# Patient Record
Sex: FEMALE | Race: AMERICAN INDIAN OR ALASKA NATIVE
[De-identification: names, ages, dates, MRNs, and addresses within clinical notes are randomized per-mention and may not be internally consistent; named-entity substitution may affect disease eponyms.]

---

## 2017-10-04 ENCOUNTER — HOSPITAL ENCOUNTER (INPATIENT)
Dept: HOSPITAL 31 - C.ER | Age: 52
LOS: 13 days | Discharge: HOME | DRG: 699 | End: 2017-10-17
Attending: INTERNAL MEDICINE | Admitting: INTERNAL MEDICINE
Payer: MEDICARE

## 2017-10-04 VITALS — BODY MASS INDEX: 18.7 KG/M2

## 2017-10-04 DIAGNOSIS — Z79.52: ICD-10-CM

## 2017-10-04 DIAGNOSIS — Z16.24: ICD-10-CM

## 2017-10-04 DIAGNOSIS — Z89.612: ICD-10-CM

## 2017-10-04 DIAGNOSIS — E80.6: ICD-10-CM

## 2017-10-04 DIAGNOSIS — N39.0: ICD-10-CM

## 2017-10-04 DIAGNOSIS — Z89.611: ICD-10-CM

## 2017-10-04 DIAGNOSIS — Z79.01: ICD-10-CM

## 2017-10-04 DIAGNOSIS — J45.909: ICD-10-CM

## 2017-10-04 DIAGNOSIS — E87.2: ICD-10-CM

## 2017-10-04 DIAGNOSIS — I50.9: ICD-10-CM

## 2017-10-04 DIAGNOSIS — Z95.5: ICD-10-CM
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
## 2017-10-04 DIAGNOSIS — D64.9: ICD-10-CM

## 2017-10-04 DIAGNOSIS — I11.0: ICD-10-CM

## 2017-10-04 DIAGNOSIS — E11.65: ICD-10-CM

## 2017-10-04 DIAGNOSIS — T83.511A: Primary | ICD-10-CM

## 2017-10-04 DIAGNOSIS — D86.89: ICD-10-CM

## 2017-10-04 DIAGNOSIS — Z86.718: ICD-10-CM

## 2017-10-04 DIAGNOSIS — I48.0: ICD-10-CM

## 2017-10-04 DIAGNOSIS — E11.51: ICD-10-CM

## 2017-10-04 DIAGNOSIS — K27.9: ICD-10-CM

## 2017-10-04 DIAGNOSIS — R19.7: ICD-10-CM

## 2017-10-04 DIAGNOSIS — Z95.810: ICD-10-CM

## 2017-10-04 DIAGNOSIS — I42.0: ICD-10-CM

## 2017-10-04 DIAGNOSIS — E87.6: ICD-10-CM

## 2017-10-04 DIAGNOSIS — E78.00: ICD-10-CM

## 2017-10-04 DIAGNOSIS — N31.9: ICD-10-CM

## 2017-10-04 DIAGNOSIS — B96.1: ICD-10-CM

## 2017-10-04 LAB
ALBUMIN/GLOB SERPL: 0.9 {RATIO} (ref 1–2.1)
ALP SERPL-CCNC: 700 U/L (ref 38–126)
ALT SERPL-CCNC: 68 U/L (ref 9–52)
AST SERPL-CCNC: 88 U/L (ref 14–36)
BACTERIA #/AREA URNS HPF: (no result) /[HPF]
BASOPHILS # BLD AUTO: 0 K/UL (ref 0–0.2)
BASOPHILS NFR BLD: 0 % (ref 0–2)
BILIRUB SERPL-MCNC: 7.1 MG/DL (ref 0.2–1.3)
BILIRUB UR-MCNC: (no result) MG/DL
BUN SERPL-MCNC: 13 MG/DL (ref 7–17)
CALCIUM SERPL-MCNC: 8.6 MG/DL (ref 8.6–10.4)
CHLORIDE SERPL-SCNC: 101 MMOL/L (ref 98–107)
CO2 SERPL-SCNC: 21 MMOL/L (ref 22–30)
COLOR UR: YELLOW
EOSINOPHIL # BLD AUTO: 0.1 K/UL (ref 0–0.7)
EOSINOPHIL NFR BLD: 1.9 % (ref 0–4)
EOSINOPHIL NFR BLD: 4 % (ref 0–4)
ERYTHROCYTE [DISTWIDTH] IN BLOOD BY AUTOMATED COUNT: 15 % (ref 11.5–14.5)
GLOBULIN SER-MCNC: 4.2 GM/DL (ref 2.2–3.9)
GLUCOSE SERPL-MCNC: 106 MG/DL (ref 65–105)
GLUCOSE UR STRIP-MCNC: NORMAL MG/DL
HCT VFR BLD CALC: 36.1 % (ref 34–47)
KETONES UR STRIP-MCNC: NEGATIVE MG/DL
LEUKOCYTE ESTERASE UR-ACNC: (no result) LEU/UL
LYMPHOCYTES # BLD AUTO: 4.5 K/UL (ref 1–4.3)
LYMPHOCYTES NFR BLD AUTO: 78.9 % (ref 20–40)
MCH RBC QN AUTO: 33.6 PG (ref 27–31)
MCHC RBC AUTO-ENTMCNC: 34.3 G/DL (ref 33–37)
MCV RBC AUTO: 98 FL (ref 81–99)
MONOCYTES # BLD: 0.6 K/UL (ref 0–0.8)
MONOCYTES NFR BLD: 11.4 % (ref 0–10)
NEUTROPHILS NFR BLD AUTO: 78 % (ref 50–75)
NRBC BLD AUTO-RTO: 0.1 % (ref 0–2)
PH UR STRIP: 5 [PH] (ref 5–8)
PLATELET # BLD: 280 K/UL (ref 130–400)
PMV BLD AUTO: 9.7 FL (ref 7.2–11.7)
POTASSIUM SERPL-SCNC: 3 MMOL/L (ref 3.6–5.2)
PROT SERPL-MCNC: 7.8 G/DL (ref 6.3–8.3)
PROT UR STRIP-MCNC: (no result) MG/DL
RBC # UR STRIP: (no result) /UL
RBC #/AREA URNS HPF: 72 /HPF (ref 0–3)
SODIUM SERPL-SCNC: 136 MMOL/L (ref 132–148)
SP GR UR STRIP: 1.02 (ref 1–1.03)
TOTAL CELLS COUNTED BLD: 100
UROBILINOGEN UR-MCNC: 4 MG/DL (ref 0.2–1)
WBC # BLD AUTO: 5.7 K/UL (ref 4.8–10.8)
WBC #/AREA URNS HPF: 937 /HPF (ref 0–5)

## 2017-10-04 RX ADMIN — DIPHENHYDRAMINE HYDROCHLORIDE PRN MG: 50 INJECTION INTRAMUSCULAR; INTRAVENOUS at 22:48

## 2017-10-04 RX ADMIN — INSULIN ASPART SCH: 100 INJECTION, SOLUTION INTRAVENOUS; SUBCUTANEOUS at 22:56

## 2017-10-04 NOTE — CP.PCM.HP
History of Present Illness





- History of Present Illness


History of Present Illness: 





CC: abdominal pain and wosening LFTS





HPI: 52 year old AA female, well known to me with  past medical history 

includes heapatic sarcoidosis (with diffuse invasion into liver), HTN, CHF, DM, 

bilateral above-knee amputation, and pacemaker, who is complanit with diet, 

medicationa nd follow uppresents to the ED for evaluation of right-sided 

abdominal pain which began a couple days ago. Patient states she was evaluated 

in [facility] yesterday and was found to have elevated liver 

functions and a urinary tract infection. Patient was later discharged. Patient 

states that Dr. Olmedo did not want patient to be discharged and advised her to 

present to this ED for further evaluation. Patient also reports chills, 

subjective fever, dry cough and nausea. She denies trouble breathing, 

vomiting.She also has nausea, no vomitting, chills, rigors not known fever, she 

aaaalso has chronic neurogenic bladder with foleys in place with cloudy urine 

in bag. She c/o abdominal pain, hip pain, b/l stump pain. denies any cough, 

sore throat, chest pain, orthopnea, PND 





Present on Admission





- Present on Admission


Any Indicators Present on Admission: Yes





Review of Systems





- Review of Systems


Systems not reviewed;Unavailable: Acuity of Condition





- Constitutional


Constitutional: absent: As Per HPI, Anorexia, Chills, Daytime Sleepiness, 

Excessive Sweating, Fatigue, Fever, Frequent Falls, Headache, Increased Appetite

, Lethargy, Malaise, Night Sweats, Snoring, Sleep Apnea, Weight Gain, Weight 

Loss, Weakness, Other





- EENT


Eyes: absent: As Per HPI, Blind Spots, Blurred Vision, Change in Vision, 

Decreased Night Vision, Diplopia, Discharge, Dry Eye, Exophthalmos, Floaters, 

Irritation, Itchy Eyes, Loss of Peripheral Vision, Pain, Photophobia, Requires 

Corrective Lenses, Sees Flashes, Spots in Vision, Tunnel Vision, Other Visual 

Disturbances, Loss of Vision, Other


Nose/Mouth/Throat: absent: As Per HPI, Epistaxis, Nasal Congestion, Nasal 

Discharge, Nasal Obstruction, Nasal Trauma, Nose Pain, Post Nasal Drip, Sinus 

Pain, Sinus Pressure, Bleeding Gums, Change in Voice, Dental Pain, Dry Mouth, 

Dysphagia, Halitosis, Hoarsness, Lip Swelling, Mouth Lesions, Mouth Pain, 

Odynophagia, Sore Throat, Throat Swelling, Tongue Swelling, Facial Pain, Neck 

Pain, Neck Mass, Other





- Cardiovascular


Cardiovascular: absent: As Per HPI, Acrocyanosis, Chest Pain, Chest Pain at Rest

, Chest Pain with Activity, Claudication, Diaphoresis, Dyspnea, Dyspnea on 

Exertion, Edema, Irregular Heart Rhythm, Pain Radiating to Arm/Neck/Jaw, Leg 

Edema, Leg Ulcers, Lightheadedness, Orthopnea, Palpitations, Paroxysmal 

Nocturnal Dyspnea, Pedal Edema, Radiating Pain, Rapid Heart Rate, Slow Heart 

Rate, Syncope, Other





- Gastrointestinal


Gastrointestinal: Abdominal Pain





- Genitourinary


Additional comments: 





neurogenic bladder


foleys in place





Past Patient History





- Infectious Disease


Hx of Infectious Diseases: None





- Tetanus Immunizations


Tetanus Immunization: Unknown





- Past Medical History & Family History


Past Medical History?: Yes





- Past Social History


Smoking Status: Never Smoked





- CARDIAC


Hx Cardia Arrhythmia: Yes


Hx Congestive Heart Failure: Yes


Hx Hypercholesterolemia: Yes


Hx Hypertension: Yes





- PULMONARY


Hx Asthma: Yes (NO INHALER-ON PREDNISONE DAILY PO.)





- NEUROLOGICAL


Hx Neurological Disorder: No





- HEENT


Hx HEENT Problems: Yes


Hx Cataracts: Yes (BILAT.)





- RENAL


Hx Chronic Kidney Disease: No





- ENDOCRINE/METABOLIC


Hx Endocrine Disorders: Yes


Hx Diabetes Mellitus Type 1: Yes





- HEMATOLOGICAL/ONCOLOGICAL


Hx Anemia: Yes





- INTEGUMENTARY


Hx Dermatological Problems: Yes





- MUSCULOSKELETAL/RHEUMATOLOGICAL


Hx Musculoskeletal Disorders: Yes


Hx Falls: Yes


Hx Unsteady Gait: Yes (PT. W/C BOUND BILAT AKA)


Other/Comment: ANABELLA ABOVE KNEE AMPUTATIONS..





- GASTROINTESTINAL


Hx Gall Bladder Disease: Yes





- GENITOURINARY/GYNECOLOGICAL


Hx Genitourinary Disorders: Yes


Hx Urinary Tract Infection: Yes


Other/Comment: PT HAS LONG TERM GUTIERREZ





- PSYCHIATRIC


Hx Substance Use: No





- SURGICAL HISTORY


Hx Appendectomy: Yes


Hx Cholecystectomy: Yes


Hx Coronary Stent: Yes





- ANESTHESIA


Hx Anesthesia: Yes


Hx Anesthesia Reactions: No


Hx Malignant Hyperthermia: No





Meds


Allergies/Adverse Reactions: 


 Allergies











Allergy/AdvReac Type Severity Reaction Status Date / Time


 


avocado Allergy Severe ANAPHYLAXIS Verified 10/04/17 17:28


 


banana Allergy Severe ANAPHYLAXIS Verified 10/04/17 17:28


 


cherry Allergy Severe ANAPHYLAXIS Verified 10/04/17 17:28


 


ketorolac [From Toradol] Allergy   Verified 10/04/17 17:28














Physical Exam





- Constitutional


Additional comments: 





elderly female in mild distress due to abdominal danitza n and jaundice





- Eye Exam


Eye Exam: Scleral icterus





- ENT Exam


ENT Exam: Mucous Membranes Moist, Normal Exam





- Neck Exam


Neck exam: Positive for: Normal Inspection





- Respiratory Exam


Respiratory Exam: Clear to Auscultation Bilateral, NORMAL BREATHING PATTERN





- Cardiovascular Exam


Cardiovascular Exam: REGULAR RHYTHM


Additional comments: 





S3 postive





- GI/Abdominal Exam


GI & Abdominal Exam: Organomegaly, Tenderness





- Rectal Exam


Rectal Exam: Deferred





Results





- Vital Signs


Recent Vital Signs: 





 Last Vital Signs











Temp  98 F   10/04/17 22:26


 


Pulse  56 L  10/04/17 22:26


 


Resp  20   10/04/17 22:26


 


BP  126/75   10/04/17 22:26


 


Pulse Ox  97   10/04/17 22:26














- Labs


Result Diagrams: 


 10/04/17 18:22





 10/04/17 18:22


Labs: 





 Laboratory Results - last 24 hr











  10/04/17 10/04/17 10/04/17





  18:20 18:22 18:22


 


WBC   5.7 


 


RBC   3.69 L 


 


Hgb   12.4 


 


Hct   36.1 


 


MCV   98.0  D 


 


MCH   33.6 H 


 


MCHC   34.3 


 


RDW   15.0 H 


 


Plt Count   280 


 


MPV   9.7 


 


Neut % (Auto)   7.8 L 


 


Lymph % (Auto)   78.9 H 


 


Mono % (Auto)   11.4 H 


 


Eos % (Auto)   1.9 


 


Baso % (Auto)   0.0 


 


Neut #   0.4 L 


 


Lymph #   4.5 H 


 


Mono #   0.6 


 


Eos #   0.1 


 


Baso #   0.0 


 


Neutrophils % (Manual)   78 H 


 


Lymphocytes % (Manual)   15 L 


 


Monocytes % (Manual)   3 


 


Eosinophils % (Manual)   4 


 


Platelet Estimate   Normal 


 


Anisocytosis (manual)   Slight 


 


Target Cells   Slight 


 


Sodium    136


 


Potassium    3.0 L


 


Chloride    101


 


Carbon Dioxide    21 L


 


Anion Gap    17


 


BUN    13


 


Creatinine    0.5 L


 


Est GFR ( Amer)    > 60


 


Est GFR (Non-Af Amer)    > 60


 


POC Glucose (mg/dL)   


 


Random Glucose    106 H


 


Calcium    8.6


 


Total Bilirubin    7.1 H


 


AST    88 H


 


ALT    68 H


 


Alkaline Phosphatase    700 H


 


Total Protein    7.8


 


Albumin    3.6


 


Globulin    4.2 H


 


Albumin/Globulin Ratio    0.9 L


 


Lipase    27


 


Urine Color  Yellow  


 


Urine Clarity  Hazy  


 


Urine pH  5.0  


 


Ur Specific Gravity  1.020  


 


Urine Protein  2+ H  


 


Urine Glucose (UA)  Normal  


 


Urine Ketones  Negative  


 


Urine Blood  1+ H  


 


Urine Nitrate  Positive H  


 


Urine Bilirubin  2+ H  


 


Urine Urobilinogen  4.0 H  


 


Ur Leukocyte Esterase  3+ H  


 


Urine WBC (Auto)  937 H  


 


Urine RBC (Auto)  72 H  


 


Ur Squamous Epith Cells  1  


 


Urine Bacteria  Many H  














  10/04/17





  22:41


 


WBC 


 


RBC 


 


Hgb 


 


Hct 


 


MCV 


 


MCH 


 


MCHC 


 


RDW 


 


Plt Count 


 


MPV 


 


Neut % (Auto) 


 


Lymph % (Auto) 


 


Mono % (Auto) 


 


Eos % (Auto) 


 


Baso % (Auto) 


 


Neut # 


 


Lymph # 


 


Mono # 


 


Eos # 


 


Baso # 


 


Neutrophils % (Manual) 


 


Lymphocytes % (Manual) 


 


Monocytes % (Manual) 


 


Eosinophils % (Manual) 


 


Platelet Estimate 


 


Anisocytosis (manual) 


 


Target Cells 


 


Sodium 


 


Potassium 


 


Chloride 


 


Carbon Dioxide 


 


Anion Gap 


 


BUN 


 


Creatinine 


 


Est GFR ( Amer) 


 


Est GFR (Non-Af Amer) 


 


POC Glucose (mg/dL)  196 H


 


Random Glucose 


 


Calcium 


 


Total Bilirubin 


 


AST 


 


ALT 


 


Alkaline Phosphatase 


 


Total Protein 


 


Albumin 


 


Globulin 


 


Albumin/Globulin Ratio 


 


Lipase 


 


Urine Color 


 


Urine Clarity 


 


Urine pH 


 


Ur Specific Gravity 


 


Urine Protein 


 


Urine Glucose (UA) 


 


Urine Ketones 


 


Urine Blood 


 


Urine Nitrate 


 


Urine Bilirubin 


 


Urine Urobilinogen 


 


Ur Leukocyte Esterase 


 


Urine WBC (Auto) 


 


Urine RBC (Auto) 


 


Ur Squamous Epith Cells 


 


Urine Bacteria 














Assessment & Plan


(1) Neurogenic bladder


Status: Acute   





(2) Sarcoidosis


Assessment and Plan: 


acute excerberation of he[atic sarcoidsois


most likely due to doisease activity as pt is complaint to medications


Status: Acute   





(3) Abdominal pain


Status: Acute   





(4) Hypertension


Status: Acute   





(5) Obstructive hyperbilirubinemia


Status: Acute

## 2017-10-04 NOTE — C.PDOC
History Of Present Illness


52 year old female, whose past medical history includes sarcoidosis (with 

diffuse invasion into liver), CHF, DM, bilateral above-knee amputation, and 

pacemaker, presents to the ED for evaluation of right-sided abdominal pain 

which began a couple days ago. Patient states she was evaluated in [facility] yesterday and was found to have elevated liver functions and a 

urinary tract infection. Patient was later discharged. Patient states that Dr. Olmedo did not want patient to be discharged and advised her to present to this 

ED for further evaluation. Patient also reports chills, subjective fever, dry 

cough and nausea. She denies trouble breathing, vomiting. 


Time Seen by Provider: 10/04/17 17:46


Chief Complaint (Nursing): Abdominal Pain


History Per: Patient


History/Exam Limitations: no limitations


Onset/Duration Of Symptoms: Days


Current Symptoms Are (Timing): Still Present


Location Of Pain/Discomfort: Other (right-sided )


Radiation Of Pain To:: None


Quality Of Discomfort: "Pain"


Associated Symptoms: Fever (subjective ), Chills


Additional History Per: Patient


Abnormal Vaginal Bleeding: No





Past Medical History


Reviewed: Historical Data, Nursing Documentation, Vital Signs


Vital Signs: 


 Last Vital Signs











Temp  98.9 F   10/04/17 17:23


 


Pulse  90   10/04/17 17:23


 


Resp  18   10/04/17 17:23


 


BP  168/90 H  10/04/17 17:23


 


Pulse Ox  99   10/04/17 18:16














- Medical History


PMH: Anemia, Asthma (NO INHALER-ON PREDNISONE DAILY PO.), CAD (stent x 1), 

Cardia Arrhythmia, CHF, Diabetes, Deep Vein Thrombosis, Gall Bladder Disease, 

HTN, Hypercholesterolemia


   Denies: Chronic Kidney Disease


Surgical History: Appendectomy, Cholecystectomy, Coronary Stent, Endoscopy, C-

Section





- [facility] Procedures








CENTRAL VENOUS CATHETER PLACEMENT WITH GUIDANCE (05/29/14)


CLOSED ENDOSCOPIC BIOPSY OF LARGE INTESTINE (11/19/14)


ENDOSC RETROGRADE CHOLANGIOPANCREATOGRAPHY [ERCP] (10/30/13)


ENDOSCOP INSERTION OF STENT (TUBE) INTO BILE DUCT (05/29/14)


ESOPHAGOGASTRODUODENOSCOPY [EGD] W/CLOSED BIOPSY (05/29/14)


OTHER LOCAL DESTRUC SKIN (10/30/13)


OTHER SKIN & SUBQ I   D (10/30/13)








Family History: States: Unknown Family Hx





- Social History


Hx Tobacco Use: No


Hx Alcohol Use: No


Hx Substance Use: No





- Immunization History


Hx Tetanus Toxoid Vaccination: No


Hx Influenza Vaccination: No


Hx Pneumococcal Vaccination: Yes (2015)





Review Of Systems


Constitutional: Positive for: Fever (subjective ), Chills


Respiratory: Positive for: Cough.  Negative for: Shortness of Breath, Sputum


Gastrointestinal: Positive for: Nausea, Abdominal Pain (right-sided ).  

Negative for: Vomiting





Physical Exam





- Physical Exam


Appears: Non-toxic, No Acute Distress


Skin: Normal Color, Warm, Dry


Head: Atraumatic, Normacephalic


Eye(s): bilateral: Normal Inspection


Oral Mucosa: Moist


Neck: Supple


Chest: Symmetrical, No Deformity, No Tenderness


Cardiovascular: Rhythm Regular, No Murmur


Respiratory: Normal Breath Sounds, No Rales, No Rhonchi, No Wheezing


Gastrointestinal/Abdominal: Soft, Tenderness (to right and left upper quadrants 

on palpation ), No Guarding, No Rebound, Other (diffusely rotund. suprapubic 

cathether in place, draining cloudy, dark nichelle urine )


Extremity: Normal ROM (baseline ), Capillary Refill (less than 2 seconds ), 

Other (bilateral above-knee amputation )


Neurological/Psych: Oriented x3, Normal Speech, Normal Cognition


Gait: Steady





ED Course And Treatment





- Laboratory Results


Result Diagrams: 


 10/04/17 18:22


Lab Interpretation: Abnormal (Markedly elevated alk phos 700, Urine nitrite + 

with 937 WBC and many bacteria)


O2 Sat by Pulse Oximetry: 99 (on RA)


Pulse Ox Interpretation: Normal


Progress Note: labs and UA ordered and reviewed.


Reevaluation Time: 18:52


Reassessment Condition: Improved (after pain medicaiton)





- Physician Consult Information


Time Consulting Physician Contacted: 18:00


Outcome Of Conversation: Case discussed with Dr Olmedo and Dr Dunbar. Patient 

to be admitted for treatment of hepatic sarcoidosis and UTI.





Disposition





- Disposition


Disposition: HOSPITALIZED


Disposition Time: 18:53


Condition: STABLE


Forms:  [facility] (English)





- POA


Present On Arrival: None





- Clinical Impression


Clinical Impression: 


 Sarcoidosis, UTI (urinary tract infection)








- Scribe Statement


The provider has reviewed the documentation as recorded by the Scribe (Sneha Thomas)


Provider Attestation: 








All medical record entries made by the Scribe were at my direction and 

personally dictated by me. I have reviewed the chart and agree that the record 

accurately reflects my personal performance of the history, physical exam, 

medical decision making, and the department course for this patient. I have 

also personally directed, reviewed, and agree with the discharge instructions 

and disposition.

## 2017-10-05 LAB
BUN SERPL-MCNC: 15 MG/DL (ref 7–17)
CALCIUM SERPL-MCNC: 9.5 MG/DL (ref 8.6–10.4)
CHLORIDE SERPL-SCNC: 102 MMOL/L (ref 98–107)
CO2 SERPL-SCNC: 25 MMOL/L (ref 22–30)
GLUCOSE SERPL-MCNC: 93 MG/DL (ref 65–105)
POTASSIUM SERPL-SCNC: 4.3 MMOL/L (ref 3.6–5.2)
SODIUM SERPL-SCNC: 137 MMOL/L (ref 132–148)

## 2017-10-05 RX ADMIN — DIPHENHYDRAMINE HYDROCHLORIDE PRN MG: 50 INJECTION INTRAMUSCULAR; INTRAVENOUS at 11:15

## 2017-10-05 RX ADMIN — DIPHENHYDRAMINE HYDROCHLORIDE PRN MG: 50 INJECTION INTRAMUSCULAR; INTRAVENOUS at 05:15

## 2017-10-05 RX ADMIN — PANTOPRAZOLE SODIUM SCH MG: 40 TABLET, DELAYED RELEASE ORAL at 09:42

## 2017-10-05 RX ADMIN — INSULIN ASPART SCH: 100 INJECTION, SOLUTION INTRAVENOUS; SUBCUTANEOUS at 11:52

## 2017-10-05 RX ADMIN — INSULIN ASPART SCH: 100 INJECTION, SOLUTION INTRAVENOUS; SUBCUTANEOUS at 21:27

## 2017-10-05 RX ADMIN — INSULIN ASPART SCH UNIT: 100 INJECTION, SOLUTION INTRAVENOUS; SUBCUTANEOUS at 17:13

## 2017-10-05 RX ADMIN — DIPHENHYDRAMINE HYDROCHLORIDE PRN MG: 50 INJECTION INTRAMUSCULAR; INTRAVENOUS at 17:33

## 2017-10-05 RX ADMIN — METOPROLOL SUCCINATE SCH MG: 25 TABLET, EXTENDED RELEASE ORAL at 09:42

## 2017-10-05 RX ADMIN — INSULIN DETEMIR SCH UNIT: 100 INJECTION, SOLUTION SUBCUTANEOUS at 09:40

## 2017-10-05 RX ADMIN — INSULIN ASPART SCH: 100 INJECTION, SOLUTION INTRAVENOUS; SUBCUTANEOUS at 08:11

## 2017-10-05 RX ADMIN — DIGOXIN SCH MG: 0.25 TABLET ORAL at 17:41

## 2017-10-05 NOTE — CON
SHORT CONSULTATION SHEET



LOCATION:  #370, bed AAndrew BOWDEN was called for GI consultation by the admitting MD as well as the ER

staff.  The patient is seen and fully examined today, 10/05/2017, for GI

consultation.  The entire chart is reviewed.  The patient is fully

examined.



The patient is a very well-known case for me, was sent from my office today

for evaluation due to her persistent abdominal pain, distention as well as

evidence of hematuria with cloudy urine in the Charles catheter.



Full dictated consultation sheet to follow.



FINAL DIAGNOSES:

1.  Urinary tract infection.

2.  Anemia.

3.  Sarcoidosis with hepatic sarcoidosis and abnormal liver function test.

4.  Partial colon resection.

5.  Status post cholecystectomy.

6.  Status post ERCP with biliary stent insertion.

7.  Known history of peripheral vascular disease with bilateral above-knee

amputation.

8.  Known history of hyperlipidemia with hypertension as well as coronary

artery disease with cardiac arrest.

9.  Diabetes mellitus by history.

10.  Bronchial asthma by history.

11.  Status post appendectomy, coronary artery stent insertion as well as

 section and partial colon resection with right hemicolectomy due

to intrapelvic and intraabdominal abscess formation before.



SUGGESTIONS:

1.  Agree with your plan.

2.  Due to the patient's jaundice and abnormal liver function tests,

steroid IV to be started for the patient's hepatic sarcoidosis.

3.  ID consultation.

4.  We will follow up closely with you.







__________________________________________

Jeffrey Albert MD



cc:  Jeffrey Albert MD



DD:  10/05/2017 12:58:55

DT:  10/05/2017 14:14:18

Job # [id number]

## 2017-10-05 NOTE — CP.PCM.PN
Subjective





- Date & Time of Evaluation


Date of Evaluation: 10/05/17


Time of Evaluation: 21:00





- Subjective


Subjective: 





Pt seen and examined, doing well c/o abdominal pain, urine cultures are pending

, UTI is due to chronic foleys in place





Objective





- Vital Signs/Intake and Output


Vital Signs (last 24 hours): 


 











Temp Pulse Resp BP Pulse Ox


 


 97.6 F   69   20   130/79   98 


 


 10/05/17 07:51  10/05/17 07:51  10/05/17 07:51  10/05/17 07:51  10/05/17 07:51








Intake and Output: 


 











 10/05/17 10/05/17





 06:59 18:59


 


Intake Total 240 


 


Output Total 900 


 


Balance -660 














- Medications


Medications: 


 Current Medications





Apixaban (Eliquis)  5 mg PO BID [facility]


Digoxin (Lanoxin)  0.25 mg PO DAILY@1800 [facility]


Diphenhydramine HCl (Benadryl)  25 mg IVP Q6 PRN


   PRN Reason: Itching / Pruritus


   Last Admin: 10/05/17 05:15 Dose:  25 mg


Insulin Aspart (Novolog)  0 unit SC ACHS [facility]


   PRN Reason: Protocol


   Last Admin: 10/05/17 08:11 Dose:  Not Given


Insulin Detemir (Levemir)  10 unit SC QAM [facility]


Losartan Potassium (Cozaar)  50 mg PO DAILY [facility]


Metoprolol Succinate (Toprol Xl)  25 mg PO DAILY [facility]


Morphine Sulfate (Morphine)  2 mg IVP Q6 PRN


   PRN Reason: Pain, severe (8-10)


   Last Admin: 10/05/17 05:09 Dose:  2 mg


Ondansetron HCl (Zofran Odt)  4 mg PO QID PRN


   PRN Reason: Nausea/Vomiting


Pantoprazole Sodium (Protonix Ec Tab)  40 mg PO DAILY [facility]


Prednisone (Prednisone Tab)  20 mg PO BID [facility]


   Last Admin: 10/04/17 20:21 Dose:  20 mg











- Labs


Labs: 


 





 10/04/17 18:22 





 10/04/17 18:22 











- Constitutional


Appears: No Acute Distress





- Head Exam


Head Exam: ATRAUMATIC, NORMAL INSPECTION, NORMOCEPHALIC





- Respiratory Exam


Respiratory Exam: Clear to Ausculation Bilateral, NORMAL BREATHING PATTERN





- Cardiovascular Exam


Cardiovascular Exam: REGULAR RHYTHM, +S1, +S2.  absent: Murmur





- GI/Abdominal Exam


GI & Abdominal Exam: Tenderness, Hypoactive Bowel Sounds


Additional comments: 





Hypoactive Bowel Sounds (BILATERAL AMPUTATION.), Soft, Tenderness (LOWER 

ABDOMEN AND RIGHT FLANK.)





- Extremities Exam


Additional comments: 





BILATERAL AMPUTATION.





Assessment and Plan


(1) Neurogenic bladder


Status: Acute   





(2) Sarcoidosis


Status: Acute   





(3) Abdominal pain


Status: Acute   





(4) Hypertension


Status: Acute   





(5) Obstructive hyperbilirubinemia


Status: Acute   





(6) UTI (urinary tract infection) due to urinary indwelling Charles catheter


Status: Acute

## 2017-10-06 LAB
APTT BLD: 38 SECONDS (ref 21–34)
INR PPP: 1.1

## 2017-10-06 RX ADMIN — DIPHENHYDRAMINE HYDROCHLORIDE PRN MG: 50 INJECTION INTRAMUSCULAR; INTRAVENOUS at 01:28

## 2017-10-06 RX ADMIN — INSULIN DETEMIR SCH UNIT: 100 INJECTION, SOLUTION SUBCUTANEOUS at 09:57

## 2017-10-06 RX ADMIN — DIGOXIN SCH MG: 0.25 TABLET ORAL at 17:37

## 2017-10-06 RX ADMIN — DIPHENHYDRAMINE HYDROCHLORIDE PRN MG: 50 INJECTION INTRAMUSCULAR; INTRAVENOUS at 23:42

## 2017-10-06 RX ADMIN — INSULIN ASPART SCH UNIT: 100 INJECTION, SOLUTION INTRAVENOUS; SUBCUTANEOUS at 17:38

## 2017-10-06 RX ADMIN — INSULIN ASPART SCH UNIT: 100 INJECTION, SOLUTION INTRAVENOUS; SUBCUTANEOUS at 08:08

## 2017-10-06 RX ADMIN — INSULIN ASPART SCH UNIT: 100 INJECTION, SOLUTION INTRAVENOUS; SUBCUTANEOUS at 12:08

## 2017-10-06 RX ADMIN — DIPHENHYDRAMINE HYDROCHLORIDE PRN MG: 50 INJECTION INTRAMUSCULAR; INTRAVENOUS at 09:57

## 2017-10-06 RX ADMIN — PANTOPRAZOLE SODIUM SCH MG: 40 TABLET, DELAYED RELEASE ORAL at 09:56

## 2017-10-06 RX ADMIN — DIPHENHYDRAMINE HYDROCHLORIDE PRN MG: 50 INJECTION INTRAMUSCULAR; INTRAVENOUS at 19:15

## 2017-10-06 RX ADMIN — METOPROLOL SUCCINATE SCH MG: 25 TABLET, EXTENDED RELEASE ORAL at 09:56

## 2017-10-06 RX ADMIN — INSULIN ASPART SCH: 100 INJECTION, SOLUTION INTRAVENOUS; SUBCUTANEOUS at 22:02

## 2017-10-06 NOTE — CON
DATE:



REASON FOR CONSULTATION:  Congestive heart failure and atrial fibrillation.



HISTORY OF PRESENT ILLNESS:  The patient is a 52-year-old 

female who has a history of cardiomyopathy, status post ICD placement in

2010, history of bilateral above-knee amputation, the first right

above-knee amputation was in 2015 secondary to diabetic foot followed by

above-knee amputation of the left foot, for what the patient stated that

the deep venous thrombosis and infection in 2016.  The patient is known to

have liver sarcoidosis and steroid therapy at home.  The patient is

diabetic and she attributed her diabetes to long-term use of steroid.  The

patient presents because of abdominal pain and was diagnosed with urinary

tract infection.  The patient did experience nausea.  She denies any

retrosternal chest pain.  She complains of occasional palpitation.  The

patient is on Eliquis therapy at home.  The patient denies any recent

discharge of the defibrillator and has recently seen her

electrophysiologist, Dr. Morin.



SOCIAL HISTORY:  Nonsmoker, nondrinker.



MEDICATIONS:  Eliquis 5 mg twice a day, Cozaar 50 mg once a day,

ceftazidime 2.5 g intravenously q. 12 hours, Lanoxin 0.25 mg daily,

morphine sulfate 2 mg intravenously q. 4 hours p.r.n., Protonix 40 mg p.o.

once a day, prednisone 20 mg twice a day, Toprol-XL 50 mg once a day,

Zofran 4 mg p.o. q. 4 hours p.r.n. for nausea and vomiting.



REVIEW OF SYSTEMS:  No fever or chills.  No dizziness.  No retrosternal

chest pain and no recent discharge from the defibrillator.



PHYSICAL EXAMINATION:

GENERAL:  The patient is a middle-aged female who does not appear to be in

acute distress.

VITAL SIGNS:  Blood pressure 167/80, heart rate 75, temperature 98.1,

respirations 20.

HEENT:  Mild facial edema.

CHEST:  Minimal basal rhonchi.

HEART:  S1 and S2 regular.

ABDOMEN:  Mild epigastric tenderness.

EXTREMITIES:  Bilateral above-knee amputations.



LABORATORY DATA:  Today's SMA-7, sodium 137, potassium 4.3, chloride 102,

CO2 of 25, glucose 93, BUN 15, creatinine 0.5, yesterday's potassium on

admission was 3.  Total bilirubin is elevated 7.1.  AST and ALT are

elevated at 88 and 68 respectively.  Alkaline phosphatase is significantly

elevated at 700.  Lipase is within normal limit.  Hemoglobin, hematocrit,

white count and platelet count are within normal limit.



EKG revealed atrial fibrillation with controlled ventricular response. 

Echocardiographic study performed in 10/2015 was consistent with ischemic

cardiomyopathy, ejection fraction was estimated to 30% to 35% with moderate

pulmonary hypertension.  Urine is positive for nitrites, bilirubin,

leukocyte esterase and significant wbc's and rbc's with many bacteria. 

Urine preliminary culture result is positive for Klebsiella pneumoniae.



ASSESSMENT:

1.  Paroxysmal atrial fibrillation.

2.  Cardiomyopathy, status post implantable cardioverter-defibrillator

placement.

3.  Bilateral above-knee amputation.

4.  Liver sarcoidosis.

5.  Improved hypokalemia.



RECOMMENDATIONS:  Continue current IV ceftazidime, continue Eliquis 5 mg

twice a day, Cozaar 50 mg once a day, Lanoxin 0.25 mg once a day, Toprol-XL

at 50 mg once a day.  Obtain serum digoxin level, PT, PTT, and INR. 

Transfer the patient to telemetry.





__________________________________________

Ankit Figeuroa MD





DD:  10/06/2017 17:24:11

DT:  10/06/2017 18:57:23

Job # [id number]

## 2017-10-06 NOTE — CP.PCM.CON
History of Present Illness





- History of Present Illness


History of Present Illness: 


INFECTIOUS DISEASE CONSULT;


HPI ; 


52 year old female, whose past medical history includes sarcoidosis (with 

diffuse invasion into liver), CHF, DM, bilateral above-knee amputation, and 

pacemaker, presents to the ED for evaluation of right-sided abdominal pain 

which began a couple days ago. Patient states she was evaluated in [facility] yesterday and was found to have elevated liver functions and a 

urinary tract infection. Patient was later discharged. Patient states that Dr. Olmedo did not want patient to be discharged and advised her to present to this 

ED for further evaluation. Patient also reports chills, subjective fever, dry 

cough and nausea. She denies trouble breathing. 





Pt has chronic neurogenic bladder with foleys in place with cloudy urine in 

bag. She c/o abdominal pain, hip pain, b/l stump pain. denies any cough, sore 

throat, chest pain, orthopnea, PND.





INFECTIOUS DISEASE CONSULTATION REQUESTED BY pmd AS URINE CULTURES POSITIVE FOR 

MULTIDRUG-RESISTANT kLEBSIELLA PNEUMONIAE.  aLSO PATIENT WITH MARKED 

HYPERBILIRUBINEMIA WITH TOTAL BILIRUBIN OF 7.1 AND CLOUDY URINE.








PMH: Anemia, Asthma (NO INHALER-ON PREDNISONE DAILY PO.), CAD (stent x 1), 

Cardia Arrhythmia, CHF, Diabetes, Deep Vein Thrombosis, Gall Bladder Disease, 

HTN, Hypercholesterolemia


   Denies: Chronic Kidney Disease


Surgical History: Appendectomy, Cholecystectomy, Coronary Stent, Endoscopy, C-

Section, B/L ABOVE-KNEE AMPUTATION.





- [facility] Procedures








CENTRAL VENOUS CATHETER PLACEMENT WITH GUIDANCE (05/29/14)


CLOSED ENDOSCOPIC BIOPSY OF LARGE INTESTINE (11/19/14)


ENDOSC RETROGRADE CHOLANGIOPANCREATOGRAPHY [ERCP] (10/30/13)


ENDOSCOP INSERTION OF STENT (TUBE) INTO BILE DUCT (05/29/14)


ESOPHAGOGASTRODUODENOSCOPY [EGD] W/CLOSED BIOPSY (05/29/14)


OTHER LOCAL DESTRUC SKIN (10/30/13)


OTHER SKIN & SUBQ I   D (10/30/13)








Family History: States: Unknown Family Hx





- Social History


Hx Tobacco Use: No


Hx Alcohol Use: No


Hx Substance Use: No





- Immunization History


Hx Tetanus Toxoid Vaccination: No


Hx Influenza Vaccination: No


Hx Pneumococcal Vaccination: Yes (2015)





ALLERGY; AVOCADO, BANANA, ANGEL, KETOROLAC.





Review of Systems





- Constitutional


Constitutional: Chills, Fever (SUBJECTIVE).  absent: Headache





- EENT


Eyes: absent: Change in Vision


Nose/Mouth/Throat: Dry Mouth.  absent: Mouth Lesions





- Cardiovascular


Cardiovascular: absent: Chest Pain, Dyspnea





- Respiratory


Respiratory: absent: Cough





- Gastrointestinal


Gastrointestinal: Abdominal Pain.  absent: Nausea, Vomiting





- Genitourinary


Genitourinary: Urinary Incontinence (NEUROGENIC BLADDER WITH CHRONIC Heath IN 

PLACE.)





- Neurological


Additional comments: 





BILATERAL AMPUTEE





- Hematologic/Lymphatic


Hematologic: As Per HPI





Past Patient History





- Infectious Disease


Hx of Infectious Diseases: None





- Tetanus Immunizations


Tetanus Immunization: Unknown





- Past Medical History & Family History


Past Medical History?: Yes





- Past Social History


Smoking Status: Never Smoked





- CARDIAC


Hx Cardia Arrhythmia: Yes


Hx Congestive Heart Failure: Yes


Hx Hypercholesterolemia: Yes


Hx Hypertension: Yes





- PULMONARY


Hx Asthma: Yes (NO INHALER-ON PREDNISONE DAILY PO.)





- NEUROLOGICAL


Hx Neurological Disorder: No





- HEENT


Hx HEENT Problems: Yes


Hx Cataracts: Yes (BILAT.)





- RENAL


Hx Chronic Kidney Disease: No





- ENDOCRINE/METABOLIC


Hx Endocrine Disorders: Yes


Hx Diabetes Mellitus Type 1: Yes





- HEMATOLOGICAL/ONCOLOGICAL


Hx Anemia: Yes





- INTEGUMENTARY


Hx Dermatological Problems: Yes





- MUSCULOSKELETAL/RHEUMATOLOGICAL


Hx Musculoskeletal Disorders: Yes


Hx Falls: Yes


Hx Unsteady Gait: Yes (PT. W/C BOUND BILAT AKA)


Other/Comment: ANABELLA ABOVE KNEE AMPUTATIONS..





- GASTROINTESTINAL


Hx Gall Bladder Disease: Yes





- GENITOURINARY/GYNECOLOGICAL


Hx Genitourinary Disorders: Yes


Hx Urinary Tract Infection: Yes


Other/Comment: PT HAS LONG TERM HEATH





- PSYCHIATRIC


Hx Substance Use: No





- SURGICAL HISTORY


Hx Appendectomy: Yes


Hx Cholecystectomy: Yes


Hx Coronary Stent: Yes





- ANESTHESIA


Hx Anesthesia: Yes


Hx Anesthesia Reactions: No


Hx Malignant Hyperthermia: No





Meds


Allergies/Adverse Reactions: 


 Allergies











Allergy/AdvReac Type Severity Reaction Status Date / Time


 


avocado Allergy Severe ANAPHYLAXIS Verified 10/04/17 17:28


 


banana Allergy Severe ANAPHYLAXIS Verified 10/04/17 17:28


 


cherry Allergy Severe ANAPHYLAXIS Verified 10/04/17 17:28


 


ketorolac [From Toradol] Allergy   Verified 10/04/17 17:28














- Medications


Medications: 


 Current Medications





Apixaban (Eliquis)  5 mg PO BID [facility]


   Last Admin: 10/06/17 09:56 Dose:  5 mg


Digoxin (Lanoxin)  0.25 mg PO DAILY@1800 [facility]


   Last Admin: 10/05/17 17:41 Dose:  0.25 mg


Diphenhydramine HCl (Benadryl)  25 mg IVP Q6 PRN


   PRN Reason: Itching / Pruritus


   Last Admin: 10/06/17 09:57 Dose:  25 mg


Ceftriaxone Sodium 1 gm/ (Sodium Chloride)  100 mls @ 100 mls/hr IVPB DAILY [facility]


   Last Admin: 10/06/17 10:44 Dose:  100 mls/hr


Insulin Aspart (Novolog)  0 unit SC ACHS [facility]


   PRN Reason: Protocol


   Last Admin: 10/06/17 12:08 Dose:  1 unit


Insulin Detemir (Levemir)  10 unit SC QAM [facility]


   Last Admin: 10/06/17 09:57 Dose:  10 unit


Losartan Potassium (Cozaar)  100 mg PO DAILY [facility]


Metoprolol Succinate (Toprol Xl)  25 mg PO DAILY [facility]


   Last Admin: 10/06/17 09:56 Dose:  25 mg


Morphine Sulfate (Morphine)  2 mg IVP Q4 PRN


   PRN Reason: Pain, severe (8-10)


   Last Admin: 10/06/17 09:57 Dose:  2 mg


Ondansetron HCl (Zofran Odt)  4 mg PO QID PRN


   PRN Reason: Nausea/Vomiting


Pantoprazole Sodium (Protonix Ec Tab)  40 mg PO DAILY [facility]


   Last Admin: 10/06/17 09:56 Dose:  40 mg


Prednisone (Prednisone Tab)  20 mg PO BID [facility]


   Last Admin: 10/06/17 09:56 Dose:  20 mg











Physical Exam





- Constitutional


Appears: No Acute Distress





- Head Exam


Head Exam: NORMAL INSPECTION





- Eye Exam


Eye Exam: EOMI, PERRL, Scleral icterus





- ENT Exam


ENT Exam: Normal Oropharynx





- Respiratory Exam


Respiratory Exam: Decreased Breath Sounds





- Cardiovascular Exam


Cardiovascular Exam: REGULAR RHYTHM, +S1, +S2





- GI/Abdominal Exam


GI & Abdominal Exam: Hypoactive Bowel Sounds (BILATERAL AMPUTATION.), Soft, 

Tenderness (LOWER ABDOMEN AND RIGHT FLANK.)





- Neurological Exam


Neurological exam: Alert, CN II-XII Intact





- Psychiatric Exam


Psychiatric exam: Normal Affect





- Skin


Skin Exam: Warm





Results





- Vital Signs


Recent Vital Signs: 


 Last Vital Signs











Temp  98.1 F   10/06/17 07:44


 


Pulse  102 H  10/06/17 11:33


 


Resp  20   10/06/17 11:33


 


BP  128/88   10/06/17 11:33


 


Pulse Ox  99   10/06/17 11:33














- Labs


Result Diagrams: 


 10/04/17 18:22





 10/05/17 11:16


Labs: 


 Laboratory Results - last 24 hr











  10/05/17 10/05/17 10/06/17





  16:23 21:20 07:06


 


POC Glucose (mg/dL)  256 H  170 H  223 H














  10/06/17





  11:18


 


POC Glucose (mg/dL)  199 H














Assessment & Plan


(1) UTI (urinary tract infection)


Status: Acute   





(2) Neurogenic bladder


Status: Acute   





(3) Sarcoidosis


Status: Acute   





(4) Abdominal pain


Status: Acute   





(5) Congestive heart failure


Status: Acute   





(6) Hypertension


Status: Acute   





- Assessment and Plan (Free Text)


Plan: 





PLAN;


PANCULTURES.


CHANGE Heath AND REPEAT MIDSTREAM ua URINE CULTURE.


START iv AVYCAZ 2.5 MG iv PIGGYBACK EVERY 12 HOURLY FOR MDR UROSEPSIS.


STRICT CONTACT PRECAUTIONS.


CALLED MICRO TO CHECK FOR SENSITIVITY FOR AVYCAZ, IMIPENEM, AND COLISTIN.





fOLLOW-UP BLOOD CULTURES TO ADJUST ANTIBIOTICS.





CT ABDOMEN AND PELVIS WITHOUT CONTRAST R/O URETHRAL CALCULI OR NEPHROLITHIASIS.


F/U LFTS AS PER GI.


PROBNP.


LFTS.


BMP.


CASE DISCUSSED WITH THE STAFF


WILL FOLLOW PATIENT ALONG WITH YOU.

## 2017-10-06 NOTE — PN
LOCATION:  #370, bed A.



SUBJECTIVE:  This is a 52-year-old female, seen and examined in rounds

early today.  The entire chart is reviewed including, but not limited to

the most recent lab and radiology study results, current and the previous

medication list, current and the previous medical events.  Case discussed

at length.  The patient is still having intermittent periods of abdominal

pain, postprandial abdominal distension with generalized weakness and

malaise.  Most recent lab result shows blood glucose level of 223 with

normal SMA-7 and basically normal CBC, but increased total bilirubin of 7.1

with AST elevated to 88, ALT 68 with alkaline phosphatase 700.



Urine culture is still pending.  The patient has many bacteria in the

urine.



PHYSICAL EXAMINATION:

GENERAL:  A 52-year-old female, awake, alert, and oriented.

VITAL SIGNS:  Afebrile with pulse of 64, respiratory is 20 to 22 and blood

pressure 166/72.

HEENT:  Showed pale, dry oral mucoid membrane.  Bilateral icteric sclerae.

LUNGS:  Few scattered crepitation.  Decreased air entry at bases.

HEART:  Positive S1 and S2.

ABDOMEN:  Soft.  Bowel sounds are present.  No mass or organomegaly.  No

rebound tenderness or guarding.

EXTREMITIES:  Evidence of bilateral above-knee amputation.

NEUROLOGIC:  No neurological deficits, sensory or motor.



LABORATORY DATA:  The patient still has Charles catheter with cloudy urine

and bloody contents.



IMPRESSION:

1.  Re-exacerbation of sarcoidosis with hepatic sarcoid disease.

2.  Abnormal liver function tests secondary to above.

3.  Urinary tract infection.

4.  Neurogenic bladder.

5.  Known history of, but not limited to cardiac arrhythmia, congestive

heart failure, diabetes mellitus, hypertension with hyperlipidemia as well

as deep venous thrombosis.

6.  Known history of _____ status post biliary stent insertion and status

post cholecystectomy.

7.  Partial colon resection secondary to intraabdominal and intrapelvic

abscess formation before.



SUGGESTIONS:

1.  Agree with your plan.

2.  Abdominal ultrasound.

3.  IV antibiotic with infectious disease consultation.

4.  Further recommendations to follow.







__________________________________________

Jeffrey Albert MD



cc:  Jeffrey Albert MD



DD:  10/06/2017 10:33:33

DT:  10/06/2017 11:15:25

Job # [id number]

## 2017-10-07 LAB
ALBUMIN/GLOB SERPL: 0.7 {RATIO} (ref 1–2.1)
ALP SERPL-CCNC: 638 U/L (ref 38–126)
ALT SERPL-CCNC: 105 U/L (ref 9–52)
AST SERPL-CCNC: 128 U/L (ref 14–36)
BILIRUB DIRECT SERPL-MCNC: 4.1 MG/DL (ref 0–0.4)
BILIRUB SERPL-MCNC: 4.7 MG/DL (ref 0.2–1.3)
BUN SERPL-MCNC: 21 MG/DL (ref 7–17)
CALCIUM SERPL-MCNC: 9.4 MG/DL (ref 8.6–10.4)
CHLORIDE SERPL-SCNC: 103 MMOL/L (ref 98–107)
CO2 SERPL-SCNC: 18 MMOL/L (ref 22–30)
GLOBULIN SER-MCNC: 4.8 GM/DL (ref 2.2–3.9)
GLUCOSE SERPL-MCNC: 189 MG/DL (ref 65–105)
POTASSIUM SERPL-SCNC: 4.6 MMOL/L (ref 3.6–5.2)
PROT SERPL-MCNC: 8.2 G/DL (ref 6.3–8.3)
SODIUM SERPL-SCNC: 134 MMOL/L (ref 132–148)

## 2017-10-07 RX ADMIN — DIPHENHYDRAMINE HYDROCHLORIDE PRN MG: 50 INJECTION INTRAMUSCULAR; INTRAVENOUS at 03:44

## 2017-10-07 RX ADMIN — INSULIN ASPART SCH: 100 INJECTION, SOLUTION INTRAVENOUS; SUBCUTANEOUS at 21:46

## 2017-10-07 RX ADMIN — Medication SCH: at 17:52

## 2017-10-07 RX ADMIN — Medication SCH MG: at 17:52

## 2017-10-07 RX ADMIN — DIPHENHYDRAMINE HYDROCHLORIDE PRN MG: 50 INJECTION INTRAMUSCULAR; INTRAVENOUS at 20:01

## 2017-10-07 RX ADMIN — METOPROLOL SUCCINATE SCH MG: 50 TABLET, EXTENDED RELEASE ORAL at 10:09

## 2017-10-07 RX ADMIN — DIPHENHYDRAMINE HYDROCHLORIDE PRN MG: 50 INJECTION INTRAMUSCULAR; INTRAVENOUS at 07:41

## 2017-10-07 RX ADMIN — DIPHENHYDRAMINE HYDROCHLORIDE PRN MG: 50 INJECTION INTRAMUSCULAR; INTRAVENOUS at 11:47

## 2017-10-07 RX ADMIN — INSULIN ASPART SCH: 100 INJECTION, SOLUTION INTRAVENOUS; SUBCUTANEOUS at 11:49

## 2017-10-07 RX ADMIN — DIGOXIN SCH MG: 0.25 TABLET ORAL at 17:53

## 2017-10-07 RX ADMIN — PANTOPRAZOLE SODIUM SCH MG: 40 TABLET, DELAYED RELEASE ORAL at 10:07

## 2017-10-07 RX ADMIN — DIPHENHYDRAMINE HYDROCHLORIDE PRN MG: 50 INJECTION INTRAMUSCULAR; INTRAVENOUS at 15:59

## 2017-10-07 RX ADMIN — INSULIN ASPART SCH: 100 INJECTION, SOLUTION INTRAVENOUS; SUBCUTANEOUS at 07:52

## 2017-10-07 RX ADMIN — INSULIN ASPART SCH UNIT: 100 INJECTION, SOLUTION INTRAVENOUS; SUBCUTANEOUS at 17:51

## 2017-10-07 RX ADMIN — INSULIN DETEMIR SCH UNIT: 100 INJECTION, SOLUTION SUBCUTANEOUS at 10:07

## 2017-10-07 NOTE — RAD
HISTORY:

CHF  



COMPARISON:

04/26/2017 



FINDINGS:



LUNGS:

No active pulmonary disease.



PLEURA:

No significant pleural effusion identified, no pneumothorax apparent.



CARDIOVASCULAR:

 No radiographic findings to suggest acute or significant 

cardiovascular disease. Position/ configuration of pacemaker

device: Satisfactory.



OSSEOUS STRUCTURES:

No significant abnormalities.



VISUALIZED UPPER ABDOMEN:

Normal.



OTHER FINDINGS:

None.



IMPRESSION:

No active disease. No significant interval change compared to the 

prior examination(s).

## 2017-10-07 NOTE — CP.PCM.PN
Subjective





- Date & Time of Evaluation


Date of Evaluation: 10/07/17


Time of Evaluation: 09:45





- Subjective


Subjective: 





PT SEEN AND EXAMINED ATBEDSIDE, CONTINUES TO C/O BACK PAIN , ABDOMINAL PAIN, IS 

ANXIOUS





Objective





- Vital Signs/Intake and Output


Vital Signs (last 24 hours): 


 











Temp Pulse Resp BP Pulse Ox


 


 97.5 F L  64   20   121/81   100 


 


 10/06/17 23:15  10/06/17 23:15  10/06/17 23:15  10/06/17 23:15  10/06/17 23:15








Intake and Output: 


 











 10/06/17 10/07/17





 18:59 06:59


 


Intake Total 460 700


 


Output Total 600 850


 


Balance -140 -150














- Medications


Medications: 


 Current Medications





Apixaban (Eliquis)  5 mg PO BID [facility]


   Last Admin: 10/06/17 17:37 Dose:  5 mg


Digoxin (Lanoxin)  0.25 mg PO DAILY@1800 [facility]


   Last Admin: 10/06/17 17:37 Dose:  0.25 mg


Diphenhydramine HCl (Benadryl)  25 mg IVP Q4H PRN


   PRN Reason: pruritus


   Last Admin: 10/06/17 23:42 Dose:  25 mg


Ceftazidime/Avibactam 2.5 gm/ (Sodium Chloride)  100 mls @ 50 mls/hr IV Q12H [facility]


   Last Admin: 10/06/17 17:36 Dose:  50 mls/hr


Insulin Aspart (Novolog)  0 unit SC ACHS [facility]


   PRN Reason: Protocol


   Last Admin: 10/06/17 22:02 Dose:  Not Given


Insulin Detemir (Levemir)  10 unit SC QAM [facility]


   Last Admin: 10/06/17 09:57 Dose:  10 unit


Losartan Potassium (Cozaar)  50 mg PO DAILY [facility]


Metoprolol Succinate (Toprol Xl)  50 mg PO DAILY [facility]


Morphine Sulfate (Morphine)  2 mg IVP Q4 PRN


   PRN Reason: Pain, severe (8-10)


   Last Admin: 10/06/17 23:44 Dose:  2 mg


Ondansetron HCl (Zofran Odt)  4 mg PO QID PRN


   PRN Reason: Nausea/Vomiting


Pantoprazole Sodium (Protonix Ec Tab)  40 mg PO DAILY [facility]


   Last Admin: 10/06/17 09:56 Dose:  40 mg


Prednisone (Prednisone Tab)  20 mg PO BID [facility]


   Last Admin: 10/06/17 17:36 Dose:  20 mg


Zolpidem Tartrate (Ambien)  5 mg PO HS PRN


   PRN Reason: Insomnia


   Last Admin: 10/06/17 22:56 Dose:  5 mg











- Labs


Labs: 


 





 10/04/17 18:22 





 10/05/17 11:16 





 











PT  12.4 SECONDS (9.7-12.2)  H  10/06/17  19:40    


 


INR  1.1   10/06/17  19:40    


 


APTT  38 SECONDS (21-34)  H  10/06/17  19:40    














- Constitutional


Appears: No Acute Distress





- Head Exam


Head Exam: ATRAUMATIC, NORMAL INSPECTION, NORMOCEPHALIC





- Respiratory Exam


Respiratory Exam: Clear to Ausculation Bilateral





- Cardiovascular Exam


Cardiovascular Exam: +S1, +S2, +S4





- GI/Abdominal Exam


GI & Abdominal Exam: Tenderness, Normal Bowel Sounds


Additional comments: 





RUQ TENDERNESS





Assessment and Plan


(1) Neurogenic bladder


Status: Acute   





(2) Sarcoidosis


Status: Acute   





(3) Abdominal pain


Status: Acute   





(4) Hypertension


Status: Acute   





(5) Obstructive hyperbilirubinemia


Status: Acute   





(6) UTI (urinary tract infection) due to urinary indwelling Charles catheter


Status: Acute

## 2017-10-07 NOTE — CP.PCM.PN
Subjective





- Date & Time of Evaluation


Date of Evaluation: 10/07/17


Time of Evaluation: 18:42





- Subjective


Subjective: 





 CHIEF COMPLAINTS  TODAY :


afebrile, tachycardic.


c/o right upper quadrant and flank pain going to the back


c/o dry cough.





ROS





HEENT :  N.ICTERIC


Resp :       No  SOB wheezing, +ve DRY COUGH


Cardio :     No CP, PND orthopnea 


GI :           No abd. Pain, n/v 


CNS : No headache , focal deficit. 


Musculoskel :  N


Ext. : Pedal pulses intact, no edema or calf pain 


Derm :        N


Psych :     N. 








PE. 


Pt. is alert awake in no distress. 





V.S  As noted in the chart 





Head ,ear nose,throat and eyes : Normal.,sclera icteric.


Neck : Supple with normal carotids.


Lungs  EXPIRATORY WHEEZE


Heart : S1 & S2 normal . . No murmur. S4 + 


Abd : Soft MILD TENDERNESS RIGHT UPPER QUADRANT with normal bowel sounds.


Neuro : Moves all ext. with no localized deficit.


Ext : No edema with intact pulses. Neg. calf tenderness 


Derm : No rashes or decubitus ulcer.





Radiology/Labs .


blood cultures-ve for 24 hours


Repeat urine culture? multiple species ? contaminants





Initial urine culture Klebsiella pneumoniae -veESBL S- TYGACIL,GENTAMYCIN -CECILLE 

4.


LFTS  10/7/17


 TOTAL BILI 4.7, DIRECT BILI 4.1, ast 128, alt 105, ALKALINE PHOSPHATASE 638 

IMPROVING.














Objective





- Vital Signs/Intake and Output


Vital Signs (last 24 hours): 


 











Temp Pulse Resp BP Pulse Ox


 


 97.8 F   101 H  20   138/89   95 


 


 10/07/17 16:00  10/07/17 16:00  10/07/17 16:00  10/07/17 16:00  10/07/17 16:00








Intake and Output: 


 











 10/07/17 10/07/17





 06:59 18:59


 


Intake Total 950 


 


Output Total 1450 


 


Balance -500 














- Medications


Medications: 


 Current Medications





Apixaban (Eliquis)  5 mg PO BID [facility]


   Last Admin: 10/07/17 17:52 Dose:  5 mg


Digoxin (Lanoxin)  0.25 mg PO DAILY@1800 [facility]


   Last Admin: 10/07/17 17:53 Dose:  0.25 mg


Diphenhydramine HCl (Benadryl)  25 mg IVP Q4H PRN


   PRN Reason: pruritus


   Last Admin: 10/07/17 15:59 Dose:  25 mg


Ceftazidime/Avibactam 2.5 gm/ (Sodium Chloride)  100 mls @ 50 mls/hr IV Q12H [facility]


   Last Admin: 10/07/17 17:51 Dose:  50 mls/hr


Insulin Aspart (Novolog)  0 unit SC ACHS [facility]


   PRN Reason: Protocol


   Last Admin: 10/07/17 17:51 Dose:  4 unit


Insulin Detemir (Levemir)  10 unit SC QAM [facility]


   Last Admin: 10/07/17 10:07 Dose:  10 unit


Losartan Potassium (Cozaar)  50 mg PO DAILY [facility]


   Last Admin: 10/07/17 10:10 Dose:  50 mg


Metoprolol Succinate (Toprol Xl)  50 mg PO DAILY [facility]


   Last Admin: 10/07/17 10:09 Dose:  50 mg


Morphine Sulfate (Morphine)  2 mg IVP Q4 PRN


   PRN Reason: Pain, severe (8-10)


   Last Admin: 10/07/17 15:59 Dose:  2 mg


Ondansetron HCl (Zofran Odt)  4 mg PO QID PRN


   PRN Reason: Nausea/Vomiting


Pantoprazole Sodium (Protonix Ec Tab)  40 mg PO DAILY [facility]


   Last Admin: 10/07/17 10:07 Dose:  40 mg


Prednisone (Prednisone Tab)  20 mg PO BID [facility]


   Last Admin: 10/07/17 17:52 Dose:  20 mg


Saccharomyces Boulardii (Florastor)  250 mg PO BID [facility]


   Last Admin: 10/07/17 17:52 Dose:  250 mg


Zolpidem Tartrate (Ambien)  5 mg PO HS PRN


   PRN Reason: Insomnia


   Last Admin: 10/06/17 22:56 Dose:  5 mg











- Labs


Labs: 


 





 10/04/17 18:22 





 10/07/17 06:23 





 











PT  12.4 SECONDS (9.7-12.2)  H  10/06/17  19:40    


 


INR  1.1   10/06/17  19:40    


 


APTT  38 SECONDS (21-34)  H  10/06/17  19:40    














Assessment and Plan


(1) UTI (urinary tract infection)


Status: Acute   





(2) Neurogenic bladder


Status: Acute   





(3) Sarcoidosis


Status: Acute   





(4) Abdominal pain


Status: Acute   





(5) Congestive heart failure


Status: Acute   





(6) Hypertension


Status: Acute   





- Assessment and Plan (Free Text)


Plan: 





ON IV AVYCAZ 2.5 MG iv PIGGYBACK EVERY 12 HOURLY FOR MDR UROSEPSIS. 10/6/17.


PATIENT TOLERATING IT WITHOUT SIDE EFFECTS.


STRICT CONTACT PRECAUTIONS.


 MICRO TO CHECK FOR SENSITIVITY FOR AVYCAZ, IMIPENEM, AND COLISTIN.-P





fOLLOW-UP BLOOD CULTURES TO ADJUST ANTIBIOTICS.





CT ABDOMEN AND PELVIS WITHOUT CONTRAST R/O URETHRAL CALCULI OR NEPHROLITHIASIS.


F/U LFTS AS PER GI.


PROBNP.


BMP.

## 2017-10-07 NOTE — PN
DATE:



LOCATION:  [facility], bed A.



SUBJECTIVE:  The patient is a 52-year-old female seen and examined in

rounds with intermittent periods of chest discomfort with abdominal pain

with underlying diagnosis of urinary tract infection, still awaiting

official report of the urine culture and ID consultation workup.



The patient is awake, alert and oriented with intermittent complaint of

persistent abdominal pain and distention, but less than before in severity.



Entire chart is reviewed including but not limited to the most recent lab

and radiology study results, current and the previous medication list,

current and the previous medical events.  Case discussed at length with the

staff in the floor.



Today's lab showed low CO2 content of 18 indicative of metabolic acidosis,

increased BUN of 21 but low creatinine with blood glucose level of 189 with

total bilirubin _____ with AST of 128, ALT of 105, alkaline phosphatase of

638 with low albumin of 3.4 with the latest normal CBC, but abnormal

urinalysis.



PHYSICAL EXAMINATION:

GENERAL:  A 52-year-old female.

VITAL SIGNS:  Afebrile with pulse of 68, respiratory 20 to 22 and blood

pressure 150/76.

HEENT:  Showed pale and dry mucoid membrane.  Bilateral icteric sclerae.

LUNGS:  Few scattered crepitation.  Decreased air entry at bases.

HEART:  Positive S1 and S2.

ABDOMEN:  Soft.  Bowel sounds are present with mild generalized tenderness.

No mass or organomegaly.  No rebound tenderness or guarding, but abdominal

distention is seen.

EXTREMITIES:  Evidence of bilateral above-knee amputation.

NEUROLOGIC:  No new neurological deficits, sensory or motor.



The patient is seen by Dr. Figueroa for cardiology evaluation and to be

transferred to telemetry unit.



IMPRESSION:

1.  Hepatic sarcoidosis with abnormal liver function test, gradually

improving.

2.  Abnormal liver function test with jaundice, most likely secondary to

above as well as secondary to infectious process.

3.  Known history of severe _____ spasm and stenosis with status post

biliary stent insertion.

4.  Urinary tract infection, to rule out an early phase of urosepsis.

5.  Neurogenic bladder by history.

6.  Peripheral vascular disease with bilateral above-knee amputation.

7.  Known history of cardiac arrhythmia, congestive heart failure,

hypertension and hyperlipidemia.

8.  Poorly-controlled diabetes mellitus.

9.  Known history of deep vein thrombosis.

10.  Known history of status post partial colon resection several months

ago.



SUGGESTION:

1.  Agree with your plan.

2.  Steroids IV.

3.  Abdominal ultrasound.



Further recommendation to follow.





__________________________________________

Jeffrey Albert MD



cc:  Jeffrey Albert MD



DD:  10/07/2017 12:08:38

DT:  10/07/2017 15:54:54

Job # [id number]

## 2017-10-07 NOTE — PN
DATE:



SUBJECTIVE:  The patient complains of palpitations.  Abdominal pain has

improved.  She is mildly short of breath.



PHYSICAL EXAMINATION

VITAL SIGNS:  Blood pressure 138/89, heart rate 101, temperature 97.8,

respiration 20, monitor reveals sinus rhythm with frequent APCs.

HEENT:  Normocephalic.

CARDIOPULMONARY:  S1 and S2, regular.

LUNGS:  Diminished breath sounds at the bases.

ABDOMEN:  Soft.

EXTREMITIES:  Bilateral above-knee amputation.



LABORATORY DATA:  Digoxin level is 1.1.



ASSESSMENT AND PLAN:

1.  Paroxysmal atrial fibrillation.

2.  Cardiomyopathy, status post implantable cardioverter-defibrillator

implantation.

3.  Bilateral above-knee amputation.

4.  Liver sarcoidosis.

5.  Mild cholestasis.

6.  Urinary tract infection.



RECOMMENDATIONS:  Continue IV ceftazidime.  Continue Eliquis 5 mg twice a

day, Lanoxin 0.25 mg daily, prednisone 20 mg twice a day, Cozaar 50 mg once

a day.  I will review echocardiograph study performed today.





__________________________________________

Ankit Figueroa MD



DD:  10/07/2017 18:10:37

DT:  10/07/2017 19:09:09

Job # [id number]

## 2017-10-07 NOTE — CP.PCM.PN
Subjective





- Date & Time of Evaluation


Date of Evaluation: 10/06/17


Time of Evaluation: 17:35





- Subjective


Subjective: 





Pt seen and examined today, she continues to complain of abdominal pain in left 

flank area,started on antibiotics as per ID


START iv AVYCAZ 2.5 MG iv PIGGYBACK EVERY 12 HOURLY FOR MDR UROSEPSIS.


STRICT CONTACT PRECAUTIONS.








Objective





- Vital Signs/Intake and Output


Vital Signs (last 24 hours): 


 











Temp Pulse Resp BP Pulse Ox


 


 97.5 F L  64   20   121/81   100 


 


 10/06/17 23:15  10/06/17 23:15  10/06/17 23:15  10/06/17 23:15  10/06/17 23:15








Intake and Output: 


 











 10/06/17 10/07/17





 18:59 06:59


 


Intake Total 460 700


 


Output Total 600 850


 


Balance -140 -150














- Medications


Medications: 


 Current Medications





Apixaban (Eliquis)  5 mg PO BID [facility]


   Last Admin: 10/06/17 17:37 Dose:  5 mg


Digoxin (Lanoxin)  0.25 mg PO DAILY@1800 [facility]


   Last Admin: 10/06/17 17:37 Dose:  0.25 mg


Diphenhydramine HCl (Benadryl)  25 mg IVP Q4H PRN


   PRN Reason: pruritus


   Last Admin: 10/06/17 23:42 Dose:  25 mg


Ceftazidime/Avibactam 2.5 gm/ (Sodium Chloride)  100 mls @ 50 mls/hr IV Q12H [facility]


   Last Admin: 10/06/17 17:36 Dose:  50 mls/hr


Insulin Aspart (Novolog)  0 unit SC ACHS [facility]


   PRN Reason: Protocol


   Last Admin: 10/06/17 22:02 Dose:  Not Given


Insulin Detemir (Levemir)  10 unit SC QAM [facility]


   Last Admin: 10/06/17 09:57 Dose:  10 unit


Losartan Potassium (Cozaar)  50 mg PO DAILY [facility]


Metoprolol Succinate (Toprol Xl)  50 mg PO DAILY [facility]


Morphine Sulfate (Morphine)  2 mg IVP Q4 PRN


   PRN Reason: Pain, severe (8-10)


   Last Admin: 10/06/17 23:44 Dose:  2 mg


Ondansetron HCl (Zofran Odt)  4 mg PO QID PRN


   PRN Reason: Nausea/Vomiting


Pantoprazole Sodium (Protonix Ec Tab)  40 mg PO DAILY [facility]


   Last Admin: 10/06/17 09:56 Dose:  40 mg


Prednisone (Prednisone Tab)  20 mg PO BID [facility]


   Last Admin: 10/06/17 17:36 Dose:  20 mg


Zolpidem Tartrate (Ambien)  5 mg PO HS PRN


   PRN Reason: Insomnia


   Last Admin: 10/06/17 22:56 Dose:  5 mg











- Labs


Labs: 


 





 10/04/17 18:22 





 10/05/17 11:16 





 











PT  12.4 SECONDS (9.7-12.2)  H  10/06/17  19:40    


 


INR  1.1   10/06/17  19:40    


 


APTT  38 SECONDS (21-34)  H  10/06/17  19:40    














- Constitutional


Appears: No Acute Distress, Chronically Ill





- Head Exam


Head Exam: ATRAUMATIC, NORMAL INSPECTION, NORMOCEPHALIC





- Eye Exam


Eye Exam: EOMI, Normal appearance, PERRL


Pupil Exam: NORMAL ACCOMODATION, PERRL





- Respiratory Exam


Respiratory Exam: Clear to Ausculation Bilateral, NORMAL BREATHING PATTERN





- Cardiovascular Exam


Cardiovascular Exam: REGULAR RHYTHM, +S1, +S2.  absent: Murmur





- GI/Abdominal Exam


GI & Abdominal Exam: Tenderness, Hypoactive Bowel Sounds





- Rectal Exam


Rectal Exam: Deferred





- Extremities Exam


Additional comments: 





b/l amputation





- Neurological Exam


Neurological Exam: Alert, Awake





- Psychiatric Exam


Psychiatric exam: Anxious





Assessment and Plan


(1) Neurogenic bladder


Status: Acute   





(2) Sarcoidosis


Status: Acute   





(3) Abdominal pain


Status: Acute   





(4) Hypertension


Status: Acute   





(5) Obstructive hyperbilirubinemia


Status: Acute   





(6) UTI (urinary tract infection) due to urinary indwelling Heath catheter


Assessment & Plan: 


PLAN;


PANCULTURES.


CHANGE Heath AND REPEAT MIDSTREAM ua URINE CULTURE.


START iv AVYCAZ 2.5 MG iv PIGGYBACK EVERY 12 HOURLY FOR MDR UROSEPSIS.


STRICT CONTACT PRECAUTIONS.


CALLED MICRO TO CHECK FOR SENSITIVITY FOR AVYCAZ, IMIPENEM, AND COLISTIN.





fOLLOW-UP BLOOD CULTURES TO ADJUST ANTIBIOTICS.





CT ABDOMEN AND PELVIS WITHOUT CONTRAST R/O URETHRAL CALCULI OR NEPHROLITHIASIS.


F/U LFTS AS PER GI.


PROBNP.


LFTS.


BMP.


CASE DISCUSSED WITH THE STAFF


WILL FOLLOW PATIENT ALONG WITH YOU.








Status: Acute

## 2017-10-08 LAB
BASOPHILS # BLD AUTO: 0 K/UL (ref 0–0.2)
BASOPHILS NFR BLD: 0 % (ref 0–2)
EOSINOPHIL # BLD AUTO: 0 K/UL (ref 0–0.7)
EOSINOPHIL NFR BLD: 0 % (ref 0–4)
ERYTHROCYTE [DISTWIDTH] IN BLOOD BY AUTOMATED COUNT: 15.6 % (ref 11.5–14.5)
HCT VFR BLD CALC: 36 % (ref 34–47)
LYMPHOCYTES # BLD AUTO: 0.8 K/UL (ref 1–4.3)
LYMPHOCYTES NFR BLD AUTO: 10 % (ref 20–40)
MCH RBC QN AUTO: 33.6 PG (ref 27–31)
MCHC RBC AUTO-ENTMCNC: 33.6 G/DL (ref 33–37)
MCV RBC AUTO: 100.2 FL (ref 81–99)
MONOCYTES # BLD: 0.4 K/UL (ref 0–0.8)
MONOCYTES NFR BLD: 5 % (ref 0–10)
NRBC BLD AUTO-RTO: 0 % (ref 0–2)
PLATELET # BLD: 236 K/UL (ref 130–400)
PMV BLD AUTO: 10 FL (ref 7.2–11.7)
WBC # BLD AUTO: 7.6 K/UL (ref 4.8–10.8)

## 2017-10-08 RX ADMIN — Medication SCH MG: at 19:16

## 2017-10-08 RX ADMIN — DIPHENHYDRAMINE HYDROCHLORIDE PRN MG: 50 INJECTION INTRAMUSCULAR; INTRAVENOUS at 22:47

## 2017-10-08 RX ADMIN — DIPHENHYDRAMINE HYDROCHLORIDE PRN MG: 50 INJECTION INTRAMUSCULAR; INTRAVENOUS at 10:07

## 2017-10-08 RX ADMIN — INSULIN DETEMIR SCH: 100 INJECTION, SOLUTION SUBCUTANEOUS at 10:07

## 2017-10-08 RX ADMIN — INSULIN ASPART SCH UNIT: 100 INJECTION, SOLUTION INTRAVENOUS; SUBCUTANEOUS at 17:16

## 2017-10-08 RX ADMIN — DIGOXIN SCH MG: 0.25 TABLET ORAL at 17:20

## 2017-10-08 RX ADMIN — Medication SCH MG: at 10:06

## 2017-10-08 RX ADMIN — DIPHENHYDRAMINE HYDROCHLORIDE PRN MG: 50 INJECTION INTRAMUSCULAR; INTRAVENOUS at 05:08

## 2017-10-08 RX ADMIN — DIPHENHYDRAMINE HYDROCHLORIDE PRN MG: 50 INJECTION INTRAMUSCULAR; INTRAVENOUS at 18:44

## 2017-10-08 RX ADMIN — DIPHENHYDRAMINE HYDROCHLORIDE PRN MG: 50 INJECTION INTRAMUSCULAR; INTRAVENOUS at 00:37

## 2017-10-08 RX ADMIN — DIPHENHYDRAMINE HYDROCHLORIDE PRN MG: 50 INJECTION INTRAMUSCULAR; INTRAVENOUS at 14:40

## 2017-10-08 RX ADMIN — INSULIN ASPART SCH: 100 INJECTION, SOLUTION INTRAVENOUS; SUBCUTANEOUS at 21:08

## 2017-10-08 RX ADMIN — INSULIN ASPART SCH: 100 INJECTION, SOLUTION INTRAVENOUS; SUBCUTANEOUS at 10:07

## 2017-10-08 RX ADMIN — INSULIN ASPART SCH UNIT: 100 INJECTION, SOLUTION INTRAVENOUS; SUBCUTANEOUS at 14:41

## 2017-10-08 RX ADMIN — PANTOPRAZOLE SODIUM SCH MG: 40 TABLET, DELAYED RELEASE ORAL at 10:06

## 2017-10-08 RX ADMIN — BISMUTH SUBSALICYLATE PRN MG: 525 SUSPENSION ORAL at 17:26

## 2017-10-08 RX ADMIN — METOPROLOL SUCCINATE SCH MG: 50 TABLET, EXTENDED RELEASE ORAL at 10:05

## 2017-10-08 NOTE — PN
DATE:



SUBJECTIVE:  The patient complains of sharp left-sided chest pain.  She is

also experiencing abdominal pain as well as suprapubic pain.



PHYSICAL EXAMINATION

VITAL SIGNS:  Blood pressure 145/86, heart rate 71, temperature 97.5,

respirations 18.

HEENT:  Normocephalic.

CHEST:  Diminished breath sound over the bases.

HEART:  S1 and S2 regular.

ABDOMEN:  Soft.

EXTREMITIES:  Bilateral above knee amputation.



LABORATORY DATA:  Hemoglobin and hematocrit 12.1 and 36, white count and

platelet count are within normal limit.



ASSESSMENT:

1.  Mildly depressed left ventricular systolic function.

2.  Uncontrolled diabetes mellitus.

3.  Paroxysmal atrial fibrillation.

4.  Liver sarcoidosis.

5.  Urinary tract infection.



RECOMMENDATIONS:  Continue current Cozaar 50 mg once a day, Eliquis 5 mg

twice a day, digoxin 0.25 mg daily, Protonix 40 mg p.o. once a day, and

Toprol-XL 50 mg once a day.





__________________________________________

Ankit Figueroa MD





DD:  10/08/2017 16:36:43

DT:  10/08/2017 18:32:20

Job # [id number]

## 2017-10-08 NOTE — CARD
--------------- APPROVED REPORT --------------





EXAM: Two-dimensional and M-mode echocardiogram with Doppler and 

color Doppler.



Other Information 

Quality : GoodRhythm : NSR



INDICATION

Dyspnea Chest Pain Congestive Heart Failure 



RISK FACTORS

Hypertension 



M-Mode DIMENSIONS 

Left Atrium (MM)2.80   (2.5-4.0cm)IVSd0.59   (0.7-1.1cm)

Aortic Root2.77   (2.2-3.7cm)LVDd6.87   (4.0-5.6cm)

Aortic Cusp Exc.1.48   (1.5-2.0cm)PWd0.98   (0.7-1.1cm)

FS (%) 24   %LVDs5.21   (2.0-3.8cm)

LVEF (%)47   (>50%)



Aortic Valve

AoV Peak Joolmsrh874.2cm/Daisy Peak GR.9mmHg



Mitral Valve

MV E Eidltpct47.0cm/sMV A Jfghuldz683.9cm/sE/A ratio0.8



TDI

E/Lateral E'0.0E/Medial E'0.0



Tricuspid Valve

TR Peak Yunnsria315px/sTR Peak Gr.4zjGyPORS02iyMy



 LEFT VENTRICLE 

The left ventricle is normal size.

There is normal left ventricular wall thickness.

The systolic function is mildly impaired.

Transmitral Doppler flow pattern is abnormal.



 RIGHT VENTRICLE 

The right ventricle is normal size.

There is Pacemaker/AICD wire. suspected in the right ventricle.



 ATRIA 

The left atrium size is normal.

The right atrium size is normal.



 AORTIC VALVE 

The aortic valve is normal in structure.



 MITRAL VALVE 

Mitral regurgitation is trace.



 TRICUSPID VALVE 

There is trace tricuspid regurgitation.



<Conclusion>

Mild LV systolic function.

DIastolic dysfunction.

Trace MR.

Trace TR.

Pacemaker/AICDwire seen.

## 2017-10-08 NOTE — PN
DATE:



LOCATION:  [facility], bed A.



SUBJECTIVE:  This 52-year-old female seen early on rounds today without

significant clinical changes, again was intermittent period of abdominal

pain, but less jaundice with postprandial abdominal distention.  The entire

chart is reviewed including, but not limited to the most recent lab and

radiology study results, current and the previous medication list, current

and the previous medical events with allergy to medication list as

reported.  The patient was seen on 10/07/2017 by the ID consultant on the

case.  The patient is still complaining of mild tachypnea with slight

shortness of breath as well as flank pain with intermittent period of dry

cough.



PHYSICAL EXAMINATION:

GENERAL:  A 52-year-old female.

VITAL SIGNS:  Afebrile with heart rate of 64, respiratory rate 20 to 22,

and blood pressure 124/76.

HEENT:  Showed pale dry oral mucous membrane, bilateral icteric sclerae.

LUNGS:  Few scattered crepitation with few rales bilaterally and decreased

air entry at bases.

HEART:  Positive S1 and S2.

ABDOMEN:  Soft with mild distention with tenderness especially in the right

upper quadrant area.  Bowel sounds are hypoactive.  No mass or

organomegaly.  No rebound, tenderness, or guarding.

EXTREMITIES:  Bilateral above-knee amputation.

NEUROLOGIC:  No reported new neurological deficits, sensory, or motor.



LABORATORY DATA:  Most recent lab result show blood glucose level of 280

with abnormal liver function test, but gradually improving with subsequent

drop of total bilirubin to 4.7.



IMPRESSION:

1.  Re-exacerbation of hepatic sarcoidosis.

2.  Abnormal liver function test secondary to above.

3.  Jaundice with known history of _____ and/or stenosis with status post

biliary stent insertion.

4.  Urinary tract infection with neurogenic bladder.

5.  Known history of congestive heart failure, hypertension, and diabetes

mellitus.

6.  Cardiac arrhythmia by history.

7.  Known history of status post cholecystectomy as well as partial colon

resection several months ago.

8.  Anemia secondary to above.

9.  Known history of peripheral vascular disease.

10.  Deep venous thrombosis by history.



SUGGESTIONS:

1.  Continue current management.

2.  The patient had new complaint of diarrhea and stool for C. diff to be

ordered.

3.  Further recommendation to follow and vancomycin p.o. 250 mg q.6 hours

with Flagyl 500 mg IV piggyback q.8 hours to start positive stool workup. 

The patient may have pseudomembranous colitis that will be discussed with

the ID consultant on the case.





__________________________________________

Jeffrey Albert MD





cc:  Jeffrey Albert MD





DD:  10/08/2017 7:27:14

DT:  10/08/2017 8:07:07

Job # [id number]

## 2017-10-08 NOTE — CP.PCM.PN
Subjective





- Date & Time of Evaluation


Date of Evaluation: 10/08/17


Time of Evaluation: 09:25





- Subjective


Subjective: 


PT SEEN AND EXAMINED, C.DIFF NEG, ON ANTIBIOTICS, AFEBRILE, C/O ABDOMINAL PAIN





Objective





- Vital Signs/Intake and Output


Vital Signs (last 24 hours): 


 











Temp Pulse Resp BP Pulse Ox


 


 97.5 F L  63   18   145/86   98 


 


 10/08/17 15:35  10/08/17 21:49  10/08/17 15:35  10/08/17 15:35  10/08/17 15:35








Intake and Output: 


 











 10/08/17 10/09/17





 18:59 06:59


 


Intake Total 200 


 


Output Total 200 


 


Balance 0 














- Medications


Medications: 


 Current Medications





Apixaban (Eliquis)  5 mg PO BID [facility]


   Last Admin: 10/08/17 17:16 Dose:  5 mg


Bismuth Subsalicylate (Pepto-Bismol)  262 mg PO Q4 PRN


   PRN Reason: STOMACH PAIN


   Last Admin: 10/08/17 17:26 Dose:  262 mg


Digoxin (Lanoxin)  0.25 mg PO DAILY@1800 [facility]


   Last Admin: 10/08/17 17:20 Dose:  0.25 mg


Diphenhydramine HCl (Benadryl)  25 mg IVP Q4H PRN


   PRN Reason: pruritus


   Last Admin: 10/08/17 22:47 Dose:  25 mg


Ceftazidime/Avibactam 2.5 gm/ (Sodium Chloride)  100 mls @ 50 mls/hr IV Q12H [facility]


   Last Admin: 10/08/17 17:15 Dose:  50 mls/hr


Insulin Aspart (Novolog)  0 unit SC ACHS [facility]


   PRN Reason: Protocol


   Last Admin: 10/08/17 21:08 Dose:  Not Given


Insulin Detemir (Levemir)  10 unit SC QAM [facility]


   Last Admin: 10/08/17 10:07 Dose:  Not Given


Losartan Potassium (Cozaar)  50 mg PO DAILY [facility]


   Last Admin: 10/08/17 10:06 Dose:  50 mg


Metoprolol Succinate (Toprol Xl)  50 mg PO DAILY [facility]


   Last Admin: 10/08/17 10:05 Dose:  50 mg


Morphine Sulfate (Morphine)  2 mg IVP Q4 PRN


   PRN Reason: Pain, severe (8-10)


   Last Admin: 10/08/17 22:47 Dose:  2 mg


Ondansetron HCl (Zofran Odt)  4 mg PO QID PRN


   PRN Reason: Nausea/Vomiting


Pantoprazole Sodium (Protonix Ec Tab)  40 mg PO DAILY [facility]


   Last Admin: 10/08/17 10:06 Dose:  40 mg


Prednisone (Prednisone Tab)  20 mg PO BID [facility]


   Last Admin: 10/08/17 17:16 Dose:  20 mg


Saccharomyces Boulardii (Florastor)  250 mg PO BID [facility]


   Last Admin: 10/08/17 19:16 Dose:  250 mg


Zolpidem Tartrate (Ambien)  5 mg PO HS PRN


   PRN Reason: Insomnia


   Last Admin: 10/08/17 22:48 Dose:  5 mg











- Labs


Labs: 


 





 10/08/17 08:16 





 10/07/17 06:23 





 











PT  12.4 SECONDS (9.7-12.2)  H  10/06/17  19:40    


 


INR  1.1   10/06/17  19:40    


 


APTT  38 SECONDS (21-34)  H  10/06/17  19:40    














- Constitutional


Appears: No Acute Distress





- Head Exam


Head Exam: ATRAUMATIC, NORMAL INSPECTION, NORMOCEPHALIC





- Neck Exam


Neck Exam: Full ROM, Normal Inspection.  absent: Lymphadenopathy





- Respiratory Exam


Respiratory Exam: Clear to Ausculation Bilateral, NORMAL BREATHING PATTERN





- Cardiovascular Exam


Cardiovascular Exam: REGULAR RHYTHM, +S1, +S2.  absent: Murmur





- GI/Abdominal Exam


GI & Abdominal Exam: Soft, Normal Bowel Sounds.  absent: Tenderness





- Rectal Exam


Rectal Exam: Deferred





Assessment and Plan


(1) Neurogenic bladder


Status: Acute   





(2) Sarcoidosis


Status: Acute   





(3) Abdominal pain


Status: Acute   





(4) Hypertension


Status: Acute   





(5) Obstructive hyperbilirubinemia


Status: Acute   





(6) UTI (urinary tract infection) due to urinary indwelling Charles catheter


Status: Acute

## 2017-10-08 NOTE — CP.PCM.PN
Subjective





- Date & Time of Evaluation


Date of Evaluation: 10/08/17


Time of Evaluation: 22:53





- Subjective


Subjective: 





 CHIEF COMPLAINTS  TODAY :


afebrile, 


c/o right upper quadrant and flank pain going to the back


c/o dry cough.





ROS





HEENT :  N.ICTERIC


Resp :       No  SOB wheezing, +ve DRY COUGH


Cardio :     No CP, PND orthopnea 


GI :           RUQ ABDOMINAL PAIN , NO  n/v 


CNS : No headache , focal deficit. 


Musculoskel :  N


Ext. : Pedal pulses intact, no edema or calf pain 


Derm :        N


Psych :     N. 








PE. 


Pt. is alert awake in no distress. 





V.S  As noted in the chart 





Head ,ear nose,throat and eyes : Normal.,sclera icteric.


Neck : Supple with normal carotids.


Lungs  EXPIRATORY WHEEZE


Heart : S1 & S2 normal . . No murmur. S4 + 


Abd : Soft MILD TENDERNESS RIGHT UPPER QUADRANT with normal bowel sounds.


Neuro : Moves all ext. with no localized deficit.


Ext : No edema with intact pulses. Neg. calf tenderness 


Derm : No rashes or decubitus ulcer.





Radiology/Labs .


STOOL C. DIFFICILE -VE 10/7/17


CXR  NAD,


blood cultures-ve for 24 hours


Repeat urine culture? multiple species ? contaminants





Initial urine culture Klebsiella pneumoniae -veESBL S- TYGACIL,GENTAMYCIN -[facility]LE 

4.


LFTS  10/7/17


 TOTAL BILI 4.7, DIRECT BILI 4.1, ast 128, alt 105, ALKALINE PHOSPHATASE 638 

IMPROVING.





Objective





- Vital Signs/Intake and Output


Vital Signs (last 24 hours): 


 











Temp Pulse Resp BP Pulse Ox


 


 97.5 F L  63   18   145/86   98 


 


 10/08/17 15:35  10/08/17 21:49  10/08/17 15:35  10/08/17 15:35  10/08/17 15:35








Intake and Output: 


 











 10/08/17 10/09/17





 18:59 06:59


 


Intake Total 200 


 


Output Total 200 


 


Balance 0 














- Medications


Medications: 


 Current Medications





Apixaban (Eliquis)  5 mg PO BID [facility]


   Last Admin: 10/08/17 17:16 Dose:  5 mg


Bismuth Subsalicylate (Pepto-Bismol)  262 mg PO Q4 PRN


   PRN Reason: STOMACH PAIN


   Last Admin: 10/08/17 17:26 Dose:  262 mg


Digoxin (Lanoxin)  0.25 mg PO DAILY@1800 [facility]


   Last Admin: 10/08/17 17:20 Dose:  0.25 mg


Diphenhydramine HCl (Benadryl)  25 mg IVP Q4H [facility]


   PRN Reason: pruritus


   Last Admin: 10/08/17 22:47 Dose:  25 mg


Ceftazidime/Avibactam 2.5 gm/ (Sodium Chloride)  100 mls @ 50 mls/hr IV Q12H [facility]


   Last Admin: 10/08/17 17:15 Dose:  50 mls/hr


Insulin Aspart (Novolog)  0 unit SC ACHS [facility]


   PRN Reason: Protocol


   Last Admin: 10/08/17 21:08 Dose:  Not Given


Insulin Detemir (Levemir)  10 unit SC QAM [facility]


   Last Admin: 10/08/17 10:07 Dose:  Not Given


Losartan Potassium (Cozaar)  50 mg PO DAILY [facility]


   Last Admin: 10/08/17 10:06 Dose:  50 mg


Metoprolol Succinate (Toprol Xl)  50 mg PO DAILY [facility]


   Last Admin: 10/08/17 10:05 Dose:  50 mg


Morphine Sulfate (Morphine)  2 mg IVP Q4 PRN


   PRN Reason: Pain, severe (8-10)


   Last Admin: 10/08/17 22:47 Dose:  2 mg


Ondansetron HCl (Zofran Odt)  4 mg PO QID PRN


   PRN Reason: Nausea/Vomiting


Pantoprazole Sodium (Protonix Ec Tab)  40 mg PO DAILY [facility]


   Last Admin: 10/08/17 10:06 Dose:  40 mg


Prednisone (Prednisone Tab)  20 mg PO BID [facility]


   Last Admin: 10/08/17 17:16 Dose:  20 mg


Saccharomyces Boulardii (Florastor)  250 mg PO BID [facility]


   Last Admin: 10/08/17 19:16 Dose:  250 mg


Zolpidem Tartrate (Ambien)  5 mg PO HS PRN


   PRN Reason: Insomnia


   Last Admin: 10/08/17 22:48 Dose:  5 mg











- Labs


Labs: 


 





 10/08/17 08:16 





 10/07/17 06:23 





 











PT  12.4 SECONDS (9.7-12.2)  H  10/06/17  19:40    


 


INR  1.1   10/06/17  19:40    


 


APTT  38 SECONDS (21-34)  H  10/06/17  19:40    














Assessment and Plan


(1) UTI (urinary tract infection)


Status: Acute   





(2) Neurogenic bladder


Status: Acute   





(3) Sarcoidosis


Status: Acute   





(4) Abdominal pain


Status: Acute   





(5) Congestive heart failure


Status: Acute   





(6) Hypertension


Status: Acute   





- Assessment and Plan (Free Text)


Plan: 





ON IV AVYCAZ 2.5 MG iv PIGGYBACK EVERY 12 HOURLY FOR MDR UROSEPSIS. 10/6/17.


PATIENT TOLERATING IT WITHOUT SIDE EFFECTS.


STRICT CONTACT PRECAUTIONS.


 MICRO TO CHECK FOR SENSITIVITY FOR AVYCAZ, IMIPENEM, AND COLISTIN.-P





fOLLOW-UP BLOOD CULTURES ( SO FAR -VE )





F/U LFTS AS PER GI.

## 2017-10-09 LAB
ALBUMIN/GLOB SERPL: 0.9 {RATIO} (ref 1–2.1)
ALP SERPL-CCNC: 611 U/L (ref 38–126)
ALT SERPL-CCNC: 203 U/L (ref 9–52)
AST SERPL-CCNC: 163 U/L (ref 14–36)
BASOPHILS # BLD AUTO: 0 K/UL (ref 0–0.2)
BASOPHILS NFR BLD: 0 % (ref 0–2)
BILIRUB DIRECT SERPL-MCNC: 3.6 MG/DL (ref 0–0.4)
BILIRUB SERPL-MCNC: 4.1 MG/DL (ref 0.2–1.3)
BUN SERPL-MCNC: 24 MG/DL (ref 7–17)
CALCIUM SERPL-MCNC: 8.9 MG/DL (ref 8.6–10.4)
CHLORIDE SERPL-SCNC: 105 MMOL/L (ref 98–107)
CO2 SERPL-SCNC: 14 MMOL/L (ref 22–30)
EOSINOPHIL # BLD AUTO: 0 K/UL (ref 0–0.7)
EOSINOPHIL NFR BLD: 0 % (ref 0–4)
ERYTHROCYTE [DISTWIDTH] IN BLOOD BY AUTOMATED COUNT: 15.3 % (ref 11.5–14.5)
GLOBULIN SER-MCNC: 4 GM/DL (ref 2.2–3.9)
GLUCOSE SERPL-MCNC: 136 MG/DL (ref 65–105)
HCT VFR BLD CALC: 35.6 % (ref 34–47)
LYMPHOCYTES # BLD AUTO: 1.3 K/UL (ref 1–4.3)
LYMPHOCYTES NFR BLD AUTO: 14 % (ref 20–40)
MCH RBC QN AUTO: 33.7 PG (ref 27–31)
MCHC RBC AUTO-ENTMCNC: 34.4 G/DL (ref 33–37)
MCV RBC AUTO: 98 FL (ref 81–99)
MONOCYTES # BLD: 0.9 K/UL (ref 0–0.8)
MONOCYTES NFR BLD: 9 % (ref 0–10)
NRBC BLD AUTO-RTO: 0 % (ref 0–2)
PLATELET # BLD: 284 K/UL (ref 130–400)
PMV BLD AUTO: 9.6 FL (ref 7.2–11.7)
POTASSIUM SERPL-SCNC: 4.3 MMOL/L (ref 3.6–5.2)
PROT SERPL-MCNC: 7.6 G/DL (ref 6.3–8.3)
SODIUM SERPL-SCNC: 134 MMOL/L (ref 132–148)
WBC # BLD AUTO: 9.5 K/UL (ref 4.8–10.8)

## 2017-10-09 RX ADMIN — INSULIN ASPART SCH UNIT: 100 INJECTION, SOLUTION INTRAVENOUS; SUBCUTANEOUS at 10:59

## 2017-10-09 RX ADMIN — DIPHENHYDRAMINE HYDROCHLORIDE PRN MG: 50 INJECTION INTRAMUSCULAR; INTRAVENOUS at 16:45

## 2017-10-09 RX ADMIN — DIPHENHYDRAMINE HYDROCHLORIDE PRN MG: 50 INJECTION INTRAMUSCULAR; INTRAVENOUS at 21:27

## 2017-10-09 RX ADMIN — DIGOXIN SCH MG: 0.25 TABLET ORAL at 18:33

## 2017-10-09 RX ADMIN — PANTOPRAZOLE SODIUM SCH MG: 40 TABLET, DELAYED RELEASE ORAL at 10:57

## 2017-10-09 RX ADMIN — Medication SCH MG: at 18:33

## 2017-10-09 RX ADMIN — INSULIN ASPART SCH UNIT: 100 INJECTION, SOLUTION INTRAVENOUS; SUBCUTANEOUS at 12:37

## 2017-10-09 RX ADMIN — INSULIN ASPART SCH: 100 INJECTION, SOLUTION INTRAVENOUS; SUBCUTANEOUS at 21:42

## 2017-10-09 RX ADMIN — DIPHENHYDRAMINE HYDROCHLORIDE PRN MG: 50 INJECTION INTRAMUSCULAR; INTRAVENOUS at 12:38

## 2017-10-09 RX ADMIN — Medication SCH MG: at 10:57

## 2017-10-09 RX ADMIN — BISMUTH SUBSALICYLATE PRN MG: 525 SUSPENSION ORAL at 02:46

## 2017-10-09 RX ADMIN — METOPROLOL SUCCINATE SCH: 50 TABLET, EXTENDED RELEASE ORAL at 10:57

## 2017-10-09 RX ADMIN — DIPHENHYDRAMINE HYDROCHLORIDE PRN MG: 50 INJECTION INTRAMUSCULAR; INTRAVENOUS at 02:34

## 2017-10-09 RX ADMIN — DIPHENHYDRAMINE HYDROCHLORIDE PRN MG: 50 INJECTION INTRAMUSCULAR; INTRAVENOUS at 07:09

## 2017-10-09 RX ADMIN — METOPROLOL SUCCINATE SCH MG: 50 TABLET, EXTENDED RELEASE ORAL at 10:57

## 2017-10-09 RX ADMIN — INSULIN ASPART SCH UNIT: 100 INJECTION, SOLUTION INTRAVENOUS; SUBCUTANEOUS at 18:35

## 2017-10-09 RX ADMIN — INSULIN DETEMIR SCH UNIT: 100 INJECTION, SOLUTION SUBCUTANEOUS at 10:59

## 2017-10-09 RX ADMIN — BISMUTH SUBSALICYLATE PRN MG: 525 SUSPENSION ORAL at 10:58

## 2017-10-09 NOTE — CP.PCM.PN
Subjective





- Date & Time of Evaluation


Date of Evaluation: 10/09/17


Time of Evaluation: 18:40





- Subjective


Subjective: 





PT SEEN AND EXAMINED, C.DIFF NEG, ON ANTIBIOTICS, AFEBRILE, C/O ABDOMINAL PAIN








Objective





- Vital Signs/Intake and Output


Vital Signs (last 24 hours): 


 











Temp Pulse Resp BP Pulse Ox


 


 97.3 F L  66   20   150/89   97 


 


 10/09/17 15:24  10/09/17 16:00  10/09/17 15:24  10/09/17 18:36  10/09/17 15:24








Intake and Output: 


 











 10/09/17 10/10/17





 18:59 06:59


 


Intake Total 500 


 


Output Total 300 


 


Balance 200 














- Medications


Medications: 


 Current Medications





Apixaban (Eliquis)  5 mg PO BID [facility]


   Last Admin: 10/09/17 18:40 Dose:  5 mg


Bismuth Subsalicylate (Pepto-Bismol)  262 mg PO Q4 PRN


   PRN Reason: STOMACH PAIN


   Last Admin: 10/09/17 10:58 Dose:  262 mg


Digoxin (Lanoxin)  0.25 mg PO DAILY@1800 [facility]


   Last Admin: 10/09/17 18:33 Dose:  0.25 mg


Diphenhydramine HCl (Benadryl)  25 mg IVP Q4H [facility]N


   PRN Reason: pruritus


   Last Admin: 10/09/17 21:27 Dose:  25 mg


Diphenoxylate HCl/Atropine (Lomotil 0.025-2.5 Mg Tablet)  1 tab PO Q4 PRN


   PRN Reason: Diarrhea


Ceftazidime/Avibactam 2.5 gm/ (Sodium Chloride)  100 mls @ 50 mls/hr IV Q12H [facility]


   Last Admin: 10/09/17 16:46 Dose:  50 mls/hr


Insulin Aspart (Novolog)  0 unit SC ACHS [facility]


   PRN Reason: Protocol


   Last Admin: 10/09/17 21:42 Dose:  Not Given


Insulin Detemir (Levemir)  10 unit SC QAM [facility]


   Last Admin: 10/09/17 10:59 Dose:  10 unit


Losartan Potassium (Cozaar)  50 mg PO DAILY [facility]


   Last Admin: 10/09/17 10:57 Dose:  50 mg


Metoprolol Succinate (Toprol Xl)  50 mg PO DAILY [facility]


   Last Admin: 10/09/17 10:57 Dose:  Not Given


Morphine Sulfate (Morphine)  2 mg IVP Q4 PRN


   PRN Reason: Pain, severe (8-10)


   Last Admin: 10/09/17 21:28 Dose:  2 mg


Ondansetron HCl (Zofran Odt)  4 mg PO QID PRN


   PRN Reason: Nausea/Vomiting


Pantoprazole Sodium (Protonix Ec Tab)  40 mg PO DAILY [facility]


   Last Admin: 10/09/17 10:57 Dose:  40 mg


Prednisone (Prednisone Tab)  20 mg PO BID [facility]


   Last Admin: 10/09/17 18:35 Dose:  20 mg


Saccharomyces Boulardii (Florastor)  250 mg PO BID [facility]


   Last Admin: 10/09/17 18:33 Dose:  250 mg


Zolpidem Tartrate (Ambien)  5 mg PO HS PRN


   PRN Reason: Insomnia


   Last Admin: 10/08/17 22:48 Dose:  5 mg











- Labs


Labs: 


 





 10/09/17 07:52 





 10/09/17 07:52 





 











PT  12.4 SECONDS (9.7-12.2)  H  10/06/17  19:40    


 


INR  1.1   10/06/17  19:40    


 


APTT  38 SECONDS (21-34)  H  10/06/17  19:40    














- Constitutional


Appears: No Acute Distress





- Head Exam


Head Exam: ATRAUMATIC, NORMAL INSPECTION, NORMOCEPHALIC





- Eye Exam


Eye Exam: EOMI, Normal appearance, PERRL


Pupil Exam: NORMAL ACCOMODATION, PERRL





- Respiratory Exam


Respiratory Exam: Decreased Breath Sounds, Rales, Wheezes





- Cardiovascular Exam


Cardiovascular Exam: REGULAR RHYTHM, +S1, +S2.  absent: Murmur





- GI/Abdominal Exam


GI & Abdominal Exam: Soft, Normal Bowel Sounds.  absent: Tenderness





- Rectal Exam


Rectal Exam: Deferred





Assessment and Plan


(1) Neurogenic bladder


Status: Acute   





(2) Sarcoidosis


Status: Acute   





(3) Abdominal pain


Status: Acute   





(4) Hypertension


Status: Acute   





(5) Obstructive hyperbilirubinemia


Status: Acute   





(6) UTI (urinary tract infection) due to urinary indwelling Charles catheter


Status: Acute

## 2017-10-09 NOTE — PN
DATE:



SUBJECTIVE:  The patient complains of pain over he ICD pocket.  No fever or

chills.



PHYSICAL EXAMINATION:

VITAL SIGNS:  Blood pressure 179/95, heart rate 92, temperature 97.9,

respirations 20.

HEENT:  Normocephalic.

CHEST:  Minimal basal rhonchi.

HEART:  S1 and S2 regular.  No tenderness over the ICD pocket.

EXTREMITIES:  Bilateral above knee amputation.



LABORATORY DATA:  Hemoglobin, hematocrit, white count and platelet count

are within normal limit today.  Today's BUN and creatinine are 24 and 0.6,

glucose is 136.  Total bilirubin _____ 4.1.  AST and ALT are 163 and 203

respectively.  Blood cultures are negative as of 48 hours.



ASSESSMENT:

1.  Cardiomyopathy.

2.  Urinary tract infection.

3.  Uncontrolled diabetes mellitus.

4.  Mild cholestasis.

5.  History of liver sarcoidosis.



CONDITIONS:  Continue current IV ceftazidime.  Continue Eliquis 5 mg twice

a day, digoxin 0.25 mg daily.  Optimize diabetic control.  Renew telemetry.





__________________________________________

Ankit Figueroa MD



DD:  10/09/2017 15:29:34

DT:  10/09/2017 16:07:34

Job # [id number]

## 2017-10-09 NOTE — PN
DATE:



LOCATION:  [facility], bed A.



SUBJECTIVE:  This is a 52-year-old female seen and examined on rounds

without reported significant clinical changes or reported active bleeding,

awake, alert and oriented with a complaint of again abdominal pain with

postprandial abdominal distention.



It has to be mentioned that the patient's _____ have been subsequently

decreased and the patient experienced dry cough with slight shortness of

breath.  The entire chart is reviewed including but not limited to the most

recent lab and radiology study results, current and previous medication

list, current and previous medical events.  Case discussed with other

consultants at length.  Today's labs show normal CBC with CO2 content of 14

indicative of metabolic acidosis, with increased BUN of 24, but low

creatinine with latest blood glucose level of 209 with subsequent drop of

total bilirubin to 4.1, but still elevated AST 1263 and  with

alkaline phosphatase 611.



PHYSICAL EXAMINATION:

GENERAL:  A 52 years old, female.

VITAL SIGNS:  Afebrile with pulse of 90, respiratory rate 20-22, blood

pressure 174/86.

HEENT:  Show pale dry oral mucoid membranes.  Bilateral icteric sclerae.

LUNGS:  Scattered few rales bilaterally with few rhonchi with mild

increased air entry at bases.

HEART:  Positive S1 and S2, increased rate.

ABDOMEN:  Soft with mild-to-moderate distention with midepigastric as well

as right upper quadrant tenderness.  No mass or organomegaly.  No rebound,

tenderness, or guarding but with suprapubic tenderness.

GENITOURINARY:  Charles catheter is in place.

EXTREMITIES:  With evidence of bilateral above knee amputation.

NEUROLOGIC:  No reported new neurological deficits, sensory or motor.



IMPRESSION:

1.  Hepatic sarcoidosis with abnormal liver function test.

2.  _____ secondary to above.

3.  Status post biliary stent insertion due to biliary spasm and/or

stenosis by history.

4.  Urinary tract infection with early stage of urosepsis.

5.  Neurogenic bladder.

6.  Known history of congestive heart failure, hypertension.

7.  Diabetes mellitus by history.

8.  Cardiac arrhythmia.

9.  Status post cholecystectomy, status post partial colon resection.

10.  Anemia secondary to above by recent history.

11.  Known history of peripheral vascular disease with bilateral above-knee

amputation.

12.  Deep venous thrombosis by history.



SUGGESTIONS:

1.  Agree with your plan.

2.  The patient may need sectional abdominal and pelvic CAT scan associated

with chest CAT scan due to her new onset of shortness of breath.

3.  Cardiology reevaluation.





__________________________________________

Jeffrey Albert MD



cc:  Jeffrey Albert MD



DD:  10/09/2017 13:48:18

DT:  10/09/2017 14:51:02

Job # [id number]

## 2017-10-09 NOTE — CP.PCM.PN
Subjective





- Date & Time of Evaluation


Date of Evaluation: 10/09/17


Time of Evaluation: 23:37





- Subjective


Subjective: 


CHIEF COMPLAINTS  TODAY :


afebrile, 


c/o pain at the site of AICD.


States she feels palpitations.


Seen by cardiology.





ROS





HEENT :  N.ICTERIC


Resp :       No  SOB wheezing, +ve DRY COUGH


Cardio :     No CP, PND orthopnea 


GI :           RUQ ABDOMINAL PAIN , NO  n/v 


CNS : No headache , focal deficit. 


Musculoskel :  N


Ext. : Pedal pulses intact, no edema or calf pain 


Derm :        N


Psych :     N. 








PE. 


Pt. is alert awake in no distress. 





V.S  As noted in the chart 





Head ,ear nose,throat and eyes : Normal.,sclera icteric.


Neck : Supple with normal carotids.


Lungs  EXPIRATORY WHEEZE


Heart : S1 & S2 irregular. . No murmur. 


Abd : Soft MILD TENDERNESS RIGHT UPPER QUADRANT with normal bowel sounds.


Neuro : Moves all ext. with no localized deficit.


Ext : No edema with intact pulses. Neg. calf tenderness 


Derm : No rashes or decubitus ulcer.





Radiology/Labs .


STOOL C. DIFFICILE -VE 10/7/17


CXR  NAD,


blood cultures-ve for 24 hours


Repeat urine culture? multiple species ? contaminants





Initial urine culture Klebsiella pneumoniae -veESBL S- TYGACIL,GENTAMYCIN -CECILLE 

4.


LFTS  10/9/17 increasing transaminitis with , , alkaline 

phosphatase 611.  Total bili 4.1 improving


 TOTAL BILI 4.7, DIRECT BILI 4.1, ast 128, alt 105, ALKALINE PHOSPHATASE 638 

IMPROVING.








Objective





- Vital Signs/Intake and Output


Vital Signs (last 24 hours): 


 











Temp Pulse Resp BP Pulse Ox


 


 97.3 F L  66   20   150/89   97 


 


 10/09/17 15:24  10/09/17 16:00  10/09/17 15:24  10/09/17 18:36  10/09/17 15:24








Intake and Output: 


 











 10/09/17 10/10/17





 18:59 06:59


 


Intake Total 500 


 


Output Total 300 1200


 


Balance 200 -1200














- Medications


Medications: 


 Current Medications





Apixaban (Eliquis)  5 mg PO BID [facility]


   Last Admin: 10/09/17 18:40 Dose:  5 mg


Bismuth Subsalicylate (Pepto-Bismol)  262 mg PO Q4 PRN


   PRN Reason: STOMACH PAIN


   Last Admin: 10/09/17 10:58 Dose:  262 mg


Digoxin (Lanoxin)  0.25 mg PO DAILY@1800 [facility]


   Last Admin: 10/09/17 18:33 Dose:  0.25 mg


Diphenhydramine HCl (Benadryl)  25 mg IVP Q4H PRN


   PRN Reason: pruritus


   Last Admin: 10/09/17 21:27 Dose:  25 mg


Diphenoxylate HCl/Atropine (Lomotil 0.025-2.5 Mg Tablet)  1 tab PO Q4 PRN


   PRN Reason: Diarrhea


Ceftazidime/Avibactam 2.5 gm/ (Sodium Chloride)  100 mls @ 50 mls/hr IV Q12H [facility]


   Last Admin: 10/09/17 16:46 Dose:  50 mls/hr


Insulin Aspart (Novolog)  0 unit SC ACHS [facility]


   PRN Reason: Protocol


   Last Admin: 10/09/17 21:42 Dose:  Not Given


Insulin Detemir (Levemir)  10 unit SC QAM [facility]


   Last Admin: 10/09/17 10:59 Dose:  10 unit


Losartan Potassium (Cozaar)  50 mg PO DAILY [facility]


   Last Admin: 10/09/17 10:57 Dose:  50 mg


Metoprolol Succinate (Toprol Xl)  50 mg PO DAILY [facility]


   Last Admin: 10/09/17 10:57 Dose:  Not Given


Morphine Sulfate (Morphine)  2 mg IVP Q4 PRN


   PRN Reason: Pain, severe (8-10)


   Last Admin: 10/09/17 21:28 Dose:  2 mg


Ondansetron HCl (Zofran Odt)  4 mg PO QID PRN


   PRN Reason: Nausea/Vomiting


Pantoprazole Sodium (Protonix Ec Tab)  40 mg PO DAILY [facility]


   Last Admin: 10/09/17 10:57 Dose:  40 mg


Prednisone (Prednisone Tab)  20 mg PO BID [facility]


   Last Admin: 10/09/17 18:35 Dose:  20 mg


Saccharomyces Boulardii (Florastor)  250 mg PO BID [facility]


   Last Admin: 10/09/17 18:33 Dose:  250 mg


Zolpidem Tartrate (Ambien)  5 mg PO HS PRN


   PRN Reason: Insomnia


   Last Admin: 10/08/17 22:48 Dose:  5 mg











- Labs


Labs: 


 





 10/09/17 07:52 





 10/09/17 07:52 





 











PT  12.4 SECONDS (9.7-12.2)  H  10/06/17  19:40    


 


INR  1.1   10/06/17  19:40    


 


APTT  38 SECONDS (21-34)  H  10/06/17  19:40    














Assessment and Plan


(1) UTI (urinary tract infection)


Status: Acute   





(2) Neurogenic bladder


Status: Acute   





(3) Sarcoidosis


Status: Acute   





(4) Abdominal pain


Status: Acute   





(5) Congestive heart failure


Status: Acute   





(6) Hypertension


Status: Acute   





- Assessment and Plan (Free Text)


Plan: 





ON IV AVYCAZ 2.5 MG iv PIGGYBACK EVERY 12 HOURLY FOR MDR UROSEPSIS. 10/6/17.


PATIENT TOLERATING IT WITHOUT SIDE EFFECTS.


STRICT CONTACT PRECAUTIONS.


 MICRO TO CHECK FOR SENSITIVITY FOR AVYCAZ, IMIPENEM, AND COLISTIN.-P





fOLLOW-UP BLOOD CULTURES ( SO FAR -VE )


Cardiology on board .


CASE DISCUSSED WITH THE STAFF/RN ON CALL.

## 2017-10-10 RX ADMIN — DIGOXIN SCH: 0.25 TABLET ORAL at 18:31

## 2017-10-10 RX ADMIN — DIPHENHYDRAMINE HYDROCHLORIDE PRN MG: 50 INJECTION INTRAMUSCULAR; INTRAVENOUS at 01:46

## 2017-10-10 RX ADMIN — INSULIN ASPART SCH UNIT: 100 INJECTION, SOLUTION INTRAVENOUS; SUBCUTANEOUS at 11:58

## 2017-10-10 RX ADMIN — METOPROLOL SUCCINATE SCH MG: 50 TABLET, EXTENDED RELEASE ORAL at 10:46

## 2017-10-10 RX ADMIN — Medication SCH MG: at 10:44

## 2017-10-10 RX ADMIN — INSULIN ASPART SCH: 100 INJECTION, SOLUTION INTRAVENOUS; SUBCUTANEOUS at 22:45

## 2017-10-10 RX ADMIN — DIPHENOXYLATE HYDROCHLORIDE AND ATROPINE SULFATE PRN TAB: 2.5; .025 TABLET ORAL at 10:44

## 2017-10-10 RX ADMIN — DIPHENHYDRAMINE HYDROCHLORIDE PRN MG: 50 INJECTION INTRAMUSCULAR; INTRAVENOUS at 19:24

## 2017-10-10 RX ADMIN — DIPHENOXYLATE HYDROCHLORIDE AND ATROPINE SULFATE PRN TAB: 2.5; .025 TABLET ORAL at 01:52

## 2017-10-10 RX ADMIN — Medication SCH MG: at 17:26

## 2017-10-10 RX ADMIN — INSULIN ASPART SCH UNIT: 100 INJECTION, SOLUTION INTRAVENOUS; SUBCUTANEOUS at 17:26

## 2017-10-10 RX ADMIN — DIGOXIN SCH: 0.25 TABLET ORAL at 19:30

## 2017-10-10 RX ADMIN — INSULIN DETEMIR SCH UNIT: 100 INJECTION, SOLUTION SUBCUTANEOUS at 10:44

## 2017-10-10 RX ADMIN — DIPHENHYDRAMINE HYDROCHLORIDE PRN MG: 50 INJECTION INTRAMUSCULAR; INTRAVENOUS at 15:11

## 2017-10-10 RX ADMIN — DIPHENHYDRAMINE HYDROCHLORIDE PRN MG: 50 INJECTION INTRAMUSCULAR; INTRAVENOUS at 06:06

## 2017-10-10 RX ADMIN — PANTOPRAZOLE SODIUM SCH MG: 40 TABLET, DELAYED RELEASE ORAL at 10:46

## 2017-10-10 RX ADMIN — DIPHENOXYLATE HYDROCHLORIDE AND ATROPINE SULFATE PRN TAB: 2.5; .025 TABLET ORAL at 22:44

## 2017-10-10 RX ADMIN — DIPHENHYDRAMINE HYDROCHLORIDE PRN MG: 50 INJECTION INTRAMUSCULAR; INTRAVENOUS at 10:43

## 2017-10-10 RX ADMIN — INSULIN ASPART SCH UNIT: 100 INJECTION, SOLUTION INTRAVENOUS; SUBCUTANEOUS at 07:45

## 2017-10-10 NOTE — CP.PCM.PN
Subjective





- Date & Time of Evaluation


Date of Evaluation: 10/10/17


Time of Evaluation: 22:24





- Subjective


Subjective: 





CHIEF COMPLAINTS  TODAY :


afebrile, 


C/O RUQ PAIN.


C/O DIARRHEA





ROS





HEENT :  N.ICTERIC


Resp :       No  SOB wheezing, +ve DRY COUGH


Cardio :     No CP, PND orthopnea 


GI :           RUQ ABDOMINAL PAIN , NO  n/v 


CNS : No headache , focal deficit. 


Musculoskel :  N


Ext. : Pedal pulses intact, no edema or calf pain 


Derm :        N


Psych :     N. 








PE. 


Pt. is alert awake in no distress. 





V.S  As noted in the chart 





Head ,ear nose,throat and eyes : Normal.,sclera icteric.


Neck : Supple with normal carotids.


Lungs  EXPIRATORY WHEEZE


Heart : S1 & S2 irregular. . No murmur. 


Abd : Soft MILD TENDERNESS RIGHT UPPER QUADRANT with normal bowel sounds.


Neuro : Moves all ext. with no localized deficit.


Ext : No edema with intact pulses. Neg. calf tenderness 


Derm : No rashes or decubitus ulcer.





Radiology/Labs .


STOOL C. DIFFICILE -VE 10/7/17


CXR  NAD,


blood cultures-ve for 3DAYS


Repeat urine culture? multiple species ? contaminants





Initial urine culture Klebsiella pneumoniae -veESBL S- TYGACIL,GENTAMYCIN -CECILLE 

4.


LFTS  10/9/17 increasing transaminitis with , , alkaline 

phosphatase 611.  Total bili 4.1 improving








Objective





- Vital Signs/Intake and Output


Vital Signs (last 24 hours): 


 











Temp Pulse Resp BP Pulse Ox


 


 97.9 F   86   20   132/85   95 


 


 10/10/17 15:00  10/10/17 15:00  10/10/17 15:00  10/10/17 15:00  10/10/17 15:00








Intake and Output: 


 











 10/10/17 10/11/17





 18:59 06:59


 


Intake Total 300 


 


Output Total 500 1000


 


Balance -200 -1000














- Medications


Medications: 


 Current Medications





Apixaban (Eliquis)  5 mg PO BID [facility]


   Last Admin: 10/10/17 17:25 Dose:  5 mg


Bismuth Subsalicylate (Pepto-Bismol)  262 mg PO Q4 PRN


   PRN Reason: STOMACH PAIN


   Last Admin: 10/09/17 10:58 Dose:  262 mg


Digoxin (Lanoxin)  0.25 mg PO DAILY@1800 [facility]


   Last Admin: 10/10/17 19:30 Dose:  Not Given


Diphenhydramine HCl (Benadryl)  25 mg IVP Q4H PRN


   PRN Reason: pruritus


   Last Admin: 10/10/17 19:24 Dose:  25 mg


Diphenoxylate HCl/Atropine (Lomotil 0.025-2.5 Mg Tablet)  1 tab PO Q4 PRN


   PRN Reason: Diarrhea


   Last Admin: 10/10/17 10:44 Dose:  1 tab


Ceftazidime/Avibactam 2.5 gm/ (Sodium Chloride)  100 mls @ 50 mls/hr IV Q12H [facility]


   Last Admin: 10/10/17 17:26 Dose:  50 mls/hr


Insulin Aspart (Novolog)  0 unit SC ACHS [facility]


   PRN Reason: Protocol


   Last Admin: 10/10/17 17:26 Dose:  6 unit


Insulin Detemir (Levemir)  14 unit SC QAM [facility]


   Last Admin: 10/10/17 10:44 Dose:  14 unit


Losartan Potassium (Cozaar)  50 mg PO DAILY [facility]


   Last Admin: 10/10/17 10:43 Dose:  50 mg


Metoprolol Succinate (Toprol Xl)  50 mg PO DAILY [facility]


   Last Admin: 10/10/17 10:46 Dose:  50 mg


Morphine Sulfate (Morphine)  2 mg IVP Q4 PRN


   PRN Reason: Pain, severe (8-10)


   Last Admin: 10/10/17 19:25 Dose:  2 mg


Ondansetron HCl (Zofran Odt)  4 mg PO QID PRN


   PRN Reason: Nausea/Vomiting


Pantoprazole Sodium (Protonix Ec Tab)  40 mg PO DAILY [facility]


   Last Admin: 10/10/17 10:46 Dose:  40 mg


Prednisone (Prednisone Tab)  20 mg PO BID [facility]


   Last Admin: 10/10/17 17:26 Dose:  20 mg


Saccharomyces Boulardii (Florastor)  250 mg PO BID [facility]


   Last Admin: 10/10/17 17:26 Dose:  250 mg


Zolpidem Tartrate (Ambien)  5 mg PO HS PRN


   PRN Reason: Insomnia


   Last Admin: 10/08/17 22:48 Dose:  5 mg











- Labs


Labs: 


 





 10/09/17 07:52 





 10/09/17 07:52 





 











PT  12.4 SECONDS (9.7-12.2)  H  10/06/17  19:40    


 


INR  1.1   10/06/17  19:40    


 


APTT  38 SECONDS (21-34)  H  10/06/17  19:40    














Assessment and Plan


(1) UTI (urinary tract infection)


Status: Acute   





(2) Neurogenic bladder


Status: Acute   





(3) Sarcoidosis


Status: Acute   





(4) Abdominal pain


Status: Acute   





(5) Congestive heart failure


Status: Acute   





(6) Hypertension


Status: Acute   





- Assessment and Plan (Free Text)


Plan: 





ON IV AVYCAZ 2.5 MG iv PIGGYBACK EVERY 12 HOURLY FOR MDR UROSEPSIS. 10/6/17.


PATIENT TOLERATING IT WITHOUT SIDE EFFECTS.


STRICT CONTACT PRECAUTIONS.


 MICRO TO CHECK FOR SENSITIVITY FOR AVYCAZ, IMIPENEM, AND COLISTIN.-P





fOLLOW-UP BLOOD CULTURES ( SO FAR -VE ).


F/U LFTS IN AM





CASE DISCUSSED WITH THE STAFF /NP MS GONCALVES.


PATIENT TO GET REPEAT URINE CULTURES.  sENT TODAY AS PER MS IVANNA CAVANAUGH.

## 2017-10-10 NOTE — PN
SUBJECTIVE:  The patient complains of abdominal discomfort and at times

palpitation.  The monitor reveals _____ pacemaker with an underlying sinus

rhythm.



PHYSICAL EXAMINATION:

VITAL SIGNS:  Blood pressure 162/87, heart rate 61, temperature 97.4,

respiration 20.

HEENT:  Normocephalic.

CHEST:  Diminished breath sounds over the bases.

HEART:  S1 and S2 regular.

ABDOMEN:  Mild tenderness.

EXTREMITIES:  Bilateral above-knee amputation.



ASSESSMENT:

1.  Cardiomyopathy.

2.  Status post implantable cardioverter-defibrillator placement.

3.  Uncontrolled diabetes mellitus.

4.  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

5.  Peripheral vascular disease, status post bilateral above-knee

amputation.

6.  Urinary tract infection.



RECOMMENDATION:  Continue current IV ceftazidime at 2.5 g q. 12 hours,

Eliquis 5 mg once a day, digoxin 0.25 mg daily and Toprol-XL at 50 mg once

a day.  Obtain repeat digoxin level in a.m.







__________________________________________

Ankit Figueroa MD





DD:  10/10/2017 14:32:22

DT:  10/10/2017 14:56:43

Job # [id number]

## 2017-10-10 NOTE — CP.PCM.PN
Subjective





- Date & Time of Evaluation


Date of Evaluation: 10/10/17


Time of Evaluation: 20:00





- Subjective


Subjective: 





pt seen & evaluated. c/o abdominal pain, nausea, no vomiting





Objective





- Vital Signs/Intake and Output


Vital Signs (last 24 hours): 


 











Temp Pulse Resp BP Pulse Ox


 


 97.9 F   86   20   132/85   95 


 


 10/10/17 15:00  10/10/17 15:00  10/10/17 15:00  10/10/17 15:00  10/10/17 15:00








Intake and Output: 


 











 10/10/17 10/11/17





 18:59 06:59


 


Intake Total 300 


 


Output Total 500 1000


 


Balance -200 -1000














- Medications


Medications: 


 Current Medications





Apixaban (Eliquis)  5 mg PO BID [facility]


   Last Admin: 10/10/17 17:25 Dose:  5 mg


Bismuth Subsalicylate (Pepto-Bismol)  262 mg PO Q4 PRN


   PRN Reason: STOMACH PAIN


   Last Admin: 10/09/17 10:58 Dose:  262 mg


Digoxin (Lanoxin)  0.25 mg PO DAILY@1800 [facility]


   Last Admin: 10/10/17 19:30 Dose:  Not Given


Diphenhydramine HCl (Benadryl)  25 mg IVP Q4H PRN


   PRN Reason: pruritus


   Last Admin: 10/10/17 19:24 Dose:  25 mg


Diphenoxylate HCl/Atropine (Lomotil 0.025-2.5 Mg Tablet)  1 tab PO Q4 PRN


   PRN Reason: Diarrhea


   Last Admin: 10/10/17 22:44 Dose:  1 tab


Ceftazidime/Avibactam 2.5 gm/ (Sodium Chloride)  100 mls @ 50 mls/hr IV Q12H [facility]


   Last Admin: 10/10/17 17:26 Dose:  50 mls/hr


Insulin Aspart (Novolog)  0 unit SC ACHS [facility]


   PRN Reason: Protocol


   Last Admin: 10/10/17 22:45 Dose:  Not Given


Insulin Detemir (Levemir)  14 unit SC QAM [facility]


   Last Admin: 10/10/17 10:44 Dose:  14 unit


Losartan Potassium (Cozaar)  50 mg PO DAILY [facility]


   Last Admin: 10/10/17 10:43 Dose:  50 mg


Metoprolol Succinate (Toprol Xl)  50 mg PO DAILY [facility]


   Last Admin: 10/10/17 10:46 Dose:  50 mg


Morphine Sulfate (Morphine)  2 mg IVP Q4 PRN


   PRN Reason: Pain, severe (8-10)


   Last Admin: 10/10/17 22:44 Dose:  2 mg


Ondansetron HCl (Zofran Odt)  4 mg PO QID PRN


   PRN Reason: Nausea/Vomiting


Pantoprazole Sodium (Protonix Ec Tab)  40 mg PO DAILY [facility]


   Last Admin: 10/10/17 10:46 Dose:  40 mg


Prednisone (Prednisone Tab)  20 mg PO BID [facility]


   Last Admin: 10/10/17 17:26 Dose:  20 mg


Saccharomyces Boulardii (Florastor)  250 mg PO BID [facility]


   Last Admin: 10/10/17 17:26 Dose:  250 mg


Zolpidem Tartrate (Ambien)  5 mg PO HS PRN


   PRN Reason: Insomnia


   Last Admin: 10/08/17 22:48 Dose:  5 mg











- Labs


Labs: 


 





 10/09/17 07:52 





 10/09/17 07:52 





 











PT  12.4 SECONDS (9.7-12.2)  H  10/06/17  19:40    


 


INR  1.1   10/06/17  19:40    


 


APTT  38 SECONDS (21-34)  H  10/06/17  19:40    














- Constitutional


Appears: No Acute Distress, Chronically Ill





- Head Exam


Head Exam: ATRAUMATIC, NORMAL INSPECTION, NORMOCEPHALIC





- Eye Exam


Eye Exam: EOMI, Normal appearance, PERRL


Pupil Exam: NORMAL ACCOMODATION, PERRL





- Respiratory Exam


Respiratory Exam: Decreased Breath Sounds





- Cardiovascular Exam


Cardiovascular Exam: +S1, +S2





- GI/Abdominal Exam


GI & Abdominal Exam: Mass, Normal Bowel Sounds





- Rectal Exam


Rectal Exam: Deferred





Assessment and Plan


(1) Neurogenic bladder


Status: Acute   





(2) Sarcoidosis


Assessment & Plan: 


hepatic sarcoisosis


Status: Acute   





(3) Abdominal pain


Status: Acute   





(4) Hypertension


Status: Acute   





(5) Obstructive hyperbilirubinemia


Status: Acute   





(6) UTI (urinary tract infection) due to urinary indwelling Charles catheter


Assessment & Plan: 


cloudy urine in foleys


on antibiotics


prednisolone


Status: Acute

## 2017-10-11 LAB
ALBUMIN/GLOB SERPL: 0.9 {RATIO} (ref 1–2.1)
ALP SERPL-CCNC: 525 U/L (ref 38–126)
ALT SERPL-CCNC: 221 U/L (ref 9–52)
AST SERPL-CCNC: 126 U/L (ref 14–36)
BACTERIA #/AREA URNS HPF: (no result) /[HPF]
BASOPHILS # BLD AUTO: 0 K/UL (ref 0–0.2)
BASOPHILS NFR BLD: 0 % (ref 0–2)
BILIRUB DIRECT SERPL-MCNC: 3.6 MG/DL (ref 0–0.4)
BILIRUB SERPL-MCNC: 4.1 MG/DL (ref 0.2–1.3)
BILIRUB UR-MCNC: NEGATIVE MG/DL
BUN SERPL-MCNC: 24 MG/DL (ref 7–17)
CALCIUM SERPL-MCNC: 9.7 MG/DL (ref 8.6–10.4)
CHLORIDE SERPL-SCNC: 104 MMOL/L (ref 98–107)
CO2 SERPL-SCNC: 18 MMOL/L (ref 22–30)
EOSINOPHIL # BLD AUTO: 0 K/UL (ref 0–0.7)
EOSINOPHIL NFR BLD: 0 % (ref 0–4)
ERYTHROCYTE [DISTWIDTH] IN BLOOD BY AUTOMATED COUNT: 15 % (ref 11.5–14.5)
GLOBULIN SER-MCNC: 4.3 GM/DL (ref 2.2–3.9)
GLUCOSE SERPL-MCNC: 160 MG/DL (ref 65–105)
GLUCOSE UR STRIP-MCNC: (no result) MG/DL
HCT VFR BLD CALC: 39.1 % (ref 34–47)
KETONES UR STRIP-MCNC: NEGATIVE MG/DL
LEUKOCYTE ESTERASE UR-ACNC: (no result) LEU/UL
LYMPHOCYTES # BLD AUTO: 2.5 K/UL (ref 1–4.3)
LYMPHOCYTES NFR BLD AUTO: 17 % (ref 20–40)
MCH RBC QN AUTO: 32.7 PG (ref 27–31)
MCHC RBC AUTO-ENTMCNC: 33.5 G/DL (ref 33–37)
MCV RBC AUTO: 97.5 FL (ref 81–99)
MONOCYTES # BLD: 1 K/UL (ref 0–0.8)
MONOCYTES NFR BLD: 7 % (ref 0–10)
NRBC BLD AUTO-RTO: 0 % (ref 0–2)
PH UR STRIP: 6 [PH] (ref 5–8)
PLATELET # BLD: 338 K/UL (ref 130–400)
PMV BLD AUTO: 9.2 FL (ref 7.2–11.7)
POTASSIUM SERPL-SCNC: 3.8 MMOL/L (ref 3.6–5.2)
PROT SERPL-MCNC: 8.2 G/DL (ref 6.3–8.3)
PROT UR STRIP-MCNC: NEGATIVE MG/DL
RBC # UR STRIP: NEGATIVE /UL
RBC #/AREA URNS HPF: 2 /HPF (ref 0–3)
SODIUM SERPL-SCNC: 137 MMOL/L (ref 132–148)
SP GR UR STRIP: 1.01 (ref 1–1.03)
UROBILINOGEN UR-MCNC: NORMAL MG/DL (ref 0.2–1)
WBC # BLD AUTO: 14.5 K/UL (ref 4.8–10.8)
WBC #/AREA URNS HPF: 32 /HPF (ref 0–5)

## 2017-10-11 RX ADMIN — DIPHENOXYLATE HYDROCHLORIDE AND ATROPINE SULFATE PRN TAB: 2.5; .025 TABLET ORAL at 12:29

## 2017-10-11 RX ADMIN — Medication SCH MG: at 09:56

## 2017-10-11 RX ADMIN — DIPHENHYDRAMINE HYDROCHLORIDE PRN MG: 50 INJECTION INTRAMUSCULAR; INTRAVENOUS at 09:27

## 2017-10-11 RX ADMIN — Medication SCH MG: at 17:08

## 2017-10-11 RX ADMIN — INSULIN ASPART SCH UNIT: 100 INJECTION, SOLUTION INTRAVENOUS; SUBCUTANEOUS at 17:07

## 2017-10-11 RX ADMIN — DIPHENHYDRAMINE HYDROCHLORIDE PRN MG: 50 INJECTION INTRAMUSCULAR; INTRAVENOUS at 02:49

## 2017-10-11 RX ADMIN — DIGOXIN SCH MG: 0.25 TABLET ORAL at 17:08

## 2017-10-11 RX ADMIN — INSULIN DETEMIR SCH UNIT: 100 INJECTION, SOLUTION SUBCUTANEOUS at 09:55

## 2017-10-11 RX ADMIN — INSULIN ASPART SCH: 100 INJECTION, SOLUTION INTRAVENOUS; SUBCUTANEOUS at 21:26

## 2017-10-11 RX ADMIN — METOPROLOL SUCCINATE SCH MG: 50 TABLET, EXTENDED RELEASE ORAL at 09:26

## 2017-10-11 RX ADMIN — PANTOPRAZOLE SODIUM SCH MG: 40 TABLET, DELAYED RELEASE ORAL at 09:26

## 2017-10-11 RX ADMIN — DIPHENHYDRAMINE HYDROCHLORIDE PRN MG: 50 INJECTION INTRAMUSCULAR; INTRAVENOUS at 23:00

## 2017-10-11 RX ADMIN — INSULIN ASPART SCH UNIT: 100 INJECTION, SOLUTION INTRAVENOUS; SUBCUTANEOUS at 12:28

## 2017-10-11 RX ADMIN — DIPHENHYDRAMINE HYDROCHLORIDE PRN MG: 50 INJECTION INTRAMUSCULAR; INTRAVENOUS at 14:31

## 2017-10-11 RX ADMIN — INSULIN ASPART SCH: 100 INJECTION, SOLUTION INTRAVENOUS; SUBCUTANEOUS at 08:30

## 2017-10-11 RX ADMIN — DIPHENHYDRAMINE HYDROCHLORIDE PRN MG: 50 INJECTION INTRAMUSCULAR; INTRAVENOUS at 18:52

## 2017-10-11 NOTE — CP.PCM.PN
Subjective





- Date & Time of Evaluation


Date of Evaluation: 10/11/17


Time of Evaluation: 20:10





- Subjective


Subjective: 





Pt seen and examined, foleys in place with clouy urine, E.Coli positive on 

antibiotics, c/o abdominal pain, repeat urine cultures are positive as well, c/

o nausea, no vomitting





Objective





- Vital Signs/Intake and Output


Vital Signs (last 24 hours): 


 











Temp Pulse Resp BP Pulse Ox


 


 97.8 F   64   20   119/74   99 


 


 10/11/17 15:08  10/11/17 18:00  10/11/17 15:08  10/11/17 15:08  10/11/17 15:08








Intake and Output: 


 











 10/11/17 10/12/17





 18:59 06:59


 


Intake Total 480 800


 


Output Total 750 1200


 


Balance -270 -400














- Medications


Medications: 


 Current Medications





Apixaban (Eliquis)  5 mg PO BID [facility]


   Last Admin: 10/11/17 17:08 Dose:  5 mg


Bismuth Subsalicylate (Pepto-Bismol)  262 mg PO Q4 PRN


   PRN Reason: STOMACH PAIN


   Last Admin: 10/09/17 10:58 Dose:  262 mg


Digoxin (Lanoxin)  0.25 mg PO DAILY@1800 [facility]


   Last Admin: 10/11/17 17:08 Dose:  0.25 mg


Diphenhydramine HCl (Benadryl)  25 mg IVP Q4H PRN


   PRN Reason: pruritus


   Last Admin: 10/11/17 23:00 Dose:  25 mg


Diphenoxylate HCl/Atropine (Lomotil 0.025-2.5 Mg Tablet)  1 tab PO Q4 PRN


   PRN Reason: Diarrhea


   Last Admin: 10/11/17 12:29 Dose:  1 tab


Ceftazidime/Avibactam 2.5 gm/ (Sodium Chloride)  100 mls @ 50 mls/hr IV Q12H [facility]


   Last Admin: 10/11/17 16:12 Dose:  50 mls/hr


Insulin Aspart (Novolog)  0 unit SC ACHS [facility]


   PRN Reason: Protocol


   Last Admin: 10/11/17 21:26 Dose:  Not Given


Insulin Detemir (Levemir)  14 unit SC QAM [facility]


   Last Admin: 10/11/17 09:55 Dose:  14 unit


Losartan Potassium (Cozaar)  50 mg PO DAILY [facility]


   Last Admin: 10/11/17 09:26 Dose:  50 mg


Metoprolol Succinate (Toprol Xl)  50 mg PO DAILY [facility]


   Last Admin: 10/11/17 09:26 Dose:  50 mg


Morphine Sulfate (Morphine)  2 mg IVP Q4 PRN


   PRN Reason: Pain, severe (8-10)


   Last Admin: 10/11/17 23:00 Dose:  2 mg


Ondansetron HCl (Zofran Odt)  4 mg PO QID PRN


   PRN Reason: Nausea/Vomiting


Pantoprazole Sodium (Protonix Ec Tab)  40 mg PO DAILY [facility]


   Last Admin: 10/11/17 09:26 Dose:  40 mg


Prednisone (Prednisone Tab)  20 mg PO BID [facility]


   Last Admin: 10/11/17 17:08 Dose:  20 mg


Saccharomyces Boulardii (Florastor)  250 mg PO BID [facility]


   Last Admin: 10/11/17 17:08 Dose:  250 mg


Zolpidem Tartrate (Ambien)  5 mg PO HS PRN


   PRN Reason: Insomnia


   Last Admin: 10/08/17 22:48 Dose:  5 mg











- Labs


Labs: 


 





 10/11/17 08:31 





 10/11/17 08:31 





 











PT  12.4 SECONDS (9.7-12.2)  H  10/06/17  19:40    


 


INR  1.1   10/06/17  19:40    


 


APTT  38 SECONDS (21-34)  H  10/06/17  19:40    














- Constitutional


Appears: No Acute Distress





- Head Exam


Head Exam: ATRAUMATIC, NORMAL INSPECTION, NORMOCEPHALIC





- Eye Exam


Eye Exam: EOMI, Normal appearance, PERRL


Pupil Exam: NORMAL ACCOMODATION, PERRL





- Respiratory Exam


Respiratory Exam: Clear to Ausculation Bilateral, NORMAL BREATHING PATTERN





- Cardiovascular Exam


Cardiovascular Exam: REGULAR RHYTHM, +S1, +S2.  absent: Murmur





- GI/Abdominal Exam


GI & Abdominal Exam: Soft, Normal Bowel Sounds.  absent: Tenderness





Assessment and Plan


(1) Neurogenic bladder


Assessment & Plan: 


on foleys


Status: Acute   





(2) Sarcoidosis


Status: Acute   





(3) Abdominal pain


Status: Acute   





(4) Hypertension


Status: Acute   





(5) Obstructive hyperbilirubinemia


Status: Acute   





(6) UTI (urinary tract infection) due to urinary indwelling Charles catheter


Assessment & Plan: 


on antibiotics


Status: Acute

## 2017-10-11 NOTE — PN
DATE:



SUBJECTIVE:  The patient denies chest pain.  She is experiencing abdominal

discomfort.  No shortness of breath.



PHYSICAL EXAMINATION:

VITAL SIGNS:  Blood pressure 119/74, heart rate is 59, temperature 97.8,

respirations 20.

HEENT:  Normocephalic.

CHEST:  Clear.

HEART:  S1 and S2 regular.

EXTREMITIES:  Bilateral above knee amputations.



LABORATORY DATA:  Hemoglobin and hematocrit is within normal limit.  White

count 14.5, platelet 338,000.  Today's BUN and creatinine 24 and 0.6,

glucose is 160.



ASSESSMENT:

1.  Dilated cardiomyopathy.

2.  Liver sarcoidosis.

3.  Urinary tract infection.



CONDITIONS:  Continue current Eliquis 5 mg twice a day, Cozaar 50 mg once a

day, digoxin 0.25 mg once a day ____.  Continue Toprol XL 50 mg once a day.

Discontinue telemetry.





__________________________________________

Ankit Figueroa MD



DD:  10/11/2017 18:01:19

DT:  10/11/2017 18:55:37

Job # [id number]

## 2017-10-11 NOTE — PN
DATE:



LOCATION:  [facility], bed A.



SUBJECTIVE:  This is a 52-year-old female seen and examined on rounds today

without significant clinical changes or reported active bleeding, but

fluctuation of her heart rate around 50 to 100 on  and off as reported.



The entire chart is reviewed including, but not limited to most recent lab

and radiology study results, current and previous medication list, and

current and previous medical events.



Latest blood test _____ is 214 and rest of lab results today is still

pending.



PHYSICAL EXAMINATION

GENERAL:  A 52-year-old female, still complaining of abdominal pain on and

off.

VITAL SIGNS:  Afebrile with pulse of 66, respiratory rate of 20 to 22, and

blood pressure of 158/82.

HEENT:  Showed pale dry mucoid membrane, bilateral icteric sclerae.

HEART:  Positive S1 and S2.

LUNGS:  Few scattered crepitation.  Decreased air entry at bases.

ABDOMEN:  Soft.  Bowel sounds are present.  No mass or organomegaly.  No

rebound tenderness or guarding.

EXTREMITIES:  There is bilateral above-knee amputation.

NEUROLOGIC:  No new neurological deficits, sensory or motor.



IMPRESSION:

1.  Hepatic sarcoidosis.

2.  Abnormal liver function test.

3.  Jaundice.

4.  Urinary tract infection with urosepsis.

5.  Known history of peripheral vascular disease.

6.  Deep venous thrombosis by history.

7.  Peptic ulcer disease by history.



SUGGESTION:

1.  Continue current management.

2.  Adjust oral intake.

3.  No aggressive GI workup in the meantime until the patient is more

stable clinically.







__________________________________________

Jeffrey Albert MD







DD:  10/10/2017 12:13:21

DT:  10/10/2017 13:01:49

Job # [id number]

## 2017-10-11 NOTE — CARD
--------------- APPROVED REPORT --------------





EKG Measurement

Heart [id number]

NM [id number]

[id number]

[id number]

[id number]



<Conclusion>

Sinus rhythm with complete heart block with frequent 

ventricular-paced complexes and premature atrial complexes

Abnormal ECG

## 2017-10-11 NOTE — CP.PCM.PN
Subjective





- Date & Time of Evaluation


Date of Evaluation: 10/11/17


Time of Evaluation: 23:46





- Subjective


Subjective: 





CHIEF COMPLAINTS  TODAY :


afebrile, 


C/O RUQ PAIN.


ALSO COMPLAINING OF SMALL PORTIONS OF FOOD GIVEN TO HER. 


 STATES SHE IS ALWAYS HUNGRY.








ROS





HEENT :  N.ICTERIC


Resp :       No  SOB wheezing, +ve DRY COUGH


Cardio :     No CP, PND orthopnea 


GI :           RUQ ABDOMINAL PAIN , NO  n/v 


CNS : No headache , focal deficit. 


Musculoskel :  N


Ext. : Pedal pulses intact, no edema or calf pain 


Derm :        N


Psych :     N. 








PE. 


Pt. is alert awake in no distress. 





V.S  As noted in the chart 





Head ,ear nose,throat and eyes : Normal.,sclera icteric.


Neck : Supple with normal carotids.


Lungs  CLEARING NO WHEEZE


Heart : S1 & S2 irregular. . No murmur. 


Abd : Soft MILD TENDERNESS RIGHT UPPER QUADRANT with normal bowel sounds.


Neuro : Moves all ext. with no localized deficit.


Ext : No edema with intact pulses. Neg. calf tenderness 


Derm : No rashes or decubitus ulcer.





Radiology/Labs .


REPEAT URINE CULTURE 10/10/17  GRAM-POSITIVE COCCI/YEAST SPECIES. ? CONTAMINANT


STOOL C. DIFFICILE -VE 10/7/17


CXR  NAD,


BLOOD CULTURES NEGATIVE TO DATE.





Initial urine culture Klebsiella pneumoniae -veESBL S- TYGACIL,GENTAMYCIN -[place] 

4.


LFTS  10/11/17 TOTAL BILI 4.1 , ast 126, alt 221, ALKALINE PHOSPHATASE 525 

IMPROVING.








Objective





- Vital Signs/Intake and Output


Vital Signs (last 24 hours): 


 











Temp Pulse Resp BP Pulse Ox


 


 97.7 F   92 H  18   128/85   100 


 


 10/11/17 23:30  10/11/17 23:30  10/11/17 23:30  10/11/17 23:30  10/11/17 23:30








Intake and Output: 


 











 10/11/17 10/12/17





 18:59 06:59


 


Intake Total 480 800


 


Output Total 750 1200


 


Balance -270 -400














- Medications


Medications: 


 Current Medications





Apixaban (Eliquis)  5 mg PO BID [facility]


   Last Admin: 10/11/17 17:08 Dose:  5 mg


Bismuth Subsalicylate (Pepto-Bismol)  262 mg PO Q4 PRN


   PRN Reason: STOMACH PAIN


   Last Admin: 10/09/17 10:58 Dose:  262 mg


Digoxin (Lanoxin)  0.25 mg PO DAILY@1800 [facility]


   Last Admin: 10/11/17 17:08 Dose:  0.25 mg


Diphenhydramine HCl (Benadryl)  25 mg IVP Q4H PRN


   PRN Reason: pruritus


   Last Admin: 10/11/17 23:00 Dose:  25 mg


Diphenoxylate HCl/Atropine (Lomotil 0.025-2.5 Mg Tablet)  1 tab PO Q4 PRN


   PRN Reason: Diarrhea


   Last Admin: 10/11/17 12:29 Dose:  1 tab


Ceftazidime/Avibactam 2.5 gm/ (Sodium Chloride)  100 mls @ 50 mls/hr IV Q12H [facility]


   Last Admin: 10/11/17 16:12 Dose:  50 mls/hr


Insulin Aspart (Novolog)  0 unit SC ACHS [facility]


   PRN Reason: Protocol


   Last Admin: 10/11/17 21:26 Dose:  Not Given


Insulin Detemir (Levemir)  14 unit SC QAM [facility]


   Last Admin: 10/11/17 09:55 Dose:  14 unit


Losartan Potassium (Cozaar)  50 mg PO DAILY [facility]


   Last Admin: 10/11/17 09:26 Dose:  50 mg


Metoprolol Succinate (Toprol Xl)  50 mg PO DAILY [facility]


   Last Admin: 10/11/17 09:26 Dose:  50 mg


Morphine Sulfate (Morphine)  2 mg IVP Q4 PRN


   PRN Reason: Pain, severe (8-10)


   Last Admin: 10/11/17 23:00 Dose:  2 mg


Ondansetron HCl (Zofran Odt)  4 mg PO QID PRN


   PRN Reason: Nausea/Vomiting


Pantoprazole Sodium (Protonix Ec Tab)  40 mg PO DAILY [facility]


   Last Admin: 10/11/17 09:26 Dose:  40 mg


Prednisone (Prednisone Tab)  20 mg PO BID [facility]


   Last Admin: 10/11/17 17:08 Dose:  20 mg


Saccharomyces Boulardii (Florastor)  250 mg PO BID [facility]


   Last Admin: 10/11/17 17:08 Dose:  250 mg


Zolpidem Tartrate (Ambien)  5 mg PO HS PRN


   PRN Reason: Insomnia


   Last Admin: 10/08/17 22:48 Dose:  5 mg











- Labs


Labs: 


 





 10/11/17 08:31 





 10/11/17 08:31 





 











PT  12.4 SECONDS (9.7-12.2)  H  10/06/17  19:40    


 


INR  1.1   10/06/17  19:40    


 


APTT  38 SECONDS (21-34)  H  10/06/17  19:40    














Assessment and Plan


(1) UTI (urinary tract infection)


Status: Acute   





(2) Neurogenic bladder


Status: Acute   





(3) Sarcoidosis


Status: Acute   





(4) Abdominal pain


Status: Acute   





(5) Congestive heart failure


Status: Acute   





(6) Hypertension


Status: Acute   





- Assessment and Plan (Free Text)


Plan: 





DC IV AVYCAZ 2.5 MG iv PIGGYBACK EVERY 12 HOURLY FOR MDR UROSEPSIS. 10/6/17.








FOLLOW-UP LFTS IN AM


cbc IN A.M.


CASE DISCUSSED WITH THE STAFF /NP MS GONCALVES.


PATIENT TO GET REPEAT URINE CULTURES. 


 SENT TODAY AS PER MS GONCALVES NP.

## 2017-10-12 LAB
ALBUMIN/GLOB SERPL: 1 {RATIO} (ref 1–2.1)
ALP SERPL-CCNC: 480 U/L (ref 38–126)
ALT SERPL-CCNC: 237 U/L (ref 9–52)
AST SERPL-CCNC: 180 U/L (ref 14–36)
BACTERIA #/AREA URNS HPF: (no result) /[HPF]
BASOPHILS # BLD AUTO: 0 K/UL (ref 0–0.2)
BASOPHILS NFR BLD: 0 % (ref 0–2)
BILIRUB DIRECT SERPL-MCNC: 3.1 MG/DL (ref 0–0.4)
BILIRUB SERPL-MCNC: 3.6 MG/DL (ref 0.2–1.3)
BILIRUB UR-MCNC: NEGATIVE MG/DL
BUN SERPL-MCNC: 24 MG/DL (ref 7–17)
CALCIUM SERPL-MCNC: 9.2 MG/DL (ref 8.6–10.4)
CHLORIDE SERPL-SCNC: 104 MMOL/L (ref 98–107)
CO2 SERPL-SCNC: 17 MMOL/L (ref 22–30)
EOSINOPHIL # BLD AUTO: 0 K/UL (ref 0–0.7)
EOSINOPHIL NFR BLD: 0 % (ref 0–4)
ERYTHROCYTE [DISTWIDTH] IN BLOOD BY AUTOMATED COUNT: 15.4 % (ref 11.5–14.5)
GLOBULIN SER-MCNC: 3.9 GM/DL (ref 2.2–3.9)
GLUCOSE SERPL-MCNC: 62 MG/DL (ref 65–105)
GLUCOSE UR STRIP-MCNC: (no result) MG/DL
HCT VFR BLD CALC: 38.3 % (ref 34–47)
KETONES UR STRIP-MCNC: NEGATIVE MG/DL
LEUKOCYTE ESTERASE UR-ACNC: (no result) LEU/UL
LYMPHOCYTES # BLD AUTO: 2.1 K/UL (ref 1–4.3)
LYMPHOCYTES NFR BLD AUTO: 15 % (ref 20–40)
MCH RBC QN AUTO: 32.7 PG (ref 27–31)
MCHC RBC AUTO-ENTMCNC: 33.5 G/DL (ref 33–37)
MCV RBC AUTO: 97.6 FL (ref 81–99)
MONOCYTES # BLD: 0.4 K/UL (ref 0–0.8)
MONOCYTES NFR BLD: 3 % (ref 0–10)
NRBC BLD AUTO-RTO: 0 % (ref 0–2)
PH UR STRIP: 5 [PH] (ref 5–8)
PLATELET # BLD: 295 K/UL (ref 130–400)
PMV BLD AUTO: 9.4 FL (ref 7.2–11.7)
POTASSIUM SERPL-SCNC: 5.1 MMOL/L (ref 3.6–5.2)
PROT SERPL-MCNC: 7.8 G/DL (ref 6.3–8.3)
PROT UR STRIP-MCNC: (no result) MG/DL
RBC # UR STRIP: (no result) /UL
RBC #/AREA URNS HPF: 258 /HPF (ref 0–3)
SODIUM SERPL-SCNC: 135 MMOL/L (ref 132–148)
SP GR UR STRIP: 1.02 (ref 1–1.03)
UROBILINOGEN UR-MCNC: 2 MG/DL (ref 0.2–1)
WBC # BLD AUTO: 13.9 K/UL (ref 4.8–10.8)
WBC #/AREA URNS HPF: 104 /HPF (ref 0–5)

## 2017-10-12 RX ADMIN — METOPROLOL SUCCINATE SCH MG: 50 TABLET, EXTENDED RELEASE ORAL at 10:48

## 2017-10-12 RX ADMIN — INSULIN ASPART SCH: 100 INJECTION, SOLUTION INTRAVENOUS; SUBCUTANEOUS at 12:00

## 2017-10-12 RX ADMIN — MINERAL OIL, PETROLATUM, PHENYLEPHRINE HCL SCH: 14; 74.9; .25 OINTMENT RECTAL at 14:00

## 2017-10-12 RX ADMIN — INSULIN ASPART SCH: 100 INJECTION, SOLUTION INTRAVENOUS; SUBCUTANEOUS at 08:30

## 2017-10-12 RX ADMIN — INSULIN DETEMIR SCH UNIT: 100 INJECTION, SOLUTION SUBCUTANEOUS at 10:49

## 2017-10-12 RX ADMIN — DIPHENHYDRAMINE HYDROCHLORIDE PRN MG: 50 INJECTION INTRAMUSCULAR; INTRAVENOUS at 12:02

## 2017-10-12 RX ADMIN — DIPHENHYDRAMINE HYDROCHLORIDE PRN MG: 50 INJECTION INTRAMUSCULAR; INTRAVENOUS at 20:26

## 2017-10-12 RX ADMIN — DIPHENHYDRAMINE HYDROCHLORIDE PRN MG: 50 INJECTION INTRAMUSCULAR; INTRAVENOUS at 16:05

## 2017-10-12 RX ADMIN — PANTOPRAZOLE SODIUM SCH MG: 40 TABLET, DELAYED RELEASE ORAL at 10:48

## 2017-10-12 RX ADMIN — DIGOXIN SCH MG: 0.25 TABLET ORAL at 19:32

## 2017-10-12 RX ADMIN — DIPHENHYDRAMINE HYDROCHLORIDE PRN MG: 50 INJECTION INTRAMUSCULAR; INTRAVENOUS at 02:57

## 2017-10-12 RX ADMIN — DIPHENHYDRAMINE HYDROCHLORIDE PRN MG: 50 INJECTION INTRAMUSCULAR; INTRAVENOUS at 07:15

## 2017-10-12 RX ADMIN — MINERAL OIL, PETROLATUM, PHENYLEPHRINE HCL SCH APPLIC: 14; 74.9; .25 OINTMENT RECTAL at 18:53

## 2017-10-12 RX ADMIN — Medication SCH MG: at 10:48

## 2017-10-12 RX ADMIN — INSULIN ASPART SCH UNIT: 100 INJECTION, SOLUTION INTRAVENOUS; SUBCUTANEOUS at 19:33

## 2017-10-12 RX ADMIN — INSULIN ASPART SCH: 100 INJECTION, SOLUTION INTRAVENOUS; SUBCUTANEOUS at 21:53

## 2017-10-12 RX ADMIN — WATER SCH MLS/HR: 1 IRRIGANT IRRIGATION at 17:25

## 2017-10-12 RX ADMIN — Medication SCH MG: at 18:53

## 2017-10-12 NOTE — CP.PCM.PN
Subjective





- Date & Time of Evaluation


Date of Evaluation: 10/12/17


Time of Evaluation: 20:35





- Subjective


Subjective: 





Pt remins on antibiotics, now she is afberile, urine positive for MDR germs, 

gram positive cocci, pending sensitivity





Objective





- Vital Signs/Intake and Output


Vital Signs (last 24 hours): 


 











Temp Pulse Resp BP Pulse Ox


 


 97.6 F   59 L  20   128/86   100 


 


 10/12/17 07:25  10/12/17 10:47  10/12/17 07:25  10/12/17 10:47  10/12/17 07:25








Intake and Output: 


 











 10/12/17 10/12/17





 06:59 18:59


 


Intake Total 800 


 


Output Total 1850 


 


Balance -1050 














- Medications


Medications: 


 Current Medications





Apixaban (Eliquis)  5 mg PO BID [facility]


   Last Admin: 10/12/17 10:50 Dose:  5 mg


Bismuth Subsalicylate (Pepto-Bismol)  262 mg PO Q4 PRN


   PRN Reason: STOMACH PAIN


   Last Admin: 10/09/17 10:58 Dose:  262 mg


Digoxin (Lanoxin)  0.25 mg PO DAILY@1800 [facility]


   Last Admin: 10/11/17 17:08 Dose:  0.25 mg


Diphenhydramine HCl (Benadryl)  25 mg IVP Q4H PRN


   PRN Reason: pruritus


   Last Admin: 10/12/17 12:02 Dose:  25 mg


Diphenoxylate HCl/Atropine (Lomotil 0.025-2.5 Mg Tablet)  1 tab PO Q4 PRN


   PRN Reason: Diarrhea


   Last Admin: 10/11/17 12:29 Dose:  1 tab


Amphotericin B 50 mg/ Dextrose  10,000 mls @ 125 mls/hr IVPB DAILY [facility]


   Stop: 10/15/17 14:01


Insulin Aspart (Novolog)  0 unit SC ACHS [facility]


   PRN Reason: Protocol


   Last Admin: 10/12/17 12:00 Dose:  Not Given


Insulin Detemir (Levemir)  14 unit SC QAM [facility]


   Last Admin: 10/12/17 10:49 Dose:  14 unit


Losartan Potassium (Cozaar)  50 mg PO DAILY [facility]


   Last Admin: 10/12/17 10:48 Dose:  50 mg


Metoprolol Succinate (Toprol Xl)  50 mg PO DAILY [facility]


   Last Admin: 10/12/17 10:48 Dose:  50 mg


Morphine Sulfate (Morphine)  2 mg IVP Q4 PRN


   PRN Reason: Pain, severe (8-10)


   Last Admin: 10/12/17 12:03 Dose:  2 mg


Multi-Ingredient Ointment (Prep-Hem)  1 ea TOP BID [facility]


Ondansetron HCl (Zofran Odt)  4 mg PO QID PRN


   PRN Reason: Nausea/Vomiting


Pantoprazole Sodium (Protonix Ec Tab)  40 mg PO DAILY [facility]


   Last Admin: 10/12/17 10:48 Dose:  40 mg


Prednisone (Prednisone Tab)  20 mg PO BID [facility]


   Last Admin: 10/12/17 10:48 Dose:  20 mg


Saccharomyces Boulardii (Florastor)  250 mg PO BID [facility]


   Last Admin: 10/12/17 10:48 Dose:  250 mg


Zolpidem Tartrate (Ambien)  5 mg PO HS PRN


   PRN Reason: Insomnia


   Last Admin: 10/08/17 22:48 Dose:  5 mg











- Labs


Labs: 


 





 10/12/17 07:56 





 10/12/17 07:56 





 











PT  12.4 SECONDS (9.7-12.2)  H  10/06/17  19:40    


 


INR  1.1   10/06/17  19:40    


 


APTT  38 SECONDS (21-34)  H  10/06/17  19:40    














- Constitutional


Appears: No Acute Distress, Chronically Ill





- Head Exam


Head Exam: ATRAUMATIC, NORMAL INSPECTION, NORMOCEPHALIC





- Eye Exam


Eye Exam: EOMI, Normal appearance, PERRL


Pupil Exam: NORMAL ACCOMODATION, PERRL





- Respiratory Exam


Respiratory Exam: Clear to Ausculation Bilateral, NORMAL BREATHING PATTERN





- Cardiovascular Exam


Cardiovascular Exam: REGULAR RHYTHM, +S1, +S2.  absent: Murmur





- GI/Abdominal Exam


GI & Abdominal Exam: Soft, Normal Bowel Sounds.  absent: Tenderness





Assessment and Plan


(1) Neurogenic bladder


Assessment & Plan: 


foleys in place


Status: Acute   





(2) Sarcoidosis


Status: Acute   





(3) Abdominal pain


Status: Acute   





(4) Hypertension


Status: Acute   





(5) Obstructive hyperbilirubinemia


Status: Acute   





(6) UTI (urinary tract infection) due to urinary indwelling Charles catheter


Assessment & Plan: 


ID follow up


MDR germs, first it was klebsella now gram positive cocci pending identity and 

sensitivity


Status: Acute

## 2017-10-12 NOTE — PN
DATE:



LOCATION:  [facility], bed 8.



SUBJECTIVE:  This is a 52-year-old female, seen and examined in rounds

today with a complaint of postprandial abdominal pain, shaky, and was

feeling of chills with less oral intake.



The patient is still complaining of right-sided abdominal pain as well as

suprapubic pain, but no reported active bleeding.  The entire chart is

reviewed including, but not limited to the most recent labs and radiology

study results, current and previous medication list, current and previous

medical events, and allergy to medication list.  Case discussed with the

staff on the floor and all the consultants.



White blood cells reported to be 14.5, but normal hemoglobin and hematocrit

with low CO2 content to 18 indicative of metabolic acidosis with mild

increased BUN of 24, but low creatinine with blood glucose level of 160,

total bilirubin down to 4.1 with ,  with increased alkaline

phosphatase to 525.



PHYSICAL EXAMINATION:

GENERAL:  A 52-year-old female appear to be awake, alert, and oriented with

persistent complaint of abdominal pain.

VITAL SIGNS:  Afebrile with heart rate of 80, respiratory rate of 20 to 22,

and blood pressure 156/80.

HEENT:  Showed dry oral mucous membrane with bilateral icteric sclerae.

LUNGS:  Few scattered crepitations.  Decreased air entry at bases.

HEART:  Positive S1 and S2.

ABDOMEN:  Soft with generalized tenderness and mild distention.  No mass or

organomegaly.  No rebound tenderness or guarding.  Charles catheter is still

in place.  Abdominal distention mildly seen.

EXTREMITIES:  With evidence of bilateral above-knee amputation.

NEUROLOGIC:  No new reported neurological deficits, sensory or motor.



IMPRESSION:

1.  Sarcoid liver.

2.  Abnormal liver function tests secondary to above.

3.  Jaundice with increased total bilirubin level, most likely secondary to

above, no evidence of obstructive phenomenon.

4.  Known history of biliary spasm with status post biliary stent

insertion.

5.  Urosepsis, on antibiotics.

6.  Known history of peripheral vascular disease.

7.  Status post bilateral above-knee amputation.

8.  Known history of deep venous thrombosis.

9.  Exacerbation of peptic ulcer disease.

10.  Poorly controlled diabetes mellitus.

11.  Known history of hypertension.

12.  Cardiac arrhythmias by history.

13.  Anemia secondary to above.

14.  History of congestive heart failure.

15.  Neurogenic bladder.



SUGGESTIONS:

1.  Agree with your plan.

2.  Adjust the steroids intake.

3.  Abdominal ultrasound.



Further recommendations to follow.  Case is to be discussed with the

cardiology consultant on the case.



If there is persistent elevation of total bilirubin with direct bilirubin,

then biliary tree change to be kept in mind.





__________________________________________

Jeffrey Albert MD









DD:  10/11/2017 10:46:24

DT:  10/11/2017 11:16:06

Job # [id number]

## 2017-10-12 NOTE — CP.PCM.PN
Subjective





- Date & Time of Evaluation


Date of Evaluation: 10/12/17


Time of Evaluation: 11:00





- Subjective


Subjective: 





NP NOTES 








PT SEEN TODAY , DENIES ANY CHEST PAIN, SOB, C/O PAIN R UQ AN D FREQUENT BM 


A FEBRILE 


LABS AND U/A DONE TODAY REVIEWED


LIVER ENZYME TRENDING UP , ALL IV ANTIBIOTICS D/C 


URINE CULTURE - GRAM POSITIVE COCCI AND YEAST SPECIOUS ( 50,000- 100,0000 


 U/A SHOWS -YEAST MANY (h) 


D/W DR. LUCIANO AND REVIEWED ALL LABS AND CULTURE RESULTS , RECOMMENDS TO START 

AMPHO. 50 MG IN 1000 ML AND IRRIGATION VIA 3 WAY F/C X 3 DAYS.  AND PT CAN BE 

DISCHARGED HOME AFTER 3 DAYS OF AMPH.   


  





Objective





- Vital Signs/Intake and Output


Vital Signs (last 24 hours): 


 











Temp Pulse Resp BP Pulse Ox


 


 97.6 F   59 L  20   128/86   100 


 


 10/12/17 07:25  10/12/17 10:47  10/12/17 07:25  10/12/17 10:47  10/12/17 07:25








Intake and Output: 


 











 10/12/17 10/12/17





 06:59 18:59


 


Intake Total 800 


 


Output Total 1850 


 


Balance -1050 














- Medications


Medications: 


 Current Medications





Apixaban (Eliquis)  5 mg PO BID [facility]


   Last Admin: 10/12/17 10:50 Dose:  5 mg


Bismuth Subsalicylate (Pepto-Bismol)  262 mg PO Q4 PRN


   PRN Reason: STOMACH PAIN


   Last Admin: 10/09/17 10:58 Dose:  262 mg


Digoxin (Lanoxin)  0.25 mg PO DAILY@1800 [facility]


   Last Admin: 10/11/17 17:08 Dose:  0.25 mg


Diphenhydramine HCl (Benadryl)  25 mg IVP Q4H PRN


   PRN Reason: pruritus


   Last Admin: 10/12/17 12:02 Dose:  25 mg


Diphenoxylate HCl/Atropine (Lomotil 0.025-2.5 Mg Tablet)  1 tab PO Q4 PRN


   PRN Reason: Diarrhea


   Last Admin: 10/11/17 12:29 Dose:  1 tab


Insulin Aspart (Novolog)  0 unit SC ACHS [facility]


   PRN Reason: Protocol


   Last Admin: 10/12/17 12:00 Dose:  Not Given


Insulin Detemir (Levemir)  14 unit SC QAM [facility]


   Last Admin: 10/12/17 10:49 Dose:  14 unit


Losartan Potassium (Cozaar)  50 mg PO DAILY [facility]


   Last Admin: 10/12/17 10:48 Dose:  50 mg


Metoprolol Succinate (Toprol Xl)  50 mg PO DAILY [facility]


   Last Admin: 10/12/17 10:48 Dose:  50 mg


Morphine Sulfate (Morphine)  2 mg IVP Q4 PRN


   PRN Reason: Pain, severe (8-10)


   Last Admin: 10/12/17 12:03 Dose:  2 mg


Multi-Ingredient Ointment (Prep-Hem)  1 ea TOP BID [facility]


Ondansetron HCl (Zofran Odt)  4 mg PO QID PRN


   PRN Reason: Nausea/Vomiting


Pantoprazole Sodium (Protonix Ec Tab)  40 mg PO DAILY [facility]


   Last Admin: 10/12/17 10:48 Dose:  40 mg


Prednisone (Prednisone Tab)  20 mg PO BID [facility]


   Last Admin: 10/12/17 10:48 Dose:  20 mg


Saccharomyces Boulardii (Florastor)  250 mg PO BID [facility]


   Last Admin: 10/12/17 10:48 Dose:  250 mg


Zolpidem Tartrate (Ambien)  5 mg PO HS PRN


   PRN Reason: Insomnia


   Last Admin: 10/08/17 22:48 Dose:  5 mg











- Labs


Labs: 


 





 10/12/17 07:56 





 10/12/17 07:56 





 











PT  12.4 SECONDS (9.7-12.2)  H  10/06/17  19:40    


 


INR  1.1   10/06/17  19:40    


 


APTT  38 SECONDS (21-34)  H  10/06/17  19:40

## 2017-10-12 NOTE — CP.PCM.PN
Subjective





- Date & Time of Evaluation


Date of Evaluation: 10/12/17


Time of Evaluation: 23:06





- Subjective


Subjective: 





CHIEF COMPLAINTS  TODAY :


afebrile, 


C/O RUQ PAIN.


chronic Charles in place ( recently changed on admission. )


ROS





HEENT :  N.ICTERIC


Resp :       No  SOB wheezing, +ve DRY COUGH


Cardio :     No CP, PND orthopnea 


GI :           RUQ ABDOMINAL PAIN , NO  n/v 


CNS : No headache , focal deficit. 


Musculoskel :  N


Ext. : Pedal pulses intact, no edema or calf pain 


Derm :        N


Psych :     N. 








PE. 


Pt. is alert awake in no distress. 





V.S  As noted in the chart 





Head ,ear nose,throat and eyes : Normal.,sclera icteric.


Neck : Supple with normal carotids.


Lungs  CLEARING NO WHEEZE


Heart : S1 & S2 irregular. . No murmur. 


Abd : Soft MILD TENDERNESS RIGHT UPPER QUADRANT with normal bowel sounds.


Neuro : Moves all ext. with no localized deficit.


Ext : No edema with intact pulses. Neg. calf tenderness 


Derm : No rashes or decubitus ulcer.





Radiology/Labs .


LFTS 10/12/17 INCREASING AST , ALT  237 INCREASED, TOTAL BILI IMPROVING 

AND 0.6, ALKALINE PHOSPHATASE 480


REPEAT URINE CULTURE 10/10/17  GRAM-POSITIVE COCCI/YEAST SPECIES. ? CONTAMINANT


STOOL C. DIFFICILE -VE 10/7/17


CXR  NAD,


BLOOD CULTURES NEGATIVE TO DATE.





Initial urine culture Klebsiella pneumoniae -veESBL S- TYGACIL,GENTAMYCIN -[place] 

4.








Objective





- Vital Signs/Intake and Output


Vital Signs (last 24 hours): 


 











Temp Pulse Resp BP Pulse Ox


 


 97.9 F   73   20   133/54 L  98 


 


 10/12/17 17:07  10/12/17 17:07  10/12/17 17:07  10/12/17 17:07  10/12/17 17:07








Intake and Output: 


 











 10/12/17 10/13/17





 18:59 06:59


 


Output Total  800


 


Balance  -800














- Medications


Medications: 


 Current Medications





Apixaban (Eliquis)  5 mg PO BID [facility]


   Last Admin: 10/12/17 18:53 Dose:  5 mg


Bismuth Subsalicylate (Pepto-Bismol)  262 mg PO Q4 PRN


   PRN Reason: STOMACH PAIN


   Last Admin: 10/09/17 10:58 Dose:  262 mg


Digoxin (Lanoxin)  0.25 mg PO DAILY@1800 [facility]


   Last Admin: 10/12/17 19:32 Dose:  0.25 mg


Diphenhydramine HCl (Benadryl)  25 mg IVP Q4H [facility]


   PRN Reason: pruritus


   Last Admin: 10/12/17 20:26 Dose:  25 mg


Diphenoxylate HCl/Atropine (Lomotil 0.025-2.5 Mg Tablet)  1 tab PO Q4 PRN


   PRN Reason: Diarrhea


   Last Admin: 10/11/17 12:29 Dose:  1 tab


Amphotericin B 50 mg/ Sterile (Water)  1,000 mls @ 125 mls/hr IVPB Q24H [facility]


   Last Admin: 10/12/17 17:25 Dose:  125 mls/hr


Insulin Aspart (Novolog)  0 unit SC ACHS [facility]


   PRN Reason: Protocol


   Last Admin: 10/12/17 21:53 Dose:  Not Given


Insulin Detemir (Levemir)  14 unit SC QAM [facility]


   Last Admin: 10/12/17 10:49 Dose:  14 unit


Losartan Potassium (Cozaar)  50 mg PO DAILY [facility]


   Last Admin: 10/12/17 10:48 Dose:  50 mg


Metoprolol Succinate (Toprol Xl)  50 mg PO DAILY [facility]


   Last Admin: 10/12/17 10:48 Dose:  50 mg


Morphine Sulfate (Morphine)  2 mg IVP Q4 PRN


   PRN Reason: Pain, severe (8-10)


   Last Admin: 10/12/17 20:26 Dose:  2 mg


Multi-Ingredient Ointment (Prep-Hem)  1 ea TOP BID [facility]


   Last Admin: 10/12/17 18:53 Dose:  1 applic


Ondansetron HCl (Zofran Odt)  4 mg PO QID PRN


   PRN Reason: Nausea/Vomiting


Pantoprazole Sodium (Protonix Ec Tab)  40 mg PO DAILY [facility]


   Last Admin: 10/12/17 10:48 Dose:  40 mg


Prednisone (Prednisone Tab)  20 mg PO BID [facility]


   Last Admin: 10/12/17 18:53 Dose:  20 mg


Saccharomyces Boulardii (Florastor)  250 mg PO BID [facility]


   Last Admin: 10/12/17 18:53 Dose:  250 mg


Zolpidem Tartrate (Ambien)  5 mg PO HS PRN


   PRN Reason: Insomnia


   Last Admin: 10/08/17 22:48 Dose:  5 mg











- Labs


Labs: 


 





 10/12/17 07:56 





 10/12/17 07:56 





 











PT  12.4 SECONDS (9.7-12.2)  H  10/06/17  19:40    


 


INR  1.1   10/06/17  19:40    


 


APTT  38 SECONDS (21-34)  H  10/06/17  19:40    














Assessment and Plan


(1) UTI (urinary tract infection)


Status: Acute   





(2) Neurogenic bladder


Status: Acute   





(3) Sarcoidosis


Status: Acute   





(4) Abdominal pain


Status: Acute   





(5) Congestive heart failure


Status: Acute   





(6) Hypertension


Status: Acute   





- Assessment and Plan (Free Text)


Plan: 





patient off IV antibiotics..


In view of leukocytosis and candidurea, and patient being on steroids,


Will start IV amphotericin bladder irrigations with 50 mg amphotericin in 

1000cc normal saline via


 a three-way Charles catheter to run daily for 3 days. 


case discussed with NP MS GONCALVES.


ALSO IN VIEW OF HEPATIC- SARCOIDOSIS AND ELEVATED LIVER FUNCTION TESTS, PATIENT 

SHOULD BE REFERRED TO [facility]


FOR  LIVER TRANSPLANT.


GI ON CASE.

## 2017-10-12 NOTE — PN
DATE:



SUBJECTIVE:  The patient's second urine culture was positive for

Gram-positive cocci, as well as, yeast species.  Blood culture has been

negative after 5 days.  The patient denies any chest pain and shortness of

breath has improved.



PHYSICAL EXAMINATION:

VITAL SIGNS:  Blood pressure 157/89, heart rate 65, temperature 97.6, and

respirations 20.

HEENT:  Normocephalic.

CHEST:  Minimal basal rhonchi.

HEART:  S1 and S2 regular.

EXTREMITIES:  Bilateral above knee amputation.



LABORATORY DATA:  Today's SMA-7; sodium 135, potassium 5.1, chloride 104,

CO2 17, glucose 62, BUN 24, and creatinine 0.4.  The proBNP is 480 and

alkaline phosphatase 237.



ASSESSMENT:

1.  Dilated cardiomyopathy.

2.  Urinary tract infection.

3.  Peripheral vascular disease, status post bilateral above knee

amputation.

4.  History of deep venous thrombosis in the past that resulted in left leg

amputation.

5.  Uncontrolled diabetes mellitus.

6.  Liver sarcoidosis.



RECOMMENDATIONS:  Continue current Eliquis 5 mg twice a day, Cozaar 50 mg

once a day, Lanoxin 0.25 mg orally daily, prednisone 20 mg twice a day, and

Toprol XL 50 mg once a day.  The patient was started on IV amphotericin

today.





__________________________________________

Ankit Figueroa MD





DD:  10/12/2017 13:58:30

DT:  10/12/2017 14:31:22

Job # [id number]

## 2017-10-13 RX ADMIN — DIGOXIN SCH: 0.25 TABLET ORAL at 18:37

## 2017-10-13 RX ADMIN — PANTOPRAZOLE SODIUM SCH MG: 40 TABLET, DELAYED RELEASE ORAL at 09:35

## 2017-10-13 RX ADMIN — METOPROLOL SUCCINATE SCH MG: 50 TABLET, EXTENDED RELEASE ORAL at 09:35

## 2017-10-13 RX ADMIN — INSULIN ASPART SCH UNIT: 100 INJECTION, SOLUTION INTRAVENOUS; SUBCUTANEOUS at 12:57

## 2017-10-13 RX ADMIN — INSULIN ASPART SCH UNIT: 100 INJECTION, SOLUTION INTRAVENOUS; SUBCUTANEOUS at 22:41

## 2017-10-13 RX ADMIN — MINERAL OIL, PETROLATUM, PHENYLEPHRINE HCL SCH: 14; 74.9; .25 OINTMENT RECTAL at 12:58

## 2017-10-13 RX ADMIN — DIPHENOXYLATE HYDROCHLORIDE AND ATROPINE SULFATE PRN TAB: 2.5; .025 TABLET ORAL at 09:46

## 2017-10-13 RX ADMIN — WATER SCH MLS/HR: 1 IRRIGANT IRRIGATION at 16:00

## 2017-10-13 RX ADMIN — Medication SCH MG: at 09:35

## 2017-10-13 RX ADMIN — Medication SCH MG: at 18:37

## 2017-10-13 RX ADMIN — DIPHENHYDRAMINE HYDROCHLORIDE PRN MG: 50 INJECTION INTRAMUSCULAR; INTRAVENOUS at 22:42

## 2017-10-13 RX ADMIN — INSULIN ASPART SCH UNIT: 100 INJECTION, SOLUTION INTRAVENOUS; SUBCUTANEOUS at 18:35

## 2017-10-13 RX ADMIN — DIPHENHYDRAMINE HYDROCHLORIDE PRN MG: 50 INJECTION INTRAMUSCULAR; INTRAVENOUS at 09:46

## 2017-10-13 RX ADMIN — DIPHENHYDRAMINE HYDROCHLORIDE PRN MG: 50 INJECTION INTRAMUSCULAR; INTRAVENOUS at 00:25

## 2017-10-13 RX ADMIN — INSULIN DETEMIR SCH UNIT: 100 INJECTION, SOLUTION SUBCUTANEOUS at 09:35

## 2017-10-13 RX ADMIN — MINERAL OIL, PETROLATUM, PHENYLEPHRINE HCL SCH APPLIC: 14; 74.9; .25 OINTMENT RECTAL at 18:41

## 2017-10-13 RX ADMIN — DIPHENHYDRAMINE HYDROCHLORIDE PRN MG: 50 INJECTION INTRAMUSCULAR; INTRAVENOUS at 18:33

## 2017-10-13 RX ADMIN — DIPHENHYDRAMINE HYDROCHLORIDE PRN MG: 50 INJECTION INTRAMUSCULAR; INTRAVENOUS at 04:16

## 2017-10-13 RX ADMIN — DIPHENHYDRAMINE HYDROCHLORIDE PRN MG: 50 INJECTION INTRAMUSCULAR; INTRAVENOUS at 14:03

## 2017-10-13 RX ADMIN — INSULIN ASPART SCH UNIT: 100 INJECTION, SOLUTION INTRAVENOUS; SUBCUTANEOUS at 08:42

## 2017-10-13 NOTE — PN
DATE:



LOCATION:  Room no 571, bed A.



SUBJECTIVE:  This is a 52-year-old female seen and examined in rounds

without significant clinical changes, reported active bleeding, but was

still complaining of lower back pain, but upper quadrant pain was _____

abdominal distention.



The entire chart is reviewed including, but not limited to the most recent

lab and radiology study results, current and the previous medication list,

current and the previous medical events.  Case discussed at length with

staff in the floor as well as all the consultants.



Most recent lab results showed leukocyte of 13.9 with normal hemoglobin and

hematocrit, but with low CO2 content of 17, BUN of 24, creatinine is low,

with persistently elevated liver function test, which it could be secondary

to her sarcoidosis of the liver or infectious process including urosepsis

also.



Repeat urine workup showed multiple red blood cells in the urine with

increased white blood cells significantly more than yesterday.



PHYSICAL EXAMINATION:

GENERAL:  A 52-year-old female appears to be awake, alert, and oriented,

complaining of abdominal pain and suprapubic pain.

VITAL SIGNS:  Afebrile with pulse of 68, respiratory rate 20 to 22, blood

pressure 136/82.

HEENT:  Showed pale dry oral mucous membranes.  Nonicteric sclera.

LUNGS:  Few scattered crepitation.  Decreased air entry at bases.

HEART:  Positive S1 and S2.

ABDOMEN:  Soft.  Bowel sounds are present with mild to moderate distention

as well as generalized tenderness, but mainly in the suprapubic area as

well as right upper quadrant area.  No other masses or organomegaly.  No

rebound tenderness or guarding.

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

NEUROLOGICAL:  No neurological deficits, new reported.



IMPRESSION:

1.  Liver sarcoidosis.

2.  Abnormal liver function test secondary to above and/or secondary to an

infectious process including recently diagnosed urosepsis.

3.  Jaundice secondary to above.

4.  Known history of _____ spasm with status post biliary stent insertion,

to rule out obstructed biliary stent inducing the current abdominal pain

and elevated liver function test.

5.  Poorly controlled diabetes mellitus.

6.  Known history of hypertension, congestive heart failure, and cardiac

arrhythmias.

7.  Neurological urinary bladder.

8.  Anemia secondary to above.



SUGGESTIONS:

1.  Continue current management.

2.  The patient for possible evaluation for liver transplant that could be

done through the [facility] Transplant Team.

3.  If the patient's symptoms persist, then abdominal ultrasound was _____

biliary tree and the pancreas should be kept in mind and possible biliary

stent exchange.





__________________________________________

Jeffrey Albert MD



cc:  Jeffrey Albert MD



DD:  10/12/2017 10:44:11

DT:  10/12/2017 11:25:48

Job # [id number]

## 2017-10-13 NOTE — CP.PCM.PN
Subjective





- Date & Time of Evaluation


Date of Evaluation: 10/13/17


Time of Evaluation: 20:35





- Subjective


Subjective: 





Pt seen and evaluated. gets on and off abdominal pain, cloudy urine now 

positive for candida and enteroccocus, on antibiotics





Objective





- Vital Signs/Intake and Output


Vital Signs (last 24 hours): 


 











Temp Pulse Resp BP Pulse Ox


 


 98.2 F   59 L  20   145/81   98 


 


 10/13/17 15:51  10/13/17 18:32  10/13/17 15:51  10/13/17 18:32  10/13/17 15:51








Intake and Output: 


 











 10/13/17 10/14/17





 18:59 06:59


 


Intake Total 700 


 


Output Total 600 450


 


Balance 100 -450














- Medications


Medications: 


 Current Medications





Apixaban (Eliquis)  5 mg PO BID [facility]


   Last Admin: 10/13/17 18:37 Dose:  5 mg


Bismuth Subsalicylate (Pepto-Bismol)  262 mg PO Q4 PRN


   PRN Reason: STOMACH PAIN


   Last Admin: 10/09/17 10:58 Dose:  262 mg


Digoxin (Lanoxin)  0.25 mg PO DAILY@1800 [facility]


   Last Admin: 10/13/17 18:37 Dose:  Not Given


Diphenhydramine HCl (Benadryl)  25 mg IVP Q4H PRN


   PRN Reason: pruritus


   Last Admin: 10/13/17 22:42 Dose:  25 mg


Diphenoxylate HCl/Atropine (Lomotil 0.025-2.5 Mg Tablet)  1 tab PO Q4 PRN


   PRN Reason: Diarrhea


   Last Admin: 10/13/17 09:46 Dose:  1 tab


Amphotericin B 50 mg/ Sterile (Water)  1,000 mls @ 125 mls/hr IVPB Q24H [facility]


   Last Admin: 10/13/17 20:31 Dose:  125 mls/hr


Insulin Aspart (Novolog)  0 unit SC ACHS [facility]


   PRN Reason: Protocol


   Last Admin: 10/13/17 22:41 Dose:  2 unit


Insulin Detemir (Levemir)  14 unit SC QAM [facility]


   Last Admin: 10/13/17 09:35 Dose:  14 unit


Losartan Potassium (Cozaar)  50 mg PO DAILY [facility]


   Last Admin: 10/13/17 09:35 Dose:  50 mg


Metoprolol Succinate (Toprol Xl)  50 mg PO DAILY [facility]


   Last Admin: 10/13/17 09:35 Dose:  50 mg


Morphine Sulfate (Morphine)  2 mg IVP Q4 PRN


   PRN Reason: Pain, severe (8-10)


   Last Admin: 10/13/17 22:42 Dose:  2 mg


Multi-Ingredient Ointment (Prep-Hem)  1 ea TOP BID [facility]


   Last Admin: 10/13/17 18:41 Dose:  1 applic


Ondansetron HCl (Zofran Odt)  4 mg PO QID PRN


   PRN Reason: Nausea/Vomiting


Pantoprazole Sodium (Protonix Ec Tab)  40 mg PO DAILY [facility]


   Last Admin: 10/13/17 09:35 Dose:  40 mg


Prednisone (Prednisone Tab)  20 mg PO BID [facility]


   Last Admin: 10/13/17 18:37 Dose:  20 mg


Saccharomyces Boulardii (Florastor)  250 mg PO BID [facility]


   Last Admin: 10/13/17 18:37 Dose:  250 mg


Zolpidem Tartrate (Ambien)  5 mg PO HS PRN


   PRN Reason: Insomnia


   Last Admin: 10/13/17 00:25 Dose:  5 mg











- Labs


Labs: 


 





 10/12/17 07:56 





 10/12/17 07:56 





 











PT  12.4 SECONDS (9.7-12.2)  H  10/06/17  19:40    


 


INR  1.1   10/06/17  19:40    


 


APTT  38 SECONDS (21-34)  H  10/06/17  19:40    














- Constitutional


Appears: No Acute Distress





- Head Exam


Head Exam: ATRAUMATIC, NORMAL INSPECTION, NORMOCEPHALIC





- Eye Exam


Eye Exam: EOMI, Normal appearance, PERRL


Pupil Exam: NORMAL ACCOMODATION, PERRL





- Respiratory Exam


Respiratory Exam: Decreased Breath Sounds, Rales, Rhonchi





- Cardiovascular Exam


Cardiovascular Exam: REGULAR RHYTHM, +S1, +S2.  absent: Murmur


Additional comments: 





S3 positive





- GI/Abdominal Exam


GI & Abdominal Exam: Soft, Normal Bowel Sounds.  absent: Tenderness





Assessment and Plan


(1) Neurogenic bladder


Status: Acute   





(2) Sarcoidosis


Status: Acute   





(3) Abdominal pain


Status: Acute   





(4) Hypertension


Status: Acute   





(5) Obstructive hyperbilirubinemia


Status: Acute   





(6) UTI (urinary tract infection) due to urinary indwelling Charles catheter


Status: Acute

## 2017-10-13 NOTE — PN
SUBJECTIVE:  The patient is slightly short of breath; however, her main

complaint is lower abdominal pain.



PHYSICAL EXAMINATION:

VITAL SIGNS:  Blood pressure 152/91, heart rate 103, temperature 98.2.

HEENT:  Mild facial edema.

CHEST:  Diminished breath sound over the bases.

HEART:  S1 and S2 regular.

EXTREMITIES:  Bilateral above knee amputation.



ASSESSMENT:

1.  Cardiomyopathy.

2.  Status post ICD placement.

3.  Liver sarcoidosis.

3.  Urinary tract infection.



RECOMMENDATIONS:  Continue current IV amphotericin, continue Cozaar 50 mg

once a day, Eliquis 5 mg once a day, Lanoxin 0.25 mg daily, prednisone 20

mg twice a day, Toprol-XL at 50 mg q.i.d.  If blood pressure continues to

remain high, I will concern initiating oral clonidine therapy at 0.1 mg

twice a day.







__________________________________________

Ankit Figueroa MD





DD:  10/13/2017 16:30:08

DT:  10/13/2017 17:35:40

Job # [id number]

## 2017-10-13 NOTE — CP.PCM.PN
Subjective





- Date & Time of Evaluation


Date of Evaluation: 10/13/17


Time of Evaluation: 19:12





- Subjective


Subjective: 








CHIEF COMPLAINTS  TODAY :


afebrile, 


C/O RUQ PAIN.


ON AMPHOTERICIN BLADDER IRRIGATIONS.-DAY 2


DENIES DIARRHEA.


ROS





HEENT :  N.ICTERIC


Resp :       No  SOB wheezing, +ve DRY COUGH


Cardio :     No CP, PND orthopnea 


GI :           RUQ ABDOMINAL PAIN , NO  n/v 


CNS : No headache , focal deficit. 


Musculoskel :  N


Ext. : Pedal pulses intact, no edema or calf pain 


Derm :        N


Psych :     N. 








PE. 


Pt. is alert awake in no distress. 





V.S  As noted in the chart 





Head ,ear nose,throat and eyes : Normal.,sclera icteric.


Neck : Supple with normal carotids.


Lungs  CLEARING NO WHEEZE


Heart : S1 & S2 irregular. . No murmur. 


Abd : Soft MILD TENDERNESS RIGHT UPPER QUADRANT with normal bowel sounds.


Neuro : Moves all ext. with no localized deficit.


Ext : No edema with intact pulses. Neg. calf tenderness 


Derm : No rashes or decubitus ulcer.





Radiology/Labs .


REPEAT URINE CULTURE FOR ENTEROCOCCUS FEACIUM/YEAST.


LFTS 10/12/17 INCREASING AST , ALT  237 INCREASED, TOTAL BILI IMPROVING 

AND 0.6, ALKALINE PHOSPHATASE 480





CXR  NAD,


BLOOD CULTURES NEGATIVE TO DATE.





Initial urine culture Klebsiella pneumoniae -veESBL S- TYGACIL,GENTAMYCIN -[facility]LE 

4.











Objective





- Vital Signs/Intake and Output


Vital Signs (last 24 hours): 


 











Temp Pulse Resp BP Pulse Ox


 


 98.2 F   59 L  20   145/81   98 


 


 10/13/17 15:51  10/13/17 18:32  10/13/17 15:51  10/13/17 18:32  10/13/17 15:51








Intake and Output: 


 











 10/13/17 10/14/17





 18:59 06:59


 


Intake Total 700 


 


Output Total 600 


 


Balance 100 














- Medications


Medications: 


 Current Medications





Apixaban (Eliquis)  5 mg PO BID [facility]


   Last Admin: 10/13/17 18:37 Dose:  5 mg


Bismuth Subsalicylate (Pepto-Bismol)  262 mg PO Q4 PRN


   PRN Reason: STOMACH PAIN


   Last Admin: 10/09/17 10:58 Dose:  262 mg


Digoxin (Lanoxin)  0.25 mg PO DAILY@1800 [facility]


   Last Admin: 10/13/17 18:37 Dose:  Not Given


Diphenhydramine HCl (Benadryl)  25 mg IVP Q4H PRN


   PRN Reason: pruritus


   Last Admin: 10/13/17 18:33 Dose:  25 mg


Diphenoxylate HCl/Atropine (Lomotil 0.025-2.5 Mg Tablet)  1 tab PO Q4 PRN


   PRN Reason: Diarrhea


   Last Admin: 10/13/17 09:46 Dose:  1 tab


Amphotericin B 50 mg/ Sterile (Water)  1,000 mls @ 125 mls/hr IVPB Q24H [facility]


   Last Admin: 10/12/17 17:25 Dose:  125 mls/hr


Insulin Aspart (Novolog)  0 unit SC ACHS [facility]


   PRN Reason: Protocol


   Last Admin: 10/13/17 18:35 Dose:  6 unit


Insulin Detemir (Levemir)  14 unit SC QAM [facility]


   Last Admin: 10/13/17 09:35 Dose:  14 unit


Losartan Potassium (Cozaar)  50 mg PO DAILY [facility]


   Last Admin: 10/13/17 09:35 Dose:  50 mg


Metoprolol Succinate (Toprol Xl)  50 mg PO DAILY [facility]


   Last Admin: 10/13/17 09:35 Dose:  50 mg


Morphine Sulfate (Morphine)  2 mg IVP Q4 PRN


   PRN Reason: Pain, severe (8-10)


   Last Admin: 10/13/17 18:32 Dose:  2 mg


Multi-Ingredient Ointment (Prep-Hem)  1 ea TOP BID [facility]


   Last Admin: 10/13/17 18:41 Dose:  1 applic


Ondansetron HCl (Zofran Odt)  4 mg PO QID PRN


   PRN Reason: Nausea/Vomiting


Pantoprazole Sodium (Protonix Ec Tab)  40 mg PO DAILY [facility]


   Last Admin: 10/13/17 09:35 Dose:  40 mg


Prednisone (Prednisone Tab)  20 mg PO BID [facility]


   Last Admin: 10/13/17 18:37 Dose:  20 mg


Saccharomyces Boulardii (Florastor)  250 mg PO BID [facility]


   Last Admin: 10/13/17 18:37 Dose:  250 mg


Zolpidem Tartrate (Ambien)  5 mg PO HS PRN


   PRN Reason: Insomnia


   Last Admin: 10/13/17 00:25 Dose:  5 mg











- Labs


Labs: 


 





 10/12/17 07:56 





 10/12/17 07:56 





 











PT  12.4 SECONDS (9.7-12.2)  H  10/06/17  19:40    


 


INR  1.1   10/06/17  19:40    


 


APTT  38 SECONDS (21-34)  H  10/06/17  19:40    














Assessment and Plan


(1) UTI (urinary tract infection)


Status: Acute   





(2) Neurogenic bladder


Status: Acute   





(3) Sarcoidosis


Status: Acute   





(4) Abdominal pain


Status: Acute   





(5) Congestive heart failure


Status: Acute   





(6) Hypertension


Status: Acute   





- Assessment and Plan (Free Text)


Plan: 





ON IV amphotericin bladder irrigations with 50 mg amphotericin in 1000cc normal 

saline via


 a three-way Charles catheter to run daily for 3 days. 


case discussed with NP MS GONCALVES.


F/U REPEAT ua URINE CULTURE .


CASE DISCUSSED WITH STAFF.


GI .ON CASE

## 2017-10-14 RX ADMIN — DIPHENHYDRAMINE HYDROCHLORIDE PRN MG: 50 INJECTION INTRAMUSCULAR; INTRAVENOUS at 06:49

## 2017-10-14 RX ADMIN — WATER SCH MLS/HR: 1 IRRIGANT IRRIGATION at 17:13

## 2017-10-14 RX ADMIN — PANTOPRAZOLE SODIUM SCH MG: 40 TABLET, DELAYED RELEASE ORAL at 10:35

## 2017-10-14 RX ADMIN — DIPHENHYDRAMINE HYDROCHLORIDE PRN MG: 50 INJECTION INTRAMUSCULAR; INTRAVENOUS at 15:01

## 2017-10-14 RX ADMIN — INSULIN ASPART SCH: 100 INJECTION, SOLUTION INTRAVENOUS; SUBCUTANEOUS at 21:40

## 2017-10-14 RX ADMIN — INSULIN ASPART SCH UNIT: 100 INJECTION, SOLUTION INTRAVENOUS; SUBCUTANEOUS at 17:13

## 2017-10-14 RX ADMIN — INSULIN DETEMIR SCH UNIT: 100 INJECTION, SOLUTION SUBCUTANEOUS at 10:32

## 2017-10-14 RX ADMIN — MINERAL OIL, PETROLATUM, PHENYLEPHRINE HCL SCH: 14; 74.9; .25 OINTMENT RECTAL at 18:00

## 2017-10-14 RX ADMIN — DIGOXIN SCH MG: 0.25 TABLET ORAL at 17:12

## 2017-10-14 RX ADMIN — Medication SCH MG: at 10:34

## 2017-10-14 RX ADMIN — INSULIN ASPART SCH UNIT: 100 INJECTION, SOLUTION INTRAVENOUS; SUBCUTANEOUS at 12:06

## 2017-10-14 RX ADMIN — MINERAL OIL, PETROLATUM, PHENYLEPHRINE HCL SCH APPLIC: 14; 74.9; .25 OINTMENT RECTAL at 10:35

## 2017-10-14 RX ADMIN — DIPHENHYDRAMINE HYDROCHLORIDE PRN MG: 50 INJECTION INTRAMUSCULAR; INTRAVENOUS at 11:06

## 2017-10-14 RX ADMIN — Medication SCH MG: at 17:12

## 2017-10-14 RX ADMIN — DIPHENHYDRAMINE HYDROCHLORIDE PRN MG: 50 INJECTION INTRAMUSCULAR; INTRAVENOUS at 23:11

## 2017-10-14 RX ADMIN — DIPHENHYDRAMINE HYDROCHLORIDE PRN MG: 50 INJECTION INTRAMUSCULAR; INTRAVENOUS at 19:17

## 2017-10-14 RX ADMIN — METOPROLOL SUCCINATE SCH: 50 TABLET, EXTENDED RELEASE ORAL at 10:36

## 2017-10-14 RX ADMIN — DIPHENHYDRAMINE HYDROCHLORIDE PRN MG: 50 INJECTION INTRAMUSCULAR; INTRAVENOUS at 02:43

## 2017-10-14 RX ADMIN — INSULIN ASPART SCH UNIT: 100 INJECTION, SOLUTION INTRAVENOUS; SUBCUTANEOUS at 08:23

## 2017-10-14 RX ADMIN — DIPHENOXYLATE HYDROCHLORIDE AND ATROPINE SULFATE PRN TAB: 2.5; .025 TABLET ORAL at 10:39

## 2017-10-14 NOTE — PN
LOCATION:  [address], bed A.



SUBJECTIVE:  This is a 52-year-old female, seen and examined in rounds

today with intermittent period of complaint of abdominal pain, mainly

located in the right upper quadrant area with very mild dyspepsia, lower

back pain and loose bowel movement with slight shortness of breath on and

off.



No reported active bleeding.



The entire chart is reviewed including, but not limited to the most recent

lab and radiology study results, current and the previous medication list,

current and the previous medical events and today's lab showed blood

glucose level of 186.



Case discussed at length with all the consultants as well as the staff.



PHYSICAL EXAMINATION:

GENERAL:  A 52-year-old female, awake, alert and oriented.

VITAL SIGNS:  Afebrile, pulse reported of 60, respiratory rate 20 to 22,

blood pressure of 108/68.

HEENT:  Showed mildly pale, dry oral mucous membranes; bilateral icteric

sclerae.

LUNGS:  Few scattered crepitation.  Decreased air entry at bases.

HEART:  Positive S1 and S2.

ABDOMEN:  Soft.  Bowel sounds are present with mild generalized tenderness,

but mainly in the right upper quadrant area with slight distention.  No

mass or organomegaly.  No rebound tenderness or guarding.

EXTREMITIES:  There is evidence of bilateral below-knee amputation.

NEUROLOGIC:  No reported new neurological deficits, sensory or motor.



The patient is concerned about possible liver transplant.  Case discussed

with her at length and total visit was over 45 minutes.



IMPRESSION:

1.  Hepatic sarcoidosis.

2.  Jaundice.

3. Known history of papillary spasm and stenosis with status post biliary

stent insertion, may need to be changed before discharge home.

4.  Abnormal liver function test secondary to above.

5.  Recurrent urinary tract infection with urosepsis recently.

6.  Re-exacerbation of peptic ulcer disease.

7.  Known history of neurogenic bladder.

8.  Peripheral vascular disease with bilateral above-knee amputation by

history.

9.  Poorly controlled diabetes mellitus.

10.  Known history of, but not limited to cardiac arrhythmia, hypertension,

congestive heart failure.

11.  Known history of partial colon resection about 2 years ago.



SUGGESTIONS:

1.  Continue current management.

2.  Adjust steroids intake.

3.  Abdominal ultrasound.



The patient may need change of the biliary stent if her bilirubin is to be

increased, otherwise close observation to follow.







__________________________________________

Jeffrey Albert MD



cc:  Jeffrey Albert MD



DD:  10/14/2017 12:00:25

DT:  10/14/2017 12:47:49

Job # [id number]

## 2017-10-14 NOTE — PN
DATE:



SUBJECTIVE:  The patient is experiencing abdominal pain.  No chest pain. 

No palpitation.  No shortness of breath.



PHYSICAL EXAMINATION:

VITAL SIGNS:  Blood pressure 107/70, heart rate 61, temperature 97.9,

respirations 20.

HEENT:  Normocephalic.

CHEST:  Minimal basilar rhonchi.

HEART:  S1 and S2 regular.

ABDOMEN:  Mild right subcostal tenderness.

EXTREMITIES:  Bilateral above-knee amputation.



LABORATORY DATA:  Hemoglobin and hematocrit 12.8 and 38.3, white count and

platelet count are 15.5 and 295,000.  Today's blood sugars are 217, 186,

and 430.



ASSESSMENT:

1.  Dilated cardiomyopathy.

2.  Status post implantable cardioverter-defibrillator placement.

3.  Urinary tract infection.

4.  Peripheral vascular disease status post bilateral above knee

amputation.

5.  Liver sarcoidosis.



RECOMMENDATIONS:  Continue IV amphotericin _____ mg daily, Cozaar 50 mg

once a day, Eliquis 5 mg twice a day, Lenoxin 0.25 mg orally once a day,

prednisone 20 mg orally twice a day, Toprol-XL 50 mg once a day, and Zofran

4 mg p.o. q.i.d. p.r.n.







__________________________________________

Ankit Figueroa MD



DD:  10/14/2017 17:20:40

DT:  10/14/2017 20:18:09

Job # [id number]

## 2017-10-14 NOTE — CP.PCM.PN
Subjective





- Date & Time of Evaluation


Date of Evaluation: 10/14/17


Time of Evaluation: 23:05





- Subjective


Subjective: 








CHIEF COMPLAINTS  TODAY :


afebrile, 


C/O RUQ PAIN.


ON AMPHOTERICIN BLADDER IRRIGATIONS.-DAY 3


denies nausea or vomiting.


ROS





HEENT :  N.ICTERIC


Resp :       No  SOB wheezing, +ve DRY COUGH


Cardio :     No CP, PND orthopnea 


GI :           RUQ ABDOMINAL PAIN , NO  n/v 


CNS : No headache , focal deficit. 


Musculoskel :  N


Ext. : Pedal pulses intact, no edema or calf pain 


Derm :        N


Psych :     N. 








PE. 


Pt. is alert awake in no distress. 





V.S  As noted in the chart 





Head ,ear nose,throat and eyes : Normal.,sclera icteric.


Neck : Supple with normal carotids.


Lungs  CLEARING NO WHEEZE


Heart : S1 & S2 irregular. . No murmur. 


Abd : Soft MILD TENDERNESS RIGHT UPPER QUADRANT with normal bowel sounds.


Neuro : Moves all ext. with no localized deficit.


Ext : No edema with intact pulses. Neg. calf tenderness 


Derm : No rashes or decubitus ulcer.





Radiology/Labs .


WBC 13.2.


repeat urine culture 10/12/17 gram-positive cocci/ yeast.


REPEAT URINE CULTURE FOR ENTEROCOCCUS FEACIUM/YEAST.


LFTS 10/12/17 INCREASING AST , ALT  237 INCREASED, TOTAL BILI IMPROVING 

AND 0.6, ALKALINE PHOSPHATASE 480





CXR  NAD,


BLOOD CULTURES NEGATIVE TO DATE.








Objective





- Vital Signs/Intake and Output


Vital Signs (last 24 hours): 


 











Temp Pulse Resp BP Pulse Ox


 


 97.9 F   61   67 H  107/70   97 


 


 10/14/17 15:48  10/14/17 15:48  10/14/17 17:12  10/14/17 17:12  10/14/17 15:48








Intake and Output: 


 











 10/14/17 10/15/17





 18:59 06:59


 


Intake Total 450 


 


Output Total 700 


 


Balance -250 














- Medications


Medications: 


 Current Medications





Apixaban (Eliquis)  5 mg PO BID [facility]


   Last Admin: 10/14/17 17:12 Dose:  5 mg


Bismuth Subsalicylate (Pepto-Bismol)  262 mg PO Q4 PRN


   PRN Reason: STOMACH PAIN


   Last Admin: 10/09/17 10:58 Dose:  262 mg


Digoxin (Lanoxin)  0.25 mg PO DAILY@1800 [facility]


   Last Admin: 10/14/17 17:12 Dose:  0.25 mg


Diphenhydramine HCl (Benadryl)  25 mg IVP Q4H [facility]


   PRN Reason: pruritus


   Last Admin: 10/14/17 19:17 Dose:  25 mg


Diphenoxylate HCl/Atropine (Lomotil 0.025-2.5 Mg Tablet)  1 tab PO Q4 PRN


   PRN Reason: Diarrhea


   Last Admin: 10/14/17 10:39 Dose:  1 tab


Amphotericin B 50 mg/ Sterile (Water)  1,000 mls @ 125 mls/hr IVPB Q24H [facility]


   Last Admin: 10/14/17 17:13 Dose:  125 mls/hr


Insulin Aspart (Novolog)  0 unit SC ACHS [facility]


   PRN Reason: Protocol


   Last Admin: 10/14/17 21:40 Dose:  Not Given


Insulin Detemir (Levemir)  14 unit SC QAM [facility]


   Last Admin: 10/14/17 10:32 Dose:  14 unit


Losartan Potassium (Cozaar)  50 mg PO DAILY [facility]


   Last Admin: 10/14/17 10:34 Dose:  50 mg


Metoprolol Succinate (Toprol Xl)  50 mg PO DAILY [facility]


   Last Admin: 10/14/17 10:36 Dose:  Not Given


Morphine Sulfate (Morphine)  2 mg IVP Q4 PRN


   PRN Reason: Pain, severe (8-10)


   Last Admin: 10/14/17 19:17 Dose:  2 mg


Multi-Ingredient Ointment (Prep-Hem)  1 ea TOP BID [facility]


   Last Admin: 10/14/17 18:00 Dose:  Not Given


Ondansetron HCl (Zofran Odt)  4 mg PO QID PRN


   PRN Reason: Nausea/Vomiting


Pantoprazole Sodium (Protonix Ec Tab)  40 mg PO DAILY [facility]


   Last Admin: 10/14/17 10:35 Dose:  40 mg


Prednisone (Prednisone Tab)  20 mg PO BID [facility]


   Last Admin: 10/14/17 17:12 Dose:  20 mg


Saccharomyces Boulardii (Florastor)  250 mg PO BID [facility]


   Last Admin: 10/14/17 17:12 Dose:  250 mg


Zolpidem Tartrate (Ambien)  5 mg PO HS PRN


   PRN Reason: Insomnia


   Last Admin: 10/13/17 00:25 Dose:  5 mg











- Labs


Labs: 


 





 10/12/17 07:56 





 10/12/17 07:56 





 











PT  12.4 SECONDS (9.7-12.2)  H  10/06/17  19:40    


 


INR  1.1   10/06/17  19:40    


 


APTT  38 SECONDS (21-34)  H  10/06/17  19:40    














Assessment and Plan


(1) UTI (urinary tract infection)


Status: Acute   





(2) Neurogenic bladder


Status: Acute   





(3) Sarcoidosis


Status: Acute   





(4) Abdominal pain


Status: Acute   





(5) Congestive heart failure


Status: Acute   





(6) Hypertension


Status: Acute   





- Assessment and Plan (Free Text)


Plan: 





ON IV amphotericin bladder irrigations with 50 mg amphotericin in 1000cc normal 

saline via


 a three-way Charles catheter to run daily for 2 days MORE. 





BLOOD CULTURES 2 SETS 15 MINUTES APART IN A.M.





Add by mouth ZYVOX 600 MG TWICE A DAY STARTING AFTER BLOOD CULTURES 10/15/17.


CASE DISCUSSED WITH STAFF..


F/U cbc WITH DIFFERENTIAL/bmp IN A.M.

## 2017-10-14 NOTE — CP.PCM.PN
Subjective





- Date & Time of Evaluation


Date of Evaluation: 10/14/17


Time of Evaluation: 19:35





- Subjective


Subjective: 





Pt seen and evaluated, continues to c/o on and off abdominal pain, on contnous 

bladder irrigation and on antibiotics for complicated UTI





Objective





- Vital Signs/Intake and Output


Vital Signs (last 24 hours): 


 











Temp Pulse Resp BP Pulse Ox


 


 97.9 F   61   67 H  107/70   97 


 


 10/14/17 15:48  10/14/17 15:48  10/14/17 17:12  10/14/17 17:12  10/14/17 15:48








Intake and Output: 


 











 10/14/17 10/15/17





 18:59 06:59


 


Intake Total 450 


 


Output Total 700 


 


Balance -250 














- Medications


Medications: 


 Current Medications





Apixaban (Eliquis)  5 mg PO BID [facility]


   Last Admin: 10/14/17 17:12 Dose:  5 mg


Bismuth Subsalicylate (Pepto-Bismol)  262 mg PO Q4 PRN


   PRN Reason: STOMACH PAIN


   Last Admin: 10/09/17 10:58 Dose:  262 mg


Digoxin (Lanoxin)  0.25 mg PO DAILY@1800 [facility]


   Last Admin: 10/14/17 17:12 Dose:  0.25 mg


Diphenhydramine HCl (Benadryl)  25 mg IVP Q4H PRN


   PRN Reason: pruritus


   Last Admin: 10/14/17 19:17 Dose:  25 mg


Diphenoxylate HCl/Atropine (Lomotil 0.025-2.5 Mg Tablet)  1 tab PO Q4 PRN


   PRN Reason: Diarrhea


   Last Admin: 10/14/17 10:39 Dose:  1 tab


Amphotericin B 50 mg/ Sterile (Water)  1,000 mls @ 125 mls/hr IVPB Q24H [facility]


   Last Admin: 10/14/17 17:13 Dose:  125 mls/hr


Insulin Aspart (Novolog)  0 unit SC ACHS [facility]


   PRN Reason: Protocol


   Last Admin: 10/14/17 17:13 Dose:  8 unit


Insulin Detemir (Levemir)  14 unit SC QAM [facility]


   Last Admin: 10/14/17 10:32 Dose:  14 unit


Losartan Potassium (Cozaar)  50 mg PO DAILY [facility]


   Last Admin: 10/14/17 10:34 Dose:  50 mg


Metoprolol Succinate (Toprol Xl)  50 mg PO DAILY [facility]


   Last Admin: 10/14/17 10:36 Dose:  Not Given


Morphine Sulfate (Morphine)  2 mg IVP Q4 PRN


   PRN Reason: Pain, severe (8-10)


   Last Admin: 10/14/17 19:17 Dose:  2 mg


Multi-Ingredient Ointment (Prep-Hem)  1 ea TOP BID [facility]


   Last Admin: 10/14/17 10:35 Dose:  1 applic


Ondansetron HCl (Zofran Odt)  4 mg PO QID PRN


   PRN Reason: Nausea/Vomiting


Pantoprazole Sodium (Protonix Ec Tab)  40 mg PO DAILY [facility]


   Last Admin: 10/14/17 10:35 Dose:  40 mg


Prednisone (Prednisone Tab)  20 mg PO BID [facility]


   Last Admin: 10/14/17 17:12 Dose:  20 mg


Saccharomyces Boulardii (Florastor)  250 mg PO BID [facility]


   Last Admin: 10/14/17 17:12 Dose:  250 mg


Zolpidem Tartrate (Ambien)  5 mg PO HS PRN


   PRN Reason: Insomnia


   Last Admin: 10/13/17 00:25 Dose:  5 mg











- Labs


Labs: 


 





 10/12/17 07:56 





 10/12/17 07:56 





 











PT  12.4 SECONDS (9.7-12.2)  H  10/06/17  19:40    


 


INR  1.1   10/06/17  19:40    


 


APTT  38 SECONDS (21-34)  H  10/06/17  19:40    














- Constitutional


Appears: No Acute Distress, Chronically Ill





- Head Exam


Head Exam: ATRAUMATIC, NORMAL INSPECTION, NORMOCEPHALIC





- ENT Exam


ENT Exam: Mucous Membranes Moist, Normal Exam





- Respiratory Exam


Respiratory Exam: Clear to Ausculation Bilateral, NORMAL BREATHING PATTERN





- Cardiovascular Exam


Cardiovascular Exam: REGULAR RHYTHM, +S1, +S2.  absent: Murmur


Additional comments: 





S3 positive





- GI/Abdominal Exam


GI & Abdominal Exam: Soft, Tenderness, Normal Bowel Sounds





Assessment and Plan


(1) Neurogenic bladder


Assessment & Plan: 


on CBI


Status: Acute   





(2) Sarcoidosis


Status: Acute   





(3) Abdominal pain


Status: Acute   





(4) Hypertension


Status: Acute   





(5) Obstructive hyperbilirubinemia


Status: Acute   





(6) UTI (urinary tract infection) due to urinary indwelling Charles catheter


Assessment & Plan: 


antibiotics


Status: Acute

## 2017-10-15 LAB
BUN SERPL-MCNC: 31 MG/DL (ref 7–17)
CALCIUM SERPL-MCNC: 9.9 MG/DL (ref 8.6–10.4)
CHLORIDE SERPL-SCNC: 105 MMOL/L (ref 98–107)
CO2 SERPL-SCNC: 15 MMOL/L (ref 22–30)
ERYTHROCYTE [DISTWIDTH] IN BLOOD BY AUTOMATED COUNT: 16.3 % (ref 11.5–14.5)
GLUCOSE SERPL-MCNC: 147 MG/DL (ref 65–105)
HCT VFR BLD CALC: 42.4 % (ref 34–47)
LG PLATELETS BLD QL SMEAR: PRESENT
MCH RBC QN AUTO: 32.7 PG (ref 27–31)
MCHC RBC AUTO-ENTMCNC: 33 G/DL (ref 33–37)
MCV RBC AUTO: 99.3 FL (ref 81–99)
NEUTROPHILS NFR BLD AUTO: 80 % (ref 50–75)
PLATELET # BLD: 275 K/UL (ref 130–400)
PMV BLD AUTO: 9.3 FL (ref 7.2–11.7)
POTASSIUM SERPL-SCNC: 4.6 MMOL/L (ref 3.6–5.2)
SODIUM SERPL-SCNC: 136 MMOL/L (ref 132–148)
WBC # BLD AUTO: 15.9 K/UL (ref 4.8–10.8)

## 2017-10-15 RX ADMIN — MINERAL OIL, PETROLATUM, PHENYLEPHRINE HCL SCH APPLIC: 14; 74.9; .25 OINTMENT RECTAL at 09:53

## 2017-10-15 RX ADMIN — Medication SCH MG: at 09:51

## 2017-10-15 RX ADMIN — DIPHENHYDRAMINE HYDROCHLORIDE PRN MG: 50 INJECTION INTRAMUSCULAR; INTRAVENOUS at 18:16

## 2017-10-15 RX ADMIN — MINERAL OIL, PETROLATUM, PHENYLEPHRINE HCL SCH APPLIC: 14; 74.9; .25 OINTMENT RECTAL at 18:17

## 2017-10-15 RX ADMIN — DIPHENOXYLATE HYDROCHLORIDE AND ATROPINE SULFATE PRN TAB: 2.5; .025 TABLET ORAL at 22:20

## 2017-10-15 RX ADMIN — METOPROLOL SUCCINATE SCH MG: 50 TABLET, EXTENDED RELEASE ORAL at 09:51

## 2017-10-15 RX ADMIN — INSULIN ASPART SCH: 100 INJECTION, SOLUTION INTRAVENOUS; SUBCUTANEOUS at 22:06

## 2017-10-15 RX ADMIN — DIPHENOXYLATE HYDROCHLORIDE AND ATROPINE SULFATE PRN TAB: 2.5; .025 TABLET ORAL at 09:51

## 2017-10-15 RX ADMIN — DIPHENHYDRAMINE HYDROCHLORIDE PRN MG: 50 INJECTION INTRAMUSCULAR; INTRAVENOUS at 05:28

## 2017-10-15 RX ADMIN — DIPHENHYDRAMINE HYDROCHLORIDE PRN MG: 50 INJECTION INTRAMUSCULAR; INTRAVENOUS at 22:20

## 2017-10-15 RX ADMIN — INSULIN ASPART SCH UNIT: 100 INJECTION, SOLUTION INTRAVENOUS; SUBCUTANEOUS at 14:08

## 2017-10-15 RX ADMIN — DIGOXIN SCH MG: 0.25 TABLET ORAL at 18:17

## 2017-10-15 RX ADMIN — DIPHENHYDRAMINE HYDROCHLORIDE PRN MG: 50 INJECTION INTRAMUSCULAR; INTRAVENOUS at 09:51

## 2017-10-15 RX ADMIN — DIPHENHYDRAMINE HYDROCHLORIDE PRN MG: 50 INJECTION INTRAMUSCULAR; INTRAVENOUS at 14:04

## 2017-10-15 RX ADMIN — Medication SCH MG: at 18:17

## 2017-10-15 RX ADMIN — INSULIN ASPART SCH: 100 INJECTION, SOLUTION INTRAVENOUS; SUBCUTANEOUS at 09:07

## 2017-10-15 RX ADMIN — INSULIN DETEMIR SCH UNIT: 100 INJECTION, SOLUTION SUBCUTANEOUS at 09:55

## 2017-10-15 RX ADMIN — PANTOPRAZOLE SODIUM SCH MG: 40 TABLET, DELAYED RELEASE ORAL at 09:51

## 2017-10-15 RX ADMIN — INSULIN ASPART SCH UNIT: 100 INJECTION, SOLUTION INTRAVENOUS; SUBCUTANEOUS at 18:22

## 2017-10-15 RX ADMIN — WATER SCH MLS/HR: 1 IRRIGANT IRRIGATION at 18:21

## 2017-10-15 NOTE — CP.PCM.PN
Subjective





- Date & Time of Evaluation


Date of Evaluation: 10/15/17


Time of Evaluation: 18:00





- Subjective


Subjective: 





PT IS FEELING BETTER, ON ANTIBIOTICS FOR UTI, URINE IS POSITVE FOR 

ENTERROCOCCUS AND YEAST SPECIES





Objective





- Vital Signs/Intake and Output


Vital Signs (last 24 hours): 


 











Temp Pulse Resp BP Pulse Ox


 


 97.7 F   53 L  19   132/56 L  100 


 


 10/15/17 15:52  10/15/17 15:52  10/15/17 15:52  10/15/17 15:52  10/15/17 15:52











- Medications


Medications: 


 Current Medications





Apixaban (Eliquis)  5 mg PO BID [facility]


   Last Admin: 10/15/17 18:17 Dose:  5 mg


Bismuth Subsalicylate (Pepto-Bismol)  262 mg PO Q4 PRN


   PRN Reason: STOMACH PAIN


   Last Admin: 10/09/17 10:58 Dose:  262 mg


Digoxin (Lanoxin)  0.25 mg PO DAILY@1800 [facility]


   Last Admin: 10/15/17 18:17 Dose:  0.25 mg


Diphenhydramine HCl (Benadryl)  25 mg IVP Q4H PRN


   PRN Reason: pruritus


   Last Admin: 10/15/17 18:16 Dose:  25 mg


Diphenoxylate HCl/Atropine (Lomotil 0.025-2.5 Mg Tablet)  1 tab PO Q4 PRN


   PRN Reason: Diarrhea


   Last Admin: 10/15/17 09:51 Dose:  1 tab


Amphotericin B 50 mg/ Sterile (Water)  1,000 mls @ 125 mls/hr IVPB Q24H [facility]


   Last Admin: 10/15/17 18:21 Dose:  125 mls/hr


Insulin Aspart (Novolog)  0 unit SC ACHS [facility]


   PRN Reason: Protocol


   Last Admin: 10/15/17 18:22 Dose:  10 unit


Insulin Detemir (Levemir)  14 unit SC QAM [facility]


   Last Admin: 10/15/17 09:55 Dose:  14 unit


Linezolid (Zyvox)  600 mg PO BID [facility]


   Last Admin: 10/15/17 18:17 Dose:  600 mg


Losartan Potassium (Cozaar)  50 mg PO DAILY [facility]


   Last Admin: 10/15/17 09:51 Dose:  50 mg


Metoprolol Succinate (Toprol Xl)  50 mg PO DAILY [facility]


   Last Admin: 10/15/17 09:51 Dose:  50 mg


Morphine Sulfate (Morphine)  2 mg IVP Q4 PRN


   PRN Reason: Pain, severe (8-10)


   Last Admin: 10/15/17 18:16 Dose:  2 mg


Multi-Ingredient Ointment (Prep-Hem)  1 ea TOP BID [facility]


   Last Admin: 10/15/17 18:17 Dose:  1 applic


Ondansetron HCl (Zofran Odt)  4 mg PO QID PRN


   PRN Reason: Nausea/Vomiting


Pantoprazole Sodium (Protonix Ec Tab)  40 mg PO DAILY [facility]


   Last Admin: 10/15/17 09:51 Dose:  40 mg


Prednisone (Prednisone Tab)  20 mg PO BID [facility]


   Last Admin: 10/15/17 18:17 Dose:  20 mg


Saccharomyces Boulardii (Florastor)  250 mg PO BID [facility]


   Last Admin: 10/15/17 18:17 Dose:  250 mg


Zolpidem Tartrate (Ambien)  5 mg PO HS PRN


   PRN Reason: Insomnia


   Last Admin: 10/13/17 00:25 Dose:  5 mg











- Labs


Labs: 


 





 10/15/17 08:22 





 10/15/17 08:22 





 











PT  12.4 SECONDS (9.7-12.2)  H  10/06/17  19:40    


 


INR  1.1   10/06/17  19:40    


 


APTT  38 SECONDS (21-34)  H  10/06/17  19:40    














- Constitutional


Appears: No Acute Distress





- Head Exam


Head Exam: ATRAUMATIC, NORMAL INSPECTION, NORMOCEPHALIC





- Eye Exam


Eye Exam: EOMI, Normal appearance, PERRL


Pupil Exam: NORMAL ACCOMODATION, PERRL





- ENT Exam


ENT Exam: Mucous Membranes Moist, Normal Exam





- Respiratory Exam


Respiratory Exam: Clear to Ausculation Bilateral, NORMAL BREATHING PATTERN





- Cardiovascular Exam


Cardiovascular Exam: REGULAR RHYTHM, +S1, +S2.  absent: Murmur





- GI/Abdominal Exam


GI & Abdominal Exam: Soft, Normal Bowel Sounds.  absent: Tenderness





Assessment and Plan


(1) Neurogenic bladder


Assessment & Plan: 


CBI, FOLEYS WITH CLOUDY URINE


Status: Acute   





(2) Sarcoidosis


Status: Acute   





(3) Abdominal pain


Status: Acute   





(4) Hypertension


Status: Acute   





(5) Obstructive hyperbilirubinemia


Status: Acute   





(6) UTI (urinary tract infection) due to urinary indwelling Charles catheter


Assessment & Plan: 


ON AMPHOTERRICIN


Status: Acute   





(7) Diabetes


Assessment & Plan: 


FLUCTUATING BS LEVELS


MONITOR PT


MONITOR LFTS


Status: Acute

## 2017-10-15 NOTE — PN
LOCATION:  Room #571, bed A.



SUBJECTIVE:  This is a 52-year-old female seen and examined in rounds

without significant clinical changes, but mildly less abdominal pain.  No

chills or fevers but less oral intake.



The patient has no evidence of active bleeding and no recent reported chest

pain or significant shortness of breath, appears to be more awake and

alert.  The entire chart is reviewed including but not limited to some of

the recent laboratory and radiology study results, current and the previous

medication list, current and the previous medical events.  Case discussed

at length with staff and the latest blood glucose level is 118 with mild

leukocytosis and normal CBC.



PHYSICAL EXAMINATION:

GENERAL:  A 52-year-old female.

VITAL SIGNS:  Afebrile, with pulse of 58, respiratory rate 20 to 22, blood

pressure of 140/86.

HEENT:  Pale dry oral mucous membrane.  Bilateral icterus sclerae.

LUNGS:  Few scattered mild crepitations.

HEART:  Positive S1 and S2.

ABDOMEN:  Soft with slight distention and right-sided mild tenderness as

well as mid-epigastric area tenderness.  No mass or organomegaly.  No

rebound tenderness or guarding.

EXTREMITIES:  There is evidence of bilateral above-knee amputation.  No

clubbing or cyanosis of extremities.

NEUROLOGIC:  No new reported neurological deficit, sensory or motor.



IMPRESSION:

1.  Hepatic sarcoidosis with abnormal liver function test, improving.

2.  Jaundice secondary to above.

3.  Known history of severe hepatobiliary spasm and/or stenosis with status

post biliary stent insertion, may need to change it as the total bilirubin

is still elevated.

4.  Known history of dilated cardiomyopathy.

5.  Peripheral vascular disease status post bilateral above-knee

amputation.

6.  Urinary tract infection with recent history of urosepsis, on

antibiotics.

7.  Known history of neurogenic bladder.

8.  Poorly controlled diabetes mellitus.

9.  Known history of hypertension with congestive heart failure.

10.  Status post partial colon resection due to intra-abdominal abscess or

lesion few years ago.



SUGGESTION:

1.  Agree with your plan.

2.  Increase the dose of steroid to 20 mg IV piggyback of Solu-Medrol q. 8

hours.

3.  If the patient's symptoms persists then change the biliary stent with

endoscopic retrograde cholangiopancreatography to be scheduled.

4.  We will discuss possible liver transplant with the transplant surgical

team at the [place], [place].  That to be discussed

with the admitting MD.  We will follow up closely with you.







__________________________________________

Jeffrey Albert MD





DD:  10/15/2017 8:37:54

DT:  10/15/2017 10:16:11

Job # [id number]

## 2017-10-15 NOTE — PN
DATE:



SUBJECTIVE:  The patient denies any shortness of breath or chest discomfort

or even pain over the ICD site.  She has discomfort related to her bladder

irrigation as well as right flank pain.



PHYSICAL EXAMINATION:

VITAL SIGNS:  Blood pressure 155/90, heart rate 55, temperature 97.5,

respirations 20.

HEENT:  Normocephalic.

CHEST:  Diminished breath sounds over the bases.

HEART:  S1 and S2 regular.

EXTREMITIES:  Bilateral above knee amputation.



LABORATORY DATA:  BUN and creatinine 31 and 0.5 respectively.  Glucose is

147.  Today's hemoglobin and hematocrit are within normal limits.  White

count 15.9 and platelet count 175,000.



ASSESSMENT:

1.  Cardiomyopathy.

2.  Sarcoid liver disease.

3.  Urinary tract infection.



RECOMMENDATIONS:  Continue current IV amphotericin, continue Cozaar 50 mg

once a day, Eliquis is 5 mg once a day, Lanoxin 0.25 mg orally once a day. 

Continue Lomotil p.r.n. for diarrhea as well as Pepto Bismol.  Continue IV

Zyvox 600 mg orally twice a day.  So far, stool C. diff antigen and toxins

are negative.







__________________________________________

Ankit Figueroa MD



DD:  10/15/2017 15:02:16

DT:  10/15/2017 19:07:10

Job # [id number]

## 2017-10-16 VITALS — HEART RATE: 85 BPM

## 2017-10-16 LAB
ALBUMIN/GLOB SERPL: 1 {RATIO} (ref 1–2.1)
ALP SERPL-CCNC: 422 U/L (ref 38–126)
ALT SERPL-CCNC: 217 U/L (ref 9–52)
AST SERPL-CCNC: 94 U/L (ref 14–36)
BASOPHILS # BLD AUTO: 0 K/UL (ref 0–0.2)
BASOPHILS # BLD AUTO: 0 K/UL (ref 0–0.2)
BASOPHILS NFR BLD: 0 % (ref 0–2)
BASOPHILS NFR BLD: 0 % (ref 0–2)
BILIRUB DIRECT SERPL-MCNC: 2.9 MG/DL (ref 0–0.4)
BILIRUB SERPL-MCNC: 3.3 MG/DL (ref 0.2–1.3)
BUN SERPL-MCNC: 30 MG/DL (ref 7–17)
CALCIUM SERPL-MCNC: 9.3 MG/DL (ref 8.6–10.4)
CHLORIDE SERPL-SCNC: 101 MMOL/L (ref 98–107)
CO2 SERPL-SCNC: 15 MMOL/L (ref 22–30)
EOSINOPHIL # BLD AUTO: 0 K/UL (ref 0–0.7)
EOSINOPHIL # BLD AUTO: 0 K/UL (ref 0–0.7)
EOSINOPHIL NFR BLD: 0 % (ref 0–4)
EOSINOPHIL NFR BLD: 0 % (ref 0–4)
ERYTHROCYTE [DISTWIDTH] IN BLOOD BY AUTOMATED COUNT: 16.3 % (ref 11.5–14.5)
GLOBULIN SER-MCNC: 3.8 GM/DL (ref 2.2–3.9)
GLUCOSE SERPL-MCNC: 343 MG/DL (ref 65–105)
HCT VFR BLD CALC: 40.9 % (ref 34–47)
LYMPHOCYTES # BLD AUTO: 2 K/UL (ref 1–4.3)
LYMPHOCYTES # BLD AUTO: 2.4 K/UL (ref 1–4.3)
LYMPHOCYTES NFR BLD AUTO: 13 % (ref 20–40)
LYMPHOCYTES NFR BLD AUTO: 17 % (ref 20–40)
MCH RBC QN AUTO: 32.2 PG (ref 27–31)
MCHC RBC AUTO-ENTMCNC: 32.5 G/DL (ref 33–37)
MCV RBC AUTO: 99.4 FL (ref 81–99)
MONOCYTES # BLD: 1 K/UL (ref 0–0.8)
MONOCYTES # BLD: 1.4 K/UL (ref 0–0.8)
MONOCYTES NFR BLD: 10 % (ref 0–10)
MONOCYTES NFR BLD: 6 % (ref 0–10)
NRBC BLD AUTO-RTO: 0 % (ref 0–2)
NRBC BLD AUTO-RTO: 0 % (ref 0–2)
PLATELET # BLD: 261 K/UL (ref 130–400)
PMV BLD AUTO: 9.2 FL (ref 7.2–11.7)
POTASSIUM SERPL-SCNC: 4.3 MMOL/L (ref 3.6–5.2)
PROT SERPL-MCNC: 7.8 G/DL (ref 6.3–8.3)
SODIUM SERPL-SCNC: 131 MMOL/L (ref 132–148)
TOTAL CELLS COUNTED BLD: 100
WBC # BLD AUTO: 14.2 K/UL (ref 4.8–10.8)

## 2017-10-16 RX ADMIN — INSULIN ASPART SCH UNIT: 100 INJECTION, SOLUTION INTRAVENOUS; SUBCUTANEOUS at 23:05

## 2017-10-16 RX ADMIN — METOPROLOL SUCCINATE SCH MG: 50 TABLET, EXTENDED RELEASE ORAL at 10:31

## 2017-10-16 RX ADMIN — DIPHENHYDRAMINE HYDROCHLORIDE PRN MG: 50 INJECTION INTRAMUSCULAR; INTRAVENOUS at 04:49

## 2017-10-16 RX ADMIN — DIPHENOXYLATE HYDROCHLORIDE AND ATROPINE SULFATE PRN TAB: 2.5; .025 TABLET ORAL at 15:55

## 2017-10-16 RX ADMIN — DIGOXIN SCH MG: 0.25 TABLET ORAL at 18:18

## 2017-10-16 RX ADMIN — DIPHENHYDRAMINE HYDROCHLORIDE PRN MG: 50 INJECTION INTRAMUSCULAR; INTRAVENOUS at 15:56

## 2017-10-16 RX ADMIN — Medication SCH MG: at 10:30

## 2017-10-16 RX ADMIN — INSULIN ASPART SCH UNIT: 100 INJECTION, SOLUTION INTRAVENOUS; SUBCUTANEOUS at 18:19

## 2017-10-16 RX ADMIN — PANTOPRAZOLE SODIUM SCH MG: 40 TABLET, DELAYED RELEASE ORAL at 10:30

## 2017-10-16 RX ADMIN — MINERAL OIL, PETROLATUM, PHENYLEPHRINE HCL SCH: 14; 74.9; .25 OINTMENT RECTAL at 20:19

## 2017-10-16 RX ADMIN — DIPHENHYDRAMINE HYDROCHLORIDE PRN MG: 50 INJECTION INTRAMUSCULAR; INTRAVENOUS at 10:34

## 2017-10-16 RX ADMIN — Medication SCH MG: at 18:18

## 2017-10-16 RX ADMIN — MINERAL OIL, PETROLATUM, PHENYLEPHRINE HCL SCH: 14; 74.9; .25 OINTMENT RECTAL at 10:50

## 2017-10-16 RX ADMIN — DIPHENHYDRAMINE HYDROCHLORIDE PRN MG: 50 INJECTION INTRAMUSCULAR; INTRAVENOUS at 20:30

## 2017-10-16 RX ADMIN — INSULIN ASPART SCH UNIT: 100 INJECTION, SOLUTION INTRAVENOUS; SUBCUTANEOUS at 10:32

## 2017-10-16 RX ADMIN — INSULIN DETEMIR SCH UNIT: 100 INJECTION, SOLUTION SUBCUTANEOUS at 10:32

## 2017-10-16 RX ADMIN — DIPHENOXYLATE HYDROCHLORIDE AND ATROPINE SULFATE PRN TAB: 2.5; .025 TABLET ORAL at 20:30

## 2017-10-16 RX ADMIN — DIPHENHYDRAMINE HYDROCHLORIDE PRN MG: 50 INJECTION INTRAMUSCULAR; INTRAVENOUS at 01:37

## 2017-10-16 RX ADMIN — INSULIN ASPART SCH UNIT: 100 INJECTION, SOLUTION INTRAVENOUS; SUBCUTANEOUS at 13:20

## 2017-10-16 NOTE — CP.PCM.PN
Subjective





- Date & Time of Evaluation


Date of Evaluation: 10/16/17


Time of Evaluation: 19:35





- Subjective


Subjective: 





Pt seen and examined, is feeling better, c/o on and off loose wattery stools 

and abdominal pain, denies any chest pain, is afebrile





Objective





- Vital Signs/Intake and Output


Vital Signs (last 24 hours): 


 











Temp Pulse Resp BP Pulse Ox


 


 97.6 F   77   18   118/84   98 


 


 10/16/17 16:05  10/16/17 16:05  10/16/17 16:05  10/16/17 16:05  10/16/17 16:05








Intake and Output: 


 











 10/16/17 10/17/17





 18:59 06:59


 


Output Total 300 


 


Balance -300 














- Medications


Medications: 


 Current Medications





Apixaban (Eliquis)  5 mg PO BID [facility]


   Last Admin: 10/16/17 18:19 Dose:  5 mg


Bismuth Subsalicylate (Pepto-Bismol)  262 mg PO Q4 PRN


   PRN Reason: STOMACH PAIN


   Last Admin: 10/09/17 10:58 Dose:  262 mg


Digoxin (Lanoxin)  0.25 mg PO DAILY@1800 [facility]


   Last Admin: 10/16/17 18:18 Dose:  0.25 mg


Diphenhydramine HCl (Benadryl)  25 mg IVP Q4H PRN


   PRN Reason: pruritus


   Last Admin: 10/16/17 20:30 Dose:  25 mg


Diphenoxylate HCl/Atropine (Lomotil 0.025-2.5 Mg Tablet)  1 tab PO Q4 PRN


   PRN Reason: Diarrhea


   Last Admin: 10/16/17 20:30 Dose:  1 tab


Insulin Aspart (Novolog)  0 unit SC ACHS [facility]


   PRN Reason: Protocol


   Last Admin: 10/16/17 18:19 Dose:  4 unit


Insulin Detemir (Levemir)  14 unit SC QAM [facility]


   Last Admin: 10/16/17 10:32 Dose:  14 unit


Linezolid (Zyvox)  600 mg PO BID [facility]


   Last Admin: 10/16/17 18:18 Dose:  600 mg


Losartan Potassium (Cozaar)  50 mg PO DAILY [facility]


   Last Admin: 10/16/17 10:31 Dose:  50 mg


Metoprolol Succinate (Toprol Xl)  50 mg PO DAILY [facility]


   Last Admin: 10/16/17 10:31 Dose:  50 mg


Morphine Sulfate (Morphine)  2 mg IVP Q4 PRN


   PRN Reason: Pain, severe (8-10)


   Last Admin: 10/16/17 20:29 Dose:  2 mg


Multi-Ingredient Ointment (Prep-Hem)  1 ea TOP BID [facility]


   Last Admin: 10/16/17 20:19 Dose:  Not Given


Ondansetron HCl (Zofran Odt)  4 mg PO QID PRN


   PRN Reason: Nausea/Vomiting


Pantoprazole Sodium (Protonix Ec Tab)  40 mg PO DAILY [facility]


   Last Admin: 10/16/17 10:30 Dose:  40 mg


Prednisone (Prednisone Tab)  20 mg PO BID [facility]


   Last Admin: 10/16/17 18:19 Dose:  20 mg


Saccharomyces Boulardii (Florastor)  250 mg PO BID [facility]


   Last Admin: 10/16/17 18:18 Dose:  250 mg


Zolpidem Tartrate (Ambien)  5 mg PO HS PRN


   PRN Reason: Insomnia


   Last Admin: 10/13/17 00:25 Dose:  5 mg











- Labs


Labs: 


 





 10/16/17 11:02 





 10/16/17 11:02 





 











PT  12.4 SECONDS (9.7-12.2)  H  10/06/17  19:40    


 


INR  1.1   10/06/17  19:40    


 


APTT  38 SECONDS (21-34)  H  10/06/17  19:40    














- Constitutional


Appears: No Acute Distress





- Head Exam


Head Exam: ATRAUMATIC, NORMAL INSPECTION, NORMOCEPHALIC





- Eye Exam


Eye Exam: EOMI, Normal appearance, PERRL


Pupil Exam: NORMAL ACCOMODATION, PERRL





- Respiratory Exam


Respiratory Exam: Decreased Breath Sounds, Rales, Rhonchi





- Cardiovascular Exam


Cardiovascular Exam: REGULAR RHYTHM, +S1, +S2.  absent: Murmur





- GI/Abdominal Exam


GI & Abdominal Exam: Soft, Normal Bowel Sounds.  absent: Tenderness





Assessment and Plan


(1) Neurogenic bladder


Assessment & Plan: 


CBI


Status: Acute   





(2) Sarcoidosis


Status: Acute   





(3) Abdominal pain


Status: Acute   





(4) Hypertension


Status: Acute   





(5) Obstructive hyperbilirubinemia


Status: Acute   





(6) UTI (urinary tract infection) due to urinary indwelling Charles catheter


Assessment & Plan: 


on antibiotics


Status: Acute   





(7) Diabetes


Status: Acute

## 2017-10-16 NOTE — PN
SUBJECTIVE:  The patient is experiencing right subcostal discomfort.  No

shortness of breath.  Still experiencing diarrhea.  No retrosternal chest

pain.



PHYSICAL EXAMINATION:

VITAL SIGNS:  Blood pressure 162/83, heart rate 60, temperature 97.9,

respirations 18.

HEENT:  Normocephalic.

CHEST:  Minimal basilar rhonchi.

HEART:  S1 and S2 regular.

EXTREMITIES:  Bilateral above-knee amputation.



LABORATORY DATA:  Today's SMA-7; sodium 131, potassium 4.2, chloride 101,

CO2 of 15, glucose 343, BUN 30, creatinine 0.6.  Total bilirubin is 3.3. 

Hemoglobin and hematocrit 13.3 and 40.9.  White count 14.2, platelet

261,000.



ASSESSMENT:

1.  Dilated cardiomyopathy, status post implantable

cardioverter-defibrillator placement.

2.  Liver sarcoidosis.

3.  Urinary tract infection.

4.  Uncontrolled diabetes mellitus.



RECOMMENDATIONS:  Continue IV amphotericin and IV Zyvox.  Continue *------*

50 mg once a day, Protonix 40 mg p.o. once a day, prednisone 20 mg p.o.

twice a day, digoxin 0.25 mg orally daily, Cozaar 50 mg once a day, Eliquis

5 mg twice a day.







__________________________________________

Ankit Figueroa MD



DD:  10/16/2017 15:51:43

DT:  10/16/2017 16:10:44

Job # [id number]

## 2017-10-16 NOTE — CP.PCM.PN
Subjective





- Date & Time of Evaluation


Date of Evaluation: 10/16/17


Time of Evaluation: 20:09





- Subjective


Subjective: 





CHIEF COMPLAINTS  TODAY :


afebrile, 


C/O RUQ PAIN.


ON AMPHOTERICIN BLADDER IRRIGATIONS.-DAY 5


c/o soft /sometimes loose stools.


ROS





HEENT :  N.ICTERIC


Resp :       No  SOB wheezing, +ve DRY COUGH


Cardio :     No CP, PND orthopnea 


GI :           RUQ ABDOMINAL PAIN , NO  n/v 


CNS : No headache , focal deficit. 


Musculoskel :  N


Ext. : Pedal pulses intact, no edema or calf pain 


Derm :        N


Psych :     N. 








PE. 


Pt. is alert awake in no distress. 





V.S  As noted in the chart 





Head ,ear nose,throat and eyes : Normal.,sclera icteric.


Neck : Supple with normal carotids.


Lungs  CLEARING NO WHEEZE


Heart : S1 & S2 irregular. . No murmur. 


Abd : Soft MILD TENDERNESS RIGHT UPPER QUADRANT with normal bowel sounds.


Neuro : Moves all ext. with no localized deficit.


Ext : No edema with intact pulses. Neg. calf tenderness 


Derm : No rashes or decubitus ulcer.





Radiology/Labs .


WBC 14.2 improving


lfts  AST 97 improving,  improving.alkaline phosphatase 422 improving 

bili 3.3 total.improving


repeat urine culture 10/12/17  enterococcus feacium / yeast. s linezolid


REPEAT URINE CULTURE FOR ENTEROCOCCUS FEACIUM/YEAST.





CXR  NAD,


BLOOD CULTURES1/15/17  NEGATIVE TO DATE.





Objective





- Vital Signs/Intake and Output


Vital Signs (last 24 hours): 


 











Temp Pulse Resp BP Pulse Ox


 


 97.6 F   77   18   118/84   98 


 


 10/16/17 16:05  10/16/17 16:05  10/16/17 16:05  10/16/17 16:05  10/16/17 16:05








Intake and Output: 


 











 10/16/17 10/17/17





 18:59 06:59


 


Output Total 300 


 


Balance -300 














- Medications


Medications: 


 Current Medications





Apixaban (Eliquis)  5 mg PO BID [facility]


   Last Admin: 10/16/17 18:19 Dose:  5 mg


Bismuth Subsalicylate (Pepto-Bismol)  262 mg PO Q4 PRN


   PRN Reason: STOMACH PAIN


   Last Admin: 10/09/17 10:58 Dose:  262 mg


Digoxin (Lanoxin)  0.25 mg PO DAILY@1800 [facility]


   Last Admin: 10/16/17 18:18 Dose:  0.25 mg


Diphenhydramine HCl (Benadryl)  25 mg IVP Q4H PRN


   PRN Reason: pruritus


   Last Admin: 10/16/17 15:56 Dose:  25 mg


Diphenoxylate HCl/Atropine (Lomotil 0.025-2.5 Mg Tablet)  1 tab PO Q4 PRN


   PRN Reason: Diarrhea


   Last Admin: 10/16/17 15:55 Dose:  1 tab


Insulin Aspart (Novolog)  0 unit SC ACHS [facility]


   PRN Reason: Protocol


   Last Admin: 10/16/17 18:19 Dose:  4 unit


Insulin Detemir (Levemir)  14 unit SC QAM [facility]


   Last Admin: 10/16/17 10:32 Dose:  14 unit


Linezolid (Zyvox)  600 mg PO BID [facility]


   Last Admin: 10/16/17 18:18 Dose:  600 mg


Losartan Potassium (Cozaar)  50 mg PO DAILY [facility]


   Last Admin: 10/16/17 10:31 Dose:  50 mg


Metoprolol Succinate (Toprol Xl)  50 mg PO DAILY [facility]


   Last Admin: 10/16/17 10:31 Dose:  50 mg


Morphine Sulfate (Morphine)  2 mg IVP Q4 PRN


   PRN Reason: Pain, severe (8-10)


   Last Admin: 10/16/17 15:56 Dose:  2 mg


Multi-Ingredient Ointment (Prep-Hem)  1 ea TOP BID [facility]


   Last Admin: 10/16/17 10:50 Dose:  Not Given


Ondansetron HCl (Zofran Odt)  4 mg PO QID PRN


   PRN Reason: Nausea/Vomiting


Pantoprazole Sodium (Protonix Ec Tab)  40 mg PO DAILY [facility]


   Last Admin: 10/16/17 10:30 Dose:  40 mg


Prednisone (Prednisone Tab)  20 mg PO BID [facility]


   Last Admin: 10/16/17 18:19 Dose:  20 mg


Saccharomyces Boulardii (Florastor)  250 mg PO BID [facility]


   Last Admin: 10/16/17 18:18 Dose:  250 mg


Zolpidem Tartrate (Ambien)  5 mg PO HS PRN


   PRN Reason: Insomnia


   Last Admin: 10/13/17 00:25 Dose:  5 mg











- Labs


Labs: 


 





 10/16/17 11:02 





 10/16/17 11:02 





 











PT  12.4 SECONDS (9.7-12.2)  H  10/06/17  19:40    


 


INR  1.1   10/06/17  19:40    


 


APTT  38 SECONDS (21-34)  H  10/06/17  19:40    














Assessment and Plan


(1) UTI (urinary tract infection)


Status: Acute   





(2) Neurogenic bladder


Status: Acute   





(3) Sarcoidosis


Status: Acute   





(4) Abdominal pain


Status: Acute   





(5) Congestive heart failure


Status: Acute   





(6) Hypertension


Status: Acute   





- Assessment and Plan (Free Text)


Plan: 





ON IV amphotericin bladder irrigations with 50 mg amphotericin in 1000cc normal 

saline via


 a three-way Charles catheter daily -day 5 completed





BLOOD CULTURES 2 SETS 15 MINUTES APART IN A.M.





on by mouth ZYVOX 600 MG TWICE A DAY STARTING AFTER BLOOD CULTURES 10/15/17.-

day2 x 5 days more


CASE DISCUSSED WITH STAFF ms huffman ..

## 2017-10-17 VITALS — DIASTOLIC BLOOD PRESSURE: 97 MMHG | SYSTOLIC BLOOD PRESSURE: 149 MMHG

## 2017-10-17 VITALS — OXYGEN SATURATION: 99 % | HEART RATE: 84 BPM | TEMPERATURE: 97.9 F

## 2017-10-17 VITALS — RESPIRATION RATE: 20 BRPM

## 2017-10-17 RX ADMIN — DIPHENHYDRAMINE HYDROCHLORIDE PRN MG: 50 INJECTION INTRAMUSCULAR; INTRAVENOUS at 05:16

## 2017-10-17 RX ADMIN — DIPHENHYDRAMINE HYDROCHLORIDE PRN MG: 50 INJECTION INTRAMUSCULAR; INTRAVENOUS at 09:40

## 2017-10-17 RX ADMIN — DIPHENHYDRAMINE HYDROCHLORIDE PRN MG: 50 INJECTION INTRAMUSCULAR; INTRAVENOUS at 00:53

## 2017-10-17 RX ADMIN — DIPHENOXYLATE HYDROCHLORIDE AND ATROPINE SULFATE PRN TAB: 2.5; .025 TABLET ORAL at 09:40

## 2017-10-17 RX ADMIN — INSULIN ASPART SCH UNIT: 100 INJECTION, SOLUTION INTRAVENOUS; SUBCUTANEOUS at 09:39

## 2017-10-17 NOTE — CP.PCM.DIS
Provider





- Provider


Date of Admission: 


10/04/17 18:49





Attending physician: 


Nasir Dunbar MD








Diagnosis





- Discharge Diagnosis


(1) Neurogenic bladder


Status: Acute   





(2) Sarcoidosis


Status: Acute   





(3) Abdominal pain


Status: Acute   





(4) Hypertension


Status: Acute   





(5) Obstructive hyperbilirubinemia


Status: Acute   





(6) UTI (urinary tract infection) due to urinary indwelling Charles catheter


Status: Acute   





(7) Diabetes


Status: Acute   





Hospital Course





- Lab Results


Lab Results: 


 Micro Results





10/15/17 08:00   Blood-Thru Central Line   Blood Culture - Preliminary


                            NO GROWTH AFTER 48 HOURS


10/12/17 14:40   Urine,Charles   Urine Culture - Final


                            Enterococcus Faecium


                            Yeast Species


10/10/17 Unknown   Urine,Clean Catch   Urine Culture - Final


                                Enterococcus Faecium


                                Yeast Species


10/06/17 19:30   Blood   Blood Culture - Final


                            NO GROWTH AFTER 5 DAYS


10/06/17 19:30   Blood   Gram Stain - Final


                            TEST NOT PERFORMED


10/06/17 16:30   Blood   Blood Culture - Final


                            NO GROWTH AFTER 5 DAYS


10/06/17 16:30   Blood   Gram Stain - Final


                            TEST NOT PERFORMED


10/06/17 15:00   Urine,Clean Catch   Urine Culture - Final


                            MULTIPLE SPECIES. SUGGEST REPEAT SPECIMEN.


10/04/17 17:52   Urine   Urine Culture - Final


                            Klebsiella Pneumoniae Ssp Pneu





 Most Recent Lab Values











WBC  14.2 K/uL (4.8-10.8)  H  10/16/17  11:02    


 


RBC  4.12 Mil/uL (3.80-5.20)   10/16/17  11:02    


 


Hgb  13.3 g/dL (11.0-16.0)   10/16/17  11:02    


 


Hct  40.9 % (34.0-47.0)   10/16/17  11:02    


 


MCV  99.4 fL (81.0-99.0)  H  10/16/17  11:02    


 


MCH  32.2 pg (27.0-31.0)  H  10/16/17  11:02    


 


MCHC  32.5 g/dL (33.0-37.0)  L  10/16/17  11:02    


 


RDW  16.3 % (11.5-14.5)  H  10/16/17  11:02    


 


Plt Count  261 K/uL (130-400)   10/16/17  11:02    


 


MPV  9.2 fL (7.2-11.7)   10/16/17  11:02    


 


Neut % (Auto)  73.0 % (50.0-75.0)   10/16/17  11:02    


 


Lymph % (Auto)  17.0 % (20.0-40.0)  L  10/16/17  11:02    


 


Mono % (Auto)  10.0 % (0.0-10.0)   10/16/17  11:02    


 


Eos % (Auto)  0.0 % (0.0-4.0)   10/16/17  11:02    


 


Baso % (Auto)  0.0 % (0.0-2.0)   10/16/17  11:02    


 


Neut #  10.4 K/uL (1.8-7.0)  H  10/16/17  11:02    


 


Lymph #  2.4 K/uL (1.0-4.3)   10/16/17  11:02    


 


Mono #  1.4 K/uL (0.0-0.8)  H  10/16/17  11:02    


 


Eos #  0.0 K/uL (0.0-0.7)   10/16/17  11:02    


 


Baso #  0.0 K/uL (0.0-0.2)   10/16/17  11:02    


 


Neutrophils % (Manual)  80 % (50-75)  H  10/15/17  08:22    


 


Band Neutrophils %  1 % (0-2)   10/15/17  08:22    


 


Lymphocytes % (Manual)  13 % (20-40)  L  10/15/17  08:22    


 


Monocytes % (Manual)  6 % (0-10)   10/15/17  08:22    


 


Eosinophils % (Manual)  4 % (0-4)   10/04/17  18:22    


 


Platelet Estimate  Normal  (NORMAL)   10/15/17  08:22    


 


Large Platelets  Present   10/15/17  08:22    


 


Anisocytosis (manual)  Slight   10/15/17  08:22    


 


Target Cells  Slight   10/04/17  18:22    


 


PT  12.4 SECONDS (9.7-12.2)  H  10/06/17  19:40    


 


INR  1.1   10/06/17  19:40    


 


APTT  38 SECONDS (21-34)  H  10/06/17  19:40    


 


Sodium  131 mmol/L (132-148)  L  10/16/17  11:02    


 


Potassium  4.3 mmol/L (3.6-5.2)   10/16/17  11:02    


 


Chloride  101 mmol/L ()   10/16/17  11:02    


 


Carbon Dioxide  15 mmol/L (22-30)  L  10/16/17  11:02    


 


Anion Gap  19  (10-20)   10/16/17  11:02    


 


BUN  30 mg/dL (7-17)  H  10/16/17  11:02    


 


Creatinine  0.6 mg/dL (0.7-1.2)  L  10/16/17  11:02    


 


Est GFR ( Amer)  > 60   10/16/17  11:02    


 


Est GFR (Non-Af Amer)  > 60   10/16/17  11:02    


 


POC Glucose (mg/dL)  127 mg/dL ()  H  10/17/17  11:13    


 


Random Glucose  343 mg/dL ()  H  10/16/17  11:02    


 


Calcium  9.3 mg/dl (8.6-10.4)   10/16/17  11:02    


 


Total Bilirubin  3.3 mg/dL (0.2-1.3)  H  10/16/17  11:02    


 


Direct Bilirubin  2.9 mg/dL (0.0-0.4)  H  10/16/17  11:02    


 


AST  94 U/L (14-36)  H D 10/16/17  11:02    


 


ALT  217 U/L (9-52)  H  10/16/17  11:02    


 


Alkaline Phosphatase  422 U/L ()  H  10/16/17  11:02    


 


NT-Pro-B Natriuret Pep  398 pg/mL (0-900)   10/08/17  08:16    


 


Total Protein  7.8 g/dL (6.3-8.3)   10/16/17  11:02    


 


Albumin  3.9 g/dL (3.5-5.0)   10/16/17  11:02    


 


Globulin  3.8 gm/dL (2.2-3.9)   10/16/17  11:02    


 


Albumin/Globulin Ratio  1.0  (1.0-2.1)   10/16/17  11:02    


 


Lipase  27 U/L ()   10/04/17  18:22    


 


Urine Color  Elise  (YELLOW)   10/12/17  05:29    


 


Urine Clarity  Hazy  (Clear)   10/12/17  05:29    


 


Urine pH  5.0  (5.0-8.0)   10/12/17  05:29    


 


Ur Specific Gravity  1.016  (1.003-1.030)   10/12/17  05:29    


 


Urine Protein  1+ mg/dL (NEGATIVE)  H  10/12/17  05:29    


 


Urine Glucose (UA)  3+ mg/dL (Normal)  H  10/12/17  05:29    


 


Urine Ketones  Negative mg/dL (NEGATIVE)   10/12/17  05:29    


 


Urine Blood  1+  (NEGATIVE)  H  10/12/17  05:29    


 


Urine Nitrate  Negative  (NEGATIVE)   10/12/17  05:29    


 


Urine Bilirubin  Negative  (NEGATIVE)   10/12/17  05:29    


 


Urine Urobilinogen  2.0 mg/dL (0.2-1.0)  H  10/12/17  05:29    


 


Ur Leukocyte Esterase  3+ Abiodun/uL (Negative)  H  10/12/17  05:29    


 


Urine WBC (Auto)  104 /hpf (0-5)  H  10/12/17  05:29    


 


Urine RBC (Auto)  258 /hpf (0-3)  H  10/12/17  05:29    


 


Ur Squamous Epith Cells  < 1 /hpf (0-5)   10/12/17  05:29    


 


Urine Bacteria  Rare  (<OCC)   10/12/17  05:29    


 


Urine Yeast (Budding)  Many /hpf (NEGATIVE)  H  10/12/17  05:29    


 


Digoxin  0.4 ng/mL (0.8-2.0)  L  10/11/17  08:31    


 


C. difficile Ag & Toxin  Negative  (NEGATIVE)   10/09/17  Unknown














- Hospital Course


Hospital Course: 





PATIENT SEEN TODAY , DENIES ANY CHEST PAIN, SOB, PALPITATIONS , N/V/, C/O  MILD 

RUQ PAIN , IMPROVED  WITH PAIN MEDICATION 


NO OVERNIGHT EVENTS REPORTED BY RN 


COMPLETED 5 DAYS OF AMPH. IRRIGATION VIA BLADDER FOR uti( MDR)





A/P 


52 yr old female admitted for abdominal pain , transminitis 


UTI WITH MDR 


completed 5 days of amph. irrigation via bladder 


d/W Dr. Tsang cleared for discharge home with 3 more doses of zyvox 


stable for discharge home otday and fu with me office in 1 week 


RX sent to the pharmacy 


Discharge plan discussed with patient , who understand saand agrees with plan 


Patient instructed to f//u with [facility] for sarcodisis of liver


 Patient instructed to returns to ED if symptoms returns or any other 

concerning symptoms  











Discharge Exam





- Head Exam


Head Exam: ATRAUMATIC, NORMAL INSPECTION, NORMOCEPHALIC





Discharge Plan





- Discharge Medications


Prescriptions: 


Oxybutynin XL [Ditropan XL] 10 mg PO DAILY #30 ter


Atropine/Diphenoxylate [Lomotil 0.025-2.5 mg tablet] 1 tab PO Q8 PRN #15 tab


 PRN Reason: Diarrhea


oxyCODONE [oxyCODONE Immediate Release Tab] 5 mg PO Q6 PRN #20 tab


 PRN Reason: Pain, Severe (8-10)


Metoprolol Succinate [Toprol XL] 50 mg PO DAILY #30 tab


Linezolid [Zyvox] 600 mg PO BID #6 tab





- Follow Up Plan


Condition: STABLE


Disposition: HOME/ ROUTINE


Instructions:  Diphenoxylate/Atropine (By mouth), Metoprolol (By mouth), 

Oxybutynin (By mouth), Oxycodone/Acetaminophen (By mouth), Linezolid (By mouth)

, Heart Failure (DC), Proctitis (DC), Pneumococcal Vaccine for Adults (DC), 

Urinary Tract Infection in Women (DC), Diabetes Mellitus Type 2 in Adults (DC), 

Influenza Vaccine (DC), Sarcoidosis (DC), Acute Abdominal Pain (DC), Chronic 

Hypertension (DC), Dysuria (GEN)


Additional Instructions: 


PLEASE F/U WITH DR. DUNBAR OFFICE IN 1 WEEK 


PLEASE F/U WITH [facility] (F/U FOR LIVER SARCODOSIS)- CALL FOR 

APPOINTMENT 


PLEASE F/U WITH  DR. OLMEDO OFFICE IN 1-2 WEEK 


CONTINUE MEDICATION AS PER MEDICATION REC. 


CONTINUE ZYVOX X 3 MORE DAYS 


RX SENT TO YOUR  PHARMACY 


Referrals: 


Jeffrey Olmedo [Staff Provider] - 


Nasir Dunbar MD [Staff Provider] -

## 2017-10-17 NOTE — CP.PCM.PN
Subjective





- Date & Time of Evaluation


Date of Evaluation: 10/17/17


Time of Evaluation: 07:50





- Subjective


Subjective: 





PATIENT SEEN TODAY , DENIES ANY CHEST PAIN, SOB, PALPITATIONS , N/V/, C/O  MILD 

RUQ PAIN , IMPROVED  WITH PAIN MEDICATION 


NO OVERNIGHT EVENTS REPORTED BY RN 


COMPLETED 5 DAYS OF AMPH. IRRIGATION VIA BLADDER FOR uti( MDR)





Objective





- Vital Signs/Intake and Output


Vital Signs (last 24 hours): 


 











Temp Pulse Resp BP Pulse Ox


 


 97.9 F   84   20   143/77   99 


 


 10/17/17 07:00  10/17/17 07:00  10/17/17 07:00  10/16/17 23:25  10/17/17 07:00








Intake and Output: 


 











 10/17/17 10/17/17





 06:59 18:59


 


Output Total 200 


 


Balance -200 














- Medications


Medications: 


 Current Medications





Apixaban (Eliquis)  5 mg PO BID [facility]


   Last Admin: 10/16/17 18:19 Dose:  5 mg


Bismuth Subsalicylate (Pepto-Bismol)  262 mg PO Q4 PRN


   PRN Reason: STOMACH PAIN


   Last Admin: 10/09/17 10:58 Dose:  262 mg


Digoxin (Lanoxin)  0.25 mg PO DAILY@1800 [facility]


   Last Admin: 10/16/17 18:18 Dose:  0.25 mg


Diphenhydramine HCl (Benadryl)  25 mg IVP Q4H PRN


   PRN Reason: pruritus


   Last Admin: 10/17/17 05:16 Dose:  25 mg


Diphenoxylate HCl/Atropine (Lomotil 0.025-2.5 Mg Tablet)  1 tab PO Q4 PRN


   PRN Reason: Diarrhea


   Last Admin: 10/16/17 20:30 Dose:  1 tab


Insulin Aspart (Novolog)  0 unit SC ACHS [facility]


   PRN Reason: Protocol


   Last Admin: 10/16/17 23:05 Dose:  3 unit


Insulin Detemir (Levemir)  14 unit SC QAM [facility]


   Last Admin: 10/16/17 10:32 Dose:  14 unit


Linezolid (Zyvox)  600 mg PO BID [facility]


   Last Admin: 10/16/17 18:18 Dose:  600 mg


Losartan Potassium (Cozaar)  50 mg PO DAILY [facility]


   Last Admin: 10/16/17 10:31 Dose:  50 mg


Metoprolol Succinate (Toprol Xl)  50 mg PO DAILY [facility]


   Last Admin: 10/16/17 10:31 Dose:  50 mg


Morphine Sulfate (Morphine)  2 mg IVP Q4 PRN


   PRN Reason: Pain, severe (8-10)


   Last Admin: 10/17/17 05:15 Dose:  2 mg


Multi-Ingredient Ointment (Prep-Hem)  1 ea TOP BID [facility]


   Last Admin: 10/16/17 20:19 Dose:  Not Given


Ondansetron HCl (Zofran Odt)  4 mg PO QID PRN


   PRN Reason: Nausea/Vomiting


Pantoprazole Sodium (Protonix Ec Tab)  40 mg PO DAILY [facility]


   Last Admin: 10/16/17 10:30 Dose:  40 mg


Prednisone (Prednisone Tab)  20 mg PO BID [facility]


   Last Admin: 10/16/17 18:19 Dose:  20 mg


Saccharomyces Boulardii (Florastor)  250 mg PO BID [facility]


   Last Admin: 10/16/17 18:18 Dose:  250 mg


Zolpidem Tartrate (Ambien)  5 mg PO HS PRN


   PRN Reason: Insomnia


   Last Admin: 10/16/17 23:05 Dose:  5 mg











- Labs


Labs: 


 





 10/16/17 11:02 





 10/16/17 11:02 





 











PT  12.4 SECONDS (9.7-12.2)  H  10/06/17  19:40    


 


INR  1.1   10/06/17  19:40    


 


APTT  38 SECONDS (21-34)  H  10/06/17  19:40    














- Constitutional


Appears: Well, No Acute Distress





- Respiratory Exam


Respiratory Exam: Clear to Ausculation Bilateral, NORMAL BREATHING PATTERN





- Cardiovascular Exam


Cardiovascular Exam: Irregular Rhythm, +S1, +S2





- GI/Abdominal Exam


GI & Abdominal Exam: Soft, Tenderness (RUQ)





- Neurological Exam


Neurological Exam: Alert, Oriented x3





Assessment and Plan





- Assessment and Plan (Free Text)


Assessment: 





A/P 


52 yr old female admitted for abdominal pain , transminitis 


UTI WITH MDR 


completed 5 days of amph. irrigation via bladder 


d/W Dr. Tsang cleared for discharge home with 3 more doses of zyvox 


D/W Dr. Dunbar, stable for discharge home otday and fu with Dr. Dunbar office 

in 1 week 


RX sent to the pharmacy 


Discharge plan discussed with patient , who understand saand agrees with plan 


Patient instructed to f//u with [facility] for sarcodisis of liver


 Patient instructed to returns to ED if symptoms returns or any other 

concerning symptoms

## 2017-10-17 NOTE — PN
DATE:  10/16/2017



LOCATION:  [address], bed A.



SUBJECTIVE:  This is a 52-year-old female seen and examined in rounds

without significant clinical changes of reported active bleeding with

intermittent periods of right-sided abdominal pain and distention.



The entire chart is reviewed including, but not limited to most recent labs

and the radiology study results, current and the previous medication list,

current and the previous medical events.  Case discussed at length with the

staff.



LABORATORY DATA:  Today's lab showed white count of 14.2 with normal

hemoglobin and hematocrit, with normal platelet count, but low sodium 131

and low CO2 content of 15 indicative of metabolic acidosis.  Latest blood

glucose level 273.  The patient still has abnormal liver function tests

with total bilirubin 3.3 with increased alkaline phosphatase 422, ,

and AST 94.



PHYSICAL EXAMINATION:

GENERAL:  A 52-year-old female, awake, alert and oriented.

VITAL SIGNS:  Afebrile with pulse of 80, respiratory rate 18-20, and blood

pressure of _____.

HEENT:  Showed pale, dry oral mucous membranes, bilateral icteric sclerae.

LUNGS:  Few scattered crepitation.  Decreased air entry at bases.

HEART:  Positive S1 and S2.

ABDOMEN:  Soft.  Bowel sounds are present with mild-to-moderate distention

and with midepigastric as well as right upper quadrant tenderness.  No mass

or organomegaly.  No rebound tenderness or guarding.

EXTREMITIES:  There is evidence of bilateral above-knee amputation.

NEUROLOGIC:  No new significant neurological deficit.



It has to be mentioned that the patient still has intermittent periods of

diarrhea of unclear etiology.



IMPRESSION:

1.  Sarcoid liver.

2.  Diarrhea, most likely osmotic.  The patient may benefit from

cholestyramine powder 1 pack twice to 3 times a day.

3.  Jaundice with abnormal liver function tests secondary to above.

4.  Known history of dilated cardiomyopathy.

5.  Known history, but not limited to peripheral vascular disease, status

post bilateral above-knee amputation.

6.  History of _____ status post biliary tree stent, may need to be

changed.

7.  Recurrent urinary tract infection with urosepsis.

8.  Known history of hypertension, congestive heart failure as well as

cardiac arrhythmia.

9.  Poorly controlled diabetes mellitus.

10.  Known history of partial colon resection.



PLAN:

1.  Continue current management.

2.  Add cholestyramine powder.



If the patient's diarrhea continue, then increase _____ and the colonoscopy

is to be kept in mind, otherwise close observation to follow.  _____.







__________________________________________

Jeffrey Albert MD







DD:  10/16/2017 18:41:15

DT:  10/16/2017 20:20:12

Job # [id number]

## 2017-10-17 NOTE — PN
DATE:



LOCATION:  [facility], bed A.



SUBJECTIVE:  This 52-year-old female seen and examined in rounds with

poorly controlled hypertension, recurrent severe abdominal pain, and

diarrhea again this morning.  The entire chart is reviewed including, but

not limited to the most recent labs and Radiology study results, current

and previous medication list, current and previous medical events.  C. diff

in the stool was reported before to be negative, none recently.  Case

discussed with the staff in the floor.



PHYSICAL EXAMINATION:

GENERAL:  A 52-year-old female.

VITAL SIGNS:  Afebrile with heart rate of 80, blood pressure 150/99 with

respiratory rate 20 to 22.

HEENT:  Showed pale dry mucoid membrane, nonicteric sclerae.

HEART:  Positive S1 and S2.

LUNGS:  Few scattered crepitations with decreased air entry at bases.

ABDOMEN:  Soft.  Bowel sounds are present.  No mass or organomegaly.  No

rebound tenderness or guarding.  The patient still has abdominal

distention.

EXTREMITIES:  There was evidence of bilateral above-knee amputation.

NEUROLOGIC:  No neurological deficit, new reported.



IMPRESSION:

1.  Diarrhea, rule out diabetic diarrhea versus recurrent pseudomembranous

colitis.

2.  Hepatic sarcoidosis.

3.  Jaundice secondary to above with abnormal liver function tests.

4.  Known history of hypertension, poorly controlled.

5.  Poorly controlled diabetes mellitus.

6.  Partial colon resection by history.

7.  Known history of neurogenic bladder prior to urinary tract infection

with urosepsis by recent history.

8.  Congestive heart failure by history.

9.  Anemia secondary to above.



SUGGESTIONS:

1.  Continue current management.

2.  Repeat stool for C. diff.  The patient may need vancomycin p.o. and

cholestyramine powder 1 pack twice a day p.o.



__________________________________________

Jeffrey Albert MD



cc:  Jeffrey Albert MD



DD:  10/17/2017 10:53:01

DT:  10/17/2017 16:08:45

Job # [id number]

## 2017-10-17 NOTE — CARD
--------------- APPROVED REPORT --------------





EKG Measurement

Heart [id number]

[id number]

[id number]

[id number]



<Conclusion>

Sinus tachycardia with 2nd degree AV block (Mobitz I)

Left axis deviation

Left ventricular hypertrophy with repolarization abnormality

Abnormal ECG

## 2018-02-05 ENCOUNTER — HOSPITAL ENCOUNTER (INPATIENT)
Dept: HOSPITAL 31 - C.ER | Age: 53
LOS: 31 days | Discharge: HOME HEALTH | DRG: 698 | End: 2018-03-08
Attending: INTERNAL MEDICINE | Admitting: INTERNAL MEDICINE
Payer: MEDICARE

## 2018-02-05 VITALS — BODY MASS INDEX: 18.7 KG/M2

## 2018-02-05 DIAGNOSIS — D86.89: ICD-10-CM

## 2018-02-05 DIAGNOSIS — Z89.612: ICD-10-CM

## 2018-02-05 DIAGNOSIS — E87.2: ICD-10-CM

## 2018-02-05 DIAGNOSIS — Z87.440: ICD-10-CM

## 2018-02-05 DIAGNOSIS — E66.9: ICD-10-CM

## 2018-02-05 DIAGNOSIS — Z89.611: ICD-10-CM

## 2018-02-05 DIAGNOSIS — Z89.519: ICD-10-CM

## 2018-02-05 DIAGNOSIS — E10.65: ICD-10-CM

## 2018-02-05 DIAGNOSIS — Z79.4: ICD-10-CM

## 2018-02-05 DIAGNOSIS — I49.3: ICD-10-CM

## 2018-02-05 DIAGNOSIS — K52.831: ICD-10-CM

## 2018-02-05 DIAGNOSIS — K31.84: ICD-10-CM

## 2018-02-05 DIAGNOSIS — B96.89: ICD-10-CM

## 2018-02-05 DIAGNOSIS — E46: ICD-10-CM

## 2018-02-05 DIAGNOSIS — I25.10: ICD-10-CM

## 2018-02-05 DIAGNOSIS — Z95.5: ICD-10-CM

## 2018-02-05 DIAGNOSIS — E10.43: ICD-10-CM

## 2018-02-05 DIAGNOSIS — T83.511A: Primary | ICD-10-CM

## 2018-02-05 DIAGNOSIS — A41.9: ICD-10-CM

## 2018-02-05 DIAGNOSIS — I42.0: ICD-10-CM

## 2018-02-05 DIAGNOSIS — E78.5: ICD-10-CM

## 2018-02-05 DIAGNOSIS — D63.8: ICD-10-CM

## 2018-02-05 DIAGNOSIS — K90.9: ICD-10-CM

## 2018-02-05 DIAGNOSIS — Z95.810: ICD-10-CM

## 2018-02-05 DIAGNOSIS — N31.9: ICD-10-CM

## 2018-02-05 DIAGNOSIS — E87.5: ICD-10-CM

## 2018-02-05 DIAGNOSIS — E78.00: ICD-10-CM

## 2018-02-05 DIAGNOSIS — I11.0: ICD-10-CM

## 2018-02-05 DIAGNOSIS — K64.4: ICD-10-CM

## 2018-02-05 DIAGNOSIS — J44.9: ICD-10-CM

## 2018-02-05 DIAGNOSIS — E86.0: ICD-10-CM

## 2018-02-05 DIAGNOSIS — K64.8: ICD-10-CM

## 2018-02-05 DIAGNOSIS — Z87.11: ICD-10-CM

## 2018-02-05 DIAGNOSIS — E10.51: ICD-10-CM

## 2018-02-05 DIAGNOSIS — N39.0: ICD-10-CM

## 2018-02-05 DIAGNOSIS — K56.41: ICD-10-CM

## 2018-02-05 DIAGNOSIS — I50.9: ICD-10-CM

## 2018-02-05 DIAGNOSIS — K27.9: ICD-10-CM

## 2018-02-05 DIAGNOSIS — Z90.49: ICD-10-CM

## 2018-02-05 DIAGNOSIS — I48.0: ICD-10-CM

## 2018-02-05 LAB
ALBUMIN SERPL-MCNC: 3.5 G/DL (ref 3.5–5)
ALBUMIN/GLOB SERPL: 0.7 {RATIO} (ref 1–2.1)
ALT SERPL-CCNC: 33 U/L (ref 9–52)
APTT BLD: 54 SECONDS (ref 21–34)
AST SERPL-CCNC: 72 U/L (ref 14–36)
BACTERIA #/AREA URNS HPF: (no result) /[HPF]
BILIRUB DIRECT SERPL-MCNC: 7.9 MG/DL (ref 0–0.4)
BILIRUB UR-MCNC: (no result) MG/DL
BUN SERPL-MCNC: 8 MG/DL (ref 7–17)
CALCIUM SERPL-MCNC: 8.9 MG/DL (ref 8.6–10.4)
EOSINOPHIL NFR BLD: 11 % (ref 0–4)
ERYTHROCYTE [DISTWIDTH] IN BLOOD BY AUTOMATED COUNT: 17.5 % (ref 11.5–14.5)
GAMMA GLUTAMYL TRANSPEPTIDASE: 488 U/L (ref 8–78)
GFR NON-AFRICAN AMERICAN: > 60
GLUCOSE UR STRIP-MCNC: NORMAL MG/DL
HGB BLD-MCNC: 11.9 G/DL (ref 11–16)
HYPOCHROMIC: SLIGHT
INR PPP: 1.2
LEUKOCYTE ESTERASE UR-ACNC: (no result) LEU/UL
LIPASE: 65 U/L (ref 23–300)
LYMPHOCYTE: 26 % (ref 20–40)
MCH RBC QN AUTO: 31.7 PG (ref 27–31)
MCHC RBC AUTO-ENTMCNC: 33.8 G/DL (ref 33–37)
MCV RBC AUTO: 93.6 FL (ref 81–99)
MICROCYTES BLD QL SMEAR: SLIGHT
MONOCYTE: 16 % (ref 0–10)
NEUTROPHILS NFR BLD AUTO: 40 % (ref 50–75)
NEUTS BAND NFR BLD: 7 % (ref 0–2)
OVALOCYTES BLD QL SMEAR: SLIGHT
PH UR STRIP: 6 [PH] (ref 5–8)
PLATELET # BLD EST: NORMAL 10*3/UL
PLATELET # BLD: 187 K/UL (ref 130–400)
PMV BLD AUTO: 8.7 FL (ref 7.2–11.7)
PROT UR STRIP-MCNC: (no result) MG/DL
PROTHROMBIN TIME: 13.6 SECONDS (ref 9.7–12.2)
RBC # BLD AUTO: 3.76 MIL/UL (ref 3.8–5.2)
RBC # UR STRIP: (no result) /UL
SCHISTOCYTES BLD QL SMEAR: SLIGHT
SP GR UR STRIP: 1.01 (ref 1–1.03)
SQUAMOUS EPITHIAL: < 1 /HPF (ref 0–5)
TARGETS BLD QL SMEAR: SLIGHT
TOTAL CELLS COUNTED BLD: 100
UROBILINOGEN UR-MCNC: NORMAL MG/DL (ref 0.2–1)
WBC # BLD AUTO: 6.4 K/UL (ref 4.8–10.8)

## 2018-02-05 NOTE — CT
EXAM:

  CT Abdomen and Pelvis With Intravenous Contrast



EXAM DATE/TIME:

  2/5/2018 7:28 PM



CLINICAL HISTORY:

  52 years old, female; Pain; Abdominal pain; Flank; Lower; Additional info: 

H/o sarcoid. Rt abd pain, vomit, uti, lt back pain



TECHNIQUE:

  Axial computed tomography images of the abdomen and pelvis with intravenous 

contrast.  All CT scans at this facility use one or more dose reduction 

techniques, viz.: automated exposure control; ma/kV adjustment per patient size 

(including targeted exams where dose is matched to indication; i.e. head); or 

iterative reconstruction technique.

  Coronal and sagittal reformatted images were created and reviewed.



CONTRAST:

  100 mL of VISIPAQUE 320 administered intravenously.



COMPARISON:

  Prior images are not available for review.



FINDINGS:

  Lower thorax:  There is streak artifact from a pacemaker in the left chest 

wall. There is streak artifact from  heart size is normal. There is no 

pericardial effusion. There is atelectasis and scarring at the lung bases.



 ABDOMEN:

  Liver:  There is fatty infiltration of the liver.

  Gallbladder and bile ducts:  Gallbladder is absent. Common duct is dilated. 

There is a biliary stent

  Pancreas:  Pancreas is mildly atrophic. There is prominence of the pancreatic 

duct in the body and tail of the pancreas. There is mild dilatation of the 

pancreatic duct in the pancreatic head, 4.4 mm..

  Spleen:  Spleen is unremarkable.  There is an accessory spleen in the left 

upper quadrant.

  Adrenals:  unremarkable

  Kidneys and ureters:  There is extensive renovascular calcification 

bilaterally. There is a renal vascular calcifications and/or nonobstructing 

stones. There is a low attenuation right renal lesion not a simple cyst by CT 

criteria.There is no pelvocaliectasis or ureterectasis.

  Stomach and bowel:  Stomach is partially distended. Rotation is normal. Small 

bowel is mildly distended with air and fluid. There is an antral anastomosis in 

the midline of the pelvis. There has been partial right hemicolectomy. There is 

an ileocolic anastomosis in the epigastric region.

  Appendix: Surgically absent



 PELVIS:

  Bladder:  Bladder is almost empty. There is a Charles catheter. There is air in 

the bladder. There is a radiopaque structure in the right side of the bladder. 

There is mild bladder wall thickening.

  Reproductive:  Uterus and adnexal structures are unremarkable.



 ABDOMEN and PELVIS:

  Intraperitoneal space:  There is no free air or free fluid.

  Bones/joints:  There is calcification of the femoral vessels.  Bony 

structures are osteopenic.There are degenerative changes in the osseus 

structures. There are bilateral AKA amputations. There compression deformities 

L3 and L5

  Soft tissues:  unremarkable

  Vasculature:  There are atherosclerotic calcifications in the aorta and 

iliacs. There are calcifications in small vessels in the abdomen.

  Lymph nodes: There is shotty adenopathy.

  



IMPRESSION:  Dilated biliary ducts status post cholecystectomy with biliary 

stent; dilated distal pancreatic duct; extensive atherosclerotic disease; 

pacemaker; nonobstructing enteral anastomosis; right hemicolectomy with 

nonobstructing ileocolic anastomosis; bladder wall thickening stone versus 

foreign body in the bladder; no obstructing renal or ureteral stones or 

hydronephrosis



Additional nonemergent findings as described above.

## 2018-02-05 NOTE — C.PDOC
Time Seen by Provider: 02/05/18 17:43


Chief Complaint (Nursing): Abdominal Pain


History Per: Patient


Onset/Duration Of Symptoms: Days (2)


Current Symptoms Are (Timing): Still Present


Severity: Moderate


Location Of Pain/Discomfort: RLQ, Suprapubic


Quality Of Discomfort: "Pain"


Associated Symptoms: Nausea, Vomiting, Urinary Symptoms


Alleviating Factors: None


Additional History Per: Prior Records





Past Medical History


Reviewed: Historical Data, Nursing Documentation, Vital Signs


Vital Signs: 


 Last Vital Signs











Temp  98.8 F   02/05/18 20:37


 


Pulse  114 H  02/05/18 20:37


 


Resp  20   02/05/18 20:37


 


BP  99/64 L  02/05/18 20:37


 


Pulse Ox  98   02/05/18 23:11














- Medical History


PMH: Anemia, Asthma (NO INHALER-ON PREDNISONE DAILY PO.), CAD (stent x 1), 

Cardia Arrhythmia, CHF, Diabetes, Deep Vein Thrombosis, Gall Bladder Disease, 

HTN, Hypercholesterolemia


Other PMH: Sarcoidosis


Surgical History: Appendectomy, Cholecystectomy, Coronary Stent, Endoscopy, C-

Section





- [facility] Procedures








CENTRAL VENOUS CATHETER PLACEMENT WITH GUIDANCE (05/29/14)


CLOSED ENDOSCOPIC BIOPSY OF LARGE INTESTINE (11/19/14)


ENDOSC RETROGRADE CHOLANGIOPANCREATOGRAPHY [ERCP] (10/30/13)


ENDOSCOP INSERTION OF STENT (TUBE) INTO BILE DUCT (05/29/14)


ESOPHAGOGASTRODUODENOSCOPY [EGD] W/CLOSED BIOPSY (05/29/14)


OTHER LOCAL DESTRUC SKIN (10/30/13)


OTHER SKIN & SUBQ I   D (10/30/13)








Family History: States: Unknown Family Hx





- Social History


Hx Tobacco Use: No


Hx Alcohol Use: No


Hx Substance Use: No





- Immunization History


Hx Tetanus Toxoid Vaccination: No


Hx Influenza Vaccination: No


Hx Pneumococcal Vaccination: Yes (2015)





Review Of Systems


Except As Marked, All Systems Reviewed And Found Negative.


Constitutional: Positive for: Fever


Cardiovascular: Negative for: Chest Pain


Respiratory: Negative for: Shortness of Breath


Gastrointestinal: Positive for: Nausea, Vomiting, Abdominal Pain


Genitourinary: Positive for: Dysuria


Musculoskeletal: Positive for: Back Pain.  Negative for: Neck Pain


Skin: Positive for: Jaundice


Neurological: Negative for: Weakness, Numbness





Physical Exam





- Physical Exam


Appears: No Acute Distress, Chronically Ill


Skin: Warm, Dry, Jaundice


Head: Atraumatic, Normacephalic


Eye(s): bilateral: PERRL, EOMI


Neck: Normal ROM, Supple


Cardiovascular: Rhythm Regular


Respiratory: Normal Breath Sounds, No Accessory Muscle Use


Gastrointestinal/Abdominal: Soft, Tenderness (RUQ and suprapubic)


Back: CVA Tenderness (left)


Pelvic: Other (indwelling john catheter)


Extremity: Normal ROM


Neurological/Psych: Oriented x3, Normal Motor, Normal Sensation





ED Course And Treatment





- Laboratory Results


Result Diagrams: 


 02/05/18 18:27





 02/05/18 18:27


Lab Interpretation: Abnormal


Interpretation Of Abnormal: UTI. Elevated Bilirubin.


O2 Sat by Pulse Oximetry: 98


Pulse Ox Interpretation: Normal





- Radiology


CXR: Viewed By Me, Read By Radiologist


CXR Interpretation: Yes: No Acute Disease, Other (Mediport and AICD in place)





- CT Scan/US


  ** CT abd/pelv.


Other Rad Studies (CT/US): Read By Radiologist, Radiology Report Reviewed


CT/US Interpretation: IMPRESSION:  Dilated biliary ducts status post 

cholecystectomy with biliary.  stent; dilated distal pancreatic duct; extensive 

atherosclerotic disease;.  pacemaker; nonobstructing enteral anastomosis; right 

hemicolectomy with.  nonobstructing ileocolic anastomosis; bladder wall 

thickening stone versus.  foreign body in the bladder; no obstructing renal or 

ureteral stones or.  hydronephrosis.  .  Additional nonemergent findings as 

described above.





Progress





- Interventions


Interventions:: Observation, Intravenous fluid





- Medications Administered


Intravenous: Antiemetic, Opiate, Other (Abx)





- Data Reviewed


Data Reviewed: Lab, Diagnostic imaging, Old records





- Patient Status


Patient status: Partially improved





- Continuity of Care


Discussed patient case with:: Patient, ED Nurse, PMD





- Patient Plan


Patient Plan: Admission





Disposition


Discussed With : Nasir Dunbar (PMD)


Comment: He accepted pt on his service.


Doctor Will See Patient In The: Hospital


Counseled Patient/Family Regarding: Studies Performed, Diagnosis





- Disposition


Disposition: HOSPITALIZED


Disposition Time: 23:12


Condition: GUARDED





- POA


Present On Arrival: Cath Associated UTI





- Clinical Impression


Clinical Impression: 


 Complicated urinary tract infection, Sarcoidosis, Jaundice, Abdominal pain

## 2018-02-05 NOTE — RAD
HISTORY:

Right side port. RUQ pain.  



COMPARISON:

Chest x-ray performed 10/7/17 



TECHNIQUE:

Chest, one view.



FINDINGS:

Right-sided MediPort extends the expected location of the SVC. 



Examination limited by habitus. 



LUNGS:

No focal consolidation.



Please note that chest x-ray has limited sensitivity for the 

detection of pulmonary masses.



PLEURA:

No significant pleural effusion identified. No definite pneumothorax .



CARDIOVASCULAR:

Single lead left-sided AICD.  Heart size appears within normal 

limits.  Atherosclerotic calcifications of the aortic knob. 



OSSEOUS STRUCTURES:

 No acute osseous abnormality identified.



VISUALIZED UPPER ABDOMEN:

Unremarkable.



OTHER FINDINGS:

None.



IMPRESSION:

Right-sided MediPort.  Single lead left-sided AICD.

## 2018-02-06 RX ADMIN — HUMAN INSULIN SCH: 100 INJECTION, SOLUTION SUBCUTANEOUS at 21:35

## 2018-02-06 RX ADMIN — DIPHENHYDRAMINE HYDROCHLORIDE PRN MG: 50 INJECTION INTRAMUSCULAR; INTRAVENOUS at 21:32

## 2018-02-06 RX ADMIN — CEFEPIME SCH MLS/HR: 1 INJECTION, SOLUTION INTRAVENOUS at 09:00

## 2018-02-06 RX ADMIN — DIPHENHYDRAMINE HYDROCHLORIDE PRN MG: 50 INJECTION INTRAMUSCULAR; INTRAVENOUS at 17:01

## 2018-02-06 RX ADMIN — CEFEPIME SCH MLS/HR: 1 INJECTION, SOLUTION INTRAVENOUS at 23:59

## 2018-02-06 RX ADMIN — PANTOPRAZOLE SODIUM SCH MG: 40 TABLET, DELAYED RELEASE ORAL at 10:20

## 2018-02-06 RX ADMIN — HUMAN INSULIN SCH UNIT: 100 INJECTION, SOLUTION SUBCUTANEOUS at 09:20

## 2018-02-06 RX ADMIN — OXYBUTYNIN CHLORIDE SCH MG: 10 TABLET, EXTENDED RELEASE ORAL at 10:20

## 2018-02-06 RX ADMIN — DIPHENHYDRAMINE HYDROCHLORIDE PRN MG: 50 INJECTION INTRAMUSCULAR; INTRAVENOUS at 06:10

## 2018-02-06 RX ADMIN — DIGOXIN SCH MG: 0.25 TABLET ORAL at 10:20

## 2018-02-06 RX ADMIN — DIPHENHYDRAMINE HYDROCHLORIDE PRN MG: 50 INJECTION INTRAMUSCULAR; INTRAVENOUS at 00:40

## 2018-02-06 RX ADMIN — METOPROLOL SUCCINATE SCH MG: 50 TABLET, EXTENDED RELEASE ORAL at 10:20

## 2018-02-06 RX ADMIN — HUMAN INSULIN SCH UNIT: 100 INJECTION, SOLUTION SUBCUTANEOUS at 18:09

## 2018-02-06 RX ADMIN — INSULIN DETEMIR SCH UNIT: 100 INJECTION, SOLUTION SUBCUTANEOUS at 10:29

## 2018-02-06 RX ADMIN — DIPHENHYDRAMINE HYDROCHLORIDE PRN MG: 50 INJECTION INTRAMUSCULAR; INTRAVENOUS at 11:45

## 2018-02-06 RX ADMIN — HUMAN INSULIN SCH UNIT: 100 INJECTION, SOLUTION SUBCUTANEOUS at 13:13

## 2018-02-06 RX ADMIN — CEFEPIME SCH MLS/HR: 1 INJECTION, SOLUTION INTRAVENOUS at 16:59

## 2018-02-06 NOTE — CP.PCM.HP
History of Present Illness





- History of Present Illness


History of Present Illness: 





CC: abdominal pain


 HPI: 52 years old female well known to me with PMH of Anemia, Asthma (NO 

INHALER-ON PREDNISONE DAILY PO.), CAD (stent x 1), Cardia Arrhythmia, CHF, 

Diabetes, Deep Vein Thrombosis, Gall Bladder Disease, HTN, Hypercholesterolemia





Present on Admission





- Present on Admission


Any Indicators Present on Admission: Yes





Past Patient History





- Infectious Disease


Hx of Infectious Diseases: None





- Tetanus Immunizations


Tetanus Immunization: Unknown





- Past Medical History & Family History


Past Medical History?: Yes





- Past Social History


Smoking Status: Never Smoked





- CARDIAC


Hx Cardia Arrhythmia: Yes


Hx Congestive Heart Failure: Yes


Hx Hypercholesterolemia: Yes


Hx Hypertension: Yes





- PULMONARY


Hx Asthma: Yes (NO INHALER-ON PREDNISONE DAILY PO.)





- NEUROLOGICAL


Hx Neurological Disorder: No





- HEENT


Hx HEENT Problems: Yes


Hx Cataracts: Yes (BILAT.)





- RENAL


Hx Chronic Kidney Disease: No





- ENDOCRINE/METABOLIC


Hx Endocrine Disorders: Yes


Hx Diabetes Mellitus Type 1: Yes





- HEMATOLOGICAL/ONCOLOGICAL


Hx Anemia: Yes





- INTEGUMENTARY


Hx Dermatological Problems: Yes





- MUSCULOSKELETAL/RHEUMATOLOGICAL


Hx Musculoskeletal Disorders: Yes


Hx Falls: Yes


Hx Unsteady Gait: Yes (PT. W/C BOUND BILAT AKA)


Other/Comment: ANABELLA ABOVE KNEE AMPUTATIONS.. Right knee 2015, left knee 2016.  

Right chest julianna cath





- GASTROINTESTINAL


Hx Gall Bladder Disease: Yes





- GENITOURINARY/GYNECOLOGICAL


Hx Genitourinary Disorders: Yes


Hx Urinary Tract Infection: Yes


Other/Comment: PT HAS LONG TERM GUTIERREZ





- PSYCHIATRIC


Hx Substance Use: No





- SURGICAL HISTORY


Hx Appendectomy: Yes


Hx Cholecystectomy: Yes


Hx Coronary Stent: Yes





- ANESTHESIA


Hx Anesthesia: Yes


Hx Anesthesia Reactions: No


Hx Malignant Hyperthermia: No





Meds


Allergies/Adverse Reactions: 


 Allergies











Allergy/AdvReac Type Severity Reaction Status Date / Time


 


avocado Allergy Severe ANAPHYLAXIS Verified 10/04/17 17:28


 


banana Allergy Severe ANAPHYLAXIS Verified 10/04/17 17:28


 


cherry Allergy Severe ANAPHYLAXIS Verified 10/04/17 17:28


 


ketorolac [From Toradol] Allergy   Verified 10/04/17 17:28














Physical Exam





- Constitutional


Appears: No Acute Distress





- Head Exam


Head Exam: ATRAUMATIC, NORMAL INSPECTION, NORMOCEPHALIC





- Eye Exam


Eye Exam: EOMI, Normal appearance, PERRL


Pupil Exam: NORMAL ACCOMODATION, PERRL





- Cardiovascular Exam


Cardiovascular Exam: REGULAR RHYTHM





- GI/Abdominal Exam


GI & Abdominal Exam: Normal Bowel Sounds, Soft.  absent: Tenderness





- Rectal Exam


Rectal Exam: Deferred





Results





- Vital Signs


Recent Vital Signs: 





 Last Vital Signs











Temp  97.8 F   02/06/18 16:31


 


Pulse  76   02/06/18 16:31


 


Resp  20   02/06/18 16:31


 


BP  154/72 H  02/06/18 16:31


 


Pulse Ox  96   02/06/18 16:31














- Labs


Result Diagrams: 


 02/17/18 08:13





 02/17/18 08:13


Labs: 





 Laboratory Results - last 24 hr











  02/06/18 02/06/18 02/06/18





  07:31 11:40 16:52


 


POC Glucose (mg/dL)  189 H  175 H  211 H














  02/06/18





  21:18


 


POC Glucose (mg/dL)  224 H














Assessment & Plan


(1) Abdominal pain


Status: Acute   





(2) Complicated urinary tract infection


Status: Acute   





(3) Diarrhea


Status: Acute   





(4) Sarcoidosis


Status: Acute   





(5) Congestive heart failure


Status: Acute   





(6) Diabetes


Status: Acute

## 2018-02-07 LAB
ALBUMIN SERPL-MCNC: 3.4 G/DL (ref 3.5–5)
ALBUMIN/GLOB SERPL: 0.8 {RATIO} (ref 1–2.1)
ALT SERPL-CCNC: 37 U/L (ref 9–52)
AST SERPL-CCNC: 89 U/L (ref 14–36)
BASOPHILS # BLD AUTO: 0 K/UL (ref 0–0.2)
BASOPHILS NFR BLD: 0 % (ref 0–2)
BUN SERPL-MCNC: 15 MG/DL (ref 7–17)
CALCIUM SERPL-MCNC: 9 MG/DL (ref 8.6–10.4)
EOSINOPHIL # BLD AUTO: 0 K/UL (ref 0–0.7)
EOSINOPHIL NFR BLD: 0 % (ref 0–4)
ERYTHROCYTE [DISTWIDTH] IN BLOOD BY AUTOMATED COUNT: 16.8 % (ref 11.5–14.5)
GFR NON-AFRICAN AMERICAN: > 60
HGB BLD-MCNC: 10.2 G/DL (ref 11–16)
LYMPHOCYTES # BLD AUTO: 0.8 K/UL (ref 1–4.3)
LYMPHOCYTES NFR BLD AUTO: 13 % (ref 20–40)
MCH RBC QN AUTO: 31.5 PG (ref 27–31)
MCHC RBC AUTO-ENTMCNC: 33.2 G/DL (ref 33–37)
MCV RBC AUTO: 94.8 FL (ref 81–99)
MONOCYTES # BLD: 0.4 K/UL (ref 0–0.8)
MONOCYTES NFR BLD: 6 % (ref 0–10)
NEUTROPHILS # BLD: 4.8 K/UL (ref 1.8–7)
NEUTROPHILS NFR BLD AUTO: 81 % (ref 50–75)
NRBC BLD AUTO-RTO: 0 % (ref 0–2)
PLATELET # BLD: 212 K/UL (ref 130–400)
PMV BLD AUTO: 10.2 FL (ref 7.2–11.7)
RBC # BLD AUTO: 3.25 MIL/UL (ref 3.8–5.2)
WBC # BLD AUTO: 5.9 K/UL (ref 4.8–10.8)

## 2018-02-07 RX ADMIN — PANTOPRAZOLE SODIUM SCH MG: 40 TABLET, DELAYED RELEASE ORAL at 10:06

## 2018-02-07 RX ADMIN — DIPHENHYDRAMINE HYDROCHLORIDE PRN MG: 50 INJECTION INTRAMUSCULAR; INTRAVENOUS at 05:35

## 2018-02-07 RX ADMIN — HUMAN INSULIN SCH UNIT: 100 INJECTION, SOLUTION SUBCUTANEOUS at 12:23

## 2018-02-07 RX ADMIN — HUMAN INSULIN SCH UNIT: 100 INJECTION, SOLUTION SUBCUTANEOUS at 18:21

## 2018-02-07 RX ADMIN — METOPROLOL SUCCINATE SCH MG: 50 TABLET, EXTENDED RELEASE ORAL at 10:07

## 2018-02-07 RX ADMIN — HUMAN INSULIN SCH: 100 INJECTION, SOLUTION SUBCUTANEOUS at 22:08

## 2018-02-07 RX ADMIN — DIPHENHYDRAMINE HYDROCHLORIDE PRN MG: 50 INJECTION INTRAMUSCULAR; INTRAVENOUS at 14:25

## 2018-02-07 RX ADMIN — CEFEPIME SCH MLS/HR: 1 INJECTION, SOLUTION INTRAVENOUS at 08:42

## 2018-02-07 RX ADMIN — DIPHENHYDRAMINE HYDROCHLORIDE PRN MG: 50 INJECTION INTRAMUSCULAR; INTRAVENOUS at 10:09

## 2018-02-07 RX ADMIN — INSULIN DETEMIR SCH UNIT: 100 INJECTION, SOLUTION SUBCUTANEOUS at 10:07

## 2018-02-07 RX ADMIN — DIGOXIN SCH: 0.25 TABLET ORAL at 10:06

## 2018-02-07 RX ADMIN — DIPHENHYDRAMINE HYDROCHLORIDE PRN MG: 50 INJECTION INTRAMUSCULAR; INTRAVENOUS at 18:21

## 2018-02-07 RX ADMIN — DIGOXIN SCH MG: 0.25 TABLET ORAL at 18:21

## 2018-02-07 RX ADMIN — DIPHENHYDRAMINE HYDROCHLORIDE PRN MG: 50 INJECTION INTRAMUSCULAR; INTRAVENOUS at 01:08

## 2018-02-07 RX ADMIN — OXYBUTYNIN CHLORIDE SCH MG: 10 TABLET, EXTENDED RELEASE ORAL at 10:06

## 2018-02-07 RX ADMIN — CEFEPIME SCH MLS/HR: 1 INJECTION, SOLUTION INTRAVENOUS at 16:13

## 2018-02-07 RX ADMIN — HUMAN INSULIN SCH UNIT: 100 INJECTION, SOLUTION SUBCUTANEOUS at 08:25

## 2018-02-07 RX ADMIN — DIPHENHYDRAMINE HYDROCHLORIDE PRN MG: 50 INJECTION INTRAMUSCULAR; INTRAVENOUS at 22:07

## 2018-02-07 NOTE — PN
DATE:



LOCATION:  [facility], bed A.



SUBJECTIVE:  This is a 52-year-old female, seen and examined initially for

GI consultation in the emergency room on 02/06/2018, reexamined again _____

today with persistent complaint of abdominal pain with reported active

bleeding.



The entire chart is reviewed including, but not limited to the most recent

lab and radiology study results, current and the previous medication list,

current and the previous medical events.  CAT scan report done at the time

of admission, official report is seen and the case discussed at length with

staff on the floor as well as ER staff in the ER.



Today's lab showed hemoglobin of 10.2, hematocrit 30.8 with normal platelet

count, with low CO2 content of 20 indicative of metabolic acidosis , blood

glucose level of 157 with increased total bilirubin of 7.8, AST 89 but

normal ALT with increased alkaline phosphatase of 441, with low albumin of

3.4.



Initial PT was elevated to 17.6 with PTT 54.



PHYSICAL EXAMINATION:

GENERAL:  A 52-year-old female denying any significant shortness of breath,

chest pain, palpitation, chills or fever, but abdominal pain with abdominal

distention, with loss of appetite.

VITAL SIGNS:  Afebrile with heart rate of 90, respiratory rate of 20 to 22,

blood pressure of 140/84.

HEENT:   Shows pale, dry oral mucous membrane, bilateral icteric sclerae.

LUNGS:   Few scattered crepitation with decreased air entry at the bases.

HEART:  Positive S1 and S2.

ABDOMEN:  Soft with generalized tenderness.  Mildly distended.   No mass or

organomegaly.  No rebound tenderness or guarding.

EXTREMITIES:  Bilateral above-knee amputation.

NEUROLOGIC:  No reported new neurological deficits, sensory or motor.



IMPRESSION:

1.  Hepatic sarcoidosis.

2.  Hepatobiliary spasm with status post biliary stent insertion, done by

myself by ERCP.

3.  Jaundice secondary to above.

4.  Re-exacerbation of peptic ulcer disease.

5.  Known history of but not limited to diabetes mellitus, cardiac

arrhythmias, hypertension, hyperlipidemia, status post cholecystectomy,

status post partial colon resection, and status post cholecystectomy and

appendectomy.



SUGGESTIONS:

1.  Continue current management.

2.  IV steroids in the meantime due to the patient's sarcoidosis.

3.  Cancer markers including alpha fetoprotein.

4.  Further recommendations to follow.





__________________________________________

Jeffrey Albert MD



DD:  02/07/2018 17:23:09

DT:  02/07/2018 20:31:54

[facility] # [id number]

## 2018-02-07 NOTE — CP.PCM.PN
Subjective





- Date & Time of Evaluation


Date of Evaluation: 02/07/18


Time of Evaluation: 17:45





- Subjective


Subjective: 





Pt urine culture posirtove for gram positive rods, pt c/o diarrhea, john sin 

place with straw color urine





Objective





- Vital Signs/Intake and Output


Vital Signs (last 24 hours): 


 











Temp Pulse Resp BP Pulse Ox


 


 97.5 F L  96 H  20   136/87   96 


 


 02/07/18 16:58  02/07/18 16:58  02/07/18 16:58  02/07/18 16:58  02/07/18 16:58








Intake and Output: 


 











 02/07/18 02/08/18





 18:59 06:59


 


Intake Total 450 100


 


Output Total 500 1200


 


Balance -50 -1100














- Medications


Medications: 


 Current Medications





Apixaban (Eliquis)  5 mg PO BID [facility]


   Last Admin: 02/07/18 18:20 Dose:  5 mg


Bismuth Subsalicylate (Pepto-Bismol)  262 mg PO Q8 PRN


   PRN Reason: Diarrhea


Digoxin (Lanoxin)  0.25 mg PO DAILY@1800 [facility]


   Last Admin: 02/07/18 18:21 Dose:  0.25 mg


Diphenhydramine HCl (Benadryl)  12.5 mg IVP Q4 PRN


   PRN Reason: Itching / Pruritus


   Last Admin: 02/07/18 22:07 Dose:  12.5 mg


Cefepime HCl (Maxipime Iv 1 Gm Premix)  1 gm in 50 mls @ 100 mls/hr IVPB Q8H [facility]


   Last Admin: 02/07/18 16:13 Dose:  100 mls/hr


Insulin Detemir (Levemir)  10 unit SC QAM [facility]


   Last Admin: 02/07/18 10:07 Dose:  10 unit


Insulin Human Regular (Novolin R)  0 unit SC ACHS [facility]


   PRN Reason: Protocol


   Last Admin: 02/07/18 22:08 Dose:  Not Given


Losartan Potassium (Cozaar)  50 mg PO DAILY [facility]


   Last Admin: 02/07/18 10:07 Dose:  50 mg


Metoprolol Succinate (Toprol Xl)  50 mg PO DAILY [facility]


   Last Admin: 02/07/18 10:07 Dose:  50 mg


Morphine Sulfate (Morphine)  2 mg IVP Q4 PRN


   PRN Reason: Pain, moderate (4-7)


   Last Admin: 02/07/18 22:07 Dose:  2 mg


Oxybutynin Chloride (Ditropan Xl)  10 mg PO DAILY [facility]


   Last Admin: 02/07/18 10:06 Dose:  10 mg


Oxycodone HCl (Oxycodone Immediate Release Tab)  5 mg PO Q6 PRN


   PRN Reason: Pain, severe (8-10)


Pantoprazole Sodium (Protonix Ec Tab)  40 mg PO DAILY [facility]


   Last Admin: 02/07/18 10:06 Dose:  40 mg


Prednisone (Prednisone Tab)  20 mg PO BID [facility]


   Last Admin: 02/07/18 18:20 Dose:  20 mg











- Labs


Labs: 


 





 02/07/18 07:20 





 02/07/18 07:20 





 











PT  13.6 SECONDS (9.7-12.2)  H  02/05/18  18:27    


 


INR  1.2   02/05/18  18:27    


 


APTT  54 SECONDS (21-34)  H  02/05/18  18:27    














- Constitutional


Appears: No Acute Distress, Chronically Ill





- Head Exam


Head Exam: ATRAUMATIC, NORMAL INSPECTION, NORMOCEPHALIC





- Eye Exam


Eye Exam: EOMI, Normal appearance, PERRL


Pupil Exam: NORMAL ACCOMODATION, PERRL





- Respiratory Exam


Respiratory Exam: Clear to Ausculation Bilateral, NORMAL BREATHING PATTERN





- Cardiovascular Exam


Cardiovascular Exam: REGULAR RHYTHM, +S1, +S2.  absent: Murmur





- GI/Abdominal Exam


GI & Abdominal Exam: Soft, Normal Bowel Sounds.  absent: Tenderness





Assessment and Plan


(1) Diarrhea


Assessment & Plan: 


rule out C.diff colitis


stool culture


Status: Acute   





(2) Complicated urinary tract infection


Assessment & Plan: 


on cefepime pending identity sensitivity


Status: Acute   





(3) Diabetes


Status: Acute   





(4) Hypertension


Status: Acute   





(5) Neurogenic bladder


Status: Acute

## 2018-02-08 LAB
ALBUMIN SERPL-MCNC: 3.4 G/DL (ref 3.5–5)
ALBUMIN/GLOB SERPL: 0.8 {RATIO} (ref 1–2.1)
ALT SERPL-CCNC: 103 U/L (ref 9–52)
AST SERPL-CCNC: 200 U/L (ref 14–36)
BASOPHILS # BLD AUTO: 0 K/UL (ref 0–0.2)
BASOPHILS NFR BLD: 0 % (ref 0–2)
BUN SERPL-MCNC: 21 MG/DL (ref 7–17)
CALCIUM SERPL-MCNC: 8.8 MG/DL (ref 8.6–10.4)
EOSINOPHIL # BLD AUTO: 0.1 K/UL (ref 0–0.7)
EOSINOPHIL NFR BLD: 1 % (ref 0–4)
ERYTHROCYTE [DISTWIDTH] IN BLOOD BY AUTOMATED COUNT: 17 % (ref 11.5–14.5)
GFR NON-AFRICAN AMERICAN: > 60
HGB BLD-MCNC: 11 G/DL (ref 11–16)
LYMPHOCYTES # BLD AUTO: 2 K/UL (ref 1–4.3)
LYMPHOCYTES NFR BLD AUTO: 20 % (ref 20–40)
MCH RBC QN AUTO: 31.5 PG (ref 27–31)
MCHC RBC AUTO-ENTMCNC: 33.7 G/DL (ref 33–37)
MCV RBC AUTO: 93.7 FL (ref 81–99)
MONOCYTES # BLD: 0.6 K/UL (ref 0–0.8)
MONOCYTES NFR BLD: 6 % (ref 0–10)
NEUTROPHILS # BLD: 7.2 K/UL (ref 1.8–7)
NEUTROPHILS NFR BLD AUTO: 73 % (ref 50–75)
NRBC BLD AUTO-RTO: 0 % (ref 0–2)
PLATELET # BLD: 258 K/UL (ref 130–400)
PMV BLD AUTO: 10.4 FL (ref 7.2–11.7)
RBC # BLD AUTO: 3.48 MIL/UL (ref 3.8–5.2)
WBC # BLD AUTO: 9.9 K/UL (ref 4.8–10.8)

## 2018-02-08 RX ADMIN — DIGOXIN SCH MG: 0.25 TABLET ORAL at 18:17

## 2018-02-08 RX ADMIN — PANTOPRAZOLE SODIUM SCH MG: 40 TABLET, DELAYED RELEASE ORAL at 09:17

## 2018-02-08 RX ADMIN — INSULIN DETEMIR SCH UNIT: 100 INJECTION, SOLUTION SUBCUTANEOUS at 09:16

## 2018-02-08 RX ADMIN — Medication SCH CAP: at 19:23

## 2018-02-08 RX ADMIN — DIPHENHYDRAMINE HYDROCHLORIDE PRN MG: 50 INJECTION INTRAMUSCULAR; INTRAVENOUS at 10:47

## 2018-02-08 RX ADMIN — HUMAN INSULIN SCH: 100 INJECTION, SOLUTION SUBCUTANEOUS at 21:43

## 2018-02-08 RX ADMIN — DIPHENHYDRAMINE HYDROCHLORIDE PRN MG: 50 INJECTION INTRAMUSCULAR; INTRAVENOUS at 02:15

## 2018-02-08 RX ADMIN — DIPHENHYDRAMINE HYDROCHLORIDE PRN MG: 50 INJECTION INTRAMUSCULAR; INTRAVENOUS at 06:36

## 2018-02-08 RX ADMIN — CEFEPIME SCH MLS/HR: 1 INJECTION, SOLUTION INTRAVENOUS at 00:03

## 2018-02-08 RX ADMIN — CIPROFLOXACIN SCH MLS/HR: 2 INJECTION, SOLUTION INTRAVENOUS at 13:00

## 2018-02-08 RX ADMIN — HUMAN INSULIN SCH: 100 INJECTION, SOLUTION SUBCUTANEOUS at 17:05

## 2018-02-08 RX ADMIN — DIPHENHYDRAMINE HYDROCHLORIDE PRN MG: 50 INJECTION INTRAMUSCULAR; INTRAVENOUS at 23:53

## 2018-02-08 RX ADMIN — CEFEPIME SCH MLS/HR: 1 INJECTION, SOLUTION INTRAVENOUS at 08:00

## 2018-02-08 RX ADMIN — METOPROLOL SUCCINATE SCH MG: 50 TABLET, EXTENDED RELEASE ORAL at 09:17

## 2018-02-08 RX ADMIN — DIPHENHYDRAMINE HYDROCHLORIDE PRN MG: 50 INJECTION INTRAMUSCULAR; INTRAVENOUS at 15:52

## 2018-02-08 RX ADMIN — DIPHENHYDRAMINE HYDROCHLORIDE PRN MG: 50 INJECTION INTRAMUSCULAR; INTRAVENOUS at 19:23

## 2018-02-08 RX ADMIN — OXYBUTYNIN CHLORIDE SCH MG: 10 TABLET, EXTENDED RELEASE ORAL at 09:17

## 2018-02-08 RX ADMIN — Medication SCH CAP: at 12:25

## 2018-02-08 RX ADMIN — HUMAN INSULIN SCH: 100 INJECTION, SOLUTION SUBCUTANEOUS at 12:25

## 2018-02-08 RX ADMIN — HUMAN INSULIN SCH: 100 INJECTION, SOLUTION SUBCUTANEOUS at 07:18

## 2018-02-08 NOTE — CON
DATE:  2018



From Dr. Albert to Dr. Nasir Dunbar.



I was called for a GI consultation by the admitting MD as well as ER staff.

The patient is seen and fully examined in the ER in the presence of the ER

medical staff.  The entire chart is reviewed including, but not limited to,

the most recent lab and radiology study results, current and previous

medication list, current and the previous medical events, allergy to

medication as all the available current and previous medical records.



Case discussed at length with the admitting medical staff.



This is a 52-year-old female very well known case for me from previous

hospital admissions as well as office visits who was admitted to the

hospital through the emergency room with a main complaint of severe crampy

abdominal pain, recurrent  nausea and vomiting, polyuria and dysuria,

postprandial abdominal distention with loss of appetite recently but no

reported recent history of chest pain, palpitation, significant shortness

of breath at the initial time of her admission.



It has to be mentioned that the patient complained of more jaundice, more

right upper abdominal distention and pain before.



No reported active bleeding, no significant complaint of diarrhea.



PAST MEDICAL HISTORY: Including, but not limited to,

1.  COPD.

2.  Hypertension.

3.  Coronary artery disease with cardiac arrhythmias and congestive heart

failure.

4.  Hepatic sarcoidosis.

5.  Biliary spasm status post biliary stent insertion as well as status

post cholecystectomy.

6.  Partial colon resection before due to intraabdominal and intrapelvic

abscess formation.

7.  Status post appendectomy with  as well as status post coronary

stent insertion.



ALLERGIES TO MEDICATION:  UNCLEAR.



CURRENT MEDICATIONS:  Medication lists were reviewed including

post-admission medication.



FAMILY HISTORY:  Unknown.



SOCIAL HISTORY:  Denied any recent history of cigarette smoking or alcohol

intake.



Initial blood workup that was done at admission showed a  normal CBC with

low creatinine of 0.4, low CO2 content  of 18, and negative of metabolic

acidosis.



Chest x-ray showed no evidence of acute disease.



CAT scan of the abdomen and pelvis done, report is seen.



PHYSICAL EXAMINATION:

GENERAL:  A 52-year-old female, awake, alert, oriented, complaining of

severe abdominal pain mainly in the right upper quadrant and mid epigastric

area.  No chills or fever.

VITAL SIGNS:  Patient is afebrile with pulse of 108 with respiratory rate

of 20-22, and blood pressure of 104/66.

HEENT:  Showed mildly pale, dry oral mucous membrane, anicteric sclera.

LUNGS:  Few scattered crepitation, decreased air entry at the bases.

HEART:  Positive S1 and S2.

ABDOMEN:  With generalized tenderness and distention especially with severe

tenderness at the right upper quadrant area and mid epigastric area.

EXTREMITIES:  With evidence of status post bilateral above-knee amputation

secondary to nonocclusive peripheral vascular disease.

RECTAL:  The patient refused.

NEUROLOGIC:  No reported new neurological deficits, sensory or motor.



IMPRESSION:

1.  Re-exacerbation of peptic ulcer disease.

2.  Liver sarcoidosis.

3.  Abnormal liver function test with jaundice secondary to above.

4.  Known history of biliary spasm with status post biliary stent

insertion.



SUGGESTION:

1.  Agree with your plan.

2.  Steroids IV due to the patient's sarcoidosis.

3.  _____ twice a day.

4.  Cancer markers including _____.

5.  Further recommendation to follow.



Thank you for letting me to participate in your patient's case management.





__________________________________________

Jeffrey Albert MD





DD:  2018 22:09:24

DT:  2018 3:15:47

[facility] # [id number]

## 2018-02-08 NOTE — CP.PCM.PN
Subjective





- Date & Time of Evaluation


Date of Evaluation: 02/08/18


Time of Evaluation: 18:00





- Subjective


Subjective: 





Pt seen & examined at bedside, hes less diarrhea, urine culture positive, pt is 

on antibiotics and medical management





Objective





- Vital Signs/Intake and Output


Vital Signs (last 24 hours): 


 











Temp Pulse Resp BP Pulse Ox


 


 97.8 F   67   20   112/65   97 


 


 02/08/18 16:14  02/08/18 16:14  02/08/18 16:14  02/08/18 16:14  02/08/18 16:14








Intake and Output: 


 











 02/08/18 02/09/18





 18:59 06:59


 


Intake Total 700 


 


Output Total 400 600


 


Balance 300 -600














- Medications


Medications: 


 Current Medications





Ampicillin (Ampicillin)  500 mg PO QID [facility]


   Last Admin: 02/08/18 21:47 Dose:  500 mg


Apixaban (Eliquis)  5 mg PO BID [facility]


   Last Admin: 02/08/18 18:16 Dose:  5 mg


Bismuth Subsalicylate (Pepto-Bismol)  262 mg PO Q8 PRN


   PRN Reason: Diarrhea


Digoxin (Lanoxin)  0.25 mg PO DAILY@1800 [facility]


   Last Admin: 02/08/18 18:17 Dose:  0.25 mg


Diphenhydramine HCl (Benadryl)  12.5 mg IVP Q4 PRN


   PRN Reason: Itching / Pruritus


   Last Admin: 02/08/18 19:23 Dose:  12.5 mg


Ciprofloxacin (Cipro 400mg/200ml Dsw)  400 mg in 200 mls @ 133 mls/hr IVPB Q12H 

[facility]


   Last Admin: 02/08/18 13:00 Dose:  133 mls/hr


Insulin Detemir (Levemir)  10 unit SC QAM [facility]


   Last Admin: 02/08/18 09:16 Dose:  10 unit


Insulin Human Regular (Novolin R)  0 unit SC ACHS [facility]


   PRN Reason: Protocol


   Last Admin: 02/08/18 21:43 Dose:  Not Given


Lactobacillus Acidophilus (Bacid Acidophilus)  1 cap PO BID [facility]


   Last Admin: 02/08/18 19:23 Dose:  1 cap


Losartan Potassium (Cozaar)  50 mg PO DAILY [facility]


   Last Admin: 02/08/18 09:17 Dose:  50 mg


Metoprolol Succinate (Toprol Xl)  50 mg PO DAILY [facility]


   Last Admin: 02/08/18 09:17 Dose:  50 mg


Morphine Sulfate (Morphine)  2 mg IVP Q4 PRN


   PRN Reason: Pain, moderate (4-7)


   Last Admin: 02/08/18 19:24 Dose:  2 mg


Oxybutynin Chloride (Ditropan Xl)  10 mg PO DAILY [facility]


   Last Admin: 02/08/18 09:17 Dose:  10 mg


Oxycodone HCl (Oxycodone Immediate Release Tab)  5 mg PO Q6 PRN


   PRN Reason: Pain, severe (8-10)


Pantoprazole Sodium (Protonix Ec Tab)  40 mg PO DAILY [facility]


   Last Admin: 02/08/18 09:17 Dose:  40 mg


Prednisone (Prednisone Tab)  20 mg PO BID [facility]


   Last Admin: 02/08/18 18:17 Dose:  20 mg











- Labs


Labs: 


 





 02/08/18 14:18 





 02/08/18 14:18 





 











PT  13.6 SECONDS (9.7-12.2)  H  02/05/18  18:27    


 


INR  1.2   02/05/18  18:27    


 


APTT  54 SECONDS (21-34)  H  02/05/18  18:27    














- Constitutional


Appears: No Acute Distress, Chronically Ill





- Head Exam


Head Exam: ATRAUMATIC, NORMAL INSPECTION, NORMOCEPHALIC





- Eye Exam


Eye Exam: EOMI, Normal appearance, PERRL


Pupil Exam: NORMAL ACCOMODATION, PERRL





- Respiratory Exam


Respiratory Exam: Clear to Ausculation Bilateral, NORMAL BREATHING PATTERN





- Cardiovascular Exam


Cardiovascular Exam: REGULAR RHYTHM, +S1, +S2.  absent: Murmur





- GI/Abdominal Exam


GI & Abdominal Exam: Soft, Normal Bowel Sounds.  absent: Tenderness





Assessment and Plan


(1) Diarrhea


Status: Acute   





(2) Complicated urinary tract infection


Status: Acute   





(3) Diabetes


Status: Acute   





(4) Hypertension


Status: Acute   





(5) Neurogenic bladder


Status: Acute

## 2018-02-09 LAB
ALBUMIN SERPL-MCNC: 3 G/DL (ref 3.5–5)
ALBUMIN/GLOB SERPL: 0.8 {RATIO} (ref 1–2.1)
ALT SERPL-CCNC: 132 U/L (ref 9–52)
AST SERPL-CCNC: 166 U/L (ref 14–36)
BASOPHILS # BLD AUTO: 0 K/UL (ref 0–0.2)
BASOPHILS NFR BLD: 0 % (ref 0–2)
BUN SERPL-MCNC: 20 MG/DL (ref 7–17)
CALCIUM SERPL-MCNC: 8.2 MG/DL (ref 8.6–10.4)
EOSINOPHIL # BLD AUTO: 0 K/UL (ref 0–0.7)
EOSINOPHIL NFR BLD: 0 % (ref 0–4)
ERYTHROCYTE [DISTWIDTH] IN BLOOD BY AUTOMATED COUNT: 16.9 % (ref 11.5–14.5)
GFR NON-AFRICAN AMERICAN: > 60
HGB BLD-MCNC: 10.2 G/DL (ref 11–16)
LYMPHOCYTES # BLD AUTO: 1 K/UL (ref 1–4.3)
LYMPHOCYTES NFR BLD AUTO: 15 % (ref 20–40)
MCH RBC QN AUTO: 31.2 PG (ref 27–31)
MCHC RBC AUTO-ENTMCNC: 33.2 G/DL (ref 33–37)
MCV RBC AUTO: 94.1 FL (ref 81–99)
MONOCYTES # BLD: 0.3 K/UL (ref 0–0.8)
MONOCYTES NFR BLD: 5 % (ref 0–10)
NEUTROPHILS # BLD: 5.1 K/UL (ref 1.8–7)
NEUTROPHILS NFR BLD AUTO: 80 % (ref 50–75)
NRBC BLD AUTO-RTO: 0 % (ref 0–2)
PLATELET # BLD: 220 K/UL (ref 130–400)
PMV BLD AUTO: 10 FL (ref 7.2–11.7)
RBC # BLD AUTO: 3.25 MIL/UL (ref 3.8–5.2)
WBC # BLD AUTO: 6.3 K/UL (ref 4.8–10.8)

## 2018-02-09 RX ADMIN — DIPHENHYDRAMINE HYDROCHLORIDE PRN MG: 50 INJECTION INTRAMUSCULAR; INTRAVENOUS at 21:07

## 2018-02-09 RX ADMIN — DIPHENHYDRAMINE HYDROCHLORIDE PRN MG: 50 INJECTION INTRAMUSCULAR; INTRAVENOUS at 16:52

## 2018-02-09 RX ADMIN — OXYBUTYNIN CHLORIDE SCH MG: 10 TABLET, EXTENDED RELEASE ORAL at 12:34

## 2018-02-09 RX ADMIN — PANTOPRAZOLE SODIUM SCH MG: 40 TABLET, DELAYED RELEASE ORAL at 09:29

## 2018-02-09 RX ADMIN — DIPHENHYDRAMINE HYDROCHLORIDE PRN MG: 50 INJECTION INTRAMUSCULAR; INTRAVENOUS at 03:56

## 2018-02-09 RX ADMIN — METOPROLOL SUCCINATE SCH MG: 50 TABLET, EXTENDED RELEASE ORAL at 09:29

## 2018-02-09 RX ADMIN — DIGOXIN SCH MG: 0.25 TABLET ORAL at 17:46

## 2018-02-09 RX ADMIN — HUMAN INSULIN SCH UNIT: 100 INJECTION, SOLUTION SUBCUTANEOUS at 16:50

## 2018-02-09 RX ADMIN — HUMAN INSULIN SCH: 100 INJECTION, SOLUTION SUBCUTANEOUS at 07:16

## 2018-02-09 RX ADMIN — DIPHENHYDRAMINE HYDROCHLORIDE PRN MG: 50 INJECTION INTRAMUSCULAR; INTRAVENOUS at 12:34

## 2018-02-09 RX ADMIN — HUMAN INSULIN SCH: 100 INJECTION, SOLUTION SUBCUTANEOUS at 12:03

## 2018-02-09 RX ADMIN — HUMAN INSULIN SCH UNIT: 100 INJECTION, SOLUTION SUBCUTANEOUS at 22:01

## 2018-02-09 RX ADMIN — CIPROFLOXACIN SCH MLS/HR: 2 INJECTION, SOLUTION INTRAVENOUS at 00:05

## 2018-02-09 RX ADMIN — Medication SCH CAP: at 09:29

## 2018-02-09 RX ADMIN — CIPROFLOXACIN SCH MLS/HR: 2 INJECTION, SOLUTION INTRAVENOUS at 12:23

## 2018-02-09 RX ADMIN — Medication SCH CAP: at 17:47

## 2018-02-09 RX ADMIN — INSULIN DETEMIR SCH UNIT: 100 INJECTION, SOLUTION SUBCUTANEOUS at 09:29

## 2018-02-09 RX ADMIN — DIPHENHYDRAMINE HYDROCHLORIDE PRN MG: 50 INJECTION INTRAMUSCULAR; INTRAVENOUS at 08:26

## 2018-02-09 NOTE — CP.PCM.PN
Subjective





- Date & Time of Evaluation


Date of Evaluation: 02/09/18


Time of Evaluation: 20:10





- Subjective


Subjective: 





pt seen and examined at bedside, c/o loose wattery stools





Objective





- Vital Signs/Intake and Output


Vital Signs (last 24 hours): 


 











Temp Pulse Resp BP Pulse Ox


 


 98.3 F   99 H  20   125/85   99 


 


 02/09/18 17:22  02/09/18 17:22  02/09/18 17:22  02/09/18 17:22  02/09/18 17:22








Intake and Output: 


 











 02/09/18 02/10/18





 18:59 06:59


 


Intake Total 600 


 


Output Total 800 350


 


Balance -200 -350














- Medications


Medications: 


 Current Medications





Ampicillin (Ampicillin)  500 mg PO QID [facility]


   Last Admin: 02/09/18 21:06 Dose:  500 mg


Apixaban (Eliquis)  5 mg PO BID [facility]


   Last Admin: 02/09/18 17:46 Dose:  5 mg


Bismuth Subsalicylate (Pepto-Bismol)  262 mg PO Q8 PRN


   PRN Reason: Diarrhea


Digoxin (Lanoxin)  0.25 mg PO DAILY@1800 [facility]


   Last Admin: 02/09/18 17:46 Dose:  0.25 mg


Diphenhydramine HCl (Benadryl)  12.5 mg IVP Q4 PRN


   PRN Reason: Itching / Pruritus


   Last Admin: 02/09/18 21:07 Dose:  12.5 mg


Ciprofloxacin (Cipro 400mg/200ml Dsw)  400 mg in 200 mls @ 133 mls/hr IVPB Q12H 

[facility]


   Last Admin: 02/09/18 12:23 Dose:  133 mls/hr


Insulin Detemir (Levemir)  10 unit SC QAM [facility]


   Last Admin: 02/09/18 09:29 Dose:  10 unit


Insulin Human Regular (Novolin R)  0 unit SC ACHS [facility]


   PRN Reason: Protocol


   Last Admin: 02/09/18 22:01 Dose:  4 unit


Lactobacillus Acidophilus (Bacid Acidophilus)  1 cap PO BID [facility]


   Last Admin: 02/09/18 17:47 Dose:  1 cap


Losartan Potassium (Cozaar)  50 mg PO DAILY [facility]


   Last Admin: 02/09/18 09:29 Dose:  50 mg


Metoprolol Succinate (Toprol Xl)  50 mg PO DAILY [facility]


   Last Admin: 02/09/18 09:29 Dose:  50 mg


Morphine Sulfate (Morphine)  2 mg IVP Q4 PRN


   PRN Reason: Pain, moderate (4-7)


   Last Admin: 02/09/18 21:06 Dose:  2 mg


Oxybutynin Chloride (Ditropan Xl)  10 mg PO DAILY [facility]


   Last Admin: 02/09/18 12:34 Dose:  10 mg


Pantoprazole Sodium (Protonix Ec Tab)  40 mg PO DAILY [facility]


   Last Admin: 02/09/18 09:29 Dose:  40 mg


Prednisone (Prednisone Tab)  20 mg PO BID [facility]


   Last Admin: 02/09/18 17:49 Dose:  20 mg











- Labs


Labs: 


 





 02/09/18 07:14 





 02/09/18 07:14 





 











PT  13.6 SECONDS (9.7-12.2)  H  02/05/18  18:27    


 


INR  1.2   02/05/18  18:27    


 


APTT  54 SECONDS (21-34)  H  02/05/18  18:27    














- Constitutional


Appears: No Acute Distress





- Head Exam


Head Exam: ATRAUMATIC, NORMAL INSPECTION, NORMOCEPHALIC





- Eye Exam


Eye Exam: EOMI, Normal appearance, PERRL


Pupil Exam: NORMAL ACCOMODATION, PERRL





- Respiratory Exam


Respiratory Exam: Clear to Ausculation Bilateral





- Cardiovascular Exam


Cardiovascular Exam: REGULAR RHYTHM, +S1, +S2.  absent: Murmur





- GI/Abdominal Exam


GI & Abdominal Exam: Soft, Normal Bowel Sounds.  absent: Tenderness





- Rectal Exam


Rectal Exam: Deferred





Assessment and Plan


(1) Diarrhea


Status: Acute   





(2) Complicated urinary tract infection


Status: Acute   





(3) Diabetes


Status: Acute   





(4) Hypertension


Status: Acute   





(5) Neurogenic bladder


Status: Acute

## 2018-02-09 NOTE — PN
DATE:  02/08/2018



LOCATION:  569, bed A.



SUBJECTIVE:  This is a 62 years old female, seen and examined in rounds was

intermittent.  Has severe crampy abdominal pain, but less than before with

nausea and less oral intake, but no reported active bleeding, reported to

have positive urine culture for gram positive rods, with episode of

diarrhea recently.  Charles catheter still in place with dark colored urine.



No reported chest pain, palpitations, significant shortness of breath, but

malaise with generalized weakness.  The patient also complaining of

abdominal distention more than usual.



 The entire chart is reviewed including, but not limited to, the most

recent lab and radiology study results, current and the previous medication

list, current and previous medical events discussed with the staff at

length.



LABORATORY DATA:  Today's lab showed normal white blood cells but low

hematocrit at 32.6 with normal platelet count with low CO2 content of 17

indicative of metabolic acidosis.  BUN elevated to 21 with blood glucose

level 118 with total bilirubin went down to 6.24,  and , were

persistently elevated.  Alkaline phosphatase 511 with low albumin of 3.24.



PHYSICAL EXAMINATION

GENERAL:  A 52 years old female appears to be awake, alert, and oriented.

VITAL SIGNS:  Afebrile with pulse of 64, respiratory rate 20-22, blood

pressure 124/68.

HEENT:  Showed mildly pale and dry oral mucous membrane.  Nonicteric

sclerae.

LUNGS:  Shows scattered crepitation.  Decreased air entry at the bases.

HEART:  Positive S1 and S2.

ABDOMEN:  Soft with some mild generalized tenderness.  No masses or

organomegaly.  No rebound, tenderness, or guarding.  Abdominal distention

noticed with significant tenderness in the mid epigastric and right upper

quadrant area.

EXTREMITIES:  With evidence of bilateral above-knee amputation.  No

clubbing or cyanosis.

NEUROLOGIC:  No reported new neurological deficits, sensory or motor.  No

reported new focal deficits.



IMPRESSION:

1.  Known history of hepatic sarcoidosis.

2.  Abnormal liver function test with jaundice that could be secondary to

her hepatic sarcoidosis and/or infectious process including urinary tract

infection with possible early stage of urine sepsis and/or medication

induced.

3.  Known history of hepatobiliary spasm with status post biliary stent

insertion, done by endoscopic retrograde cholangiopancreatography by

myself.

4.  The patient with peptic ulcer disease, peritoneal and known history of

cardiac arrhythmia, diabetes mellitus, hypertension, hyperlipidemia with

status post cholecystectomy.

5.  Known history of abdominal and pelvic abscess formation with status

post partial right-sided colon resection.

6.  Status post appendectomy.

7.  Malnutrition with hypoalbuminemia.



SUGGESTION:

1.  Continue current management.

2.  Reglan IV.

3.  Low dose of steroid.

4.  Solu-Cortef  20 mg IV piggyback q.8 hours for two days and 20 mg IV

piggyback q.12 hours for two days and 20 mg IV piggyback daily until

discharged home.

5.  Blood culture x2.

6.  Further recommendations to follow.

7.  Due to the patient's diarrhea, a stool for C. difficile to be ordered

and cholestyramine powder one bag twice a day p.o. to be added as well as

Creon 92987 units p.o. three times a day with meals.  Further

recommendation to follow and the patient may benefit from vancomycin 250 mg

one tablet p.o. q.6 hours in the meantime.





__________________________________________

Jeffrey Albert MD





DD:  02/08/2018 17:12:05

DT:  02/08/2018 20:58:30

Job # [id number]

## 2018-02-09 NOTE — PN
LOCATION:   9, bed A.



SUBJECTIVE:  This is a 52-year-old female, seen and examined in rounds with

again recurrent episode of complaint of abdominal pain with loss of

appetite and some loose bowel movement.  No reported actual chest pain,

palpitations, or significant complaint of shortness of breath.  No chills

or fever.



The entire chart is reviewed including, but not limited to, the most recent

lab and radiology study results, current and the previous medication list,

current and previous medical events, and today's lab showed normal white

blood cells of 6.3, but low hemoglobin of 10.2, low hematocrit 30.6, with

normal platelet count and low CO2 content of 15 indicative of metabolic

acidosis, with normal creatinine but mildly elevated BUN to 20, with blood

glucose level 164, low calcium 5.8 with gradually corrected total bilirubin

to 5.7, AST down to 166 and  with alkaline phosphatase 452 with low

albumin 3.0.



PHYSICAL EXAMINATION:

GENERAL:  A 52-year-old female, awake, alert, and oriented, complaining

again of abdominal pain with postprandial abdominal distention.

VITAL SIGNS:  Afebrile with pulse of 66, respiratory rate 20 to 22, blood

pressure 124/72.

HEENT:  Showed pale dry mucous membrane with bilateral icteric sclerae.

LUNGS:  Scattered mild crepitation with decreased air entry at bases.

HEART:  Positive S1 and S2.

ABDOMEN:  Soft with mild-to-moderate distention and mild generalized

tenderness.  No mass or organomegaly.  No rebound tenderness or guarding.

EXTREMITIES:  With evidence of bilateral above-knee amputation.

NEUROLOGIC:  No reported new neurological deficits, sensory or motor.



IMPRESSION:

1.  Hepatic sarcoidosis with abnormal liver function tests and jaundice

most likely secondary to hepatocellular injury.

2.  Diarrhea, ____ versus infectious, rule out pseudomembranous colitis.

3.  Urinary tract infection.

4.  Anemia most likely secondary to above.

5.  Multiple past medical history including, but not limited to, cardiac

arrhythmias, status post partial colon resection, status post

cholecystectomy.

6.  Status post ERCP with biliary stent insertion due to severe biliary

spasm.

7.  Known history of diabetes mellitus, diabetic gastroparesis,

hypertension.

8.  Malnutrition with hypoalbuminemia.

9.  Urinary tract infection with possible urosepsis.



SUGGESTIONS:

1.  Continue current management.

2.  Repeat stool for C. diff.

3.  Vancomycin p.o.

4.  Further recommendation to follow.







__________________________________________

Jeffrey Albert MD



DD:  02/09/2018 11:19:20

DT:  02/09/2018 12:53:04

Job # [id number]

## 2018-02-10 LAB
ALBUMIN SERPL-MCNC: 3 G/DL (ref 3.5–5)
ALBUMIN/GLOB SERPL: 0.9 {RATIO} (ref 1–2.1)
ALT SERPL-CCNC: 164 U/L (ref 9–52)
AST SERPL-CCNC: 188 U/L (ref 14–36)
BASOPHILS # BLD AUTO: 0 K/UL (ref 0–0.2)
BASOPHILS NFR BLD: 0 % (ref 0–2)
BUN SERPL-MCNC: 19 MG/DL (ref 7–17)
CALCIUM SERPL-MCNC: 8.3 MG/DL (ref 8.6–10.4)
EOSINOPHIL # BLD AUTO: 0 K/UL (ref 0–0.7)
EOSINOPHIL NFR BLD: 0 % (ref 0–4)
ERYTHROCYTE [DISTWIDTH] IN BLOOD BY AUTOMATED COUNT: 17.6 % (ref 11.5–14.5)
GFR NON-AFRICAN AMERICAN: > 60
HGB BLD-MCNC: 9.9 G/DL (ref 11–16)
LYMPHOCYTES # BLD AUTO: 0.8 K/UL (ref 1–4.3)
LYMPHOCYTES NFR BLD AUTO: 12 % (ref 20–40)
MCH RBC QN AUTO: 31.3 PG (ref 27–31)
MCHC RBC AUTO-ENTMCNC: 32.8 G/DL (ref 33–37)
MCV RBC AUTO: 95.5 FL (ref 81–99)
MONOCYTES # BLD: 0.2 K/UL (ref 0–0.8)
MONOCYTES NFR BLD: 3 % (ref 0–10)
NEUTROPHILS # BLD: 5.8 K/UL (ref 1.8–7)
NEUTROPHILS NFR BLD AUTO: 85 % (ref 50–75)
NRBC BLD AUTO-RTO: 0 % (ref 0–2)
PLATELET # BLD: 232 K/UL (ref 130–400)
PMV BLD AUTO: 10 FL (ref 7.2–11.7)
RBC # BLD AUTO: 3.16 MIL/UL (ref 3.8–5.2)
WBC # BLD AUTO: 6.8 K/UL (ref 4.8–10.8)

## 2018-02-10 RX ADMIN — INSULIN DETEMIR SCH UNIT: 100 INJECTION, SOLUTION SUBCUTANEOUS at 09:41

## 2018-02-10 RX ADMIN — METOPROLOL SUCCINATE SCH MG: 50 TABLET, EXTENDED RELEASE ORAL at 09:25

## 2018-02-10 RX ADMIN — DIPHENHYDRAMINE HYDROCHLORIDE PRN MG: 50 INJECTION INTRAMUSCULAR; INTRAVENOUS at 04:57

## 2018-02-10 RX ADMIN — Medication SCH CAP: at 09:25

## 2018-02-10 RX ADMIN — DIPHENHYDRAMINE HYDROCHLORIDE PRN MG: 50 INJECTION INTRAMUSCULAR; INTRAVENOUS at 09:40

## 2018-02-10 RX ADMIN — HUMAN INSULIN SCH: 100 INJECTION, SOLUTION SUBCUTANEOUS at 21:44

## 2018-02-10 RX ADMIN — PANTOPRAZOLE SODIUM SCH MG: 40 TABLET, DELAYED RELEASE ORAL at 09:25

## 2018-02-10 RX ADMIN — CIPROFLOXACIN SCH MLS/HR: 2 INJECTION, SOLUTION INTRAVENOUS at 13:00

## 2018-02-10 RX ADMIN — DIPHENHYDRAMINE HYDROCHLORIDE PRN MG: 50 INJECTION INTRAMUSCULAR; INTRAVENOUS at 18:01

## 2018-02-10 RX ADMIN — DIPHENHYDRAMINE HYDROCHLORIDE PRN MG: 50 INJECTION INTRAMUSCULAR; INTRAVENOUS at 00:59

## 2018-02-10 RX ADMIN — CIPROFLOXACIN SCH MLS/HR: 2 INJECTION, SOLUTION INTRAVENOUS at 00:27

## 2018-02-10 RX ADMIN — DIGOXIN SCH MG: 0.25 TABLET ORAL at 17:56

## 2018-02-10 RX ADMIN — Medication SCH CAP: at 17:56

## 2018-02-10 RX ADMIN — DIPHENHYDRAMINE HYDROCHLORIDE PRN MG: 50 INJECTION INTRAMUSCULAR; INTRAVENOUS at 13:37

## 2018-02-10 RX ADMIN — HUMAN INSULIN SCH UNIT: 100 INJECTION, SOLUTION SUBCUTANEOUS at 17:58

## 2018-02-10 RX ADMIN — DIPHENHYDRAMINE HYDROCHLORIDE PRN MG: 50 INJECTION INTRAMUSCULAR; INTRAVENOUS at 21:48

## 2018-02-10 RX ADMIN — CIPROFLOXACIN SCH MLS/HR: 2 INJECTION, SOLUTION INTRAVENOUS at 23:51

## 2018-02-10 RX ADMIN — HUMAN INSULIN SCH UNIT: 100 INJECTION, SOLUTION SUBCUTANEOUS at 12:37

## 2018-02-10 RX ADMIN — HUMAN INSULIN SCH UNIT: 100 INJECTION, SOLUTION SUBCUTANEOUS at 08:41

## 2018-02-10 RX ADMIN — OXYBUTYNIN CHLORIDE SCH MG: 10 TABLET, EXTENDED RELEASE ORAL at 09:25

## 2018-02-10 NOTE — CP.PCM.PN
Subjective





- Date & Time of Evaluation


Date of Evaluation: 02/10/18


Time of Evaluation: 19:00





- Subjective


Subjective: 





pt developed dehydartion due to persistent watery stools and hypokalemia, 

c.diff is neg, etiology of diarrhea still not clear





Objective





- Vital Signs/Intake and Output


Vital Signs (last 24 hours): 


 











Temp Pulse Resp BP Pulse Ox


 


 98.1 F   58 L  20   118/76   97 


 


 02/10/18 18:16  02/10/18 18:16  02/10/18 18:16  02/10/18 18:16  02/10/18 18:16








Intake and Output: 


 











 02/10/18 02/11/18





 18:59 06:59


 


Intake Total 650 


 


Output Total 700 


 


Balance -50 














- Medications


Medications: 


 Current Medications





Ampicillin (Ampicillin)  500 mg PO QID [facility]


   Last Admin: 02/10/18 17:56 Dose:  500 mg


Apixaban (Eliquis)  5 mg PO BID [facility]


   Last Admin: 02/10/18 17:56 Dose:  5 mg


Bismuth Subsalicylate (Pepto-Bismol)  262 mg PO Q8 PRN


   PRN Reason: Diarrhea


Digoxin (Lanoxin)  0.25 mg PO DAILY@1800 [facility]


   Last Admin: 02/10/18 17:56 Dose:  0.25 mg


Diphenhydramine HCl (Benadryl)  12.5 mg IVP Q4 PRN


   PRN Reason: Itching / Pruritus


   Last Admin: 02/10/18 18:01 Dose:  12.5 mg


Ciprofloxacin (Cipro 400mg/200ml Dsw)  400 mg in 200 mls @ 133 mls/hr IVPB Q12H 

[facility]


   Last Admin: 02/10/18 13:00 Dose:  133 mls/hr


Insulin Detemir (Levemir)  10 unit SC QAM [facility]


   Last Admin: 02/10/18 09:41 Dose:  10 unit


Insulin Human Regular (Novolin R)  0 unit SC ACHS [facility]


   PRN Reason: Protocol


   Last Admin: 02/10/18 17:58 Dose:  3 unit


Lactobacillus Acidophilus (Bacid Acidophilus)  1 cap PO BID [facility]


   Last Admin: 02/10/18 17:56 Dose:  1 cap


Losartan Potassium (Cozaar)  50 mg PO DAILY [facility]


   Last Admin: 02/10/18 09:25 Dose:  50 mg


Metoprolol Succinate (Toprol Xl)  50 mg PO DAILY [facility]


   Last Admin: 02/10/18 09:25 Dose:  50 mg


Morphine Sulfate (Morphine)  2 mg IVP Q4 PRN


   PRN Reason: Pain, moderate (4-7)


   Last Admin: 02/10/18 18:02 Dose:  2 mg


Oxybutynin Chloride (Ditropan Xl)  10 mg PO DAILY [facility]


   Last Admin: 02/10/18 09:25 Dose:  10 mg


Pantoprazole Sodium (Protonix Ec Tab)  40 mg PO DAILY [facility]


   Last Admin: 02/10/18 09:25 Dose:  40 mg


Prednisone (Prednisone Tab)  20 mg PO BID [facility]


   Last Admin: 02/10/18 17:56 Dose:  20 mg











- Labs


Labs: 


 





 02/10/18 07:10 





 02/10/18 07:10 





 











PT  13.6 SECONDS (9.7-12.2)  H  02/05/18  18:27    


 


INR  1.2   02/05/18  18:27    


 


APTT  54 SECONDS (21-34)  H  02/05/18  18:27    














Assessment and Plan


(1) Diarrhea


Status: Acute   





(2) Complicated urinary tract infection


Status: Acute   





(3) Diabetes


Status: Acute   





(4) Hypertension


Status: Acute   





(5) Neurogenic bladder


Status: Acute

## 2018-02-10 NOTE — PN
DATE:



LOCATION:  569 bed A



SUBJECTIVE:  This is a 52-year-old female seen and examined in rounds early

today with a complaint of lower abdominal and pelvic pain as well as

vaginal pain and itching, also she still has mid epigastric and right upper

quadrant pain on and off with less nausea and dyspepsia.  No vomiting.  No

chest pain.  No palpitations or significant shortness of breath.  The

patient denied any chills or fever.  Charles catheter is still in place.



The entire chart is reviewed including, but not limited to the most recent

lab and radiology study results, current and the previous medication list,

current and previous medical events, allergy to medication list as well as

all the available current and previous medical records.  Case discussed

with the staff at length.



LABORATORY DATA:  Today's lab showed blood glucose level of 257.  Rest of

the lab is still pending, but the patient reported to have low hemoglobin

and hematocrit with normal platelet count, and low CO2 content of 15,

indicative of metabolic acidosis with mildly elevated BUN, but normal

creatinine with blood glucose level of 164 before.  She still has elevated

liver function tests, but gradually improved with low albumin of 3.0.



PHYSICAL EXAMINATION:

GENERAL:  A 52-year-old female.

VITAL SIGNS:  Afebrile with pulse of 76, respiratory rate 20 to 22, blood

pressure 130/76.

HEENT:  Showed pale dry oral mucous membrane.  Nonicteric sclerae.

LUNGS:  Shows scattered crepitation.  Decreased air entry at the bases.

HEART:  Positive S1 and S2.

ABDOMEN:  Soft, bowel sound are present.  No mass or organomegaly.  No

rebound tenderness or guarding.

EXTREMITIES:  Shows evidence of bilateral above-knee amputation.

NEUROLOGIC:  No new reported neurological deficits, sensory or motor.



IMPRESSION:

1.  Hepatic sarcoidosis with abnormal liver function test associated with

jaundice secondary to the above.

2.  Urinary tract infection with early stage of septicemia, most likely the

patient still has Charles catheter in place.

3.  Recently reported diarrhea, improving.

4.  Anemia secondary to the above.

5.  Multiple past medical history including peripheral vascular disease,

status post bilateral above-knee amputation, diabetes mellitus,

hypertension, status post cholecystectomy.

6.  Evidence of papillary stenosis with status post biliary stent insertion

done by myself with ERCP.

7.  Status post partial colon resection due to intrapelvic, intraabdominal

abscess formation.

8.  Known history of diabetic gastroparesis.



SUGGESTIONS:

1.  Continue current management.

2.  OB/GYN evaluation.

3.  Pelvic ultrasound, to rule out possible pelvic inflammatory disease.

4.  Further recommendation to follow.





__________________________________________

Jeffrey Albert MD





DD:  02/10/2018 7:55:38

DT:  02/10/2018 9:28:27

Job # [id number]

## 2018-02-11 RX ADMIN — PANTOPRAZOLE SODIUM SCH MG: 40 TABLET, DELAYED RELEASE ORAL at 09:48

## 2018-02-11 RX ADMIN — METOPROLOL SUCCINATE SCH MG: 50 TABLET, EXTENDED RELEASE ORAL at 09:48

## 2018-02-11 RX ADMIN — DIPHENHYDRAMINE HYDROCHLORIDE PRN MG: 50 INJECTION INTRAMUSCULAR; INTRAVENOUS at 22:23

## 2018-02-11 RX ADMIN — Medication SCH CAP: at 18:47

## 2018-02-11 RX ADMIN — HUMAN INSULIN SCH UNIT: 100 INJECTION, SOLUTION SUBCUTANEOUS at 08:30

## 2018-02-11 RX ADMIN — DIPHENHYDRAMINE HYDROCHLORIDE PRN MG: 50 INJECTION INTRAMUSCULAR; INTRAVENOUS at 09:48

## 2018-02-11 RX ADMIN — DIPHENHYDRAMINE HYDROCHLORIDE PRN MG: 50 INJECTION INTRAMUSCULAR; INTRAVENOUS at 01:46

## 2018-02-11 RX ADMIN — HUMAN INSULIN SCH: 100 INJECTION, SOLUTION SUBCUTANEOUS at 19:26

## 2018-02-11 RX ADMIN — HUMAN INSULIN SCH: 100 INJECTION, SOLUTION SUBCUTANEOUS at 22:18

## 2018-02-11 RX ADMIN — DIPHENHYDRAMINE HYDROCHLORIDE PRN MG: 50 INJECTION INTRAMUSCULAR; INTRAVENOUS at 18:45

## 2018-02-11 RX ADMIN — OXYBUTYNIN CHLORIDE SCH MG: 10 TABLET, EXTENDED RELEASE ORAL at 09:48

## 2018-02-11 RX ADMIN — DIGOXIN SCH MG: 0.25 TABLET ORAL at 18:45

## 2018-02-11 RX ADMIN — Medication SCH CAP: at 09:48

## 2018-02-11 RX ADMIN — DIPHENHYDRAMINE HYDROCHLORIDE PRN MG: 50 INJECTION INTRAMUSCULAR; INTRAVENOUS at 05:35

## 2018-02-11 RX ADMIN — CIPROFLOXACIN SCH MLS/HR: 2 INJECTION, SOLUTION INTRAVENOUS at 12:00

## 2018-02-11 RX ADMIN — INSULIN DETEMIR SCH UNIT: 100 INJECTION, SOLUTION SUBCUTANEOUS at 10:19

## 2018-02-11 RX ADMIN — DIPHENHYDRAMINE HYDROCHLORIDE PRN MG: 50 INJECTION INTRAMUSCULAR; INTRAVENOUS at 14:13

## 2018-02-11 NOTE — CP.PCM.PN
Subjective





- Date & Time of Evaluation


Date of Evaluation: 02/11/18


Time of Evaluation: 16:00





- Subjective


Subjective: 





Pt is seen and examined, blood sugars still high, no shortness of breath, on 

antibiotics for MDR UTI





Objective





- Vital Signs/Intake and Output


Vital Signs (last 24 hours): 


 











Temp Pulse Resp BP Pulse Ox


 


 98.1 F   91 H  20   127/74   98 


 


 02/11/18 17:58  02/11/18 17:58  02/11/18 17:58  02/11/18 17:58  02/11/18 17:58








Intake and Output: 


 











 02/11/18 02/12/18





 18:59 06:59


 


Intake Total 700 


 


Output Total 2200 


 


Balance -1500 














- Medications


Medications: 


 Current Medications





Ampicillin (Ampicillin)  500 mg PO QID [facility]


   Last Admin: 02/11/18 18:44 Dose:  500 mg


Apixaban (Eliquis)  5 mg PO BID [facility]


   Last Admin: 02/11/18 18:45 Dose:  5 mg


Bismuth Subsalicylate (Pepto-Bismol)  262 mg PO Q8 PRN


   PRN Reason: Diarrhea


Digoxin (Lanoxin)  0.25 mg PO DAILY@1800 [facility]


   Last Admin: 02/11/18 18:45 Dose:  0.25 mg


Diphenhydramine HCl (Benadryl)  12.5 mg IVP Q4 PRN


   PRN Reason: Itching / Pruritus


   Last Admin: 02/11/18 18:45 Dose:  12.5 mg


Ciprofloxacin (Cipro 400mg/200ml Dsw)  400 mg in 200 mls @ 133 mls/hr IVPB Q12H 

[facility]


   Last Admin: 02/11/18 12:00 Dose:  133 mls/hr


Insulin Detemir (Levemir)  10 unit SC QAM [facility]


   Last Admin: 02/11/18 10:19 Dose:  10 unit


Insulin Human Regular (Novolin R)  0 unit SC ACHS [facility]


   PRN Reason: Protocol


   Last Admin: 02/11/18 19:26 Dose:  Not Given


Lactobacillus Acidophilus (Bacid Acidophilus)  1 cap PO BID [facility]


   Last Admin: 02/11/18 18:47 Dose:  1 cap


Losartan Potassium (Cozaar)  50 mg PO DAILY [facility]


   Last Admin: 02/11/18 09:48 Dose:  50 mg


Metoprolol Succinate (Toprol Xl)  50 mg PO DAILY [facility]


   Last Admin: 02/11/18 09:48 Dose:  50 mg


Morphine Sulfate (Morphine)  2 mg IVP Q4 PRN


   PRN Reason: Pain, moderate (4-7)


   Last Admin: 02/11/18 18:45 Dose:  2 mg


Oxybutynin Chloride (Ditropan Xl)  10 mg PO DAILY [facility]


   Last Admin: 02/11/18 09:48 Dose:  10 mg


Pantoprazole Sodium (Protonix Ec Tab)  40 mg PO DAILY [facility]


   Last Admin: 02/11/18 09:48 Dose:  40 mg


Prednisone (Prednisone Tab)  20 mg PO BID [facility]


   Last Admin: 02/11/18 18:45 Dose:  20 mg











- Labs


Labs: 


 





 02/10/18 07:10 





 02/10/18 07:10 





 











PT  13.6 SECONDS (9.7-12.2)  H  02/05/18  18:27    


 


INR  1.2   02/05/18  18:27    


 


APTT  54 SECONDS (21-34)  H  02/05/18  18:27    














- Constitutional


Appears: No Acute Distress





- Eye Exam


Eye Exam: EOMI, Normal appearance, PERRL


Pupil Exam: NORMAL ACCOMODATION, PERRL





- Respiratory Exam


Respiratory Exam: Decreased Breath Sounds, Rales, Rhonchi





- Cardiovascular Exam


Cardiovascular Exam: REGULAR RHYTHM, +S1, +S2.  absent: Murmur





- GI/Abdominal Exam


GI & Abdominal Exam: Soft, Normal Bowel Sounds.  absent: Tenderness





Assessment and Plan


(1) Diarrhea


Status: Acute   





(2) Complicated urinary tract infection


Status: Acute   





(3) Diabetes


Status: Acute   





(4) Hypertension


Status: Acute   





(5) Neurogenic bladder


Status: Acute

## 2018-02-11 NOTE — PN
DATE:



LOCATION:  [facility], bed 8.



SUBJECTIVE:  This is 52-year-old female, seen and examined in rounds with

intermittent period of left abdominal pain, there is abdominal distention

with suprapubic and vaginal pain as per the patient's statement with mild

generalized tenderness.  No reported chest pain, palpitations, shortness of

breath, chills, or fever.



The entire chart is reviewed including but not limited to the most recent

lab and radiology study results, current and the previous medication list,

current and the previous medical events.  Case was discussed with the staff

at length.



Today's labs show blood glucose level of 165, rest of the labs still

pending.  The patient previously reported to have low hemoglobin and

hematocrit with elevated liver function tests that is secondary to her

hepatic sarcoidosis with low albumin.



PHYSICAL EXAMINATION:

GENERAL:  A 52-year-old female.

VITAL SIGNS:  Afebrile with pulse of 62, respiratory rate of 20 to 22 with

blood pressure of 140/66.

HEENT:  Showed mildly pale, dry oral mucous membranes, bilateral icteric

sclerae.

LUNGS:  A few scattered crepitation with decreased air entry at bases.

HEART:  Positive S1 and S2.

ABDOMEN:  Soft with mild distention and generalized tenderness, mainly in

the right upper quadrant area as well as suprapubic area.  The patient had

changed his Charles catheter recently without any complications.  The patient

also has complained of mild diarrhea but less than before.

EXTREMITIES:  With evidence of bilateral upper knee amputation.

NEUROLOGIC:  No reported new neurological deficits, sensory or motor.



IMPRESSION:

1.  Re-exacerbation of hepatic sarcoidosis with abnormal liver function

tests and jaundice.

2.  Diarrhea, infectious versus mechanical, the possibility of recurrent

pseudomembranous colitis versus diabetic diarrhea was raised.

3.  Known history of hypertension, diabetes mellitus, peripheral vascular

disease with status post bilateral above-knee amputation.

4.  Status post partial colon resection before with possible short bowel

syndrome inducing diarrhea.

5.  Anemia, secondary to above.

6.  Multiple past medical history including above as well as papillary

stenosis with status post biliary stent insertion.

7.  Known history of diabetic gastroparesis.















SUGGESTIONS:

1.  Continue current management.

2.  Urology consultation due to the patient's recurrent urinary tract

infection due to her neurogenic bladder.

3.  Further recommendation to follow.



__________________________________________

Jeffrey Albert MD



DD:  02/11/2018 8:26:31

DT:  02/11/2018 10:39:16

[facility] # [id number]

## 2018-02-12 LAB
BACTERIA #/AREA URNS HPF: (no result) /[HPF]
BASOPHILS # BLD AUTO: 0 K/UL (ref 0–0.2)
BASOPHILS NFR BLD: 0 % (ref 0–2)
BILIRUB UR-MCNC: NEGATIVE MG/DL
BUN SERPL-MCNC: 19 MG/DL (ref 7–17)
CALCIUM SERPL-MCNC: 8 MG/DL (ref 8.6–10.4)
COLOR UR: YELLOW
EOSINOPHIL # BLD AUTO: 0 K/UL (ref 0–0.7)
EOSINOPHIL NFR BLD: 0 % (ref 0–4)
ERYTHROCYTE [DISTWIDTH] IN BLOOD BY AUTOMATED COUNT: 18.2 % (ref 11.5–14.5)
GFR NON-AFRICAN AMERICAN: > 60
GLUCOSE UR STRIP-MCNC: NORMAL MG/DL
HGB BLD-MCNC: 10.5 G/DL (ref 11–16)
LEUKOCYTE ESTERASE UR-ACNC: (no result) LEU/UL
LYMPHOCYTES # BLD AUTO: 1.4 K/UL (ref 1–4.3)
LYMPHOCYTES NFR BLD AUTO: 17 % (ref 20–40)
MCH RBC QN AUTO: 31.7 PG (ref 27–31)
MCHC RBC AUTO-ENTMCNC: 33.3 G/DL (ref 33–37)
MCV RBC AUTO: 95.3 FL (ref 81–99)
MONOCYTES # BLD: 0.1 K/UL (ref 0–0.8)
MONOCYTES NFR BLD: 1 % (ref 0–10)
NEUTROPHILS # BLD: 6.6 K/UL (ref 1.8–7)
NEUTROPHILS NFR BLD AUTO: 82 % (ref 50–75)
NRBC BLD AUTO-RTO: 0 % (ref 0–2)
PH UR STRIP: 6 [PH] (ref 5–8)
PLATELET # BLD: 283 K/UL (ref 130–400)
PMV BLD AUTO: 9.1 FL (ref 7.2–11.7)
PROT UR STRIP-MCNC: (no result) MG/DL
RBC # BLD AUTO: 3.32 MIL/UL (ref 3.8–5.2)
RBC # UR STRIP: NEGATIVE /UL
SP GR UR STRIP: 1.01 (ref 1–1.03)
SQUAMOUS EPITHIAL: < 1 /HPF (ref 0–5)
UROBILINOGEN UR-MCNC: NORMAL MG/DL (ref 0.2–1)
WBC # BLD AUTO: 8 K/UL (ref 4.8–10.8)

## 2018-02-12 RX ADMIN — DIPHENHYDRAMINE HYDROCHLORIDE PRN MG: 50 INJECTION INTRAMUSCULAR; INTRAVENOUS at 22:47

## 2018-02-12 RX ADMIN — HUMAN INSULIN SCH UNIT: 100 INJECTION, SOLUTION SUBCUTANEOUS at 08:18

## 2018-02-12 RX ADMIN — Medication SCH CAP: at 10:27

## 2018-02-12 RX ADMIN — DIPHENHYDRAMINE HYDROCHLORIDE PRN MG: 50 INJECTION INTRAMUSCULAR; INTRAVENOUS at 02:29

## 2018-02-12 RX ADMIN — DIGOXIN SCH MG: 0.25 TABLET ORAL at 18:42

## 2018-02-12 RX ADMIN — OXYBUTYNIN CHLORIDE SCH MG: 10 TABLET, EXTENDED RELEASE ORAL at 10:28

## 2018-02-12 RX ADMIN — DIPHENHYDRAMINE HYDROCHLORIDE PRN MG: 50 INJECTION INTRAMUSCULAR; INTRAVENOUS at 18:40

## 2018-02-12 RX ADMIN — HUMAN INSULIN SCH: 100 INJECTION, SOLUTION SUBCUTANEOUS at 21:33

## 2018-02-12 RX ADMIN — HUMAN INSULIN SCH UNIT: 100 INJECTION, SOLUTION SUBCUTANEOUS at 13:23

## 2018-02-12 RX ADMIN — CIPROFLOXACIN SCH MLS/HR: 2 INJECTION, SOLUTION INTRAVENOUS at 11:27

## 2018-02-12 RX ADMIN — DIPHENHYDRAMINE HYDROCHLORIDE PRN MG: 50 INJECTION INTRAMUSCULAR; INTRAVENOUS at 10:27

## 2018-02-12 RX ADMIN — Medication SCH CAP: at 18:42

## 2018-02-12 RX ADMIN — CIPROFLOXACIN SCH MLS/HR: 2 INJECTION, SOLUTION INTRAVENOUS at 01:19

## 2018-02-12 RX ADMIN — INSULIN DETEMIR SCH UNIT: 100 INJECTION, SOLUTION SUBCUTANEOUS at 10:29

## 2018-02-12 RX ADMIN — DIPHENHYDRAMINE HYDROCHLORIDE PRN MG: 50 INJECTION INTRAMUSCULAR; INTRAVENOUS at 14:38

## 2018-02-12 RX ADMIN — PANTOPRAZOLE SODIUM SCH MG: 40 TABLET, DELAYED RELEASE ORAL at 10:30

## 2018-02-12 RX ADMIN — METOPROLOL SUCCINATE SCH MG: 50 TABLET, EXTENDED RELEASE ORAL at 10:30

## 2018-02-12 RX ADMIN — HUMAN INSULIN SCH UNIT: 100 INJECTION, SOLUTION SUBCUTANEOUS at 18:41

## 2018-02-12 NOTE — PN
DATE:



LOCATION:  [facility], Bed A.



SUBJECTIVE:  The patient is a 52-year-old female seen and examined on

rounds with intermittent abdominal pain associated with severe discomfort

in the suprapubic area with itching and mild intermittent periods of

shortness of breath, denied any chest pain, palpitations, chills, or fever

with less arm movement frequency.  No active bleeding.



The entire chart was reviewed including but not limited to the most recent

lab and radiology study results, current and the previous medication list,

current and the previous medical events.  Case was discussed with the staff

at length.



Today's labs showed hemoglobin of 10.5 with hematocrit 31.6 with normal

platelet count, with normal potassium 3.1, with CO2 content of 11,

indicative of severe metabolic acidosis.  BUN elevated to 19, but low

creatinine with blood glucose level of 190.



PHYSICAL EXAMINATION

GENERAL:  A 52-year-old female, awake, alert, and oriented.

VITAL SIGNS:  Afebrile with pulse of 74, respiratory rate 20 to 22, blood

pressure of 128/82.

HEENT:  Showed pale, dry oral mucous membranes, nonicteric sclerae.

LUNGS:  A few scattered crepitation, decreased air entry at bases.

HEART:  Positive S1 and S2.

ABDOMEN:  Soft with mild generalized tenderness.  No masses or

organomegaly.  No rebound tenderness or guarding.

EXTREMITIES:  Reveals bilateral above-knee amputation.

NEUROLOGIC:  No reported neurological deficits, sensory or motor.



IMPRESSION:

1.  Hepatic sarcoidosis with elevated liver function tests associated with

mild jaundice, improving.

2.  Diarrhea.

3.  Known history of diabetes mellitus, hypertension, peripheral vascular

disease, with status post bilateral above-knee amputation.

4.  Recurrent urinary tract infection with neurogenic bladder.  The patient

still has Charles catheter in place.

5.  Known history of papillary stenosis, status post biliary stent

insertion, diabetic gastroparesis, peptic ulcer disease, partial left colon

resection.



SUGGESTION:

1.  Continue current management.

2.  Adjust steroid dose.

3.  Repeat stool for C. diff.

4.  The patient with known history of pseudomembranous colitis.

5.  No need for aggressive GI workup in the meantime and close observation

to follow.







__________________________________________

Jeffrey Albert MD



DD:  02/12/2018 12:49:10

DT:  02/12/2018 14:10:37

[facility] # [id number]

## 2018-02-12 NOTE — CP.PCM.PN
Subjective





- Date & Time of Evaluation


Date of Evaluation: 02/12/18


Time of Evaluation: 18:35





- Subjective


Subjective: 





PT IS SEEN AND EXAMINED, C/O WATERY STOOLS, C.DIFF IS NEG, NO NAUSEA, VOMITING, 

ON ANTIBIOTICS FOR UTI, AWAIT CULTURES RESULTS





Objective





- Vital Signs/Intake and Output


Vital Signs (last 24 hours): 


 











Temp Pulse Resp BP Pulse Ox


 


 98.0 F   104 H  20   135/94 H  97 


 


 02/12/18 16:00  02/12/18 18:44  02/12/18 16:00  02/12/18 18:44  02/12/18 16:00








Intake and Output: 


 











 02/12/18 02/13/18





 18:59 06:59


 


Intake Total 250 


 


Output Total 800 


 


Balance -550 














- Medications


Medications: 


 Current Medications





Ampicillin (Ampicillin)  500 mg PO QID [facility]


   Last Admin: 02/12/18 22:46 Dose:  500 mg


Apixaban (Eliquis)  5 mg PO BID [facility]


   Last Admin: 02/12/18 18:42 Dose:  5 mg


Bismuth Subsalicylate (Pepto-Bismol)  262 mg PO Q8 PRN


   PRN Reason: Diarrhea


Digoxin (Lanoxin)  0.25 mg PO DAILY@1800 [facility]


   Last Admin: 02/12/18 18:42 Dose:  0.25 mg


Diphenhydramine HCl (Benadryl)  12.5 mg IVP Q4 PRN


   PRN Reason: Itching / Pruritus


   Last Admin: 02/12/18 22:47 Dose:  12.5 mg


Ciprofloxacin (Cipro 400mg/200ml Dsw)  400 mg in 200 mls @ 133 mls/hr IVPB Q12H 

[facility]


   Last Admin: 02/12/18 11:27 Dose:  133 mls/hr


Insulin Detemir (Levemir)  10 unit SC QAM [facility]


   Last Admin: 02/12/18 10:29 Dose:  10 unit


Insulin Human Regular (Novolin R)  0 unit SC ACHS [facility]


   PRN Reason: Protocol


   Last Admin: 02/12/18 21:33 Dose:  Not Given


Lactobacillus Acidophilus (Bacid Acidophilus)  1 cap PO BID [facility]


   Last Admin: 02/12/18 18:42 Dose:  1 cap


Losartan Potassium (Cozaar)  50 mg PO DAILY [facility]


   Last Admin: 02/12/18 10:28 Dose:  50 mg


Metoprolol Succinate (Toprol Xl)  50 mg PO DAILY [facility]


   Last Admin: 02/12/18 10:30 Dose:  50 mg


Morphine Sulfate (Morphine)  2 mg IVP Q4 PRN


   PRN Reason: Pain, moderate (4-7)


   Last Admin: 02/12/18 22:47 Dose:  2 mg


Oxybutynin Chloride (Ditropan Xl)  10 mg PO DAILY [facility]


   Last Admin: 02/12/18 10:28 Dose:  10 mg


Pantoprazole Sodium (Protonix Ec Tab)  40 mg PO DAILY [facility]


   Last Admin: 02/12/18 10:30 Dose:  40 mg


Prednisone (Prednisone Tab)  20 mg PO BID [facility]


   Last Admin: 02/12/18 18:42 Dose:  20 mg











- Labs


Labs: 


 





 02/12/18 08:38 





 02/12/18 08:38 





 











PT  13.6 SECONDS (9.7-12.2)  H  02/05/18  18:27    


 


INR  1.2   02/05/18  18:27    


 


APTT  54 SECONDS (21-34)  H  02/05/18  18:27    














- Constitutional


Appears: No Acute Distress





- Head Exam


Head Exam: ATRAUMATIC, NORMAL INSPECTION, NORMOCEPHALIC





- Eye Exam


Eye Exam: EOMI, Normal appearance, PERRL


Pupil Exam: NORMAL ACCOMODATION, PERRL





- Respiratory Exam


Respiratory Exam: Clear to Ausculation Bilateral, NORMAL BREATHING PATTERN





- Cardiovascular Exam


Cardiovascular Exam: REGULAR RHYTHM, +S1, +S2.  absent: Murmur





- GI/Abdominal Exam


GI & Abdominal Exam: Soft, Normal Bowel Sounds.  absent: Tenderness





Assessment and Plan


(1) Diarrhea


Status: Acute   





(2) Complicated urinary tract infection


Status: Acute   





(3) Diabetes


Status: Acute   





(4) Hypertension


Status: Acute   





(5) Neurogenic bladder


Status: Acute

## 2018-02-13 LAB
ALBUMIN SERPL-MCNC: 3 G/DL (ref 3.5–5)
ALBUMIN/GLOB SERPL: 0.8 {RATIO} (ref 1–2.1)
ALT SERPL-CCNC: 203 U/L (ref 9–52)
AST SERPL-CCNC: 127 U/L (ref 14–36)
BASOPHILS # BLD AUTO: 0 K/UL (ref 0–0.2)
BASOPHILS NFR BLD: 0 % (ref 0–2)
BUN SERPL-MCNC: 26 MG/DL (ref 7–17)
CALCIUM SERPL-MCNC: 7.9 MG/DL (ref 8.6–10.4)
EOSINOPHIL # BLD AUTO: 0 K/UL (ref 0–0.7)
EOSINOPHIL NFR BLD: 0 % (ref 0–4)
ERYTHROCYTE [DISTWIDTH] IN BLOOD BY AUTOMATED COUNT: 18.7 % (ref 11.5–14.5)
GFR NON-AFRICAN AMERICAN: > 60
HGB BLD-MCNC: 10.6 G/DL (ref 11–16)
LYMPHOCYTES # BLD AUTO: 11 K/UL (ref 1–4.3)
LYMPHOCYTES NFR BLD AUTO: 11 % (ref 20–40)
MCH RBC QN AUTO: 31.9 PG (ref 27–31)
MCHC RBC AUTO-ENTMCNC: 33.2 G/DL (ref 33–37)
MCV RBC AUTO: 96 FL (ref 81–99)
MONOCYTES # BLD: 7 K/UL (ref 0–0.8)
MONOCYTES NFR BLD: 7 % (ref 0–10)
NEUTROPHILS # BLD: 82 K/UL (ref 1.8–7)
NEUTROPHILS NFR BLD AUTO: 82 % (ref 50–75)
NRBC BLD AUTO-RTO: 0 % (ref 0–2)
PLATELET # BLD: 305 K/UL (ref 130–400)
PMV BLD AUTO: 9.6 FL (ref 7.2–11.7)
RBC # BLD AUTO: 3.31 MIL/UL (ref 3.8–5.2)
WBC # BLD AUTO: 8.8 K/UL (ref 4.8–10.8)

## 2018-02-13 RX ADMIN — DIPHENHYDRAMINE HYDROCHLORIDE PRN MG: 50 INJECTION INTRAMUSCULAR; INTRAVENOUS at 12:33

## 2018-02-13 RX ADMIN — OXYBUTYNIN CHLORIDE SCH MG: 10 TABLET, EXTENDED RELEASE ORAL at 10:38

## 2018-02-13 RX ADMIN — INSULIN DETEMIR SCH UNIT: 100 INJECTION, SOLUTION SUBCUTANEOUS at 10:39

## 2018-02-13 RX ADMIN — DIPHENHYDRAMINE HYDROCHLORIDE PRN MG: 50 INJECTION INTRAMUSCULAR; INTRAVENOUS at 21:26

## 2018-02-13 RX ADMIN — PANTOPRAZOLE SODIUM SCH MG: 40 TABLET, DELAYED RELEASE ORAL at 10:37

## 2018-02-13 RX ADMIN — HUMAN INSULIN SCH: 100 INJECTION, SOLUTION SUBCUTANEOUS at 12:36

## 2018-02-13 RX ADMIN — HUMAN INSULIN SCH UNIT: 100 INJECTION, SOLUTION SUBCUTANEOUS at 18:35

## 2018-02-13 RX ADMIN — DIGOXIN SCH: 0.25 TABLET ORAL at 17:05

## 2018-02-13 RX ADMIN — SULFAMETHOXAZOLE AND TRIMETHOPRIM SCH TAB: 800; 160 TABLET ORAL at 23:04

## 2018-02-13 RX ADMIN — DIPHENHYDRAMINE HYDROCHLORIDE PRN MG: 50 INJECTION INTRAMUSCULAR; INTRAVENOUS at 08:03

## 2018-02-13 RX ADMIN — POTASSIUM CHLORIDE ONE MEQ: 20 TABLET, EXTENDED RELEASE ORAL at 10:38

## 2018-02-13 RX ADMIN — DIPHENHYDRAMINE HYDROCHLORIDE PRN MG: 50 INJECTION INTRAMUSCULAR; INTRAVENOUS at 02:37

## 2018-02-13 RX ADMIN — HUMAN INSULIN SCH UNIT: 100 INJECTION, SOLUTION SUBCUTANEOUS at 08:16

## 2018-02-13 RX ADMIN — HUMAN INSULIN SCH: 100 INJECTION, SOLUTION SUBCUTANEOUS at 22:18

## 2018-02-13 RX ADMIN — DIPHENHYDRAMINE HYDROCHLORIDE PRN MG: 50 INJECTION INTRAMUSCULAR; INTRAVENOUS at 17:03

## 2018-02-13 RX ADMIN — POTASSIUM CHLORIDE ONE: 20 TABLET, EXTENDED RELEASE ORAL at 10:42

## 2018-02-13 RX ADMIN — Medication SCH CAP: at 17:04

## 2018-02-13 RX ADMIN — METOPROLOL SUCCINATE SCH MG: 50 TABLET, EXTENDED RELEASE ORAL at 10:38

## 2018-02-13 RX ADMIN — CIPROFLOXACIN SCH MLS/HR: 2 INJECTION, SOLUTION INTRAVENOUS at 00:15

## 2018-02-13 RX ADMIN — SULFAMETHOXAZOLE AND TRIMETHOPRIM SCH TAB: 800; 160 TABLET ORAL at 13:48

## 2018-02-13 RX ADMIN — Medication SCH CAP: at 10:38

## 2018-02-13 NOTE — CP.PCM.PN
Subjective





- Date & Time of Evaluation


Date of Evaluation: 02/13/18


Time of Evaluation: 18:00





- Subjective


Subjective: 





Pt is c/o dirrhea, low bicarb and she is in state of metabolic acidosis, pt is 

on medical management





Objective





- Vital Signs/Intake and Output


Vital Signs (last 24 hours): 


 











Temp Pulse Resp BP Pulse Ox


 


 98.2 F   59 L  20   140/82   99 


 


 02/13/18 16:00  02/13/18 17:02  02/13/18 16:00  02/13/18 17:02  02/13/18 16:00








Intake and Output: 


 











 02/13/18 02/14/18





 18:59 06:59


 


Intake Total 400 


 


Output Total 1800 


 


Balance -1400 














- Medications


Medications: 


 Current Medications





Apixaban (Eliquis)  5 mg PO BID [facility]


   Last Admin: 02/13/18 17:03 Dose:  5 mg


Bismuth Subsalicylate (Pepto-Bismol)  262 mg PO Q8 PRN


   PRN Reason: Diarrhea


Digoxin (Lanoxin)  0.25 mg PO DAILY@1800 [facility]


   Last Admin: 02/13/18 17:05 Dose:  Not Given


Diphenhydramine HCl (Benadryl)  12.5 mg IVP Q4 PRN


   PRN Reason: Itching / Pruritus


   Last Admin: 02/13/18 21:26 Dose:  12.5 mg


Gabapentin (Neurontin)  100 mg PO BID [facility]


   Last Admin: 02/13/18 19:57 Dose:  100 mg


Sodium Chloride (Sodium Chloride 0.9%)  1,000 mls @ 70 mls/hr IV .[id number] [facility]


   Last Admin: 02/13/18 12:31 Dose:  70 mls/hr


Insulin Detemir (Levemir)  10 unit SC QAM [facility]


   Last Admin: 02/13/18 10:39 Dose:  10 unit


Insulin Human Regular (Novolin R)  0 unit SC ACHS [facility]


   PRN Reason: Protocol


   Last Admin: 02/13/18 22:18 Dose:  Not Given


Lactobacillus Acidophilus (Bacid Acidophilus)  1 cap PO BID [facility]


   Last Admin: 02/13/18 17:04 Dose:  1 cap


Losartan Potassium (Cozaar)  50 mg PO DAILY [facility]


   Last Admin: 02/13/18 10:38 Dose:  50 mg


Metoprolol Succinate (Toprol Xl)  50 mg PO DAILY [facility]


   Last Admin: 02/13/18 10:38 Dose:  50 mg


Morphine Sulfate (Morphine)  2 mg IVP Q4 PRN


   PRN Reason: Pain, moderate (4-7)


   Last Admin: 02/13/18 21:25 Dose:  2 mg


Oxybutynin Chloride (Ditropan Xl)  10 mg PO DAILY [facility]


   Last Admin: 02/13/18 10:38 Dose:  10 mg


Pantoprazole Sodium (Protonix Ec Tab)  40 mg PO DAILY [facility]


   Last Admin: 02/13/18 10:37 Dose:  40 mg


Prednisone (Prednisone Tab)  20 mg PO DAILY [facility]


Sodium Bicarbonate (Sodium Bicarbonate Tab)  650 mg PO BID [facility]


   Last Admin: 02/13/18 17:08 Dose:  650 mg


Trimethoprim/Sulfamethoxazole (Bactrim Ds Tab)  1 tab PO Q12H [facility]


   Last Admin: 02/13/18 23:04 Dose:  1 tab











- Labs


Labs: 


 





 02/13/18 07:59 





 02/13/18 07:59 





 











PT  13.6 SECONDS (9.7-12.2)  H  02/05/18  18:27    


 


INR  1.2   02/05/18  18:27    


 


APTT  54 SECONDS (21-34)  H  02/05/18  18:27    














- Constitutional


Appears: No Acute Distress





- Head Exam


Head Exam: ATRAUMATIC, NORMAL INSPECTION, NORMOCEPHALIC





- ENT Exam


ENT Exam: Mucous Membranes Moist





- Respiratory Exam


Respiratory Exam: Clear to Ausculation Bilateral, NORMAL BREATHING PATTERN





- Cardiovascular Exam


Cardiovascular Exam: REGULAR RHYTHM, +S1, +S2.  absent: Murmur





Assessment and Plan


(1) Diarrhea


Status: Acute   





(2) Complicated urinary tract infection


Status: Acute   





(3) Diabetes


Status: Acute   





(4) Hypertension


Status: Acute   





(5) Neurogenic bladder


Status: Acute

## 2018-02-13 NOTE — PN
DATE:



LOCATION:  Room 569, bed A.



SUBJECTIVE:   This is a 52-year-old female seen and examined and found

without significant clinical changes or _____ 0040 active bleeding. 

Complains intermittent period of some abdominal pain and abdominal

distention on and off as well as suprapubic pain was less reported, was

less reported episode of diarrhea.



The entire chart is reviewed including, but not limited to, the most recent

lab and radiology study results, current and the previous medication list,

current and the previous medical events.  Case discussed with the staff at

length.



Today's lab showed, again, hemoglobin of 10.6, hematocrit 31.8 with normal

platelet count and low potassium 3.0, low CO2 content of 11 with increased

BUN of 26 and blood glucose level, the latest was 81, with total bilirubin

down to 4.9, AST down to 127, ALT to 203 with alkaline phosphatase 380 with

low albumin of 3.0.



PHYSICAL EXAMINATION:

GENERAL:  A 52-year-old female.

VITAL SIGNS:  Afebrile with pulse of 64, respiratory rate 20 to 22, and

blood pressure of 140/76.

HEENT:  Showed mildly pale dry oral mucous membrane.  Mild icteric sclerae.

LUNGS:  A few scattered crepitation.  Decreased air entry at bases.

HEART:  Positive S1 and S2.

ABDOMEN:  Soft, bowel sounds are present with mild distention and

tenderness in the mid epigastric, right upper quadrant and suprapubic area.

No mass or organomegaly.  Charles catheter still in place.

EXTREMITIES:  With evidence of above-knee bilateral amputation.  No

clubbing or cyanosis.

NEUROLOGIC:  No reported new neurological deficits, sensory or motor.



IMPRESSION:

1.  Diarrhea that could be secondary to diabetic diarrhea versus functional

diarrhea, awaiting the result of the stool culture as the patient's

Clostridium difficile toxin was negative.

2.  Hepatic sarcoidosis with abnormal liver function test and jaundice,

mildly improving.

3.  Peptic ulcer disease.

4.  Urinary tract infection with neurogenic bladder.

5.  Peripheral vascular disease with bilateral above-knee amputation.

6.  Diabetes mellitus.

7.  Hypertension by history.



SUGGESTION:

1.  Continue current management.

2.  Rehydration.

3.  Cholestyramine powder.

4.  If the patient's symptoms persisted, cholestyramine powder twice a day

p.o. to be considered.



Further recommendation to follow.





__________________________________________

Jeffrey Albert MD





DD:  02/13/2018 13:34:14

DT:  02/13/2018 16:05:34

[facility] # [id number]

## 2018-02-14 LAB
ALBUMIN SERPL-MCNC: 3.1 G/DL (ref 3.5–5)
ALBUMIN/GLOB SERPL: 1 {RATIO} (ref 1–2.1)
ALT SERPL-CCNC: 200 U/L (ref 9–52)
AST SERPL-CCNC: 133 U/L (ref 14–36)
BASOPHILS # BLD AUTO: 0 K/UL (ref 0–0.2)
BASOPHILS NFR BLD: 0 % (ref 0–2)
BUN SERPL-MCNC: 25 MG/DL (ref 7–17)
CALCIUM SERPL-MCNC: 8.1 MG/DL (ref 8.6–10.4)
EOSINOPHIL # BLD AUTO: 0 K/UL (ref 0–0.7)
EOSINOPHIL NFR BLD: 0 % (ref 0–4)
ERYTHROCYTE [DISTWIDTH] IN BLOOD BY AUTOMATED COUNT: 19.3 % (ref 11.5–14.5)
GFR NON-AFRICAN AMERICAN: 43
HGB BLD-MCNC: 10.5 G/DL (ref 11–16)
LYMPHOCYTES # BLD AUTO: 1.6 K/UL (ref 1–4.3)
LYMPHOCYTES NFR BLD AUTO: 17 % (ref 20–40)
MCH RBC QN AUTO: 32.4 PG (ref 27–31)
MCHC RBC AUTO-ENTMCNC: 34.1 G/DL (ref 33–37)
MCV RBC AUTO: 94.9 FL (ref 81–99)
MONOCYTES # BLD: 0.7 K/UL (ref 0–0.8)
MONOCYTES NFR BLD: 7 % (ref 0–10)
NEUTROPHILS # BLD: 7.2 K/UL (ref 1.8–7)
NEUTROPHILS NFR BLD AUTO: 76 % (ref 50–75)
NRBC BLD AUTO-RTO: 0 % (ref 0–2)
PLATELET # BLD: 281 K/UL (ref 130–400)
PMV BLD AUTO: 9.6 FL (ref 7.2–11.7)
RBC # BLD AUTO: 3.23 MIL/UL (ref 3.8–5.2)
WBC # BLD AUTO: 9.5 K/UL (ref 4.8–10.8)

## 2018-02-14 RX ADMIN — DIGOXIN SCH: 0.25 TABLET ORAL at 18:48

## 2018-02-14 RX ADMIN — DIPHENHYDRAMINE HYDROCHLORIDE PRN MG: 50 INJECTION INTRAMUSCULAR; INTRAVENOUS at 05:28

## 2018-02-14 RX ADMIN — HUMAN INSULIN SCH UNIT: 100 INJECTION, SOLUTION SUBCUTANEOUS at 18:48

## 2018-02-14 RX ADMIN — Medication SCH CAP: at 09:51

## 2018-02-14 RX ADMIN — SULFAMETHOXAZOLE AND TRIMETHOPRIM SCH TAB: 800; 160 TABLET ORAL at 22:46

## 2018-02-14 RX ADMIN — HUMAN INSULIN SCH UNIT: 100 INJECTION, SOLUTION SUBCUTANEOUS at 12:20

## 2018-02-14 RX ADMIN — DIPHENHYDRAMINE HYDROCHLORIDE PRN MG: 50 INJECTION INTRAMUSCULAR; INTRAVENOUS at 14:17

## 2018-02-14 RX ADMIN — DIPHENHYDRAMINE HYDROCHLORIDE PRN MG: 50 INJECTION INTRAMUSCULAR; INTRAVENOUS at 23:45

## 2018-02-14 RX ADMIN — OXYBUTYNIN CHLORIDE SCH MG: 10 TABLET, EXTENDED RELEASE ORAL at 11:20

## 2018-02-14 RX ADMIN — PANTOPRAZOLE SODIUM SCH MG: 40 TABLET, DELAYED RELEASE ORAL at 09:51

## 2018-02-14 RX ADMIN — DIPHENHYDRAMINE HYDROCHLORIDE PRN MG: 50 INJECTION INTRAMUSCULAR; INTRAVENOUS at 09:51

## 2018-02-14 RX ADMIN — HUMAN INSULIN SCH UNIT: 100 INJECTION, SOLUTION SUBCUTANEOUS at 12:15

## 2018-02-14 RX ADMIN — HUMAN INSULIN SCH: 100 INJECTION, SOLUTION SUBCUTANEOUS at 21:17

## 2018-02-14 RX ADMIN — INSULIN DETEMIR SCH UNIT: 100 INJECTION, SOLUTION SUBCUTANEOUS at 09:51

## 2018-02-14 RX ADMIN — SULFAMETHOXAZOLE AND TRIMETHOPRIM SCH TAB: 800; 160 TABLET ORAL at 11:19

## 2018-02-14 RX ADMIN — METOPROLOL SUCCINATE SCH MG: 50 TABLET, EXTENDED RELEASE ORAL at 09:51

## 2018-02-14 RX ADMIN — DIPHENHYDRAMINE HYDROCHLORIDE PRN MG: 50 INJECTION INTRAMUSCULAR; INTRAVENOUS at 01:24

## 2018-02-14 RX ADMIN — Medication SCH CAP: at 18:48

## 2018-02-14 RX ADMIN — HUMAN INSULIN SCH: 100 INJECTION, SOLUTION SUBCUTANEOUS at 07:30

## 2018-02-14 RX ADMIN — BISMUTH SUBSALICYLATE PRN MG: 525 SUSPENSION ORAL at 18:47

## 2018-02-14 RX ADMIN — DIPHENHYDRAMINE HYDROCHLORIDE PRN MG: 50 INJECTION INTRAMUSCULAR; INTRAVENOUS at 19:38

## 2018-02-14 NOTE — CP.PCM.PN
Subjective





- Date & Time of Evaluation


Date of Evaluation: 02/14/18


Time of Evaluation: 18:50





- Subjective


Subjective: 





pt seen and examined at bedside, c/o persistent watery stools and hypokalemia, 

c.diff is neg, etiology of diarrhea still not clear





Objective





- Vital Signs/Intake and Output


Vital Signs (last 24 hours): 


 











Temp Pulse Resp BP Pulse Ox


 


 97.8 F   59 L  18   130/65   99 


 


 02/14/18 16:00  02/14/18 18:43  02/14/18 16:00  02/14/18 18:43  02/14/18 16:00








Intake and Output: 


 











 02/14/18 02/15/18





 18:59 06:59


 


Intake Total 750 


 


Output Total 1800 900


 


Balance -1050 -900














- Medications


Medications: 


 Current Medications





Apixaban (Eliquis)  5 mg PO BID [facility]


   Last Admin: 02/14/18 18:44 Dose:  5 mg


Bismuth Subsalicylate (Pepto-Bismol)  262 mg PO Q8 PRN


   PRN Reason: Diarrhea


   Last Admin: 02/14/18 18:47 Dose:  262 mg


Digoxin (Lanoxin)  0.25 mg PO DAILY@1800 [facility]


   Last Admin: 02/14/18 18:48 Dose:  Not Given


Diphenhydramine HCl (Benadryl)  12.5 mg IVP Q4 PRN


   PRN Reason: Itching / Pruritus


   Last Admin: 02/14/18 19:38 Dose:  12.5 mg


Gabapentin (Neurontin)  100 mg PO BID [facility]


   Last Admin: 02/14/18 18:45 Dose:  100 mg


Sodium Chloride (Sodium Chloride 0.9%)  1,000 mls @ 70 mls/hr IV .[id number] [facility]


   Last Admin: 02/14/18 16:12 Dose:  Not Given


Insulin Detemir (Levemir)  10 unit SC QAM [facility]


   Last Admin: 02/14/18 09:51 Dose:  10 unit


Insulin Human Regular (Novolin R)  0 unit SC ACHS [facility]


   PRN Reason: Protocol


   Last Admin: 02/14/18 21:17 Dose:  Not Given


Lactobacillus Acidophilus (Bacid Acidophilus)  1 cap PO BID [facility]


   Last Admin: 02/14/18 18:48 Dose:  1 cap


Losartan Potassium (Cozaar)  50 mg PO DAILY [facility]


   Last Admin: 02/14/18 09:51 Dose:  50 mg


Metoprolol Succinate (Toprol Xl)  50 mg PO DAILY [facility]


   Last Admin: 02/14/18 09:51 Dose:  50 mg


Morphine Sulfate (Morphine)  2 mg IVP Q4 PRN


   PRN Reason: Pain, moderate (4-7)


   Stop: 02/20/18 18:00


Oxybutynin Chloride (Ditropan Xl)  10 mg PO DAILY [facility]


   Last Admin: 02/14/18 11:20 Dose:  10 mg


Pantoprazole Sodium (Protonix Ec Tab)  40 mg PO DAILY [facility]


   Last Admin: 02/14/18 09:51 Dose:  40 mg


Prednisone (Prednisone Tab)  20 mg PO DAILY [facility]


   Last Admin: 02/14/18 09:51 Dose:  20 mg


Sodium Bicarbonate (Sodium Bicarbonate Tab)  650 mg PO BID [facility]


   Last Admin: 02/14/18 18:46 Dose:  650 mg


Trimethoprim/Sulfamethoxazole (Bactrim Ds Tab)  1 tab PO Q12H [facility]


   Last Admin: 02/14/18 22:46 Dose:  1 tab











- Labs


Labs: 


 





 02/14/18 07:11 





 02/14/18 07:11 





 











PT  13.6 SECONDS (9.7-12.2)  H  02/05/18  18:27    


 


INR  1.2   02/05/18  18:27    


 


APTT  54 SECONDS (21-34)  H  02/05/18  18:27    














Assessment and Plan


(1) Diarrhea


Status: Acute   





(2) Complicated urinary tract infection


Status: Acute   





(3) Diabetes


Status: Acute   





(4) Hypertension


Status: Acute   





(5) Neurogenic bladder


Status: Acute

## 2018-02-15 LAB
ALBUMIN SERPL-MCNC: 3 G/DL (ref 3.5–5)
ALBUMIN/GLOB SERPL: 1 {RATIO} (ref 1–2.1)
ALT SERPL-CCNC: 202 U/L (ref 9–52)
AST SERPL-CCNC: 147 U/L (ref 14–36)
BUN SERPL-MCNC: 28 MG/DL (ref 7–17)
CALCIUM SERPL-MCNC: 7.6 MG/DL (ref 8.6–10.4)
ERYTHROCYTE [DISTWIDTH] IN BLOOD BY AUTOMATED COUNT: 19.5 % (ref 11.5–14.5)
GFR NON-AFRICAN AMERICAN: > 60
HGB BLD-MCNC: 10.4 G/DL (ref 11–16)
MCH RBC QN AUTO: 31.9 PG (ref 27–31)
MCHC RBC AUTO-ENTMCNC: 33.7 G/DL (ref 33–37)
MCV RBC AUTO: 94.8 FL (ref 81–99)
PLATELET # BLD: 279 K/UL (ref 130–400)
PMV BLD AUTO: 9.4 FL (ref 7.2–11.7)
RBC # BLD AUTO: 3.27 MIL/UL (ref 3.8–5.2)
WBC # BLD AUTO: 9.1 K/UL (ref 4.8–10.8)

## 2018-02-15 RX ADMIN — SULFAMETHOXAZOLE AND TRIMETHOPRIM SCH TAB: 800; 160 TABLET ORAL at 23:46

## 2018-02-15 RX ADMIN — HUMAN INSULIN SCH: 100 INJECTION, SOLUTION SUBCUTANEOUS at 22:30

## 2018-02-15 RX ADMIN — DIGOXIN SCH MG: 0.25 TABLET ORAL at 17:21

## 2018-02-15 RX ADMIN — PANTOPRAZOLE SODIUM SCH MG: 40 TABLET, DELAYED RELEASE ORAL at 09:04

## 2018-02-15 RX ADMIN — Medication SCH CAP: at 17:22

## 2018-02-15 RX ADMIN — Medication SCH CAP: at 09:04

## 2018-02-15 RX ADMIN — DIPHENHYDRAMINE HYDROCHLORIDE PRN MG: 50 INJECTION INTRAMUSCULAR; INTRAVENOUS at 08:53

## 2018-02-15 RX ADMIN — DIPHENHYDRAMINE HYDROCHLORIDE PRN MG: 50 INJECTION INTRAMUSCULAR; INTRAVENOUS at 21:06

## 2018-02-15 RX ADMIN — HUMAN INSULIN SCH UNIT: 100 INJECTION, SOLUTION SUBCUTANEOUS at 17:10

## 2018-02-15 RX ADMIN — OXYBUTYNIN CHLORIDE SCH MG: 10 TABLET, EXTENDED RELEASE ORAL at 09:04

## 2018-02-15 RX ADMIN — HUMAN INSULIN SCH: 100 INJECTION, SOLUTION SUBCUTANEOUS at 11:45

## 2018-02-15 RX ADMIN — DIPHENHYDRAMINE HYDROCHLORIDE PRN MG: 50 INJECTION INTRAMUSCULAR; INTRAVENOUS at 04:40

## 2018-02-15 RX ADMIN — METOPROLOL SUCCINATE SCH MG: 50 TABLET, EXTENDED RELEASE ORAL at 09:04

## 2018-02-15 RX ADMIN — INSULIN DETEMIR SCH UNIT: 100 INJECTION, SOLUTION SUBCUTANEOUS at 09:04

## 2018-02-15 RX ADMIN — DIPHENHYDRAMINE HYDROCHLORIDE PRN MG: 50 INJECTION INTRAMUSCULAR; INTRAVENOUS at 13:06

## 2018-02-15 RX ADMIN — DIPHENOXYLATE HYDROCHLORIDE AND ATROPINE SULFATE PRN TAB: 2.5; .025 TABLET ORAL at 13:07

## 2018-02-15 RX ADMIN — DIPHENHYDRAMINE HYDROCHLORIDE PRN MG: 50 INJECTION INTRAMUSCULAR; INTRAVENOUS at 17:06

## 2018-02-15 RX ADMIN — SULFAMETHOXAZOLE AND TRIMETHOPRIM SCH TAB: 800; 160 TABLET ORAL at 12:23

## 2018-02-15 RX ADMIN — HUMAN INSULIN SCH: 100 INJECTION, SOLUTION SUBCUTANEOUS at 07:24

## 2018-02-15 NOTE — PN
DATE:



LOCATION:  [facility], bed A.



SUBJECTIVE:  This is a 52-year-old female seen and examined in rounds with

intermittent period of complaint of abdominal pain, but less than before

with less diarrhea.  No reported active bleeding.  The patient is still

complaining of lower back pain.  No actual chest pain, palpitations,

shortness of breath, chills, or fevers, but suprapubic tenderness and the

pain on and off.



The entire chart is reviewed including, but not limited to the most recent

lab and radiology study results, current and the previous medication list,

current and the previous medical events.  Case was discussed with the staff

at length.  Today's lab showed low hemoglobin of 10.4, hematocrit 31.0,

with normal platelet count, but decreased CO2 content of 13, indicative of

metabolic acidosis with increased BUN of 28, normal creatinine, blood

glucose 134, low calcium 7.6, with subsequent improvement of liver function

tests, but still abnormal with total bilirubin 4.4, , ,

alkaline phosphatase 353, with low albumin 3.0.



PHYSICAL EXAMINATION:

GENERAL:  A 52-year-old female, awake, alert, oriented.

VITAL SIGNS:  Afebrile with pulse of 82, respiratory rate 20 to 22, blood

pressure 120/82.

HEENT:  Showed mildly pale, dry mucoid membrane with bilateral icteric

sclerae.

LUNGS:  Few scattered crepitation with decreased air entry at bases.

HEART:  Positive S1 and S2.

ABDOMEN:  With mild distention.  Mild generalized tenderness mainly in the

mid epigastric right upper quadrant and suprapubic areas.  No mass or

organomegaly.  No rebound tenderness or guarding.

EXTREMITIES:  With evidence of bilateral above-knee amputation.

NEUROLOGIC:  No other reported new neurological deficits, sensory or motor.

No reported focal deficits recently.



IMPRESSION:

1.  Diarrhea, gradually improving, which could be secondary to diabetic

diarrhea.

2.  Recurrent urinary tract infection.

3.  Known history of diabetes mellitus, hypertension, with hepatic

sarcoidosis.

4.  Jaundice with abnormal liver function tests secondary to above.

5.  Neurogenic bladder, still her Charles catheter is in place.

6.  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

insertion.

7.  Peripheral vascular disease with bilateral above-knee amputation.



SUGGESTIONS:

1.  Continue current management.

2.  The patient may need subsequent increase of steroid IV for short period

of time, then subsequently decrease the dose with less frequency.

3.  Repeat stool for Clostridium difficile.

4.  Further recommendations to follow.





__________________________________________

Jeffrey Albert MD



DD:  02/15/2018 13:12:30

DT:  02/15/2018 15:22:54

[facility] # [id number]

## 2018-02-15 NOTE — CP.PCM.PN
Subjective





- Date & Time of Evaluation


Date of Evaluation: 02/15/18


Time of Evaluation: 18:00





- Subjective


Subjective: 





Pt seen and evaluated, PT IS FOR STOOL FOR C.DIFF DUE TO CHRONIC DIARRHEA , PT 

HAS EXTREME DIARRHEA CAUSING DEHYDRATION AND METABOILC ACIDOSIS





Objective





- Vital Signs/Intake and Output


Vital Signs (last 24 hours): 


 











Temp Pulse Resp BP Pulse Ox


 


 97.6 F   109 H  20   133/76   96 


 


 02/15/18 17:18  02/15/18 17:18  02/15/18 17:18  02/15/18 17:18  02/15/18 17:18








Intake and Output: 


 











 02/15/18 02/16/18





 18:59 06:59


 


Intake Total 900 1200


 


Output Total 1000 1600


 


Balance -100 -400














- Medications


Medications: 


 Current Medications





Apixaban (Eliquis)  5 mg PO BID [facility]


   Last Admin: 02/15/18 17:14 Dose:  5 mg


Bismuth Subsalicylate (Pepto-Bismol)  262 mg PO Q8 PRN


   PRN Reason: Diarrhea


   Last Admin: 02/14/18 18:47 Dose:  262 mg


Digoxin (Lanoxin)  0.25 mg PO DAILY@1800 [facility]


   Last Admin: 02/15/18 17:21 Dose:  0.25 mg


Diphenhydramine HCl (Benadryl)  12.5 mg IVP Q4 PRN


   PRN Reason: Itching / Pruritus


   Last Admin: 02/15/18 21:06 Dose:  12.5 mg


Diphenoxylate HCl/Atropine (Lomotil 0.025-2.5 Mg Tablet)  1 tab PO Q4 PRN


   PRN Reason: Irritable bowel symptoms


   Last Admin: 02/15/18 13:07 Dose:  1 tab


Gabapentin (Neurontin)  100 mg PO BID [facility]


   Last Admin: 02/15/18 17:14 Dose:  100 mg


Sodium Bicarbonate 100 meq/ (Sodium Chloride)  1,100 mls @ 125 mls/hr IV 

.[id number] [facility]


   Last Admin: 02/15/18 19:45 Dose:  Not Given


Insulin Detemir (Levemir)  10 unit SC QAM [facility]


   Last Admin: 02/15/18 09:04 Dose:  10 unit


Insulin Human Regular (Novolin R)  0 unit SC ACHS [facility]


   PRN Reason: Protocol


   Last Admin: 02/15/18 17:10 Dose:  6 unit


Lactobacillus Acidophilus (Bacid Acidophilus)  1 cap PO BID [facility]


   Last Admin: 02/15/18 17:22 Dose:  1 cap


Losartan Potassium (Cozaar)  50 mg PO DAILY [facility]


   Last Admin: 02/15/18 09:04 Dose:  50 mg


Metoprolol Succinate (Toprol Xl)  50 mg PO DAILY [facility]


   Last Admin: 02/15/18 09:04 Dose:  50 mg


Morphine Sulfate (Morphine)  2 mg IVP Q4 PRN


   PRN Reason: Pain, moderate (4-7)


   Stop: 02/20/18 18:00


   Last Admin: 02/15/18 21:07 Dose:  2 mg


Oxybutynin Chloride (Ditropan Xl)  10 mg PO DAILY [facility]


   Last Admin: 02/15/18 09:04 Dose:  10 mg


Pantoprazole Sodium (Protonix Ec Tab)  40 mg PO DAILY [facility]


   Last Admin: 02/15/18 09:04 Dose:  40 mg


Prednisone (Prednisone Tab)  20 mg PO DAILY [facility]


   Last Admin: 02/15/18 09:04 Dose:  20 mg


Sodium Bicarbonate (Sodium Bicarbonate Tab)  650 mg PO BID [facility]


   Last Admin: 02/15/18 17:26 Dose:  650 mg


Trimethoprim/Sulfamethoxazole (Bactrim Ds Tab)  1 tab PO Q12H [facility]


   Last Admin: 02/15/18 12:23 Dose:  1 tab











- Labs


Labs: 


 





 02/15/18 07:38 





 02/15/18 07:38 





 











PT  13.6 SECONDS (9.7-12.2)  H  02/05/18  18:27    


 


INR  1.2   02/05/18  18:27    


 


APTT  54 SECONDS (21-34)  H  02/05/18  18:27    














- Constitutional


Appears: No Acute Distress





- Head Exam


Head Exam: ATRAUMATIC, NORMAL INSPECTION, NORMOCEPHALIC





- Eye Exam


Eye Exam: EOMI, Normal appearance, PERRL


Pupil Exam: NORMAL ACCOMODATION, PERRL





- Respiratory Exam


Respiratory Exam: Clear to Ausculation Bilateral, NORMAL BREATHING PATTERN





- Cardiovascular Exam


Cardiovascular Exam: REGULAR RHYTHM, +S1, +S2.  absent: Murmur





- GI/Abdominal Exam


GI & Abdominal Exam: Soft, Normal Bowel Sounds.  absent: Tenderness





Assessment and Plan


(1) Diarrhea


Status: Acute   





(2) Complicated urinary tract infection


Status: Acute   





(3) Diabetes


Status: Acute   





(4) Hypertension


Status: Acute   





(5) Neurogenic bladder


Status: Acute

## 2018-02-16 LAB
ALBUMIN SERPL-MCNC: 3.1 G/DL (ref 3.5–5)
ALBUMIN/GLOB SERPL: 1 {RATIO} (ref 1–2.1)
ALT SERPL-CCNC: 188 U/L (ref 9–52)
AST SERPL-CCNC: 163 U/L (ref 14–36)
BACTERIA #/AREA URNS HPF: (no result) /[HPF]
BASOPHILS # BLD AUTO: 0 K/UL (ref 0–0.2)
BASOPHILS NFR BLD: 0 % (ref 0–2)
BILIRUB UR-MCNC: NEGATIVE MG/DL
BUN SERPL-MCNC: 25 MG/DL (ref 7–17)
CALCIUM SERPL-MCNC: 8.1 MG/DL (ref 8.6–10.4)
EOSINOPHIL # BLD AUTO: 0 K/UL (ref 0–0.7)
EOSINOPHIL NFR BLD: 0 % (ref 0–4)
ERYTHROCYTE [DISTWIDTH] IN BLOOD BY AUTOMATED COUNT: 20.2 % (ref 11.5–14.5)
GFR NON-AFRICAN AMERICAN: > 60
GLUCOSE UR STRIP-MCNC: (no result) MG/DL
HGB BLD-MCNC: 10.1 G/DL (ref 11–16)
LEUKOCYTE ESTERASE UR-ACNC: (no result) LEU/UL
LYMPHOCYTES # BLD AUTO: 2.5 K/UL (ref 1–4.3)
LYMPHOCYTES NFR BLD AUTO: 34 % (ref 20–40)
MCH RBC QN AUTO: 32.2 PG (ref 27–31)
MCHC RBC AUTO-ENTMCNC: 34.3 G/DL (ref 33–37)
MCV RBC AUTO: 93.9 FL (ref 81–99)
MONOCYTES # BLD: 0.5 K/UL (ref 0–0.8)
MONOCYTES NFR BLD: 7 % (ref 0–10)
NEUTROPHILS # BLD: 4.4 K/UL (ref 1.8–7)
NEUTROPHILS NFR BLD AUTO: 59 % (ref 50–75)
NRBC BLD AUTO-RTO: 0 % (ref 0–2)
PH UR STRIP: 6 [PH] (ref 5–8)
PLATELET # BLD: 232 K/UL (ref 130–400)
PMV BLD AUTO: 8.7 FL (ref 7.2–11.7)
PROT UR STRIP-MCNC: NEGATIVE MG/DL
RBC # BLD AUTO: 3.14 MIL/UL (ref 3.8–5.2)
RBC # UR STRIP: NEGATIVE /UL
SP GR UR STRIP: 1.01 (ref 1–1.03)
SQUAMOUS EPITHIAL: < 1 /HPF (ref 0–5)
UROBILINOGEN UR-MCNC: NORMAL MG/DL (ref 0.2–1)
WBC # BLD AUTO: 7.4 K/UL (ref 4.8–10.8)

## 2018-02-16 RX ADMIN — DIPHENHYDRAMINE HYDROCHLORIDE PRN MG: 50 INJECTION INTRAMUSCULAR; INTRAVENOUS at 06:00

## 2018-02-16 RX ADMIN — DIPHENHYDRAMINE HYDROCHLORIDE PRN MG: 50 INJECTION INTRAMUSCULAR; INTRAVENOUS at 10:02

## 2018-02-16 RX ADMIN — BISMUTH SUBSALICYLATE PRN MG: 525 SUSPENSION ORAL at 10:01

## 2018-02-16 RX ADMIN — DIGOXIN SCH MG: 0.25 TABLET ORAL at 18:18

## 2018-02-16 RX ADMIN — DIPHENHYDRAMINE HYDROCHLORIDE PRN MG: 50 INJECTION INTRAMUSCULAR; INTRAVENOUS at 18:10

## 2018-02-16 RX ADMIN — SULFAMETHOXAZOLE AND TRIMETHOPRIM SCH TAB: 800; 160 TABLET ORAL at 12:40

## 2018-02-16 RX ADMIN — HUMAN INSULIN SCH: 100 INJECTION, SOLUTION SUBCUTANEOUS at 07:21

## 2018-02-16 RX ADMIN — DIPHENOXYLATE HYDROCHLORIDE AND ATROPINE SULFATE PRN TAB: 2.5; .025 TABLET ORAL at 10:02

## 2018-02-16 RX ADMIN — DIPHENHYDRAMINE HYDROCHLORIDE PRN MG: 50 INJECTION INTRAMUSCULAR; INTRAVENOUS at 22:10

## 2018-02-16 RX ADMIN — INSULIN DETEMIR SCH UNIT: 100 INJECTION, SOLUTION SUBCUTANEOUS at 10:02

## 2018-02-16 RX ADMIN — PANTOPRAZOLE SODIUM SCH MG: 40 TABLET, DELAYED RELEASE ORAL at 09:59

## 2018-02-16 RX ADMIN — HUMAN INSULIN SCH: 100 INJECTION, SOLUTION SUBCUTANEOUS at 21:58

## 2018-02-16 RX ADMIN — DIPHENHYDRAMINE HYDROCHLORIDE PRN MG: 50 INJECTION INTRAMUSCULAR; INTRAVENOUS at 14:10

## 2018-02-16 RX ADMIN — Medication SCH CAP: at 10:00

## 2018-02-16 RX ADMIN — HUMAN INSULIN SCH: 100 INJECTION, SOLUTION SUBCUTANEOUS at 12:23

## 2018-02-16 RX ADMIN — METOPROLOL SUCCINATE SCH MG: 50 TABLET, EXTENDED RELEASE ORAL at 10:00

## 2018-02-16 RX ADMIN — HUMAN INSULIN SCH UNIT: 100 INJECTION, SOLUTION SUBCUTANEOUS at 17:10

## 2018-02-16 RX ADMIN — OXYBUTYNIN CHLORIDE SCH MG: 10 TABLET, EXTENDED RELEASE ORAL at 10:00

## 2018-02-16 RX ADMIN — DIPHENOXYLATE HYDROCHLORIDE AND ATROPINE SULFATE PRN TAB: 2.5; .025 TABLET ORAL at 14:10

## 2018-02-16 RX ADMIN — Medication SCH CAP: at 18:00

## 2018-02-16 RX ADMIN — DIPHENHYDRAMINE HYDROCHLORIDE PRN MG: 50 INJECTION INTRAMUSCULAR; INTRAVENOUS at 01:24

## 2018-02-16 NOTE — CP.PCM.PN
Subjective





- Date & Time of Evaluation


Date of Evaluation: 02/16/18


Time of Evaluation: 09:00





- Subjective


Subjective: 





Pt seen and evaluated, her diarrhea is improving, c.diff id neg, dietry non 

complaince, diarrhea still unknown etioligy, afebnrile





Objective





- Vital Signs/Intake and Output


Vital Signs (last 24 hours): 


 











Temp Pulse Resp BP Pulse Ox


 


 97.7 F   83   20   129/81   95 


 


 02/16/18 08:26  02/16/18 08:26  02/16/18 08:26  02/16/18 08:26  02/16/18 08:26








Intake and Output: 


 











 02/16/18 02/16/18





 06:59 18:59


 


Intake Total 1200 


 


Output Total 2800 


 


Balance -1600 














- Medications


Medications: 


 Current Medications





Apixaban (Eliquis)  5 mg PO BID [facility]


   Last Admin: 02/15/18 17:14 Dose:  5 mg


Bismuth Subsalicylate (Pepto-Bismol)  262 mg PO Q8 PRN


   PRN Reason: Diarrhea


   Last Admin: 02/14/18 18:47 Dose:  262 mg


Digoxin (Lanoxin)  0.25 mg PO DAILY@1800 [facility]


   Last Admin: 02/15/18 17:21 Dose:  0.25 mg


Diphenhydramine HCl (Benadryl)  12.5 mg IVP Q4 PRN


   PRN Reason: Itching / Pruritus


   Last Admin: 02/16/18 06:00 Dose:  12.5 mg


Diphenoxylate HCl/Atropine (Lomotil 0.025-2.5 Mg Tablet)  1 tab PO Q4 PRN


   PRN Reason: Irritable bowel symptoms


   Last Admin: 02/15/18 13:07 Dose:  1 tab


Gabapentin (Neurontin)  100 mg PO BID [facility]


   Last Admin: 02/15/18 17:14 Dose:  100 mg


Sodium Bicarbonate 100 meq/ (Sodium Chloride)  1,100 mls @ 125 mls/hr IV 

[id number] [facility]


   Last Admin: 02/16/18 06:00 Dose:  125 mls/hr


Insulin Detemir (Levemir)  10 unit SC QAM [facility]


   Last Admin: 02/15/18 09:04 Dose:  10 unit


Insulin Human Regular (Novolin R)  0 unit SC ACHS [facility]


   PRN Reason: Protocol


   Last Admin: 02/16/18 07:21 Dose:  Not Given


Lactobacillus Acidophilus (Bacid Acidophilus)  1 cap PO BID [facility]


   Last Admin: 02/15/18 17:22 Dose:  1 cap


Losartan Potassium (Cozaar)  50 mg PO DAILY [facility]


   Last Admin: 02/15/18 09:04 Dose:  50 mg


Metoprolol Succinate (Toprol Xl)  50 mg PO DAILY [facility]


   Last Admin: 02/15/18 09:04 Dose:  50 mg


Morphine Sulfate (Morphine)  2 mg IVP Q4 PRN


   PRN Reason: Pain, moderate (4-7)


   Stop: 02/20/18 18:00


   Last Admin: 02/16/18 05:58 Dose:  2 mg


Oxybutynin Chloride (Ditropan Xl)  10 mg PO DAILY [facility]


   Last Admin: 02/15/18 09:04 Dose:  10 mg


Pantoprazole Sodium (Protonix Ec Tab)  40 mg PO DAILY [facility]


   Last Admin: 02/15/18 09:04 Dose:  40 mg


Prednisone (Prednisone Tab)  20 mg PO DAILY [facility]


   Last Admin: 02/15/18 09:04 Dose:  20 mg


Sodium Bicarbonate (Sodium Bicarbonate Tab)  650 mg PO BID [facility]


   Last Admin: 02/15/18 17:26 Dose:  650 mg


Trimethoprim/Sulfamethoxazole (Bactrim Ds Tab)  1 tab PO Q12H [facility]


   Last Admin: 02/15/18 23:46 Dose:  1 tab











- Labs


Labs: 


 





 02/15/18 07:38 





 02/15/18 07:38 





 











PT  13.6 SECONDS (9.7-12.2)  H  02/05/18  18:27    


 


INR  1.2   02/05/18  18:27    


 


APTT  54 SECONDS (21-34)  H  02/05/18  18:27    














- Constitutional


Appears: No Acute Distress, Chronically Ill





- Eye Exam


Eye Exam: EOMI, Normal appearance, PERRL


Pupil Exam: NORMAL ACCOMODATION, PERRL





- Respiratory Exam


Respiratory Exam: Clear to Ausculation Bilateral, NORMAL BREATHING PATTERN





- Cardiovascular Exam


Cardiovascular Exam: REGULAR RHYTHM, +S1, +S2.  absent: Murmur





- GI/Abdominal Exam


GI & Abdominal Exam: Soft, Normal Bowel Sounds.  absent: Tenderness





Assessment and Plan


(1) Diarrhea


Status: Acute   





(2) Complicated urinary tract infection


Status: Acute   





(3) Diabetes


Status: Acute   





(4) Hypertension


Status: Acute   





(5) Neurogenic bladder


Status: Acute

## 2018-02-16 NOTE — PN
DATE:



LOCATION:  [facility], bed A.



SUBJECTIVE:  This is a 52-year-old female, seen and examined in rounds with

a complaint of intermittent periods of diarrhea.  Again, without reported

active bleeding with crampy abdominal pain as well as lower back pain and

left flank pain on and off with suprapubic abdominal pain.  No reported

nausea and vomiting.   No reported active bleeding.  The patient is still

on contact precaution and isolation.



The entire chart is reviewed including, but not limited to the most recent

lab and radiology study results, current and the previous medication list,

current and the previous medical events.  Case discussed with the staff at

length.



Today's lab showed hemoglobin 10.1, hematocrit 29.5 with low potassium 3.5,

BUN 25, normal creatinine, blood glucose 136, low calcium 8.1, with total

bilirubin 5.9, , , alkaline phosphatase 338, with low total

protein 6.2, low albumin 3.1.



The patient reported no complaint of chest pain, shortness of breath,

palpitation, with intermittent periods of headache, with mild nausea and

dyspepsia.



PHYSICAL EXAMINATION

GENERAL:  A 52-year-old female, awake, alert, oriented.

VITAL SIGNS:  Afebrile with pulse of 80, respiratory rate 20 to 22, blood

pressure of 124/78.

HEENT:  Showed pale dry oral mucous membrane.  Mild icteric sclerae

bilaterally.

LUNGS:  Scattered mild crepitations, decreased air entry at bases.

HEART:  Positive S1 and S2.

ABDOMEN:  Soft with generalized tenderness and mild distention.  Bowel

sounds are present and hyperactive.  No mass or organomegaly.

EXTREMITIES:  Extremities with bilateral above-knee amputation.  No

clubbing or cyanosis.

NEUROLOGIC:  No reported new neurological deficits, sensory or motor.



IMPRESSION:

1.  Liver sarcoidosis with abnormal liver function test.

2.  Jaundice secondary to above.

3.  Recurrent urinary tract infection.

4.  Neurogenic bladder.  The patient still has Charles catheter in place.

5.  Diarrhea, that could be secondary to diabetic neuropathy versus short

bowel syndrome, the patient is status post partial colon resection.  The

possibility of collagenous colitis due to the patient's sarcoidosis should

be kept in mind also.

7.  Poorly controlled diabetes mellitus.

8. Hypertension by history.

9.  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

10.  Metabolic acidosis secondary to infectious process most likely.

11.  Peripheral vascular disease with status post bilateral above-knee

amputation.



SUGGESTIONS:

1.  Continue current management.

2.  Adjust oral intake.

3.  Again, the patient may benefit from small dose of IV Solu-Cortef.  When

her infectious process is improved, that to be discussed with admitting MD.

4.  The patient will need pain management consultation.





__________________________________________

Jeffrey Albert MD



DD:  02/16/2018 12:37:06

DT:  02/16/2018 13:16:34

Job # [id number]

## 2018-02-17 LAB
BASOPHILS # BLD AUTO: 0.1 K/UL (ref 0–0.2)
BASOPHILS NFR BLD: 1.7 % (ref 0–2)
BUN SERPL-MCNC: 21 MG/DL (ref 7–17)
CALCIUM SERPL-MCNC: 8.1 MG/DL (ref 8.6–10.4)
EOSINOPHIL # BLD AUTO: 0.1 K/UL (ref 0–0.7)
EOSINOPHIL NFR BLD: 0.9 % (ref 0–4)
ERYTHROCYTE [DISTWIDTH] IN BLOOD BY AUTOMATED COUNT: 20.6 % (ref 11.5–14.5)
GFR NON-AFRICAN AMERICAN: > 60
HGB BLD-MCNC: 10.2 G/DL (ref 11–16)
LYMPHOCYTES # BLD AUTO: 2.9 K/UL (ref 1–4.3)
LYMPHOCYTES NFR BLD AUTO: 34.3 % (ref 20–40)
MCH RBC QN AUTO: 31.4 PG (ref 27–31)
MCHC RBC AUTO-ENTMCNC: 33.3 G/DL (ref 33–37)
MCV RBC AUTO: 94.3 FL (ref 81–99)
MONOCYTES # BLD: 0.9 K/UL (ref 0–0.8)
MONOCYTES NFR BLD: 10.5 % (ref 0–10)
NEUTROPHILS # BLD: 4.5 K/UL (ref 1.8–7)
NEUTROPHILS NFR BLD AUTO: 52.6 % (ref 50–75)
NRBC BLD AUTO-RTO: 0.1 % (ref 0–2)
PLATELET # BLD: 245 K/UL (ref 130–400)
PMV BLD AUTO: 9.6 FL (ref 7.2–11.7)
RBC # BLD AUTO: 3.26 MIL/UL (ref 3.8–5.2)
WBC # BLD AUTO: 8.5 K/UL (ref 4.8–10.8)

## 2018-02-17 RX ADMIN — Medication SCH CAP: at 10:24

## 2018-02-17 RX ADMIN — HUMAN INSULIN SCH UNIT: 100 INJECTION, SOLUTION SUBCUTANEOUS at 12:10

## 2018-02-17 RX ADMIN — DIPHENOXYLATE HYDROCHLORIDE AND ATROPINE SULFATE PRN TAB: 2.5; .025 TABLET ORAL at 14:13

## 2018-02-17 RX ADMIN — HUMAN INSULIN SCH: 100 INJECTION, SOLUTION SUBCUTANEOUS at 22:11

## 2018-02-17 RX ADMIN — PANTOPRAZOLE SODIUM SCH MG: 40 TABLET, DELAYED RELEASE ORAL at 10:24

## 2018-02-17 RX ADMIN — DIPHENOXYLATE HYDROCHLORIDE AND ATROPINE SULFATE PRN TAB: 2.5; .025 TABLET ORAL at 18:22

## 2018-02-17 RX ADMIN — Medication SCH CAP: at 18:20

## 2018-02-17 RX ADMIN — HUMAN INSULIN SCH: 100 INJECTION, SOLUTION SUBCUTANEOUS at 08:34

## 2018-02-17 RX ADMIN — DIPHENHYDRAMINE HYDROCHLORIDE PRN MG: 50 INJECTION INTRAMUSCULAR; INTRAVENOUS at 06:01

## 2018-02-17 RX ADMIN — SULFAMETHOXAZOLE AND TRIMETHOPRIM SCH TAB: 800; 160 TABLET ORAL at 00:03

## 2018-02-17 RX ADMIN — DIPHENHYDRAMINE HYDROCHLORIDE PRN MG: 50 INJECTION INTRAMUSCULAR; INTRAVENOUS at 22:32

## 2018-02-17 RX ADMIN — DIPHENHYDRAMINE HYDROCHLORIDE PRN MG: 50 INJECTION INTRAMUSCULAR; INTRAVENOUS at 02:02

## 2018-02-17 RX ADMIN — DIPHENOXYLATE HYDROCHLORIDE AND ATROPINE SULFATE PRN TAB: 2.5; .025 TABLET ORAL at 06:01

## 2018-02-17 RX ADMIN — DIPHENHYDRAMINE HYDROCHLORIDE PRN MG: 50 INJECTION INTRAMUSCULAR; INTRAVENOUS at 10:21

## 2018-02-17 RX ADMIN — DIPHENOXYLATE HYDROCHLORIDE AND ATROPINE SULFATE PRN TAB: 2.5; .025 TABLET ORAL at 02:04

## 2018-02-17 RX ADMIN — INSULIN DETEMIR SCH UNIT: 100 INJECTION, SOLUTION SUBCUTANEOUS at 10:24

## 2018-02-17 RX ADMIN — OXYBUTYNIN CHLORIDE SCH MG: 10 TABLET, EXTENDED RELEASE ORAL at 10:23

## 2018-02-17 RX ADMIN — DIPHENHYDRAMINE HYDROCHLORIDE PRN MG: 50 INJECTION INTRAMUSCULAR; INTRAVENOUS at 18:20

## 2018-02-17 RX ADMIN — DIPHENHYDRAMINE HYDROCHLORIDE PRN MG: 50 INJECTION INTRAMUSCULAR; INTRAVENOUS at 14:12

## 2018-02-17 RX ADMIN — DIPHENOXYLATE HYDROCHLORIDE AND ATROPINE SULFATE PRN TAB: 2.5; .025 TABLET ORAL at 10:23

## 2018-02-17 RX ADMIN — HUMAN INSULIN SCH UNIT: 100 INJECTION, SOLUTION SUBCUTANEOUS at 18:22

## 2018-02-17 RX ADMIN — SULFAMETHOXAZOLE AND TRIMETHOPRIM SCH TAB: 800; 160 TABLET ORAL at 10:47

## 2018-02-17 RX ADMIN — SULFAMETHOXAZOLE AND TRIMETHOPRIM SCH TAB: 800; 160 TABLET ORAL at 23:41

## 2018-02-17 RX ADMIN — METOPROLOL SUCCINATE SCH: 50 TABLET, EXTENDED RELEASE ORAL at 10:31

## 2018-02-17 RX ADMIN — DIGOXIN SCH: 0.25 TABLET ORAL at 18:22

## 2018-02-17 NOTE — PN
DATE:



LOCATION:  [facility], Bed A.



SUBJECTIVE:  This is a 52 years old female, seen and examined early in

rounds without significant clinical changes specific, current complaint of

some abdominal pain, mainly in the suprapubic area as well as right upper

quadrant area, but less than before with left flank area pain.  The entire

chart is reviewed complete including but not limited to the most recent lab

and radiology study results, current and previous medication list, current

and the previous medical events.  Case was discussed with the staff at

length, and the patient had repeat urinalysis that showed leukocytosis with

many bacteria again.



Today's lab showed blood glucose level is stable at 108, but with abnormal

liver function test as per yesterday treatment results as well as low

hemoglobin and hematocrit, but no leukocytosis with normal platelet count.



The patient denied any chest pain, significant shortness of breath, or

palpation; and denied any chills or fever.



PHYSICAL EXAMINATION:

GENERAL:  A 52-year-old female, awake, alert, and oriented, somewhat

tolerating oral intake, complaining of diarrhea, but less than before.

VITAL SIGNS:  Pulse of 102, afebrile, blood pressure 124/70 with

respiratory rate 20 to 22.

HEENT:  Showed pale dry oral mucous membranes.  Slightly icteric sclerae

bilaterally.

HEART:  Positive S1 and S2.

ABDOMEN:  Soft.  Bowel sounds are present with mild generalized tenderness.

No mass or organomegaly.  No rebound tenderness or guarding.  Mild

abdominal distention noticed.

EXTREMITIES:  With bilateral above knee amputation.

NEUROLOGIC:  No reported new neurological deficits, sensory or motor.  No

reported new focal deficits.



IMPRESSION:

1.  Abnormal liver function test due to most likely hepatic sarcoidosis as

well as possible infectious process inducing abnormal liver function test

including urinary tract infection.

2.  Jaundice secondary to above, due to hepatocellular disease.

3.  Known history of papillary spasm with status post biliary tree stent

insertion before.

4.  Peripheral vascular disease with bilateral above knee amputation.

5.  Neurologic bladder with recurrent urinary tract infection.

6.  Known history of hypertension, diabetes mellitus.

7.  Diarrhea.  Again, that could be secondary to diabetic diarrhea rather

than infectious process.



SUGGESTIONS:

1.  Continue current management.

2.  Repeat stool for Clostridium difficile.

3.  Cholestyramine powder 1 pack three times a day.

4.  Further recommendations to follow.





__________________________________________

Jeffrey Albert MD





DD:  02/17/2018 7:27:56

DT:  02/17/2018 9:26:10

[facility] # [id number]

## 2018-02-17 NOTE — CP.PCM.PN
Subjective





- Date & Time of Evaluation


Date of Evaluation: 02/17/18


Time of Evaluation: 19:00





- Subjective


Subjective: 





Pt seen and examined at bedside ,continues to have loose voluminous stools, 

c.diff is neg, less likely to be c.diff, medically manage pt and monitor 

electrolytes and consult Gi for diarrhea eval and etiology





Objective





- Vital Signs/Intake and Output


Vital Signs (last 24 hours): 


 











Temp Pulse Resp BP Pulse Ox


 


 98.1 F   50 L  20   111/59 L  98 


 


 02/17/18 15:45  02/17/18 15:45  02/17/18 15:45  02/17/18 15:45  02/17/18 15:45











- Medications


Medications: 


 Current Medications





Apixaban (Eliquis)  5 mg PO BID [facility]


   Last Admin: 02/17/18 18:22 Dose:  5 mg


Bismuth Subsalicylate (Pepto-Bismol)  262 mg PO Q8 PRN


   PRN Reason: Diarrhea


   Last Admin: 02/16/18 10:01 Dose:  262 mg


Digoxin (Lanoxin)  0.25 mg PO DAILY@1800 [facility]


   Last Admin: 02/17/18 18:22 Dose:  Not Given


Diphenhydramine HCl (Benadryl)  12.5 mg IVP Q4 PRN


   PRN Reason: Itching / Pruritus


   Last Admin: 02/17/18 18:20 Dose:  12.5 mg


Diphenoxylate HCl/Atropine (Lomotil 0.025-2.5 Mg Tablet)  1 tab PO Q4 PRN


   PRN Reason: Irritable bowel symptoms


   Last Admin: 02/17/18 18:22 Dose:  1 tab


Gabapentin (Neurontin)  100 mg PO BID [facility]


   Last Admin: 02/17/18 18:21 Dose:  100 mg


Insulin Detemir (Levemir)  10 unit SC QAM [facility]


   Last Admin: 02/17/18 10:24 Dose:  10 unit


Insulin Human Regular (Novolin R)  0 unit SC ACHS [facility]


   PRN Reason: Protocol


   Last Admin: 02/17/18 18:22 Dose:  6 unit


Lactobacillus Acidophilus (Bacid Acidophilus)  1 cap PO BID [facility]


   Last Admin: 02/17/18 18:20 Dose:  1 cap


Losartan Potassium (Cozaar)  50 mg PO DAILY [facility]


   Last Admin: 02/17/18 10:24 Dose:  50 mg


Metoprolol Succinate (Toprol Xl)  50 mg PO DAILY [facility]


   Last Admin: 02/17/18 10:31 Dose:  Not Given


Morphine Sulfate (Morphine)  2 mg IVP Q4 PRN


   PRN Reason: Pain, moderate (4-7)


   Stop: 02/20/18 18:00


   Last Admin: 02/17/18 18:21 Dose:  2 mg


Oxybutynin Chloride (Ditropan Xl)  10 mg PO DAILY [facility]


   Last Admin: 02/17/18 10:23 Dose:  10 mg


Pantoprazole Sodium (Protonix Ec Tab)  40 mg PO DAILY [facility]


   Last Admin: 02/17/18 10:24 Dose:  40 mg


Prednisone (Prednisone Tab)  20 mg PO DAILY [facility]


   Last Admin: 02/17/18 10:24 Dose:  20 mg


Sodium Bicarbonate (Sodium Bicarbonate Tab)  650 mg PO BID [facility]


   Last Admin: 02/17/18 18:25 Dose:  650 mg


Trimethoprim/Sulfamethoxazole (Bactrim Ds Tab)  1 tab PO Q12H [facility]


   Last Admin: 02/17/18 10:47 Dose:  1 tab











- Labs


Labs: 


 





 02/17/18 08:13 





 02/17/18 08:13 





 











PT  13.6 SECONDS (9.7-12.2)  H  02/05/18  18:27    


 


INR  1.2   02/05/18  18:27    


 


APTT  54 SECONDS (21-34)  H  02/05/18  18:27    














- Constitutional


Appears: No Acute Distress





- Head Exam


Head Exam: ATRAUMATIC, NORMAL INSPECTION, NORMOCEPHALIC





- Eye Exam


Eye Exam: EOMI, Normal appearance, PERRL


Pupil Exam: NORMAL ACCOMODATION, PERRL





- Respiratory Exam


Respiratory Exam: Clear to Ausculation Bilateral, NORMAL BREATHING PATTERN





- Cardiovascular Exam


Cardiovascular Exam: REGULAR RHYTHM, +S1, +S2.  absent: Murmur





- GI/Abdominal Exam


GI & Abdominal Exam: Soft, Normal Bowel Sounds.  absent: Tenderness





- Rectal Exam


Rectal Exam: Deferred





- Neurological Exam


Neurological Exam: Alert, Awake, CN II-XII Intact, Normal Gait, Oriented x3





- Psychiatric Exam


Psychiatric exam: Normal Affect, Normal Mood





Assessment and Plan


(1) Diarrhea


Status: Acute   





(2) Complicated urinary tract infection


Status: Acute   





(3) Diabetes


Status: Acute   





(4) Hypertension


Status: Acute   





(5) Neurogenic bladder


Status: Acute

## 2018-02-18 RX ADMIN — PANTOPRAZOLE SODIUM SCH MG: 40 TABLET, DELAYED RELEASE ORAL at 11:03

## 2018-02-18 RX ADMIN — Medication SCH CAP: at 11:05

## 2018-02-18 RX ADMIN — DIPHENHYDRAMINE HYDROCHLORIDE PRN MG: 50 INJECTION INTRAMUSCULAR; INTRAVENOUS at 02:46

## 2018-02-18 RX ADMIN — HUMAN INSULIN SCH: 100 INJECTION, SOLUTION SUBCUTANEOUS at 08:12

## 2018-02-18 RX ADMIN — DIPHENHYDRAMINE HYDROCHLORIDE PRN MG: 50 INJECTION INTRAMUSCULAR; INTRAVENOUS at 06:46

## 2018-02-18 RX ADMIN — HUMAN INSULIN SCH UNIT: 100 INJECTION, SOLUTION SUBCUTANEOUS at 12:13

## 2018-02-18 RX ADMIN — HUMAN INSULIN SCH UNIT: 100 INJECTION, SOLUTION SUBCUTANEOUS at 18:27

## 2018-02-18 RX ADMIN — OXYBUTYNIN CHLORIDE SCH MG: 10 TABLET, EXTENDED RELEASE ORAL at 11:03

## 2018-02-18 RX ADMIN — INSULIN DETEMIR SCH UNIT: 100 INJECTION, SOLUTION SUBCUTANEOUS at 11:03

## 2018-02-18 RX ADMIN — DIPHENOXYLATE HYDROCHLORIDE AND ATROPINE SULFATE PRN TAB: 2.5; .025 TABLET ORAL at 11:03

## 2018-02-18 RX ADMIN — DIPHENHYDRAMINE HYDROCHLORIDE PRN MG: 50 INJECTION INTRAMUSCULAR; INTRAVENOUS at 15:13

## 2018-02-18 RX ADMIN — DIPHENOXYLATE HYDROCHLORIDE AND ATROPINE SULFATE PRN TAB: 2.5; .025 TABLET ORAL at 11:07

## 2018-02-18 RX ADMIN — DIGOXIN SCH: 0.25 TABLET ORAL at 18:30

## 2018-02-18 RX ADMIN — SULFAMETHOXAZOLE AND TRIMETHOPRIM SCH TAB: 800; 160 TABLET ORAL at 11:03

## 2018-02-18 RX ADMIN — DIPHENOXYLATE HYDROCHLORIDE AND ATROPINE SULFATE PRN TAB: 2.5; .025 TABLET ORAL at 15:13

## 2018-02-18 RX ADMIN — DIPHENOXYLATE HYDROCHLORIDE AND ATROPINE SULFATE PRN TAB: 2.5; .025 TABLET ORAL at 06:45

## 2018-02-18 RX ADMIN — Medication SCH CAP: at 18:33

## 2018-02-18 RX ADMIN — DIPHENHYDRAMINE HYDROCHLORIDE PRN MG: 50 INJECTION INTRAMUSCULAR; INTRAVENOUS at 11:07

## 2018-02-18 RX ADMIN — METOPROLOL SUCCINATE SCH MG: 50 TABLET, EXTENDED RELEASE ORAL at 11:03

## 2018-02-18 RX ADMIN — SULFAMETHOXAZOLE AND TRIMETHOPRIM SCH: 800; 160 TABLET ORAL at 18:31

## 2018-02-18 RX ADMIN — DIPHENHYDRAMINE HYDROCHLORIDE PRN MG: 50 INJECTION INTRAMUSCULAR; INTRAVENOUS at 19:19

## 2018-02-18 NOTE — PN
DATE:



LOCATION:  9, Bed A.



SUBJECTIVE:  This is a 52-year-old female, seen and examined in rounds,

appeared to be more awake, alert, oriented with less complaint of abdominal

pain, but left flank pain and suprapubic pain.



The entire chart is reviewed including but not limited to the most recent

lab and radiologic study results, current and the previous medication list,

current and the previous medical events.  Case discussed with the staff at

length.



Today's lab showed blood glucose level of 94, rest is still pending and the

patient reported to have low potassium, low hemoglobin and hematocrit, but

normal platelet count with low CO2 content of 16 before, indicative of

metabolic acidosis.



PHYSICAL EXAMINATION:

GENERAL:  A 52-year-old female, still complaining of some abdominal pain

with mild nausea.

VITAL SIGNS:  Afebrile, with pulse of 60, respiratory rate 20 to 22, blood

pressure 124/82.

HEENT:  Showed pale, dry oral mucous membrane, nonicteric sclerae.

LUNGS:  Few scattered crepitation, decreased air entry at bases.

HEART:  Positive S1 and S2.

ABDOMEN:  Soft.  Bowel sounds are present with mild generalized tenderness.

No mass or organomegaly.  No rebound tenderness or guarding, but with

abdominal distention.  The patient experienced some feeling of tenderness

in the mid epigastric and left lower quadrant area as well as the right

upper quadrant area during my physical examination.

EXTREMITIES:  With evidence of bilateral above-knee amputation.

NEUROLOGIC:  No other reported new significant clinical changes or new

neurological deficits, sensory, or motor.



IMPRESSION:

1.  Hepatic sarcoidosis.

2.  Abnormal liver function tests with jaundice secondary to above,

gradually improving.

3.  Diarrhea, functional versus infectious, possibility of diabetic

diarrhea versus collagenous diarrhea was raised.

4.  Known history of peripheral vascular disease with bilateral above-knee

amputation.

5.  Anemia secondary to above.

6.  Known history of diabetes mellitus, hypertension, partial colon

resection, neurogenic bladder, with recurrent urinary tract infection.

7.  Known history of biliary spasm, status post biliary stent insertion.



SUGGESTION:

1.  Continue current management.

2.  Again increase cholestyramine powder to one bag three times a day.

3.  Increase the dose of the steroids IV, then subsequently decrease it

when the patient is more stable clinically.

4.  Further recommendations to follow and no need for aggressive GI workup

in the meantime.





__________________________________________

Jeffrey Albert MD



DD:  02/18/2018 11:15:38

DT:  02/18/2018 12:35:07

[facility] # [id number]

## 2018-02-18 NOTE — CP.PCM.PN
Subjective





- Date & Time of Evaluation


Date of Evaluation: 02/18/18


Time of Evaluation: 17:35





- Subjective


Subjective: 





pt developed dehydartion due to persistent watery stools and hypokalemia, 

c.diff is neg, etiology of diarrhea still not clear





Objective





- Vital Signs/Intake and Output


Vital Signs (last 24 hours): 


 











Temp Pulse Resp BP Pulse Ox


 


 98.5 F   54 L  20   107/56 L  99 


 


 02/18/18 16:00  02/18/18 16:00  02/18/18 16:00  02/18/18 16:00  02/18/18 16:00








Intake and Output: 


 











 02/18/18 02/19/18





 18:59 06:59


 


Intake Total 400 


 


Output Total 500 


 


Balance -100 














- Medications


Medications: 


 Current Medications





Apixaban (Eliquis)  5 mg PO BID [facility]


   Last Admin: 02/18/18 18:28 Dose:  5 mg


Bismuth Subsalicylate (Pepto-Bismol)  262 mg PO Q8 PRN


   PRN Reason: Diarrhea


   Last Admin: 02/16/18 10:01 Dose:  262 mg


Digoxin (Lanoxin)  0.25 mg PO DAILY@1800 [facility]


   Last Admin: 02/18/18 18:30 Dose:  Not Given


Diphenhydramine HCl (Benadryl)  12.5 mg IVP Q4 PRN


   PRN Reason: Itching / Pruritus


   Last Admin: 02/18/18 19:19 Dose:  12.5 mg


Diphenoxylate HCl/Atropine (Lomotil 0.025-2.5 Mg Tablet)  1 tab PO Q4 PRN


   PRN Reason: Irritable bowel symptoms


   Last Admin: 02/18/18 15:13 Dose:  1 tab


Gabapentin (Neurontin)  100 mg PO BID [facility]


   Last Admin: 02/18/18 18:28 Dose:  100 mg


Insulin Detemir (Levemir)  10 unit SC QAM [facility]


   Last Admin: 02/18/18 11:03 Dose:  10 unit


Insulin Human Regular (Novolin R)  0 unit SC ACHS [facility]


   PRN Reason: Protocol


   Last Admin: 02/18/18 18:27 Dose:  4 unit


Lactobacillus Acidophilus (Bacid Acidophilus)  1 cap PO BID [facility]


   Last Admin: 02/18/18 18:33 Dose:  1 cap


Losartan Potassium (Cozaar)  50 mg PO DAILY [facility]


   Last Admin: 02/18/18 11:04 Dose:  50 mg


Metoprolol Succinate (Toprol Xl)  50 mg PO DAILY [facility]


   Last Admin: 02/18/18 11:03 Dose:  50 mg


Morphine Sulfate (Morphine)  2 mg IVP Q4 PRN


   PRN Reason: Pain, moderate (4-7)


   Stop: 02/24/18 18:00


Oxybutynin Chloride (Ditropan Xl)  10 mg PO DAILY [facility]


   Last Admin: 02/18/18 11:03 Dose:  10 mg


Pantoprazole Sodium (Protonix Ec Tab)  40 mg PO DAILY [facility]


   Last Admin: 02/18/18 11:03 Dose:  40 mg


Prednisone (Prednisone Tab)  20 mg PO DAILY [facility]


   Last Admin: 02/18/18 11:03 Dose:  20 mg


Sodium Bicarbonate (Sodium Bicarbonate Tab)  650 mg PO BID [facility]


   Last Admin: 02/18/18 18:28 Dose:  650 mg


Trimethoprim/Sulfamethoxazole (Bactrim Ds Tab)  1 tab PO Q12H [facility]


   Last Admin: 02/18/18 11:03 Dose:  1 tab


Trimethoprim/Sulfamethoxazole (Bactrim Ds Tab)  1 tab PO Q12H [facility]


   Stop: 02/24/18 18:00


   Last Admin: 02/18/18 18:31 Dose:  Not Given











- Labs


Labs: 


 





 02/17/18 08:13 





 02/17/18 08:13 





 











PT  13.6 SECONDS (9.7-12.2)  H  02/05/18  18:27    


 


INR  1.2   02/05/18  18:27    


 


APTT  54 SECONDS (21-34)  H  02/05/18  18:27    














- Constitutional


Appears: No Acute Distress





- Head Exam


Head Exam: ATRAUMATIC, NORMAL INSPECTION, NORMOCEPHALIC





- Eye Exam


Eye Exam: EOMI, Normal appearance, PERRL


Pupil Exam: NORMAL ACCOMODATION, PERRL





- Respiratory Exam


Respiratory Exam: Clear to Ausculation Bilateral, NORMAL BREATHING PATTERN





- Cardiovascular Exam


Cardiovascular Exam: REGULAR RHYTHM, +S1, +S2.  absent: Murmur





- GI/Abdominal Exam


GI & Abdominal Exam: Soft, Normal Bowel Sounds.  absent: Tenderness





Assessment and Plan


(1) Diarrhea


Status: Acute   





(2) Complicated urinary tract infection


Status: Acute   





(3) Diabetes


Status: Acute   





(4) Hypertension


Status: Acute   





(5) Neurogenic bladder


Status: Acute

## 2018-02-19 LAB
BUN SERPL-MCNC: 29 MG/DL (ref 7–17)
CALCIUM SERPL-MCNC: 8.5 MG/DL (ref 8.6–10.4)
GFR NON-AFRICAN AMERICAN: 58

## 2018-02-19 RX ADMIN — SULFAMETHOXAZOLE AND TRIMETHOPRIM SCH: 800; 160 TABLET ORAL at 06:15

## 2018-02-19 RX ADMIN — METHYLPREDNISOLONE SODIUM SUCCINATE SCH MG: 40 INJECTION, POWDER, FOR SOLUTION INTRAMUSCULAR; INTRAVENOUS at 21:43

## 2018-02-19 RX ADMIN — DIPHENHYDRAMINE HYDROCHLORIDE PRN MG: 50 INJECTION INTRAMUSCULAR; INTRAVENOUS at 17:17

## 2018-02-19 RX ADMIN — METOPROLOL SUCCINATE SCH MG: 50 TABLET, EXTENDED RELEASE ORAL at 10:11

## 2018-02-19 RX ADMIN — DIPHENHYDRAMINE HYDROCHLORIDE PRN MG: 50 INJECTION INTRAMUSCULAR; INTRAVENOUS at 12:34

## 2018-02-19 RX ADMIN — HUMAN INSULIN SCH: 100 INJECTION, SOLUTION SUBCUTANEOUS at 08:03

## 2018-02-19 RX ADMIN — INSULIN DETEMIR SCH UNIT: 100 INJECTION, SOLUTION SUBCUTANEOUS at 10:13

## 2018-02-19 RX ADMIN — DIPHENHYDRAMINE HYDROCHLORIDE PRN MG: 50 INJECTION INTRAMUSCULAR; INTRAVENOUS at 08:25

## 2018-02-19 RX ADMIN — METHYLPREDNISOLONE SODIUM SUCCINATE SCH MG: 40 INJECTION, POWDER, FOR SOLUTION INTRAMUSCULAR; INTRAVENOUS at 13:02

## 2018-02-19 RX ADMIN — HUMAN INSULIN SCH UNIT: 100 INJECTION, SOLUTION SUBCUTANEOUS at 17:18

## 2018-02-19 RX ADMIN — PANTOPRAZOLE SODIUM SCH MG: 40 TABLET, DELAYED RELEASE ORAL at 10:11

## 2018-02-19 RX ADMIN — DIPHENHYDRAMINE HYDROCHLORIDE PRN MG: 50 INJECTION INTRAMUSCULAR; INTRAVENOUS at 04:18

## 2018-02-19 RX ADMIN — Medication SCH CAP: at 17:16

## 2018-02-19 RX ADMIN — DIPHENOXYLATE HYDROCHLORIDE AND ATROPINE SULFATE PRN TAB: 2.5; .025 TABLET ORAL at 08:31

## 2018-02-19 RX ADMIN — DIPHENOXYLATE HYDROCHLORIDE AND ATROPINE SULFATE PRN TAB: 2.5; .025 TABLET ORAL at 00:05

## 2018-02-19 RX ADMIN — SULFAMETHOXAZOLE AND TRIMETHOPRIM SCH TAB: 800; 160 TABLET ORAL at 17:16

## 2018-02-19 RX ADMIN — DIPHENHYDRAMINE HYDROCHLORIDE PRN MG: 50 INJECTION INTRAMUSCULAR; INTRAVENOUS at 00:04

## 2018-02-19 RX ADMIN — DIPHENOXYLATE HYDROCHLORIDE AND ATROPINE SULFATE PRN TAB: 2.5; .025 TABLET ORAL at 17:16

## 2018-02-19 RX ADMIN — DIPHENHYDRAMINE HYDROCHLORIDE PRN MG: 50 INJECTION INTRAMUSCULAR; INTRAVENOUS at 21:43

## 2018-02-19 RX ADMIN — SULFAMETHOXAZOLE AND TRIMETHOPRIM SCH TAB: 800; 160 TABLET ORAL at 11:13

## 2018-02-19 RX ADMIN — SULFAMETHOXAZOLE AND TRIMETHOPRIM SCH TAB: 800; 160 TABLET ORAL at 00:10

## 2018-02-19 RX ADMIN — HUMAN INSULIN SCH: 100 INJECTION, SOLUTION SUBCUTANEOUS at 22:00

## 2018-02-19 RX ADMIN — Medication SCH CAP: at 10:11

## 2018-02-19 RX ADMIN — DIGOXIN SCH MG: 0.25 TABLET ORAL at 17:16

## 2018-02-19 RX ADMIN — OXYBUTYNIN CHLORIDE SCH MG: 10 TABLET, EXTENDED RELEASE ORAL at 10:11

## 2018-02-19 RX ADMIN — HUMAN INSULIN SCH UNIT: 100 INJECTION, SOLUTION SUBCUTANEOUS at 12:30

## 2018-02-19 NOTE — CP.PCM.PN
Subjective





- Date & Time of Evaluation


Date of Evaluation: 02/19/18


Time of Evaluation: 21:00





- Subjective


Subjective: 





Pt seen and examined at bedside





Objective





- Vital Signs/Intake and Output


Vital Signs (last 24 hours): 


 











Temp Pulse Resp BP Pulse Ox


 


 98.4 F   60   20   120/74   98 


 


 02/19/18 16:00  02/19/18 17:21  02/19/18 16:00  02/19/18 17:21  02/19/18 16:00











- Medications


Medications: 


 Current Medications





Apixaban (Eliquis)  5 mg PO BID [facility]


   Last Admin: 02/19/18 17:16 Dose:  5 mg


Bismuth Subsalicylate (Pepto-Bismol)  262 mg PO Q8 PRN


   PRN Reason: Diarrhea


   Last Admin: 02/16/18 10:01 Dose:  262 mg


Digoxin (Lanoxin)  0.25 mg PO DAILY@1800 [facility]


   Last Admin: 02/19/18 17:16 Dose:  0.25 mg


Diphenhydramine HCl (Benadryl)  12.5 mg IVP Q4 PRN


   PRN Reason: Itching / Pruritus


   Last Admin: 02/19/18 21:43 Dose:  12.5 mg


Diphenoxylate HCl/Atropine (Lomotil 0.025-2.5 Mg Tablet)  1 tab PO Q4 PRN


   PRN Reason: Irritable bowel symptoms


   Last Admin: 02/19/18 17:16 Dose:  1 tab


Gabapentin (Neurontin)  100 mg PO BID [facility]


   Last Admin: 02/19/18 17:15 Dose:  100 mg


Insulin Detemir (Levemir)  10 unit SC QAM [facility]


   Last Admin: 02/19/18 10:13 Dose:  10 unit


Insulin Human Regular (Novolin R)  0 unit SC ACHS [facility]


   PRN Reason: Protocol


   Last Admin: 02/19/18 17:18 Dose:  4 unit


Lactobacillus Acidophilus (Bacid Acidophilus)  1 cap PO BID [facility]


   Last Admin: 02/19/18 17:16 Dose:  1 cap


Losartan Potassium (Cozaar)  50 mg PO DAILY [facility]


   Last Admin: 02/19/18 10:11 Dose:  50 mg


Methylprednisolone (Solu-Medrol)  30 mg IVP Q8H [facility]


   Last Admin: 02/19/18 21:43 Dose:  30 mg


Metoprolol Succinate (Toprol Xl)  50 mg PO DAILY [facility]


   Last Admin: 02/19/18 10:11 Dose:  50 mg


Morphine Sulfate (Morphine)  2 mg IVP Q4 PRN


   PRN Reason: Pain, moderate (4-7)


   Stop: 02/24/18 18:00


   Last Admin: 02/19/18 21:43 Dose:  2 mg


Oxybutynin Chloride (Ditropan Xl)  10 mg PO DAILY [facility]


   Last Admin: 02/19/18 10:11 Dose:  10 mg


Pantoprazole Sodium (Protonix Ec Tab)  40 mg PO DAILY [facility]


   Last Admin: 02/19/18 10:11 Dose:  40 mg


Sodium Bicarbonate (Sodium Bicarbonate Tab)  650 mg PO BID [facility]


   Last Admin: 02/19/18 17:15 Dose:  650 mg


Trimethoprim/Sulfamethoxazole (Bactrim Ds Tab)  1 tab PO Q12H [facility]


   Stop: 02/24/18 18:00


   Last Admin: 02/19/18 17:16 Dose:  1 tab











- Labs


Labs: 


 





 02/17/18 08:13 





 02/19/18 06:34 





 











PT  13.6 SECONDS (9.7-12.2)  H  02/05/18  18:27    


 


INR  1.2   02/05/18  18:27    


 


APTT  54 SECONDS (21-34)  H  02/05/18  18:27    














Assessment and Plan


(1) Diarrhea


Status: Acute   





(2) Complicated urinary tract infection


Status: Acute   





(3) Diabetes


Status: Acute   





(4) Hypertension


Status: Acute   





(5) Neurogenic bladder


Status: Acute

## 2018-02-19 NOTE — PN
DATE:  02/19/2018



LOCATION:  569, bed A.



SUBJECTIVE:  This is a 52-year-old seen and examined in rounds.  Case

discussed with the staff at length with recurrent complaint again of

abdominal pain with diarrhea, not responding to current treatment.  I

instructed the medical staff to increase the Solu Medrol to 30 IV piggyback

q.8 for two days, then reduce it to 20 IV piggyback q.8 hours for two days.



The entire chart is reviewed including, but not limited to the most recent

lab and radiology study results, current and the previous medication list,

current and the previous medical events.



LABORATORY DATA:  Today's lab showed CO2 content of 60, indicative of

metabolic acidosis.  Increased BUN of 29 with normal creatinine.  Blood

glucose level 173 with recent reported low hemoglobin and hematocrit, but

normal platelet count.



The patient denies any chest pain, significant complaint of shortness of

breath, palpitation, and no reported chills or fever, but less oral intake,

and mildly distended abdomen more than before.



PHYSICAL EXAMINATION:

GENERAL:  A 52-year-old female, awake, alert, and oriented, complaining of

perfused diarrhea.

VITAL SIGNS:  Afebrile, with pulse of 64, respiratory rate 20 to 22, blood

pressure 120/66.

HEENT:  Showed pale dry mucous membranes with bilateral icteric sclerae.

LUNGS:  Scattered mild crepitations, decreased air entry at bases

bilaterally.

HEART:  Positive S1 and S2.

ABDOMEN:  Soft with generalized tenderness, but mainly in the mid

epigastric and right upper quadrant area with slight distention. 

Hyperactive bowel sounds.  No mass or organomegaly.  No rebound tenderness

or guarding.

RECTAL:  _____ tenderness.  The patient has liquid fecal material.

EXTREMITIES:  With evidence of bilateral above-knee amputation.  No

reported clubbing or cyanosis.

NEUROLOGIC:  No reported new neurological deficits, sensory or motor.



IMPRESSION:

1.  Hepatic sarcoidosis with abnormal liver function test and jaundice

secondary to hepatocellular injury by her primary diagnosis.

2.  Diarrhea, infectious versus mechanical, the possibility of diabetic

diarrhea versus collagenous colitis view was raised as well as possible

short bowel syndrome, status post partial colon resection before.

3.  Poorly controlled diabetes mellitus.

4.  Hypertension by history.

4.  Coronary artery disease.

5.  Neurogenic bladder with recurrent urinary tract infection.

6.  Peptic ulcer disease by history.

7.  Anemia secondary to above.

8.  Peripheral vascular disease with bilateral above-knee amputation by

history.



SUGGESTIONS:

1.  Continue current management.

2.  As above.

3.  If the patient's symptoms persist, then colonoscopy to be scheduled,

otherwise close observation to follow.





__________________________________________

Jeffrey Albert MD



DD:  02/19/2018 12:20:51

DT:  02/19/2018 13:02:20

Job # [id number]

## 2018-02-20 LAB
ALBUMIN SERPL-MCNC: 3.6 G/DL (ref 3.5–5)
ALBUMIN/GLOB SERPL: 1 {RATIO} (ref 1–2.1)
ALT SERPL-CCNC: 167 U/L (ref 9–52)
AST SERPL-CCNC: 97 U/L (ref 14–36)
BASOPHILS # BLD AUTO: 0.1 K/UL (ref 0–0.2)
BASOPHILS NFR BLD: 0 % (ref 0–2)
BUN SERPL-MCNC: 32 MG/DL (ref 7–17)
CALCIUM SERPL-MCNC: 9.1 MG/DL (ref 8.6–10.4)
EOSINOPHIL # BLD AUTO: 0 K/UL (ref 0–0.7)
EOSINOPHIL NFR BLD: 0 % (ref 0–4)
ERYTHROCYTE [DISTWIDTH] IN BLOOD BY AUTOMATED COUNT: 22 % (ref 11.5–14.5)
GFR NON-AFRICAN AMERICAN: 52
HGB BLD-MCNC: 11.1 G/DL (ref 11–16)
LYMPHOCYTES # BLD AUTO: 1 K/UL (ref 1–4.3)
LYMPHOCYTES NFR BLD AUTO: 10 % (ref 20–40)
MCH RBC QN AUTO: 31.4 PG (ref 27–31)
MCHC RBC AUTO-ENTMCNC: 32.8 G/DL (ref 33–37)
MCV RBC AUTO: 95.8 FL (ref 81–99)
MONOCYTES # BLD: 0.4 K/UL (ref 0–0.8)
MONOCYTES NFR BLD: 4 % (ref 0–10)
NEUTROPHILS # BLD: 8.2 K/UL (ref 1.8–7)
NEUTROPHILS NFR BLD AUTO: 85 % (ref 50–75)
NRBC BLD AUTO-RTO: 0 % (ref 0–2)
PLATELET # BLD: 238 K/UL (ref 130–400)
PMV BLD AUTO: 9.7 FL (ref 7.2–11.7)
RBC # BLD AUTO: 3.55 MIL/UL (ref 3.8–5.2)
WBC # BLD AUTO: 9.7 K/UL (ref 4.8–10.8)

## 2018-02-20 RX ADMIN — HUMAN INSULIN SCH UNIT: 100 INJECTION, SOLUTION SUBCUTANEOUS at 08:45

## 2018-02-20 RX ADMIN — DIPHENHYDRAMINE HYDROCHLORIDE PRN MG: 50 INJECTION INTRAMUSCULAR; INTRAVENOUS at 22:21

## 2018-02-20 RX ADMIN — HUMAN INSULIN SCH UNIT: 100 INJECTION, SOLUTION SUBCUTANEOUS at 17:10

## 2018-02-20 RX ADMIN — DIPHENHYDRAMINE HYDROCHLORIDE PRN MG: 50 INJECTION INTRAMUSCULAR; INTRAVENOUS at 18:21

## 2018-02-20 RX ADMIN — METOPROLOL SUCCINATE SCH MG: 50 TABLET, EXTENDED RELEASE ORAL at 10:26

## 2018-02-20 RX ADMIN — METHYLPREDNISOLONE SODIUM SUCCINATE SCH MG: 40 INJECTION, POWDER, FOR SOLUTION INTRAMUSCULAR; INTRAVENOUS at 04:16

## 2018-02-20 RX ADMIN — OXYBUTYNIN CHLORIDE SCH MG: 10 TABLET, EXTENDED RELEASE ORAL at 10:52

## 2018-02-20 RX ADMIN — SULFAMETHOXAZOLE AND TRIMETHOPRIM SCH TAB: 800; 160 TABLET ORAL at 06:06

## 2018-02-20 RX ADMIN — INSULIN DETEMIR SCH UNIT: 100 INJECTION, SOLUTION SUBCUTANEOUS at 10:26

## 2018-02-20 RX ADMIN — Medication SCH CAP: at 10:52

## 2018-02-20 RX ADMIN — DIPHENHYDRAMINE HYDROCHLORIDE PRN MG: 50 INJECTION INTRAMUSCULAR; INTRAVENOUS at 14:15

## 2018-02-20 RX ADMIN — METHYLPREDNISOLONE SODIUM SUCCINATE SCH MG: 40 INJECTION, POWDER, FOR SOLUTION INTRAMUSCULAR; INTRAVENOUS at 13:15

## 2018-02-20 RX ADMIN — PANTOPRAZOLE SODIUM SCH MG: 40 TABLET, DELAYED RELEASE ORAL at 10:26

## 2018-02-20 RX ADMIN — DIPHENHYDRAMINE HYDROCHLORIDE PRN MG: 50 INJECTION INTRAMUSCULAR; INTRAVENOUS at 10:27

## 2018-02-20 RX ADMIN — DIPHENHYDRAMINE HYDROCHLORIDE PRN MG: 50 INJECTION INTRAMUSCULAR; INTRAVENOUS at 01:58

## 2018-02-20 RX ADMIN — DIPHENHYDRAMINE HYDROCHLORIDE PRN MG: 50 INJECTION INTRAMUSCULAR; INTRAVENOUS at 06:07

## 2018-02-20 RX ADMIN — DIPHENOXYLATE HYDROCHLORIDE AND ATROPINE SULFATE PRN TAB: 2.5; .025 TABLET ORAL at 10:25

## 2018-02-20 RX ADMIN — Medication SCH CAP: at 18:36

## 2018-02-20 RX ADMIN — HUMAN INSULIN SCH UNIT: 100 INJECTION, SOLUTION SUBCUTANEOUS at 12:15

## 2018-02-20 RX ADMIN — DIGOXIN SCH MG: 0.25 TABLET ORAL at 18:36

## 2018-02-20 RX ADMIN — METHYLPREDNISOLONE SODIUM SUCCINATE SCH MG: 40 INJECTION, POWDER, FOR SOLUTION INTRAMUSCULAR; INTRAVENOUS at 19:45

## 2018-02-20 RX ADMIN — HUMAN INSULIN SCH UNIT: 100 INJECTION, SOLUTION SUBCUTANEOUS at 22:01

## 2018-02-20 RX ADMIN — SULFAMETHOXAZOLE AND TRIMETHOPRIM SCH TAB: 800; 160 TABLET ORAL at 18:40

## 2018-02-20 NOTE — CP.PCM.PN
Subjective





- Date & Time of Evaluation


Date of Evaluation: 02/20/18


Time of Evaluation: 19:40





- Subjective


Subjective: 





Pt is seen and examined, diarrhea is improved today, afebrile. no shortness of 

breath pt is on medical management of dehydration, hepatic sarcoidosis, colitis

, diabetes, HTn





Objective





- Vital Signs/Intake and Output


Vital Signs (last 24 hours): 


 











Temp Pulse Resp BP Pulse Ox


 


 98.0 F   64   20   110/68   98 


 


 02/20/18 15:49  02/20/18 15:49  02/20/18 15:49  02/20/18 15:49  02/20/18 15:49








Intake and Output: 


 











 02/20/18 02/21/18





 18:59 06:59


 


Intake Total 600 1000


 


Output Total 800 800


 


Balance -200 200














- Medications


Medications: 


 Current Medications





Apixaban (Eliquis)  5 mg PO BID [facility]


   Last Admin: 02/20/18 18:36 Dose:  5 mg


Bismuth Subsalicylate (Pepto-Bismol)  262 mg PO Q8 PRN


   PRN Reason: Diarrhea


   Last Admin: 02/16/18 10:01 Dose:  262 mg


Digoxin (Lanoxin)  0.25 mg PO DAILY@1800 [facility]


   Last Admin: 02/20/18 18:36 Dose:  0.25 mg


Diphenhydramine HCl (Benadryl)  12.5 mg IVP Q4 PRN


   PRN Reason: Itching / Pruritus


   Last Admin: 02/20/18 22:21 Dose:  12.5 mg


Diphenoxylate HCl/Atropine (Lomotil 0.025-2.5 Mg Tablet)  1 tab PO Q4 PRN


   PRN Reason: Irritable bowel symptoms


   Last Admin: 02/20/18 10:25 Dose:  1 tab


Gabapentin (Neurontin)  100 mg PO BID [facility]


   Last Admin: 02/20/18 18:36 Dose:  100 mg


Insulin Detemir (Levemir)  10 unit SC QAM [facility]


   Last Admin: 02/20/18 10:26 Dose:  10 unit


Insulin Human Regular (Novolin R)  0 unit SC ACHS [facility]


   PRN Reason: Protocol


   Last Admin: 02/20/18 22:01 Dose:  3 unit


Lactobacillus Acidophilus (Bacid Acidophilus)  1 cap PO BID [facility]


   Last Admin: 02/20/18 18:36 Dose:  1 cap


Losartan Potassium (Cozaar)  50 mg PO DAILY [facility]


   Last Admin: 02/20/18 10:26 Dose:  50 mg


Methylprednisolone (Solu-Medrol)  30 mg IVP Q8H [facility]


   Last Admin: 02/20/18 19:45 Dose:  30 mg


Metoprolol Succinate (Toprol Xl)  50 mg PO DAILY [facility]


   Last Admin: 02/20/18 10:26 Dose:  50 mg


Morphine Sulfate (Morphine)  2 mg IVP Q4 PRN


   PRN Reason: Pain, moderate (4-7)


   Stop: 02/24/18 18:00


   Last Admin: 02/20/18 22:23 Dose:  2 mg


Oxybutynin Chloride (Ditropan Xl)  10 mg PO DAILY [facility]


   Last Admin: 02/20/18 10:52 Dose:  10 mg


Pantoprazole Sodium (Protonix Ec Tab)  40 mg PO DAILY [facility]


   Last Admin: 02/20/18 10:26 Dose:  40 mg


Sodium Bicarbonate (Sodium Bicarbonate Tab)  650 mg PO BID [facility]


   Last Admin: 02/20/18 18:38 Dose:  650 mg


Trimethoprim/Sulfamethoxazole (Bactrim Ds Tab)  1 tab PO Q12H [facility]


   Stop: 02/24/18 18:00


   Last Admin: 02/20/18 18:40 Dose:  1 tab











- Labs


Labs: 


 





 02/20/18 07:16 





 02/20/18 07:16 





 











PT  13.6 SECONDS (9.7-12.2)  H  02/05/18  18:27    


 


INR  1.2   02/05/18  18:27    


 


APTT  54 SECONDS (21-34)  H  02/05/18  18:27    














- Constitutional


Appears: No Acute Distress, Chronically Ill





- Head Exam


Head Exam: ATRAUMATIC, NORMAL INSPECTION, NORMOCEPHALIC





- Eye Exam


Eye Exam: EOMI, Normal appearance, PERRL


Pupil Exam: NORMAL ACCOMODATION, PERRL





- Respiratory Exam


Respiratory Exam: Clear to Ausculation Bilateral, NORMAL BREATHING PATTERN





- Cardiovascular Exam


Cardiovascular Exam: REGULAR RHYTHM, +S1, +S2.  absent: Murmur





- GI/Abdominal Exam


GI & Abdominal Exam: Soft, Hyperactive Bowel Sounds.  absent: Tenderness





- Neurological Exam


Neurological Exam: Alert, Oriented x3





- Psychiatric Exam


Psychiatric exam: Anxious





Assessment and Plan


(1) Diarrhea


Status: Acute   





(2) Complicated urinary tract infection


Status: Acute   





(3) Diabetes


Status: Acute   





(4) Hypertension


Status: Acute   





(5) Neurogenic bladder


Status: Acute

## 2018-02-21 LAB
ALBUMIN SERPL-MCNC: 3.4 G/DL (ref 3.5–5)
ALBUMIN/GLOB SERPL: 1 {RATIO} (ref 1–2.1)
ALT SERPL-CCNC: 154 U/L (ref 9–52)
ANISOCYTOSIS BLD QL SMEAR: (no result)
AST SERPL-CCNC: 93 U/L (ref 14–36)
BASOPHILS # BLD AUTO: 0 K/UL (ref 0–0.2)
BASOPHILS NFR BLD: 0 % (ref 0–2)
BUN SERPL-MCNC: 41 MG/DL (ref 7–17)
BUN SERPL-MCNC: 42 MG/DL (ref 7–17)
BURR CELLS BLD QL SMEAR: SLIGHT
CALCIUM SERPL-MCNC: 8.5 MG/DL (ref 8.6–10.4)
CALCIUM SERPL-MCNC: 9.1 MG/DL (ref 8.6–10.4)
EOSINOPHIL # BLD AUTO: 0 K/UL (ref 0–0.7)
EOSINOPHIL NFR BLD: 0 % (ref 0–4)
ERYTHROCYTE [DISTWIDTH] IN BLOOD BY AUTOMATED COUNT: 22.2 % (ref 11.5–14.5)
GFR NON-AFRICAN AMERICAN: 52
GFR NON-AFRICAN AMERICAN: > 60
HGB BLD-MCNC: 10.8 G/DL (ref 11–16)
LYMPHOCYTE: 5 % (ref 20–40)
LYMPHOCYTES # BLD AUTO: 0.7 K/UL (ref 1–4.3)
LYMPHOCYTES NFR BLD AUTO: 6 % (ref 20–40)
MCH RBC QN AUTO: 32 PG (ref 27–31)
MCHC RBC AUTO-ENTMCNC: 33 G/DL (ref 33–37)
MCV RBC AUTO: 96.9 FL (ref 81–99)
MONOCYTE: 3 % (ref 0–10)
MONOCYTES # BLD: 0.6 K/UL (ref 0–0.8)
MONOCYTES NFR BLD: 5 % (ref 0–10)
NEUTROPHILS # BLD: 10.2 K/UL (ref 1.8–7)
NEUTROPHILS NFR BLD AUTO: 89 % (ref 50–75)
NEUTROPHILS NFR BLD AUTO: 92 % (ref 50–75)
NRBC BLD AUTO-RTO: 0 % (ref 0–2)
PLATELET # BLD EST: NORMAL 10*3/UL
PLATELET # BLD: 196 K/UL (ref 130–400)
PMV BLD AUTO: 9.4 FL (ref 7.2–11.7)
POIKILOCYTOSIS BLD QL SMEAR: SLIGHT
RBC # BLD AUTO: 3.38 MIL/UL (ref 3.8–5.2)
TARGETS BLD QL SMEAR: (no result)
TOTAL CELLS COUNTED BLD: 100
WBC # BLD AUTO: 11.5 K/UL (ref 4.8–10.8)

## 2018-02-21 RX ADMIN — METOPROLOL SUCCINATE SCH MG: 50 TABLET, EXTENDED RELEASE ORAL at 09:25

## 2018-02-21 RX ADMIN — INSULIN DETEMIR SCH UNIT: 100 INJECTION, SOLUTION SUBCUTANEOUS at 09:27

## 2018-02-21 RX ADMIN — HUMAN INSULIN SCH UNIT: 100 INJECTION, SOLUTION SUBCUTANEOUS at 21:41

## 2018-02-21 RX ADMIN — DIPHENHYDRAMINE HYDROCHLORIDE PRN MG: 50 INJECTION INTRAMUSCULAR; INTRAVENOUS at 00:51

## 2018-02-21 RX ADMIN — HUMAN INSULIN SCH UNIT: 100 INJECTION, SOLUTION SUBCUTANEOUS at 08:15

## 2018-02-21 RX ADMIN — SULFAMETHOXAZOLE AND TRIMETHOPRIM SCH TAB: 800; 160 TABLET ORAL at 05:18

## 2018-02-21 RX ADMIN — METHYLPREDNISOLONE SODIUM SUCCINATE SCH MG: 40 INJECTION, POWDER, FOR SOLUTION INTRAMUSCULAR; INTRAVENOUS at 12:26

## 2018-02-21 RX ADMIN — HUMAN INSULIN SCH UNIT: 100 INJECTION, SOLUTION SUBCUTANEOUS at 17:41

## 2018-02-21 RX ADMIN — DIPHENHYDRAMINE HYDROCHLORIDE PRN MG: 50 INJECTION INTRAMUSCULAR; INTRAVENOUS at 17:43

## 2018-02-21 RX ADMIN — DIPHENHYDRAMINE HYDROCHLORIDE PRN MG: 50 INJECTION INTRAMUSCULAR; INTRAVENOUS at 21:32

## 2018-02-21 RX ADMIN — Medication SCH CAP: at 09:25

## 2018-02-21 RX ADMIN — DIPHENHYDRAMINE HYDROCHLORIDE PRN MG: 50 INJECTION INTRAMUSCULAR; INTRAVENOUS at 04:51

## 2018-02-21 RX ADMIN — DIPHENOXYLATE HYDROCHLORIDE AND ATROPINE SULFATE PRN TAB: 2.5; .025 TABLET ORAL at 09:25

## 2018-02-21 RX ADMIN — Medication SCH CAP: at 17:42

## 2018-02-21 RX ADMIN — HUMAN INSULIN SCH UNIT: 100 INJECTION, SOLUTION SUBCUTANEOUS at 12:25

## 2018-02-21 RX ADMIN — DIPHENOXYLATE HYDROCHLORIDE AND ATROPINE SULFATE PRN TAB: 2.5; .025 TABLET ORAL at 17:48

## 2018-02-21 RX ADMIN — METHYLPREDNISOLONE SODIUM SUCCINATE SCH MG: 40 INJECTION, POWDER, FOR SOLUTION INTRAMUSCULAR; INTRAVENOUS at 04:50

## 2018-02-21 RX ADMIN — OXYBUTYNIN CHLORIDE SCH MG: 10 TABLET, EXTENDED RELEASE ORAL at 09:25

## 2018-02-21 RX ADMIN — DIGOXIN SCH MG: 0.25 TABLET ORAL at 17:42

## 2018-02-21 RX ADMIN — DIPHENHYDRAMINE HYDROCHLORIDE PRN MG: 50 INJECTION INTRAMUSCULAR; INTRAVENOUS at 09:27

## 2018-02-21 RX ADMIN — DIPHENHYDRAMINE HYDROCHLORIDE PRN MG: 50 INJECTION INTRAMUSCULAR; INTRAVENOUS at 13:49

## 2018-02-21 RX ADMIN — METHYLPREDNISOLONE SODIUM SUCCINATE SCH MG: 40 INJECTION, POWDER, FOR SOLUTION INTRAMUSCULAR; INTRAVENOUS at 21:31

## 2018-02-21 RX ADMIN — PANTOPRAZOLE SODIUM SCH MG: 40 TABLET, DELAYED RELEASE ORAL at 09:25

## 2018-02-21 RX ADMIN — SULFAMETHOXAZOLE AND TRIMETHOPRIM SCH TAB: 800; 160 TABLET ORAL at 17:42

## 2018-02-21 NOTE — CP.PCM.PN
Subjective





- Date & Time of Evaluation


Date of Evaluation: 02/21/18


Time of Evaluation: 18:45





- Subjective


Subjective: 





Pt seen and examined, she continues to improve, neg stool culture for C.Diff, 

decrease loose bowel movements





Objective





- Vital Signs/Intake and Output


Vital Signs (last 24 hours): 


 











Temp Pulse Resp BP Pulse Ox


 


 98.1 F   72   20   104/58 L  100 


 


 02/21/18 16:36  02/21/18 16:36  02/21/18 16:36  02/21/18 16:36  02/21/18 16:36








Intake and Output: 


 











 02/21/18 02/22/18





 18:59 06:59


 


Intake Total 800 700


 


Output Total 900 800


 


Balance -100 -100














- Medications


Medications: 


 Current Medications





Apixaban (Eliquis)  5 mg PO BID [facility]


   Last Admin: 02/21/18 17:42 Dose:  5 mg


Bismuth Subsalicylate (Pepto-Bismol)  262 mg PO Q8 PRN


   PRN Reason: Diarrhea


   Last Admin: 02/16/18 10:01 Dose:  262 mg


Digoxin (Lanoxin)  0.25 mg PO DAILY@1800 [facility]


   Last Admin: 02/21/18 17:42 Dose:  0.25 mg


Diphenhydramine HCl (Benadryl)  12.5 mg IVP Q4 PRN


   PRN Reason: Itching / Pruritus


   Last Admin: 02/21/18 21:32 Dose:  12.5 mg


Diphenoxylate HCl/Atropine (Lomotil 0.025-2.5 Mg Tablet)  1 tab PO Q4 PRN


   PRN Reason: Irritable bowel symptoms


   Last Admin: 02/21/18 17:48 Dose:  1 tab


Gabapentin (Neurontin)  100 mg PO BID [facility]


   Last Admin: 02/21/18 17:42 Dose:  100 mg


Sodium Chloride (Sodium Chloride 0.9%)  1,000 mls @ 50 mls/hr IV .Q20H [facility]


   Stop: 02/22/18 08:48


   Last Admin: 02/21/18 21:31 Dose:  50 mls/hr


Insulin Detemir (Levemir)  10 unit SC QAM [facility]


   Last Admin: 02/21/18 09:27 Dose:  10 unit


Insulin Human Regular (Novolin R)  0 unit SC ACHS [facility]


   PRN Reason: Protocol


   Last Admin: 02/21/18 21:41 Dose:  4 unit


Lactobacillus Acidophilus (Bacid Acidophilus)  1 cap PO BID [facility]


   Last Admin: 02/21/18 17:42 Dose:  1 cap


Losartan Potassium (Cozaar)  50 mg PO DAILY [facility]


   Last Admin: 02/21/18 09:25 Dose:  50 mg


Methylprednisolone (Solu-Medrol)  20 mg IVP Q8H [facility]


   Last Admin: 02/21/18 21:31 Dose:  20 mg


Metoprolol Succinate (Toprol Xl)  50 mg PO DAILY [facility]


   Last Admin: 02/21/18 09:25 Dose:  50 mg


Morphine Sulfate (Morphine)  2 mg IVP Q4 PRN


   PRN Reason: Pain, moderate (4-7)


   Stop: 02/24/18 18:00


   Last Admin: 02/21/18 21:32 Dose:  2 mg


Oxybutynin Chloride (Ditropan Xl)  10 mg PO DAILY [facility]


   Last Admin: 02/21/18 09:25 Dose:  10 mg


Pantoprazole Sodium (Protonix Ec Tab)  40 mg PO DAILY [facility]


   Last Admin: 02/21/18 09:25 Dose:  40 mg


Sodium Bicarbonate (Sodium Bicarbonate Tab)  650 mg PO BID [facility]


   Last Admin: 02/21/18 17:42 Dose:  650 mg


Trimethoprim/Sulfamethoxazole (Bactrim Ds Tab)  1 tab PO Q12H [facility]


   Stop: 02/24/18 18:00


   Last Admin: 02/21/18 17:42 Dose:  1 tab











- Labs


Labs: 


 





 02/21/18 07:11 





 02/21/18 17:27 





 











PT  13.6 SECONDS (9.7-12.2)  H  02/05/18  18:27    


 


INR  1.2   02/05/18  18:27    


 


APTT  54 SECONDS (21-34)  H  02/05/18  18:27    














- Constitutional


Appears: No Acute Distress





- Head Exam


Head Exam: ATRAUMATIC, NORMAL INSPECTION, NORMOCEPHALIC





- Eye Exam


Eye Exam: EOMI, Normal appearance, PERRL


Pupil Exam: NORMAL ACCOMODATION, PERRL





- Respiratory Exam


Respiratory Exam: Clear to Ausculation Bilateral, NORMAL BREATHING PATTERN





- Cardiovascular Exam


Cardiovascular Exam: REGULAR RHYTHM, +S1, +S2.  absent: Murmur





- GI/Abdominal Exam


GI & Abdominal Exam: Soft, Hyperactive Bowel Sounds





Assessment and Plan


(1) Diarrhea


Status: Acute   





(2) Complicated urinary tract infection


Status: Acute   





(3) Diabetes


Status: Acute   





(4) Hypertension


Status: Acute   





(5) Neurogenic bladder


Status: Acute

## 2018-02-22 LAB
ALBUMIN SERPL-MCNC: 3.1 G/DL (ref 3.5–5)
ALBUMIN/GLOB SERPL: 1.1 {RATIO} (ref 1–2.1)
ALT SERPL-CCNC: 159 U/L (ref 9–52)
ANISOCYTOSIS BLD QL SMEAR: (no result)
AST SERPL-CCNC: 94 U/L (ref 14–36)
BASOPHILS # BLD AUTO: 0 K/UL (ref 0–0.2)
BASOPHILS NFR BLD: 0 % (ref 0–2)
BUN SERPL-MCNC: 37 MG/DL (ref 7–17)
BUN SERPL-MCNC: 41 MG/DL (ref 7–17)
BURR CELLS BLD QL SMEAR: SLIGHT
CALCIUM SERPL-MCNC: 8.4 MG/DL (ref 8.6–10.4)
CALCIUM SERPL-MCNC: 8.8 MG/DL (ref 8.6–10.4)
CK MB SERPL-MCNC: 1.15 NG/ML (ref 0–3.38)
EOSINOPHIL # BLD AUTO: 0 K/UL (ref 0–0.7)
EOSINOPHIL NFR BLD: 0 % (ref 0–4)
ERYTHROCYTE [DISTWIDTH] IN BLOOD BY AUTOMATED COUNT: 22.8 % (ref 11.5–14.5)
GFR NON-AFRICAN AMERICAN: > 60
GFR NON-AFRICAN AMERICAN: > 60
HGB BLD-MCNC: 10.8 G/DL (ref 11–16)
LYMPHOCYTE: 5 % (ref 20–40)
LYMPHOCYTES # BLD AUTO: 0.4 K/UL (ref 1–4.3)
LYMPHOCYTES NFR BLD AUTO: 4 % (ref 20–40)
MCH RBC QN AUTO: 32.2 PG (ref 27–31)
MCHC RBC AUTO-ENTMCNC: 33.1 G/DL (ref 33–37)
MCV RBC AUTO: 97.3 FL (ref 81–99)
MONOCYTES # BLD: 0.2 K/UL (ref 0–0.8)
MONOCYTES NFR BLD: 2 % (ref 0–10)
MYELOCYTES NFR BLD: 1 % (ref 0–0)
NEUTROPHILS # BLD: 9.4 K/UL (ref 1.8–7)
NEUTROPHILS NFR BLD AUTO: 93 % (ref 50–75)
NEUTROPHILS NFR BLD AUTO: 94 % (ref 50–75)
NEUTS BAND NFR BLD: 1 % (ref 0–2)
NRBC BLD AUTO-RTO: 0 % (ref 0–2)
PLATELET # BLD EST: NORMAL 10*3/UL
PLATELET # BLD: 176 K/UL (ref 130–400)
PMV BLD AUTO: 9.5 FL (ref 7.2–11.7)
RBC # BLD AUTO: 3.36 MIL/UL (ref 3.8–5.2)
SCHISTOCYTES BLD QL SMEAR: SLIGHT
TARGETS BLD QL SMEAR: (no result)
TOTAL CELLS COUNTED BLD: 100
WBC # BLD AUTO: 10 K/UL (ref 4.8–10.8)

## 2018-02-22 RX ADMIN — METOPROLOL SUCCINATE SCH MG: 50 TABLET, EXTENDED RELEASE ORAL at 09:17

## 2018-02-22 RX ADMIN — OXYBUTYNIN CHLORIDE SCH MG: 10 TABLET, EXTENDED RELEASE ORAL at 12:15

## 2018-02-22 RX ADMIN — METHYLPREDNISOLONE SODIUM SUCCINATE SCH MG: 40 INJECTION, POWDER, FOR SOLUTION INTRAMUSCULAR; INTRAVENOUS at 11:06

## 2018-02-22 RX ADMIN — Medication SCH CAP: at 12:15

## 2018-02-22 RX ADMIN — HUMAN INSULIN SCH UNIT: 100 INJECTION, SOLUTION SUBCUTANEOUS at 12:39

## 2018-02-22 RX ADMIN — DIPHENHYDRAMINE HYDROCHLORIDE PRN MG: 50 INJECTION INTRAMUSCULAR; INTRAVENOUS at 11:06

## 2018-02-22 RX ADMIN — HUMAN INSULIN SCH: 100 INJECTION, SOLUTION SUBCUTANEOUS at 22:14

## 2018-02-22 RX ADMIN — METHYLPREDNISOLONE SODIUM SUCCINATE SCH MG: 40 INJECTION, POWDER, FOR SOLUTION INTRAMUSCULAR; INTRAVENOUS at 20:11

## 2018-02-22 RX ADMIN — PANTOPRAZOLE SODIUM SCH MG: 40 TABLET, DELAYED RELEASE ORAL at 09:17

## 2018-02-22 RX ADMIN — HUMAN INSULIN SCH UNIT: 100 INJECTION, SOLUTION SUBCUTANEOUS at 17:10

## 2018-02-22 RX ADMIN — Medication SCH CAP: at 18:50

## 2018-02-22 RX ADMIN — SODIUM BICARBONATE SCH MLS/HR: 84 INJECTION INTRAVENOUS at 13:28

## 2018-02-22 RX ADMIN — INSULIN DETEMIR SCH UNIT: 100 INJECTION, SOLUTION SUBCUTANEOUS at 09:17

## 2018-02-22 RX ADMIN — DIGOXIN SCH MG: 0.25 TABLET ORAL at 18:50

## 2018-02-22 RX ADMIN — SULFAMETHOXAZOLE AND TRIMETHOPRIM SCH TAB: 800; 160 TABLET ORAL at 05:44

## 2018-02-22 RX ADMIN — HUMAN INSULIN SCH UNIT: 100 INJECTION, SOLUTION SUBCUTANEOUS at 08:51

## 2018-02-22 RX ADMIN — DIPHENHYDRAMINE HYDROCHLORIDE PRN MG: 50 INJECTION INTRAMUSCULAR; INTRAVENOUS at 15:36

## 2018-02-22 RX ADMIN — DIPHENHYDRAMINE HYDROCHLORIDE PRN MG: 50 INJECTION INTRAMUSCULAR; INTRAVENOUS at 05:45

## 2018-02-22 RX ADMIN — METHYLPREDNISOLONE SODIUM SUCCINATE SCH MG: 40 INJECTION, POWDER, FOR SOLUTION INTRAMUSCULAR; INTRAVENOUS at 04:35

## 2018-02-22 RX ADMIN — DIPHENHYDRAMINE HYDROCHLORIDE PRN MG: 50 INJECTION INTRAMUSCULAR; INTRAVENOUS at 20:17

## 2018-02-22 RX ADMIN — DIPHENOXYLATE HYDROCHLORIDE AND ATROPINE SULFATE PRN TAB: 2.5; .025 TABLET ORAL at 11:06

## 2018-02-22 RX ADMIN — DIPHENHYDRAMINE HYDROCHLORIDE PRN MG: 50 INJECTION INTRAMUSCULAR; INTRAVENOUS at 01:30

## 2018-02-22 NOTE — PN
DATE:



LOCATION:  [facility], bed A.



SUBJECTIVE:  This is 52-year-old female seen and examined in rounds with

less abdominal pain, with less bowel movement frequency and somewhat more

solid stool, but her diarrhea is not resolved completely yet.



The patient still has intermittent periods of postprandial abdominal

distention with mild nausea and dyspepsia.  No chest pain or palpitations. 

No chills or fever and no reported significant shortness of breath.  No

reported active bleeding.



The entire chart is reviewed including, but not limited to the most recent

lab and radiology study results, current and the previous medication list,

current and the previous medical events.  Case was discussed with the staff

at length.  Today's lab showed hemoglobin 10.8, hematocrit 32.7, normal

platelet count, and normal white blood cells with CO2 content of 14,

indicative of metabolic acidosis with BUN 37, but normal creatinine.  Blood

glucose level 372, calcium 8.4, albumin 3.1, with normal digoxin

therapeutic level.



PHYSICAL EXAMINATION:

GENERAL:   A 52-year-old female, awake, alert, and oriented, tolerated oral

intake adequately.

VITAL SIGNS:  Afebrile, with pulse of 82, respiratory rate 20 to 22, blood

pressure 130/60.

HEENT:  Showed pale, dry oral mucous membrane, mildly icteric sclerae

bilaterally.

LUNGS:  Scattered mild crepitation.  Decreased air entry at bases.

HEART:  Positive S1 and S2.

ABDOMEN:  Soft with generalized mild tenderness.  No mass or organomegaly. 

No rebound tenderness or guarding.

EXTREMITIES:  Without edema, clubbing, or cyanosis.  The patient has

bilateral above-knee amputation.

NEUROLOGIC:  No reported new neurological deficits, sensory or motor.



It has to be mentioned that the patient had periods of _____ reported by

the staff on the floor, but without any significant clinical status.



IMPRESSION:

1.  Hepatic sarcoidosis.

2.  Cardiac arrhythmia.

3.  Diarrhea most likely secondary to collagenous colitis versus short

bowel syndrome with possible diabetic diarrhea.

4.  Abnormal liver function test with jaundice secondary to above.

5.  Peptic ulcer disease, diabetes mellitus, hypertension by history.

6.  Known history of coronary artery disease.

7.  Recurrent urinary tract infection with neurogenic bladder.  The patient

still has Charles catheter in place.

8.  Peripheral vascular disease, with status post bilateral above-knee

amputation.



SUGGESTIONS:

1.  Continue current management.

2.  Subsequent decrease, but slow dose of the prednisone IV, then decrease

the frequency.

3.  Repeat stool for complete workup including C and S.

4.  Cardiology reevaluation.

5.  No further aggressive GI workup in the meantime.



We will follow up closely with you.





__________________________________________

Jeffrey Albert MD





DD:  02/22/2018 17:01:50

DT:  02/22/2018 21:56:14

Job # [id number]

## 2018-02-22 NOTE — CP.PCM.PN
Subjective





- Date & Time of Evaluation


Date of Evaluation: 02/22/18


Time of Evaluation: 19:00





- Subjective


Subjective: 





pt seen and examined today, pt is weak, sick, HCO3 went down , K went up, she 

had 5 episodes of loose wattery stools till noon





Objective





- Vital Signs/Intake and Output


Vital Signs (last 24 hours): 


 











Temp Pulse Resp BP Pulse Ox


 


 98 F   79   18   125/63   98 


 


 02/22/18 16:00  02/22/18 16:00  02/22/18 16:00  02/22/18 16:00  02/22/18 16:00








Intake and Output: 


 











 02/22/18 02/23/18





 18:59 06:59


 


Intake Total 1110 


 


Output Total 1200 


 


Balance -90 














- Medications


Medications: 


 Current Medications





Apixaban (Eliquis)  5 mg PO BID [facility]


   Last Admin: 02/22/18 09:17 Dose:  5 mg


Bismuth Subsalicylate (Pepto-Bismol)  262 mg PO Q8 PRN


   PRN Reason: Diarrhea


   Last Admin: 02/16/18 10:01 Dose:  262 mg


Digoxin (Lanoxin)  0.25 mg PO DAILY@1800 [facility]


   Last Admin: 02/21/18 17:42 Dose:  0.25 mg


Diphenhydramine HCl (Benadryl)  12.5 mg IVP Q4 PRN


   PRN Reason: Itching / Pruritus


   Last Admin: 02/22/18 15:36 Dose:  12.5 mg


Diphenoxylate HCl/Atropine (Lomotil 0.025-2.5 Mg Tablet)  1 tab PO Q4 PRN


   PRN Reason: Irritable bowel symptoms


   Last Admin: 02/22/18 11:06 Dose:  1 tab


Gabapentin (Neurontin)  100 mg PO BID [facility]


   Last Admin: 02/22/18 09:17 Dose:  100 mg


Sodium Bicarbonate 150 meq/ (Dextrose)  1,150 mls @ 100 mls/hr IV .[id number] [facility]


   Last Admin: 02/22/18 13:28 Dose:  100 mls/hr


Insulin Detemir (Levemir)  10 unit SC QAM [facility]


   Last Admin: 02/22/18 09:17 Dose:  10 unit


Insulin Human Regular (Novolin R)  0 unit SC ACHS [facility]


   PRN Reason: Protocol


   Last Admin: 02/22/18 12:39 Dose:  5 unit


Lactobacillus Acidophilus (Bacid Acidophilus)  1 cap PO BID [facility]


   Last Admin: 02/22/18 12:15 Dose:  1 cap


Losartan Potassium (Cozaar)  50 mg PO DAILY [facility]


   Last Admin: 02/22/18 09:16 Dose:  50 mg


Methylprednisolone (Solu-Medrol)  20 mg IVP Q8H [facility]


   Last Admin: 02/22/18 11:06 Dose:  20 mg


Metoprolol Succinate (Toprol Xl)  50 mg PO DAILY [facility]


   Last Admin: 02/22/18 09:17 Dose:  50 mg


Morphine Sulfate (Morphine)  2 mg IVP Q4 PRN


   PRN Reason: Pain, moderate (4-7)


   Stop: 02/24/18 18:00


   Last Admin: 02/22/18 15:35 Dose:  2 mg


Oxybutynin Chloride (Ditropan Xl)  10 mg PO DAILY [facility]


   Last Admin: 02/22/18 12:15 Dose:  10 mg


Pantoprazole Sodium (Protonix Ec Tab)  40 mg PO DAILY [facility]


   Last Admin: 02/22/18 09:17 Dose:  40 mg


Sodium Bicarbonate (Sodium Bicarbonate Tab)  650 mg PO BID [facility]


   Last Admin: 02/22/18 12:39 Dose:  650 mg











- Labs


Labs: 


 





 02/22/18 07:47 





 02/22/18 15:45 





 











PT  13.6 SECONDS (9.7-12.2)  H  02/05/18  18:27    


 


INR  1.2   02/05/18  18:27    


 


APTT  54 SECONDS (21-34)  H  02/05/18  18:27    














- Constitutional


Appears: No Acute Distress





- Head Exam


Head Exam: ATRAUMATIC, NORMAL INSPECTION, NORMOCEPHALIC





- Eye Exam


Eye Exam: EOMI, Normal appearance, PERRL


Pupil Exam: NORMAL ACCOMODATION, PERRL





- Respiratory Exam


Respiratory Exam: Decreased Breath Sounds





- Cardiovascular Exam


Cardiovascular Exam: REGULAR RHYTHM, +S1, +S2.  absent: Murmur





- GI/Abdominal Exam


GI & Abdominal Exam: Tenderness





Assessment and Plan


(1) Diarrhea


Status: Acute   





(2) Complicated urinary tract infection


Status: Acute   





(3) Diabetes


Status: Acute   





(4) Hypertension


Status: Acute   





(5) Neurogenic bladder


Status: Acute   





(6) Hyperkalemia


Status: Acute

## 2018-02-22 NOTE — CP.PCM.PN
Subjective





- Date & Time of Evaluation


Date of Evaluation: 02/22/18


Time of Evaluation: 15:00





- Subjective


Subjective: 





NP NOTIFIED BY PRIMARY JANNIE WEINSTEIN AND CHARGE JANNIE ORANTES THAT THEY NOTICED PVCS ON 

PT'S TELE MONITOR.  I IMMEDIATELY EVALUATED THE PT, WHO WAS SITTING UPRIGHT IN 

BED WATCHING T.V.  PT DENIED ANY CHEST PAIN, DIZZINESS, SOB, ABD PAIN, NAUSEA.  

ADMITS TO FEELING "LIKE MY HEART SKIPPED A BEAT."  PT ADMITS SHE GETS THIS 

FEELING OFTEN BUT HAS NEVER MENTIONED IT TO HER PROVIDERS.  LABS FROM THIS 

MORNING SHOW K 6.3 AND CO2 11.  CO2 HAS BEEN CONSISTENTLY LOW; K WAS ELEVATED 

YESTERDAY AS WELL AND REPEAT.  PT SEEN BY DR. DODGE EARLIER, WHO ORDERED MEDS 

TO ADDRESS THE CO2 AND K LEVELS.  MEDS WERE GIVEN BY THE RN CORINNA EARLIER THIS 

AFTERNOON (SEE MAR).





ON EXAM PT IS AAOX3, BLE AMPUTATIONS NOTED; VSS WITH HR 79; BS CTA B/L; REG 

RHYTHM NOTED, NO MURMURS OR GALLOPS. 


-STAT EKG ORDERED; ? Q WAVES NOTED, HOWEVER, THIS IS AN UNOFFICIAL READING WITH 

NO CARDIOLOGIST ON THE FLOOR AND COMPARED TO THE LAST EKG DONE IN THIS HOSPITAL 

ON OCTOBER 11, 2017.  


-DR. GOMES CONSULTED AND HE WAS MADE AWARE OF CONSULT BY ME. 


-STAT YOSI ALSO ORDERED, PENDING RESULTS.  REPEAT BMP TIMED FOR 1600 TODAY.  





I DISCUSSED ALL NEW FINDINGS WITH DR. DODGE VIA TELEPHONE.  IN AGREEMENT WITH 

THE ABOVE ORDERS.  DR. DODGE TO BE NOTIFIED BY PRIMARY RN ASHIT REGARDING BMP 

AND YOSI RESULTS.  DISCUSSED THIS WITH JANNIE COLE TAKING OVER FOR THE 3P-11P 

SHIFT.  DISCUSSED ALL OF THIS WITH THE PT AS WELL AND SHE IS IN AGREEMENT WITH 

PLAN.  NO FURTHER ORDERS.





Objective





- Vital Signs/Intake and Output


Vital Signs (last 24 hours): 


 











Temp Pulse Resp BP Pulse Ox


 


 97.4 F L  73   18   129/74   97 


 


 02/22/18 07:35  02/22/18 07:35  02/22/18 07:35  02/22/18 07:35  02/22/18 07:35








Intake and Output: 


 











 02/22/18 02/22/18





 06:59 18:59


 


Intake Total 700 760


 


Output Total 1900 


 


Balance -1200 760














- Medications


Medications: 


 Current Medications





Apixaban (Eliquis)  5 mg PO BID [facility]


   Last Admin: 02/22/18 09:17 Dose:  5 mg


Bismuth Subsalicylate (Pepto-Bismol)  262 mg PO Q8 PRN


   PRN Reason: Diarrhea


   Last Admin: 02/16/18 10:01 Dose:  262 mg


Digoxin (Lanoxin)  0.25 mg PO DAILY@1800 [facility]


   Last Admin: 02/21/18 17:42 Dose:  0.25 mg


Diphenhydramine HCl (Benadryl)  12.5 mg IVP Q4 PRN


   PRN Reason: Itching / Pruritus


   Last Admin: 02/22/18 15:36 Dose:  12.5 mg


Diphenoxylate HCl/Atropine (Lomotil 0.025-2.5 Mg Tablet)  1 tab PO Q4 PRN


   PRN Reason: Irritable bowel symptoms


   Last Admin: 02/22/18 11:06 Dose:  1 tab


Gabapentin (Neurontin)  100 mg PO BID [facility]


   Last Admin: 02/22/18 09:17 Dose:  100 mg


Sodium Bicarbonate 150 meq/ (Dextrose)  1,150 mls @ 100 mls/hr IV .[id number] [facility]


   Last Admin: 02/22/18 13:28 Dose:  100 mls/hr


Insulin Detemir (Levemir)  10 unit SC QAM [facility]


   Last Admin: 02/22/18 09:17 Dose:  10 unit


Insulin Human Regular (Novolin R)  0 unit SC ACHS [facility]


   PRN Reason: Protocol


   Last Admin: 02/22/18 12:39 Dose:  5 unit


Lactobacillus Acidophilus (Bacid Acidophilus)  1 cap PO BID [facility]


   Last Admin: 02/22/18 12:15 Dose:  1 cap


Losartan Potassium (Cozaar)  50 mg PO DAILY [facility]


   Last Admin: 02/22/18 09:16 Dose:  50 mg


Methylprednisolone (Solu-Medrol)  20 mg IVP Q8H [facility]


   Last Admin: 02/22/18 11:06 Dose:  20 mg


Metoprolol Succinate (Toprol Xl)  50 mg PO DAILY [facility]


   Last Admin: 02/22/18 09:17 Dose:  50 mg


Morphine Sulfate (Morphine)  2 mg IVP Q4 PRN


   PRN Reason: Pain, moderate (4-7)


   Stop: 02/24/18 18:00


   Last Admin: 02/22/18 15:35 Dose:  2 mg


Oxybutynin Chloride (Ditropan Xl)  10 mg PO DAILY [facility]


   Last Admin: 02/22/18 12:15 Dose:  10 mg


Pantoprazole Sodium (Protonix Ec Tab)  40 mg PO DAILY [facility]


   Last Admin: 02/22/18 09:17 Dose:  40 mg


Sodium Bicarbonate (Sodium Bicarbonate Tab)  650 mg PO BID [facility]


   Last Admin: 02/22/18 12:39 Dose:  650 mg











- Labs


Labs: 


 





 02/22/18 07:47 





 02/22/18 07:47 





 











PT  13.6 SECONDS (9.7-12.2)  H  02/05/18  18:27    


 


INR  1.2   02/05/18  18:27    


 


APTT  54 SECONDS (21-34)  H  02/05/18  18:27

## 2018-02-22 NOTE — CP.PCM.CON
History of Present Illness





- History of Present Illness


History of Present Illness: 





pt is seen and examined full consult is dictated #[id number]


1. Hyperkalemia


2. met. acidosis


 check urine lytes, osm, cr, serum osm


start ivf d5w  with nahco3 150 meq/lit at 100 ml/hr


 kayexalate 30 gm po x 1


repeat bmp at 6pm





Past Patient History





- Infectious Disease


Hx of Infectious Diseases: None





- Tetanus Immunizations


Tetanus Immunization: Unknown





- Past Medical History & Family History


Past Medical History?: Yes





- Past Social History


Smoking Status: Never Smoked





- CARDIAC


Hx Cardia Arrhythmia: Yes


Hx Congestive Heart Failure: Yes


Hx Hypercholesterolemia: Yes


Hx Hypertension: Yes





- PULMONARY


Hx Asthma: Yes (NO INHALER-ON PREDNISONE DAILY PO.)





- NEUROLOGICAL


Hx Neurological Disorder: No





- HEENT


Hx HEENT Problems: Yes


Hx Cataracts: Yes (BILAT.)





- RENAL


Hx Chronic Kidney Disease: No





- ENDOCRINE/METABOLIC


Hx Endocrine Disorders: Yes


Hx Diabetes Mellitus Type 1: Yes





- HEMATOLOGICAL/ONCOLOGICAL


Hx Anemia: Yes





- INTEGUMENTARY


Hx Dermatological Problems: Yes





- MUSCULOSKELETAL/RHEUMATOLOGICAL


Hx Musculoskeletal Disorders: Yes


Hx Falls: Yes


Hx Unsteady Gait: Yes (PT. W/C BOUND BILAT AKA)


Other/Comment: ANABELLA ABOVE KNEE AMPUTATIONS.. Right knee 2015, left knee 2016.  

Right chest julianna cath





- GASTROINTESTINAL


Hx Gall Bladder Disease: Yes





- GENITOURINARY/GYNECOLOGICAL


Hx Genitourinary Disorders: Yes


Hx Urinary Tract Infection: Yes


Other/Comment: PT HAS LONG TERM GUTIERREZ





- PSYCHIATRIC


Hx Substance Use: No





- SURGICAL HISTORY


Hx Appendectomy: Yes


Hx Cholecystectomy: Yes


Hx Coronary Stent: Yes





- ANESTHESIA


Hx Anesthesia: Yes


Hx Anesthesia Reactions: No


Hx Malignant Hyperthermia: No





Meds


Allergies/Adverse Reactions: 


 Allergies











Allergy/AdvReac Type Severity Reaction Status Date / Time


 


avocado Allergy Severe ANAPHYLAXIS Verified 10/04/17 17:28


 


banana Allergy Severe ANAPHYLAXIS Verified 10/04/17 17:28


 


cherry Allergy Severe ANAPHYLAXIS Verified 10/04/17 17:28


 


ketorolac [From Toradol] Allergy   Verified 10/04/17 17:28














- Medications


Medications: 


 Current Medications





Apixaban (Eliquis)  5 mg PO BID [facility]


   Last Admin: 02/22/18 09:17 Dose:  5 mg


Bismuth Subsalicylate (Pepto-Bismol)  262 mg PO Q8 PRN


   PRN Reason: Diarrhea


   Last Admin: 02/16/18 10:01 Dose:  262 mg


Digoxin (Lanoxin)  0.25 mg PO DAILY@1800 [facility]


   Last Admin: 02/21/18 17:42 Dose:  0.25 mg


Diphenhydramine HCl (Benadryl)  12.5 mg IVP Q4 PRN


   PRN Reason: Itching / Pruritus


   Last Admin: 02/22/18 11:06 Dose:  12.5 mg


Diphenoxylate HCl/Atropine (Lomotil 0.025-2.5 Mg Tablet)  1 tab PO Q4 PRN


   PRN Reason: Irritable bowel symptoms


   Last Admin: 02/22/18 11:06 Dose:  1 tab


Gabapentin (Neurontin)  100 mg PO BID [facility]


   Last Admin: 02/22/18 09:17 Dose:  100 mg


Sodium Bicarbonate 150 meq/ (Dextrose)  1,150 mls @ 100 mls/hr IV .[id number] [facility]


   Last Admin: 02/22/18 13:28 Dose:  100 mls/hr


Insulin Detemir (Levemir)  10 unit SC QAM [facility]


   Last Admin: 02/22/18 09:17 Dose:  10 unit


Insulin Human Regular (Novolin R)  0 unit SC ACHS [facility]


   PRN Reason: Protocol


   Last Admin: 02/22/18 12:39 Dose:  5 unit


Lactobacillus Acidophilus (Bacid Acidophilus)  1 cap PO BID [facility]


   Last Admin: 02/22/18 12:15 Dose:  1 cap


Losartan Potassium (Cozaar)  50 mg PO DAILY [facility]


   Last Admin: 02/22/18 09:16 Dose:  50 mg


Methylprednisolone (Solu-Medrol)  20 mg IVP Q8H [facility]


   Last Admin: 02/22/18 11:06 Dose:  20 mg


Metoprolol Succinate (Toprol Xl)  50 mg PO DAILY [facility]


   Last Admin: 02/22/18 09:17 Dose:  50 mg


Morphine Sulfate (Morphine)  2 mg IVP Q4 PRN


   PRN Reason: Pain, moderate (4-7)


   Stop: 02/24/18 18:00


   Last Admin: 02/22/18 11:06 Dose:  2 mg


Oxybutynin Chloride (Ditropan Xl)  10 mg PO DAILY [facility]


   Last Admin: 02/22/18 12:15 Dose:  10 mg


Pantoprazole Sodium (Protonix Ec Tab)  40 mg PO DAILY [facility]


   Last Admin: 02/22/18 09:17 Dose:  40 mg


Sodium Bicarbonate (Sodium Bicarbonate Tab)  650 mg PO BID [facility]


   Last Admin: 02/22/18 12:39 Dose:  650 mg











Results





- Vital Signs


Recent Vital Signs: 


 Last Vital Signs











Temp  97.4 F L  02/22/18 07:35


 


Pulse  73   02/22/18 07:35


 


Resp  18   02/22/18 07:35


 


BP  129/74   02/22/18 07:35


 


Pulse Ox  97   02/22/18 07:35














- Labs


Result Diagrams: 


 02/22/18 07:47





 02/22/18 15:45


Labs: 


 Laboratory Results - last 24 hr











  02/21/18 02/21/18 02/21/18





  16:21 17:27 21:00


 


WBC   


 


RBC   


 


Hgb   


 


Hct   


 


MCV   


 


MCH   


 


MCHC   


 


RDW   


 


Plt Count   


 


MPV   


 


Neut % (Auto)   


 


Lymph % (Auto)   


 


Mono % (Auto)   


 


Eos % (Auto)   


 


Baso % (Auto)   


 


Neut # (Auto)   


 


Lymph # (Auto)   


 


Mono # (Auto)   


 


Eos # (Auto)   


 


Baso # (Auto)   


 


Neutrophils % (Manual)   


 


Band Neutrophils %   


 


Lymphocytes % (Manual)   


 


Monocytes % (Manual)   


 


Myelocytes %   


 


Platelet Estimate   


 


Anisocytosis (manual)   


 


Target Cells   


 


Colony Cells   


 


Schistocytes   


 


Sodium   134 


 


Potassium   5.3 H 


 


Chloride   107 


 


Carbon Dioxide   14 L 


 


Anion Gap   18 


 


BUN   41 H 


 


Creatinine   0.9 


 


Est GFR ( Amer)   > 60 


 


Est GFR (Non-Af Amer)   > 60 


 


POC Glucose (mg/dL)  330 H   348 H


 


Random Glucose   304 H 


 


Calcium   8.5 L 


 


Total Bilirubin   


 


AST   


 


ALT   


 


Alkaline Phosphatase   


 


Total Protein   


 


Albumin   


 


Globulin   


 


Albumin/Globulin Ratio   














  02/22/18 02/22/18 02/22/18





  06:18 07:47 07:47


 


WBC   10.0 


 


RBC   3.36 L 


 


Hgb   10.8 L 


 


Hct   32.7 L 


 


MCV   97.3 


 


MCH   32.2 H 


 


MCHC   33.1 


 


RDW   22.8 H 


 


Plt Count   176 


 


MPV   9.5 


 


Neut % (Auto)   94.0 H 


 


Lymph % (Auto)   4.0 L 


 


Mono % (Auto)   2.0 


 


Eos % (Auto)   0.0 


 


Baso % (Auto)   0.0 


 


Neut # (Auto)   9.4 H 


 


Lymph # (Auto)   0.4 L 


 


Mono # (Auto)   0.2 


 


Eos # (Auto)   0.0 


 


Baso # (Auto)   0.0 


 


Neutrophils % (Manual)   93 H 


 


Band Neutrophils %   1 


 


Lymphocytes % (Manual)   5 L 


 


Monocytes % (Manual)   TEST NOT PERFORMED 


 


Myelocytes %   1 H 


 


Platelet Estimate   Normal 


 


Anisocytosis (manual)   Moderate 


 


Target Cells   Moderate 


 


[place] Cells   Slight 


 


Schistocytes   Slight 


 


Sodium    133


 


Potassium    6.3 H*


 


Chloride    112 H


 


Carbon Dioxide    11 L* D


 


Anion Gap    16


 


BUN    41 H


 


Creatinine    0.7


 


Est GFR ( Amer)    > 60


 


Est GFR (Non-Af Amer)    > 60


 


POC Glucose (mg/dL)  213 H  


 


Random Glucose    216 H


 


Calcium    8.8


 


Total Bilirubin    5.0 H


 


AST    94 H


 


ALT    159 H


 


Alkaline Phosphatase    324 H


 


Total Protein    6.0 L


 


Albumin    3.1 L


 


Globulin    2.8


 


Albumin/Globulin Ratio    1.1














  02/22/18





  11:28


 


WBC 


 


RBC 


 


Hgb 


 


Hct 


 


MCV 


 


MCH 


 


MCHC 


 


RDW 


 


Plt Count 


 


MPV 


 


Neut % (Auto) 


 


Lymph % (Auto) 


 


Mono % (Auto) 


 


Eos % (Auto) 


 


Baso % (Auto) 


 


Neut # (Auto) 


 


Lymph # (Auto) 


 


Mono # (Auto) 


 


Eos # (Auto) 


 


Baso # (Auto) 


 


Neutrophils % (Manual) 


 


Band Neutrophils % 


 


Lymphocytes % (Manual) 


 


Monocytes % (Manual) 


 


Myelocytes % 


 


Platelet Estimate 


 


Anisocytosis (manual) 


 


Target Cells 


 


Colony Cells 


 


Schistocytes 


 


Sodium 


 


Potassium 


 


Chloride 


 


Carbon Dioxide 


 


Anion Gap 


 


BUN 


 


Creatinine 


 


Est GFR ( Amer) 


 


Est GFR (Non-Af Amer) 


 


POC Glucose (mg/dL)  359 H


 


Random Glucose 


 


Calcium 


 


Total Bilirubin 


 


AST 


 


ALT 


 


Alkaline Phosphatase 


 


Total Protein 


 


Albumin 


 


Globulin 


 


Albumin/Globulin Ratio

## 2018-02-23 LAB
ALBUMIN SERPL-MCNC: 3 G/DL (ref 3.5–5)
ALBUMIN/GLOB SERPL: 1.2 {RATIO} (ref 1–2.1)
ALT SERPL-CCNC: 195 U/L (ref 9–52)
AST SERPL-CCNC: 151 U/L (ref 14–36)
BUN SERPL-MCNC: 33 MG/DL (ref 7–17)
CALCIUM SERPL-MCNC: 8.3 MG/DL (ref 8.6–10.4)
ERYTHROCYTE [DISTWIDTH] IN BLOOD BY AUTOMATED COUNT: 22.8 % (ref 11.5–14.5)
GFR NON-AFRICAN AMERICAN: > 60
HGB BLD-MCNC: 9.9 G/DL (ref 11–16)
MCH RBC QN AUTO: 32.6 PG (ref 27–31)
MCHC RBC AUTO-ENTMCNC: 34.5 G/DL (ref 33–37)
MCV RBC AUTO: 94.5 FL (ref 81–99)
OSMOLALITY,URINE: 577 MOSM/KG (ref 300–1000)
PLATELET # BLD: 149 K/UL (ref 130–400)
PMV BLD AUTO: 9.8 FL (ref 7.2–11.7)
RBC # BLD AUTO: 3.03 MIL/UL (ref 3.8–5.2)
WBC # BLD AUTO: 6.9 K/UL (ref 4.8–10.8)

## 2018-02-23 RX ADMIN — DIPHENOXYLATE HYDROCHLORIDE AND ATROPINE SULFATE PRN TAB: 2.5; .025 TABLET ORAL at 22:18

## 2018-02-23 RX ADMIN — DIPHENHYDRAMINE HYDROCHLORIDE PRN MG: 50 INJECTION INTRAMUSCULAR; INTRAVENOUS at 18:09

## 2018-02-23 RX ADMIN — DIPHENHYDRAMINE HYDROCHLORIDE PRN MG: 50 INJECTION INTRAMUSCULAR; INTRAVENOUS at 22:11

## 2018-02-23 RX ADMIN — DIPHENHYDRAMINE HYDROCHLORIDE PRN MG: 50 INJECTION INTRAMUSCULAR; INTRAVENOUS at 00:18

## 2018-02-23 RX ADMIN — DIPHENHYDRAMINE HYDROCHLORIDE PRN MG: 50 INJECTION INTRAMUSCULAR; INTRAVENOUS at 13:47

## 2018-02-23 RX ADMIN — DIPHENOXYLATE HYDROCHLORIDE AND ATROPINE SULFATE PRN TAB: 2.5; .025 TABLET ORAL at 04:17

## 2018-02-23 RX ADMIN — HUMAN INSULIN SCH UNIT: 100 INJECTION, SOLUTION SUBCUTANEOUS at 12:37

## 2018-02-23 RX ADMIN — METHYLPREDNISOLONE SODIUM SUCCINATE SCH MG: 40 INJECTION, POWDER, FOR SOLUTION INTRAMUSCULAR; INTRAVENOUS at 22:10

## 2018-02-23 RX ADMIN — METOPROLOL SUCCINATE SCH MG: 50 TABLET, EXTENDED RELEASE ORAL at 09:23

## 2018-02-23 RX ADMIN — INSULIN DETEMIR SCH UNIT: 100 INJECTION, SOLUTION SUBCUTANEOUS at 09:24

## 2018-02-23 RX ADMIN — METHYLPREDNISOLONE SODIUM SUCCINATE SCH MG: 40 INJECTION, POWDER, FOR SOLUTION INTRAMUSCULAR; INTRAVENOUS at 12:36

## 2018-02-23 RX ADMIN — BISMUTH SUBSALICYLATE PRN MG: 525 SUSPENSION ORAL at 04:16

## 2018-02-23 RX ADMIN — DIPHENHYDRAMINE HYDROCHLORIDE PRN MG: 50 INJECTION INTRAMUSCULAR; INTRAVENOUS at 04:17

## 2018-02-23 RX ADMIN — HUMAN INSULIN SCH UNIT: 100 INJECTION, SOLUTION SUBCUTANEOUS at 08:24

## 2018-02-23 RX ADMIN — DIPHENOXYLATE HYDROCHLORIDE AND ATROPINE SULFATE PRN TAB: 2.5; .025 TABLET ORAL at 00:18

## 2018-02-23 RX ADMIN — HUMAN INSULIN SCH UNIT: 100 INJECTION, SOLUTION SUBCUTANEOUS at 18:10

## 2018-02-23 RX ADMIN — Medication SCH CAP: at 09:23

## 2018-02-23 RX ADMIN — SODIUM BICARBONATE SCH: 84 INJECTION INTRAVENOUS at 12:05

## 2018-02-23 RX ADMIN — BISMUTH SUBSALICYLATE PRN MG: 525 SUSPENSION ORAL at 18:12

## 2018-02-23 RX ADMIN — DIPHENOXYLATE HYDROCHLORIDE AND ATROPINE SULFATE PRN TAB: 2.5; .025 TABLET ORAL at 09:23

## 2018-02-23 RX ADMIN — PANTOPRAZOLE SODIUM SCH MG: 40 TABLET, DELAYED RELEASE ORAL at 09:23

## 2018-02-23 RX ADMIN — SODIUM BICARBONATE SCH MLS/HR: 84 INJECTION INTRAVENOUS at 02:48

## 2018-02-23 RX ADMIN — OXYBUTYNIN CHLORIDE SCH MG: 10 TABLET, EXTENDED RELEASE ORAL at 09:23

## 2018-02-23 RX ADMIN — Medication SCH CAP: at 18:07

## 2018-02-23 RX ADMIN — DIPHENHYDRAMINE HYDROCHLORIDE PRN MG: 50 INJECTION INTRAMUSCULAR; INTRAVENOUS at 09:25

## 2018-02-23 RX ADMIN — HUMAN INSULIN SCH: 100 INJECTION, SOLUTION SUBCUTANEOUS at 21:58

## 2018-02-23 RX ADMIN — DIGOXIN SCH MG: 0.25 TABLET ORAL at 18:07

## 2018-02-23 RX ADMIN — METHYLPREDNISOLONE SODIUM SUCCINATE SCH MG: 40 INJECTION, POWDER, FOR SOLUTION INTRAMUSCULAR; INTRAVENOUS at 04:16

## 2018-02-23 NOTE — CP.PCM.PN
Subjective





- Date & Time of Evaluation


Date of Evaluation: 02/23/18


Time of Evaluation: 18:30





- Subjective


Subjective: 





pt continues to have diarrhea, i discussed the case with Honorio Arora d/c HCO3





Objective





- Vital Signs/Intake and Output


Vital Signs (last 24 hours): 


 











Temp Pulse Resp BP Pulse Ox


 


 98.4 F   64   20   139/88   98 


 


 02/23/18 16:05  02/23/18 16:05  02/23/18 16:05  02/23/18 16:05  02/23/18 16:05








Intake and Output: 


 











 02/23/18 02/24/18





 18:59 06:59


 


Intake Total 1200 


 


Output Total 1100 800


 


Balance 100 -800














- Medications


Medications: 


 Current Medications





Apixaban (Eliquis)  5 mg PO BID [facility]


   Last Admin: 02/23/18 18:10 Dose:  5 mg


Bismuth Subsalicylate (Pepto-Bismol)  262 mg PO Q8 PRN


   PRN Reason: Diarrhea


   Last Admin: 02/23/18 18:12 Dose:  262 mg


Digoxin (Lanoxin)  0.25 mg PO DAILY@1800 [facility]


   Last Admin: 02/23/18 18:07 Dose:  0.25 mg


Diphenhydramine HCl (Benadryl)  12.5 mg IVP Q4 PRN


   PRN Reason: Itching / Pruritus


   Last Admin: 02/23/18 22:11 Dose:  12.5 mg


Diphenoxylate HCl/Atropine (Lomotil 0.025-2.5 Mg Tablet)  1 tab PO Q4 PRN


   PRN Reason: Irritable bowel symptoms


   Last Admin: 02/23/18 22:18 Dose:  1 tab


Gabapentin (Neurontin)  100 mg PO BID [facility]


   Last Admin: 02/23/18 18:07 Dose:  100 mg


Insulin Detemir (Levemir)  10 unit SC QAM [facility]


   Last Admin: 02/23/18 09:24 Dose:  10 unit


Insulin Human Regular (Novolin R)  0 unit SC ACHS [facility]


   PRN Reason: Protocol


   Last Admin: 02/23/18 21:58 Dose:  Not Given


Lactobacillus Acidophilus (Bacid Acidophilus)  1 cap PO BID [facility]


   Last Admin: 02/23/18 18:07 Dose:  1 cap


Losartan Potassium (Cozaar)  50 mg PO DAILY [facility]


   Last Admin: 02/23/18 09:23 Dose:  50 mg


Methylprednisolone (Solu-Medrol)  20 mg IVP Q12 [facility]


   Last Admin: 02/23/18 22:10 Dose:  20 mg


Metoprolol Succinate (Toprol Xl)  50 mg PO DAILY [facility]


   Last Admin: 02/23/18 09:23 Dose:  50 mg


Morphine Sulfate (Morphine)  2 mg IVP Q4 PRN


   PRN Reason: Pain, moderate (4-7)


   Stop: 02/24/18 18:00


   Last Admin: 02/23/18 22:12 Dose:  2 mg


Oxybutynin Chloride (Ditropan Xl)  10 mg PO DAILY [facility]


   Last Admin: 02/23/18 09:23 Dose:  10 mg


Pantoprazole Sodium (Protonix Ec Tab)  40 mg PO DAILY [facility]


   Last Admin: 02/23/18 09:23 Dose:  40 mg


Sodium Bicarbonate (Sodium Bicarbonate Tab)  650 mg PO BID [facility]


   Last Admin: 02/23/18 18:08 Dose:  650 mg











- Labs


Labs: 


 





 02/23/18 07:01 





 02/23/18 07:01 





 











PT  13.6 SECONDS (9.7-12.2)  H  02/05/18  18:27    


 


INR  1.2   02/05/18  18:27    


 


APTT  54 SECONDS (21-34)  H  02/05/18  18:27    














- Constitutional


Appears: No Acute Distress





- Head Exam


Head Exam: ATRAUMATIC, NORMAL INSPECTION, NORMOCEPHALIC





- Eye Exam


Eye Exam: EOMI, Normal appearance, PERRL


Pupil Exam: NORMAL ACCOMODATION, PERRL





- Respiratory Exam


Respiratory Exam: Clear to Ausculation Bilateral, NORMAL BREATHING PATTERN





- Cardiovascular Exam


Cardiovascular Exam: REGULAR RHYTHM, +S1, +S2.  absent: Murmur





- GI/Abdominal Exam


GI & Abdominal Exam: Soft, Normal Bowel Sounds.  absent: Tenderness





Assessment and Plan


(1) Diarrhea


Status: Acute   





(2) Complicated urinary tract infection


Status: Acute   





(3) Diabetes


Status: Acute   





(4) Hypertension


Status: Acute   





(5) Neurogenic bladder


Status: Acute

## 2018-02-23 NOTE — PN
DATE:



LOCATION:  [facility], bed A.



HISTORY:  This is a 52-year-old female, seen and examined in rounds without

reported significant clinical changes, appeared to be awake, alert,

oriented, with reported electrolyte imbalance including hyperkalemia.  No

reported chest pain, active bleeding, significant shortness of breath, but

intermittent periods of abdominal pain, less than before with less bowel

movement frequency.



The patient is seen by the Nephrology consultant.  Consultation is

appreciated.



PHYSICAL EXAMINATION

GENERAL:  A 52-year-old female, afebrile, with pulse of 66, respiratory

rate 20 to 22, blood pressure 130/82.

HEENT:  Showed mildly pale dry oral mucous membranes with bilateral icteric

sclerae.

LUNGS:  Scattered crepitation.  Decreased air entry at bases.

HEART:  Positive S1, and S2.

ABDOMEN:  Generalized mild tenderness, in the mid epigastric and right

upper quadrant area.  No masses or organomegaly.  Mild abdominal distention

noticed.

EXTREMITIES:  With evidence of bilateral above-knee amputation.

NEUROLOGIC:  No new reported neurological focal deficits, sensory or motor.



Today's lab showed hemoglobin 9.9, hematocrit 38.7, with normal platelet

count, BUN increased to 33 but low creatinine.  Blood glucose level 319,

with calcium 8.3.  Total bilirubin improved to 4.9.  , ,

alkaline phosphatase 314.  Total protein 5.7, and albumin 3.0.



IMPRESSION:

1.  Abnormal liver function tests, most likely secondary to hepatic

sarcoidosis, responding to IV steroids.

2.  Jaundice, secondary to above.

3.  Reported recent history of diarrhea, most likely secondary to

collagenous colitis, gradually improving.

4.  Electrolyte imbalance.

5.  Reported cardiac arrhythmias.

6.  Known history of coronary artery disease.

7.  Known history of hypertension.

8.  Poorly controlled diabetes mellitus as well as peptic ulcer disease.

9.  Known history of partial colon resection, which could be another factor

for the patient's recurrent diarrhea.

10.  Peripheral vascular disease with bilateral above-knee amputation.



SUGGESTION:

1.  Continue current management.

2.  Correct underlying electrolyte imbalance.

3.  Subsequent decrease of steroid dose.

4.  Further recommendations to follow.





__________________________________________

Jeffrey Albert MD



DD:  02/23/2018 18:20:25

DT:  02/23/2018 20:55:44

Job # [id number]

## 2018-02-23 NOTE — CARD
--------------- APPROVED REPORT --------------





EKG Measurement

Heart [id number]

MI 158P

[id number]

[id number]

[id number]



<Conclusion>

Sinus rhythm with marked sinus arrhythmia

consider lead reversal.

T wave abnormality, consider anterior ischemia

Abnormal ECG

please repeat.

## 2018-02-23 NOTE — RAD
HISTORY:

SOB  



COMPARISON:

2/5/2018 



FINDINGS:



LUNGS:

No active pulmonary disease.



PLEURA:

No significant pleural effusion identified, no pneumothorax apparent.



CARDIOVASCULAR:

Normal heart size.  AICD.  No congestive change.



OSSEOUS STRUCTURES:

No significant abnormalities.



VISUALIZED UPPER ABDOMEN:

Normal.



OTHER FINDINGS:

None.



IMPRESSION:

No active disease.

## 2018-02-23 NOTE — CP.PCM.PN
Subjective





- Date & Time of Evaluation


Date of Evaluation: 02/23/18


Time of Evaluation: 18:36





- Subjective


Subjective: 





pt is seen and examined, follow up consult is dictated #[id number]


 d/c nahcob drip, k+  is wnl





Objective





- Vital Signs/Intake and Output


Vital Signs (last 24 hours): 


 











Temp Pulse Resp BP Pulse Ox


 


 98.4 F   64   20   139/88   98 


 


 02/23/18 16:05  02/23/18 16:05  02/23/18 16:05  02/23/18 16:05  02/23/18 16:05








Intake and Output: 


 











 02/23/18 02/23/18





 06:59 18:59


 


Intake Total 1100 1200


 


Output Total 1900 1100


 


Balance -800 100














- Medications


Medications: 


 Current Medications





Apixaban (Eliquis)  5 mg PO BID [facility]


   Last Admin: 02/23/18 18:10 Dose:  5 mg


Bismuth Subsalicylate (Pepto-Bismol)  262 mg PO Q8 PRN


   PRN Reason: Diarrhea


   Last Admin: 02/23/18 18:12 Dose:  262 mg


Digoxin (Lanoxin)  0.25 mg PO DAILY@1800 [facility]


   Last Admin: 02/23/18 18:07 Dose:  0.25 mg


Diphenhydramine HCl (Benadryl)  12.5 mg IVP Q4 PRN


   PRN Reason: Itching / Pruritus


   Last Admin: 02/23/18 18:09 Dose:  12.5 mg


Diphenoxylate HCl/Atropine (Lomotil 0.025-2.5 Mg Tablet)  1 tab PO Q4 PRN


   PRN Reason: Irritable bowel symptoms


   Last Admin: 02/23/18 09:23 Dose:  1 tab


Gabapentin (Neurontin)  100 mg PO BID [facility]


   Last Admin: 02/23/18 18:07 Dose:  100 mg


Sodium Bicarbonate 150 meq/ (Dextrose)  1,150 mls @ 100 mls/hr IV .[id number] [facility]


   Last Admin: 02/23/18 12:05 Dose:  Not Given


Insulin Detemir (Levemir)  10 unit SC QAM [facility]


   Last Admin: 02/23/18 09:24 Dose:  10 unit


Insulin Human Regular (Novolin R)  0 unit SC ACHS [facility]


   PRN Reason: Protocol


   Last Admin: 02/23/18 18:10 Dose:  4 unit


Lactobacillus Acidophilus (Bacid Acidophilus)  1 cap PO BID [facility]


   Last Admin: 02/23/18 18:07 Dose:  1 cap


Losartan Potassium (Cozaar)  50 mg PO DAILY [facility]


   Last Admin: 02/23/18 09:23 Dose:  50 mg


Methylprednisolone (Solu-Medrol)  20 mg IVP Q12 [facility]


Metoprolol Succinate (Toprol Xl)  50 mg PO DAILY [facility]


   Last Admin: 02/23/18 09:23 Dose:  50 mg


Morphine Sulfate (Morphine)  2 mg IVP Q4 PRN


   PRN Reason: Pain, moderate (4-7)


   Stop: 02/24/18 18:00


   Last Admin: 02/23/18 18:08 Dose:  2 mg


Oxybutynin Chloride (Ditropan Xl)  10 mg PO DAILY [facility]


   Last Admin: 02/23/18 09:23 Dose:  10 mg


Pantoprazole Sodium (Protonix Ec Tab)  40 mg PO DAILY [facility]


   Last Admin: 02/23/18 09:23 Dose:  40 mg


Sodium Bicarbonate (Sodium Bicarbonate Tab)  650 mg PO BID [facility]


   Last Admin: 02/23/18 18:08 Dose:  650 mg











- Labs


Labs: 


 





 02/23/18 07:01 





 02/23/18 07:01 





 











PT  13.6 SECONDS (9.7-12.2)  H  02/05/18  18:27    


 


INR  1.2   02/05/18  18:27    


 


APTT  54 SECONDS (21-34)  H  02/05/18  18:27

## 2018-02-23 NOTE — CON
DATE:  02/22/2018.



LOCATION:  Room 569, bed A.



REQUESTED BY:  Dr. Nasir Dunbar.



REASON FOR RENAL CONSULTATION:  Severe metabolic acidosis and hyperkalemia

for further evaluation.



HISTORY OF PRESENT ILLNESS:  Mrs. Jaramillo is a 52 years old middle age

 female with a past medical history significant for

sarcoidosis involving the liver and also history of hypertension, diabetes

since 2002 and hypertension for about 4 years, diabetic foot ulcer,

diabetic neuropathy, status post bilateral AKA, first AKA was on right side

2015 and on the left side she had on 2016, history of colonic mass status

post surgery, status post appendectomy and gallbladder removal who was

admitted initially on 02/05/2018 with chief complaints of diarrhea for 1

week more than five times a day and also associated nausea, abdominal pain.

The patient is still having persistent diarrhea and occasional lower

abdominal discomfort.  Denies any chest pain.  The patient does complain of

mild shortness of breath today.  No fever.  No fever, no cough today.  No

dizziness.



PAST MEDICAL HISTORY:  Significant for sarcoidosis of the liver,

hypertension for about 4 years and diabetes for about 15 to 16 years since

diagnosis sarcoidosis in 2002.



PAST SURGICAL HISTORY:  Bilateral AKA, right AKA in 2015 and the left AKA

in 2016, colonic mass removal and appendectomy, cholecystectomy.



ALLERGIES:  ALLERGIC TO AVOCADO, BANANA, ANGEL AND KETOROLAC.



CURRENT MEDICATIONS:  Include as follows, Bacid 1 capsule p.o. b.i.d.,

Benadryl 12.5 mg IV q. 4 hours p.r.n., losartan 50 mg p.o. daily, Ditropan

10 mg p.o. daily, Eliquis 5 mg p.o. b.i.d., digoxin 0.25 mg p.o. daily,

insulin detemir Levemir 10 units subcu q. a.m., Lomotil 1 tablet p.o. q. 4

hours p.r.n., morphine sulfate 2 mg IV q. 4 hours, gabapentin 100 mg p.o.

b.i.d., Novolin R for sliding scale, Pepto-Bismol 262 mg p.o. q. 8 hours

p.r.n., Protonix 40 mg p.o. daily and IV drip D5W with 150 mEq sodium

bicarbonate 100 mL/hour, sodium bicarb tablets 650 mg p.o. b.i.d.,

Solu-Medrol 20 mg IV q. 8 hours and metoprolol 50 mg p.o. daily.



SOCIAL HISTORY:  The patient denies any smoking, alcohol or drugs.



PERSONAL HISTORY:  She is living with a person for more than 29 years and

they have four children, not .



REVIEW OF SYSTEMS:  Significant for nausea, abdominal discomfort and

diarrhea severe, mild shortness of breath.  All other review of systems

were reviewed and are negative.



PHYSICAL EXAMINATION:

VITAL SIGNS:  As follows; blood pressure 129/74, pulse 73, respiration 18,

temperature 97.4, saturation 97%.  Height 3 feet 1 inch and weight is 109

pounds with bilateral AKA.

GENERAL:  Mrs. Jaramillo is a 52 years old middle-aged female, moderately

built, moderately nourished, not in acute distress.

HEENT:  Pupils normal, reactive to light and accommodation.  Conjunctivae

pink.  Sclerae anicteric.  Tongue is moist.  Trachea is midline.

LUNGS:  Symmetric on both sides.  Bilateral breath sounds present.  Clear

on auscultation.

CVS:  [place] at the fifth intercostal space, midclavicular line.  S1 and S2

audible.  No murmur, no gallop.

ABDOMEN:  The patient has mild diffuse abdominal pain present.  No

guarding.  No rigidity.  The patient also has a scar on the right upper

quadrant and also a midline scar present from the epigastric to the

symphysis pubis from the previous surgeries.

CNS:  The patient is alert, awake, oriented x3.  Sensory system is grossly

within normal limits.  Motor system both upper extremities are normal.  The

patient has bilateral AKA.



LABORATORY DATA:  Include as follows as of 02/22/2018; WBC 10, hemoglobin

10.8, hematocrit is 32.7, platelets of 176, neutrophils 94, lymph 4, monos

2 and bands 1.  Other reports sodium 133, potassium 6.3, chloride 112, CO2

11, BUN 41, creatinine 0.7, glucose 216, calcium is 8.0, total bili 5.0,

AST 94, , alkaline phosphatase 324, total protein 6.0 and albumin is

3.1.  Other laboratory data, digoxin level is 0.9, CPK less than 20, CK-MB

1.15, troponin 0.012.  As of 02/16/2018; urinalysis nichelle, hazy, pH 6,

specific gravity 1.013, protein negative, glucose 3+, ketones negative,

blood negative, nitrites positive, bilirubin negative, urobilinogen normal,

leukocyte esterase 3+, wbc 85, rbc 3, bacteria many.  Urine culture as of

02/05/2018, positive for enterococcus faecalis and E-coli and repeat

cultures as of 02/11/2018, positive for Klebsiella pneumonia and yeast

species.  CT of the abdomen and pelvis as of 02/05/2018 impression dilated

biliary ducts, status postop cholecystectomy with biliary stent, dilated

and distal pancreatic duct, extensive arteriosclerotic disease and

pacemaker, nonobstructing antral anastomosis, right hemicolectomy with

nonobstructing ileocolic anastomosis, bladder wall thickening stone versus

foreign body in the bladder, no obstructing renal or ureteral stones or

hydronephrosis.  Echo report as of 10/06/2017, ejection fraction about 47%

as of 10/08/2017, impression mild LV systolic dysfunction, diastolic

dysfunction, trace MR, trace TR and pacemaker AICD with wire seen.



ASSESSMENT:  In summary, Mrs. Jaramillo is a 52 years old 

female with a history of sarcoidosis of the liver status post biliary stent

and multiple abdominal surgeries status post cholecystectomy, status post

appendectomy, status post right hemicolectomy and ileocolic anastomosis who

was admitted with lower abdominal pain and also diarrhea, nausea, was

initially treated for urinary tract infection and now with persistent

diarrhea and elevated potassium and increased BUN and creatinine ratio and

also low bicarbonate.

1.  Hyperkalemia most likely secondary to severe metabolic acidosis.

2.  Severe metabolic acidosis secondary to most likely gastrointestinal

loss secondary to diarrhea.

3.  Prerenal azotemia secondary to decreased poor left ventricular function

and also mild intravascular depletion.



PLAN:  The patient was given Kayexalate one time and also IV fluids changed

to D5W with sodium bicarb 150 mEq for each liter and 100 mL/hour.  Repeat

BMP at 06:00 p.m.  Check urine lytes and osmolality, urine creatinine and

serum osmolality to check TTKG and also to check urine anion gap, if urine

anion gap is negative that is in favor of GI loss as a cause for metabolic

acidosis, if it is positive anion gap it is consistent with renal tubular

acidosis.  Clinical picture consistent with metabolic acidosis secondary to

GI loss.  We will follow with you.



Thank you for allowing me to participate in your patient's care.







__________________________________________

Nancy Meyer MD





DD:  02/22/2018 23:03:34

DT:  02/23/2018 8:58:10

[facility] # [id number]

## 2018-02-23 NOTE — CON
DATE:  02/22/2018.



REASON FOR CONSULTATION:  Abnormal EKG and shortness of breath as well as

cardiomyopathy.



HISTORY OF PRESENT ILLNESS:  The patient is a 52-year-old -American

female with history of cardiomyopathy status post ICD placement, history of

bilateral above-knee amputation, the first right above-knee amputation

performed 2015, _____ foot, followed by above-knee amputation of the left

foot before previously for which the patient stated, it was related to deep

venous thrombosis and infection 2016.  The patient is known to have liver

sarcoidosis on steroid therapy.  The patient follows with Dr. Morin for her

ICD and denies any recent discharge of defibrillator.  The patient had a

prolonged hospitalization in 10/2017, and the patient was treated for C.

diff at that time according to her.  She also had recent hospitalization

_____ center.  She initially presented with a diagnosis of urinary tract

infection, although she started to experience frequent diarrhea and lately

started to experience shortness of breath.  The patient denied any

retrosternal chest pain.



SOCIAL HISTORY:  Nonsmoker, nondrinker.  She is .  Lives with her

family.



MEDICATIONS:  Lactobacillus acidophilus 1 capsule twice a day, Benadryl

12.5 mg to each feet q. 4 hours p.r.n. for itchiness, Cozaar 50 mg once a

day, Ditropan-XL 10 mg once a day, Eliquis 5 mg twice a day, Lanoxin 0.25

mg daily, Neurontin 100 mg twice a day, Toprol-XL 50 mg once a day, Solu

Medrol 10 mg IV q. 8 hours.



REVIEW OF SYSTEMS:  The patient denies any fever or chills.  She denies

retrosternal chest pain.  No palpitations, dizziness or syncope.  She is

experiencing sacral irritation from frequent diarrhea.



PHYSICAL EXAMINATION:

GENERAL:  The patient is a middle-aged female, who is mildly tachypneic but

does not appear to be in acute distress.

VITAL SIGNS:  Blood pressure 125/63, heart rate 79, temperature 98,

respirations 18.

HEENT:  Pink conjunctivae.

CHEST:  Diffuse bilateral rhonchi.

HEART:  S1, S2 regular.

ABDOMEN:  Soft.

EXTREMITIES:  Bilateral above knee amputation.



LABORATORY:  Today's SMA-7 at 3:45 p.m., sodium 134, potassium 4.4,

chloride 105, CO2 14, glucose 372, BUN 37, creatinine 0.8.  Potassium

earlier this morning was 6.3.  One set of troponin is negative.  Today's

hemoglobin and hematocrit 10.8 and 32.7, white count and platelet count are

within normal limits.  I did review the EKGs which revealed sinus rhythm

and at times occasional ventricular paced beats with T-wave inversion in

leads V1 and V2.  Abdomen and pelvis CT scan done on the 02/05/2018

revealed dilated biliary duct, status post cholecystectomy with biliary

stent, dilated distal pancreatic duct, extensive arteriosclerotic disease,

pacemaker, nonobstructive antral anastomosis.  Right hemicolectomy with

nonobstructing ileocolic anastomosis.  Today digoxin level is 0.9.  C. diff

toxin is negative twice.  Influenza type A and B serology is negative.



ASSESSMENT:

1.  Congestive heart failure.

2.  Abnormal EKG with evidence of anterior T-wave inversion.

3.  Diarrhea and urinary tract infection.

4.  Improved hyperkalemia.



RECOMMENDATIONS:  Continue current Lactobacillus acidophilus, continue

Cozaar at 50 mg once a day, Eliquis at 5 mg twice a day, digoxin 0.25 mg

daily, Solu-Medrol 10 mg intravenously q. 8 hours, Toprol XL 60 mg once a

day.  Obtain a portable chest x-ray.  I did review the EKG which has been

of poor quality and possible arm lead misplacement, and I will repeat

12-lead EKG.







__________________________________________

Ankit Figueroa MD





DD:  02/22/2018 17:10:25

DT:  02/23/2018 0:22:43

Job # [id number]

## 2018-02-23 NOTE — CARD
--------------- APPROVED REPORT --------------





EKG Measurement

Heart [id number]

[id number]

QT0T0

QTc0



<Conclusion>

** No QRS complexes found, no ECG analysis possible **

## 2018-02-23 NOTE — PN
DATE:



SUBJECTIVE:  The patient is  experiencing diarrhea, rectal pain.  She

denies any chest pain.  She denies any shortness of breath.



PHYSICAL EXAMINATION:

VITAL SIGNS:  Blood pressure 144/95, heart rate 69, temperature 97.9, _____

blood pressure 127/71.

HEENT:  Pale conjunctivae.

CHEST:  Bilateral rhonchi.

HEART:  S1 and S2 regular.

EXTREMITIES:  Bilateral above knee amputation.



LABORATORY DATA:  Blood sugar 350, BUN and creatinine 33 and 0.6 rest of

SMA-7 is within normal limits.  Liver enzymes are elevated.  Alkaline

phosphatase is 314.  Hemoglobin and hematocrit 9.9 and 28.7, white count

and platelet count are within normal limits.  Repeat of the EKG revealed

sinus rhythm _____ pacemaker, inferior and anterior T-wave inversion could

be nonspecific; however, ischemia cannot be completely excluded. 

Definitely compared to yesterday's EKGs that _____.



ASSESSMENT:

1.  Cardiomyopathy.

2.  Status post implantable cardioverter-defibrillator placement.

3.  Peripheral vascular disease status post bilateral above-knee

amputation.

4.  Uncontrolled diabetes.

5.  Intractable diarrhea.

6.  Liver sarcoidosis.



RECOMMENDATIONS:  Continue current Cozaar 50 mg once a day, Ditropan 10 mg

once a day, Eliquis 5 mg twice a day, digoxin 0.25 mg once a day,

Pepto-Bismol 262 mg p.o. q. 8 hours p.r.n., Solu-Medrol 20 mg twice a day,

Toprol XL 60 mg once a day, Lomotil 1 tablet p.o. q. 4 hours p.r.n.,

consider initiating Imodium.







__________________________________________

Ankit Figueroa MD





DD:  02/23/2018 16:05:25

DT:  02/23/2018 20:57:43

Job # [id number]

## 2018-02-24 LAB
ALBUMIN SERPL-MCNC: 2.9 G/DL (ref 3.5–5)
ALBUMIN/GLOB SERPL: 1.1 {RATIO} (ref 1–2.1)
ALT SERPL-CCNC: 194 U/L (ref 9–52)
AST SERPL-CCNC: 103 U/L (ref 14–36)
BUN SERPL-MCNC: 25 MG/DL (ref 7–17)
CALCIUM SERPL-MCNC: 8 MG/DL (ref 8.6–10.4)
ERYTHROCYTE [DISTWIDTH] IN BLOOD BY AUTOMATED COUNT: 23.2 % (ref 11.5–14.5)
GFR NON-AFRICAN AMERICAN: > 60
HGB BLD-MCNC: 9.9 G/DL (ref 11–16)
MCH RBC QN AUTO: 32.6 PG (ref 27–31)
MCHC RBC AUTO-ENTMCNC: 33.7 G/DL (ref 33–37)
MCV RBC AUTO: 96.6 FL (ref 81–99)
PLATELET # BLD: 164 K/UL (ref 130–400)
PMV BLD AUTO: 10.4 FL (ref 7.2–11.7)
RBC # BLD AUTO: 3.03 MIL/UL (ref 3.8–5.2)
WBC # BLD AUTO: 6.1 K/UL (ref 4.8–10.8)

## 2018-02-24 RX ADMIN — DIPHENHYDRAMINE HYDROCHLORIDE PRN MG: 50 INJECTION INTRAMUSCULAR; INTRAVENOUS at 06:22

## 2018-02-24 RX ADMIN — DIPHENOXYLATE HYDROCHLORIDE AND ATROPINE SULFATE PRN TAB: 2.5; .025 TABLET ORAL at 22:19

## 2018-02-24 RX ADMIN — PANTOPRAZOLE SODIUM SCH MG: 40 TABLET, DELAYED RELEASE ORAL at 09:34

## 2018-02-24 RX ADMIN — BISMUTH SUBSALICYLATE PRN MG: 525 SUSPENSION ORAL at 22:19

## 2018-02-24 RX ADMIN — METHYLPREDNISOLONE SODIUM SUCCINATE SCH MG: 40 INJECTION, POWDER, FOR SOLUTION INTRAMUSCULAR; INTRAVENOUS at 22:19

## 2018-02-24 RX ADMIN — DIPHENOXYLATE HYDROCHLORIDE AND ATROPINE SULFATE PRN TAB: 2.5; .025 TABLET ORAL at 02:21

## 2018-02-24 RX ADMIN — DIPHENOXYLATE HYDROCHLORIDE AND ATROPINE SULFATE PRN TAB: 2.5; .025 TABLET ORAL at 17:35

## 2018-02-24 RX ADMIN — HUMAN INSULIN SCH: 100 INJECTION, SOLUTION SUBCUTANEOUS at 22:00

## 2018-02-24 RX ADMIN — METHYLPREDNISOLONE SODIUM SUCCINATE SCH MG: 40 INJECTION, POWDER, FOR SOLUTION INTRAMUSCULAR; INTRAVENOUS at 10:34

## 2018-02-24 RX ADMIN — Medication SCH CAP: at 09:34

## 2018-02-24 RX ADMIN — DIPHENHYDRAMINE HYDROCHLORIDE PRN MG: 50 INJECTION INTRAMUSCULAR; INTRAVENOUS at 17:30

## 2018-02-24 RX ADMIN — DIPHENOXYLATE HYDROCHLORIDE AND ATROPINE SULFATE PRN TAB: 2.5; .025 TABLET ORAL at 09:34

## 2018-02-24 RX ADMIN — METOPROLOL SUCCINATE SCH MG: 50 TABLET, EXTENDED RELEASE ORAL at 09:35

## 2018-02-24 RX ADMIN — Medication SCH CAP: at 17:31

## 2018-02-24 RX ADMIN — DIPHENHYDRAMINE HYDROCHLORIDE PRN MG: 50 INJECTION INTRAMUSCULAR; INTRAVENOUS at 10:35

## 2018-02-24 RX ADMIN — DIPHENHYDRAMINE HYDROCHLORIDE PRN MG: 50 INJECTION INTRAMUSCULAR; INTRAVENOUS at 02:21

## 2018-02-24 RX ADMIN — HUMAN INSULIN SCH UNIT: 100 INJECTION, SOLUTION SUBCUTANEOUS at 17:31

## 2018-02-24 RX ADMIN — HUMAN INSULIN SCH UNIT: 100 INJECTION, SOLUTION SUBCUTANEOUS at 08:43

## 2018-02-24 RX ADMIN — HUMAN INSULIN SCH: 100 INJECTION, SOLUTION SUBCUTANEOUS at 12:08

## 2018-02-24 RX ADMIN — DIPHENHYDRAMINE HYDROCHLORIDE PRN MG: 50 INJECTION INTRAMUSCULAR; INTRAVENOUS at 14:21

## 2018-02-24 RX ADMIN — METHYLPREDNISOLONE SODIUM SUCCINATE SCH MG: 40 INJECTION, POWDER, FOR SOLUTION INTRAMUSCULAR; INTRAVENOUS at 08:49

## 2018-02-24 RX ADMIN — METHYLPREDNISOLONE SODIUM SUCCINATE SCH MG: 40 INJECTION, POWDER, FOR SOLUTION INTRAMUSCULAR; INTRAVENOUS at 17:30

## 2018-02-24 RX ADMIN — INSULIN DETEMIR SCH UNIT: 100 INJECTION, SOLUTION SUBCUTANEOUS at 09:35

## 2018-02-24 RX ADMIN — DIGOXIN SCH: 0.25 TABLET ORAL at 17:38

## 2018-02-24 RX ADMIN — HUMAN INSULIN SCH: 100 INJECTION, SOLUTION SUBCUTANEOUS at 08:50

## 2018-02-24 RX ADMIN — OXYBUTYNIN CHLORIDE SCH MG: 10 TABLET, EXTENDED RELEASE ORAL at 09:34

## 2018-02-24 RX ADMIN — DIPHENHYDRAMINE HYDROCHLORIDE PRN MG: 50 INJECTION INTRAMUSCULAR; INTRAVENOUS at 22:20

## 2018-02-24 NOTE — CP.PCM.PN
Subjective





- Date & Time of Evaluation


Date of Evaluation: 02/24/18


Time of Evaluation: 19:00





- Subjective


Subjective: 





pt is for colonoscopy contnues to have diarrhea no sob, chest pain





Objective





- Vital Signs/Intake and Output


Vital Signs (last 24 hours): 


 











Temp Pulse Resp BP Pulse Ox


 


 97.8 F   60   20   140/58 L  95 


 


 02/24/18 16:56  02/24/18 16:56  02/24/18 16:56  02/24/18 16:56  02/24/18 16:56








Intake and Output: 


 











 02/24/18 02/25/18





 18:59 06:59


 


Intake Total 400 


 


Output Total 900 


 


Balance -500 














- Medications


Medications: 


 Current Medications





Apixaban (Eliquis)  5 mg PO BID [facility]


   Last Admin: 02/24/18 17:30 Dose:  5 mg


Bisacodyl (Dulcolax)  10 mg PO ONCE ONE


   Stop: 02/25/18 17:01


Bismuth Subsalicylate (Pepto-Bismol)  262 mg PO Q8 PRN


   PRN Reason: Diarrhea


   Last Admin: 02/24/18 22:19 Dose:  262 mg


Digoxin (Lanoxin)  0.25 mg PO DAILY@1800 [facility]


   Last Admin: 02/24/18 17:38 Dose:  Not Given


Diphenhydramine HCl (Benadryl)  12.5 mg IVP Q4 PRN


   PRN Reason: Itching / Pruritus


   Last Admin: 02/24/18 22:20 Dose:  12.5 mg


Diphenoxylate HCl/Atropine (Lomotil 0.025-2.5 Mg Tablet)  1 tab PO Q4 PRN


   PRN Reason: Irritable bowel symptoms


   Last Admin: 02/24/18 22:19 Dose:  1 tab


Gabapentin (Neurontin)  100 mg PO BID [facility]


   Last Admin: 02/24/18 17:30 Dose:  100 mg


Sodium Chloride (Sodium Chloride 0.9%)  1,000 mls @ 70 mls/hr IV .[id number] [facility]


   Last Admin: 02/24/18 22:19 Dose:  70 mls/hr


Insulin Detemir (Levemir)  10 unit SC QAM [facility]


   Last Admin: 02/24/18 09:35 Dose:  10 unit


Insulin Human Regular (Novolin R)  0 unit SC ACHS [facility]


   PRN Reason: Protocol


   Last Admin: 02/24/18 22:00 Dose:  Not Given


Lactobacillus Acidophilus (Bacid Acidophilus)  1 cap PO BID [facility]


   Last Admin: 02/24/18 17:31 Dose:  1 cap


Losartan Potassium (Cozaar)  50 mg PO DAILY [facility]


   Last Admin: 02/24/18 09:34 Dose:  50 mg


Methylprednisolone (Solu-Medrol)  20 mg IVP Q12 [facility]


   Last Admin: 02/24/18 22:19 Dose:  20 mg


Methylprednisolone (Solu-Medrol)  40 mg IVP Q8H [facility]


   Last Admin: 02/24/18 17:30 Dose:  40 mg


Metoclopramide HCl (Reglan)  5 mg IVP Q6H [facility]


   Last Admin: 02/24/18 22:20 Dose:  5 mg


Metoprolol Succinate (Toprol Xl)  50 mg PO DAILY [facility]


   Last Admin: 02/24/18 09:35 Dose:  50 mg


Morphine Sulfate (Morphine)  2 mg IVP Q4 PRN


   PRN Reason: Pain, severe (8-10)


   Last Admin: 02/24/18 22:20 Dose:  2 mg


Oxybutynin Chloride (Ditropan Xl)  10 mg PO DAILY [facility]


   Last Admin: 02/24/18 09:34 Dose:  10 mg


Pantoprazole Sodium (Protonix Ec Tab)  40 mg PO DAILY [facility]


   Last Admin: 02/24/18 09:34 Dose:  40 mg


Polyethylene Glycol/Electrolytes (Golytely)  4,000 ml PO ONCE ONE


   Stop: 02/25/18 10:01


Sodium Bicarbonate (Sodium Bicarbonate Tab)  650 mg PO BID [facility]


   Last Admin: 02/24/18 17:30 Dose:  650 mg











- Labs


Labs: 


 





 02/24/18 07:06 





 02/24/18 07:06 





 











PT  13.6 SECONDS (9.7-12.2)  H  02/05/18  18:27    


 


INR  1.2   02/05/18  18:27    


 


APTT  54 SECONDS (21-34)  H  02/05/18  18:27    














Assessment and Plan


(1) Diarrhea


Status: Acute   





(2) Complicated urinary tract infection


Status: Acute   





(3) Diabetes


Status: Acute   





(4) Hypertension


Status: Acute   





(5) Neurogenic bladder


Status: Acute

## 2018-02-24 NOTE — PN
SUBJECTIVE:  The patient is still experiencing frequent diarrhea as well as

rectal and bilateral lower abdominal pain.  No retrosternal any chest pain.

Mildly short of breath.



PHYSICAL EXAMINATION:

VITAL SIGNS:  Blood pressure 150/76, heart rate 58, temperature 97.8,

respirations 18.

HEENT:  Pale conjunctivae.

CHEST:  Bilateral rhonchi.

HEART:  S1 and S2, regular.

EXTREMITIES:  Bilateral above-knee amputation.



LABORATORY DATA:  Hemoglobin and hematocrit 9.9 and 29.3.  White counts and

platelet counts are within normal limits.  Today SMA-7:  Sodium 136,

potassium 4.1, chloride 102, CO2 24, glucose 470, BUN 25, creatinine 0.6. 

AST and ALT are 103 and 194 respectively.  Alkaline phosphatase 284.



ASSESSMENT:

1.  Congestive heart failure.

2.  Peripheral vascular disease, status post bilateral above-knee

amputation.

3.  Liver sarcoidosis.

4.  Uncontrolled diabetes mellitus.

5.  Persistent diarrhea.



RECOMMENDATIONS:  Continue Cozaar 50 mg once a day, Eliquis 5 mg twice a

day, Lanoxin 0.25 mg daily, Neurontin 100 mg twice a day, Reglan 5 mg IV

q.6 hours, Solu-Medrol _____, Toprol-XL 50 mg once a day.  The patient will

undergo colonoscopy on Monday, Eliquis will be withheld starting tomorrow.





__________________________________________

Ankit Figueroa MD





DD:  02/24/2018 16:52:48

DT:  02/24/2018 18:27:57

Job # [id number]

## 2018-02-25 LAB
ALBUMIN SERPL-MCNC: 3 G/DL (ref 3.5–5)
ALBUMIN/GLOB SERPL: 1.1 {RATIO} (ref 1–2.1)
ALT SERPL-CCNC: 192 U/L (ref 9–52)
AST SERPL-CCNC: 92 U/L (ref 14–36)
BUN SERPL-MCNC: 26 MG/DL (ref 7–17)
CALCIUM SERPL-MCNC: 8.3 MG/DL (ref 8.6–10.4)
ERYTHROCYTE [DISTWIDTH] IN BLOOD BY AUTOMATED COUNT: 23.6 % (ref 11.5–14.5)
GFR NON-AFRICAN AMERICAN: > 60
HGB BLD-MCNC: 10.1 G/DL (ref 11–16)
MCH RBC QN AUTO: 32.2 PG (ref 27–31)
MCHC RBC AUTO-ENTMCNC: 33.3 G/DL (ref 33–37)
MCV RBC AUTO: 96.6 FL (ref 81–99)
PLATELET # BLD: 169 K/UL (ref 130–400)
PMV BLD AUTO: 10.1 FL (ref 7.2–11.7)
RBC # BLD AUTO: 3.14 MIL/UL (ref 3.8–5.2)
WBC # BLD AUTO: 7.2 K/UL (ref 4.8–10.8)

## 2018-02-25 RX ADMIN — DIPHENOXYLATE HYDROCHLORIDE AND ATROPINE SULFATE PRN TAB: 2.5; .025 TABLET ORAL at 06:32

## 2018-02-25 RX ADMIN — METHYLPREDNISOLONE SODIUM SUCCINATE SCH MG: 40 INJECTION, POWDER, FOR SOLUTION INTRAMUSCULAR; INTRAVENOUS at 21:33

## 2018-02-25 RX ADMIN — HUMAN INSULIN SCH UNIT: 100 INJECTION, SOLUTION SUBCUTANEOUS at 17:45

## 2018-02-25 RX ADMIN — INSULIN DETEMIR SCH UNIT: 100 INJECTION, SOLUTION SUBCUTANEOUS at 09:09

## 2018-02-25 RX ADMIN — HUMAN INSULIN SCH: 100 INJECTION, SOLUTION SUBCUTANEOUS at 23:01

## 2018-02-25 RX ADMIN — BISMUTH SUBSALICYLATE PRN MG: 525 SUSPENSION ORAL at 21:34

## 2018-02-25 RX ADMIN — DIPHENOXYLATE HYDROCHLORIDE AND ATROPINE SULFATE PRN TAB: 2.5; .025 TABLET ORAL at 02:33

## 2018-02-25 RX ADMIN — PANTOPRAZOLE SODIUM SCH MG: 40 TABLET, DELAYED RELEASE ORAL at 09:08

## 2018-02-25 RX ADMIN — HUMAN INSULIN SCH UNIT: 100 INJECTION, SOLUTION SUBCUTANEOUS at 12:21

## 2018-02-25 RX ADMIN — DIPHENHYDRAMINE HYDROCHLORIDE PRN MG: 50 INJECTION INTRAMUSCULAR; INTRAVENOUS at 21:33

## 2018-02-25 RX ADMIN — Medication SCH CAP: at 09:08

## 2018-02-25 RX ADMIN — DIPHENOXYLATE HYDROCHLORIDE AND ATROPINE SULFATE PRN TAB: 2.5; .025 TABLET ORAL at 21:34

## 2018-02-25 RX ADMIN — HUMAN INSULIN SCH UNIT: 100 INJECTION, SOLUTION SUBCUTANEOUS at 08:05

## 2018-02-25 RX ADMIN — Medication SCH CAP: at 17:44

## 2018-02-25 RX ADMIN — DIPHENOXYLATE HYDROCHLORIDE AND ATROPINE SULFATE PRN TAB: 2.5; .025 TABLET ORAL at 09:08

## 2018-02-25 RX ADMIN — METHYLPREDNISOLONE SODIUM SUCCINATE SCH MG: 40 INJECTION, POWDER, FOR SOLUTION INTRAMUSCULAR; INTRAVENOUS at 01:30

## 2018-02-25 RX ADMIN — METHYLPREDNISOLONE SODIUM SUCCINATE SCH MG: 40 INJECTION, POWDER, FOR SOLUTION INTRAMUSCULAR; INTRAVENOUS at 17:42

## 2018-02-25 RX ADMIN — OXYBUTYNIN CHLORIDE SCH MG: 10 TABLET, EXTENDED RELEASE ORAL at 09:08

## 2018-02-25 RX ADMIN — DIGOXIN SCH MG: 0.25 TABLET ORAL at 17:44

## 2018-02-25 RX ADMIN — METHYLPREDNISOLONE SODIUM SUCCINATE SCH MG: 40 INJECTION, POWDER, FOR SOLUTION INTRAMUSCULAR; INTRAVENOUS at 09:09

## 2018-02-25 RX ADMIN — DIPHENHYDRAMINE HYDROCHLORIDE PRN MG: 50 INJECTION INTRAMUSCULAR; INTRAVENOUS at 02:33

## 2018-02-25 RX ADMIN — DIPHENHYDRAMINE HYDROCHLORIDE PRN MG: 50 INJECTION INTRAMUSCULAR; INTRAVENOUS at 17:43

## 2018-02-25 RX ADMIN — DIPHENHYDRAMINE HYDROCHLORIDE PRN MG: 50 INJECTION INTRAMUSCULAR; INTRAVENOUS at 14:35

## 2018-02-25 RX ADMIN — METOPROLOL SUCCINATE SCH MG: 50 TABLET, EXTENDED RELEASE ORAL at 09:08

## 2018-02-25 RX ADMIN — DIPHENHYDRAMINE HYDROCHLORIDE PRN MG: 50 INJECTION INTRAMUSCULAR; INTRAVENOUS at 10:33

## 2018-02-25 RX ADMIN — METHYLPREDNISOLONE SODIUM SUCCINATE SCH MG: 40 INJECTION, POWDER, FOR SOLUTION INTRAMUSCULAR; INTRAVENOUS at 08:05

## 2018-02-25 RX ADMIN — DIPHENHYDRAMINE HYDROCHLORIDE PRN MG: 50 INJECTION INTRAMUSCULAR; INTRAVENOUS at 06:32

## 2018-02-25 NOTE — PN
DATE:



LOCATION:  Room 569, bed A.



SUBJECTIVE:  This is 52-year-old female, seen and examined in rounds and

response to the recurrent medical nursing staff request this morning due to

recurrent perfuse diarrhea with, again as per patient's statement, fresh

blood.  The entire chart is reviewed including, but not limited to, the

most recent lab and radiology study results, current and previous

medication list, current and previous medical events, but the patient

denied any actual chest pain, palpitation, or significant complain of

hematemesis, still have a complaint of abdominal pain, diffuse.  Today's

lab showed hemoglobin 10.1, hematocrit 30.3, normal platelet count, CO2

content on 20 indicative of metabolic acidosis, BUN 26, creatinine low at

0.5 indicative of prerenal azotemia with dehydration, but increased blood

glucose level to 425, low calcium 8.3 with abnormal liver function test,

but mildly improved since the increased steroid intake IV.  Albumin 3.0,

total protein 5.7, as the patient also reported less oral intake.



PHYSICAL EXAMINATION

GENERAL:  A 52-year-old female, awake, alert, and oriented.

VITAL SIGNS:  Afebrile with pulse of 74, blood pressure 146/74, and

respiratory rate 20 to 22.

HEENT:  Showed pale, dry oral mucoid membrane.  Nonicteric sclerae.

LUNGS:  Few scattered crepitation, decreased air entry at bases.

HEART:  Positive S1 and S2.

ABDOMEN:  Soft.  Bowel sounds are present with mild generalized tenderness.

No mass or organomegaly.  No rebound tenderness or guarding, but abdominal

distention.

EXTREMITIES:  With evidence of bilateral above-knee amputation.

RECTAL:  _____ positive stool.

NEUROLOGIC:  No reported new neurological deficits, sensory or motor.



The patient seen by the cardiology consultant on the case, which is

appreciated.



IMPRESSION:

1.  Gastrointestinal bleeding with subsequent drop in hemoglobin and

hematocrit.

2.  Perfuse diarrhea, unclear etiology, possibility of malabsorption

syndrome versus short bowel syndrome and/or collagenous colitis was raised

keeping in mind the patient's known history of hepatic sarcoidosis.

3.  Known history of hepatic sarcoidosis, abnormal liver function test with

jaundice.

3.  Peripheral vascular disease with bilateral above-knee amputation.

4.  Re-exacerbation of peptic ulcer disease.

5.  Known history of hypertension, diabetes mellitus, and cardiac

arrhythmias.

6.  Known history of partial colon resection.

7.  Papillary spasm, status post biliary stent insertion.

8.  Metabolic acidosis secondary to above.



SUGGESTION:

1.  Continue current management.

2.  The patient for colonoscopy at a.m.

3.  Further recommendation to follow post colonoscopy.





_________________________________________

Jeffrey Albert MD





DD:  02/25/2018 9:18:15

DT:  02/25/2018 11:03:23

Job # [id number]

## 2018-02-25 NOTE — CP.PCM.PN
Subjective





- Date & Time of Evaluation


Date of Evaluation: 02/25/18


Time of Evaluation: 18:00





- Subjective


Subjective: 





Pt seen and evaluated at bedside, pt is for colonoscopy tommorow, perssitant 

diarrhea, stool culture has been ordered repeatedly neg





Objective





- Vital Signs/Intake and Output


Vital Signs (last 24 hours): 


 











Temp Pulse Resp BP Pulse Ox


 


 98.2 F   62   18   137/54 L  100 


 


 02/25/18 16:12  02/25/18 16:16  02/25/18 16:12  02/25/18 16:12  02/25/18 16:12








Intake and Output: 


 











 02/25/18 02/26/18





 18:59 06:59


 


Intake Total 1620 560


 


Output Total 1500 900


 


Balance 120 -340














- Medications


Medications: 


 Current Medications





Apixaban (Eliquis)  5 mg PO BID [facility]


   Last Admin: 02/24/18 17:30 Dose:  5 mg


Bismuth Subsalicylate (Pepto-Bismol)  262 mg PO Q8 PRN


   PRN Reason: Diarrhea


   Last Admin: 02/25/18 21:34 Dose:  262 mg


Digoxin (Lanoxin)  0.25 mg PO DAILY@1800 [facility]


   Last Admin: 02/25/18 17:44 Dose:  0.25 mg


Diphenhydramine HCl (Benadryl)  12.5 mg IVP Q4 PRN


   PRN Reason: Itching / Pruritus


   Last Admin: 02/25/18 21:33 Dose:  12.5 mg


Diphenoxylate HCl/Atropine (Lomotil 0.025-2.5 Mg Tablet)  1 tab PO Q4 PRN


   PRN Reason: Irritable bowel symptoms


   Last Admin: 02/25/18 21:34 Dose:  1 tab


Gabapentin (Neurontin)  100 mg PO BID [facility]


   Last Admin: 02/25/18 17:44 Dose:  100 mg


Sodium Chloride (Sodium Chloride 0.9%)  1,000 mls @ 70 mls/hr IV .[id number] [facility]


   Last Admin: 02/25/18 12:20 Dose:  70 mls/hr


Insulin Detemir (Levemir)  10 unit SC QAM [facility]


   Last Admin: 02/25/18 09:09 Dose:  10 unit


Insulin Human Regular (Novolin R)  0 unit SC ACHS [facility]


   PRN Reason: Protocol


   Last Admin: 02/25/18 23:01 Dose:  Not Given


Lactobacillus Acidophilus (Bacid Acidophilus)  1 cap PO BID [facility]


   Last Admin: 02/25/18 17:44 Dose:  1 cap


Losartan Potassium (Cozaar)  50 mg PO DAILY [facility]


   Last Admin: 02/25/18 09:08 Dose:  50 mg


Methylprednisolone (Solu-Medrol)  20 mg IVP Q12 [facility]


   Last Admin: 02/25/18 21:33 Dose:  20 mg


Methylprednisolone (Solu-Medrol)  40 mg IVP Q8H [facility]


   Last Admin: 02/25/18 17:42 Dose:  40 mg


Metoclopramide HCl (Reglan)  5 mg IVP Q6H [facility]


   Last Admin: 02/25/18 23:01 Dose:  Not Given


Metoprolol Succinate (Toprol Xl)  50 mg PO DAILY [facility]


   Last Admin: 02/25/18 09:08 Dose:  50 mg


Morphine Sulfate (Morphine)  2 mg IVP Q4 PRN


   PRN Reason: Pain, severe (8-10)


   Last Admin: 02/25/18 21:34 Dose:  2 mg


Oxybutynin Chloride (Ditropan Xl)  10 mg PO DAILY [facility]


   Last Admin: 02/25/18 09:08 Dose:  10 mg


Pantoprazole Sodium (Protonix Ec Tab)  40 mg PO DAILY [facility]


   Last Admin: 02/25/18 09:08 Dose:  40 mg


Sodium Bicarbonate (Sodium Bicarbonate Tab)  650 mg PO BID [facility]


   Last Admin: 02/25/18 19:21 Dose:  650 mg











- Labs


Labs: 


 





 02/25/18 08:16 





 02/25/18 08:16 





 











PT  13.6 SECONDS (9.7-12.2)  H  02/05/18  18:27    


 


INR  1.2   02/05/18  18:27    


 


APTT  54 SECONDS (21-34)  H  02/05/18  18:27    














- Constitutional


Appears: No Acute Distress





- Head Exam


Head Exam: ATRAUMATIC, NORMAL INSPECTION, NORMOCEPHALIC





- Eye Exam


Eye Exam: EOMI, Normal appearance, PERRL


Pupil Exam: NORMAL ACCOMODATION, PERRL





- Respiratory Exam


Respiratory Exam: Clear to Ausculation Bilateral, NORMAL BREATHING PATTERN





- Cardiovascular Exam


Cardiovascular Exam: REGULAR RHYTHM, +S1, +S2.  absent: Murmur





- GI/Abdominal Exam


GI & Abdominal Exam: Soft, Normal Bowel Sounds.  absent: Tenderness





Assessment and Plan


(1) Diarrhea


Status: Acute   





(2) Complicated urinary tract infection


Status: Acute   





(3) Diabetes


Status: Acute   





(4) Hypertension


Status: Acute   





(5) Neurogenic bladder


Status: Acute

## 2018-02-25 NOTE — CP.PCM.PN
Subjective





- Date & Time of Evaluation


Date of Evaluation: 02/25/18


Time of Evaluation: 17:27





- Subjective


Subjective: 





pt is seen and examined, follow up consult is dictated #[id number]





Objective





- Vital Signs/Intake and Output


Vital Signs (last 24 hours): 


 











Temp Pulse Resp BP Pulse Ox


 


 98.2 F   62   18   137/54 L  100 


 


 02/25/18 16:12  02/25/18 16:16  02/25/18 16:12  02/25/18 16:12  02/25/18 16:12








Intake and Output: 


 











 02/25/18 02/25/18





 06:59 18:59


 


Intake Total  1620


 


Output Total  1500


 


Balance  120














- Medications


Medications: 


 Current Medications





Apixaban (Eliquis)  5 mg PO BID [facility]


   Last Admin: 02/24/18 17:30 Dose:  5 mg


Bismuth Subsalicylate (Pepto-Bismol)  262 mg PO Q8 PRN


   PRN Reason: Diarrhea


   Last Admin: 02/24/18 22:19 Dose:  262 mg


Digoxin (Lanoxin)  0.25 mg PO DAILY@1800 [facility]


   Last Admin: 02/24/18 17:38 Dose:  Not Given


Diphenhydramine HCl (Benadryl)  12.5 mg IVP Q4 PRN


   PRN Reason: Itching / Pruritus


   Last Admin: 02/25/18 14:35 Dose:  12.5 mg


Diphenoxylate HCl/Atropine (Lomotil 0.025-2.5 Mg Tablet)  1 tab PO Q4 PRN


   PRN Reason: Irritable bowel symptoms


   Last Admin: 02/25/18 09:08 Dose:  1 tab


Gabapentin (Neurontin)  100 mg PO BID [facility]


   Last Admin: 02/25/18 09:08 Dose:  100 mg


Sodium Chloride (Sodium Chloride 0.9%)  1,000 mls @ 70 mls/hr IV .[id number] [facility]


   Last Admin: 02/25/18 12:20 Dose:  70 mls/hr


Insulin Detemir (Levemir)  10 unit SC QAM [facility]


   Last Admin: 02/25/18 09:09 Dose:  10 unit


Insulin Human Regular (Novolin R)  0 unit SC ACHS [facility]


   PRN Reason: Protocol


   Last Admin: 02/25/18 12:21 Dose:  8 unit


Lactobacillus Acidophilus (Bacid Acidophilus)  1 cap PO BID [facility]


   Last Admin: 02/25/18 09:08 Dose:  1 cap


Losartan Potassium (Cozaar)  50 mg PO DAILY [facility]


   Last Admin: 02/25/18 09:08 Dose:  50 mg


Methylprednisolone (Solu-Medrol)  20 mg IVP Q12 [facility]


   Last Admin: 02/25/18 09:09 Dose:  20 mg


Methylprednisolone (Solu-Medrol)  40 mg IVP Q8H [facility]


   Last Admin: 02/25/18 08:05 Dose:  40 mg


Metoclopramide HCl (Reglan)  5 mg IVP Q6H [facility]


   Last Admin: 02/25/18 14:35 Dose:  5 mg


Metoprolol Succinate (Toprol Xl)  50 mg PO DAILY [facility]


   Last Admin: 02/25/18 09:08 Dose:  50 mg


Morphine Sulfate (Morphine)  2 mg IVP Q4 PRN


   PRN Reason: Pain, severe (8-10)


   Last Admin: 02/25/18 14:35 Dose:  2 mg


Oxybutynin Chloride (Ditropan Xl)  10 mg PO DAILY [facility]


   Last Admin: 02/25/18 09:08 Dose:  10 mg


Pantoprazole Sodium (Protonix Ec Tab)  40 mg PO DAILY [facility]


   Last Admin: 02/25/18 09:08 Dose:  40 mg


Sodium Bicarbonate (Sodium Bicarbonate Tab)  650 mg PO BID [facility]


   Last Admin: 02/25/18 10:33 Dose:  650 mg











- Labs


Labs: 


 





 02/25/18 08:16 





 02/25/18 08:16 





 











PT  13.6 SECONDS (9.7-12.2)  H  02/05/18  18:27    


 


INR  1.2   02/05/18  18:27    


 


APTT  54 SECONDS (21-34)  H  02/05/18  18:27

## 2018-02-25 NOTE — PN
SUBJECTIVE:  The patient is still experiencing watery diarrhea and

tenesmus.



PHYSICAL EXAMINATION:

VITAL SIGNS:  Blood pressure 128/59, heart rate 64, temperature 98.3,

respirations 18.

HEENT:  Facial edema.

NECK:  No JVD.

CHEST:  Bilateral rhonchi, no wheezing.

HEART:  S1 and S2, regular.

ABDOMEN:  Soft.

EXTREMITIES:  Bilateral above-knee amputation.



LABORATORY DATA:  Today, BUN and creatinine 26 and 0.5.  Sodium and

potassium are within normal limits.  Glucose 425.  Today's hemoglobin and

hematocrit 10.1 and 30.3.  White counts and platelet counts are within

normal limits.



ASSESSMENT:

1.  Intractable diarrhea.

2.   Dilated cardiomyopathy, status post implantable cardioverter

defibrillator placement.

3.  Peripheral vascular disease, status post bilateral above-knee

amputation.

4.  Liver sarcoidosis.



RECOMMENDATIONS:  Continue Cozaar at 50 mg once a day, Eliquis has been

restarted today.  Continue gabapentin, oral Protonix, Pepto-Bismol, Reglan.

Continue Solu-Medrol 20 mg intravenous twice a day, normal saline at 70

mL/hour, Toprol-XL 50 mg once a day.  The patient can undergo colonoscopy

tomorrow from the cardiac point of view.





__________________________________________

Ankit Figueroa MD





DD:  02/25/2018 14:44:10

DT:  02/25/2018 17:12:06

Job # [id number]

## 2018-02-26 LAB
ALBUMIN SERPL-MCNC: 3 G/DL (ref 3.5–5)
ALBUMIN/GLOB SERPL: 1 {RATIO} (ref 1–2.1)
ALT SERPL-CCNC: 217 U/L (ref 9–52)
AST SERPL-CCNC: 120 U/L (ref 14–36)
BUN SERPL-MCNC: 12 MG/DL (ref 7–17)
CALCIUM SERPL-MCNC: 7.9 MG/DL (ref 8.6–10.4)
ERYTHROCYTE [DISTWIDTH] IN BLOOD BY AUTOMATED COUNT: 22.9 % (ref 11.5–14.5)
GFR NON-AFRICAN AMERICAN: > 60
HGB BLD-MCNC: 10.6 G/DL (ref 11–16)
MCH RBC QN AUTO: 32.3 PG (ref 27–31)
MCHC RBC AUTO-ENTMCNC: 33.3 G/DL (ref 33–37)
MCV RBC AUTO: 97.2 FL (ref 81–99)
PLATELET # BLD: 156 K/UL (ref 130–400)
PMV BLD AUTO: 9.3 FL (ref 7.2–11.7)
RBC # BLD AUTO: 3.29 MIL/UL (ref 3.8–5.2)
WBC # BLD AUTO: 6.8 K/UL (ref 4.8–10.8)

## 2018-02-26 PROCEDURE — 0DCQ7ZZ EXTIRPATION OF MATTER FROM ANUS, VIA NATURAL OR ARTIFICIAL OPENING: ICD-10-PCS | Performed by: SPECIALIST

## 2018-02-26 PROCEDURE — 0DJD8ZZ INSPECTION OF LOWER INTESTINAL TRACT, VIA NATURAL OR ARTIFICIAL OPENING ENDOSCOPIC: ICD-10-PCS | Performed by: SPECIALIST

## 2018-02-26 RX ADMIN — INSULIN DETEMIR SCH UNIT: 100 INJECTION, SOLUTION SUBCUTANEOUS at 13:33

## 2018-02-26 RX ADMIN — METHYLPREDNISOLONE SODIUM SUCCINATE SCH MG: 40 INJECTION, POWDER, FOR SOLUTION INTRAMUSCULAR; INTRAVENOUS at 08:53

## 2018-02-26 RX ADMIN — Medication SCH: at 10:30

## 2018-02-26 RX ADMIN — HUMAN INSULIN SCH UNIT: 100 INJECTION, SOLUTION SUBCUTANEOUS at 15:23

## 2018-02-26 RX ADMIN — PANTOPRAZOLE SODIUM SCH: 40 TABLET, DELAYED RELEASE ORAL at 10:31

## 2018-02-26 RX ADMIN — Medication SCH CAP: at 17:22

## 2018-02-26 RX ADMIN — METOPROLOL SUCCINATE SCH: 50 TABLET, EXTENDED RELEASE ORAL at 10:32

## 2018-02-26 RX ADMIN — DIPHENHYDRAMINE HYDROCHLORIDE PRN MG: 50 INJECTION INTRAMUSCULAR; INTRAVENOUS at 11:53

## 2018-02-26 RX ADMIN — DIPHENHYDRAMINE HYDROCHLORIDE PRN MG: 50 INJECTION INTRAMUSCULAR; INTRAVENOUS at 17:22

## 2018-02-26 RX ADMIN — DIGOXIN SCH MG: 0.25 TABLET ORAL at 17:21

## 2018-02-26 RX ADMIN — HUMAN INSULIN SCH UNIT: 100 INJECTION, SOLUTION SUBCUTANEOUS at 11:50

## 2018-02-26 RX ADMIN — INSULIN DETEMIR SCH: 100 INJECTION, SOLUTION SUBCUTANEOUS at 10:31

## 2018-02-26 RX ADMIN — DIPHENOXYLATE HYDROCHLORIDE AND ATROPINE SULFATE PRN TAB: 2.5; .025 TABLET ORAL at 02:29

## 2018-02-26 RX ADMIN — METOPROLOL SUCCINATE SCH MG: 50 TABLET, EXTENDED RELEASE ORAL at 11:52

## 2018-02-26 RX ADMIN — OXYBUTYNIN CHLORIDE SCH: 10 TABLET, EXTENDED RELEASE ORAL at 10:30

## 2018-02-26 RX ADMIN — HUMAN INSULIN SCH: 100 INJECTION, SOLUTION SUBCUTANEOUS at 21:57

## 2018-02-26 RX ADMIN — DIPHENHYDRAMINE HYDROCHLORIDE PRN MG: 50 INJECTION INTRAMUSCULAR; INTRAVENOUS at 22:08

## 2018-02-26 RX ADMIN — HUMAN INSULIN SCH UNIT: 100 INJECTION, SOLUTION SUBCUTANEOUS at 17:23

## 2018-02-26 RX ADMIN — BISMUTH SUBSALICYLATE PRN MG: 525 SUSPENSION ORAL at 05:24

## 2018-02-26 RX ADMIN — DIPHENHYDRAMINE HYDROCHLORIDE PRN MG: 50 INJECTION INTRAMUSCULAR; INTRAVENOUS at 06:31

## 2018-02-26 RX ADMIN — METHYLPREDNISOLONE SODIUM SUCCINATE SCH MG: 40 INJECTION, POWDER, FOR SOLUTION INTRAMUSCULAR; INTRAVENOUS at 22:08

## 2018-02-26 RX ADMIN — METHYLPREDNISOLONE SODIUM SUCCINATE SCH MG: 40 INJECTION, POWDER, FOR SOLUTION INTRAMUSCULAR; INTRAVENOUS at 01:13

## 2018-02-26 RX ADMIN — METHYLPREDNISOLONE SODIUM SUCCINATE SCH: 40 INJECTION, POWDER, FOR SOLUTION INTRAMUSCULAR; INTRAVENOUS at 10:31

## 2018-02-26 RX ADMIN — HUMAN INSULIN SCH: 100 INJECTION, SOLUTION SUBCUTANEOUS at 08:24

## 2018-02-26 RX ADMIN — METHYLPREDNISOLONE SODIUM SUCCINATE SCH MG: 40 INJECTION, POWDER, FOR SOLUTION INTRAMUSCULAR; INTRAVENOUS at 17:23

## 2018-02-26 RX ADMIN — DIPHENHYDRAMINE HYDROCHLORIDE PRN MG: 50 INJECTION INTRAMUSCULAR; INTRAVENOUS at 02:29

## 2018-02-26 NOTE — PN
DATE:  02/25/2018



The patient is located in room 569, bed A.



Requested by Dr. Nasir Dunbar



SUBJECTIVE:  Mrs. Jaramillo is a 52-year-old middle-aged 

female with a past medical history significant for hypertension, diabetes,

bilateral AKA, and sarcoidosis of the liver with abnormal LFTs, was

initially admitted with a UTI and her hospital course was complicated by

severe persistent diarrhea and also severe metabolic acidosis and

hyperkalemia.  The patient was treated with IV fluids with D5W with bicarb

and also got treated with Kayexalate.  Now, potassium is under

control, still slightly acidotic, still complains of persistent diarrhea,

watery, feeling weak and tired.  No chest pain or no palpitation.  No

shortness of breath.



PHYSICAL EXAMINATION:

VITAL SIGNS:  This afternoon, low blood pressure 137/54, pulse 57,

respirations 18, temperature 98.2, saturation 100%.  Height 3 feet 1 inch,

weight is 109 pounds.

GENERAL:  Mrs. Jaramillo is a 52-year-old female, moderately built,

moderately nourished with bilateral AKA.

HEENT:  Pupils normal and reactive to light and accommodation. 

Conjunctivae pink, sclerae anicteric.  Tongue is moist.  Trachea is

midline.

LUNGS:  Symmetric on both sides.  Bilateral breath sounds present.  Clear

on auscultation.

CVS:  Paloma at the fifth intercostal space, midclavicular area.  S1 and S2

audible.  No murmur or gallop.

ABDOMEN:  Normal in appearance, soft and tympanic.  Diffuse

mild-to-moderate tenderness present.  No guarding.  No rigidity.

CNS:  The patient is alert, awake, oriented x3.  Sensory and motor system

is grossly within normal limits.

EXTREMITIES:  Bilateral AKA.



CURRENT MEDICATIONS:  Include as follows:  Lactobacillus 1 capsule p.o.

b.i.d., Benadryl 112.5 mg p.o. q.4 hours p.r.n., Cozaar 50 mg p.o. daily,

Ditropan 10 mg p.o. daily, Eliquis on hold, digoxin 0.25 mg p.o. daily,

Levemir 10 units subcu q. a.m., Lomotil 1 tablet p.o. q.4 hours p.r.n.,

morphine sulfate 2 mg IV q.4 hours p.r.n., Neurontin 100 mg p.o. b.i.d.,

Novolin R per sliding scale, Pepto-Bismol 262 mg p.o. q.8 hours p.r.n.,

Protonix 40 mg p.o. daily, Reglan 5 mg IV q.8 hours, sodium bicarbonate 650

mg p.o. b.i.d., IV fluids with normal saline at 70 mL/hour, Solu-Medrol 40

mg IV q.8 hours, metoprolol 50 mg p.o. daily.



LABORATORY DATA:  Includes as follows:  As of 02/25/2018:  WBC 7.2,

hemoglobin 10.1, hematocrit is 30.3, platelets 169.  Sodium 136, potassium

4.8, chloride 103, CO2 is 20, BUN 26, creatinine 0.5, glucose 425, calcium

8.3.  Total bili 4.5, AST 92, , alkaline phosphatase 294, total

protein 5.7, albumin is 3.



In summary, Mrs. Jaramillo is a 52-year-old middle-aged 

female with multiple medical problems that include hypertension, diabetes,

status post right hemicolectomy and cholecystectomy and status post AICD on

the left side, status post UTI with persistent diarrhea and low bicarb and

increased BUN and creatinine.

1.  Metabolic acidosis, most likely secondary to GI loss.

2.  Prerenal azotemia.

3.  Hypertension.

4.  Diabetes.



Blood pressure is under control.  Continue IV fluids, normal saline at 70

mL/hour and continue sodium bicarbonate tablets 650 mg p.o. b.i.d. and if

sodium bicarb continued to decrease, we will change bicarb to three times a

day.  Check stool for culture and sensitivity, of course, stool for ova and

parasites, stool for leukocytes, stool for FIDENCIO, stool for microsporidium

and Isospora also.



We will follow with you.



Thank you for allowing me to participate in your patient's care.  The case

discussed with Dr. Dunbar in rounds.





__________________________________________

Nancy Meyer MD

 



DD:  02/25/2018 23:22:42

DT:  02/25/2018 23:29:52

Job # [id number]



[id number]

## 2018-02-26 NOTE — CP.PCM.PN
Subjective





- Date & Time of Evaluation


Date of Evaluation: 02/26/18


Time of Evaluation: 19:00





- Subjective


Subjective: 





Pt seen and examined  s/p colonoscopy





Objective





- Vital Signs/Intake and Output


Vital Signs (last 24 hours): 


 











Temp Pulse Resp BP Pulse Ox


 


 97.9 F   66   14   175/86 H  100 


 


 02/26/18 11:00  02/26/18 11:30  02/26/18 11:30  02/26/18 11:30  02/26/18 11:30








Intake and Output: 


 











 02/26/18 02/26/18





 06:59 18:59


 


Intake Total 560 


 


Output Total 900 


 


Balance -340 














- Medications


Medications: 


 Current Medications





Apixaban (Eliquis)  5 mg PO BID [facility]


   Last Admin: 02/24/18 17:30 Dose:  5 mg


Bisacodyl (Dulcolax)  10 mg PO ONCE ONE


   Stop: 02/26/18 17:01


Bismuth Subsalicylate (Pepto-Bismol)  262 mg PO Q8 PRN


   PRN Reason: Diarrhea


   Last Admin: 02/26/18 05:24 Dose:  262 mg


Digoxin (Lanoxin)  0.25 mg PO DAILY@1800 [facility]


   Last Admin: 02/25/18 17:44 Dose:  0.25 mg


Diphenhydramine HCl (Benadryl)  12.5 mg IVP Q4 PRN


   PRN Reason: Itching / Pruritus


   Last Admin: 02/26/18 11:53 Dose:  12.5 mg


Diphenoxylate HCl/Atropine (Lomotil 0.025-2.5 Mg Tablet)  1 tab PO Q4 PRN


   PRN Reason: Irritable bowel symptoms


   Last Admin: 02/26/18 02:29 Dose:  1 tab


Gabapentin (Neurontin)  100 mg PO BID [facility]


   Last Admin: 02/26/18 10:31 Dose:  Not Given


Sodium Chloride (Sodium Chloride 0.9%)  1,000 mls @ 70 mls/hr IV .[id number] [facility]


   Last Admin: 02/26/18 02:28 Dose:  70 mls/hr


Insulin Detemir (Levemir)  10 unit SC QAM [facility]


   Last Admin: 02/26/18 10:31 Dose:  Not Given


Insulin Human Regular (Novolin R)  0 unit SC ACHS [facility]


   PRN Reason: Protocol


   Last Admin: 02/26/18 08:24 Dose:  Not Given


Lactobacillus Acidophilus (Bacid Acidophilus)  1 cap PO BID [facility]


   Last Admin: 02/26/18 10:30 Dose:  Not Given


Losartan Potassium (Cozaar)  50 mg PO DAILY [facility]


   Last Admin: 02/26/18 11:52 Dose:  50 mg


Methylprednisolone (Solu-Medrol)  20 mg IVP Q12 [facility]


   Last Admin: 02/26/18 10:31 Dose:  Not Given


Methylprednisolone (Solu-Medrol)  40 mg IVP Q8H [facility]


   Last Admin: 02/26/18 08:53 Dose:  40 mg


Metoclopramide HCl (Reglan)  5 mg IVP Q6H [facility]


   Last Admin: 02/26/18 08:25 Dose:  5 mg


Metoprolol Succinate (Toprol Xl)  50 mg PO DAILY [facility]


   Last Admin: 02/26/18 11:52 Dose:  50 mg


Morphine Sulfate (Morphine)  2 mg IVP Q4 PRN


   PRN Reason: Pain, severe (8-10)


   Last Admin: 02/26/18 11:53 Dose:  2 mg


Oxybutynin Chloride (Ditropan Xl)  10 mg PO DAILY [facility]


   Last Admin: 02/26/18 10:30 Dose:  Not Given


Pantoprazole Sodium (Protonix Ec Tab)  40 mg PO DAILY [facility]


   Last Admin: 02/26/18 10:31 Dose:  Not Given


Polyethylene Glycol/Electrolytes (Golytely)  4,000 ml PO ONCE ONE


   Stop: 02/26/18 13:01


Sodium Bicarbonate (Sodium Bicarbonate Tab)  650 mg PO BID [facility]


   Last Admin: 02/26/18 10:31 Dose:  Not Given











- Labs


Labs: 


 





 02/25/18 08:16 





 02/25/18 08:16 





 











PT  13.6 SECONDS (9.7-12.2)  H  02/05/18  18:27    


 


INR  1.2   02/05/18  18:27    


 


APTT  54 SECONDS (21-34)  H  02/05/18  18:27    














Assessment and Plan


(1) Diarrhea


Status: Acute   





(2) Complicated urinary tract infection


Status: Acute   





(3) Diabetes


Status: Acute   





(4) Hypertension


Status: Acute   





(5) Neurogenic bladder


Status: Acute

## 2018-02-26 NOTE — PN
DATE:  02/24/2018.



LOCATION:  [facility], bed A.



SUBJECTIVE:  This is a 52 years old female, seen and examined in rounds

today complaining again of diarrhea since the steroid dose was reduced with

crampy abdominal pain claiming that she has some red fecal material.  The

entire chart is reviewed including, but not limited to, the most recent lab

and radiology study results, current and previous medication list, current

and previous medical events.  Case discussed with staff in the floor.



Today's lab showed elevated blood glucose level to 395 and the patient

still has low hemoglobin and hematocrit as well as increased BUN, but low

creatinine with abnormal liver function tests, but less than before with

low albumin and low total protein.



PHYSICAL EXAMINATION:

GENERAL:  A 52 years old female.  The patient denied any actual chest pain

or palpitation or significant shortness of breath.  No chills or fever.

VITAL SIGNS:  Afebrile with pulse of 56, respiratory rate 20-22, blood

pressure 142/76.

HEART:  Positive S1 and S2.

ABDOMEN:  Soft with mild distention and generalized tenderness with

hyperactive bowel sounds.  No masses or organomegaly.  No rebound

tenderness or guarding.

EXTREMITIES:  With evidence of bilateral above-knee amputation.

NEUROLOGIC:  No reported new neurological deficits, sensory or motor.



IMPRESSION:

1.  Diarrhea, again that could be secondary to poorly controlled diabetes

mellitus with collagenous diarrhea.  Repeat Clostridium difficile is

advised.

2.  Hepatic sarcoidosis.

3.  Abnormal liver function test with jaundice secondary to above.

4.  Anemia secondary to above.

5.  Known history but not limited to hypertension, cardiac arrhythmias,

peripheral vascular disease with bilateral above knee amputation.

6.  Known history of partial colon resection.



SUGGESTIONS:

1.  Agree with your plan.

2.  The patient is to be prepared for potential colonoscopy after soft and

clear preparation to rule out possible lower gastrointestinal tract source

of blood loss and due to the patient's chronic diarrhea.

3.  We will increase the dose of Solu-Medrol 40 IV piggyback q. 8 hours

_____.

4.  Cholestyramine powder 1 pack 3 times a day p.o.

5.  No milk and no dairy products.  We will follow up closely with you.







__________________________________________

Jeffrey Albert MD





DD:  02/24/2018 7:30:34

DT:  02/24/2018 8:59:05

[facility] # [id number]

## 2018-02-26 NOTE — CP.PCM.PN
Subjective





- Date & Time of Evaluation


Date of Evaluation: 02/26/18


Time of Evaluation: 16:08





- Subjective


Subjective: 





PT SEEN AFTER COLONOSCOPY; TOLERATED WELL.  PER DR. SILVERIO PT TO REPEAT IT 

TOMORROW AS THERE WAS FECAL RETENTION NOTED IN TODAY'S EXAM.  PT'S LABS DRAWN 

THIS AFTERNOON AFTER SHE RETURNED FROM ENDO.  SHE OFFERS NO COMPLAINTS AND IS 

COMFORTABLE.  NOTED BS ELEVATED, HOWEVER, PT'S LANTUS AND OTHER MEDS HELD 2/2 

ENDO.  REPEAT AM LABS ORDERED.  NO FURTHER ORDERS AT THIS TIME.





Objective





- Vital Signs/Intake and Output


Vital Signs (last 24 hours): 


 











Temp Pulse Resp BP Pulse Ox


 


 97.9 F   65   14   175/86 H  100 


 


 02/26/18 11:00  02/26/18 14:41  02/26/18 11:30  02/26/18 11:30  02/26/18 11:30








Intake and Output: 


 











 02/26/18 02/26/18





 06:59 18:59


 


Intake Total 560 


 


Output Total 900 400


 


Balance -340 -400














- Medications


Medications: 


 Current Medications





Apixaban (Eliquis)  5 mg PO BID [facility]


   Last Admin: 02/24/18 17:30 Dose:  5 mg


Bisacodyl (Dulcolax)  10 mg PO ONCE ONE


   Stop: 02/26/18 17:01


Bismuth Subsalicylate (Pepto-Bismol)  262 mg PO Q8 PRN


   PRN Reason: Diarrhea


   Last Admin: 02/26/18 05:24 Dose:  262 mg


Digoxin (Lanoxin)  0.25 mg PO DAILY@1800 [facility]


   Last Admin: 02/25/18 17:44 Dose:  0.25 mg


Diphenhydramine HCl (Benadryl)  12.5 mg IVP Q4 PRN


   PRN Reason: Itching / Pruritus


   Last Admin: 02/26/18 11:53 Dose:  12.5 mg


Diphenoxylate HCl/Atropine (Lomotil 0.025-2.5 Mg Tablet)  1 tab PO Q4 PRN


   PRN Reason: Irritable bowel symptoms


   Last Admin: 02/26/18 02:29 Dose:  1 tab


Gabapentin (Neurontin)  100 mg PO BID [facility]


   Last Admin: 02/26/18 10:31 Dose:  Not Given


Sodium Chloride (Sodium Chloride 0.9%)  1,000 mls @ 70 mls/hr IV .[id number] [facility]


   Last Admin: 02/26/18 02:28 Dose:  70 mls/hr


Insulin Detemir (Levemir)  10 unit SC QAM [facility]


   Last Admin: 02/26/18 13:33 Dose:  10 unit


Insulin Human Regular (Novolin R)  0 unit SC ACHS [facility]


   PRN Reason: Protocol


   Last Admin: 02/26/18 15:23 Dose:  10 unit


Lactobacillus Acidophilus (Bacid Acidophilus)  1 cap PO BID [facility]


   Last Admin: 02/26/18 10:30 Dose:  Not Given


Losartan Potassium (Cozaar)  50 mg PO DAILY [facility]


   Last Admin: 02/26/18 11:52 Dose:  50 mg


Methylprednisolone (Solu-Medrol)  20 mg IVP Q12 [facility]


   Last Admin: 02/26/18 10:31 Dose:  Not Given


Methylprednisolone (Solu-Medrol)  40 mg IVP Q8H [facility]


   Last Admin: 02/26/18 08:53 Dose:  40 mg


Metoclopramide HCl (Reglan)  5 mg IVP Q6H [facility]


   Last Admin: 02/26/18 14:05 Dose:  5 mg


Metoprolol Succinate (Toprol Xl)  50 mg PO DAILY [facility]


   Last Admin: 02/26/18 11:52 Dose:  50 mg


Morphine Sulfate (Morphine)  2 mg IVP Q4 PRN


   PRN Reason: Pain, severe (8-10)


   Last Admin: 02/26/18 11:53 Dose:  2 mg


Oxybutynin Chloride (Ditropan Xl)  10 mg PO DAILY [facility]


   Last Admin: 02/26/18 10:30 Dose:  Not Given


Pantoprazole Sodium (Protonix Ec Tab)  40 mg PO DAILY [facility]


   Last Admin: 02/26/18 10:31 Dose:  Not Given


Sodium Bicarbonate (Sodium Bicarbonate Tab)  650 mg PO BID [facility]


   Last Admin: 02/26/18 10:31 Dose:  Not Given











- Labs


Labs: 


 





 02/26/18 14:07 





 02/26/18 14:07 





 











PT  13.6 SECONDS (9.7-12.2)  H  02/05/18  18:27    


 


INR  1.2   02/05/18  18:27    


 


APTT  54 SECONDS (21-34)  H  02/05/18  18:27

## 2018-02-26 NOTE — CON
DATE:02/23/2018



FOLLOWUP RENAL CONSULTATION



LOCATION:  The patient is located in room 369, bed A.



REQUESTED BY: Nasir Dunbar MD



REASON FOR FOLLOWUP:  Metabolic acidosis and hyperkalemia.



SUBJECTIVE:  Mrs. Jaramillo is a 52-year-old middle-aged 

female with a past medical history significant for longstanding sarcoidosis

of the liver, hypertension, diabetes, bilateral AKA, and also right

hemicolectomy and ileocolonic anastomosis status post cholecystectomy who

was admitted with UTI and Renal consult requested yesterday with

hyperkalemia and severe metabolic acidosis.  The patient is still

complaining of severe diarrhea watery.  Denies any chest pain or

palpitation.  Denies any abdominal pain.  Denies any nausea or vomiting. 

The patient does complain of occasional shortness of breath.



PHYSICAL EXAMINATION:

VITAL SIGNS:  As follow; blood pressure 139/88, pulse 64, respirations 20,

temperature 68.4, and saturation 98%.  Height 3 feet 1 inch, weight is 109

pounds.

GENERAL:  Mrs. Jaramillo is a 52-year-old female, moderately built,

moderately nourished, not in acute distress.

HEENT:  Pupils normal and reactive to light and accommodation. 

Conjunctivae pink.  Sclerae anicteric.  Tongue is moist.  Trachea is

midline.

LUNGS:  Symmetric on both sides.  Bilateral breath sounds present.  Clear

on auscultation.

CVS:  [place] at the fifth intercostal space, midclavicular line.  S1 and S2

audible.  No murmur or gallop.

ABDOMEN:  Normal in appearance.  Soft, tympanic.  No guarding, no rigidity.

No hepatosplenomegaly.

CNS:  The patient is alert, awake, and oriented x3.  Sensory system is

grossly within normal limits.  Motor system, bilateral AKA.



CURRENT MEDICATION:  Include as follows; lactobacillus 1 capsule p.o.

b.i.d., Benadryl 12.5 mg p.o. q. 4 hours, Cozaar 50 mg p.o. daily, Ditropan

XL 10 mg p.o. daily, Eliquis 5 mg p.o. b.i.d., digoxin 0.25 mg p.o. daily,

Levemir 10 units subcu q. a.m. Lomotil one tablet p.o. q. 4 hours p.r.n.,

morphine 2 mg IV q. 4 hours p.r.n., Neurontin 100 mg p.o. b.i.d.,

Pepto-Bismol 262 mg p.o. q. 8 hours, sodium bicarb tablet 650 mg p.o.

b.i.d., Solu-Medrol 20 mg IV q. 12 hours, and metoprolol 50 mg p.o. daily.



LABORATORY DATA:  Include as follows, as of 02/23/2018, WBC 6.9, hemoglobin

9.9, hematocrit is 28.7, and platelets 149.  Sodium 137, potassium 4.3,

chloride 104, CO2 of 22, BUN 33, creatinine 0.6, glucose 350, serum

osmolality 169, calcium 8.3, total bili 4.7, , , alkaline

phosphatase 314, total protein 5.7, and albumin is 3.0.  Urine osmolality

577, urine sodium is 100, urine potassium is 9.3.  Urine culture as of

02/05/2018 E. coli, Enterococcus faecalis.  As of 02/11/2018 urine positive

for Klebsiella pneumoniae and yeast.  Stool for C. diff toxin was negative

as of 02/09/2018 and 02/15/2018.  Digoxin level as of 02/22/2018 is 0.9.



ASSESSMENT:  In summary, Mrs. Jaramillo is a 52-year-old middle-aged 

American female with bilateral above knee amputation, hypertension,

diabetes, sarcoidosis of the liver status post right hemicolectomy,

ileocolonic anastomosis, and status post cholecystectomy, who was admitted

initially with a lower abdominal pain and significantly found to have a

urinary tract infection with complaints of persistent severe diarrhea

status post hyperkalemia and metabolic acidosis, was on IV sodium bicarb

drip till this evening at 100 mL per hour.

1.  Status post hyperkalemia most likely secondary to severe metabolic

acidosis secondary to gastrointestinal loss.

2.  Metabolic acidosis most likely secondary to diarrhea.

3.  Hypertension.

4.  Diabetes.



PLAN:  Please consider to check stool for culture and sensitivity , ova

and parasite, stool for microsporidium and isospora and stool for ova and

parasite.  Continue p.o. sodium bicarb tablet at this time and continue to

monitor BMP and metabolic acidosis.  You can discontinue sodium bicarb

drip.  We will follow with you.



Thank you for allowing me to participate in your patient's care.





__________________________________________

Nancy Meyer MD



DD:  02/23/2018 23:30:41

DT:  02/24/2018 0:50:37

Job # [id number]

SHAYLEE

## 2018-02-27 LAB
BUN SERPL-MCNC: 14 MG/DL (ref 7–17)
CALCIUM SERPL-MCNC: 7.9 MG/DL (ref 8.6–10.4)
ERYTHROCYTE [DISTWIDTH] IN BLOOD BY AUTOMATED COUNT: 23 % (ref 11.5–14.5)
GFR NON-AFRICAN AMERICAN: > 60
HGB BLD-MCNC: 10.3 G/DL (ref 11–16)
MCH RBC QN AUTO: 32.9 PG (ref 27–31)
MCHC RBC AUTO-ENTMCNC: 34.1 G/DL (ref 33–37)
MCV RBC AUTO: 96.3 FL (ref 81–99)
PLATELET # BLD: 145 K/UL (ref 130–400)
PMV BLD AUTO: 9.1 FL (ref 7.2–11.7)
RBC # BLD AUTO: 3.14 MIL/UL (ref 3.8–5.2)
WBC # BLD AUTO: 7.3 K/UL (ref 4.8–10.8)

## 2018-02-27 RX ADMIN — HUMAN INSULIN SCH: 100 INJECTION, SOLUTION SUBCUTANEOUS at 12:19

## 2018-02-27 RX ADMIN — METHYLPREDNISOLONE SODIUM SUCCINATE SCH MG: 40 INJECTION, POWDER, FOR SOLUTION INTRAMUSCULAR; INTRAVENOUS at 08:28

## 2018-02-27 RX ADMIN — DIPHENHYDRAMINE HYDROCHLORIDE PRN MG: 50 INJECTION INTRAMUSCULAR; INTRAVENOUS at 10:34

## 2018-02-27 RX ADMIN — Medication SCH: at 10:00

## 2018-02-27 RX ADMIN — Medication SCH CAP: at 17:55

## 2018-02-27 RX ADMIN — DIGOXIN SCH: 0.25 TABLET ORAL at 18:03

## 2018-02-27 RX ADMIN — DIPHENHYDRAMINE HYDROCHLORIDE PRN MG: 50 INJECTION INTRAMUSCULAR; INTRAVENOUS at 20:10

## 2018-02-27 RX ADMIN — HUMAN INSULIN SCH UNIT: 100 INJECTION, SOLUTION SUBCUTANEOUS at 22:38

## 2018-02-27 RX ADMIN — PANTOPRAZOLE SODIUM SCH: 40 TABLET, DELAYED RELEASE ORAL at 10:00

## 2018-02-27 RX ADMIN — OXYBUTYNIN CHLORIDE SCH: 10 TABLET, EXTENDED RELEASE ORAL at 10:00

## 2018-02-27 RX ADMIN — INSULIN DETEMIR SCH: 100 INJECTION, SOLUTION SUBCUTANEOUS at 10:00

## 2018-02-27 RX ADMIN — METHYLPREDNISOLONE SODIUM SUCCINATE SCH MG: 40 INJECTION, POWDER, FOR SOLUTION INTRAMUSCULAR; INTRAVENOUS at 00:02

## 2018-02-27 RX ADMIN — DIPHENHYDRAMINE HYDROCHLORIDE PRN MG: 50 INJECTION INTRAMUSCULAR; INTRAVENOUS at 06:19

## 2018-02-27 RX ADMIN — METHYLPREDNISOLONE SODIUM SUCCINATE SCH MG: 40 INJECTION, POWDER, FOR SOLUTION INTRAMUSCULAR; INTRAVENOUS at 21:27

## 2018-02-27 RX ADMIN — DIPHENHYDRAMINE HYDROCHLORIDE PRN MG: 50 INJECTION INTRAMUSCULAR; INTRAVENOUS at 01:58

## 2018-02-27 RX ADMIN — HUMAN INSULIN SCH UNIT: 100 INJECTION, SOLUTION SUBCUTANEOUS at 17:54

## 2018-02-27 RX ADMIN — METOPROLOL SUCCINATE SCH: 50 TABLET, EXTENDED RELEASE ORAL at 10:00

## 2018-02-27 RX ADMIN — METHYLPREDNISOLONE SODIUM SUCCINATE SCH MG: 40 INJECTION, POWDER, FOR SOLUTION INTRAMUSCULAR; INTRAVENOUS at 17:56

## 2018-02-27 RX ADMIN — METHYLPREDNISOLONE SODIUM SUCCINATE SCH: 40 INJECTION, POWDER, FOR SOLUTION INTRAMUSCULAR; INTRAVENOUS at 10:00

## 2018-02-27 RX ADMIN — HUMAN INSULIN SCH: 100 INJECTION, SOLUTION SUBCUTANEOUS at 07:30

## 2018-02-27 RX ADMIN — DIPHENHYDRAMINE HYDROCHLORIDE PRN MG: 50 INJECTION INTRAMUSCULAR; INTRAVENOUS at 14:51

## 2018-02-27 NOTE — OP
PROCEDURE DATE:



PREOPERATIVE DIAGNOSIS:  The patient denies any chest pain or shortness of

breath.  Colonoscopy was performed today; however, post _____ fecal

impaction and retained fecal material, partial colon resection, and

internal hemorrhoids were noted.  The patient denies any shortness of

breath at this time or chest pain.



PHYSICAL EXAMINATION:

VITAL SIGNS:  Blood pressure 132/80, heart rate 69, temperature 97.9,

respirations 20.

HEENT:  Normocephalic.

CHEST:  Minimal rhonchi.

HEART:  Sounds are regular.

EXTREMITIES:  Bilateral above-knee amputation.



LABORATORY DATA:  Hemoglobin and hematocrit today, 10.6 and 32.0, white

count and platelet count are within normal limits.  Today's blood sugar

447, more than 500 twice.



ASSESSMENT:

1.  Dilated cardiomyopathy, status post implantable cardioverter

defibrillator placement.

2.  Intractable diarrhea.

3.  Peripheral vascular disease, status post bilateral above-knee

amputation.

4.  Liver sarcoidosis.



RECOMMENDATIONS:  Continue digoxin 0.25 mg orally daily.  Eliquis is still

on hold.  Continue gabapentin 100 mg twice a day, Protonix 40 mg once a

day, Reglan 5 mg IV q.6 hours, Toprol XL 60 mg once a day, Solu-Medrol 40

mg IV q. 8 hours.  Plan is to perform colonoscopy tomorrow.





__________________________________________

Ankit Figueroa MD





DD:  02/26/2018 16:59:22

DT:  02/27/2018 3:56:50

Job # [id number]

## 2018-02-27 NOTE — PN
DATE:



LOCATION:  [facility], bed A.



SUBJECTIVE:  This is a 52-year-old female seen and examined in rounds,

where I was told that the patient still has solid bowel movement, for which

colonoscopy today is to be rescheduled for tomorrow as the patient is not

fully well prepared.



The entire chart is reviewed including, but not limited to the most recent

lab and radiology study results, current and previous medication list,

current and previous medical events.



LABORATORY DATA:  Today's lab showed hemoglobin 10.3, hematocrit 30.2. 

Blood glucose level 195, calcium 7.9, and still has elevated liver function

test, but much less than before.



PHYSICAL EXAMINATION

GENERAL:  A 52-year-old female, afebrile, denying any chest pain,

palpitation, but abdominal pain on and off.

VITAL SIGNS:  Pulse of 62, respiratory rate 20 to 22, blood pressure

170/88.

HEENT:  Showed mildly pale, dry oral mucous membrane.  Bilateral icteric

sclerae.

LUNGS:  Few scattered crepitation.  Decreased air entry at bases.

HEART:  Positive S1 and S2.

ABDOMEN:  Soft with mild generalized tenderness.  No mass or organomegaly. 

No rebound tenderness or guarding.  Abdominal distention noted.

EXTREMITIES:  With evidence of bilateral above-the-knee amputation.

NEUROLOGIC:  No reported new neurological deficits, sensory or motor.



IMPRESSION:

1.  Diarrhea with evidence of fecal impaction by recent colonoscopy, repeat

colonoscopy after a more adequate preparation, scheduled for tomorrow a.m.

2.  Hepatic sarcoidosis.

3.  Anemia, secondary to above.

4.  Multiple past medical history including, but not limited to partial

colon resection, status post cholecystectomy, status post biliary stent

insertion.

5.  Diabetes mellitus, with hypertension by history.

6.  Electrolyte imbalance.

7.  Abnormal liver function test with mild jaundice secondary to the above.



SUGGESTIONS:

1.  Continue current management.

2.  Patient is for repeat colonoscopy at a.m. after a more adequate

preparation.





__________________________________________

Jeffrey Albert MD



DD:  02/27/2018 9:45:26

DT:  02/27/2018 12:01:50

Job # [id number]

## 2018-02-27 NOTE — PN
SUBJECTIVE:  The patient is still experiencing diarrhea.  She denies any

chest pain.  She has adequate diuresis.



PHYSICAL EXAMINATION:

VITAL SIGNS:  Blood pressure 126/91, heart rate 59, temperature 98.6,

respirations 18.

HEENT:  Pale conjunctiva.

CHEST:  Bilateral rhonchi, no wheezing.

HEART:  S1 and S2, regular and distant.

ABDOMEN:  Soft.

EXTREMITIES:  Bilateral above-knee amputation.



LABORATORY DATA:  Today's SMA-7:  Sodium normal limits.  Glucose is 146 and

creatinine 0.4.  Today's hemoglobin and hematocrit 10.3 and 30.2.  White

counts and platelet counts are within normal limits.



Urine culture is positive for E. coli and enterococcus faecalis as well as

Klebsiella pneumonia.



ASSESSMENT:

1.  Dilated cardiomyopathy, status post implantable cardioverter

defibrillator placement.

2.  Peripheral vascular disease, status post bilateral above-knee

amputation.

3.  Intractable diarrhea.

4.  Gram negative urinary tract infection.

5.  Liver sarcoidosis.



RECOMMENDATIONS:  Continue current Cozaar at 100 mg daily, Eliquis is on

hold.  The patient will undergo colonoscopy today and following this the

patient will be resumed on Solu-Medrol, Toprol and Eliquis.







__________________________________________

Ankit Figueroa MD



DD:  02/27/2018 12:55:32

DT:  02/27/2018 15:14:17

Job # [id number]

## 2018-02-27 NOTE — CP.PCM.PN
General Pediatrics Subjective





- Date & Time of Evaluation


Date of Evaluation: 02/27/18


Time of Evaluation: 18:40





- Subjective


Subjective: 





Pt is for colonoscopy. pending results, pt is still having diarrhea





Objective





- Vital Signs/Intake and Output


Vital Signs (last 24 hours): 


 











Temp Pulse Resp BP Pulse Ox


 


 97.7 F   65   20   151/77 H  95 


 


 02/27/18 16:18  02/27/18 16:18  02/27/18 16:18  02/27/18 16:18  02/27/18 16:18








Intake and Output: 


 











 02/27/18 02/28/18





 18:59 06:59


 


Intake Total 720 


 


Output Total 1359 


 


Balance -639 














- Medications


Medications: 


 Current Medications





Apixaban (Eliquis)  5 mg PO BID [facility]


   Last Admin: 02/24/18 17:30 Dose:  5 mg


Bismuth Subsalicylate (Pepto-Bismol)  262 mg PO Q8 PRN


   PRN Reason: Diarrhea


   Last Admin: 02/26/18 05:24 Dose:  262 mg


Digoxin (Lanoxin)  0.25 mg PO DAILY@1800 [facility]


   Last Admin: 02/27/18 18:03 Dose:  Not Given


Diphenhydramine HCl (Benadryl)  12.5 mg IVP Q4 PRN


   PRN Reason: Itching / Pruritus


   Last Admin: 02/27/18 20:10 Dose:  12.5 mg


Diphenoxylate HCl/Atropine (Lomotil 0.025-2.5 Mg Tablet)  1 tab PO Q4 PRN


   PRN Reason: Irritable bowel symptoms


   Last Admin: 02/26/18 02:29 Dose:  1 tab


Gabapentin (Neurontin)  100 mg PO BID [facility]


   Last Admin: 02/27/18 17:56 Dose:  100 mg


Sodium Chloride (Sodium Chloride 0.9%)  1,000 mls @ 70 mls/hr IV [id number] [facility]


   Last Admin: 02/27/18 21:27 Dose:  70 mls/hr


Insulin Detemir (Levemir)  10 unit SC QAM [facility]


   Last Admin: 02/27/18 10:00 Dose:  Not Given


Insulin Human Regular (Novolin R)  0 unit SC ACHS [facility]


   PRN Reason: Protocol


   Last Admin: 02/27/18 22:38 Dose:  4 unit


Lactobacillus Acidophilus (Bacid Acidophilus)  1 cap PO BID [facility]


   Last Admin: 02/27/18 17:55 Dose:  1 cap


Losartan Potassium (Cozaar)  100 mg PO DAILY [facility]


Methylprednisolone (Solu-Medrol)  20 mg IVP Q12 [facility]


   Last Admin: 02/27/18 21:27 Dose:  20 mg


Methylprednisolone (Solu-Medrol)  40 mg IVP Q8H [facility]


   Last Admin: 02/27/18 17:56 Dose:  40 mg


Metoclopramide HCl (Reglan)  5 mg IVP Q6H [facility]


   Last Admin: 02/27/18 20:16 Dose:  5 mg


Metoprolol Succinate (Toprol Xl)  50 mg PO DAILY [facility]


   Last Admin: 02/27/18 10:00 Dose:  Not Given


Morphine Sulfate (Morphine)  2 mg IVP Q4 PRN


   PRN Reason: Pain, severe (8-10)


   Last Admin: 02/27/18 20:12 Dose:  2 mg


Oxybutynin Chloride (Ditropan Xl)  10 mg PO DAILY [facility]


   Last Admin: 02/27/18 10:00 Dose:  Not Given


Pantoprazole Sodium (Protonix Ec Tab)  40 mg PO DAILY [facility]


   Last Admin: 02/27/18 10:00 Dose:  Not Given


Sodium Bicarbonate (Sodium Bicarbonate Tab)  650 mg PO BID [facility]


   Last Admin: 02/27/18 17:55 Dose:  650 mg











- Labs


Labs: 


 





 02/27/18 07:04 





 02/27/18 07:04 





 











PT  13.6 SECONDS (9.7-12.2)  H  02/05/18  18:27    


 


INR  1.2   02/05/18  18:27    


 


APTT  54 SECONDS (21-34)  H  02/05/18  18:27    














- Constitutional


Appears: No Acute Distress





- Head Exam


Head Exam: ATRAUMATIC, NORMAL INSPECTION, NORMOCEPHALIC





- Eye Exam


Eye Exam: EOMI, Normal appearance, PERRL


Pupil Exam: NORMAL ACCOMODATION, PERRL





- Respiratory Exam


Respiratory Exam: Clear to Ausculation Bilateral, NORMAL BREATHING PATTERN





- Cardiovascular Exam


Cardiovascular Exam: REGULAR RHYTHM, +S1, +S2.  absent: Murmur





- GI/Abdominal Exam


GI & Abdominal Exam: Soft, Normal Bowel Sounds.  absent: Tenderness





Assessment and Plan


(1) Diarrhea


Status: Acute   





(2) Complicated urinary tract infection


Status: Acute   





(3) Diabetes


Status: Acute   





(4) Hypertension


Status: Acute   





(5) Neurogenic bladder


Status: Acute

## 2018-02-28 LAB
ALBUMIN SERPL-MCNC: 2.7 G/DL (ref 3.5–5)
ALBUMIN/GLOB SERPL: 1 {RATIO} (ref 1–2.1)
ALT SERPL-CCNC: 242 U/L (ref 9–52)
ANISOCYTOSIS BLD QL SMEAR: SLIGHT
AST SERPL-CCNC: 135 U/L (ref 14–36)
BASOPHILS # BLD AUTO: 0 K/UL (ref 0–0.2)
BASOPHILS NFR BLD: 0 % (ref 0–2)
BUN SERPL-MCNC: 23 MG/DL (ref 7–17)
CALCIUM SERPL-MCNC: 7.9 MG/DL (ref 8.6–10.4)
EOSINOPHIL # BLD AUTO: 0 K/UL (ref 0–0.7)
EOSINOPHIL NFR BLD: 0 % (ref 0–4)
ERYTHROCYTE [DISTWIDTH] IN BLOOD BY AUTOMATED COUNT: 23.8 % (ref 11.5–14.5)
GFR NON-AFRICAN AMERICAN: > 60
HGB BLD-MCNC: 10.7 G/DL (ref 11–16)
HYPOCHROMIC: SLIGHT
LYMPHOCYTE: 5 % (ref 20–40)
LYMPHOCYTES # BLD AUTO: 0.7 K/UL (ref 1–4.3)
LYMPHOCYTES NFR BLD AUTO: 8 % (ref 20–40)
MACROCYTES BLD QL SMEAR: SLIGHT
MCH RBC QN AUTO: 32.5 PG (ref 27–31)
MCHC RBC AUTO-ENTMCNC: 34 G/DL (ref 33–37)
MCV RBC AUTO: 95.6 FL (ref 81–99)
MICROCYTES BLD QL SMEAR: SLIGHT
MONOCYTE: 6 % (ref 0–10)
MONOCYTES # BLD: 0.5 K/UL (ref 0–0.8)
MONOCYTES NFR BLD: 6 % (ref 0–10)
NEUTROPHILS # BLD: 7.2 K/UL (ref 1.8–7)
NEUTROPHILS NFR BLD AUTO: 86 % (ref 50–75)
NEUTROPHILS NFR BLD AUTO: 87 % (ref 50–75)
NEUTS BAND NFR BLD: 2 % (ref 0–2)
NRBC BLD AUTO-RTO: 0 % (ref 0–2)
PLATELET # BLD EST: NORMAL 10*3/UL
PLATELET # BLD: 165 K/UL (ref 130–400)
PMV BLD AUTO: 9.1 FL (ref 7.2–11.7)
POIKILOCYTOSIS BLD QL SMEAR: SLIGHT
RBC # BLD AUTO: 3.29 MIL/UL (ref 3.8–5.2)
TARGETS BLD QL SMEAR: SLIGHT
TOTAL CELLS COUNTED BLD: 100
WBC # BLD AUTO: 8.3 K/UL (ref 4.8–10.8)

## 2018-02-28 PROCEDURE — 0DJD8ZZ INSPECTION OF LOWER INTESTINAL TRACT, VIA NATURAL OR ARTIFICIAL OPENING ENDOSCOPIC: ICD-10-PCS | Performed by: SPECIALIST

## 2018-02-28 RX ADMIN — METHYLPREDNISOLONE SODIUM SUCCINATE SCH: 40 INJECTION, POWDER, FOR SOLUTION INTRAMUSCULAR; INTRAVENOUS at 10:00

## 2018-02-28 RX ADMIN — DIPHENHYDRAMINE HYDROCHLORIDE PRN MG: 50 INJECTION INTRAMUSCULAR; INTRAVENOUS at 23:51

## 2018-02-28 RX ADMIN — DIPHENHYDRAMINE HYDROCHLORIDE PRN MG: 50 INJECTION INTRAMUSCULAR; INTRAVENOUS at 00:39

## 2018-02-28 RX ADMIN — DIPHENHYDRAMINE HYDROCHLORIDE PRN MG: 50 INJECTION INTRAMUSCULAR; INTRAVENOUS at 10:26

## 2018-02-28 RX ADMIN — INSULIN DETEMIR SCH: 100 INJECTION, SOLUTION SUBCUTANEOUS at 10:00

## 2018-02-28 RX ADMIN — HUMAN INSULIN SCH: 100 INJECTION, SOLUTION SUBCUTANEOUS at 07:30

## 2018-02-28 RX ADMIN — HUMAN INSULIN SCH UNIT: 100 INJECTION, SOLUTION SUBCUTANEOUS at 21:35

## 2018-02-28 RX ADMIN — DIPHENHYDRAMINE HYDROCHLORIDE PRN MG: 50 INJECTION INTRAMUSCULAR; INTRAVENOUS at 19:17

## 2018-02-28 RX ADMIN — DIPHENHYDRAMINE HYDROCHLORIDE PRN MG: 50 INJECTION INTRAMUSCULAR; INTRAVENOUS at 14:45

## 2018-02-28 RX ADMIN — PANTOPRAZOLE SODIUM SCH: 40 TABLET, DELAYED RELEASE ORAL at 10:00

## 2018-02-28 RX ADMIN — Medication SCH CAP: at 18:16

## 2018-02-28 RX ADMIN — METOPROLOL SUCCINATE SCH: 50 TABLET, EXTENDED RELEASE ORAL at 10:00

## 2018-02-28 RX ADMIN — OXYBUTYNIN CHLORIDE SCH: 10 TABLET, EXTENDED RELEASE ORAL at 10:00

## 2018-02-28 RX ADMIN — HUMAN INSULIN SCH: 100 INJECTION, SOLUTION SUBCUTANEOUS at 11:30

## 2018-02-28 RX ADMIN — HUMAN INSULIN SCH UNIT: 100 INJECTION, SOLUTION SUBCUTANEOUS at 18:17

## 2018-02-28 RX ADMIN — DIPHENHYDRAMINE HYDROCHLORIDE PRN MG: 50 INJECTION INTRAMUSCULAR; INTRAVENOUS at 06:09

## 2018-02-28 RX ADMIN — DIGOXIN SCH: 0.25 TABLET ORAL at 18:17

## 2018-02-28 RX ADMIN — METHYLPREDNISOLONE SODIUM SUCCINATE SCH MG: 40 INJECTION, POWDER, FOR SOLUTION INTRAMUSCULAR; INTRAVENOUS at 21:17

## 2018-02-28 RX ADMIN — Medication SCH: at 10:00

## 2018-02-28 NOTE — CP.PCM.PN
Subjective





- Date & Time of Evaluation


Date of Evaluation: 02/28/18


Time of Evaluation: 19:00





- Subjective


Subjective: 





Pt is c/o constipation now, we are tapering her steroids, s/p colonoscopy, 

finding noted,





Objective





- Vital Signs/Intake and Output


Vital Signs (last 24 hours): 


 











Temp Pulse Resp BP Pulse Ox


 


 98.2 F   58 L  18   137/73   99 


 


 02/28/18 16:00  02/28/18 16:00  02/28/18 16:00  02/28/18 16:00  02/28/18 16:00








Intake and Output: 


 











 02/28/18 03/01/18





 18:59 06:59


 


Intake Total  500


 


Output Total 950 800


 


Balance -950 -300














- Medications


Medications: 


 Current Medications





Apixaban (Eliquis)  5 mg PO BID [facility]


   Last Admin: 02/24/18 17:30 Dose:  5 mg


Bismuth Subsalicylate (Pepto-Bismol)  262 mg PO Q8 PRN


   PRN Reason: Diarrhea


   Last Admin: 02/26/18 05:24 Dose:  262 mg


Digoxin (Lanoxin)  0.25 mg PO DAILY@1800 [facility]


   Last Admin: 02/28/18 18:17 Dose:  Not Given


Diphenhydramine HCl (Benadryl)  12.5 mg IVP Q4 PRN


   PRN Reason: Itching / Pruritus


   Last Admin: 02/28/18 19:17 Dose:  12.5 mg


Diphenoxylate HCl/Atropine (Lomotil 0.025-2.5 Mg Tablet)  1 tab PO Q4 PRN


   PRN Reason: Irritable bowel symptoms


   Last Admin: 02/26/18 02:29 Dose:  1 tab


Docusate Sodium (Colace)  100 mg PO TID [facility]


   Last Admin: 02/28/18 18:17 Dose:  100 mg


Gabapentin (Neurontin)  100 mg PO BID [facility]


   Stop: 03/06/18 20:46


Insulin Detemir (Levemir)  10 unit SC QAM [facility]


   Last Admin: 02/28/18 10:00 Dose:  Not Given


Insulin Human Regular (Novolin R)  0 unit SC ACHS [facility]


   PRN Reason: Protocol


   Last Admin: 02/28/18 21:35 Dose:  2 unit


Lactobacillus Acidophilus (Bacid Acidophilus)  1 cap PO BID [facility]


   Last Admin: 02/28/18 18:16 Dose:  1 cap


Losartan Potassium (Cozaar)  100 mg PO DAILY [facility]


   Last Admin: 02/28/18 10:00 Dose:  Not Given


Methylprednisolone (Solu-Medrol)  20 mg IVP Q12 [facility]


   Last Admin: 02/28/18 21:17 Dose:  20 mg


Metoclopramide HCl (Reglan)  5 mg IVP Q6H [facility]


   Last Admin: 02/28/18 19:22 Dose:  Not Given


Metoprolol Succinate (Toprol Xl)  50 mg PO DAILY [facility]


   Last Admin: 02/28/18 10:00 Dose:  Not Given


Morphine Sulfate (Morphine)  2 mg IVP Q4 PRN


   PRN Reason: Pain, severe (8-10)


   Stop: 03/06/18 20:47


Oxybutynin Chloride (Ditropan Xl)  10 mg PO DAILY [facility]


   Last Admin: 02/28/18 10:00 Dose:  Not Given


Pantoprazole Sodium (Protonix Ec Tab)  40 mg PO DAILY [facility]


   Last Admin: 02/28/18 10:00 Dose:  Not Given


Sodium Bicarbonate (Sodium Bicarbonate Tab)  650 mg PO BID [facility]


   Last Admin: 02/28/18 18:17 Dose:  650 mg











- Labs


Labs: 


 





 02/28/18 07:06 





 02/28/18 07:06 





 











PT  13.6 SECONDS (9.7-12.2)  H  02/05/18  18:27    


 


INR  1.2   02/05/18  18:27    


 


APTT  54 SECONDS (21-34)  H  02/05/18  18:27    














- Constitutional


Appears: No Acute Distress





- Head Exam


Head Exam: ATRAUMATIC, NORMAL INSPECTION, NORMOCEPHALIC





- Eye Exam


Eye Exam: EOMI, Normal appearance, PERRL


Pupil Exam: NORMAL ACCOMODATION, PERRL





- Cardiovascular Exam


Cardiovascular Exam: REGULAR RHYTHM, +S1, +S2.  absent: Murmur





- GI/Abdominal Exam


GI & Abdominal Exam: Soft, Tenderness, Normal Bowel Sounds





- Rectal Exam


Rectal Exam: Deferred





Assessment and Plan


(1) Diarrhea


Assessment & Plan: 


dirrhea alternating with constipation


etiology unknown


repeat stool cultures


Status: Acute   





(2) Complicated urinary tract infection


Status: Acute   





(3) Diabetes


Status: Acute   





(4) Hypertension


Status: Acute   





(5) Neurogenic bladder


Status: Acute

## 2018-02-28 NOTE — PN
SUBJECTIVE:  The patient underwent colonoscopy and the impression was

preparation of the colon was unsatisfactory, nonbleeding external and

internal hemorrhoids, stool in entire examined colon, patent end-to-end

ileocolonic anastomosis.  The patient denies any chest pain or shortness of

breath.  At this time, she is still experiencing abdominal discomfort.



PHYSICAL EXAMINATION:

VITAL SIGNS:  Blood pressure 116/87, heart rate 61, temperature 97.9, and

respirations 12.

HEENT:  Pale conjunctiva.

CHEST:  Bilateral rhonchi.

HEART:  S1 and S2, regular and distant.

ABDOMEN:  Mild diffuse tenderness.

EXTREMITIES:  Bilateral below-knee amputation.



LABORATORY DATA:  Today's hemoglobin and hematocrit 10.7 and 31.5.  White

count and platelet count are within normal limits.  Today's BUN and

creatinine are 23 and 0.5 respectively.  Glucose 263.



ASSESSMENT:

1.  Dilated cardiomyopathy, status post implantable cardioverter

defibrillator placement.

2.  Mild prerenal azotemia.

3.  Peripheral vascular disease, status post bilateral below-knee

amputation.

4.  Intractable diarrhea.

5.  Anemia.



RECOMMENDATIONS:  Continue current Cozaar at 100 mg once a day, Ditropan at

10 mg once a day.  Eliquis is still on hold because of the plan for further

colonoscopy.  Continue Lanoxin 0.25 mg daily.  Also, continue Solu-Medrol

20 mg intravenous twice a day, Toprol-XL at 50 mg once a day.  Telemetry

monitor was discontinued yesterday; however, it will be resumed if the

patient develops any shortness of breath or chest discomfort.





__________________________________________

Ankit Figueroa MD





DD:  02/28/2018 14:14:22

DT:  02/28/2018 15:38:40

Job # [id number]

## 2018-03-01 LAB
ALBUMIN SERPL-MCNC: 2.9 G/DL (ref 3.5–5)
ALBUMIN/GLOB SERPL: 1 {RATIO} (ref 1–2.1)
ALT SERPL-CCNC: 267 U/L (ref 9–52)
ANISOCYTOSIS BLD QL SMEAR: (no result)
AST SERPL-CCNC: 114 U/L (ref 14–36)
BASOPHILS # BLD AUTO: 0 K/UL (ref 0–0.2)
BASOPHILS NFR BLD: 0 % (ref 0–2)
BUN SERPL-MCNC: 27 MG/DL (ref 7–17)
CALCIUM SERPL-MCNC: 7.7 MG/DL (ref 8.6–10.4)
EOSINOPHIL # BLD AUTO: 0 K/UL (ref 0–0.7)
EOSINOPHIL NFR BLD: 0 % (ref 0–4)
ERYTHROCYTE [DISTWIDTH] IN BLOOD BY AUTOMATED COUNT: 24.1 % (ref 11.5–14.5)
GFR NON-AFRICAN AMERICAN: > 60
HGB BLD-MCNC: 10.5 G/DL (ref 11–16)
HYPOCHROMIC: SLIGHT
LYMPHOCYTE: 4 % (ref 20–40)
LYMPHOCYTES # BLD AUTO: 0.3 K/UL (ref 1–4.3)
LYMPHOCYTES NFR BLD AUTO: 4 % (ref 20–40)
MCH RBC QN AUTO: 32.4 PG (ref 27–31)
MCHC RBC AUTO-ENTMCNC: 33.4 G/DL (ref 33–37)
MCV RBC AUTO: 97.1 FL (ref 81–99)
MONOCYTE: 1 % (ref 0–10)
MONOCYTES # BLD: 0.1 K/UL (ref 0–0.8)
MONOCYTES NFR BLD: 1 % (ref 0–10)
NEUTROPHILS # BLD: 7.9 K/UL (ref 1.8–7)
NEUTROPHILS NFR BLD AUTO: 95 % (ref 50–75)
NEUTROPHILS NFR BLD AUTO: 95 % (ref 50–75)
NRBC BLD AUTO-RTO: 0 % (ref 0–2)
PLATELET # BLD EST: NORMAL 10*3/UL
PLATELET # BLD: 150 K/UL (ref 130–400)
PMV BLD AUTO: 8.8 FL (ref 7.2–11.7)
POLYCHROMIC: SLIGHT
RBC # BLD AUTO: 3.25 MIL/UL (ref 3.8–5.2)
TARGETS BLD QL SMEAR: SLIGHT
TOTAL CELLS COUNTED BLD: 100
WBC # BLD AUTO: 8.3 K/UL (ref 4.8–10.8)

## 2018-03-01 RX ADMIN — HUMAN INSULIN SCH UNIT: 100 INJECTION, SOLUTION SUBCUTANEOUS at 19:24

## 2018-03-01 RX ADMIN — METOPROLOL SUCCINATE SCH MG: 50 TABLET, EXTENDED RELEASE ORAL at 09:15

## 2018-03-01 RX ADMIN — Medication SCH CAP: at 19:19

## 2018-03-01 RX ADMIN — DIGOXIN SCH MG: 0.25 TABLET ORAL at 19:20

## 2018-03-01 RX ADMIN — DIPHENHYDRAMINE HYDROCHLORIDE PRN MG: 50 INJECTION INTRAMUSCULAR; INTRAVENOUS at 10:37

## 2018-03-01 RX ADMIN — HUMAN INSULIN SCH UNIT: 100 INJECTION, SOLUTION SUBCUTANEOUS at 12:25

## 2018-03-01 RX ADMIN — HUMAN INSULIN SCH UNIT: 100 INJECTION, SOLUTION SUBCUTANEOUS at 08:25

## 2018-03-01 RX ADMIN — Medication SCH CAP: at 09:15

## 2018-03-01 RX ADMIN — HUMAN INSULIN SCH UNIT: 100 INJECTION, SOLUTION SUBCUTANEOUS at 22:43

## 2018-03-01 RX ADMIN — METHYLPREDNISOLONE SODIUM SUCCINATE SCH MG: 40 INJECTION, POWDER, FOR SOLUTION INTRAMUSCULAR; INTRAVENOUS at 09:18

## 2018-03-01 RX ADMIN — INSULIN DETEMIR SCH UNIT: 100 INJECTION, SOLUTION SUBCUTANEOUS at 09:16

## 2018-03-01 RX ADMIN — METHYLPREDNISOLONE SODIUM SUCCINATE SCH MG: 40 INJECTION, POWDER, FOR SOLUTION INTRAMUSCULAR; INTRAVENOUS at 22:43

## 2018-03-01 RX ADMIN — DIPHENHYDRAMINE HYDROCHLORIDE PRN MG: 50 INJECTION INTRAMUSCULAR; INTRAVENOUS at 19:21

## 2018-03-01 RX ADMIN — DIPHENHYDRAMINE HYDROCHLORIDE PRN MG: 50 INJECTION INTRAMUSCULAR; INTRAVENOUS at 14:44

## 2018-03-01 RX ADMIN — DIPHENHYDRAMINE HYDROCHLORIDE PRN MG: 50 INJECTION INTRAMUSCULAR; INTRAVENOUS at 06:44

## 2018-03-01 RX ADMIN — OXYBUTYNIN CHLORIDE SCH MG: 10 TABLET, EXTENDED RELEASE ORAL at 09:15

## 2018-03-01 RX ADMIN — PANTOPRAZOLE SODIUM SCH MG: 40 TABLET, DELAYED RELEASE ORAL at 09:15

## 2018-03-01 RX ADMIN — DIPHENHYDRAMINE HYDROCHLORIDE PRN MG: 50 INJECTION INTRAMUSCULAR; INTRAVENOUS at 23:30

## 2018-03-01 NOTE — CARD
--------------- APPROVED REPORT --------------





EKG Measurement

Heart [id number]

[id number]

QT0T0

QTc0



<Conclusion>

** No QRS complexes found, no ECG analysis possible **

## 2018-03-01 NOTE — PN
DATE:



LOCATION:  [facility], bed A.



SUBJECTIVE:  This is a 52-year-old female seen and examined in rounds with

less movement in pregnancy.



The entire chart is reviewed including but not limited to the most recent

lab and radiology study results, current and the previous medication list,

current and the previous medical events.  Case discussed with the staff at

length.



LABORATORY DATA:  Today's lab showed hemoglobin 10.5, hematocrit 31.5,

potassium 5.4, BUN 27, creatinine 4.5, blood glucose 252, low calcium 7.7,

with elevated AST, ALT, and alkaline phosphatase, but less than before with

lower total bilirubin 3.8 with low albumin 2.9, low total protein 5.6.



PHYSICAL EXAMINATION:

GENERAL:  A 52-year-old female.

VITAL SIGNS:  Afebrile with pulse of 62, respiratory rate of 20 to 22,

blood pressure 136/66.

HEENT:  Showed pale, dry oral mucous membrane.  Nonicteric sclerae.

LUNGS:  Few scattered crepitation, decreased air entry at bases.

HEART:  Positive S1 and S2.

ABDOMEN:  Soft, with mild generalized tenderness.  No mass or organomegaly.

No rebound tenderness or guarding.

EXTREMITIES:  Without significant edema, clubbing or cyanosis.  Upper

extremities, peripheral pulses are present.

NEUROLOGIC:  No reported new neurological deficits, sensory or motor.



IMPRESSION:

1.  Hepatic sarcoidosis with abnormal liver function tests.

2.  Jaundice secondary to above.

3.  Fecal impaction _____, most likely diarrhea.

4.  Anemia secondary to above.

5.  Poorly controlled diabetes mellitus.

6.  Known history of hypertension, status post cholecystectomy, status post

partial colon resection.



SUGGESTION:

1.  Continue current management.

2.  A fiber diet.

3.  Further recommendations to follow.





__________________________________________

Jeffrey Albert MD



DD:  03/01/2018 13:04:01

DT:  03/01/2018 14:41:43

Job # [id number]

## 2018-03-01 NOTE — CARD
--------------- APPROVED REPORT --------------





EKG Measurement

Heart [id number]

[id number]

[id number]

[id number]



<Conclusion>

Undetermined rhythm

Nonspecific ST and T wave abnormality

Abnormal ECG

## 2018-03-01 NOTE — CP.PCM.PN
Subjective





- Date & Time of Evaluation


Date of Evaluation: 03/01/18


Time of Evaluation: 18:40





- Subjective


Subjective: 





Pt seen and examined, pt is feeling better, diarrhea and constipation resolved, 

pt is for discharge tomorrow





Objective





- Vital Signs/Intake and Output


Vital Signs (last 24 hours): 


 











Temp Pulse Resp BP Pulse Ox


 


 97.9 F   54 L  18   126/68   98 


 


 03/01/18 17:15  03/01/18 17:15  03/01/18 17:15  03/01/18 17:15  03/01/18 17:15








Intake and Output: 


 











 03/01/18 03/02/18





 18:59 06:59


 


Output Total 3150 


 


Balance -3150 














- Medications


Medications: 


 Current Medications





Apixaban (Eliquis)  [facility] mg PO BID [facility]


   Last Admin: 02/24/18 17:30 Dose:  5 mg


Bismuth Subsalicylate (Pepto-Bismol)  262 mg PO Q8 PRN


   PRN Reason: Diarrhea


   Last Admin: 02/26/18 05:24 Dose:  262 mg


Digoxin (Lanoxin)  0.25 mg PO DAILY@1800 [facility]


   Last Admin: 03/01/18 19:20 Dose:  0.25 mg


Diphenhydramine HCl (Benadryl)  12.5 mg IVP Q4 PRN


   PRN Reason: Itching / Pruritus


   Last Admin: 03/01/18 19:21 Dose:  12.5 mg


Diphenoxylate HCl/Atropine (Lomotil 0.025-2.5 Mg Tablet)  1 tab PO Q4 PRN


   PRN Reason: Irritable bowel symptoms


   Last Admin: 02/26/18 02:29 Dose:  1 tab


Docusate Sodium (Colace)  100 mg PO TID [facility]


   Last Admin: 03/01/18 18:55 Dose:  Not Given


Gabapentin (Neurontin)  100 mg PO BID [facility]


   Stop: 03/06/18 20:46


   Last Admin: 03/01/18 19:20 Dose:  100 mg


Insulin Detemir (Levemir)  10 unit SC QAM [facility]


   Last Admin: 03/01/18 09:16 Dose:  10 unit


Insulin Human Regular (Novolin R)  0 unit SC ACHS [facility]


   PRN Reason: Protocol


   Last Admin: 03/01/18 19:24 Dose:  6 unit


Lactobacillus Acidophilus (Bacid Acidophilus)  1 cap PO BID [facility]


   Last Admin: 03/01/18 19:19 Dose:  1 cap


Losartan Potassium (Cozaar)  100 mg PO DAILY [facility]


   Last Admin: 03/01/18 09:15 Dose:  100 mg


Methylprednisolone (Solu-Medrol)  20 mg IVP Q12 [facility]


   Last Admin: 03/01/18 09:18 Dose:  20 mg


Metoclopramide HCl (Reglan)  5 mg IVP Q6H [facility]


   Last Admin: 03/01/18 19:45 Dose:  Not Given


Metoprolol Succinate (Toprol Xl)  50 mg PO DAILY [facility]


   Last Admin: 03/01/18 09:15 Dose:  50 mg


Morphine Sulfate (Morphine)  2 mg IVP Q4 PRN


   PRN Reason: Pain, severe (8-10)


   Stop: 03/06/18 20:47


   Last Admin: 03/01/18 19:22 Dose:  2 mg


Oxybutynin Chloride (Ditropan Xl)  10 mg PO DAILY [facility]


   Last Admin: 03/01/18 09:15 Dose:  10 mg


Pantoprazole Sodium (Protonix Ec Tab)  40 mg PO DAILY [facility]


   Last Admin: 03/01/18 09:15 Dose:  40 mg


Sodium Bicarbonate (Sodium Bicarbonate Tab)  650 mg PO BID [facility]


   Last Admin: 03/01/18 19:19 Dose:  650 mg











- Labs


Labs: 


 





 03/01/18 07:15 





 03/01/18 07:15 





 











PT  13.6 SECONDS (9.7-12.2)  H  02/05/18  18:27    


 


INR  1.2   02/05/18  18:27    


 


APTT  54 SECONDS (21-34)  H  02/05/18  18:27    














- Constitutional


Appears: No Acute Distress





- Head Exam


Head Exam: ATRAUMATIC, NORMAL INSPECTION, NORMOCEPHALIC





- Eye Exam


Eye Exam: EOMI, Normal appearance, PERRL


Pupil Exam: NORMAL ACCOMODATION, PERRL





- Respiratory Exam


Respiratory Exam: Clear to Ausculation Bilateral, NORMAL BREATHING PATTERN





- Cardiovascular Exam


Cardiovascular Exam: REGULAR RHYTHM, +S1, +S2.  absent: Murmur





- GI/Abdominal Exam


GI & Abdominal Exam: Soft, Normal Bowel Sounds.  absent: Tenderness





- Rectal Exam


Rectal Exam: Deferred





Assessment and Plan


(1) Diarrhea


Status: Acute   





(2) Complicated urinary tract infection


Status: Acute   





(3) Diabetes


Status: Acute   





(4) Hypertension


Status: Acute   





(5) Neurogenic bladder


Status: Acute

## 2018-03-02 LAB
ALBUMIN SERPL-MCNC: 3.3 G/DL (ref 3.5–5)
ALBUMIN/GLOB SERPL: 1.1 {RATIO} (ref 1–2.1)
ALT SERPL-CCNC: 266 U/L (ref 9–52)
ANISOCYTOSIS BLD QL SMEAR: (no result)
AST SERPL-CCNC: 110 U/L (ref 14–36)
BASOPHILS # BLD AUTO: 0 K/UL (ref 0–0.2)
BASOPHILS NFR BLD: 0 % (ref 0–2)
BUN SERPL-MCNC: 23 MG/DL (ref 7–17)
CALCIUM SERPL-MCNC: 8 MG/DL (ref 8.6–10.4)
EOSINOPHIL # BLD AUTO: 0 K/UL (ref 0–0.7)
EOSINOPHIL NFR BLD: 0 % (ref 0–4)
ERYTHROCYTE [DISTWIDTH] IN BLOOD BY AUTOMATED COUNT: 25.2 % (ref 11.5–14.5)
GFR NON-AFRICAN AMERICAN: > 60
HGB BLD-MCNC: 10.9 G/DL (ref 11–16)
HYPOCHROMIC: SLIGHT
LYMPHOCYTE: 6 % (ref 20–40)
LYMPHOCYTES # BLD AUTO: 0.5 K/UL (ref 1–4.3)
LYMPHOCYTES NFR BLD AUTO: 6 % (ref 20–40)
MACROCYTES BLD QL SMEAR: SLIGHT
MCH RBC QN AUTO: 32.6 PG (ref 27–31)
MCHC RBC AUTO-ENTMCNC: 32.2 G/DL (ref 33–37)
MCV RBC AUTO: 101.3 FL (ref 81–99)
MONOCYTE: 5 % (ref 0–10)
MONOCYTES # BLD: 0.4 K/UL (ref 0–0.8)
MONOCYTES NFR BLD: 5 % (ref 0–10)
NEUTROPHILS # BLD: 7.1 K/UL (ref 1.8–7)
NEUTROPHILS NFR BLD AUTO: 89 % (ref 50–75)
NEUTROPHILS NFR BLD AUTO: 89 % (ref 50–75)
NRBC BLD AUTO-RTO: 0 % (ref 0–2)
OVALOCYTES BLD QL SMEAR: SLIGHT
PLATELET # BLD EST: NORMAL 10*3/UL
PLATELET # BLD: 164 K/UL (ref 130–400)
PMV BLD AUTO: 9.4 FL (ref 7.2–11.7)
POLYCHROMIC: SLIGHT
RBC # BLD AUTO: 3.35 MIL/UL (ref 3.8–5.2)
TARGETS BLD QL SMEAR: (no result)
TOTAL CELLS COUNTED BLD: 100
WBC # BLD AUTO: 8 K/UL (ref 4.8–10.8)

## 2018-03-02 RX ADMIN — OXYBUTYNIN CHLORIDE SCH MG: 10 TABLET, EXTENDED RELEASE ORAL at 10:07

## 2018-03-02 RX ADMIN — HUMAN INSULIN SCH UNIT: 100 INJECTION, SOLUTION SUBCUTANEOUS at 06:43

## 2018-03-02 RX ADMIN — METOPROLOL SUCCINATE SCH MG: 50 TABLET, EXTENDED RELEASE ORAL at 10:22

## 2018-03-02 RX ADMIN — DIPHENHYDRAMINE HYDROCHLORIDE PRN MG: 50 INJECTION INTRAMUSCULAR; INTRAVENOUS at 03:32

## 2018-03-02 RX ADMIN — HUMAN INSULIN SCH: 100 INJECTION, SOLUTION SUBCUTANEOUS at 11:59

## 2018-03-02 RX ADMIN — METHYLPREDNISOLONE SODIUM SUCCINATE SCH MG: 40 INJECTION, POWDER, FOR SOLUTION INTRAMUSCULAR; INTRAVENOUS at 10:24

## 2018-03-02 RX ADMIN — INSULIN DETEMIR SCH UNIT: 100 INJECTION, SOLUTION SUBCUTANEOUS at 10:23

## 2018-03-02 RX ADMIN — DIGOXIN SCH MG: 0.25 TABLET ORAL at 18:55

## 2018-03-02 RX ADMIN — Medication SCH CAP: at 18:55

## 2018-03-02 RX ADMIN — DIPHENHYDRAMINE HYDROCHLORIDE PRN MG: 50 INJECTION INTRAMUSCULAR; INTRAVENOUS at 20:47

## 2018-03-02 RX ADMIN — HUMAN INSULIN SCH UNIT: 100 INJECTION, SOLUTION SUBCUTANEOUS at 22:45

## 2018-03-02 RX ADMIN — DIPHENHYDRAMINE HYDROCHLORIDE PRN MG: 50 INJECTION INTRAMUSCULAR; INTRAVENOUS at 11:56

## 2018-03-02 RX ADMIN — METHYLPREDNISOLONE SODIUM SUCCINATE SCH MG: 40 INJECTION, POWDER, FOR SOLUTION INTRAMUSCULAR; INTRAVENOUS at 22:45

## 2018-03-02 RX ADMIN — PANTOPRAZOLE SODIUM SCH MG: 40 TABLET, DELAYED RELEASE ORAL at 10:22

## 2018-03-02 RX ADMIN — HUMAN INSULIN SCH UNIT: 100 INJECTION, SOLUTION SUBCUTANEOUS at 18:55

## 2018-03-02 RX ADMIN — DIPHENHYDRAMINE HYDROCHLORIDE PRN MG: 50 INJECTION INTRAMUSCULAR; INTRAVENOUS at 16:19

## 2018-03-02 RX ADMIN — DIPHENHYDRAMINE HYDROCHLORIDE PRN MG: 50 INJECTION INTRAMUSCULAR; INTRAVENOUS at 07:36

## 2018-03-02 RX ADMIN — Medication SCH CAP: at 10:07

## 2018-03-02 NOTE — PN
DATE:



SUBJECTIVE:  The patient still has diarrhea but less frequent with better

consistency.  She denies any chest pain or shortness of breath at this

time.



PHYSICAL EXAMINATION:

VITAL SIGNS:   Showed blood pressure 147/69, heart rate 66, temperature

97.7, respirations 18.

HEENT:  Facial edema with moon face.

NECK:    No JVD.

CHEST:  Bilateral rhonchi.

HEART:  S1 and S2, regular.

EXTREMITIES:  Bilateral above-knee amputation.



LABORATORY DATA:  Hemoglobin and hematocrit 10.5 and 31.5, respectively. 

The white count and platelet count are within normal limits.  Today's

potassium 5.4, BUN and creatinine are 27 and 0.5.  Glucose 315.



ASSESSMENT:

1.  Dilated cardiomyopathy.

2.  Liver sarcoidosis.

3.  Improving diarrhea.

4.  Mild hyperkalemia.

5.  Peripheral vascular disease.



RECOMMENDATIONS:  The patient is on Eliquis 5 mg twice a day, continue

Toprol-XL at 50 mg once a day, Solu-Medrol 20 mg intravenous twice a day. 

Restrict dietary potassium intake.  Repeat BMP in a.m.  If potassium is

higher than the current number, consider Kayexalate therapy.





__________________________________________

Ankit Figueroa MD





DD:  03/01/2018 16:01:43

DT:  03/01/2018 18:34:04

Job # [id number]

## 2018-03-02 NOTE — PN
DATE:  03/02/2018



LOCATION:  Room 569, Bed 8.



SUBJECTIVE:  This is a 53-year-old female, seen and examined in rounds

without significant clinical changes, but with much less bowel movement,

frequency, keeping in mind that the patient had evidence of fecal impaction

by colonoscopy recently.  No reported active bleeding, nausea, vomiting,

chills, or fever.  No chest pain or palpitation.  The entire chart is

reviewed including, but not limited to, the most recent lab and radiologic

study results, current and the previous medication list, current and

previous medical events.



LABORATORY DATA:  Today's lab showed normal white blood cells and normal

platelet count.  Hemoglobin 10.9 with CO2 content of 15.  BUN is 23 with

low creatinine of 0.6.  Blood glucose level 444 with subsequent drop of

total bilirubin, AST and ALT as well as alkaline phosphatase with total

protein of 6.1, albumin 3.3.



PHYSICAL EXAMINATION:

GENERAL:  A 52-year-old female, awake, alert, and oriented with complaint

of intermittent abdominal pain.

VITAL SIGNS:  Afebrile with pulse of 62, respiratory rate 20 to 22, and

blood pressure of 138/78.

HEENT:  Showed pale, dry oral mucous membrane.  Nonicteric sclerae.

LUNGS:  Few scattered crepitations.  Decreased air entry at bases.

HEART:  Positive S1 and S2.

ABDOMEN:  Soft with slight generalized tenderness, but mainly in the mid

epigastric, right upper quadrant, and left lower quadrant areas.  No masses

or organomegaly.  No rebound tenderness or guarding.

EXTREMITIES:  With evidence of bilateral above-knee amputation.  No

clubbing or cyanosis.

NEUROLOGIC:  No new reported neurological deficits, sensory or motor.



IMPRESSION:

1.  Fecal impaction inducing diarrhea, gradually improving.

2.  Hepatic sarcoidosis, abnormal liver function test.

3.  Jaundice secondary to hepatocellular injury, secondary to above,

improving gradually.

4.  Abnormal liver function test due to her hepatic sarcoidosis and

recurrent urinary tract infection.

5.  Anemia due to above.

6.  Poorly controlled diabetes mellitus.

7.  Poorly controlled hypertension.

8.  Status post cholecystectomy, status post biliary stent insertion,

status post partial colon resection.



SUGGESTION:

1.  Continue current management.

2.  Continue high fiber diet with Fiber-Con one tablet twice a day and

GlycoLax powder 2 tablespoon twice a day.

3.  Further recommendation, we will follow up closely with you.





__________________________________________

Jeffrye Albert MD





DD:  03/02/2018 17:44:23

DT:  03/02/2018 20:17:46

[facility] # [id number]

## 2018-03-02 NOTE — PN
DATE:



SUBJECTIVE:  The patient is experiencing shortness of breath.



PHYSICAL EXAMINATION:

VITAL SIGNS:  Blood pressure 162/74, heart rate 56, temperature 98.2, and

respirations 20.

HEENT:  Facial edema.

NECK:  No JVD.

CHEST:  Diminished breath sounds on the bases.

HEART:  S1 and S2, regular.

EXTREMITIES:  Bilateral above-knee amputation.



LABORATORY DATA:  SMA-7:  Sodium 136, potassium 4.8, chloride 104, CO2 of

15, glucose 564, BUN 23, and creatinine 0.6.  Today's hemoglobin and

hematocrit are 10.9 and 34, white count and platelet count are within

normal limit.



ASSESSMENT:

1.  Intractable diarrhea.

2.  Dilated cardiomyopathy, status post implantable cardioverter

defibrillator placement.

3.  Liver sarcoidosis.

4.  Peripheral vascular disease, status post bilateral below-knee

amputation.



RECOMMENDATIONS:  Continue Toprol-XL 50 mg once a day, Solu-Medrol 20 mg

twice a day, Protonix 20 mg once a day, Neurontin 100 mg twice a day,

digoxin 0.25 mg daily, Eliquis 5 mg twice a day, and Lasix at 40 mg orally

once a day.





__________________________________________

Ankit Figueroa MD





DD:  03/02/2018 17:13:00

DT:  03/02/2018 19:55:32

Job # [id number]

## 2018-03-03 LAB
ALBUMIN SERPL-MCNC: 3.6 G/DL (ref 3.5–5)
ALBUMIN/GLOB SERPL: 1.2 {RATIO} (ref 1–2.1)
ALT SERPL-CCNC: 246 U/L (ref 9–52)
ANISOCYTOSIS BLD QL SMEAR: (no result)
AST SERPL-CCNC: 92 U/L (ref 14–36)
BASOPHILS # BLD AUTO: 0 K/UL (ref 0–0.2)
BUN SERPL-MCNC: 34 MG/DL (ref 7–17)
BURR CELLS BLD QL SMEAR: SLIGHT
CALCIUM SERPL-MCNC: 8.3 MG/DL (ref 8.6–10.4)
EOSINOPHIL # BLD AUTO: 0 K/UL (ref 0–0.7)
ERYTHROCYTE [DISTWIDTH] IN BLOOD BY AUTOMATED COUNT: 24.7 % (ref 11.5–14.5)
GFR NON-AFRICAN AMERICAN: > 60
HGB BLD-MCNC: 11.5 G/DL (ref 11–16)
HOWELL JOLLY BODIES: SLIGHT
HYPOCHROMIC: SLIGHT
LYMPHOCYTE: 2 % (ref 20–40)
LYMPHOCYTES # BLD AUTO: 0.2 K/UL (ref 1–4.3)
LYMPHOCYTES NFR BLD AUTO: 2 % (ref 20–40)
MACROCYTES BLD QL SMEAR: (no result)
MCH RBC QN AUTO: 33 PG (ref 27–31)
MCHC RBC AUTO-ENTMCNC: 32.9 G/DL (ref 33–37)
MCV RBC AUTO: 100.2 FL (ref 81–99)
METAMYELOCYTES NFR BLD: 1 % (ref 0–0)
MICROCYTES BLD QL SMEAR: SLIGHT
MONOCYTE: 6 % (ref 0–10)
MONOCYTES # BLD: 0.7 K/UL (ref 0–0.8)
MONOCYTES NFR BLD: 6 % (ref 0–10)
NEUTROPHILS # BLD: 10.1 K/UL (ref 1.8–7)
NEUTROPHILS NFR BLD AUTO: 90 % (ref 50–75)
NEUTROPHILS NFR BLD AUTO: 92 % (ref 50–75)
NEUTS BAND NFR BLD: 1 % (ref 0–2)
OVALOCYTES BLD QL SMEAR: SLIGHT
PLATELET # BLD EST: NORMAL 10*3/UL
PLATELET # BLD: 166 K/UL (ref 130–400)
PMV BLD AUTO: 9.2 FL (ref 7.2–11.7)
POIKILOCYTOSIS BLD QL SMEAR: SLIGHT
POLYCHROMIC: SLIGHT
RBC # BLD AUTO: 3.48 MIL/UL (ref 3.8–5.2)
SCHISTOCYTES BLD QL SMEAR: SLIGHT
SPHEROCYTES BLD QL SMEAR: SLIGHT
TARGETS BLD QL SMEAR: SLIGHT
TEARDROP CELLS: SLIGHT
TOTAL CELLS COUNTED BLD: 100
WBC # BLD AUTO: 11 K/UL (ref 4.8–10.8)

## 2018-03-03 RX ADMIN — Medication SCH CAP: at 10:49

## 2018-03-03 RX ADMIN — HUMAN INSULIN SCH UNIT: 100 INJECTION, SOLUTION SUBCUTANEOUS at 11:30

## 2018-03-03 RX ADMIN — METOPROLOL SUCCINATE SCH MG: 50 TABLET, EXTENDED RELEASE ORAL at 10:50

## 2018-03-03 RX ADMIN — OXYBUTYNIN CHLORIDE SCH MG: 10 TABLET, EXTENDED RELEASE ORAL at 10:50

## 2018-03-03 RX ADMIN — DIPHENHYDRAMINE HYDROCHLORIDE PRN MG: 50 INJECTION INTRAMUSCULAR; INTRAVENOUS at 17:40

## 2018-03-03 RX ADMIN — HUMAN INSULIN SCH: 100 INJECTION, SOLUTION SUBCUTANEOUS at 16:43

## 2018-03-03 RX ADMIN — HUMAN INSULIN SCH UNIT: 100 INJECTION, SOLUTION SUBCUTANEOUS at 07:30

## 2018-03-03 RX ADMIN — DIPHENHYDRAMINE HYDROCHLORIDE PRN MG: 50 INJECTION INTRAMUSCULAR; INTRAVENOUS at 00:36

## 2018-03-03 RX ADMIN — INSULIN DETEMIR SCH UNIT: 100 INJECTION, SOLUTION SUBCUTANEOUS at 10:00

## 2018-03-03 RX ADMIN — DIPHENHYDRAMINE HYDROCHLORIDE PRN MG: 50 INJECTION INTRAMUSCULAR; INTRAVENOUS at 04:29

## 2018-03-03 RX ADMIN — DIGOXIN SCH MG: 0.25 TABLET ORAL at 17:40

## 2018-03-03 RX ADMIN — DIPHENHYDRAMINE HYDROCHLORIDE PRN MG: 50 INJECTION INTRAMUSCULAR; INTRAVENOUS at 22:16

## 2018-03-03 RX ADMIN — DIPHENHYDRAMINE HYDROCHLORIDE PRN MG: 50 INJECTION INTRAMUSCULAR; INTRAVENOUS at 12:30

## 2018-03-03 RX ADMIN — HUMAN INSULIN SCH UNIT: 100 INJECTION, SOLUTION SUBCUTANEOUS at 22:17

## 2018-03-03 RX ADMIN — METHYLPREDNISOLONE SODIUM SUCCINATE SCH MG: 40 INJECTION, POWDER, FOR SOLUTION INTRAMUSCULAR; INTRAVENOUS at 22:17

## 2018-03-03 RX ADMIN — PANTOPRAZOLE SODIUM SCH MG: 40 TABLET, DELAYED RELEASE ORAL at 10:49

## 2018-03-03 RX ADMIN — METHYLPREDNISOLONE SODIUM SUCCINATE SCH MG: 40 INJECTION, POWDER, FOR SOLUTION INTRAMUSCULAR; INTRAVENOUS at 10:52

## 2018-03-03 RX ADMIN — Medication SCH CAP: at 17:39

## 2018-03-03 RX ADMIN — DIPHENHYDRAMINE HYDROCHLORIDE PRN MG: 50 INJECTION INTRAMUSCULAR; INTRAVENOUS at 08:30

## 2018-03-03 NOTE — PN
LOCATION:  9, bed A.



SUBJECTIVE:  This is a 52-year-old female seen and examined in rounds

without significant clinical changes with less abdominal pain but claiming

to have diarrhea again which _____ it was not reported as well by the

nursing staff.



No reported chest pain, palpitations, significant shortness of breath or

chills or fever.



The entire chart is reviewed including, but not limited to the most recent

lab and radiology study results, current and previous medication list,

current and the previous medical events.  Case was discussed with the staff

in the floor at length.



Today's lab showed white blood cells of 11.0 with normal hemoglobin and

hematocrit.  Blood glucose level 396.  Rest of the labs still pending.



PHYSICAL EXAMINATION:

GENERAL:  This is a 52-year-old female.

VITAL SIGNS:  Afebrile with pulse of 60, respiratory rate 20 to 22, blood

pressure 124/64.

HEENT:  Showed pale, dry mucous membrane.  Bilateral icteric sclera.

LUNGS:  Few scattered crepitation, decreased air entry at bases.

HEART:  Positive S1 and S2.

ABDOMEN:  Soft.  Bowel sounds are present with generalized mild tenderness

but mainly in the right upper quadrant and the left lower quadrant area. 

No mass or organomegaly.  No rebound tenderness or guarding.



IMPRESSION:

1.  Hepatic sarcoidosis.

2.  Fecal impaction inducing diarrhea, the patient generally improving.

3.  Multiple past medical history including, but not limited to, poorly

controlled diabetes mellitus, hypertension, status post partial colon

resection, status post cholecystectomy.

4.  Abnormal liver function test with jaundice secondary to above.

5.  Anemia due to above.



SUGGESTIONS:

1.  Continue current management.

2.  Anusol cream.

3.  An extra dose of morphine sulphate now, then the patient may discharge

home as per the primary MD.





__________________________________________

Jeffrey Albert MD



DD:  03/03/2018 7:39:37

DT:  03/03/2018 10:09:17

Job # [id number]

## 2018-03-03 NOTE — CP.PCM.PN
Subjective





- Date & Time of Evaluation


Date of Evaluation: 03/03/18


Time of Evaluation: 18:00





- Subjective


Subjective: 





Pt seen and examined at bedside. she is s/p coonoscopy, neg findings, however 

pt cont to have diarrhea





Objective





- Vital Signs/Intake and Output


Vital Signs (last 24 hours): 


 











Temp Pulse Resp BP Pulse Ox


 


 97.9 F   69   20   126/77   98 


 


 03/03/18 16:00  03/03/18 16:00  03/03/18 16:00  03/03/18 16:00  03/03/18 16:00








Intake and Output: 


 











 03/03/18 03/03/18





 06:59 18:59


 


Output Total 500 


 


Balance -500 














- Medications


Medications: 


 Current Medications





Apixaban (Eliquis)  5 mg PO BID [facility]


   Last Admin: 03/03/18 10:49 Dose:  5 mg


Bismuth Subsalicylate (Pepto-Bismol)  262 mg PO Q8 PRN


   PRN Reason: Diarrhea


   Last Admin: 02/26/18 05:24 Dose:  262 mg


Digoxin (Lanoxin)  0.25 mg PO DAILY@1800 [facility]


   Last Admin: 03/02/18 18:55 Dose:  0.25 mg


Diphenhydramine HCl (Benadryl)  12.5 mg IVP Q4 PRN


   PRN Reason: Itching / Pruritus


   Last Admin: 03/03/18 12:30 Dose:  12.5 mg


Docusate Sodium (Colace)  100 mg PO TID [facility]


   Last Admin: 03/03/18 13:28 Dose:  100 mg


Furosemide (Lasix)  40 mg PO DAILY [facility]


   Last Admin: 03/03/18 10:50 Dose:  40 mg


Gabapentin (Neurontin)  100 mg PO BID [facility]


   Stop: 03/06/18 20:46


   Last Admin: 03/03/18 10:50 Dose:  100 mg


Hydrocortisone (Anusol-Hc)  25 mg RC BID [facility]


   Last Admin: 03/03/18 10:00 Dose:  25 mg


Insulin Detemir (Levemir)  10 unit SC QAM [facility]


   Last Admin: 03/03/18 10:00 Dose:  10 unit


Insulin Human Regular (Novolin R)  0 unit SC ACHS [facility]


   PRN Reason: Protocol


   Last Admin: 03/03/18 16:43 Dose:  Not Given


Lactobacillus Acidophilus (Bacid Acidophilus)  1 cap PO BID [facility]


   Last Admin: 03/03/18 10:49 Dose:  1 cap


Losartan Potassium (Cozaar)  100 mg PO DAILY [facility]


   Last Admin: 03/03/18 10:49 Dose:  100 mg


Methylprednisolone (Solu-Medrol)  20 mg IVP Q12 [facility]


   Last Admin: 03/03/18 10:52 Dose:  20 mg


Metoclopramide HCl (Reglan)  5 mg IVP Q6H [facility]


   Last Admin: 03/03/18 13:27 Dose:  5 mg


Metoprolol Succinate (Toprol Xl)  50 mg PO DAILY [facility]


   Last Admin: 03/03/18 10:50 Dose:  50 mg


Morphine Sulfate (Morphine)  2 mg IVP Q4 PRN


   PRN Reason: Pain, severe (8-10)


   Stop: 03/06/18 20:47


   Last Admin: 03/03/18 12:30 Dose:  2 mg


Oxybutynin Chloride (Ditropan Xl)  10 mg PO DAILY [facility]


   Last Admin: 03/03/18 10:50 Dose:  10 mg


Pantoprazole Sodium (Protonix Ec Tab)  40 mg PO DAILY [facility]


   Last Admin: 03/03/18 10:49 Dose:  40 mg


Sodium Bicarbonate (Sodium Bicarbonate Tab)  650 mg PO BID [facility]


   Last Admin: 03/03/18 10:52 Dose:  650 mg











- Labs


Labs: 


 





 03/03/18 06:46 





 03/03/18 06:46 





 











PT  13.6 SECONDS (9.7-12.2)  H  02/05/18  18:27    


 


INR  1.2   02/05/18  18:27    


 


APTT  54 SECONDS (21-34)  H  02/05/18  18:27    














- Constitutional


Appears: No Acute Distress





- Head Exam


Head Exam: ATRAUMATIC, NORMAL INSPECTION, NORMOCEPHALIC





- Eye Exam


Eye Exam: EOMI, Normal appearance, PERRL


Pupil Exam: NORMAL ACCOMODATION, PERRL





- Respiratory Exam


Respiratory Exam: Decreased Breath Sounds, Rhonchi





- Cardiovascular Exam


Cardiovascular Exam: REGULAR RHYTHM, +S1, +S2.  absent: Murmur





- GI/Abdominal Exam


GI & Abdominal Exam: Tenderness, Hyperactive Bowel Sounds





Assessment and Plan


(1) Diarrhea


Status: Acute   





(2) Complicated urinary tract infection


Status: Acute   





(3) Diabetes


Status: Acute   





(4) Hypertension


Status: Acute   





(5) Neurogenic bladder


Status: Acute

## 2018-03-03 NOTE — PN
DATE:



SUBJECTIVE:  The patient is still experiencing diarrhea, denies chest pain,

has shortness of breath, blood pressure is falling.



PHYSICAL EXAMINATION:

VITAL SIGNS:  Blood pressure 120/70, heart rate 66, temperature 97.6, and

respirations 20.

HEENT:  No marked facial edema.

NECK:  No JVD.

CHEST:  Minimum rhonchi.

HEART:  Heart sounds regular.

EXTREMITIES:  Bilateral below-knee amputation.



LABORATORY DATA:  SMA-7:  Sodium 136, potassium 5.1, chloride 101, CO2 of

21, glucose 427, BUN 34, and creatinine 0.8.  Hemoglobin and hematocrit are

11.5 and 34.9, white count 11 and platelet count 166,000.



ASSESSMENT:

1.  Dilated cardiomyopathy, status post implantable cardioverter

defibrillator placement.

2.  Intractable diarrhea.

3.  Peripheral vascular disease, status post bilateral below-knee

amputation.

4.  Liver sarcoidosis.



RECOMMENDATIONS:  Continue Cozaar at 100 mg once a day, Eliquis 5 mg twice

a day, _____ 0.25 mg once a day, Lasix 40 mg p.o. once a day, Toprol-XL 50

mg once a day, Solu-Medrol 20 mg twice a day.





__________________________________________

Ankit Figueroa MD





DD:  03/03/2018 15:35:00

DT:  03/03/2018 20:01:18

Job # [id number]

## 2018-03-04 LAB
ALBUMIN SERPL-MCNC: 3.6 G/DL (ref 3.5–5)
ALBUMIN/GLOB SERPL: 1.1 {RATIO} (ref 1–2.1)
ALT SERPL-CCNC: 227 U/L (ref 9–52)
AST SERPL-CCNC: 91 U/L (ref 14–36)
BASOPHILS # BLD AUTO: 0 K/UL (ref 0–0.2)
BASOPHILS NFR BLD: 0.1 % (ref 0–2)
BUN SERPL-MCNC: 49 MG/DL (ref 7–17)
CALCIUM SERPL-MCNC: 8.8 MG/DL (ref 8.6–10.4)
EOSINOPHIL # BLD AUTO: 0 K/UL (ref 0–0.7)
EOSINOPHIL NFR BLD: 0.2 % (ref 0–4)
ERYTHROCYTE [DISTWIDTH] IN BLOOD BY AUTOMATED COUNT: 24.7 % (ref 11.5–14.5)
GFR NON-AFRICAN AMERICAN: 58
HGB BLD-MCNC: 11.5 G/DL (ref 11–16)
LYMPHOCYTES # BLD AUTO: 2.3 K/UL (ref 1–4.3)
LYMPHOCYTES NFR BLD AUTO: 24.3 % (ref 20–40)
MCH RBC QN AUTO: 33.2 PG (ref 27–31)
MCHC RBC AUTO-ENTMCNC: 32.3 G/DL (ref 33–37)
MCV RBC AUTO: 102.7 FL (ref 81–99)
MONOCYTES # BLD: 0.4 K/UL (ref 0–0.8)
MONOCYTES NFR BLD: 3.8 % (ref 0–10)
NEUTROPHILS # BLD: 6.8 K/UL (ref 1.8–7)
NEUTROPHILS NFR BLD AUTO: 71.6 % (ref 50–75)
NRBC BLD AUTO-RTO: 0 % (ref 0–2)
PLATELET # BLD: 180 K/UL (ref 130–400)
PMV BLD AUTO: 10.1 FL (ref 7.2–11.7)
RBC # BLD AUTO: 3.47 MIL/UL (ref 3.8–5.2)
WBC # BLD AUTO: 9.6 K/UL (ref 4.8–10.8)

## 2018-03-04 RX ADMIN — DIPHENHYDRAMINE HYDROCHLORIDE PRN MG: 50 INJECTION INTRAMUSCULAR; INTRAVENOUS at 19:02

## 2018-03-04 RX ADMIN — METHYLPREDNISOLONE SODIUM SUCCINATE SCH MG: 40 INJECTION, POWDER, FOR SOLUTION INTRAMUSCULAR; INTRAVENOUS at 10:23

## 2018-03-04 RX ADMIN — Medication SCH CAP: at 18:30

## 2018-03-04 RX ADMIN — DIPHENHYDRAMINE HYDROCHLORIDE PRN MG: 50 INJECTION INTRAMUSCULAR; INTRAVENOUS at 23:11

## 2018-03-04 RX ADMIN — INSULIN DETEMIR SCH UNIT: 100 INJECTION, SOLUTION SUBCUTANEOUS at 10:23

## 2018-03-04 RX ADMIN — Medication SCH CAP: at 11:18

## 2018-03-04 RX ADMIN — HUMAN INSULIN SCH: 100 INJECTION, SOLUTION SUBCUTANEOUS at 09:14

## 2018-03-04 RX ADMIN — HUMAN INSULIN SCH UNIT: 100 INJECTION, SOLUTION SUBCUTANEOUS at 21:48

## 2018-03-04 RX ADMIN — OXYBUTYNIN CHLORIDE SCH MG: 10 TABLET, EXTENDED RELEASE ORAL at 11:18

## 2018-03-04 RX ADMIN — HUMAN INSULIN SCH UNIT: 100 INJECTION, SOLUTION SUBCUTANEOUS at 13:09

## 2018-03-04 RX ADMIN — HUMAN INSULIN SCH UNIT: 100 INJECTION, SOLUTION SUBCUTANEOUS at 17:45

## 2018-03-04 RX ADMIN — DIPHENHYDRAMINE HYDROCHLORIDE PRN MG: 50 INJECTION INTRAMUSCULAR; INTRAVENOUS at 02:10

## 2018-03-04 RX ADMIN — DIPHENHYDRAMINE HYDROCHLORIDE PRN MG: 50 INJECTION INTRAMUSCULAR; INTRAVENOUS at 14:35

## 2018-03-04 RX ADMIN — PANTOPRAZOLE SODIUM SCH MG: 40 TABLET, DELAYED RELEASE ORAL at 10:23

## 2018-03-04 RX ADMIN — METHYLPREDNISOLONE SODIUM SUCCINATE SCH MG: 40 INJECTION, POWDER, FOR SOLUTION INTRAMUSCULAR; INTRAVENOUS at 22:44

## 2018-03-04 RX ADMIN — DIPHENHYDRAMINE HYDROCHLORIDE PRN MG: 50 INJECTION INTRAMUSCULAR; INTRAVENOUS at 10:37

## 2018-03-04 RX ADMIN — DIGOXIN SCH MG: 0.25 TABLET ORAL at 18:17

## 2018-03-04 RX ADMIN — METOPROLOL SUCCINATE SCH MG: 50 TABLET, EXTENDED RELEASE ORAL at 10:23

## 2018-03-04 RX ADMIN — HUMAN INSULIN SCH UNIT: 100 INJECTION, SOLUTION SUBCUTANEOUS at 06:17

## 2018-03-04 RX ADMIN — DIPHENHYDRAMINE HYDROCHLORIDE PRN MG: 50 INJECTION INTRAMUSCULAR; INTRAVENOUS at 06:07

## 2018-03-04 NOTE — PN
SUBJECTIVE:  The patient's shortness of breath has improved.  No wheezing. 

She is experiencing rectal pain with prolapsing hemorrhoids as well as

frequent diarrhea.



PHYSICAL EXAMINATION:

VITAL SIGNS:  Blood pressure 122/60, heart rate 59, temperature is 98,

respirations 18.

HEENT:  Moon face and facial edema.

NECK:  No JVD.

CHEST:  Bibasilar rhonchi.

HEART:  S1 and S2 regular.

EXTREMITIES:  Bilateral above knee amputation.



LABORATORIES:  Today's hemoglobin, hematocrit, white counts and platelet

counts are within normal limits.  Today's blood sugar is 659, potassium is

5.3, BUN and creatine are 49 and 1.0.



ASSESSMENT:

1.  Dilated cardiomyopathy.

2.  Prerenal azotemia and mild hyperkalemia.

3.  Status post implantable cardioverter defibrillator placement..

4.  Intractable diarrhea.

5.  Liver sarcoidosis.



RECOMMENDATIONS:  The patient was given Anusol local cream today for her

prolapsed hemorrhoids.  Reduce Cozaar to 50 mg daily.  Continue Eliquis 5

mg twice a day.  Discontinue Lasix and continue Toprol-XL 50 mg once a day.







__________________________________________

Ankit Figueroa MD



DD:  03/04/2018 12:44:10

DT:  03/04/2018 15:28:49

Job # [id number]

## 2018-03-04 NOTE — CP.PCM.PN
Subjective





- Date & Time of Evaluation


Date of Evaluation: 03/04/18


Time of Evaluation: 17:00





- Subjective


Subjective: 


 Patient seen and evaluated at bedside





Objective





- Vital Signs/Intake and Output


Vital Signs (last 24 hours): 


 











Temp Pulse Resp BP Pulse Ox


 


 98.0 F   68   20   122/80   96 


 


 03/04/18 16:06  03/04/18 16:06  03/04/18 16:06  03/04/18 18:32  03/04/18 16:06











- Medications


Medications: 


 Current Medications





Apixaban (Eliquis)  5 mg PO BID [facility]


   Last Admin: 03/04/18 18:16 Dose:  5 mg


Bismuth Subsalicylate (Pepto-Bismol)  262 mg PO Q8 PRN


   PRN Reason: Diarrhea


   Last Admin: 02/26/18 05:24 Dose:  262 mg


Digoxin (Lanoxin)  0.25 mg PO DAILY@1800 [facility]


   Last Admin: 03/04/18 18:17 Dose:  0.25 mg


Diphenhydramine HCl (Benadryl)  12.5 mg IVP Q4 PRN


   PRN Reason: Itching / Pruritus


   Last Admin: 03/04/18 19:02 Dose:  12.5 mg


Docusate Sodium (Colace)  100 mg PO TID [facility]


   Last Admin: 03/04/18 18:17 Dose:  Not Given


Gabapentin (Neurontin)  100 mg PO BID [facility]


   Stop: 03/06/18 20:46


   Last Admin: 03/04/18 18:16 Dose:  100 mg


Hydrocortisone (Anusol-Hc)  25 mg RC BID [facility]


   Last Admin: 03/04/18 18:15 Dose:  Not Given


Insulin Detemir (Levemir)  10 unit SC QAM [facility]


   Last Admin: 03/04/18 10:23 Dose:  10 unit


Insulin Human Regular (Novolin R)  0 unit SC ACHS [facility]


   PRN Reason: Protocol


   Last Admin: 03/04/18 17:45 Dose:  4 unit


Lactobacillus Acidophilus (Bacid Acidophilus)  1 cap PO BID [facility]


   Last Admin: 03/04/18 18:30 Dose:  1 cap


Losartan Potassium (Cozaar)  50 mg PO DAILY [facility]


Methylprednisolone (Solu-Medrol)  20 mg IVP Q12 [facility]


   Last Admin: 03/04/18 10:23 Dose:  20 mg


Metoclopramide HCl (Reglan)  5 mg IVP Q6H [facility]


   Last Admin: 03/04/18 20:45 Dose:  5 mg


Metoprolol Succinate (Toprol Xl)  50 mg PO DAILY [facility]


   Last Admin: 03/04/18 10:23 Dose:  50 mg


Morphine Sulfate (Morphine)  2 mg IVP Q4 PRN


   PRN Reason: Pain, severe (8-10)


Oxybutynin Chloride (Ditropan Xl)  10 mg PO DAILY [facility]


   Last Admin: 03/04/18 11:18 Dose:  10 mg


Pantoprazole Sodium (Protonix Ec Tab)  40 mg PO DAILY [facility]


   Last Admin: 03/04/18 10:23 Dose:  40 mg


Sodium Bicarbonate (Sodium Bicarbonate Tab)  650 mg PO BID [facility]


   Last Admin: 03/04/18 18:16 Dose:  650 mg











- Labs


Labs: 


 





 03/04/18 07:51 





 03/04/18 07:51 





 











PT  13.6 SECONDS (9.7-12.2)  H  02/05/18  18:27    


 


INR  1.2   02/05/18  18:27    


 


APTT  54 SECONDS (21-34)  H  02/05/18  18:27    














Assessment and Plan


(1) Diarrhea


Status: Acute   





(2) Complicated urinary tract infection


Status: Acute   





(3) Diabetes


Status: Acute   





(4) Hypertension


Status: Acute   





(5) Neurogenic bladder


Status: Acute

## 2018-03-04 NOTE — PN
DATE:



LOCATION:  [facility], bed A.



SUBJECTIVE:  This is a 52-year-old female seen and examined in rounds,

appeared to be more comfortable with less abdominal pain, less abdominal

distention, and less bowel movement frequency.



Case discussed with the staff in the floor and today's lab is still

pending.



Yesterday's lab showed mild leukocytosis of 11.0 with normal hemoglobin and

hematocrit and normal platelet count, but still with elevated liver

function tests, most likely secondary to her hepatic sarcoidosis.



Today's lab, however, showed blood glucose level of 486, most likely also

secondary to steroid induced, besides the patient's known history of

diabetes mellitus.



PHYSICAL EXAMINATION:

GENERAL:  A 52-year-old female.

VITAL SIGNS:  Afebrile with pulse of 64, respiratory rate of 20 to 22,

blood pressure 120/64.

HEENT:  Showed pale, dry oral mucous membranes.  Bilateral icteric sclerae.

LUNGS:  Few scattered crepitation.  Decreased air entry at bases.

HEART:  Positive S1 and S2.

ABDOMEN:  Soft, bowel sounds are present with mild distention, positive for

tenderness in the mid-epigastric and right upper quadrant area.  No mass or

organomegaly.  No rebound tenderness or guarding.

RECTAL:  The patient refused.

EXTREMITIES:  With evidence of bilateral above-the-knee amputation.  No

clubbing or cyanosis.

NEUROLOGIC:  No reported new neurological deficits, sensory or motor.



IMPRESSION:

1.  Hepatic sarcoidosis.

2.  Fecal impaction inducing, most likely diarrhea, improving.

3.  Status post colonoscopy with fecal disimpaction.

4.  Abnormal liver function tests with jaundice secondary to above.

5.  Anemia secondary to above.

6.  Poorly controlled diabetes mellitus and poorly controlled hypertension.

7.  Known history of but not limited to status post partial colon

resection, status post cholecystectomy, status post ERCP and biliary stent

insertion.



SUGGESTIONS:

1.  Continue current management.

2.  High-fiber diet.

3.  May add Metamucil one teaspoon p.o. three times a day.

4.  Subsequent decrease of the steroid IV dose, then discontinue and switch

to 10 mg of prednisone p.o. twice a day.

5.  Further recommendations to follow.







__________________________________________

Jeffrey Albert MD





DD:  03/04/2018 7:58:27

DT:  03/04/2018 10:52:38

[facility] # [id number]

## 2018-03-05 LAB
BUN SERPL-MCNC: 48 MG/DL (ref 7–17)
CALCIUM SERPL-MCNC: 8.5 MG/DL (ref 8.6–10.4)
CHLORIDE STL-SCNC: 5 MEQ/L
GFR NON-AFRICAN AMERICAN: > 60
POTASSIUM STL-SCNC: 121 MEQ/L
STOOL SODIUM: 60.6 MEQ/L

## 2018-03-05 RX ADMIN — DIPHENHYDRAMINE HYDROCHLORIDE PRN MG: 50 INJECTION INTRAMUSCULAR; INTRAVENOUS at 08:10

## 2018-03-05 RX ADMIN — DIPHENHYDRAMINE HYDROCHLORIDE PRN MG: 50 INJECTION INTRAMUSCULAR; INTRAVENOUS at 21:09

## 2018-03-05 RX ADMIN — OXYBUTYNIN CHLORIDE SCH MG: 10 TABLET, EXTENDED RELEASE ORAL at 09:28

## 2018-03-05 RX ADMIN — HUMAN INSULIN SCH UNIT: 100 INJECTION, SOLUTION SUBCUTANEOUS at 06:59

## 2018-03-05 RX ADMIN — Medication SCH CAP: at 09:28

## 2018-03-05 RX ADMIN — METOPROLOL SUCCINATE SCH MG: 50 TABLET, EXTENDED RELEASE ORAL at 09:27

## 2018-03-05 RX ADMIN — DIPHENHYDRAMINE HYDROCHLORIDE PRN MG: 50 INJECTION INTRAMUSCULAR; INTRAVENOUS at 03:57

## 2018-03-05 RX ADMIN — MINERAL OIL, PETROLATUM, PHENYLEPHRINE HCL SCH APPLIC: 14; 74.9; .25 OINTMENT RECTAL at 18:45

## 2018-03-05 RX ADMIN — HUMAN INSULIN SCH UNIT: 100 INJECTION, SOLUTION SUBCUTANEOUS at 12:23

## 2018-03-05 RX ADMIN — METHYLPREDNISOLONE SODIUM SUCCINATE SCH MG: 40 INJECTION, POWDER, FOR SOLUTION INTRAMUSCULAR; INTRAVENOUS at 09:25

## 2018-03-05 RX ADMIN — MINERAL OIL, PETROLATUM, PHENYLEPHRINE HCL SCH APPLIC: 14; 74.9; .25 OINTMENT RECTAL at 19:20

## 2018-03-05 RX ADMIN — DIGOXIN SCH MG: 0.25 TABLET ORAL at 18:40

## 2018-03-05 RX ADMIN — HUMAN INSULIN SCH UNIT: 100 INJECTION, SOLUTION SUBCUTANEOUS at 21:52

## 2018-03-05 RX ADMIN — Medication SCH CAP: at 18:40

## 2018-03-05 RX ADMIN — DIPHENHYDRAMINE HYDROCHLORIDE PRN MG: 50 INJECTION INTRAMUSCULAR; INTRAVENOUS at 16:46

## 2018-03-05 RX ADMIN — DIPHENHYDRAMINE HYDROCHLORIDE PRN MG: 50 INJECTION INTRAMUSCULAR; INTRAVENOUS at 12:16

## 2018-03-05 RX ADMIN — PANTOPRAZOLE SODIUM SCH MG: 40 TABLET, DELAYED RELEASE ORAL at 09:27

## 2018-03-05 RX ADMIN — HUMAN INSULIN SCH UNIT: 100 INJECTION, SOLUTION SUBCUTANEOUS at 16:45

## 2018-03-05 RX ADMIN — INSULIN DETEMIR SCH UNIT: 100 INJECTION, SOLUTION SUBCUTANEOUS at 09:34

## 2018-03-05 NOTE — PN
DATE:



SUBJECTIVE:  The patient's shortness of breath has improved.  She is still

experiencing diarrhea and severe rectal pain despite Anusol suppository.



PHYSICAL EXAMINATION:

VITAL SIGNS:  Blood pressure 118/61, heart rate 63, temperature is 97.5,

respirations 20.

HEENT:  Normocephalic.

CHEST:  Bilateral rhonchi.

HEART:  S1, S2 regular.

EXTREMITIES:  Bilateral above knee amputation.



LABORATORY DATA:  Today's SMA-7:  Sodium 137, potassium 5.1, chloride 100,

CO2 of 23, glucose _____, BUN 48, and creatinine 0.8.  Calcium is found to

be normal 8.5.



ASSESSMENT:

1.  Congestive heart failure.

2.  Prerenal azotemia.

3.  Uncontrolled diabetes mellitus.

4.  Liver sarcoidosis.

5.  External and internal hemorrhoids.

6.  Intractable diarrhea.



RECOMMENDATIONS:  Continue current Cozaar 50 mg once a day, Ditropan XL 10

mg once a day, Eliquis 5 mg twice a day, Lanoxin 0.25 mg once a day,

prednisone 20 mg once a day, Toprol-XL 50 mg once a day.  Obtain repeat

digoxin level in a.m.  The 3 prior digoxin levels were either

subtherapeutic or normal.





__________________________________________

Ankit Figueroa MD





DD:  03/05/2018 17:32:23

DT:  03/05/2018 18:03:06

Job # [id number]

## 2018-03-05 NOTE — CP.PCM.PN
Subjective





- Date & Time of Evaluation


Date of Evaluation: 03/05/18


Time of Evaluation: 18:00





- Subjective


Subjective: 





Pt seen and examined, blood sugars are high, electrolytes are deranges, high BUN

/creatinine





Objective





- Vital Signs/Intake and Output


Vital Signs (last 24 hours): 


 











Temp Pulse Resp BP Pulse Ox


 


 97.5 F L  63   20   118/61   95 


 


 03/05/18 16:46  03/05/18 16:46  03/05/18 16:46  03/05/18 16:46  03/05/18 16:46








Intake and Output: 


 











 03/05/18 03/06/18





 18:59 06:59


 


Intake Total 300 


 


Output Total 1300 


 


Balance -1000 














- Medications


Medications: 


 Current Medications





Apixaban (Eliquis)  5 mg PO BID [facility]


   Last Admin: 03/05/18 18:41 Dose:  5 mg


Bismuth Subsalicylate (Pepto-Bismol)  262 mg PO Q8 PRN


   PRN Reason: Diarrhea


   Last Admin: 02/26/18 05:24 Dose:  262 mg


Digoxin (Lanoxin)  0.25 mg PO DAILY@1800 [facility]


   Last Admin: 03/05/18 18:40 Dose:  0.25 mg


Diphenhydramine HCl (Benadryl)  12.5 mg IVP Q4 PRN


   PRN Reason: Itching / Pruritus


   Last Admin: 03/05/18 21:09 Dose:  12.5 mg


Docusate Sodium (Colace)  100 mg PO TID [facility]


   Last Admin: 03/05/18 18:01 Dose:  Not Given


Gabapentin (Neurontin)  100 mg PO BID [facility]


   Stop: 03/06/18 20:46


   Last Admin: 03/05/18 18:40 Dose:  100 mg


Hydrocortisone (Anusol-Hc)  25 mg RC BID [facility]


   Last Admin: 03/05/18 18:42 Dose:  Not Given


Insulin Detemir (Levemir)  10 unit SC QAM [facility]


   Last Admin: 03/05/18 09:34 Dose:  10 unit


Insulin Human Regular (Novolin R)  0 unit SC ACHS [facility]


   PRN Reason: Protocol


   Last Admin: 03/05/18 21:52 Dose:  4 unit


Lactobacillus Acidophilus (Bacid Acidophilus)  1 cap PO BID [facility]


   Last Admin: 03/05/18 18:40 Dose:  1 cap


Losartan Potassium (Cozaar)  50 mg PO DAILY [facility]


   Last Admin: 03/05/18 09:27 Dose:  50 mg


Metoclopramide HCl (Reglan)  5 mg IVP Q6H [facility]


   Last Admin: 03/05/18 20:45 Dose:  5 mg


Metoprolol Succinate (Toprol Xl)  50 mg PO DAILY [facility]


   Last Admin: 03/05/18 09:27 Dose:  50 mg


Morphine Sulfate (Morphine)  2 mg IVP Q4 PRN


   PRN Reason: Pain, severe (8-10)


   Last Admin: 03/05/18 21:10 Dose:  2 mg


Multi-Ingredient Ointment (Prep-Hem)  0 ea TOP BID [facility]


   Last Admin: 03/05/18 18:45 Dose:  1 applic


Oxybutynin Chloride (Ditropan Xl)  10 mg PO DAILY [facility]


   Last Admin: 03/05/18 09:28 Dose:  10 mg


Pantoprazole Sodium (Protonix Ec Tab)  40 mg PO DAILY [facility]


   Last Admin: 03/05/18 09:27 Dose:  40 mg


Prednisone (Prednisone Tab)  20 mg PO DAILY [facility]


Sodium Bicarbonate (Sodium Bicarbonate Tab)  650 mg PO BID [facility]


   Last Admin: 03/05/18 18:41 Dose:  650 mg











- Labs


Labs: 


 





 03/04/18 07:51 





 03/05/18 06:15 





 











PT  13.6 SECONDS (9.7-12.2)  H  02/05/18  18:27    


 


INR  1.2   02/05/18  18:27    


 


APTT  54 SECONDS (21-34)  H  02/05/18  18:27    














- Constitutional


Appears: No Acute Distress





- Head Exam


Head Exam: ATRAUMATIC, NORMAL INSPECTION, NORMOCEPHALIC





- Eye Exam


Eye Exam: EOMI, Normal appearance, PERRL


Pupil Exam: NORMAL ACCOMODATION, PERRL





- Respiratory Exam


Respiratory Exam: Decreased Breath Sounds, Rales, Wheezes





- Cardiovascular Exam


Cardiovascular Exam: Irregular Rhythm, +S1, +S2





- GI/Abdominal Exam


GI & Abdominal Exam: Tenderness, Hyperactive Bowel Sounds





Assessment and Plan


(1) Diarrhea


Status: Acute   





(2) Complicated urinary tract infection


Status: Acute   





(3) Diabetes


Status: Acute   





(4) Hypertension


Status: Acute   





(5) Neurogenic bladder


Status: Acute

## 2018-03-05 NOTE — PN
DATE:



LOCATION:  [facility], bed A.



SUBJECTIVE:  This is a 52-year-old female seen and examined in rounds as

requested by the admitting MD as well as the patient herself without being

reported by the staff on the floor.



The entire chart is reviewed including, but not limited to the most recent

lab and radiology study results, current and the previous medication list,

current and the previous medical events.  Case discussed with the staff at

length.



The patient's bowel movement frequency less than before, but she is still

complaining of abdominal pain with mild generalized jaundice, less than

before.  Today's blood glucose level reached 498 with low calcium 8.5, and

BUN of 48, but recently liver function tests have been improving.



PHYSICAL EXAMINATION:

GENERAL:  A 52-year-old female, awake, alert, and oriented.

VITAL SIGNS:  Afebrile, with pulse of 66, respiratory rate 18 to 20, blood

pressure 130/74.

HEENT:  Showed pale dry oral mucous membrane, nonicteric sclerae.

LUNGS:  Few scattered crepitation.  Decreased air entry at bases.

HEART:  Positive S1 and S2.

ABDOMEN:  Soft with slight distention and generalized tenderness, but

mainly in the mid epigastric area, right upper quadrant area, and left

lower quadrant area.  No masses or organomegaly.  No rebound tenderness or

guarding.

EXTREMITIES:  With evidence of bilateral above-knee amputation.  No

clubbing or cyanosis.

NEUROLOGIC:  No reported new neurological deficits, sensory or motor.



IMPRESSION:

1.  Diarrhea, most likely secondary to fecal impaction, disimpaction x2 was

performed despite the patient's retained fecal material.

2.  Known history of but not limited to poorly controlled diabetes

mellitus, hepatic sarcoidosis, hypertension.

3.  Dilated cardiomyopathy.

4.  Electrolyte imbalance.

5.  Abnormal liver function tests with jaundice secondary to above.

6.  Status post partial colon resection by history, with status post

cholecystectomy and biliary stent insertion.

7.  Anemia secondary to above.



SUGGESTIONS:

1.  Continue current management.

2.  May change steroid dose to lower dose due to the elevated blood glucose

level and Endocrinology consult to be considered.

3.  Further recommendation to follow.





__________________________________________

Jeffrey Albert MD



DD:  03/05/2018 10:35:13

DT:  03/05/2018 12:48:25

[facility] # [id number]

## 2018-03-06 LAB
ALBUMIN SERPL-MCNC: 3.2 G/DL (ref 3.5–5)
ALBUMIN/GLOB SERPL: 1.1 {RATIO} (ref 1–2.1)
ALT SERPL-CCNC: 226 U/L (ref 9–52)
AST SERPL-CCNC: 111 U/L (ref 14–36)
BUN SERPL-MCNC: 46 MG/DL (ref 7–17)
CALCIUM SERPL-MCNC: 8.4 MG/DL (ref 8.6–10.4)
ERYTHROCYTE [DISTWIDTH] IN BLOOD BY AUTOMATED COUNT: 24.1 % (ref 11.5–14.5)
GFR NON-AFRICAN AMERICAN: > 60
HGB BLD-MCNC: 11.2 G/DL (ref 11–16)
MCH RBC QN AUTO: 32.6 PG (ref 27–31)
MCHC RBC AUTO-ENTMCNC: 33 G/DL (ref 33–37)
MCV RBC AUTO: 98.6 FL (ref 81–99)
PLATELET # BLD: 163 K/UL (ref 130–400)
PMV BLD AUTO: 9.2 FL (ref 7.2–11.7)
RBC # BLD AUTO: 3.43 MIL/UL (ref 3.8–5.2)
WBC # BLD AUTO: 8.3 K/UL (ref 4.8–10.8)

## 2018-03-06 RX ADMIN — DIPHENHYDRAMINE HYDROCHLORIDE PRN MG: 50 INJECTION INTRAMUSCULAR; INTRAVENOUS at 18:12

## 2018-03-06 RX ADMIN — OXYBUTYNIN CHLORIDE SCH MG: 10 TABLET, EXTENDED RELEASE ORAL at 10:37

## 2018-03-06 RX ADMIN — Medication SCH CAP: at 18:12

## 2018-03-06 RX ADMIN — DIPHENHYDRAMINE HYDROCHLORIDE PRN MG: 50 INJECTION INTRAMUSCULAR; INTRAVENOUS at 14:19

## 2018-03-06 RX ADMIN — HUMAN INSULIN SCH UNIT: 100 INJECTION, SOLUTION SUBCUTANEOUS at 12:18

## 2018-03-06 RX ADMIN — DIPHENHYDRAMINE HYDROCHLORIDE PRN MG: 50 INJECTION INTRAMUSCULAR; INTRAVENOUS at 01:08

## 2018-03-06 RX ADMIN — MINERAL OIL, PETROLATUM, PHENYLEPHRINE HCL SCH APPLIC: 14; 74.9; .25 OINTMENT RECTAL at 18:13

## 2018-03-06 RX ADMIN — METOPROLOL SUCCINATE SCH MG: 50 TABLET, EXTENDED RELEASE ORAL at 10:37

## 2018-03-06 RX ADMIN — HUMAN INSULIN SCH UNIT: 100 INJECTION, SOLUTION SUBCUTANEOUS at 08:12

## 2018-03-06 RX ADMIN — MINERAL OIL, PETROLATUM, PHENYLEPHRINE HCL SCH APPLIC: 14; 74.9; .25 OINTMENT RECTAL at 10:39

## 2018-03-06 RX ADMIN — INSULIN DETEMIR SCH UNIT: 100 INJECTION, SOLUTION SUBCUTANEOUS at 10:37

## 2018-03-06 RX ADMIN — DIPHENHYDRAMINE HYDROCHLORIDE PRN MG: 50 INJECTION INTRAMUSCULAR; INTRAVENOUS at 05:43

## 2018-03-06 RX ADMIN — HUMAN INSULIN SCH UNIT: 100 INJECTION, SOLUTION SUBCUTANEOUS at 17:18

## 2018-03-06 RX ADMIN — PANTOPRAZOLE SODIUM SCH MG: 40 TABLET, DELAYED RELEASE ORAL at 10:37

## 2018-03-06 RX ADMIN — Medication SCH CAP: at 10:37

## 2018-03-06 RX ADMIN — DIGOXIN SCH MG: 0.25 TABLET ORAL at 18:20

## 2018-03-06 RX ADMIN — DIPHENHYDRAMINE HYDROCHLORIDE PRN MG: 50 INJECTION INTRAMUSCULAR; INTRAVENOUS at 22:00

## 2018-03-06 RX ADMIN — HUMAN INSULIN SCH: 100 INJECTION, SOLUTION SUBCUTANEOUS at 21:20

## 2018-03-06 NOTE — CP.PCM.PN
Subjective





- Date & Time of Evaluation


Date of Evaluation: 03/06/18


Time of Evaluation: 18:00





- Subjective


Subjective: 





PT SEEN AND EXAMINED AT BEDSIDE





Objective





- Vital Signs/Intake and Output


Vital Signs (last 24 hours): 


 











Temp Pulse Resp BP Pulse Ox


 


 98.4 F   73   20   111/53 L  96 


 


 03/06/18 16:49  03/06/18 16:49  03/06/18 16:49  03/06/18 16:49  03/06/18 16:49








Intake and Output: 


 











 03/06/18 03/07/18





 18:59 06:59


 


Intake Total 400 


 


Output Total 2050 1100


 


Balance -1650 -1100














- Medications


Medications: 


 Current Medications





Apixaban (Eliquis)  5 mg PO BID [facility]


   Last Admin: 03/06/18 18:12 Dose:  5 mg


Bismuth Subsalicylate (Pepto-Bismol)  262 mg PO Q8 PRN


   PRN Reason: Diarrhea


   Last Admin: 02/26/18 05:24 Dose:  262 mg


Digoxin (Lanoxin)  0.25 mg PO DAILY@1800 [facility]


   Last Admin: 03/06/18 18:20 Dose:  0.25 mg


Diphenhydramine HCl (Benadryl)  12.5 mg IVP Q4H PRN


   PRN Reason: Itching / Pruritus


   Last Admin: 03/06/18 22:00 Dose:  12.5 mg


Docusate Sodium (Colace)  100 mg PO TID [facility]


   Last Admin: 03/06/18 18:12 Dose:  Not Given


Hydrocortisone (Anusol-Hc)  25 mg RC BID [facility]


   Last Admin: 03/06/18 18:12 Dose:  25 mg


Insulin Detemir (Levemir)  10 unit SC QAM [facility]


   Last Admin: 03/06/18 10:37 Dose:  10 unit


Insulin Human Regular (Novolin R)  0 unit SC ACHS [facility]


   PRN Reason: Protocol


   Last Admin: 03/06/18 21:20 Dose:  Not Given


Lactobacillus Acidophilus (Bacid Acidophilus)  1 cap PO BID [facility]


   Last Admin: 03/06/18 18:12 Dose:  1 cap


Losartan Potassium (Cozaar)  50 mg PO DAILY [facility]


   Last Admin: 03/06/18 10:37 Dose:  50 mg


Metoclopramide HCl (Reglan)  5 mg IVP Q6H [facility]


   Last Admin: 03/06/18 20:33 Dose:  5 mg


Metoprolol Succinate (Toprol Xl)  50 mg PO DAILY [facility]


   Last Admin: 03/06/18 10:37 Dose:  50 mg


Morphine Sulfate (Morphine)  2 mg IVP Q4 PRN


   PRN Reason: Pain, severe (8-10)


   Last Admin: 03/06/18 22:00 Dose:  2 mg


Multi-Ingredient Ointment (Prep-Hem)  0 ea TOP BID [facility]


   Last Admin: 03/06/18 18:13 Dose:  1 applic


Oxybutynin Chloride (Ditropan Xl)  10 mg PO DAILY [facility]


   Last Admin: 03/06/18 10:37 Dose:  10 mg


Pantoprazole Sodium (Protonix Ec Tab)  40 mg PO DAILY [facility]


   Last Admin: 03/06/18 10:37 Dose:  40 mg


Prednisone (Prednisone Tab)  20 mg PO DAILY [facility]


   Last Admin: 03/06/18 10:37 Dose:  20 mg


Sodium Bicarbonate (Sodium Bicarbonate Tab)  650 mg PO BID [facility]


   Last Admin: 03/06/18 18:12 Dose:  650 mg











- Labs


Labs: 


 





 03/06/18 07:46 





 03/06/18 07:46 





 











PT  13.6 SECONDS (9.7-12.2)  H  02/05/18  18:27    


 


INR  1.2   02/05/18  18:27    


 


APTT  54 SECONDS (21-34)  H  02/05/18  18:27    














Assessment and Plan


(1) Diarrhea


Status: Acute   





(2) Complicated urinary tract infection


Status: Acute   





(3) Diabetes


Status: Acute   





(4) Hypertension


Status: Acute   





(5) Neurogenic bladder


Status: Acute

## 2018-03-06 NOTE — PN
DATE:



SUBJECTIVE:  The patient's shortness of breath has improved.  She is still

experiencing diarrhea and rectal pain from her hemorrhoids.



PHYSICAL EXAMINATION:

VITAL SIGNS:  Blood pressure 108/72, heart rate 75, temperature is 98,

respirations 18.

HEENT:  Normocephalic.

CHEST:  Bilateral rhonchi.

HEART:  S1, S2 regular.

EXTREMITIES:  Bilateral above-knee amputation.



LABORATORY DATA:  SMA-7:  Sodium 139, potassium 4.5, chloride 103, CO2 of

21, glucose 316, BUN 46, and creatinine 0.6.  Today's hemoglobin and

hematocrit 11.2 and 33.9.  White count and platelet counts are within

normal limits.



ASSESSMENT:

1.  Dilated cardiomyopathy, status post implantable cardioverter

defibrillator placement.

2.  History of paroxysmal atrial fibrillation.

3.  Urinary tract infection.

4.  Intractable diarrhea, which is improving.

5.  External and internal hemorrhoids.

6.  Liver sarcoidosis.



RECOMMENDATIONS:  Continue current Cozaar 50 mg once a day, Eliquis 5 mg

twice a day, Lanoxin 0.25 mg once a day, prednisone 20 mg once a day,

Toprol-XL at 50 mg once a day.  The most recent digoxin level today is 0.9

which is in the therapeutic range.





__________________________________________

Ankit Figueroa MD





DD:  03/06/2018 16:33:40

DT:  03/06/2018 17:52:49

Job # [id number]

## 2018-03-07 VITALS — HEART RATE: 104 BPM

## 2018-03-07 VITALS — RESPIRATION RATE: 20 BRPM

## 2018-03-07 LAB
ALBUMIN SERPL-MCNC: 3.1 G/DL (ref 3.5–5)
ALBUMIN/GLOB SERPL: 1 {RATIO} (ref 1–2.1)
ALT SERPL-CCNC: 212 U/L (ref 9–52)
AST SERPL-CCNC: 126 U/L (ref 14–36)
BASOPHILS # BLD AUTO: 0 K/UL (ref 0–0.2)
BASOPHILS NFR BLD: 0 % (ref 0–2)
BUN SERPL-MCNC: 44 MG/DL (ref 7–17)
CALCIUM SERPL-MCNC: 8.4 MG/DL (ref 8.6–10.4)
EOSINOPHIL # BLD AUTO: 0.1 K/UL (ref 0–0.7)
EOSINOPHIL NFR BLD: 1 % (ref 0–4)
ERYTHROCYTE [DISTWIDTH] IN BLOOD BY AUTOMATED COUNT: 23.8 % (ref 11.5–14.5)
GFR NON-AFRICAN AMERICAN: > 60
HGB BLD-MCNC: 10.5 G/DL (ref 11–16)
LYMPHOCYTES # BLD AUTO: 1.8 K/UL (ref 1–4.3)
LYMPHOCYTES NFR BLD AUTO: 22 % (ref 20–40)
MCH RBC QN AUTO: 33.7 PG (ref 27–31)
MCHC RBC AUTO-ENTMCNC: 34.9 G/DL (ref 33–37)
MCV RBC AUTO: 96.8 FL (ref 81–99)
MONOCYTES # BLD: 0.3 K/UL (ref 0–0.8)
MONOCYTES NFR BLD: 4 % (ref 0–10)
NEUTROPHILS # BLD: 5.8 K/UL (ref 1.8–7)
NEUTROPHILS NFR BLD AUTO: 73 % (ref 50–75)
NRBC BLD AUTO-RTO: 0 % (ref 0–2)
PLATELET # BLD: 144 K/UL (ref 130–400)
PMV BLD AUTO: 9.2 FL (ref 7.2–11.7)
RBC # BLD AUTO: 3.12 MIL/UL (ref 3.8–5.2)
WBC # BLD AUTO: 7.9 K/UL (ref 4.8–10.8)

## 2018-03-07 RX ADMIN — DIPHENHYDRAMINE HYDROCHLORIDE PRN MG: 50 INJECTION INTRAMUSCULAR; INTRAVENOUS at 05:56

## 2018-03-07 RX ADMIN — DIPHENHYDRAMINE HYDROCHLORIDE PRN MG: 50 INJECTION INTRAMUSCULAR; INTRAVENOUS at 14:26

## 2018-03-07 RX ADMIN — HUMAN INSULIN SCH UNIT: 100 INJECTION, SOLUTION SUBCUTANEOUS at 22:11

## 2018-03-07 RX ADMIN — MINERAL OIL, PETROLATUM, PHENYLEPHRINE HCL SCH APPLIC: 14; 74.9; .25 OINTMENT RECTAL at 17:58

## 2018-03-07 RX ADMIN — HUMAN INSULIN SCH: 100 INJECTION, SOLUTION SUBCUTANEOUS at 07:02

## 2018-03-07 RX ADMIN — Medication SCH CAP: at 19:01

## 2018-03-07 RX ADMIN — DIGOXIN SCH MG: 0.25 TABLET ORAL at 17:54

## 2018-03-07 RX ADMIN — INSULIN DETEMIR SCH UNIT: 100 INJECTION, SOLUTION SUBCUTANEOUS at 09:55

## 2018-03-07 RX ADMIN — MINERAL OIL, PETROLATUM, PHENYLEPHRINE HCL SCH APPLIC: 14; 74.9; .25 OINTMENT RECTAL at 10:00

## 2018-03-07 RX ADMIN — OXYBUTYNIN CHLORIDE SCH MG: 10 TABLET, EXTENDED RELEASE ORAL at 09:55

## 2018-03-07 RX ADMIN — HUMAN INSULIN SCH UNIT: 100 INJECTION, SOLUTION SUBCUTANEOUS at 17:30

## 2018-03-07 RX ADMIN — DIPHENHYDRAMINE HYDROCHLORIDE PRN MG: 50 INJECTION INTRAMUSCULAR; INTRAVENOUS at 23:29

## 2018-03-07 RX ADMIN — HUMAN INSULIN SCH UNIT: 100 INJECTION, SOLUTION SUBCUTANEOUS at 12:45

## 2018-03-07 RX ADMIN — METOPROLOL SUCCINATE SCH MG: 50 TABLET, EXTENDED RELEASE ORAL at 09:55

## 2018-03-07 RX ADMIN — DIPHENHYDRAMINE HYDROCHLORIDE PRN MG: 50 INJECTION INTRAMUSCULAR; INTRAVENOUS at 01:59

## 2018-03-07 RX ADMIN — DIPHENHYDRAMINE HYDROCHLORIDE PRN MG: 50 INJECTION INTRAMUSCULAR; INTRAVENOUS at 19:04

## 2018-03-07 RX ADMIN — DIPHENHYDRAMINE HYDROCHLORIDE PRN MG: 50 INJECTION INTRAMUSCULAR; INTRAVENOUS at 09:56

## 2018-03-07 RX ADMIN — Medication SCH CAP: at 09:54

## 2018-03-07 RX ADMIN — PANTOPRAZOLE SODIUM SCH MG: 40 TABLET, DELAYED RELEASE ORAL at 09:55

## 2018-03-07 NOTE — PN
DATE:



SUBJECTIVE:  The patient is anxious, mildly short of breath.  Complains of

diarrhea, rectal pain.



PHYSICAL EXAMINATION:

VITAL SIGNS:  Blood pressure 152/85, heart rate 76, temperature 98.1,

respirations 18.

HEENT:  Pale conjunctivae.

CHEST:  Bilateral rhonchi.

HEART:  Heart sounds S1 and S2, regular.

EXTREMITIES:  Bilateral above-knee amputation.



LABORATORY DATA:  SMA-7:  Sodium 138, potassium 4.6, chloride 107, CO2 is

21, glucose 165, BUN 44, creatinine 0.7, subsequent blood sugar was 400. 

Hemoglobin and hematocrit 10.5 and 30.2.  White count and platelet count

are within normal limits.



ASSESSMENT:

1.  Dilated cardiomyopathy.

2.  Status post implantable cardioverter defibrillator placement.

3.  Intractable diarrhea.

4.  Anemia.

5.  Uncontrolled diabetes mellitus.

6.  Liver sarcoidosis.



RECOMMENDATIONS:   Continue current Cozaar 50 mg once a day, Eliquis 5 mg

twice a day, digoxin 0.25 mg daily, prednisone 20 mg once a day, Toprol-XL

50 mg once a day.





__________________________________________

Ankit Figueroa MD





DD:  03/07/2018 14:54:12

DT:  03/07/2018 17:16:40

Job # [id number]

## 2018-03-07 NOTE — PN
DATE:  03/07/2018.



LOCATION:  [facility], bed A.



SUBJECTIVE:  This 52 years old female seen and examined in rounds with

complaint of intermittent period of rectal and abdominal pain with less

bowel movement frequency and less abdominal distention.  No chest pain or

palpitation, shortness of breath or reported active bleeding.



The entire chart is reviewed including but not limited to the most recent

lab and radiology study results, current and the previous medication list,

current and the previous medical events.  Case discussed with the staff at

length.



PHYSICAL EXAMINATION:

GENERAL:  A 52 years old female, awake, alert, oriented, afebrile with

stable vital signs with mild generalized jaundice.

VITAL SIGNS:  The patient is afebrile with heart rate of 72, respiratory 20

to 22, blood pressure 144/82.

HEENT:  Showed pale, dry oral mucous membranes.  Bilateral icteric sclerae.

LUNGS:  Few scattered crepitation, decreased air entry at bases.

HEART:  Positive S1 and S2.

ABDOMEN:  Soft, bowel sounds are present.  No mass or organomegaly.  No

rebound tenderness or guarding.

EXTREMITIES:  With evidence of bilateral above knee amputation.  No

clubbing or cyanosis.

NEUROLOGIC:  No reported new neurological deficits, sensory or motor.  No

reported new focal deficits.



Case discussed with the staff at length.



LABORATORY DATA:  Today's lab result show hemoglobin 10.5, _____ BUN 44,

and blood glucose level reported to be 400 was still abnormal.  Liver

function test with low albumin and low total protein.



IMPRESSION:

1.  Liver sarcoidosis.

2.  Abnormal liver function test.

3.  Jaundice secondary to above.

4.  Anemia, secondary to chronic disease.

5.  Fecal impaction with status post disimpaction.

6.  Known history of poorly controlled hypertension.

7.  Poorly controlled diabetes mellitus.

8.  Status post partial colon resection.

9.  Status post bilateral above knee amputation due to peripheral vascular

disease.



SUGGESTIONS:

1.  Continue current management.

2.  The patient is to be followed up as outpatient.

3.  Further recommendation to follow.







__________________________________________

Jeffrey Albert MD





DD:  03/07/2018 13:05:09

DT:  03/07/2018 13:34:46

Job # [id number]

## 2018-03-07 NOTE — CP.PCM.PN
Subjective





- Date & Time of Evaluation


Date of Evaluation: 03/07/18


Time of Evaluation: 11:52





- Subjective


Subjective: 


PT CLEARED FOR D/C HOME TODAY PER DR. SILVERIO AND DR. GOMES.  PER DR. SILVERIO 

PT HAS TO F/U WITH DR. DODGE FOR A RECTAL SURGEON REFERRAL FOR HEMORRHOID 

REMOVAL.  RX REFILLS AND NEW RX SENT TO PT'S PHARMACY.   PT HAS HOME CARE 

SERVICES AT HOME ALREADY ARRANGED. PT TO BE D/C WITH [facility] AS SHE HAS 

HOME CARE NURSE THAT CHANGES IT EVERY MONTH.  DISCUSSED D/C WITH PT AND SHE 

VERBALIZES UNDERSTANDING.  CM AND SW AWARE OF D/C; TO ARRANGE TRANSPORTATION 

FOR THIS EVENING.  NO FURTHER ORDERS. 











[facility] phone no [phone]


-FOLLOW UP WITH DR. DODGE IN THE OFFICE WITHIN 5-7 DAYS OF DISCHARGE---CALL 

THE OFFICE TOMORROW TO MAKE YOUR APPOINTMENT.


-FOLLOW UP WITH DR. SILVERIO IN THE OFFICE WITHIN 10-14 DAYS OF DISCHARGE---CALL 

THE OFFICE TOMORROW TO MAKE YOUR APPOINTMENT.


-FOLLOW UP WITH DR. GOMES OR DR. SKAGGS IN THE OFFICE WITHIN 10-14 DAYS OF 

DISCHARGE---CALL THE OFFICE TOMORROW TO MAKE YOUR 


 APPOINTMENT.


-DURING YOUR FOLLOW UP VISIT WITH DR. DODGE, REQUEST A REFERRAL FOR A RECTAL 

SURGEON THAT MAY REMOVE YOUR HEMORRHOIDS.


-CONTINUE ALL HOME MEDICATIONS AS USUAL.


-NEW PRESCRIPTIONS AND REFILLS HAVE BEEN SENT TO YOUR PHARMACY; PICK THEM UP BY 

TOMORROW MORNING. 


-RESUME HOME CARE SERVICES WITH [facility].  HAVE THE NURSE CONTINUE TO CHANGE 

YOUR CATHETER EVERY MONTH AND YOU MANAGE IT IN BETWEEN 


 HOME VISITS.


-FOR FURTHER QUESTIONS OR CONCERNS, CONTACT DR DODGE. 





Objective





- Vital Signs/Intake and Output


Vital Signs (last 24 hours): 


 











Temp Pulse Resp BP Pulse Ox


 


 98.1 F   76   18   152/85 H  97 


 


 03/07/18 07:45  03/07/18 07:45  03/07/18 07:45  03/07/18 07:45  03/07/18 07:45








Intake and Output: 


 











 03/07/18 03/07/18





 06:59 18:59


 


Output Total 1100 


 


Balance -1100 














- Medications


Medications: 


 Current Medications





Apixaban (Eliquis)  5 mg PO BID [facility]


   Last Admin: 03/07/18 09:55 Dose:  5 mg


Bismuth Subsalicylate (Pepto-Bismol)  262 mg PO Q8 PRN


   PRN Reason: Diarrhea


   Last Admin: 02/26/18 05:24 Dose:  262 mg


Digoxin (Lanoxin)  0.25 mg PO DAILY@1800 [facility]


   Last Admin: 03/06/18 18:20 Dose:  0.25 mg


Diphenhydramine HCl (Benadryl)  12.5 mg IVP Q4H PRN


   PRN Reason: Itching / Pruritus


   Last Admin: 03/07/18 09:56 Dose:  12.5 mg


Docusate Sodium (Colace)  100 mg PO TID [facility]


   Last Admin: 03/07/18 10:44 Dose:  Not Given


Hydrocortisone (Anusol-Hc)  25 mg RC BID [facility]


   Last Admin: 03/07/18 10:44 Dose:  Not Given


Insulin Detemir (Levemir)  10 unit SC QAM [facility]


   Last Admin: 03/07/18 09:55 Dose:  10 unit


Insulin Human Regular (Novolin R)  0 unit SC ACHS [facility]


   PRN Reason: Protocol


   Last Admin: 03/07/18 07:02 Dose:  Not Given


Lactobacillus Acidophilus (Bacid Acidophilus)  1 cap PO BID [facility]


   Last Admin: 03/07/18 09:54 Dose:  1 cap


Losartan Potassium (Cozaar)  50 mg PO DAILY [facility]


   Last Admin: 03/07/18 09:54 Dose:  50 mg


Metoclopramide HCl (Reglan)  5 mg IVP Q6H [facility]


   Last Admin: 03/07/18 08:25 Dose:  5 mg


Metoprolol Succinate (Toprol Xl)  50 mg PO DAILY [facility]


   Last Admin: 03/07/18 09:55 Dose:  50 mg


Morphine Sulfate (Morphine)  2 mg IVP Q4 PRN


   PRN Reason: Pain, severe (8-10)


   Last Admin: 03/07/18 09:56 Dose:  2 mg


Multi-Ingredient Ointment (Prep-Hem)  0 ea TOP BID [facility]


   Last Admin: 03/07/18 10:00 Dose:  1 applic


Oxybutynin Chloride (Ditropan Xl)  10 mg PO DAILY [facility]


   Last Admin: 03/07/18 09:55 Dose:  10 mg


Pantoprazole Sodium (Protonix Ec Tab)  40 mg PO DAILY [facility]


   Last Admin: 03/07/18 09:55 Dose:  40 mg


Prednisone (Prednisone Tab)  20 mg PO DAILY [facility]


   Last Admin: 03/07/18 09:55 Dose:  20 mg


Sodium Bicarbonate (Sodium Bicarbonate Tab)  650 mg PO BID [facility]


   Last Admin: 03/06/18 18:12 Dose:  650 mg











- Labs


Labs: 


 





 03/07/18 07:17 





 03/07/18 07:17 





 











PT  13.6 SECONDS (9.7-12.2)  H  02/05/18  18:27    


 


INR  1.2   02/05/18  18:27    


 


APTT  54 SECONDS (21-34)  H  02/05/18  18:27

## 2018-03-07 NOTE — PCM.HF
Heart Failure Core Measure





- Heart Failure


Ejection Fraction: Less Than 40 %


ACE Inhibitor Prescribed: No


Contraindication/Reason for not providing: on arb


Beta-Blocker Prescribed: Metoprolol Succinate


Angiotensin II Receptor Blocker Prescribed: Yes


AnticoagulationTherapy for Atrial Fibrillation/Atrialflutter: Yes


Aldosterone Antagonist Prescribed: No


Contraindication/Reason for not providing: risk for hyperkalemia


Hydralazine Nitrate Prescribed: No


Contraindication/Reason for not providing: on dig


Implantable Cardioverter Defibrillator Therapy: Yes


Contraindication/Reason for not providing: has aicd


Cardiac Resynchronization Therapy Prescribed: No


Contraindication/Reason for not providing: has aicd





- Follow up


Will be discharged to: Home


Follow Up Date (must be within 7 days from discharge): 03/12/18


Follow Up Time: 09:00

## 2018-03-07 NOTE — CP.PCM.PN
Subjective





- Date & Time of Evaluation


Date of Evaluation: 03/07/18


Time of Evaluation: 15:00





- Subjective


Subjective: 





Pt seen & examined at bedside





Objective





- Vital Signs/Intake and Output


Vital Signs (last 24 hours): 


 











Temp Pulse Resp BP Pulse Ox


 


 97.7 F   64   20   125/66   96 


 


 03/07/18 16:18  03/07/18 16:18  03/07/18 16:18  03/07/18 16:18  03/07/18 16:18








Intake and Output: 


 











 03/07/18 03/08/18





 18:59 06:59


 


Intake Total 500 


 


Output Total 2700 1800


 


Balance -2200 -1800














- Medications


Medications: 


 Current Medications





Apixaban (Eliquis)  5 mg PO BID [facility]


   Last Admin: 03/07/18 17:54 Dose:  5 mg


Bismuth Subsalicylate (Pepto-Bismol)  262 mg PO Q8 PRN


   PRN Reason: Diarrhea


   Last Admin: 02/26/18 05:24 Dose:  262 mg


Digoxin (Lanoxin)  0.25 mg PO DAILY@1800 [facility]


   Last Admin: 03/07/18 17:54 Dose:  0.25 mg


Diphenhydramine HCl (Benadryl)  12.5 mg IVP Q4H PRN


   PRN Reason: Itching / Pruritus


   Last Admin: 03/07/18 19:04 Dose:  12.5 mg


Docusate Sodium (Colace)  100 mg PO TID [facility]


   Last Admin: 03/07/18 17:53 Dose:  100 mg


Heparin Sodium (Porcine) (Heparin Lock Flush)  300 units IVF ONCE PRN


   PRN Reason: prior to julianna cath removal


Hydrocortisone (Anusol-Hc)  25 mg RC BID [facility]


   Last Admin: 03/07/18 19:01 Dose:  Not Given


Insulin Detemir (Levemir)  10 unit SC QAM [facility]


   Last Admin: 03/07/18 09:55 Dose:  10 unit


Insulin Human Regular (Novolin R)  0 unit SC ACHS [facility]


   PRN Reason: Protocol


   Last Admin: 03/07/18 22:11 Dose:  3 unit


Lactobacillus Acidophilus (Bacid Acidophilus)  1 cap PO BID [facility]


   Last Admin: 03/07/18 19:01 Dose:  1 cap


Losartan Potassium (Cozaar)  50 mg PO DAILY [facility]


   Last Admin: 03/07/18 09:54 Dose:  50 mg


Metoclopramide HCl (Reglan)  5 mg IVP Q6H [facility]


   Last Admin: 03/07/18 19:01 Dose:  5 mg


Metoprolol Succinate (Toprol Xl)  50 mg PO DAILY [facility]


   Last Admin: 03/07/18 09:55 Dose:  50 mg


Morphine Sulfate (Morphine)  2 mg IVP Q4 PRN


   PRN Reason: Pain, severe (8-10)


   Last Admin: 03/07/18 19:06 Dose:  2 mg


Multi-Ingredient Ointment (Prep-Hem)  0 ea TOP BID [facility]


   Last Admin: 03/07/18 17:58 Dose:  1 applic


Oxybutynin Chloride (Ditropan Xl)  10 mg PO DAILY [facility]


   Last Admin: 03/07/18 09:55 Dose:  10 mg


Pantoprazole Sodium (Protonix Ec Tab)  40 mg PO DAILY [facility]


   Last Admin: 03/07/18 09:55 Dose:  40 mg


Prednisone (Prednisone Tab)  20 mg PO DAILY [facility]


   Last Admin: 03/07/18 09:55 Dose:  20 mg


Sodium Bicarbonate (Sodium Bicarbonate Tab)  650 mg PO BID [facility]


   Last Admin: 03/07/18 18:03 Dose:  650 mg











- Labs


Labs: 


 





 03/07/18 07:17 





 03/07/18 07:17 





 











PT  13.6 SECONDS (9.7-12.2)  H  02/05/18  18:27    


 


INR  1.2   02/05/18  18:27    


 


APTT  54 SECONDS (21-34)  H  02/05/18  18:27    














Assessment and Plan


(1) Diarrhea


Status: Acute   





(2) Complicated urinary tract infection


Status: Acute   





(3) Diabetes


Status: Acute   





(4) Hypertension


Status: Acute   





(5) Neurogenic bladder


Status: Acute

## 2018-03-08 VITALS
HEART RATE: 57 BPM | TEMPERATURE: 98.1 F | OXYGEN SATURATION: 98 % | SYSTOLIC BLOOD PRESSURE: 107 MMHG | DIASTOLIC BLOOD PRESSURE: 69 MMHG

## 2018-03-08 RX ADMIN — DIPHENHYDRAMINE HYDROCHLORIDE PRN MG: 50 INJECTION INTRAMUSCULAR; INTRAVENOUS at 03:32

## 2018-03-08 NOTE — CP.PCM.DIS
Provider





- Provider


Date of Admission: 


02/05/18 23:13





Attending physician: 


Nasir Dunbar MD





Time Spent in preparation of Discharge (in minutes): 45





Diagnosis





- Discharge Diagnosis


(1) Diarrhea


Status: Acute   





(2) Complicated urinary tract infection


Status: Acute   





(3) Diabetes


Status: Acute   





(4) Hypertension


Status: Acute   





(5) Neurogenic bladder


Status: Acute   





Hospital Course





- Lab Results


Lab Results: 


 Micro Results





02/25/18 19:47   Other: Please Indicate   Mycobacterial Culture - Preliminary


02/25/18 19:47   Stool   Stool Culture - Final


                            NO SALMONELLA, SHIGELLA OR CAMPYLOBACTER ISOLATED.


02/25/18 19:47   Stool   Ova and Parasite Concentrate Exam - Final


02/11/18 Unknown   Urine,Charles   Urine Culture - Final


                                Klebsiella Pneumoniae Ssp Pneu


                                Yeast Species


02/05/18 19:48   Urine   Urine Culture - Final


                            Escherichia Coli


                            Enterococcus Faecalis


02/06/18 18:09   Urine,Charles   Urine Culture - Final


                            ,000 CFU/ML.


                            MULTIPLE SPECIES. SUGGEST REPEAT SPECIMEM.





 Most Recent Lab Values











WBC  7.9 K/uL (4.8-10.8)   03/07/18  07:17    


 


RBC  3.12 Mil/uL (3.80-5.20)  L  03/07/18  07:17    


 


Hgb  10.5 g/dL (11.0-16.0)  L  03/07/18  07:17    


 


Hct  30.2 % (34.0-47.0)  L  03/07/18  07:17    


 


MCV  96.8 fL (81.0-99.0)   03/07/18  07:17    


 


MCH  33.7 pg (27.0-31.0)  H  03/07/18  07:17    


 


MCHC  34.9 g/dL (33.0-37.0)   03/07/18  07:17    


 


RDW  23.8 % (11.5-14.5)  H  03/07/18  07:17    


 


Plt Count  144 K/uL (130-400)   03/07/18  07:17    


 


MPV  9.2 fL (7.2-11.7)   03/07/18  07:17    


 


Neut % (Auto)  73.0 % (50.0-75.0)   03/07/18  07:17    


 


Lymph % (Auto)  22.0 % (20.0-40.0)   03/07/18  07:17    


 


Mono % (Auto)  4.0 % (0.0-10.0)   03/07/18  07:17    


 


Eos % (Auto)  1.0 % (0.0-4.0)   03/07/18  07:17    


 


Baso % (Auto)  0.0 % (0.0-2.0)   03/07/18  07:17    


 


Neut # (Auto)  5.8 K/uL (1.8-7.0)   03/07/18  07:17    


 


Lymph # (Auto)  1.8 K/uL (1.0-4.3)   03/07/18  07:17    


 


Mono # (Auto)  0.3 K/uL (0.0-0.8)   03/07/18  07:17    


 


Eos # (Auto)  0.1 K/uL (0.0-0.7)   03/07/18  07:17    


 


Baso # (Auto)  0.0 K/uL (0.0-0.2)   03/07/18  07:17    


 


Neutrophils % (Manual)  90 % (50-75)  H  03/03/18  06:46    


 


Band Neutrophils %  1 % (0-2)   03/03/18  06:46    


 


Lymphocytes % (Manual)  2 % (20-40)  L  03/03/18  06:46    


 


Monocytes % (Manual)  6 % (0-10)   03/03/18  06:46    


 


Eosinophils % (Manual)  11 % (0-4)  H  02/05/18  18:27    


 


Metamyelocytes %  1 % (0-0)  H  03/03/18  06:46    


 


Myelocytes %  1 % (0-0)  H  02/22/18  07:47    


 


Platelet Estimate  Normal  (NORMAL)   03/03/18  06:46    


 


Polychromasia  Slight   03/03/18  06:46    


 


Hypochromasia (manual)  Slight   03/03/18  06:46    


 


Poikilocytosis (manual  Slight   03/03/18  06:46    


 


Anisocytosis (manual)  Moderate   03/03/18  06:46    


 


Microcytosis (manual)  Slight   03/03/18  06:46    


 


Macrocytosis (manual)  Moderate   03/03/18  06:46    


 


Spherocytes  Slight   03/03/18  06:46    


 


Target Cells  Slight   03/03/18  06:46    


 


Tear Drop Cells  Slight   03/03/18  06:46    


 


Ovalocytes  Slight   03/03/18  06:46    


 


Riley[place] Bodies  Slight   03/03/18  06:46    


 


[place] Cells  Slight   03/03/18  06:46    


 


Schistocytes  Slight   03/03/18  06:46    


 


PT  13.6 SECONDS (9.7-12.2)  H  02/05/18  18:27    


 


INR  1.2   02/05/18  18:27    


 


APTT  54 SECONDS (21-34)  H  02/05/18  18:27    


 


Sodium  138 mmol/L (132-148)   03/07/18  07:17    


 


Potassium  4.6 mmol/L (3.6-5.2)   03/07/18  07:17    


 


Chloride  107 mmol/L ()   03/07/18  07:17    


 


Carbon Dioxide  21 mmol/L (22-30)  L  03/07/18  07:17    


 


Anion Gap  15  (10-20)   03/07/18  07:17    


 


BUN  44 mg/dL (7-17)  H  03/07/18  07:17    


 


Creatinine  0.7 mg/dL (0.7-1.2)   03/07/18  07:17    


 


Est GFR ( Amer)  > 60   03/07/18  07:17    


 


Est GFR (Non-Af Amer)  > 60   03/07/18  07:17    


 


POC Glucose (mg/dL)  105 mg/dL ()   03/08/18  06:20    


 


Random Glucose  165 mg/dL ()  H  03/07/18  07:17    


 


Serum Osmolality  316 mosm/kg (272-300)  H  02/23/18  07:21    


 


Calcium  8.4 mg/dl (8.6-10.4)  L  03/07/18  07:17    


 


Total Bilirubin  4.9 mg/dL (0.2-1.3)  H  03/07/18  07:17    


 


Direct Bilirubin  7.9 mg/dL (0.0-0.4)  H  02/05/18  18:27    


 


GGT  488 U/L (8-78)  H  02/05/18  18:27    


 


AST  126 U/L (14-36)  H  03/07/18  07:17    


 


ALT  212 U/L (9-52)  H  03/07/18  07:17    


 


Alkaline Phosphatase  352 U/L ()  H  03/07/18  07:17    


 


Ammonia  37 umol/L (9-33)  H D 02/05/18  18:54    


 


Total Creatine Kinase  < 20 U/L ()  L  02/22/18  15:45    


 


CK-MB (Mass)  1.15 ng/mL (0.0-3.38)   02/22/18  15:45    


 


Troponin I  < 0.0120 ng/mL (0.00-0.120)   02/22/18  15:45    


 


Total Protein  6.2 g/dL (6.3-8.3)  L  03/07/18  07:17    


 


Albumin  3.1 g/dL (3.5-5.0)  L  03/07/18  07:17    


 


Globulin  3.1 gm/dL (2.2-3.9)   03/07/18  07:17    


 


Albumin/Globulin Ratio  1.0  (1.0-2.1)   03/07/18  07:17    


 


Lipase  65 U/L ()   02/05/18  18:27    


 


Urine Color  Elise  (YELLOW)   02/16/18  22:29    


 


Urine Clarity  Hazy  (Clear)   02/16/18  22:29    


 


Urine pH  6.0  (5.0-8.0)   02/16/18  22:29    


 


Ur Specific Gravity  1.013  (1.003-1.030)   02/16/18  22:29    


 


Urine Protein  Negative mg/dL (NEGATIVE)   02/16/18  22:29    


 


Urine Glucose (UA)  3+ mg/dL (Normal)  H  02/16/18  22:29    


 


Urine Ketones  Negative mg/dL (NEGATIVE)   02/16/18  22:29    


 


Urine Blood  Negative  (NEGATIVE)   02/16/18  22:29    


 


Urine Nitrate  Positive  (NEGATIVE)  H  02/16/18  22:29    


 


Urine Bilirubin  Negative  (NEGATIVE)   02/16/18  22:29    


 


Urine Urobilinogen  Normal mg/dL (0.2-1.0)   02/16/18  22:29    


 


Ur Leukocyte Esterase  3+ Abiodun/uL (Negative)  H  02/16/18  22:29    


 


Urine WBC (Auto)  85 /hpf (0-5)  H  02/16/18  22:29    


 


Urine RBC (Auto)  3 /hpf (0-3)   02/16/18  22:29    


 


Ur Squamous Epith Cells  < 1 /hpf (0-5)   02/16/18  22:29    


 


Urine Bacteria  Many  (<OCC)  H  02/16/18  22:29    


 


Urine Yeast (Budding)  Few /hpf (NEGATIVE)  H  02/12/18  14:18    


 


Urine Osmolality  577 mosm/kg (300-1000)   02/23/18  07:21    


 


Ur Random Sodium  100 mmol/L  02/23/18  07:21    


 


Ur Random Potassium  9.3 mmol/L  02/23/18  07:21    


 


Stool Sodium  60.60 mEq/L  02/25/18  09:52    


 


Stool Potassium  121 mEq/L  02/25/18  09:52    


 


Stool Chloride  5 mEq/L  02/25/18  09:52    


 


Stool Leukocytes, Qual  Negative  (NEGATIVE)   02/25/18  17:24    


 


Digoxin  0.9 ng/mL (0.8-2.0)   03/06/18  07:46    


 


C. difficile Ag & Toxin  Negative  (NEGATIVE)   02/15/18  11:42    


 


Influenza Typ A,B (EIA)  Negative for flu a/b  (NEGATIVE)   02/05/18  19:20    














- Hospital Course


Hospital Course: 





PT SEEN AND EXAMINED, NO SHORTNESS OF BREATH, SHE IS FEELING WELL, AFEBRILE





Discharge Exam





- Head Exam


Head Exam: ATRAUMATIC, NORMAL INSPECTION, NORMOCEPHALIC





- Eye Exam


Eye Exam: EOMI, Normal appearance, PERRL


Pupil Exam: NORMAL ACCOMODATION, PERRL





- ENT Exam


ENT Exam: Mucous Membranes Moist





- Respiratory Exam


Respiratory Exam: Clear to PA & Lateral





- Cardiovascular Exam


Cardiovascular Exam: REGULAR RHYTHM, +S1, +S2





- GI/Abdominal Exam


GI & Abdominal Exam: Normal Bowel Sounds





- Rectal Exam


Rectal Exam: Deferred





Discharge Plan





- Discharge Medications


Prescriptions: 


Zolpidem [Ambien] 5 mg PO DAILY PRN #30 tab


 PRN Reason: Insomnia


Hydrocortisone 2.5% (Rectal) [Anusol-HC] 30 applic NH BID #1 tube


Lactobacillus Acidophilus [Bacid Acidophilus] 1 cap PO BID #60 cap


Mesalamine [Canasa] 1,000 mg RC HS #30 sup


Docusate [Colace] 100 mg PO TID #90 cap


Fluticasone Propionate 60 gm TP BID #1 cream..g.


Digoxin [Lanoxin] 250 mcg PO DAILY #30 tab


Atropine/Diphenoxylate [Lonox 0.025 MG-2.5 MG] 1 tab PO Q8 PRN #10 tab


 PRN Reason: Diarrhea


Gabapentin [Neurontin] 100 mg PO BID #60 cap


oxyCODONE [oxyCODONE Immediate Release Tab] 5 mg PO Q6 PRN #20 tab


 PRN Reason: Pain, Severe (8-10)


predniSONE [predniSONE Tab] 20 mg PO DAILY #30 tab


Pantoprazole Sodium [Protonix] 1 tab PO DAILY #30 tablet.


Sodium Bicarbonate Tab 650 mg PO BID #60 tab





- Follow Up Plan


Condition: GUARDED


Disposition: HOME/ ROUTINE


Instructions:  Jaundice in Adults, Sarcoidosis, High Blood Pressure (DC), Acute 

Abdomen (Belly Pain), Urinary Tract Infection in Women (DC), Acute Abdominal 

Pain (DC), Acute Abdominal Pain (GEN)


Additional Instructions: 


[facility] phone no [phone]





-FOLLOW UP WITH DR. DUNBAR IN THE OFFICE WITHIN 5-7 DAYS OF DISCHARGE---CALL 

THE OFFICE TOMORROW TO MAKE YOUR APPOINTMENT.


-FOLLOW UP WITH DR. OLMEDO IN THE OFFICE WITHIN 10-14 DAYS OF DISCHARGE---CALL 

THE OFFICE TOMORROW TO MAKE YOUR APPOINTMENT.


-FOLLOW UP WITH DR. FIGUEROA OR DR. SKAGGS IN THE OFFICE WITHIN 10-14 DAYS OF 

DISCHARGE---CALL THE OFFICE TOMORROW TO MAKE YOUR 


 APPOINTMENT.


-DURING YOUR FOLLOW UP VISIT WITH DR. DUNBAR, REQUEST A REFERRAL FOR A RECTAL 

SURGEON THAT MAY REMOVE YOUR HEMORRHOIDS.


-CONTINUE ALL HOME MEDICATIONS AS USUAL.


-NEW PRESCRIPTIONS AND REFILLS HAVE BEEN SENT TO YOUR PHARMACY; PICK THEM UP BY 

TOMORROW MORNING. 


-RESUME HOME CARE SERVICES WITH [facility].  HAVE THE NURSE CONTINUE TO CHANGE 

YOUR CATHETER EVERY MONTH AND YOU MANAGE IT IN BETWEEN 


 HOME VISITS.


-FOR FURTHER QUESTIONS OR CONCERNS, CONTACT DR DUNBAR. 


Referrals: 


Jeffrey Olmedo [Staff Provider] - 


Ankit Figueroa MD [Staff Provider] - 


Nancy Meyer MD [Staff Provider] - 


Nasir Dunbar MD [Staff Provider] -

## 2018-09-08 ENCOUNTER — HOSPITAL ENCOUNTER (INPATIENT)
Dept: HOSPITAL 31 - C.ER | Age: 53
LOS: 7 days | Discharge: HOME HEALTH | DRG: 444 | End: 2018-09-15
Attending: INTERNAL MEDICINE | Admitting: INTERNAL MEDICINE
Payer: MEDICARE

## 2018-09-08 VITALS — BODY MASS INDEX: 18.7 KG/M2

## 2018-09-08 DIAGNOSIS — I77.6: ICD-10-CM

## 2018-09-08 DIAGNOSIS — R50.9: ICD-10-CM

## 2018-09-08 DIAGNOSIS — E10.649: ICD-10-CM

## 2018-09-08 DIAGNOSIS — Z98.42: ICD-10-CM

## 2018-09-08 DIAGNOSIS — E10.51: ICD-10-CM

## 2018-09-08 DIAGNOSIS — Z87.11: ICD-10-CM

## 2018-09-08 DIAGNOSIS — H54.7: ICD-10-CM

## 2018-09-08 DIAGNOSIS — Z98.41: ICD-10-CM

## 2018-09-08 DIAGNOSIS — E10.65: ICD-10-CM

## 2018-09-08 DIAGNOSIS — T83.511A: ICD-10-CM

## 2018-09-08 DIAGNOSIS — Z89.612: ICD-10-CM

## 2018-09-08 DIAGNOSIS — K83.1: ICD-10-CM

## 2018-09-08 DIAGNOSIS — I11.0: ICD-10-CM
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
## 2018-09-08 DIAGNOSIS — I63.9: ICD-10-CM

## 2018-09-08 DIAGNOSIS — D63.8: ICD-10-CM

## 2018-09-08 DIAGNOSIS — K83.0: Primary | ICD-10-CM

## 2018-09-08 DIAGNOSIS — R94.5: ICD-10-CM

## 2018-09-08 DIAGNOSIS — E87.2: ICD-10-CM

## 2018-09-08 DIAGNOSIS — D86.89: ICD-10-CM

## 2018-09-08 DIAGNOSIS — E78.5: ICD-10-CM

## 2018-09-08 DIAGNOSIS — Z87.440: ICD-10-CM

## 2018-09-08 DIAGNOSIS — I50.9: ICD-10-CM

## 2018-09-08 DIAGNOSIS — Z95.5: ICD-10-CM

## 2018-09-08 DIAGNOSIS — E86.0: ICD-10-CM

## 2018-09-08 DIAGNOSIS — I65.21: ICD-10-CM

## 2018-09-08 DIAGNOSIS — G40.209: ICD-10-CM

## 2018-09-08 DIAGNOSIS — N31.9: ICD-10-CM

## 2018-09-08 DIAGNOSIS — M54.12: ICD-10-CM

## 2018-09-08 DIAGNOSIS — G54.6: ICD-10-CM

## 2018-09-08 DIAGNOSIS — Z79.4: ICD-10-CM

## 2018-09-08 DIAGNOSIS — Z89.611: ICD-10-CM

## 2018-09-08 DIAGNOSIS — N39.0: ICD-10-CM

## 2018-09-08 DIAGNOSIS — J45.909: ICD-10-CM

## 2018-09-08 DIAGNOSIS — E78.00: ICD-10-CM

## 2018-09-08 DIAGNOSIS — A41.9: ICD-10-CM

## 2018-09-08 DIAGNOSIS — I25.10: ICD-10-CM

## 2018-09-08 LAB
ALBUMIN SERPL-MCNC: 3.2 G/DL (ref 3.5–5)
ALBUMIN/GLOB SERPL: 0.8 {RATIO} (ref 1–2.1)
ALT SERPL-CCNC: 45 U/L (ref 9–52)
APTT BLD: 25 SECONDS (ref 21–34)
AST SERPL-CCNC: 89 U/L (ref 14–36)
BACTERIA #/AREA URNS HPF: (no result) /[HPF]
BASE EXCESS BLDV CALC-SCNC: -13.2 MMOL/L (ref 0–2)
BASOPHILS # BLD AUTO: 0 K/UL (ref 0–0.2)
BASOPHILS NFR BLD: 0.8 % (ref 0–2)
BILIRUB UR-MCNC: NEGATIVE MG/DL
BUN SERPL-MCNC: 15 MG/DL (ref 7–17)
CALCIUM SERPL-MCNC: 8.5 MG/DL (ref 8.6–10.4)
COLOR UR: YELLOW
EOSINOPHIL # BLD AUTO: 0.1 K/UL (ref 0–0.7)
EOSINOPHIL NFR BLD: 2.1 % (ref 0–4)
ERYTHROCYTE [DISTWIDTH] IN BLOOD BY AUTOMATED COUNT: 18.5 % (ref 11.5–14.5)
GFR NON-AFRICAN AMERICAN: > 60
GLUCOSE UR STRIP-MCNC: NORMAL MG/DL
HGB BLD-MCNC: 10.4 G/DL (ref 11–16)
INR PPP: 1.2
LEUKOCYTE ESTERASE UR-ACNC: (no result) LEU/UL
LIPASE: 151 U/L (ref 23–300)
LYMPHOCYTES # BLD AUTO: 0.9 K/UL (ref 1–4.3)
LYMPHOCYTES NFR BLD AUTO: 15.4 % (ref 20–40)
MCH RBC QN AUTO: 31.8 PG (ref 27–31)
MCHC RBC AUTO-ENTMCNC: 33.8 G/DL (ref 33–37)
MCV RBC AUTO: 94 FL (ref 81–99)
MONOCYTES # BLD: 0.6 K/UL (ref 0–0.8)
MONOCYTES NFR BLD: 10.4 % (ref 0–10)
NEUTROPHILS # BLD: 4.2 K/UL (ref 1.8–7)
NEUTROPHILS NFR BLD AUTO: 71.3 % (ref 50–75)
NRBC BLD AUTO-RTO: 0 % (ref 0–2)
PCO2 BLDV: 24 MMHG (ref 40–60)
PH BLDV: 7.29 [PH] (ref 7.32–7.43)
PH UR STRIP: 6 [PH] (ref 5–8)
PLATELET # BLD: 162 K/UL (ref 130–400)
PMV BLD AUTO: 8.7 FL (ref 7.2–11.7)
PROT UR STRIP-MCNC: NEGATIVE MG/DL
PROTHROMBIN TIME: 12.7 SECONDS (ref 9.7–12.2)
RBC # BLD AUTO: 3.27 MIL/UL (ref 3.8–5.2)
RBC # UR STRIP: NEGATIVE /UL
SP GR UR STRIP: 1.03 (ref 1–1.03)
SQUAMOUS EPITHIAL: < 1 /HPF (ref 0–5)
UROBILINOGEN UR-MCNC: NORMAL MG/DL (ref 0.2–1)
VENOUS BLOOD GAS PO2: 41 MM/HG (ref 30–55)
WBC # BLD AUTO: 5.9 K/UL (ref 4.8–10.8)

## 2018-09-08 RX ADMIN — DIPHENHYDRAMINE HYDROCHLORIDE PRN MG: 50 INJECTION INTRAMUSCULAR; INTRAVENOUS at 20:42

## 2018-09-08 RX ADMIN — HUMAN INSULIN SCH: 100 INJECTION, SOLUTION SUBCUTANEOUS at 22:25

## 2018-09-08 RX ADMIN — HUMAN INSULIN SCH: 100 INJECTION, SOLUTION SUBCUTANEOUS at 17:33

## 2018-09-08 RX ADMIN — WATER SCH MLS/HR: 1 INJECTION INTRAMUSCULAR; INTRAVENOUS; SUBCUTANEOUS at 20:44

## 2018-09-08 RX ADMIN — HYDROCORTISONE SCH: 10 CREAM TOPICAL at 19:00

## 2018-09-08 RX ADMIN — Medication SCH: at 19:00

## 2018-09-08 NOTE — C.PDOC
History Of Present Illness





<Alondra Adams - Last Filed: 18 13:10>





<Magalie Ching - Last Filed: 18 12:13>


52 y/o female, BIBA, presents to the ED complaining of reoccurring RUQ for one 

week. She reports a TM of 101, jaundice x 1 week, new onset headache, neck pain

, and photophobia for seven days. The patient also reports a HO phantom pain in 

left leg but current pain is different prior prior. She has a HO sarcoidosis s/

p liver stent by Dr. Klein 2-3 weeks ago "bc too much sludge". Pt has pain then

, resolves after stent but now reoccurring. As per family, she has been "

talking crazy" since last night. The patient has a PSH thania, appy, colon mass 

removed (noncancerous), Defib/PPM. She also has right chest port since 2017 

and a gutierrez for 2-3 weeks. She has a PO Dilaudid Rx. The patient states she is 

compliant with her medication but has been unable to take it for the past seven 

days due to vomiting. She took Zofran Rx with no relief. 














RECUR RUQ PAIN X 1 WEEK. HO SARCOIDOSIS S/P LIVER STENT BY DR SILVERIO 2-3 WEEKS 

"BC TOO MUCH SLUDGE". PS HAD PAIN THEN, RESOLVED AFTER STENT BUT NOW RECUR. 

RECUR JAUNDICE X 1 WEEK. PS FAMILY NOTED PT TO BE "TALKING CRAZY" LAST NIGHT. 

. PSH THANIA, APPY, COLON MASS REMOVAL (NONCANCER), DEFIB/PPM. CO NEW 

ONSET HA, NECK PAIN, PHOTOPHOBIA X SEV DAYS, RESTLESS LEGS SINCE LAST NIGHT. PS 

HO PHANTOM PAIN L LEG BUT CURRENT LEG PAIN DIF FROM PRIOR. R CHEST PORT SINCE , GUTIERREZ X 2-3 WKS





PS HAS PO DILAUDID RX, TAKES PRN BUT HAS NOT BEEN ABLE TO TAKE X SEV DAYS DUE 

TO VOMITING. NO IMPROVE W ZOFRAN RX





EXAM


MOD DIST NONTOXIC


HEENT +ICTERIC SCLERA, MILD PHOTOPHOBIA RESOLVES W INDIRECT LIGHT, TOLERATING 

AMBIENT LIGHT. 


NECK SUPPLE NO MENINGISMUS


ABD +RUQ TEND SEVERE SOFT NO R/G


EXT B/L AKA NO SWELL NONTEND


 CHRONIC GUTIERREZ


NEURO AO3 NO FOCAL DEF


PSYCH CALM COOPERATIVE APPROPRIATE


REMAINDER NEG (Ho,Alondra)


History Per: Patient


History/Exam Limitations: no limitations


Onset/Duration Of Symptoms: Days


Current Symptoms Are (Timing): Still Present


Recent travel outside of the [place]: No


Additional History Per: EMS, Family





<Alondra Adams - Last Filed: 18 13:10>





<Magalie Ching - Last Filed: 18 12:13>


Time Seen by Provider: 18 11:03


Chief Complaint (Nursing): Chest Pain





Past Medical History


Reviewed: Historical Data, Nursing Documentation, Vital Signs





- Medical History


PMH: Anemia, Asthma (NO INHALER-ON PREDNISONE DAILY PO.), CAD (stent x 1), 

Cardia Arrhythmia, CHF, Diabetes, Deep Vein Thrombosis, Gall Bladder Disease, 

HTN, Hypercholesterolemia


   Denies: Chronic Kidney Disease


Surgical History: Appendectomy, Cholecystectomy, Coronary Stent, Endoscopy, C-

Section


Family History: States: Unknown Family Hx





- Social History


Hx Tobacco Use: No


Hx Alcohol Use: No


Hx Substance Use: No





- Immunization History


Hx Tetanus Toxoid Vaccination: No


Hx Influenza Vaccination: No


Hx Pneumococcal Vaccination: Yes ()





<Alondra Adams - Last Filed: 18 13:10>


Vital Signs: 


 Last Vital Signs











Temp  98.3 F   18 07:00


 


Pulse  91 H  18 07:00


 


Resp  20   18 07:00


 


BP  148/81   18 07:00


 


Pulse Ox  100   18 07:00














- [facility] Procedures








CENTRAL VENOUS CATHETER PLACEMENT WITH GUIDANCE (14)


CLOSED ENDOSCOPIC BIOPSY OF LARGE INTESTINE (14)


ENDOSC RETROGRADE CHOLANGIOPANCREATOGRAPHY [ERCP] (10/30/13)


ENDOSCOP INSERTION OF STENT (TUBE) INTO BILE DUCT (14)


ESOPHAGOGASTRODUODENOSCOPY [EGD] W/CLOSED BIOPSY (14)


INSPECTION OF LOWER INTESTINAL TRACT, ENDO (18)


IRRIGATION OF LOWER GI USING IRRIGATING SUBSTANCE, ENDO (18)


OTHER LOCAL DESTRUC SKIN (10/30/13)


OTHER SKIN & SUBQ I   D (10/30/13)











Review Of Systems


Except As Marked, All Systems Reviewed And Found Negative.


Constitutional: Negative for: Fever


Gastrointestinal: Positive for: Vomiting, Abdominal Pain (RUQ pain)


Musculoskeletal: Positive for: Other (phantom left leg pain)


Skin: Positive for: Jaundice


Neurological: Positive for: Change in Speech, Headache, Other (photophobia)





<Alondra Adams Last Filed: 18 13:10>





Physical Exam





- Physical Exam


Appears: Non-toxic, Other (Moderate Distress)


Skin: Jaundice


Head: Atraumatic, Normacephalic


Eye(s): bilateral: Photophobia (mild. Resolves with indirect light.Tolerating 

ambient light ), Scleral Icterus


Oral Mucosa: Moist


Neck: Supple, No Other (MENINGISMUS)


Chest: Symmetrical


Cardiovascular: Rhythm Regular, No Murmur


Respiratory: Normal Breath Sounds, No Rales, No Rhonchi, No Wheezing, Other (

NARD)


Gastrointestinal/Abdominal: Soft (severe soft), Tenderness (RUQ tenderness), No 

Guarding, No Rebound, Other ( CHRONIC GUTIERREZ)


Extremity: No Tenderness, No Swelling


Extremity: Bilateral: Other (AKA)


Pulses: Left Dorsalis Pedis: Normal, Right Dorsalis Pedis: Normal


Neurological/Psych: Oriented x3, Other (no focal deficit. Calm. Cooperative. 

Appropriate.)





<Alondra Adams Last Filed: 18 13:10>





ED Course And Treatment





- Laboratory Results


Result Diagrams: 


 18 11:40





 18 11:29


ECG: Interpreted By Me


ECG Rhythm: Sinus Rhythm


Interpretation Of ECG: 


Rate From EC


O2 Sat by Pulse Oximetry: 99 (RA)


Pulse Ox Interpretation: Normal





- Radiology


CXR: Interpreted by Me


CXR Interpretation: Yes: No Acute Disease





- CT Scan/US


  ** Head


Other Rad Studies (CT/US): Read By Radiologist, Radiology Report Reviewed


CT/US Interpretation: IMPRESSION:  No acute intracranial abnormality.  Chronic 

microvascular ischemic change.  Small right basal ganglia lacunar infarct.  If 

symptoms persists, consider correlation with MRI.





<Alondra Adams Last Filed: 18 13:10>





- Laboratory Results


Result Diagrams: 


 18 08:16





 18 08:16





- CT Scan/US





  ** Abdomen  & Pelvis


Other Rad Studies (CT/US): Read By Radiologist, Radiology Report Reviewed


CT/US Interpretation: IMPRESSION:  1. Splenomegaly.  2. No evidence for 

obstruction.  3. The vasculature demonstrates diffuse moderate atherosclerotic 

calcification.





<Magalie Ching - Last Filed: 18 12:13>





Progress





- Data Reviewed


Data Reviewed: Lab, Diagnostic imaging, EKG, Old records





- Critical Care


Citical Care: Excluding Proc Time


Critical Care Time: 90 minutes





<Alondra Adams - Last Filed: 18 13:10>





<HumzaMagalie - Last Filed: 18 12:13>





- Re-Evaluation


Re-evaluation Note: 





18 11:16


D/W DR SILVERIO: LAST LIVER STENT 4-5 MO, STATES PT USUALLY REQUIRES NEW STENT Q4-

6 MO FOR ASCENDING CHOLANGITIS. WILL CONSULT, REQUESTS CT.





D/W DR DUNBAR WILL ADMIT (Alondra Adams)





Medical Decision Making





<Alondra Adams - Last Filed: 18 13:10>





<HumzaMagalie LENNY - Last Filed: 18 12:13>


Medical Decision Making: 


Impression: 52 y/o female with reoccurring RUQ for one week. Pt had a 

subjective fever, has been experiencing jaundice x 1 week, New onset HA, neck 

pain and photophobia. She also has a ho phantom pain in left leg but current 

pain is different from prior. 





Plan:


-VBG


-CT ABD & Pelvis 


-Head CT


-EKG


-Ammonia


-CMP


-Lipase


-Magnesium


-Phosphorous


-CBC


-PTT


-PT


-Chest X-Ray


-Glucose Lab


-Benadryl 50 mg IV


-Compazine 10 mg


-Morphine 4 mg IV


-IV Fluids


-UA (Alondra Adams)





Disposition


Counseled Patient/Family Regarding: Studies Performed, Diagnosis





- Disposition


Disposition Time: 13:00





<Alondra Adams - Last Filed: 18 13:10>





- Disposition


Disposition Time: 14:11





<Humza,Magalie LENNY - Last Filed: 18 12:13>





- Disposition


Disposition: HOSPITALIZED


Condition: STABLE





- Clinical Impression


Clinical Impression: 


 Ascending cholangitis








- PA / NP / Resident Statement


MD/DO has reviewed & agrees with the documentation as recorded.





- Scribe Statement


The provider has reviewed the documentation as recorded by the Scribe (Jesica Pitt)





<Alondra Adams - Last Filed: 18 13:10>





<Magalie Ching - Last Filed: 18 12:13>





- Scribe Statement


All medical record entries made by the Scribe were at my direction and 

personally dictated by me. I have reviewed the chart and agree that the record 

accurately reflects my personal performance of the history, physical exam, 

medical decision making, and the department course for this patient. I have 

also personally directed, reviewed, and agree with the discharge instructions 

and disposition. (BryanAlondra)





Physician Patient Turnover


Patient Signed Over To: Magalie Ching


Handoff Comments: FU CT, ADMISSION





<Alondra Adams - Last Filed: 18 13:10>





Addendum





<Alondra Adams - Last Filed: 18 13:10>





<Magalie Ching - Last Filed: 18 12:13>


Addendum: 





18 13:52


Patient c/o abdominal pain again - IV morphine ordered.  CT results pending.


18 13:55


********************************************************************************

***





Date of service: 





2018





PROCEDURE:  CT HEAD WITHOUT CONTRAST.





HISTORY:


Headaches. History of sarcoidosis. 





COMPARISON:


None available.





TECHNIQUE:


Axial computed tomography images were obtained through the head/brain without 

intravenous contrast.  





Radiation dose:





Total exam DLP = 933 mGy-cm.





This CT exam was performed using one or more of the following dose reduction 

techniques: Automated exposure control, adjustment of the mA and/or kV 

according to patient size, and/or use of iterative reconstruction technique.





FINDINGS:





HEMORRHAGE:


No intracranial hemorrhage. 





BRAIN:


No mass effect or edema.  Scattered focal lucencies in the subcortical and 

periventricular white matter suggestive for chronic microvascular ischemic 

change. Small right basal ganglia lacunar infarct.  Bilateral basal ganglia 

calcifications. 





VENTRICLES:


Unremarkable. No hydrocephalus. 





CALVARIUM:


Unremarkable.





PARANASAL SINUSES:


Unremarkable as visualized. No significant inflammatory changes.





MASTOID AIR CELLS:


Unremarkable as visualized. No inflammatory changes.





OTHER FINDINGS:


None.





IMPRESSION:


No acute intracranial abnormality. 





Chronic microvascular ischemic change.





Small right basal ganglia lacunar infarct.





If symptoms persists, consider correlation with MRI.





18 14:11


Name: CHANCE CAAL Age: 53Years F Date: 2018


Requesting Physician: BryanAlondra MRN: [id number] : 1965


[facility] Procedure Ordered As Accession Number of


Images


CT ABDOMEN/PELVIS


W


CT ABD PELVIS IV CONTRAST


ONLY


[id number]


J


577


Provided Clinical History: RUQ PAIN HO ASCENDING CHOLANGITIS, SARCOID


EXAM:


CT Abdomen and Pelvis With Intravenous Contrast


EXAM DATE/TIME:


2018 11:20 AM


CLINICAL HISTORY:


53 years old, female; Signs and symptoms; Abdominal tenderness; Additional info

: Ruq pain ho


ascending cholangitis, sarcoid


TECHNIQUE:


Axial computed tomography images of the abdomen and pelvis with intravenous 

contrast.


All CT scans at this facility use at least one of these dose optimization 

techniques: automated


exposure control; mA and/or kV adjustment per patient size (includes targeted 

exams where dose is


matched to clinical indication); or iterative reconstruction.


Coronal and sagittal reformatted images were created and reviewed.


CONTRAST:


100 ml of VISIPAQUE administered intravenously.


COMPARISON:


No relevant prior studies available.


FINDINGS:


Lower thorax: There is minimal bibasilar atelectasis.


ABDOMEN:


Liver: No mass.


Gallbladder and bile ducts: The patient is status post cholecystectomy. Biliary 

stent appreciated.


Mild dilatation of the intrahepatic and extrahepatic bile ducts likely due to 

distended biliary reservoir


post cholecystectomy. Pneumobilia.


Pancreas: Mild dilatation of the pancreatic duct.


Spleen: Splenomegaly measuring 15 cm in craniocaudad dimension.


Adrenals: Normal. No mass.


CHANCE CAAL Accession: [id number] MRN: [id number] | Preliminary Radiology 

Report


CONFIDENTIALITY STATEMENT


This report is intended only for the use of the referring physician, and only 

in accordance with law, If you received this in error, call [phone]


Page 2 of 2


Kidneys and ureters: Extensive renal vascular calcification bilaterally. 

Hypodense lesions of the


renal parenchyma, some consistent with cysts, others too small to definitely 

characterize but


statistically likely to be additional cysts. Largest hypoattenuation focus is 

noted within the upper pole


of the right kidney measuring 15 mm in width and 46 Hounsfield units, greater 

than expected for a


simple cyst.


Stomach and bowel: Status post right hemicolectomy. Status post ileocolic 

anastomosis. Moderate


feces is present throughout the colon.


Appendix: No evidence of appendicitis.


PELVIS:


Bladder: The bladder is decompressed around a Gutierrez catheter.


Reproductive: Unremarkable as visualized.


ABDOMEN and PELVIS:


Intraperitoneal space: Normal. No free air. No significant fluid collection.


Bones/joints: Multiple lumbar compression fractures.


Soft tissues: Unremarkable.


Vasculature: The vasculature demonstrates diffuse moderate atherosclerotic 

calcification.


Lymph nodes: Normal. No enlarged lymph nodes.


IMPRESSION:


1. Splenomegaly


2. No evidence for obstruction


3. The vasculature demonstrates diffuse moderate atherosclerotic calcification.


Thank you for allowing us to participate in the care of your patient.


Dictated and Authenticated by: Mati Abreu MD


2018 1:18 PM Eastern Time ([place] & [place])








18 14:15


Discussed patient with PMD Dr. Dunbar, aware of CT finding of lacunar infarct.

  PO ASA given.  Patient's only symptoms is headache, she denies visual changes

, facial droop, slurred speech, extremity weakness, visual changes, etc. (Magalie Ching)





Decision To Admit





<Alondra Adams - Last Filed: 18 13:10>





- Pt Status Changed To:


Hospital Disposition Of: Inpatient





- Admit Certification


Admit to Inpatient:: After my assessment, the patient will require 

hospitalization for at least two midnights.  This is because of the severity of 

symptoms shown, intensity of services needed, and/or the medical risk in this 

patient being treated as an outpatient.





- InPatient:


Physician Admission Certification: I certify that this patient requires 2 or 

more midnights of care for the following reason:: SEE NOTES





- .


Bed Request Type: Telemetry


Admitting Physician: Nasir Dunbar





<Magalie Ching - Last Filed: 18 12:13>





- .


Patient Diagnosis: 


 Ascending cholangitis, Infarction of right basal ganglia

## 2018-09-08 NOTE — CT
Date of service: 



09/08/2018



PROCEDURE:  CT HEAD WITHOUT CONTRAST.



HISTORY:

Headaches. History of sarcoidosis. 



COMPARISON:

None available.



TECHNIQUE:

Axial computed tomography images were obtained through the head/brain 

without intravenous contrast.  



Radiation dose:



Total exam DLP = 933 mGy-cm.



This CT exam was performed using one or more of the following dose 

reduction techniques: Automated exposure control, adjustment of the 

mA and/or kV according to patient size, and/or use of iterative 

reconstruction technique.



FINDINGS:



HEMORRHAGE:

No intracranial hemorrhage. 



BRAIN:

No mass effect or edema.  Scattered focal lucencies in the 

subcortical and periventricular white matter suggestive for chronic 

microvascular ischemic change. Small right basal ganglia lacunar 

infarct.  Bilateral basal ganglia calcifications. 



VENTRICLES:

Unremarkable. No hydrocephalus. 



CALVARIUM:

Unremarkable.



PARANASAL SINUSES:

Unremarkable as visualized. No significant inflammatory changes.



MASTOID AIR CELLS:

Unremarkable as visualized. No inflammatory changes.



OTHER FINDINGS:

None.



IMPRESSION:

No acute intracranial abnormality. 



Chronic microvascular ischemic change.



Small right basal ganglia lacunar infarct.



If symptoms persists, consider correlation with MRI.

## 2018-09-08 NOTE — RAD
Chest x-ray single frontal view 



History: Abdominal pain. 



Comparison: 02/22/2018 



Findings: 



Biapical pleural thickening with upper lobe granulomatous changes. 



Mild venous congestion. 



Right hilar prominence. 



Left-sided pacemaker. 



Right chest wall port with tip extending to the cavoatrial junction. 



Biliary stent in place. 



Surgical clips in the right upper abdomen. 



Diffuse increased interstitial lung markings. 



Tortuous ectatic aorta with calcification at the aortic knob. 



Mild cardiomegaly. 



Impression: 



Biapical pleural thickening with upper lobe granulomatous changes. 



Mild venous congestion. 



Right hilar prominence. 



Left-sided pacemaker. 



Right chest wall port with tip extending to the cavoatrial junction. 



Biliary stent in place. 



Surgical clips in the right upper abdomen. 



Diffuse increased interstitial lung markings. 



Tortuous ectatic aorta with calcification at the aortic knob. 



Mild cardiomegaly.

## 2018-09-08 NOTE — CT
Date of service: 



09/08/2018



PROCEDURE:  CT Abdomen and Pelvis with intravenous contrast



HISTORY:

Right upper quadrant abdominal pain. 



COMPARISON:

CT dated 02/05/2018



TECHNIQUE:

Multiple contiguous axial images were performed through the abdomen 

and pelvis with the use of intravenous contrast.  Subsequently, 

sagittal and coronal reformatted images were obtained. 



Radiation dose:



Total exam DLP = 400 mGy-cm.



This CT exam was performed using one or more of the following dose 

reduction techniques: Automated exposure control, adjustment of the 

mA and/or kV according to patient size, and/or use of iterative 

reconstruction technique.



FINDINGS:



LOWER THORAX:

Mild bibasilar atelectasis. 1 millimeter pulmonary nodule within the 

right middle lobe on series 3, image 7. 



LIVER:

Unremarkable. No gross lesion or ductal dilatation.  



GALLBLADDER AND BILE DUCTS:

Patient is status postcholecystectomy.  Pneumobilia.  Biliary stent 

appreciated. Mild to moderate dilatation of the intrahepatic and 

extrahepatic bile ducts likely due to distended biliary reservoir 

postcholecystectomy. 



PANCREAS:

Mild dilatation of the pancreatic duct.



SPLEEN:

Splenomegaly measuring 15 centimeters in craniocaudad dimension. 



ADRENALS:

Unremarkable. No mass. 



KIDNEYS AND URETERS:

Extensive renal vascular calcifications bilaterally.  Hypodense 

lesions of the renal parenchymal, some consistent with cysts, others 

too small to definitely characterize. An additional hypoattenuated 

lesion is seen  within the upper pole of the right kidney measuring 

15 millimeters with a Hounsfield unit attenuation of 46, 

indeterminate.  This may be better evaluated with multiphasic CT or 

MR if clinically indicated. 



VASCULATURE:

Moderate atherosclerotic calcification. 



BOWEL:

Status post right hemicolectomy.  Status post ileocolic anastomosis.  

Moderate feces present throughout the colon.



APPENDIX:

No findings to suggest acute appendicitis. 



PERITONEUM:

Unremarkable. No free fluid. No free air. 



LYMPH NODES:

Unremarkable. No enlarged lymph nodes. 



BLADDER:

Decompressed urinary bladder around a Charles catheter. 



REPRODUCTIVE:

Unremarkable. 



BONES:

Multiple lumbar compression fractures. Suggestion of angulation at 

the medial aspect of the right femoral head neck junction, 

nonspecific.  Clinical correlation. 



OTHER FINDINGS:

Jump 



None.



IMPRESSION:

The patient is status postcholecystectomy.  Pneumobilia. Biliary 

stent appreciated. Mild to moderate dilatation of the intrahepatic 

and extrahepatic bile ducts likely due to distended biliary reservoir 

postcholecystectomy. Underlying acute infectious and or inflammatory 

changes of the biliary system cannot entirely be excluded on basis of 

these images. Clinical correlation.



Splenomegaly.



Extensive renal vascular calcifications bilaterally.  Hypodense 

lesions of the renal parenchymal, some consistent with cysts, others 

too small to definitely characterize. An additional hypoattenuated 

lesion is seen  within the upper pole of the right kidney measuring 

15 millimeters with a Hounsfield unit attenuation of 46, 

indeterminate. This may be better evaluated with multiphasic CT or MR 

if clinically indicated. 



Multiple lumbar compression fractures. Suggestion of angulation at 

the medial aspect of the right femoral head neck junction, 

nonspecific.  Clinical correlation. 



Additional findings as above.



These findings were preliminarily reported at 1:18 p.m. on 09/08/2018 

by Dr. Mati Abreu from [facility].

## 2018-09-09 RX ADMIN — Medication SCH CAP: at 18:58

## 2018-09-09 RX ADMIN — DIPHENHYDRAMINE HYDROCHLORIDE PRN MG: 50 INJECTION INTRAMUSCULAR; INTRAVENOUS at 20:50

## 2018-09-09 RX ADMIN — WATER SCH MLS/HR: 1 INJECTION INTRAMUSCULAR; INTRAVENOUS; SUBCUTANEOUS at 03:31

## 2018-09-09 RX ADMIN — HUMAN INSULIN SCH U: 100 INJECTION, SOLUTION SUBCUTANEOUS at 18:59

## 2018-09-09 RX ADMIN — Medication SCH CAP: at 10:31

## 2018-09-09 RX ADMIN — DIPHENHYDRAMINE HYDROCHLORIDE PRN MG: 50 INJECTION INTRAMUSCULAR; INTRAVENOUS at 06:57

## 2018-09-09 RX ADMIN — OXYBUTYNIN CHLORIDE SCH MG: 10 TABLET, EXTENDED RELEASE ORAL at 10:31

## 2018-09-09 RX ADMIN — HUMAN INSULIN SCH: 100 INJECTION, SOLUTION SUBCUTANEOUS at 22:29

## 2018-09-09 RX ADMIN — DIPHENHYDRAMINE HYDROCHLORIDE PRN MG: 50 INJECTION INTRAMUSCULAR; INTRAVENOUS at 16:59

## 2018-09-09 RX ADMIN — DIPHENHYDRAMINE HYDROCHLORIDE PRN MG: 50 INJECTION INTRAMUSCULAR; INTRAVENOUS at 00:56

## 2018-09-09 RX ADMIN — METOPROLOL SUCCINATE SCH MG: 50 TABLET, EXTENDED RELEASE ORAL at 10:31

## 2018-09-09 RX ADMIN — WATER SCH MLS/HR: 1 INJECTION INTRAMUSCULAR; INTRAVENOUS; SUBCUTANEOUS at 13:33

## 2018-09-09 RX ADMIN — HYDROCORTISONE SCH: 10 CREAM TOPICAL at 18:58

## 2018-09-09 RX ADMIN — WATER SCH MLS/HR: 1 INJECTION INTRAMUSCULAR; INTRAVENOUS; SUBCUTANEOUS at 20:51

## 2018-09-09 RX ADMIN — HUMAN INSULIN SCH: 100 INJECTION, SOLUTION SUBCUTANEOUS at 11:51

## 2018-09-09 RX ADMIN — HUMAN INSULIN SCH: 100 INJECTION, SOLUTION SUBCUTANEOUS at 07:30

## 2018-09-09 RX ADMIN — HYDROCORTISONE SCH: 10 CREAM TOPICAL at 10:35

## 2018-09-09 RX ADMIN — DIPHENHYDRAMINE HYDROCHLORIDE PRN MG: 50 INJECTION INTRAMUSCULAR; INTRAVENOUS at 11:35

## 2018-09-09 NOTE — CP.PCM.PN
Subjective





- Subjective


Subjective: 





feels better, no chest pain, no cough, no fever, on antibiotics, no fever





Objective





- Vital Signs/Intake and Output


Vital Signs (last 24 hours): 


 











Temp Pulse Resp BP Pulse Ox


 


 98.5 F   60   20   116/74   100 


 


 09/09/18 15:10  09/09/18 15:10  09/09/18 15:10  09/09/18 15:10  09/09/18 15:10








Intake and Output: 


 











 09/09/18 09/10/18





 18:59 06:59


 


Intake Total 400 


 


Output Total 500 


 


Balance -100 














- Medications


Medications: 


 Current Medications





Apixaban (Eliquis)  5 mg PO BID [facility]


   Last Admin: 09/09/18 18:59 Dose:  5 mg


Digoxin (Lanoxin)  0.25 mg PO 0630 [facility]


Diphenhydramine HCl (Benadryl)  25 mg IVP Q4 PRN


   PRN Reason: Itching / Pruritus


   Last Admin: 09/09/18 16:59 Dose:  25 mg


Diphenoxylate HCl/Atropine (Lomotil 0.025-2.5 Mg Tablet)  1 tab PO Q8 PRN


   PRN Reason: Diarrhea


Gabapentin (Neurontin)  100 mg PO BID [facility]


   Last Admin: 09/09/18 18:59 Dose:  100 mg


Hydrocortisone (Cortizone 1% Cream)  0 gm TOP BID [facility]


   Last Admin: 09/09/18 18:58 Dose:  Not Given


Metronidazole 250 mg/ (Miscellaneous)  50 mls @ 100 mls/hr IVPB Q8H [facility]


   PRN Reason: Protocol


   Last Admin: 09/09/18 13:33 Dose:  100 mls/hr


Ceftriaxone Sodium 1 gm/ (Sodium Chloride)  100 mls @ 100 mls/hr IVPB Q24H [facility]


   PRN Reason: Protocol


   Last Admin: 09/09/18 17:03 Dose:  100 mls/hr


Insulin Human Regular (Novolin R)  0 unit SC ACHS [facility]


   PRN Reason: Protocol


   Last Admin: 09/09/18 18:59 Dose:  4 u


Lactobacillus Acidophilus (Bacid Acidophilus)  1 cap PO BID [facility]


   Last Admin: 09/09/18 18:58 Dose:  1 cap


Metoprolol Succinate (Toprol Xl)  50 mg PO DAILY [facility]


   Last Admin: 09/09/18 10:31 Dose:  50 mg


Morphine Sulfate (Morphine)  4 mg IVP Q4 PRN


   PRN Reason: Pain, severe (8-10)


   Last Admin: 09/09/18 17:00 Dose:  4 mg


Ondansetron HCl (Zofran Odt)  4 mg PO Q6H PRN


   PRN Reason: Nausea/Vomiting


Oxybutynin Chloride (Ditropan Xl)  10 mg PO DAILY [facility]


   Last Admin: 09/09/18 10:31 Dose:  10 mg


Prednisone (Prednisone Tab)  20 mg PO DAILY [facility]


   Last Admin: 09/09/18 10:31 Dose:  20 mg


Zolpidem Tartrate (Ambien)  5 mg PO 2200 PRN


   PRN Reason: Insomnia


   Last Admin: 09/09/18 00:55 Dose:  5 mg











- Labs


Labs: 


 





 09/08/18 11:40 





 09/08/18 11:29 





 











PT  12.7 SECONDS (9.7-12.2)  H  09/08/18  11:40    


 


INR  1.2   09/08/18  11:40    


 


APTT  25 SECONDS (21-34)   09/08/18  11:40    














Assessment and Plan


(1) Ascending cholangitis


Status: Acute   





(2) Hypertension


Status: Acute   





(3) Sarcoidosis


Status: Chronic   





(4) UTI (urinary tract infection) due to urinary indwelling Charles catheter


Status: Acute

## 2018-09-09 NOTE — PN
DATE:  2018



LOCATION:  [address], bed B.



SUBJECTIVE:  This is a 53-year-old female seen and examined in rounds

initially for GI consultation on 2018 as requested by the admitting

ER MD, reexamined again early today with intermittent severe abdominal pain

mainly in the right upper quadrant area with an episode of hypoglycemia.



The patient denied any actual chest pain, palpitation, but more jaundice,

with mild episodes of shortness of breath and fatigue.



The entire chart is reviewed including but not limited to the most recent

lab and radiology study results, current and the previous medication list,

current and the previous medical events, and the patient reported to have

low hemoglobin and hematocrit, but normal white blood cells with total

bilirubin of 10.2, AST 89, ALT 45, alkaline phosphatase 535, with normal

ammonia level, and albumin 3.2, with normal lipase level.



Abdominal and pelvic CAT scan was done at the time of the admission,

official report is seen.



PHYSICAL EXAMINATION:

GENERAL:  A 53-year-old female, awake, alert, and oriented with generalized

jaundice.

VITAL SIGNS:  Temperature of 99.7, pulse of 58, respiratory rate 20-22,

blood pressure of 124/64.

HEENT:  Showed pale dry oral mucous membrane.  Nonicteric sclerae.

LUNGS:  Few scattered crepitation.  Decreased air entry at bases.

HEART:  Positive S1 and S2.

ABDOMEN:  Soft, with mild generalized tenderness, especially in the

suprapubic area, right upper quadrant area.  No mass or organomegaly.  No

rebound tenderness or guarding, but spleen is palpable.

EXTREMITIES:  With evidence of bilateral above-knee amputation.

NEUROLOGIC:  No reported new neurological deficits, sensory or motor.



IMPRESSION:

1.  Reexacerbation of hepatic sarcoidosis with abnormal liver function

test.

2.  Jaundice secondary to above.

3.  Status post biliary stent insertion, rule out possible ascending

cholangitis with malfunction of the stent, the patient will need biliary

stent change.

4.  Abnormal CAT scan of the abdomen and the pelvis.

5.  Multiple past medical history including but not limited to bronchial

asthma, coronary artery disease, with status post cardiac stent insertion,

hypertension, congestive heart failure, cardiac arrhythmia, hyperlipidemia,

status post cholecystectomy, status post partial colon resection before due

to intra-abdominal and intrapelvic abscess formation, and status post

appendectomy.

6.  Known history of status post  section.

7.  Peptic ulcer disease by history.

8.  Anemia, most likely secondary to above.

9.  Electrolyte imbalance.



SUGGESTIONS:

1.  Agree with your plan.

2.  Start the patient on low dose of steroids due to her hepatic

sarcoidosis.

3.  Scheduled for ERCP with biliary stent change when she is more stable

clinically.

4.  ID consultation.

5.  Further recommendations to follow and the patient had abnormal CAT scan

of the head indicative of a small right basal ganglia lacunar infarction

for which Neurology consultation should be considered.

6.  We will follow up closely with you.





__________________________________________

Jeffrey Albert MD





DD:  2018 8:03:24

DT:  2018 8:06:44

Job # [id number]

## 2018-09-09 NOTE — CP.PCM.HP
History of Present Illness





- History of Present Illness


History of Present Illness: 





52 y/o with hepatic sarcoidosis with jaundice, high lft's with abdominal pain, 

fever, chills rigors weakness and fatigue, dyspnea at times, headache back of 

the ehad, no chest pain,





Present on Admission





- Present on Admission


History of DVT/PE: No


History of Uncontrolled Diabetes: Yes


Urinary Catheter: Yes


Decubitus Ulcer Present: No





Review of Systems





- Constitutional


Constitutional: Chills, Headache, Malaise, Night Sweats





- EENT


Eyes: Pain


Nose/Mouth/Throat: Dry Mouth, Neck Pain





- Cardiovascular


Cardiovascular: Irregular Heart Rhythm





- Gastrointestinal


Gastrointestinal: Abdominal Pain, Bloating, Constipation, Excessive Flatus, 

Heartburn





- Genitourinary


Genitourinary: Difficulty Urinating, Urinary Frequency, Freq UTI





- Musculoskeletal


Musculoskeletal: Abnormal Gait, Stiffness





- Integumentary


Integumentary: Dry Skin





- Neurological


Neurological: Headaches, Paresthesias, Weakness





- Psychiatric


Psychiatric: Anxiety





- Endocrine


Endocrine: Fatigue, Palpitations





Past Patient History





- Infectious Disease


Hx of Infectious Diseases: None





- Tetanus Immunizations


Tetanus Immunization: Unknown





- Past Medical History & Family History


Past Medical History?: Yes





- Past Social History


Smoking Status: Never Smoked





- CARDIAC


Hx Cardiac Disorders: Yes


Hx Cardia Arrhythmia: Yes


Hx Congestive Heart Failure: Yes


Hx Hypercholesterolemia: Yes


Hx Hypertension: Yes





- PULMONARY


Hx Respiratory Disorders: Yes


Hx Asthma: Yes (NO INHALER-ON PREDNISONE DAILY PO.)





- NEUROLOGICAL


Hx Neurological Disorder: No





- HEENT


Hx HEENT Problems: Yes


Hx Cataracts: Yes (BILAT.)





- RENAL


Hx Chronic Kidney Disease: No





- ENDOCRINE/METABOLIC


Hx Endocrine Disorders: Yes


Hx Diabetes Mellitus Type 1: Yes





- HEMATOLOGICAL/ONCOLOGICAL


Hx Blood Disorders: Yes


Hx Anemia: Yes





- INTEGUMENTARY


Hx Dermatological Problems: No





- MUSCULOSKELETAL/RHEUMATOLOGICAL


Hx Falls: No





- GASTROINTESTINAL


Hx Gastrointestinal Disorders: Yes


Hx Gall Bladder Disease: Yes





- GENITOURINARY/GYNECOLOGICAL


Hx Genitourinary Disorders: Yes


Hx Urinary Tract Infection: Yes


Other/Comment: PT HAS LONG TERM GUTIERREZ





- PSYCHIATRIC


Hx Psychophysiologic Disorder: No


Hx Substance Use: No





- SURGICAL HISTORY


Hx Surgeries: Yes


Hx Amputation: Yes (bilateral)


Hx Appendectomy: Yes


Hx Cholecystectomy: Yes


Hx Coronary Stent: Yes





- ANESTHESIA


Hx Anesthesia: Yes


Hx Anesthesia Reactions: No


Hx Malignant Hyperthermia: No


Has any member of the family had a problem w/ anesthesia?: No





Meds


Allergies/Adverse Reactions: 


 Allergies











Allergy/AdvReac Type Severity Reaction Status Date / Time


 


avocado Allergy Severe ANAPHYLAXIS Verified 10/04/17 17:28


 


banana Allergy Severe ANAPHYLAXIS Verified 10/04/17 17:28


 


cherry Allergy Severe ANAPHYLAXIS Verified 10/04/17 17:28


 


ketorolac [From Toradol] Allergy   Verified 10/04/17 17:28














Physical Exam





- Constitutional


Appears: Non-toxic, No Acute Distress, Chronically Ill





- Head Exam


Head Exam: ATRAUMATIC, NORMAL INSPECTION, NORMOCEPHALIC





- Expanded Head Exam


  ** Expanded


Head Exam: Hematoma





- ENT Exam


ENT Exam: Mucous Membranes Moist, Normal Exam





- Neck Exam


Neck exam: Positive for: Normal Inspection





- Respiratory Exam


Respiratory Exam: Clear to Auscultation Bilateral, NORMAL BREATHING PATTERN





- Cardiovascular Exam


Cardiovascular Exam: REGULAR RHYTHM, +S1, +S2, Systolic Murmur





- GI/Abdominal Exam


GI & Abdominal Exam: Distended, Normal Bowel Sounds, Organomegaly





- Rectal Exam


Rectal Exam: NORMAL INSPECTION





- Back Exam


Back exam: NORMAL INSPECTION





- Neurological Exam


Neurological exam: Abnormal Gait, Alert, CN II-XII Intact, Reflexes Normal





- Psychiatric Exam


Psychiatric exam: Flat Affect





- Skin


Skin Exam: Intact





Results





- Vital Signs


Recent Vital Signs: 





 Last Vital Signs











Temp  98.3 F   09/08/18 18:38


 


Pulse  61   09/08/18 18:38


 


Resp  20   09/08/18 18:38


 


BP  162/82 H  09/08/18 18:38


 


Pulse Ox  100   09/08/18 18:38














- Labs


Result Diagrams: 


 09/08/18 11:40





 09/08/18 11:29


Labs: 





 Laboratory Results - last 24 hr











  09/08/18 09/08/18 09/08/18





  10:38 11:29 11:29


 


WBC   


 


RBC   


 


Hgb   


 


Hct   


 


MCV   


 


MCH   


 


MCHC   


 


RDW   


 


Plt Count   


 


MPV   


 


Neut % (Auto)   


 


Lymph % (Auto)   


 


Mono % (Auto)   


 


Eos % (Auto)   


 


Baso % (Auto)   


 


Neut # (Auto)   


 


Lymph # (Auto)   


 


Mono # (Auto)   


 


Eos # (Auto)   


 


Baso # (Auto)   


 


PT   


 


INR   


 


APTT   


 


pO2   


 


VBG pH   


 


VBG pCO2   


 


VBG HCO3   


 


VBG Total CO2   


 


VBG O2 Sat (Calc)   


 


VBG Base Excess   


 


VBG Potassium   


 


Glucose   


 


Lactate   


 


Crit Value Called To   


 


Crit Value Called By   


 


Crit Value Read Back   


 


Blood Gas Notified Time   


 


Sodium   141 


 


Potassium   3.2 L 


 


Chloride   113 H 


 


Carbon Dioxide   14 L 


 


Anion Gap   18 


 


BUN   15 


 


Creatinine   0.4 L 


 


Est GFR ( Amer)   > 60 


 


Est GFR (Non-Af Amer)   > 60 


 


POC Glucose (mg/dL)  94  


 


Random Glucose   85 


 


Calcium   8.5 L 


 


Phosphorus   2.5 


 


Magnesium   1.8 


 


Total Bilirubin   10.2 H 


 


AST   89 H D 


 


ALT   45 


 


Alkaline Phosphatase   535 H D 


 


Ammonia    22  D


 


Total Protein   7.1 


 


Albumin   3.2 L 


 


Globulin   3.8 


 


Albumin/Globulin Ratio   0.8 L 


 


Lipase   151 


 


Venous Blood Potassium   


 


Urine Color   


 


Urine Clarity   


 


Urine pH   


 


Ur Specific Gravity   


 


Urine Protein   


 


Urine Glucose (UA)   


 


Urine Ketones   


 


Urine Blood   


 


Urine Nitrate   


 


Urine Bilirubin   


 


Urine Urobilinogen   


 


Ur Leukocyte Esterase   


 


Urine WBC (Auto)   


 


Urine RBC (Auto)   


 


Ur Squamous Epith Cells   


 


Urine Bacteria   














  09/08/18 09/08/18 09/08/18





  11:40 11:40 11:40


 


WBC  5.9  


 


RBC  3.27 L  


 


Hgb  10.4 L  


 


Hct  30.8 L  


 


MCV  94.0  D  


 


MCH  31.8 H  


 


MCHC  33.8  


 


RDW  18.5 H  


 


Plt Count  162  


 


MPV  8.7  


 


Neut % (Auto)  71.3  


 


Lymph % (Auto)  15.4 L  


 


Mono % (Auto)  10.4 H  


 


Eos % (Auto)  2.1  


 


Baso % (Auto)  0.8  


 


Neut # (Auto)  4.2  


 


Lymph # (Auto)  0.9 L  


 


Mono # (Auto)  0.6  


 


Eos # (Auto)  0.1  


 


Baso # (Auto)  0.0  


 


PT   12.7 H 


 


INR   1.2 


 


APTT   25 


 


pO2    41


 


VBG pH    7.29 L


 


VBG pCO2    24 L


 


VBG HCO3    13.9


 


VBG Total CO2    12.2 L


 


VBG O2 Sat (Calc)    78.1 H


 


VBG Base Excess    -13.2 L


 


VBG Potassium    2.2 L*


 


Glucose    54 L


 


Lactate    0.7


 


Crit Value Called To    [facility] nurse


 


Crit Value Called By    [facility]


 


Crit Value Read Back    Y


 


Blood Gas Notified Time    1250


 


Sodium    143.0


 


Potassium   


 


Chloride    119.0 H


 


Carbon Dioxide   


 


Anion Gap   


 


BUN   


 


Creatinine   


 


Est GFR ( Amer)   


 


Est GFR (Non-Af Amer)   


 


POC Glucose (mg/dL)   


 


Random Glucose   


 


Calcium   


 


Phosphorus   


 


Magnesium   


 


Total Bilirubin   


 


AST   


 


ALT   


 


Alkaline Phosphatase   


 


Ammonia   


 


Total Protein   


 


Albumin   


 


Globulin   


 


Albumin/Globulin Ratio   


 


Lipase   


 


Venous Blood Potassium    2.2 L*


 


Urine Color   


 


Urine Clarity   


 


Urine pH   


 


Ur Specific Gravity   


 


Urine Protein   


 


Urine Glucose (UA)   


 


Urine Ketones   


 


Urine Blood   


 


Urine Nitrate   


 


Urine Bilirubin   


 


Urine Urobilinogen   


 


Ur Leukocyte Esterase   


 


Urine WBC (Auto)   


 


Urine RBC (Auto)   


 


Ur Squamous Epith Cells   


 


Urine Bacteria   














  09/08/18 09/08/18 09/08/18





  12:48 17:30 21:48


 


WBC   


 


RBC   


 


Hgb   


 


Hct   


 


MCV   


 


MCH   


 


MCHC   


 


RDW   


 


Plt Count   


 


MPV   


 


Neut % (Auto)   


 


Lymph % (Auto)   


 


Mono % (Auto)   


 


Eos % (Auto)   


 


Baso % (Auto)   


 


Neut # (Auto)   


 


Lymph # (Auto)   


 


Mono # (Auto)   


 


Eos # (Auto)   


 


Baso # (Auto)   


 


PT   


 


INR   


 


APTT   


 


pO2   


 


VBG pH   


 


VBG pCO2   


 


VBG HCO3   


 


VBG Total CO2   


 


VBG O2 Sat (Calc)   


 


VBG Base Excess   


 


VBG Potassium   


 


Glucose   


 


Lactate   


 


Crit Value Called To   


 


Crit Value Called By   


 


Crit Value Read Back   


 


Blood Gas Notified Time   


 


Sodium   


 


Potassium   


 


Chloride   


 


Carbon Dioxide   


 


Anion Gap   


 


BUN   


 


Creatinine   


 


Est GFR ( Amer)   


 


Est GFR (Non-Af Amer)   


 


POC Glucose (mg/dL)   89  92


 


Random Glucose   


 


Calcium   


 


Phosphorus   


 


Magnesium   


 


Total Bilirubin   


 


AST   


 


ALT   


 


Alkaline Phosphatase   


 


Ammonia   


 


Total Protein   


 


Albumin   


 


Globulin   


 


Albumin/Globulin Ratio   


 


Lipase   


 


Venous Blood Potassium   


 


Urine Color  Yellow  


 


Urine Clarity  Clear  


 


Urine pH  6.0  


 


Ur Specific Gravity  1.031 H  


 


Urine Protein  Negative  


 


Urine Glucose (UA)  Normal  


 


Urine Ketones  Negative  


 


Urine Blood  Negative  


 


Urine Nitrate  Negative  


 


Urine Bilirubin  Negative  


 


Urine Urobilinogen  Normal  


 


Ur Leukocyte Esterase  3+ H  


 


Urine WBC (Auto)  68 H  


 


Urine RBC (Auto)  6 H  


 


Ur Squamous Epith Cells  < 1  


 


Urine Bacteria  Rare  














Assessment & Plan


(1) Ascending cholangitis


Status: Acute   Priority: High   





(2) Hypertension


Status: Acute   





(3) Sarcoidosis


Status: Chronic   Priority: Medium   





(4) UTI (urinary tract infection) due to urinary indwelling Gutierrez catheter


Status: Acute   Priority: High

## 2018-09-10 LAB
ALBUMIN SERPL-MCNC: 3 G/DL (ref 3.5–5)
ALBUMIN/GLOB SERPL: 0.8 {RATIO} (ref 1–2.1)
ALT SERPL-CCNC: 51 U/L (ref 9–52)
AST SERPL-CCNC: 124 U/L (ref 14–36)
BUN SERPL-MCNC: 18 MG/DL (ref 7–17)
CALCIUM SERPL-MCNC: 8.5 MG/DL (ref 8.6–10.4)
ERYTHROCYTE [DISTWIDTH] IN BLOOD BY AUTOMATED COUNT: 18.2 % (ref 11.5–14.5)
ERYTHROCYTE [SEDIMENTATION RATE] IN BLOOD: 65 MM/HR (ref 0–20)
FOLATE SERPL-MCNC: 17.3 NG/ML
GFR NON-AFRICAN AMERICAN: > 60
HGB BLD-MCNC: 9.8 G/DL (ref 11–16)
MCH RBC QN AUTO: 31.9 PG (ref 27–31)
MCHC RBC AUTO-ENTMCNC: 33.8 G/DL (ref 33–37)
MCV RBC AUTO: 94.3 FL (ref 81–99)
PLATELET # BLD: 152 K/UL (ref 130–400)
PMV BLD AUTO: 8.8 FL (ref 7.2–11.7)
RBC # BLD AUTO: 3.09 MIL/UL (ref 3.8–5.2)
T4 FREE SERPL-MCNC: 1.2 NG/DL (ref 0.78–2.19)
VIT B12 SERPL-MCNC: 901 PG/ML (ref 239–931)
WBC # BLD AUTO: 5.4 K/UL (ref 4.8–10.8)

## 2018-09-10 PROCEDURE — 0F778DZ DILATION OF COMMON HEPATIC DUCT WITH INTRALUMINAL DEVICE, VIA NATURAL OR ARTIFICIAL OPENING ENDOSCOPIC: ICD-10-PCS | Performed by: SPECIALIST

## 2018-09-10 PROCEDURE — 0FPB8DZ REMOVAL OF INTRALUMINAL DEVICE FROM HEPATOBILIARY DUCT, VIA NATURAL OR ARTIFICIAL OPENING ENDOSCOPIC: ICD-10-PCS | Performed by: SPECIALIST

## 2018-09-10 RX ADMIN — WATER SCH MLS/HR: 1 INJECTION INTRAMUSCULAR; INTRAVENOUS; SUBCUTANEOUS at 18:47

## 2018-09-10 RX ADMIN — HUMAN INSULIN SCH: 100 INJECTION, SOLUTION SUBCUTANEOUS at 21:33

## 2018-09-10 RX ADMIN — DIPHENHYDRAMINE HYDROCHLORIDE PRN MG: 50 INJECTION INTRAMUSCULAR; INTRAVENOUS at 19:52

## 2018-09-10 RX ADMIN — HUMAN INSULIN SCH U: 100 INJECTION, SOLUTION SUBCUTANEOUS at 18:54

## 2018-09-10 RX ADMIN — HYDROCORTISONE SCH: 10 CREAM TOPICAL at 18:59

## 2018-09-10 RX ADMIN — DIPHENHYDRAMINE HYDROCHLORIDE PRN MG: 50 INJECTION INTRAMUSCULAR; INTRAVENOUS at 15:17

## 2018-09-10 RX ADMIN — WATER SCH MLS/HR: 1 INJECTION INTRAMUSCULAR; INTRAVENOUS; SUBCUTANEOUS at 03:11

## 2018-09-10 RX ADMIN — HUMAN INSULIN SCH: 100 INJECTION, SOLUTION SUBCUTANEOUS at 15:57

## 2018-09-10 RX ADMIN — METOPROLOL SUCCINATE SCH MG: 50 TABLET, EXTENDED RELEASE ORAL at 15:18

## 2018-09-10 RX ADMIN — WATER SCH MLS/HR: 1 INJECTION INTRAMUSCULAR; INTRAVENOUS; SUBCUTANEOUS at 11:00

## 2018-09-10 RX ADMIN — HUMAN INSULIN SCH: 100 INJECTION, SOLUTION SUBCUTANEOUS at 15:22

## 2018-09-10 RX ADMIN — DIPHENHYDRAMINE HYDROCHLORIDE PRN MG: 50 INJECTION INTRAMUSCULAR; INTRAVENOUS at 23:58

## 2018-09-10 RX ADMIN — Medication SCH CAP: at 18:48

## 2018-09-10 RX ADMIN — DIGOXIN SCH: 0.25 TABLET ORAL at 15:21

## 2018-09-10 RX ADMIN — HYDROCORTISONE SCH: 10 CREAM TOPICAL at 15:21

## 2018-09-10 RX ADMIN — DIPHENHYDRAMINE HYDROCHLORIDE PRN MG: 50 INJECTION INTRAMUSCULAR; INTRAVENOUS at 05:18

## 2018-09-10 RX ADMIN — DIPHENHYDRAMINE HYDROCHLORIDE PRN MG: 50 INJECTION INTRAMUSCULAR; INTRAVENOUS at 00:40

## 2018-09-10 NOTE — CON
DATE:  09/08/2018



LOCATION:  670, bed B.



This is from Dr. Jeffrey Albert to Dr. Nasir Dunbar.



I was called for GI consultation by the admitting medical team as well as

the ER staff.  The patient is seen and fully examined on 09/08/2018 as

requested by the admitting MD as well as the ER staff people.  The entire

chart is reviewed including, but not limited to the most recent lab and

radiology study results, current and previous medication list, current and

previous medical events, allergy to medication list, as well as all the

available current and previous medical records.



HISTORY OF PRESENT ILLNESS:  This is a 53 years old female, very well-known

case for me from previous multiple admissions as well as office visits who

was admitted to the hospital through the emergency room due to severe

recurrent right upper quadrant and midepigastric abdominal pain, more

jaundice, low grade temperature, low oral intake, headache, generalized

weakness and malaise, as well as suprapubic abdominal pain.



No reported active bleeding, chest pain, palpitation or reported

significant shortness of breath.



The patient also had been having severe dyspepsia and nausea without relief

by Zofran.



PAST MEDICAL HISTORY:  Including but not limited to;

1.  Liver sarcoidosis.

2.  Poorly controlled diabetes mellitus, hypertension, coronary artery

disease, peptic ulcer disease.

3.  Peripheral vascular disease with status post bilateral above-knee

amputation.

4.  Status post cholecystectomy, status post partial colon resection due to

intraabdominal and intrapelvic abscess formation.

5.  Biliary three stents insertion and reexchange more than once before due

to sludge formation and evidence of ascending cholangitis at that time.  It

has to be mentioned that the last stent exchange was over two and a half to

three months ago or about.



FAMILY HISTORY:  Unrelated to specific GI disorder.



CURRENT MEDICATIONS:  Post admission medication list was reviewed.



ALLERGIES:  TO MEDICATIONS LIST WAS REVIEWED.



SOCIAL HISTORY:  Denied any recent history of alcohol intake or cigarette

smoking.



LABORATORY DATA:  Initial blood workup at the time of the admission showed

hemoglobin of 10.4, hematocrit 30.8, potassium 3.2, CO2 content of 14

indicative of severe metabolic acidosis with low creatinine 0.4.



Radiology study results at the time of the admission showed evidence of

splenomegaly with diffuse moderate atherosclerotic calcifications and

biliary stent is in place.



The patient had also CAT scan of the head due to the patient's recurrent

complaint of headache at that time, indicative of no acute intracranial

abnormalities but chronic microvascular ischemic change and is more right

basal ganglia lacunar infarct.



PHYSICAL EXAMINATION:

GENERAL:  A 53 years old female, awake, alert with generalized jaundice,

complaining of some chest discomfort at the time of my physical

examination, but severe abdominal pain.

VITAL SIGNS:  The patient was afebrile at the time she was seen by me with

pulse of 62, respiratory rate 20-22, blood pressure 136/62.

HEENT:  Showed pale dry oral mucous membrane, bilateral icteric sclerae.

LUNGS:  Scattered bilateral crepitation with decreased air entry at bases.

LYMPH NODES:  No lymphadenitis or lymphadenopathy.

HEART:  Positive S1 and S2.

ABDOMEN:  Soft with mild-to-moderate distention with midepigastric as well

as midabdominal and right upper quadrant tenderness.  Spleen is palpable. 

No other mass or organomegaly.  No rebound tenderness or guarding.

EXTREMITIES:  With bilateral above-knee amputation.

NEUROLOGIC:  No new reported neurological deficits, sensory or motor.



IMPRESSION:

1.  Liver sarcoidosis with reexacerbation.

2.  Abnormal liver function tests secondary to above.

3.  To rule out an early phase of ascending cholangitis.

4.  To rule out recurrent urinary tract infection.  The patient has

cloudy-colored urine through her Charles catheter with suprapubic pain.

5.  Multiple past medical histories as reported above.

6.  Increased jaundice with more elevated total bilirubin, possibility of

partially dysfunction biliary stent due to a sludge was raised.



SUGGESTIONS:

1.  Agree with your plan.

2.  _____ IV.

3.  H2 blockers IV.

4.  Blood culture x2.  Full urine workup including culture and sensitivity.

5.  Antireflux measure.

6.  ID consultation.

7.  The patient for ERCP with biliary stent change when she is more stable

clinically.  Further recommendation to follow.



Thank you for letting me participate in your patient's case management.







__________________________________________

Jeffrey Albert MD





DD:  09/09/2018 19:38:53

DT:  09/09/2018 19:46:59

Job # [id number]

## 2018-09-10 NOTE — CP.PCM.PN
Objective





- Vital Signs/Intake and Output


Vital Signs (last 24 hours): 


 











Temp Pulse Resp BP Pulse Ox


 


 98.2 F   59 L  20   133/82   100 


 


 09/10/18 15:00  09/10/18 15:45  09/10/18 15:00  09/10/18 15:00  09/10/18 15:00











- Medications


Medications: 


 Current Medications





Apixaban (Eliquis)  5 mg PO BID [facility]


   Last Admin: 09/10/18 18:48 Dose:  5 mg


Digoxin (Lanoxin)  0.25 mg PO 0630 [facility]


   Last Admin: 09/10/18 15:21 Dose:  Not Given


Diphenhydramine HCl (Benadryl)  25 mg IVP Q4 PRN


   PRN Reason: Itching / Pruritus


   Last Admin: 09/10/18 19:52 Dose:  25 mg


Diphenoxylate HCl/Atropine (Lomotil 0.025-2.5 Mg Tablet)  1 tab PO Q8 PRN


   PRN Reason: Diarrhea


Gabapentin (Neurontin)  300 mg PO TID [facility]


   Last Admin: 09/10/18 18:48 Dose:  300 mg


Hydrocortisone (Cortizone 1% Cream)  0 gm TOP BID [facility]


   Last Admin: 09/10/18 18:59 Dose:  Not Given


Metronidazole 250 mg/ (Miscellaneous)  50 mls @ 100 mls/hr IVPB Q8H [facility]


   PRN Reason: Protocol


   Last Admin: 09/10/18 18:47 Dose:  100 mls/hr


Ceftriaxone Sodium 1 gm/ (Sodium Chloride)  100 mls @ 100 mls/hr IVPB Q24H [facility]


   PRN Reason: Protocol


   Last Admin: 09/10/18 17:04 Dose:  100 mls/hr


Insulin Human Regular (Novolin R)  0 unit SC ACHS [facility]


   PRN Reason: Protocol


   Last Admin: 09/10/18 21:33 Dose:  Not Given


Lactobacillus Acidophilus (Bacid Acidophilus)  1 cap PO BID [facility]


   Last Admin: 09/10/18 18:48 Dose:  1 cap


Metoprolol Succinate (Toprol Xl)  50 mg PO DAILY [facility]


   Last Admin: 09/10/18 15:18 Dose:  50 mg


Morphine Sulfate (Morphine)  4 mg IVP Q4 PRN


   PRN Reason: Pain, severe (8-10)


   Last Admin: 09/10/18 19:51 Dose:  4 mg


Ondansetron HCl (Zofran Odt)  4 mg PO Q6H PRN


   PRN Reason: Nausea/Vomiting


Oxybutynin Chloride (Ditropan Xl)  10 mg PO DAILY [facility]


   Last Admin: 09/09/18 10:31 Dose:  10 mg


Prednisone (Prednisone Tab)  60 mg PO DAILY [facility]


   Last Admin: 09/10/18 15:19 Dose:  60 mg


Zolpidem Tartrate (Ambien)  5 mg PO 2200 PRN


   PRN Reason: Insomnia


   Last Admin: 09/10/18 00:39 Dose:  5 mg











- Labs


Labs: 


 





 09/10/18 06:15 





 09/10/18 06:15 





 











PT  12.7 SECONDS (9.7-12.2)  H  09/08/18  11:40    


 


INR  1.2   09/08/18  11:40    


 


APTT  25 SECONDS (21-34)   09/08/18  11:40    














Assessment and Plan


(1) Ascending cholangitis


Status: Acute   





(2) Hypertension


Status: Acute   





(3) Sarcoidosis


Status: Chronic   





(4) UTI (urinary tract infection) due to urinary indwelling Charles catheter


Status: Acute

## 2018-09-10 NOTE — CP.PCM.CON
History of Present Illness





- History of Present Illness


History of Present Illness: 





CONSULT DICTATED 


SARCOIDOSIS  WITH HEADACHE AND V/A DECREASED IN OD


CERVICAL RADIUCULITIS


PHANTOM PAIN SYNDROME 


VACUITIES -> HEADACHE 


PARTIAL COMPLEX SEIZURES 





BLOOD WORK UP 


EEG 


GABAPENTIN 


OPHTHALMOLOGIC CONSULT





Past Patient History





- Infectious Disease


Hx of Infectious Diseases: None





- Tetanus Immunizations


Tetanus Immunization: Unknown





- Past Medical History & Family History


Past Medical History?: Yes





- Past Social History


Smoking Status: Never Smoked





- CARDIAC


Hx Cardiac Disorders: Yes


Hx Cardia Arrhythmia: Yes


Hx Congestive Heart Failure: Yes


Hx Hypercholesterolemia: Yes


Hx Hypertension: Yes





- PULMONARY


Hx Respiratory Disorders: Yes


Hx Asthma: Yes (NO INHALER-ON PREDNISONE DAILY PO.)





- NEUROLOGICAL


Hx Neurological Disorder: No





- HEENT


Hx HEENT Problems: Yes


Hx Cataracts: Yes (BILAT.)





- RENAL


Hx Chronic Kidney Disease: No





- ENDOCRINE/METABOLIC


Hx Endocrine Disorders: Yes


Hx Diabetes Mellitus Type 1: Yes





- HEMATOLOGICAL/ONCOLOGICAL


Hx Blood Disorders: Yes


Hx Anemia: Yes





- INTEGUMENTARY


Hx Dermatological Problems: No





- MUSCULOSKELETAL/RHEUMATOLOGICAL


Hx Falls: No





- GASTROINTESTINAL


Hx Gastrointestinal Disorders: Yes


Hx Gall Bladder Disease: Yes





- GENITOURINARY/GYNECOLOGICAL


Hx Genitourinary Disorders: Yes


Hx Urinary Tract Infection: Yes


Other/Comment: PT HAS LONG TERM GUTIERREZ





- PSYCHIATRIC


Hx Psychophysiologic Disorder: No


Hx Substance Use: No





- SURGICAL HISTORY


Hx Surgeries: Yes


Hx Amputation: Yes (bilateral)


Hx Appendectomy: Yes


Hx Cholecystectomy: Yes


Hx Coronary Stent: Yes





- ANESTHESIA


Hx Anesthesia: Yes


Hx Anesthesia Reactions: No


Hx Malignant Hyperthermia: No


Has any member of the family had a problem w/ anesthesia?: No





Meds


Allergies/Adverse Reactions: 


 Allergies











Allergy/AdvReac Type Severity Reaction Status Date / Time


 


avocado Allergy Severe ANAPHYLAXIS Verified 10/04/17 17:28


 


banana Allergy Severe ANAPHYLAXIS Verified 10/04/17 17:28


 


cherry Allergy Severe ANAPHYLAXIS Verified 10/04/17 17:28


 


ketorolac [From Toradol] Allergy   Verified 10/04/17 17:28














- Medications


Medications: 


 Current Medications





Apixaban (Eliquis)  5 mg PO BID [facility]


   Last Admin: 09/09/18 18:59 Dose:  5 mg


Digoxin (Lanoxin)  0.25 mg PO 0630 [facility]


Diphenhydramine HCl (Benadryl)  25 mg IVP Q4 PRN


   PRN Reason: Itching / Pruritus


   Last Admin: 09/10/18 05:18 Dose:  25 mg


Diphenoxylate HCl/Atropine (Lomotil 0.025-2.5 Mg Tablet)  1 tab PO Q8 PRN


   PRN Reason: Diarrhea


Gabapentin (Neurontin)  100 mg PO BID [facility]


   Last Admin: 09/09/18 18:59 Dose:  100 mg


Hydrocortisone (Cortizone 1% Cream)  0 gm TOP BID [facility]


   Last Admin: 09/09/18 18:58 Dose:  Not Given


Metronidazole 250 mg/ (Miscellaneous)  50 mls @ 100 mls/hr IVPB Q8H [facility]


   PRN Reason: Protocol


   Last Admin: 09/10/18 03:11 Dose:  100 mls/hr


Ceftriaxone Sodium 1 gm/ (Sodium Chloride)  100 mls @ 100 mls/hr IVPB Q24H [facility]


   PRN Reason: Protocol


   Last Admin: 09/09/18 17:03 Dose:  100 mls/hr


Insulin Human Regular (Novolin R)  0 unit SC ACHS [facility]


   PRN Reason: Protocol


   Last Admin: 09/09/18 22:29 Dose:  Not Given


Lactobacillus Acidophilus (Bacid Acidophilus)  1 cap PO BID [facility]


   Last Admin: 09/09/18 18:58 Dose:  1 cap


Metoprolol Succinate (Toprol Xl)  50 mg PO DAILY [facility]


   Last Admin: 09/09/18 10:31 Dose:  50 mg


Morphine Sulfate (Morphine)  4 mg IVP Q4 PRN


   PRN Reason: Pain, severe (8-10)


   Last Admin: 09/10/18 05:18 Dose:  4 mg


Ondansetron HCl (Zofran Odt)  4 mg PO Q6H PRN


   PRN Reason: Nausea/Vomiting


Oxybutynin Chloride (Ditropan Xl)  10 mg PO DAILY [facility]


   Last Admin: 09/09/18 10:31 Dose:  10 mg


Prednisone (Prednisone Tab)  20 mg PO DAILY [facility]


   Last Admin: 09/09/18 10:31 Dose:  20 mg


Zolpidem Tartrate (Ambien)  5 mg PO 2200 PRN


   PRN Reason: Insomnia


   Last Admin: 09/10/18 00:39 Dose:  5 mg











Results





- Vital Signs


Recent Vital Signs: 


 Last Vital Signs











Temp  97.8 F   09/10/18 05:20


 


Pulse  79   09/10/18 05:20


 


Resp  18   09/10/18 05:20


 


BP  134/74   09/10/18 05:20


 


Pulse Ox  100   09/10/18 05:20














- Labs


Result Diagrams: 


 09/10/18 06:15





 09/10/18 06:15


Labs: 


 Laboratory Results - last 24 hr











  09/09/18 09/09/18 09/09/18





  06:35 06:36 06:58


 


WBC   


 


RBC   


 


Hgb   


 


Hct   


 


MCV   


 


MCH   


 


MCHC   


 


RDW   


 


Plt Count   


 


MPV   


 


Sodium   


 


Potassium   


 


Chloride   


 


Carbon Dioxide   


 


Anion Gap   


 


BUN   


 


Creatinine   


 


Est GFR ( Amer)   


 


Est GFR (Non-Af Amer)   


 


POC Glucose (mg/dL)  63 L  62 L  149 H


 


Random Glucose   


 


Calcium   


 


Total Bilirubin   


 


AST   


 


ALT   


 


Alkaline Phosphatase   


 


Total Protein   


 


Albumin   


 


Globulin   


 


Albumin/Globulin Ratio   














  09/09/18 09/09/18 09/09/18





  11:34 17:13 21:52


 


WBC   


 


RBC   


 


Hgb   


 


Hct   


 


MCV   


 


MCH   


 


MCHC   


 


RDW   


 


Plt Count   


 


MPV   


 


Sodium   


 


Potassium   


 


Chloride   


 


Carbon Dioxide   


 


Anion Gap   


 


BUN   


 


Creatinine   


 


Est GFR ( Amer)   


 


Est GFR (Non-Af Amer)   


 


POC Glucose (mg/dL)  94  308 H  211 H


 


Random Glucose   


 


Calcium   


 


Total Bilirubin   


 


AST   


 


ALT   


 


Alkaline Phosphatase   


 


Total Protein   


 


Albumin   


 


Globulin   


 


Albumin/Globulin Ratio   














  09/10/18 09/10/18 09/10/18





  06:15 06:15 06:31


 


WBC  5.4  


 


RBC  3.09 L  


 


Hgb  9.8 L  


 


Hct  29.1 L  


 


MCV  94.3  


 


MCH  31.9 H  


 


MCHC  33.8  


 


RDW  18.2 H  


 


Plt Count  152  


 


MPV  8.8  


 


Sodium   142 


 


Potassium   4.5 


 


Chloride   112 H 


 


Carbon Dioxide   17 L 


 


Anion Gap   17 


 


BUN   18 H 


 


Creatinine   0.7 


 


Est GFR ( Amer)   > 60 


 


Est GFR (Non-Af Amer)   > 60 


 


POC Glucose (mg/dL)    83


 


Random Glucose   100 


 


Calcium   8.5 L 


 


Total Bilirubin   8.8 H 


 


AST   124 H D 


 


ALT   51 


 


Alkaline Phosphatase   435 H 


 


Total Protein   6.9 


 


Albumin   3.0 L 


 


Globulin   3.9 


 


Albumin/Globulin Ratio   0.8 L

## 2018-09-10 NOTE — CON
DATE:  09/10/2018



TIME OF EVALUATION:  07:05 a.m.



ATTENDING PHYSICIAN:  Nasir Dunbar MD



LOCATION:  The patient is in room #670, bed B.



REASON FOR CONSULTATION:  Headache and change in mental status.



CHIEF COMPLAINT:  The patient was admitted with history of right upper

quadrant pain with diarrhea.  The patient was also admitted with fever and

change in mental status.



From neurological point of view, I was called in to evaluate her for

further management.



HISTORY OF PRESENT ILLNESS:  Ms. Laura Duggan is a 53-year-old

right-handed  female presenting with about 4 days' history

of radicular neck pain, shooting to her intrascapular region on her left

side, associating with some visual disturbances on her right eye.  Headache

she claims at 8/10, not associating with nausea or vomiting.  Visual

disturbance is manifesting as decreased vision in the right eye and

occasional double vision.  The patient is also presenting with some

episodic change in mental status with missing conversation and making her

own words, which people were not able to understand and  she has been

doing it without her knowledge and somebody told her that she has been

doing it and then she realized that she made a mistake.  No history of

involuntary movements.  However, involuntary shocking movement in her both

lower extremities, moved by itself, and she was told that she had some leg

syndrome.  At present, her headache is somewhat a little better, but visual

disturbances are still the same.



Significant past medical history including sarcoidosis was diagnosed from

the liver biopsy in 2003.  Following that, she did have conduction deficit

in the heart, had a defibrillator in 2006, then battery was replaced with a

pacemaker 6 months ago.  She had a left leg amputation in 2015, right leg

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

complication from her diabetes.



The patient also told that she did have a stroke from the CAT scan and she

was never aware of that.



SOCIAL HISTORY:  She denies smoking or alcohol use.  She is a mother of

grown 4 children.  No history of miscarriages.



REVIEW OF SYSTEMS:  The 12-point system being reviewed from Neuro, change

in mental status, visual disturbances, and neck pain.



MEDICATIONS:  Zolpidem, Benadryl, Ditropan, Eliquis, Lanoxin, Lomotil,

metronidazole, morphine, Neurontin, insulin, prednisone, Zofran.





PHYSICAL EXAMINATION:

VITAL SIGNS:  Blood pressure 134/74, mean artery pressure of 94,

respiratory rate 16, temperature 97.8, with a pulse rate 79 and regular.

NECK:  Supple.  No carotid bruits.

HEART:  Heart sounds regular.

CHEST:  Fair air entry.

EXTREMITIES:  Above-knee amputation in both lower extremities.  Involuntary

movements noted on the right lower extremity than the left side.

NEUROLOGIC:

Mental Status:  She is awake, alert, and oriented to person, place, and

time.  Her speech is clear.  Naming, repetition, fluency, comprehension all

within normal.  No sign of hallucination.  No sign of suicidal ideation. 

No sign of confabulation.  No sign of depression.

Cranial nerve examination:  Visual field intact.  However, visual acuity is

markedly decreased on the right eye.  Pupils reactive to light. 

Extraocular movements intact.  No primary gaze nystagmus.  No facial

sensory deficit.  Facial asymmetry manifesting as flattening of the left

nasolabial fold.  Hearing is normal.  Tongue is midline.  Good gag.

Motor examination:  Outstretched hand with eyes closed, no drift noted. 

Mild sensory tremor and distal muscle atrophy noted.  No fasciculation at

rest.

Deep tendon reflexes:  In upper extremities are absent.  Lower extremities

are not done due to her above-knee amputation.

Coordination:  Finger-nose-finger test is intact.

Gait:  Deferred at this time.



Blood workup:  WBC 5.4, hemoglobin 9.8, hematocrit 29.1, platelets 152. 

Sodium 142, potassium 4.5, chloride 104, bicarbonate 17, BUN 18, creatinine

0.7, GFR more than 60, calcium 8.5, B12 pending.



CONCLUSION:  Ms. Laura Jaramillo as per neurological examination presenting

with,

1.  New neck pain, radicular nature to her shoulder, consistent with

vasculitis, manifesting with radiculitis from collagen vascular disease

(sarcoidosis).

2.  Visual impairment on the right eye, status post cataract surgery,

possible local eye problem, related to sarcoidosis as well.

3.  Mild facial asymmetry on her left side, consistent with right

subcortical dysfunction, consistent with CAT scan findings.

4.  Episodic change in mental status and involuntary movement of her leg,

consistent with possible partial complex seizures.

5.  Post amputation, phantom pain syndrome with neuropathy.



RECOMMENDATIONS:

1.  Steroids should be increased to 60 mg and sugar should be controlled

with insulin increasing dose because of her visual disturbances which is

new to her and at the same time Ophthalmological consultation is also

recommended.

2.  Electroencephalogram to rule out seizures.

3.  Blood workup as per the order.

5.  Because of her defibrillator and pacemaker, MRI cannot be done. 

However, I will continue to do the neurological workup and treat her

symptomatically at present.  Gabapentin dose can be increased to 300 three

times a day to control her phantom pain syndrome as well as radicular pain.



The patient's condition has been well discussed.  The patient agreed with

my plan of management.  The patient will be followed closely with you.







__________________________________________

Shalom Jon MD



DD:  09/10/2018 7:23:34

DT:  09/10/2018 7:28:27

Job # [id number]



MTDD

## 2018-09-11 LAB
BUN SERPL-MCNC: 20 MG/DL (ref 7–17)
CALCIUM SERPL-MCNC: 8 MG/DL (ref 8.6–10.4)
ERYTHROCYTE [DISTWIDTH] IN BLOOD BY AUTOMATED COUNT: 18.6 % (ref 11.5–14.5)
GFR NON-AFRICAN AMERICAN: > 60
HGB BLD-MCNC: 10.1 G/DL (ref 11–16)
MCH RBC QN AUTO: 32.2 PG (ref 27–31)
MCHC RBC AUTO-ENTMCNC: 33.5 G/DL (ref 33–37)
MCV RBC AUTO: 96.2 FL (ref 81–99)
PLATELET # BLD: 158 K/UL (ref 130–400)
PMV BLD AUTO: 9.2 FL (ref 7.2–11.7)
RBC # BLD AUTO: 3.12 MIL/UL (ref 3.8–5.2)
WBC # BLD AUTO: 3 K/UL (ref 4.8–10.8)

## 2018-09-11 RX ADMIN — Medication SCH CAP: at 18:04

## 2018-09-11 RX ADMIN — HUMAN INSULIN SCH U: 100 INJECTION, SOLUTION SUBCUTANEOUS at 18:05

## 2018-09-11 RX ADMIN — DIGOXIN SCH: 0.25 TABLET ORAL at 06:30

## 2018-09-11 RX ADMIN — WATER SCH MLS/HR: 1 INJECTION INTRAMUSCULAR; INTRAVENOUS; SUBCUTANEOUS at 03:16

## 2018-09-11 RX ADMIN — DIPHENHYDRAMINE HYDROCHLORIDE PRN MG: 50 INJECTION INTRAMUSCULAR; INTRAVENOUS at 11:50

## 2018-09-11 RX ADMIN — HUMAN INSULIN SCH U: 100 INJECTION, SOLUTION SUBCUTANEOUS at 08:28

## 2018-09-11 RX ADMIN — WATER SCH MLS/HR: 1 INJECTION INTRAMUSCULAR; INTRAVENOUS; SUBCUTANEOUS at 20:00

## 2018-09-11 RX ADMIN — WATER SCH MLS/HR: 1 INJECTION INTRAMUSCULAR; INTRAVENOUS; SUBCUTANEOUS at 11:20

## 2018-09-11 RX ADMIN — HYDROCORTISONE SCH: 10 CREAM TOPICAL at 18:04

## 2018-09-11 RX ADMIN — DIGOXIN SCH MG: 0.25 TABLET ORAL at 08:27

## 2018-09-11 RX ADMIN — METOPROLOL SUCCINATE SCH MG: 50 TABLET, EXTENDED RELEASE ORAL at 11:21

## 2018-09-11 RX ADMIN — DIPHENHYDRAMINE HYDROCHLORIDE PRN MG: 50 INJECTION INTRAMUSCULAR; INTRAVENOUS at 05:39

## 2018-09-11 RX ADMIN — HYDROCORTISONE SCH: 10 CREAM TOPICAL at 11:23

## 2018-09-11 RX ADMIN — HUMAN INSULIN SCH: 100 INJECTION, SOLUTION SUBCUTANEOUS at 11:51

## 2018-09-11 RX ADMIN — DIPHENHYDRAMINE HYDROCHLORIDE PRN MG: 50 INJECTION INTRAMUSCULAR; INTRAVENOUS at 20:23

## 2018-09-11 RX ADMIN — HUMAN INSULIN SCH: 100 INJECTION, SOLUTION SUBCUTANEOUS at 22:06

## 2018-09-11 RX ADMIN — DIPHENHYDRAMINE HYDROCHLORIDE PRN MG: 50 INJECTION INTRAMUSCULAR; INTRAVENOUS at 16:06

## 2018-09-11 NOTE — CARD
--------------- APPROVED REPORT --------------





Date of service: 09/10/2018



EXAM: Two-dimensional and M-mode echocardiogram with Doppler and 

color Doppler.



Other Information 

Quality : TDSRhythm : 



INDICATION

Chest Pain 



RISK FACTORS

Hypertension 

Hyperlipidemia

Diabetes



2D DIMENSIONS 

IVSd0.9   (0.7-1.1cm)LVDd3.5   (3.9-5.9cm)

PWd1.0   (0.7-1.1cm)LVDs2.1   (2.5-4.0cm)

FS (%) 41.6   %LVEF (%)73.5   (>50%)



M-Mode DIMENSIONS 

Left Atrium (MM)2.95   (2.5-4.0cm)IVSd1.23   (0.7-1.1cm)

Aortic Root3.05   (2.2-3.7cm)LVDd4.77   (4.0-5.6cm)

Aortic Cusp Exc.2.14   (1.5-2.0cm)PWd1.18   (0.7-1.1cm)

FS (%) 30   %LVDs3.32   (2.0-3.8cm)

LVEF (%)58   (>50%)



Mitral Valve

MV E Kphocyje04.1cm/sE/A ratio0.0



TDI

E/Lateral E'0.0E/Medial E'0.0



 LEFT VENTRICLE 

The left ventricle is normal size.

There is normal left ventricular wall thickness.

Left ventricle systolic function is normal.

The Ejection Fraction is >70%.

There is normal LV segmental wall motion.



The left ventricular diastolic function is normal.

No left ventricle thrombus noted on this study.



 RIGHT VENTRICLE 

The right ventricle is normal size.

There is normal right ventricular wall thickness.

The right ventricular systolic function is normal.



 ATRIA 

The left atrium size is normal.

The right atrium size is normal.

There is a catheter/pacemaker lead seen in the right atrium.

The interatrial septum is intact with no evidence for an atrial 

septal defect.



 AORTIC VALVE 

The aortic valve is normal in structure.

No aortic regurgitation is present.

There is no aortic valvular stenosis. 

There is no aortic valvular vegetation.



 MITRAL VALVE 

The posterior mitral valve leaflet appears thickened, but open well.

There is no evidence of mitral valve prolapse.

There is no mitral valve stenosis.

There is no mitral valve regurgitation noted.



 TRICUSPID VALVE 

The tricuspid valve is normal in structure.

There is no tricuspid valve regurgitation noted.

There is no tricuspid valve prolapse or vegetation.

There is no tricuspid valve stenosis. 



 PULMONIC VALVE 

The pulmonic valve is partially flail.

There is no pulmonic valvular regurgitation. 



 GREAT VESSELS 

The aortic root is normal in size.



 PERICARDIAL EFFUSION 

There is no significant pericardial effusion.



<Conclusion>

Left ventricle systolic function is normal.

The Ejection Fraction is >70%.

No aortic regurgitation is present.

There is no mitral valve regurgitation noted.

There is no tricuspid valve regurgitation noted.

There is no pulmonic valvular regurgitation.

There is a catheter/pacemaker lead seen in the right atrium.

## 2018-09-11 NOTE — PN
DATE:  09/11/2018



TIME OF EVALUATION:  07:05 a.m.



NEUROLOGICAL PROBLEM:  Sarcoidosis complicating with cervical radiculitis,

visual disturbances and possible spinal myoclonus.



PHYSICAL EXAMINATION:

VITAL SIGNS:  Blood pressure 149/77, mean arterial pressure of 101,

respiratory rate 18, pulse rate 56 regular, sinus and temperature 97.5.

HEENT:  Pupils reactive to light.  Extraocular movement normal.  There is

significant tenderness over palpating the left trapezius and Tinel sign

positive over the _____ nerve region.

Rest of the examinations are unchanged.



The patient complaining of severe pain from occipital region on her left

side, radiating to her vertex and left shoulder region.  That is

associating with some myoclonic jerk on her left arm, dropping the objects

from her hand including cell phone.  The symptoms are more intensified to

compare with yesterday.



Her vision on her right side is somewhat improved, more than 50% to compare

with yesterday's vision.



RECOMMENDATIONS:

1.  Continue steroids for now.  Control the sugar with _____ of insulin.

2.  CAT scan of the cervical spine to rule out herniated disk.  Her pain is

so intense, so I would like to put her on Percocet for 8/10 pain scale as a

p.r.n. basis every 6 hours for today.

3.  Recommended electroencephalogram is still pending.



The patient will be followed closely with you.







__________________________________________

Shalom Jon MD



DD:  09/11/2018 7:14:43

DT:  09/11/2018 7:16:36

Job # [id number]

## 2018-09-11 NOTE — VASCLAB
Date of service: 



09/10/2018



PROCEDURE:  



HISTORY:

Carotid stenosis



COMPARISON:

None available. 



TECHNIQUE:

Grayscale and duplex Doppler evaluation of the cervical carotid and 

vertebral arteries were performed. The common carotid, carotid 

bifurcations and cervical Internal Carotid Artery (ICA) and proximal 

External Carotid Artery (ECA) were evaluated.  The vertebral arteries 

were evaluated for gross patency and flow direction. 



Report prepared by  BC Mckeon



FINDINGS:



RIGHT CAROTID ARTERIES:

1. Common Carotid Artery: No significant focal plaque formation of 

the right common carotid artery. Maximum Peak Systolic velocity: 61 

cm/sec: End-diastolic velocity 10 cm/sec.



2. Carotid Bifurcation: Calcific plaque formation. Maximum Peak 

Systolic velocity: 40 cm/sec: End-diastolic velocity 12 cm/sec.  



3. Internal Carotid Artery:    Plaque description: Calcific  



3.1. Proximal Segment: Peak systolic velocity 172 cm/sec: 

End-diastolic velocity  52 cm/sec - % stenosis  60-70%



3.2. Middle Segment:    Peak systolic velocity 152 cm/sec: 

End-diastolic velocity  25  cm/sec - % stenosis 50-60%



3.3. Distal Segment:     Peak systolic velocity 80 cm/sec: 

End-diastolic velocity  11  cm/sec - % stenosis 



4. External Carotid Artery: No significant focal plaque formation. 

Peak systolic velocity 68 cm/sec



5. ICA/CCA Ratio: 3.7



LEFT CAROTID ARTERIES:

1. Common Carotid Artery: No significant focal plaque formation of 

the left common carotid artery. Maximum Peak Systolic velocity: 57 

cm/sec: End-diastolic velocity 12 cm/sec.



2. Carotid Bifurcation: Calcific plaque formation. Maximum Peak 

Systolic velocity: 51 cm/sec: End-diastolic velocity 12 cm/sec.



3. Internal Carotid Artery:      Plaque description: Calcific 



3.1. Proximal Segment: Peak systolic velocity 56 cm/sec: 

End-diastolic velocity 18 cm/sec - % stenosis 0-15%



3.2. Middle Segment:    Peak systolic velocity 82 cm/sec: 

End-diastolic velocity 18 cm/sec - % stenosis 0-15%



3.3. Distal Segment:     Peak systolic velocity 46 cm/sec: 

End-diastolic velocity 17 cm/sec - % stenosis 0-15%



4. External Carotid Artery: No significant focal plaque formation. 

Peak systolic velocity 62 cm/sec



5. ICA/CCA Ratio: 1.9



VERTEBRAL ARTERIES:

1. Right Vertebral Artery: The right vertebral artery flow direction 

is  antegrade.



2. Left Vertebral Artery:   The left vertebral artery flow direction 

is antegrade.



OTHER FINDINGS:

1. Right Brachial Blood pressure: 120/70 mmHg.



2. Left Brachial Blood pressure: 100/70 mmHg.



IMPRESSION:

RIGHT: Increased velocity of 172cm/s at the right proximal internal 

carotid artery, suggesting 60-70% stenosis.



LEFT:  Duplex scan does not suggest hemodynamically significant 

stenosis of the left extracranial carotid arteries.

## 2018-09-11 NOTE — RAD
PROCEDURE:  



HISTORY:

As Above 



COMPARISON:

None



TECHNIQUE:

Total fluoroscopic time utilized during the procedure: 49.2 seconds. 

Total dose 6.49 mGy



FINDINGS:

Submitted images from the current procedure: 5 Please refer to the 

physician's notes performing the procedure. 



IMPRESSION:

Less than 1 hour fluoroscopic time utilized during performance of the 

procedure

## 2018-09-11 NOTE — CP.PCM.PN
Objective





- Vital Signs/Intake and Output


Vital Signs (last 24 hours): 


 











Temp Pulse Resp BP Pulse Ox


 


 97 F L  58 L  20   154/83 H  100 


 


 09/11/18 15:15  09/11/18 16:20  09/11/18 15:15  09/11/18 15:15  09/11/18 15:15








Intake and Output: 


 











 09/11/18 09/12/18





 18:59 06:59


 


Intake Total 290 


 


Output Total  1600


 


Balance 290 -1600














- Medications


Medications: 


 Current Medications





Apixaban (Eliquis)  5 mg PO BID [facility]


   Last Admin: 09/11/18 18:03 Dose:  5 mg


Digoxin (Lanoxin)  0.25 mg PO 0800 [facility]


   Last Admin: 09/11/18 08:27 Dose:  0.25 mg


Diphenhydramine HCl (Benadryl)  25 mg IVP Q4 PRN


   PRN Reason: Itching / Pruritus


   Last Admin: 09/11/18 20:23 Dose:  25 mg


Diphenoxylate HCl/Atropine (Lomotil 0.025-2.5 Mg Tablet)  1 tab PO Q8 PRN


   PRN Reason: Diarrhea


Gabapentin (Neurontin)  300 mg PO TID [facility]


   Last Admin: 09/11/18 18:03 Dose:  300 mg


Hydrocortisone (Cortizone 1% Cream)  0 gm TOP BID [facility]


   Last Admin: 09/11/18 18:04 Dose:  Not Given


Insulin Human Regular (Novolin R)  [facility]


   PRN Reason: Protocol


   Last Admin: 09/11/18 22:06 Dose:  Not Given


Lactobacillus Acidophilus (Bacid Acidophilus)  1 cap PO BID [facility]


   Last Admin: 09/11/18 18:04 Dose:  1 cap


Metoprolol Succinate (Toprol Xl)  50 mg PO DAILY [facility]


   Last Admin: 09/11/18 11:21 Dose:  50 mg


Morphine Sulfate (Morphine)  4 mg IVP Q4 PRN


   PRN Reason: Pain, severe (8-10)


   Last Admin: 09/11/18 20:22 Dose:  4 mg


Ondansetron HCl (Zofran Odt)  4 mg PO Q6H PRN


   PRN Reason: Nausea/Vomiting


Oxybutynin Chloride (Ditropan Xl)  10 mg PO DAILY [facility]


   Last Admin: 09/09/18 10:31 Dose:  10 mg


Oxycodone/Acetaminophen (Percocet 5/325 Mg Tab)  1 tab PO Q6H PRN


   PRN Reason: Pain, severe (8-10)


   Stop: 09/14/18 07:11


Prednisone (Prednisone Tab)  60 mg PO DAILY [facility]


   Last Admin: 09/11/18 11:19 Dose:  60 mg


Zolpidem Tartrate (Ambien)  5 mg PO 2200 PRN


   PRN Reason: Insomnia


   Last Admin: 09/10/18 23:58 Dose:  5 mg











- Labs


Labs: 


 





 09/11/18 07:28 





 09/11/18 07:28 





 











PT  12.7 SECONDS (9.7-12.2)  H  09/08/18  11:40    


 


INR  1.2   09/08/18  11:40    


 


APTT  25 SECONDS (21-34)   09/08/18  11:40    














Assessment and Plan


(1) Ascending cholangitis


Status: Acute   





(2) Hypertension


Status: Acute   





(3) Sarcoidosis


Status: Chronic   





(4) UTI (urinary tract infection) due to urinary indwelling Charles catheter


Status: Acute

## 2018-09-11 NOTE — PN
DATE:  2018



LOCATION:  [address], bed B.



SUBJECTIVE:  This is a 53-year-old female, seen and examined in rounds post

ERCP with biliary stent exchange.



The patient still has intermittent period of abdominal pain with leg pain

and some lump reported in the left side of her neck and left shoulder, seen

by the neurology consultant on the case.



No reported active chest pain, palpitation, but generalized weakness and

malaise.



Cervical spine CAT scan done this morning, report is seen.



Today's lab showed hemoglobin of 10.1, hematocrit 30 with normal platelet

count, low CO2 content 15 indicative of metabolic acidosis, BUN 20, normal

creatinine, blood glucose level 272, and calcium 8.



PHYSICAL EXAMINATION:

GENERAL:  This is a 53-year-old female, appear to be awake, alert, and

oriented.

VITAL SIGNS:  Afebrile with pulse of 64, respiratory rate of 20 to 22,

blood pressure of 156/74.

HEENT:  Showed pale, dry oral mucous membrane.  Bilateral icteric sclerae.

LINGS:  Few scattered crepitation.  Decreased air entry at bases.

HEART:  Positive S1 and S2.

ABDOMEN:  Soft with generalized tenderness.  No mass or organomegaly.  No

rebound tenderness or guarding.

EXTREMITIES:  With evidence of bilateral above-knee amputation.

NEUROLOGIC:  No reported new neurological deficits, sensory or motor.



IMPRESSION:

1.  Hepatic sarcoidosis with jaundice and abnormal liver function test.

2.  Re-exacerbation of peptic ulcer disease.

3.  Multiple past medical history including but not limited to status post

 section, cholecystectomy, partial colon resection, diabetes

mellitus, hypertension with cardiac arrhythmia.

4.  Status post endoscopic retrograde cholangiopancreatography with biliary

stent insertion.



SUGGESTIONS:

1.  Continue current management.

2.  Low dose of steroids.

3.  Further recommendation to follow.







__________________________________________

Jeffrey Albert MD



DD:  2018 10:32:34

DT:  2018 11:44:55

Job # [id number]

## 2018-09-11 NOTE — CT
Date of service: 



09/11/2018



PROCEDURE:  CT Cervical Spine without contrast



HISTORY:

HNP 



COMPARISON:

None available.



TECHNIQUE:

Axial computed tomography images were obtained of the cervical spine 

without the use of intravenous contrast. Coronal and sagittal 

reformatted images were created and reviewed.



Radiation dose: 



Total exam DLP = 488.43 mGy-cm.



This CT exam was performed using one or more of the following dose 

reduction techniques: Automated exposure control, adjustment of the 

mA and/or kV according to patient size, and/or use of iterative 

reconstruction technique.



FINDINGS:



VERTEBRAE:

No acute compression fractures no retropulsed fragments.  Vertebral 

bodies exhibit relatively normal stature.  There is straightening of 

the normal cervical lordosis which could be due to patient 

positioning gantry however underlying element of muscle spasm may 

contribute. Vertebral bodies and facets otherwise normally aligned.



DISCS/SPINAL CANAL/NEURAL FORAMINA:

Slight disc space narrowing at the anterior margins C2-C3 level as 

well as minor disc space narrowing at the C5-C6 level. No disc 

herniation or significant disc bulge. The overall central bony canal 

and exit foramina appear adequate so far as can be seen however note 

that fine soft tissue and bone detail is partially obscured at the 

lower cervical upper thoracic region due to crossing streak and beam 

hardening artifact arising from dense clavicles and shoulder girdles.







PARASPINAL SOFT TISSUES:

Prevertebral and paraspinal soft tissues unremarkable. 



OTHER FINDINGS:

Vascular calcifications right distal common carotid artery/ carotid 

bifurcation with extension into the proximal right internal carotid 

artery. .  There also appears to be vascular calcifications distal 

left common carotid artery and left carotid bifurcation which is 

located in a left parasagittal retropharyngeal soft tissues just 

below the level of the free margin of the epiglottis/pyriform sinus 

region. 



IMPRESSION:

No acute fractures. .



Minor multilevel degenerative spondylosis. No disc herniation or 

significant disc bulge seen. 



See above discussion for additional details and incidental findings.

## 2018-09-11 NOTE — PN
DATE:  09/11/2018



SUBJECTIVE:  The patient is still feeling weak, abdominal pain, right upper

quadrant pain, pelvic pain, chest clogged, urine out of the Charles.  She has

been seen by GI and she is on antibiotics.  She is also being evaluated by

Neuro.



PHYSICAL EXAMINATION:

VITAL SIGNS:  /84, pulse 64, respiratory 20, temperature 98.2.

LUNGS:  Clear.

CVS:  S1 and S2 regular.

ABDOMEN:  Soft.



ASSESSMENT:

1.  Hepatic sarcoidosis.

2.  Urinary tract infection with indwelling Charles catheter with neurogenic

bladder, pending culture.

3.  Diabetes.

4.  Coronary artery disease.



PLAN:  Medical management, antibiotics, culture, pending GI eval, monitor

patient.







_________________________________________

Nasir Dunbar MD



DD:  09/10/2018 22:46:29

DT:  09/11/2018 14:31:52

Job # [id number]

## 2018-09-12 LAB
BUN SERPL-MCNC: 21 MG/DL (ref 7–17)
CALCIUM SERPL-MCNC: 7.9 MG/DL (ref 8.6–10.4)
ERYTHROCYTE [DISTWIDTH] IN BLOOD BY AUTOMATED COUNT: 18.2 % (ref 11.5–14.5)
GFR NON-AFRICAN AMERICAN: > 60
HGB BLD-MCNC: 10.5 G/DL (ref 11–16)
MCH RBC QN AUTO: 32.7 PG (ref 27–31)
MCHC RBC AUTO-ENTMCNC: 33.9 G/DL (ref 33–37)
MCV RBC AUTO: 96.6 FL (ref 81–99)
PLATELET # BLD: 194 K/UL (ref 130–400)
PMV BLD AUTO: 10 FL (ref 7.2–11.7)
RBC # BLD AUTO: 3.22 MIL/UL (ref 3.8–5.2)
WBC # BLD AUTO: 7 K/UL (ref 4.8–10.8)

## 2018-09-12 RX ADMIN — Medication SCH CAP: at 10:00

## 2018-09-12 RX ADMIN — HUMAN INSULIN SCH U: 100 INJECTION, SOLUTION SUBCUTANEOUS at 08:13

## 2018-09-12 RX ADMIN — HUMAN INSULIN SCH: 100 INJECTION, SOLUTION SUBCUTANEOUS at 21:35

## 2018-09-12 RX ADMIN — HYDROCORTISONE SCH: 10 CREAM TOPICAL at 10:53

## 2018-09-12 RX ADMIN — Medication SCH CAP: at 15:26

## 2018-09-12 RX ADMIN — HUMAN INSULIN SCH U: 100 INJECTION, SOLUTION SUBCUTANEOUS at 17:55

## 2018-09-12 RX ADMIN — OXYBUTYNIN CHLORIDE SCH MG: 10 TABLET, EXTENDED RELEASE ORAL at 15:27

## 2018-09-12 RX ADMIN — DIGOXIN SCH MG: 0.25 TABLET ORAL at 08:13

## 2018-09-12 RX ADMIN — HUMAN INSULIN SCH: 100 INJECTION, SOLUTION SUBCUTANEOUS at 13:16

## 2018-09-12 RX ADMIN — DIPHENHYDRAMINE HYDROCHLORIDE PRN MG: 50 INJECTION INTRAMUSCULAR; INTRAVENOUS at 00:50

## 2018-09-12 RX ADMIN — Medication SCH CAP: at 17:57

## 2018-09-12 RX ADMIN — METOPROLOL SUCCINATE SCH MG: 50 TABLET, EXTENDED RELEASE ORAL at 10:49

## 2018-09-12 RX ADMIN — DIPHENHYDRAMINE HYDROCHLORIDE PRN MG: 50 INJECTION INTRAMUSCULAR; INTRAVENOUS at 17:56

## 2018-09-12 RX ADMIN — DIPHENHYDRAMINE HYDROCHLORIDE PRN MG: 50 INJECTION INTRAMUSCULAR; INTRAVENOUS at 07:48

## 2018-09-12 RX ADMIN — HYDROCORTISONE SCH: 10 CREAM TOPICAL at 17:21

## 2018-09-12 RX ADMIN — DIPHENHYDRAMINE HYDROCHLORIDE PRN MG: 50 INJECTION INTRAMUSCULAR; INTRAVENOUS at 22:10

## 2018-09-12 RX ADMIN — OXYBUTYNIN CHLORIDE SCH MG: 10 TABLET, EXTENDED RELEASE ORAL at 15:26

## 2018-09-12 NOTE — CT
PROCEDURE:  CTA HEAD AND NECK WITH CONTRAST



HISTORY:

Vasculitis 



COMPARISON:

Carotid duplex Doppler examination from 09/10/2018 



TECHNIQUE:

Initial noncontrast head CT was performed. Subsequently, CT angiogram 

of the head and neck were performed after the intravenous 

administration of 80 mL of Omnipaque 350.  Contiguous 1.5mm thick 

images were obtained in the axial plane of the neck. 2-D coronal and 

sagittal MPR images were obtained. Imaging postprocessing was 

performed with 3-D images also obtained. A delayed contrast head CT 

was also obtained. This CT exam was performed using one or more of 

the following dose reduction techniques: Automated exposure control, 

adjustment of the mA and/or kV according to patient size, and/or use 

of iterative reconstruction technique.



Contrast dose: 100 mL Visipaque 320



Radiation dose:



Total exam DLP = 584.24 mGy-cm.



FINDINGS:



HEAD:

Right:



 The intracranial internal carotid artery, and anterior and middle 

cerebral arteries are widely patent. There are atherosclerotic 

calcifications in the cavernous carotid artery space 



Left:



The intracranial internal carotid artery, and anterior and middle 

cerebral arteries are widely patent. 



Posterior circulation: 



The visualized intracranial vertebral arteries, basilar artery and 

posterior cerebral arteries are widely patent.



There is no endoluminal filling defect to suggest thrombus. There is 

no intracranial saccular aneurysm.



NECK:

There is a three vessel aortic arch. There is no stenosis at the 

origins of the great vessels at the level of the aortic arch. There 

is retropharyngeal course of bilateral internal carotid arteries, an 

anatomic variant. 



Right Carotid:



On the right, the common carotid and external carotid arteries are 

widely patent. 



There are segmental coarse atherosclerotic calcifications and 

noncalcified plaques in the proximal internal carotid artery with 

approximately 85% stenosis hemodynamically significant. 



Left Carotid:



On the left, the common carotid, internal carotid and external 

carotid arteries are widely patent.



There are mild atherosclerotic calcifications in the proximal 

internal and external carotid arteries. There is no hemodynamically 

significant stenosis in the internal carotid arteries.



There is no hemodynamically significant stenosis in the internal 

carotid artery by NASCET criteria.



The vertebral arteries are widely patent. The right vertebral artery 

is hypoplastic, an anatomic variant.



The visualized soft tissues of the neck are normal. The visualized 

brain and cervical spine are within normal limits.



The lung apices are clear. 



There is a multinodular thyroid gland, dedicated thyroid ultrasound 

on a nonemergent basis is recommended for further evaluation. . 



IMPRESSION:

1. No evidence of endoluminal thrombus,occlusion or definite 

significant stenosis in the intracranial arteries.



2. Coarse calcified atherosclerotic plaques and soft plaques in the 

proximal right internal carotid artery with approximately 85% 

hemodynamically significant stenosis. 



3.  No evidence of hemodynamically significant stenosis in the 

internal carotid arteries.



4. Patent bilateral vertebral arteries. Right vertebral artery is 

hypoplastic, an anatomic variant.

## 2018-09-12 NOTE — PN
DATE:  09/11/2018



SUBJECTIVE:  The patient has decreased abdominal pain.  Her urine is

cloudy.  She feels better.  She is on IV fluids and antibiotics.



PHYSICAL EXAMINATION:

VITAL SIGNS:  /83, pulse 58, respiratory rate 20, temperature 97.

LUNGS:  Clear.

CARDIOVASCULAR SYSTEM:  S1, S2 plus S3 positive.

ABDOMEN:  Soft.



ASSESSMENT:

1.  Urinary tract infection, rule out sepsis.

2.  Hepatic sarcoidosis.

3.  Dehydration.

4.  Diabetes.



PLAN:  Continue current medications.  Monitor the patient.





__________________________________________

Nasir Dunbar MD





DD:  09/11/2018 22:33:11

DT:  09/12/2018 3:42:40

Job # [id number]

## 2018-09-12 NOTE — PN
DATE:  09/12/2018



NEUROLOGICAL PROBLEM:  Neck pain.



PHYSICAL EXAMINATION:

VITAL SIGNS:  Blood pressure 148/81, mean arterial pressure of 103,

respiratory rate 18, temperature 98.3 with the pulse rate of 91.

NECK:  Her neck pain is _____ in nature, radiating down to her left

shoulder and numbness and tingling sensation of her left index and middle

finger.



She denies any weakness compared to the right side.  Examination is

unchanged.  Her cranial nerves are normal.



WORKUP:  CT of the cervical spine does not show any structural pathology

that could explain her current problem.



Knowing that she has been suffering from sarcoidosis and steroids is not

helping, at this point the patient may need further workup such as a CT

angiogram to rule out any vasculitis process.



Her blood workup for vasculitis is also still pending at present.



RECOMMENDATION:

1.  CT angiogram of her neck.

2.  Continue Percocet and steroid at the same dose with sliding scale

insulin at present.

3.  The patient will be followed closely with you.







__________________________________________

Shalom Jon MD



DD:  09/12/2018 8:56:13

DT:  09/12/2018 9:32:52

Job # [id number]

## 2018-09-12 NOTE — CP.PCM.PN
Objective





- Vital Signs/Intake and Output


Vital Signs (last 24 hours): 


 











Temp Pulse Resp BP Pulse Ox


 


 98.2 F   54 L  18   135/81   99 


 


 09/12/18 15:53  09/12/18 16:30  09/12/18 15:53  09/12/18 15:53  09/12/18 15:53








Intake and Output: 


 











 09/12/18 09/13/18





 18:59 06:59


 


Output Total  700


 


Balance  -700














- Medications


Medications: 


 Current Medications





Apixaban (Eliquis)  5 mg PO BID [facility]


   Last Admin: 09/12/18 17:57 Dose:  5 mg


Digoxin (Lanoxin)  0.25 mg PO 0800 [facility]


   Last Admin: 09/12/18 08:13 Dose:  0.25 mg


Diphenhydramine HCl (Benadryl)  25 mg IVP Q4 PRN


   PRN Reason: Itching / Pruritus


   Last Admin: 09/12/18 22:10 Dose:  25 mg


Diphenoxylate HCl/Atropine (Lomotil 0.025-2.5 Mg Tablet)  1 tab PO Q8 PRN


   PRN Reason: Diarrhea


Gabapentin (Neurontin)  300 mg PO TID [facility]


   Last Admin: 09/12/18 17:57 Dose:  300 mg


Hydrocortisone (Cortizone 1% Cream)  0 gm TOP BID [facility]


   Last Admin: 09/12/18 17:21 Dose:  Not Given


Insulin Human Regular (Novolin R)  0 unit SC [facility]


   PRN Reason: Protocol


   Last Admin: 09/12/18 21:35 Dose:  Not Given


Lactobacillus Acidophilus (Bacid Acidophilus)  1 cap PO BID [facility]


   Last Admin: 09/12/18 17:57 Dose:  1 cap


Metoprolol Succinate (Toprol Xl)  50 mg PO DAILY [facility]


   Last Admin: 09/12/18 10:49 Dose:  50 mg


Morphine Sulfate (Morphine)  4 mg IVP Q4 PRN


   PRN Reason: Pain, severe (8-10)


   Last Admin: 09/12/18 22:10 Dose:  4 mg


Ondansetron HCl (Zofran Odt)  4 mg PO Q6H PRN


   PRN Reason: Nausea/Vomiting


Oxybutynin Chloride (Ditropan Xl)  10 mg PO DAILY [facility]


   Last Admin: 09/12/18 15:27 Dose:  10 mg


Oxycodone/Acetaminophen (Percocet 5/325 Mg Tab)  1 tab PO Q6H PRN


   PRN Reason: Pain, severe (8-10)


   Stop: 09/14/18 07:11


Prednisone (Prednisone Tab)  60 mg PO DAILY [facility]


   Last Admin: 09/12/18 10:49 Dose:  60 mg


Zolpidem Tartrate (Ambien)  5 mg PO 2200 [facility]


   PRN Reason: Insomnia


   Last Admin: 09/12/18 22:10 Dose:  5 mg











- Labs


Labs: 


 





 09/12/18 08:16 





 09/12/18 08:16 





 











PT  12.7 SECONDS (9.7-12.2)  H  09/08/18  11:40    


 


INR  1.2   09/08/18  11:40    


 


APTT  25 SECONDS (21-34)   09/08/18  11:40    














Assessment and Plan


(1) Ascending cholangitis


Status: Acute   





(2) Hypertension


Status: Acute   





(3) Sarcoidosis


Status: Chronic   





(4) UTI (urinary tract infection) due to urinary indwelling Charles catheter


Status: Acute

## 2018-09-12 NOTE — PN
DATE:  09/12/2018



LOCATION:  [address], bed B.



SUBJECTIVE:  This is a 53-year-old female, seen and examined in rounds

today, with complaint of abdominal pain on and off with less jaundice and

less abdominal distention, but no reported actual shortness of breath,

chest pain, palpitation.  No vomiting.  No chills or fever, but nausea with

mild dyspepsia.



The patient reported episodes of diarrhea recently which is gradually

improving.



The entire chart is reviewed including, but not limited to, most recent lab

and radiology study results, current and previous medication list, current

and previous medical events and today's lab results showed normal white

blood cells, the hemoglobin 10.5, hematocrit 31 with normal platelet count,

but CO2 content of 14 indicative of metabolic acidosis, BUN 21, normal

creatinine, blood glucose level 156, calcium 7.9.



The patient reported subsequent drop of total bilirubin post ERCP.



PHYSICAL EXAMINATION:

GENERAL:  A 53-year-old female.

VITAL SIGNS:  Afebrile with pulse of 70, respiratory 18 to 20, blood

pressure of 138/82.

HEENT:  Showed pale, dry oral mucous membrane with bilateral icteric

sclerae.

LUNGS:  Few scattered crepitation.  Decreased air entry at bases..

HEART:  Positive S1 and S2.

ABDOMEN:  Soft with mild-to-moderate generalized tenderness, but less than

before, especially in the right upper quadrant and midepigastric area. 

Positive mild distention.  Bowel sounds are hypoactive.  No mass or

organomegaly.  No rebound tenderness or guarding.

EXTREMITIES:  With evidence of bilateral lower extremity edema.  Bilateral

above-knee amputation.  No clubbing or cyanosis.

NEUROLOGIC:  No reported new neurological deficits, sensory or motor.



The patient was seen by the neurology consult done for potential ruling in

or out of possible vasculitis.



IMPRESSION:

1.  Hepatic sarcoidosis.

2.  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

3.  Abnormal liver function test with jaundice secondary to above.

4.  Reexacerbation of peptic ulcer disease.

5.  Anemia most likely secondary to chronic disease.

6.  Recurrent urinary tract infection.

7.  Known history of, but not limited to, cholecystectomy, status post

partial colon resection, hypertension, diabetes mellitus, and cardiac

arrhythmia.



SUGGESTIONS:

1.  Continue current management.

2.  Adjust the steroid dose.

3.  Further evaluation to follow.



__________________________________________

Jeffrey Albert MD





DD:  09/12/2018 17:43:20

DT:  09/12/2018 17:55:52

[facility] # [id number]

## 2018-09-13 VITALS — RESPIRATION RATE: 20 BRPM

## 2018-09-13 RX ADMIN — DIPHENHYDRAMINE HYDROCHLORIDE PRN MG: 50 INJECTION INTRAMUSCULAR; INTRAVENOUS at 03:42

## 2018-09-13 RX ADMIN — PHENOBARBITAL, HYOSCYAMINE SULFATE, ATROPINE SULFATE, SCOPOLAMINE HYDROBROMIDE SCH TAB: 16.2; .1037; .0194; .0065 TABLET ORAL at 09:53

## 2018-09-13 RX ADMIN — DIPHENHYDRAMINE HYDROCHLORIDE PRN MG: 50 INJECTION INTRAMUSCULAR; INTRAVENOUS at 22:48

## 2018-09-13 RX ADMIN — HYDROCORTISONE SCH: 10 CREAM TOPICAL at 09:47

## 2018-09-13 RX ADMIN — Medication SCH CAP: at 17:16

## 2018-09-13 RX ADMIN — DIPHENHYDRAMINE HYDROCHLORIDE PRN MG: 50 INJECTION INTRAMUSCULAR; INTRAVENOUS at 12:11

## 2018-09-13 RX ADMIN — DIPHENHYDRAMINE HYDROCHLORIDE PRN MG: 50 INJECTION INTRAMUSCULAR; INTRAVENOUS at 17:15

## 2018-09-13 RX ADMIN — METOPROLOL SUCCINATE SCH MG: 50 TABLET, EXTENDED RELEASE ORAL at 09:46

## 2018-09-13 RX ADMIN — HYDROCORTISONE SCH: 10 CREAM TOPICAL at 17:16

## 2018-09-13 RX ADMIN — DIGOXIN SCH MG: 0.25 TABLET ORAL at 08:05

## 2018-09-13 RX ADMIN — HUMAN INSULIN SCH UNIT: 100 INJECTION, SOLUTION SUBCUTANEOUS at 08:15

## 2018-09-13 RX ADMIN — OXYBUTYNIN CHLORIDE SCH MG: 10 TABLET, EXTENDED RELEASE ORAL at 09:45

## 2018-09-13 RX ADMIN — DIPHENHYDRAMINE HYDROCHLORIDE PRN MG: 50 INJECTION INTRAMUSCULAR; INTRAVENOUS at 07:49

## 2018-09-13 RX ADMIN — HUMAN INSULIN SCH UNIT: 100 INJECTION, SOLUTION SUBCUTANEOUS at 23:14

## 2018-09-13 RX ADMIN — HUMAN INSULIN SCH UNIT: 100 INJECTION, SOLUTION SUBCUTANEOUS at 17:17

## 2018-09-13 RX ADMIN — Medication SCH CAP: at 09:45

## 2018-09-13 RX ADMIN — PHENOBARBITAL, HYOSCYAMINE SULFATE, ATROPINE SULFATE, SCOPOLAMINE HYDROBROMIDE SCH TAB: 16.2; .1037; .0194; .0065 TABLET ORAL at 17:16

## 2018-09-13 RX ADMIN — HUMAN INSULIN SCH UNIT: 100 INJECTION, SOLUTION SUBCUTANEOUS at 12:18

## 2018-09-13 RX ADMIN — PHENOBARBITAL, HYOSCYAMINE SULFATE, ATROPINE SULFATE, SCOPOLAMINE HYDROBROMIDE SCH TAB: 16.2; .1037; .0194; .0065 TABLET ORAL at 13:46

## 2018-09-13 NOTE — PN
DATE:  09/13/2018



NEUROLOGICAL PROBLEM:  Collagen vascular disease related to vasculitis

process of her underlying sarcoidosis.



PHYSICAL EXAMINATION:

VITAL SIGNS:  Blood pressure 140/68, mean arterial pressure of 92,

respiratory rate 18, temperature 97.9 with pulse rate of 59.



Somewhat better with the neck pain.  She slept better; however, she keeps

complaining of loose bowel movements.



Examination unchanged to compare with my previous examination.



The patient _____ CT angiogram, showed significant stenosis of right

internal carotid artery of 85%.



RECOMMENDATIONS:  Steroids can be tapered off weekly to 40, then 20 mg. 

That 20 mg she could take every other day.



Interventionalist being called in to have possible stenting because of risk

factor of thrombogenesis secondary to her sarcoidosis.



The patient will be followed while she is in the hospital.







__________________________________________

Shalom Jon MD



DD:  09/13/2018 7:35:29

DT:  09/13/2018 8:07:48

Job # [id number]

## 2018-09-13 NOTE — PN
DATE:  09/13/2018



LOCATION:  670, bed B.



SUBJECTIVE:  This is a 53-year-old female seen and examined in rounds,

appeared to be awake, alert, oriented with less abdominal pain, less

nausea, and less bowel movement frequency.  No reported active bleeding,

but with generalized weakness and malaise.  No chest pain, palpitation, or

significant chills or fever recently.  The entire chart is reviewed

including but not limited to the most recent lab and radiology study

results, current and the previous medication list, current and the previous

medical events.  Today's lab; however, is still pending, but the latest

blood glucose level was 273 with subsequent improvement of AST, ALT and

alkaline phosphatase, but still has elevated total bilirubin, less than

before.



PHYSICAL EXAMINATION:

GENERAL:  A 53-year-old female appeared to be awake, alert and oriented.

VITAL SIGNS:  Afebrile with pulse of 74, respiratory rate 20 to 22 with

blood pressure 150/78.

HEENT:  Showed pale, dry oral mucous membrane.  Bilateral icteric sclerae.

LUNGS:  Few scattered crepitation.  Decreased air entry at bases.

HEART:  Positive S1 and S2.

ABDOMEN:  Soft with mild generalized tenderness, but mainly midepigastric

right upper quadrant area as well as the suprapubic area, less than before

with mild abdominal distention.  No mass or organomegaly.  No rebound

tenderness or guarding.

EXTREMITIES:  With bilateral above-knee amputation.  No clubbing or

cyanosis.

NEUROLOGICAL:  No reported new neurological deficits, sensory or motor.



IMPRESSION:

1.  Hepatic sarcoidosis.

2.  Abnormal liver function test, most likely secondary to above.  No clear

evidence of acute ascending cholangitis so far.

3.  Jaundice due to hepatocellular injury and/or _____ spasm with status

post endoscopic retrograde cholangiopancreatography and biliary stent

insertion.

4.  Anemia, most likely secondary to chronic disease.  No evidence of

active bleeding from the gastrointestinal tract so far.

5.  Recurrent urinary tract infection with septicemia.

6.  Known history of status post cholecystectomy, status post partial colon

resection, known history of hypertension and diabetes mellitus as well as

cardiac arrhythmia.



SUGGESTION:

1.  Continue current management.

2.  Cardiology reevaluation.

3.  Add Donnatal tab p.o.

4.  Further recommendation to follow.







__________________________________________

Jeffrey Alebrt MD



DD:  09/13/2018 8:48:12

DT:  09/13/2018 8:54:42

[facility] # [id number]

## 2018-09-13 NOTE — PN
DATE:  09/12/2018



SUBJECTIVE:  The patient is feeling some pain.  She is not short of breath.

She has decreased abdominal pain.  Her urine is clearing up.  Nausea.  No

vomiting.  No dyspnea.



PHYSICAL EXAMINATION:

VITAL SIGNS:  Her blood pressure is 135/81, pulse 54, respiratory rate 18,

temperature 98.2.

LUNGS:  Clear.

CARDIOVASCULAR SYSTEM:  S1 and S2 plus, S3 positive.

ABDOMEN:  Soft.



ASSESSMENT:

1.  Urinary tract infection, neurogenic bladder.

2.  Hepatic sarcoidosis.

3.  Diabetes.

4.  Coronary artery disease.



PLAN:  Medical management.  Monitor the patient.





__________________________________________

Nasir Dunbar MD





DD:  09/12/2018 23:48:11

DT:  09/13/2018 6:29:28

Job # [id number]

## 2018-09-13 NOTE — CP.PCM.PN
Objective





- Vital Signs/Intake and Output


Vital Signs (last 24 hours): 


 











Temp Pulse Resp BP Pulse Ox


 


 97.8 F   60   20   125/77   100 


 


 09/13/18 15:00  09/13/18 15:00  09/13/18 15:00  09/13/18 15:00  09/13/18 15:00








Intake and Output: 


 











 09/13/18 09/14/18





 18:59 06:59


 


Output Total 900 700


 


Balance -900 -700














- Medications


Medications: 


 Current Medications





Apixaban (Eliquis)  5 mg PO BID [facility]


   Last Admin: 09/13/18 17:16 Dose:  5 mg


Belladonna/Phenobarbital (Donnatal)  1 tab PO TID [facility]


   Last Admin: 09/13/18 17:16 Dose:  1 tab


Digoxin (Lanoxin)  0.25 mg PO 0800 [facility]


   Last Admin: 09/13/18 08:05 Dose:  0.25 mg


Diphenhydramine HCl (Benadryl)  25 mg IVP Q4 PRN


   PRN Reason: Itching / Pruritus


   Last Admin: 09/13/18 17:15 Dose:  25 mg


Diphenoxylate HCl/Atropine (Lomotil 0.025-2.5 Mg Tablet)  1 tab PO Q8 PRN


   PRN Reason: Diarrhea


Gabapentin (Neurontin)  300 mg PO TID [facility]


   Last Admin: 09/13/18 17:16 Dose:  300 mg


Hydrocortisone (Cortizone 1% Cream)  0 gm TOP BID [facility]


   Last Admin: 09/13/18 17:16 Dose:  Not Given


Insulin Human Regular (Novolin R)  0 unit SC ACHS [facility]


   PRN Reason: Protocol


   Last Admin: 09/13/18 17:17 Dose:  6 unit


Lactobacillus Acidophilus (Bacid Acidophilus)  1 cap PO BID [facility]


   Last Admin: 09/13/18 17:16 Dose:  1 cap


Metoprolol Succinate (Toprol Xl)  50 mg PO DAILY [facility]


   Last Admin: 09/13/18 09:46 Dose:  50 mg


Morphine Sulfate (Morphine)  4 mg IVP Q4 PRN


   PRN Reason: Pain, severe (8-10)


   Last Admin: 09/13/18 17:15 Dose:  4 mg


Ondansetron HCl (Zofran Odt)  4 mg PO Q6H PRN


   PRN Reason: Nausea/Vomiting


Oxybutynin Chloride (Ditropan Xl)  10 mg PO DAILY [facility]


   Last Admin: 09/13/18 09:45 Dose:  10 mg


Oxycodone/Acetaminophen (Percocet 5/325 Mg Tab)  1 tab PO Q6H PRN


   PRN Reason: Pain, severe (8-10)


   Stop: 09/14/18 07:11


   Last Admin: 09/13/18 09:53 Dose:  1 tab


Prednisone (Prednisone Tab)  20 mg PO DAILY [facility]


Zolpidem Tartrate (Ambien)  5 mg PO 2200 PRN


   PRN Reason: Insomnia


   Last Admin: 09/12/18 22:10 Dose:  5 mg











- Labs


Labs: 


 





 09/12/18 08:16 





 09/12/18 08:16 





 











PT  12.7 SECONDS (9.7-12.2)  H  09/08/18  11:40    


 


INR  1.2   09/08/18  11:40    


 


APTT  25 SECONDS (21-34)   09/08/18  11:40    














Assessment and Plan


(1) Ascending cholangitis


Status: Acute   





(2) Hypertension


Status: Acute   





(3) Sarcoidosis


Status: Chronic   





(4) UTI (urinary tract infection) due to urinary indwelling Charles catheter


Status: Acute

## 2018-09-13 NOTE — CP.PCM.CON
History of Present Illness





- History of Present Illness


History of Present Illness: 





Chief Concern: Right Upper Quadrant pain


Reason for consult: Right ICA stenosis


Consult Requested by: Dr. Shalom Jon


Location: Inpatient








HPI: 


Ms. Laura Jaramillo is a 54 yo lady with a medical history significant for 

sarcoidosis, CHF with defibrillator and Permananet pacemaker on anticoagulation

, DM II, bilateral above knee amputations (right side from "diabetes" and left 

side from "blood clots"), recurrent jaundice s/p biliary stent, removal of non 

cancerous colon mass who was brought in for right upper quadrant pain for which 

she is being evaluated and managed.


Neurology was consulted for headaches and the patient had a ct and a cta which 

showed high grade stenosis of the right ICA.


The patient reports that her main concern at this time is her diarrhoea and her 

left shoulder and neck pain and left sided headache.


She denies any vision loss in her right eye. No dysarthria or unilateral 

numbness or weakness. NO other complaints.





PMH: as above





All: Reviewed





Medications: Reviewed 


Pt on anticoagulation with eliquis





Social hx: No t/e/d





FH: NC








On exam:


Vitals reviewed





Pt is lying in bed an uncomfortable from her diarrhoea and head ache


MSE: AO x 3, No aphasia, neglect or dysarthria


CN: Pupils 3 to 2 mm, VFF, EOM full, V1-3 intact, no facial paresis, tongue 

midline


Motor: 5/5 b/l UE ( AKA b/l lower extremities)


Sensory: Intact b/l


Coord: No dysmetria


Gait: AKA b/l








CT head: No acute infarct


CTA: High grade (85%) stenosis of the cervical right ICA





Imp:


1) Asymptomatic High grade stenosis of the right ICA





Recs:


1) Recommend medical management at this time


2) Pt educated regarding stroke symptoms and knows to call 911 and come to the 

ED if she has any stroke like symptoms


3) She reports that she is compliant with her medications and knows the 

importance of taking her medications


4) Please do not hesitate to contact us for any further questions or 

clarifications











Past Patient History





- Infectious Disease


Hx of Infectious Diseases: None





- Tetanus Immunizations


Tetanus Immunization: Unknown





- Past Medical History & Family History


Past Medical History?: Yes





- Past Social History


Smoking Status: Never Smoked





- CARDIAC


Hx Cardiac Disorders: Yes


Hx Congestive Heart Failure: Yes


Hx Hypercholesterolemia: Yes


Hx Hypertension: Yes





- PULMONARY


Hx Respiratory Disorders: Yes


Hx Asthma: Yes (NO INHALER-ON PREDNISONE DAILY PO.)





- NEUROLOGICAL


Hx Neurological Disorder: No





- HEENT


Hx HEENT Problems: Yes


Hx Cataracts: Yes (BILAT.)





- RENAL


Hx Chronic Kidney Disease: No





- ENDOCRINE/METABOLIC


Hx Diabetes Mellitus Type 1: Yes





- HEMATOLOGICAL/ONCOLOGICAL


Hx Blood Disorders: Yes


Hx Anemia: Yes





- INTEGUMENTARY


Hx Dermatological Problems: No





- MUSCULOSKELETAL/RHEUMATOLOGICAL


Hx Falls: No





- GASTROINTESTINAL


Hx Gastrointestinal Disorders: Yes


Hx Gall Bladder Disease: Yes





- GENITOURINARY/GYNECOLOGICAL


Hx Genitourinary Disorders: Yes


Hx Urinary Tract Infection: Yes


Other/Comment: PT HAS LONG TERM GUTIERREZ





- PSYCHIATRIC


Hx Psychophysiologic Disorder: No


Hx Substance Use: No





- SURGICAL HISTORY


Hx Surgeries: Yes


Hx Amputation: Yes (bilateral)


Hx Appendectomy: Yes


Hx Cholecystectomy: Yes


Hx Coronary Stent: Yes





- ANESTHESIA


Hx Anesthesia: Yes


Hx Anesthesia Reactions: No


Hx Malignant Hyperthermia: No


Has any member of the family had a problem w/ anesthesia?: No





Meds


Allergies/Adverse Reactions: 


 Allergies











Allergy/AdvReac Type Severity Reaction Status Date / Time


 


avocado Allergy Severe ANAPHYLAXIS Verified 10/04/17 17:28


 


banana Allergy Severe ANAPHYLAXIS Verified 10/04/17 17:28


 


cherry Allergy Severe ANAPHYLAXIS Verified 10/04/17 17:28


 


ketorolac [From Toradol] Allergy   Verified 10/04/17 17:28














- Medications


Medications: 


 Current Medications





Apixaban (Eliquis)  5 mg PO BID [facility]


   Last Admin: 09/13/18 09:48 Dose:  5 mg


Belladonna/Phenobarbital (Donnatal)  1 tab PO TID [facility]


   Last Admin: 09/13/18 09:53 Dose:  1 tab


Digoxin (Lanoxin)  0.25 mg PO 0800 [facility]


   Last Admin: 09/13/18 08:05 Dose:  0.25 mg


Diphenhydramine HCl (Benadryl)  25 mg IVP Q4 PRN


   PRN Reason: Itching / Pruritus


   Last Admin: 09/13/18 12:11 Dose:  25 mg


Diphenoxylate HCl/Atropine (Lomotil 0.025-2.5 Mg Tablet)  1 tab PO Q8 PRN


   PRN Reason: Diarrhea


Gabapentin (Neurontin)  300 mg PO TID [facility]


   Last Admin: 09/13/18 09:46 Dose:  300 mg


Hydrocortisone (Cortizone 1% Cream)  0 gm TOP BID [facility]


   Last Admin: 09/13/18 09:47 Dose:  Not Given


Insulin Human Regular (Novolin R)  0 unit SC ACHS [facility]


   PRN Reason: Protocol


   Last Admin: 09/13/18 12:18 Dose:  1 unit


Lactobacillus Acidophilus (Bacid Acidophilus)  1 cap PO BID [facility]


   Last Admin: 09/13/18 09:45 Dose:  1 cap


Metoprolol Succinate (Toprol Xl)  50 mg PO DAILY [facility]


   Last Admin: 09/13/18 09:46 Dose:  50 mg


Morphine Sulfate (Morphine)  4 mg IVP Q4 PRN


   PRN Reason: Pain, severe (8-10)


   Last Admin: 09/13/18 12:09 Dose:  4 mg


Ondansetron HCl (Zofran Odt)  4 mg PO Q6H PRN


   PRN Reason: Nausea/Vomiting


Oxybutynin Chloride (Ditropan Xl)  10 mg PO DAILY [facility]


   Last Admin: 09/13/18 09:45 Dose:  10 mg


Oxycodone/Acetaminophen (Percocet 5/325 Mg Tab)  1 tab PO Q6H PRN


   PRN Reason: Pain, severe (8-10)


   Stop: 09/14/18 07:11


   Last Admin: 09/13/18 09:53 Dose:  1 tab


Prednisone (Prednisone Tab)  60 mg PO DAILY [facility]


   Last Admin: 09/13/18 09:45 Dose:  60 mg


Zolpidem Tartrate (Ambien)  5 mg PO 2200 PRN


   PRN Reason: Insomnia


   Last Admin: 09/12/18 22:10 Dose:  5 mg











Results





- Vital Signs


Recent Vital Signs: 


 Last Vital Signs











Temp  98.0 F   09/13/18 07:59


 


Pulse  71   09/13/18 07:59


 


Resp  20   09/13/18 07:59


 


BP  154/80 H  09/13/18 07:59


 


Pulse Ox  100   09/13/18 07:59














- Labs


Result Diagrams: 


 09/12/18 08:16





 09/12/18 08:16


Labs: 


 Laboratory Results - last 24 hr











  09/10/18 09/12/18 09/12/18





  06:15 06:27 16:44


 


POC Glucose (mg/dL)   182 H  335 H


 


Rheumatoid Factor IgG  <5  


 


Rheumatoid Factor IgA  19 H  


 


Rheumatoid Factor IgM  <5  














  09/12/18 09/13/18 09/13/18





  20:57 06:26 11:25


 


POC Glucose (mg/dL)  273 H  185 H  152 H


 


Rheumatoid Factor IgG   


 


Rheumatoid Factor IgA   


 


Rheumatoid Factor IgM   














Assessment & Plan





- Assessment and Plan (Free Text)


Assessment: 





Imp:


1) Asymptomatic High grade stenosis of the right ICA








Plan: 





Imp:


1) Asymptomatic High grade stenosis of the right ICA





Recs:


1) Recommend medical management at this time


2) Pt educated regarding stroke symptoms and knows to call 911 and come to the 

ED if she has any stroke like symptoms


3) She reports that she is compliant with her medications and knows the 

importance of taking her medications


4) Please do not hesitate to contact us for any further questions or 

clarifications

## 2018-09-14 RX ADMIN — HUMAN INSULIN SCH: 100 INJECTION, SOLUTION SUBCUTANEOUS at 22:00

## 2018-09-14 RX ADMIN — PHENOBARBITAL, HYOSCYAMINE SULFATE, ATROPINE SULFATE, SCOPOLAMINE HYDROBROMIDE SCH TAB: 16.2; .1037; .0194; .0065 TABLET ORAL at 17:39

## 2018-09-14 RX ADMIN — Medication SCH CAP: at 17:43

## 2018-09-14 RX ADMIN — DIPHENHYDRAMINE HYDROCHLORIDE PRN MG: 50 INJECTION INTRAMUSCULAR; INTRAVENOUS at 15:58

## 2018-09-14 RX ADMIN — DIPHENHYDRAMINE HYDROCHLORIDE PRN MG: 50 INJECTION INTRAMUSCULAR; INTRAVENOUS at 06:44

## 2018-09-14 RX ADMIN — METOPROLOL SUCCINATE SCH MG: 50 TABLET, EXTENDED RELEASE ORAL at 09:39

## 2018-09-14 RX ADMIN — HYDROCORTISONE SCH: 10 CREAM TOPICAL at 17:38

## 2018-09-14 RX ADMIN — DIGOXIN SCH: 0.25 TABLET ORAL at 09:39

## 2018-09-14 RX ADMIN — DIPHENHYDRAMINE HYDROCHLORIDE PRN MG: 50 INJECTION INTRAMUSCULAR; INTRAVENOUS at 02:39

## 2018-09-14 RX ADMIN — DIPHENHYDRAMINE HYDROCHLORIDE PRN MG: 50 INJECTION INTRAMUSCULAR; INTRAVENOUS at 11:13

## 2018-09-14 RX ADMIN — HYDROCORTISONE SCH: 10 CREAM TOPICAL at 09:40

## 2018-09-14 RX ADMIN — HUMAN INSULIN SCH UNIT: 100 INJECTION, SOLUTION SUBCUTANEOUS at 17:37

## 2018-09-14 RX ADMIN — HUMAN INSULIN SCH: 100 INJECTION, SOLUTION SUBCUTANEOUS at 12:01

## 2018-09-14 RX ADMIN — HUMAN INSULIN SCH UNIT: 100 INJECTION, SOLUTION SUBCUTANEOUS at 08:27

## 2018-09-14 RX ADMIN — DIPHENHYDRAMINE HYDROCHLORIDE PRN MG: 50 INJECTION INTRAMUSCULAR; INTRAVENOUS at 20:21

## 2018-09-14 RX ADMIN — PHENOBARBITAL, HYOSCYAMINE SULFATE, ATROPINE SULFATE, SCOPOLAMINE HYDROBROMIDE SCH TAB: 16.2; .1037; .0194; .0065 TABLET ORAL at 09:40

## 2018-09-14 NOTE — PN
DATE:  09/14/2018



SUBJECTIVE:  The patient is afebrile, no dysuria.  She is Charles dependent. 

No nausea, vomiting.



PHYSICAL EXAMINATION:

VITAL SIGNS:  /80, pulse 71, respiratory rate 20, temperature 98.

LUNGS:  Clear.

CVS:  S1, S2, regular.

ABDOMEN:  Enlarged liver.



ASSESSMENT:

1.  Urinary tract infection.  Neurogenic bladder.

2.  Hepatic sarcoidosis.

3.  Diabetes.

4.  Hypertension.



PLAN:  Continue current medication.  Accu-Chek sliding scale blood sugar

control.





__________________________________________

Nasir Dunbar MD





DD:  09/13/2018 22:53:30

DT:  09/14/2018 21:33:30

Job # [id number]

## 2018-09-14 NOTE — PN
DATE:  09/14/2018



LOCATION:  670, bed B.



SUBJECTIVE:  This is a 53-year-old female, seen and examined in rounds

without significant clinical changes or reported active bleeding, but still

with a complaint of abdominal pain.  The case discussed with the neurology

consultant on the case,  _____.



Today's lab showed blood glucose level of 150.



PHYSICAL EXAMINATION:

GENERAL:  A 53-year-old female, awake, alert and oriented, somewhat

tolerated oral intake.

VITAL SIGNS:  Pulse of 58, respiratory rate 20 to 22, afebrile with blood

pressure 148/82.

HEENT:  Showed pale, dry mucous membrane.  Nonicteric sclerae.

LUNGS:  Few scattered crepitation.  Decreased air entry at bases.

HEART:  Positive S1 and S2.

ABDOMEN:  Soft.  Bowel sounds are present with mild generalized tenderness.

No mass or organomegaly.  No rebound tenderness or guarding.  Abdominal

distention noticed with especially midepigastric and right upper quadrant

as well as suprapubic tenderness, less than before.

EXTREMITIES:  With evidence of bilateral above-knee amputation.

NEUROLOGICAL:  No reported new neurological deficits or focal neurological

changes.



IMPRESSION:

1.  Hepatic sarcoidosis.

2.  Jaundice with abnormal liver function tests secondary to above.

3.  Status post endoscopic retrograde cholangiopancreatography with biliary

stent insertion due to above as well as due to her papillary stenosis,

improved clinically.

4.  Anemia secondary to chronic disease most likely.

5.  Re-exacerbation of peptic ulcer disease.

6.  Recurrent urinary tract infection with _____ bladder associated with

septicemia, on antibiotics.

7.  Known history of hypertension, diabetes mellitus and cardiac

arrhythmia.

8.  Status post partial colon resection, status post cholecystectomy.



SUGGESTIONS:

1.  Continue current management.

2.  Subsequent decrease of the steroid dose.

3.  Followup in liver function tests.

4.  Further recommendation to follow.







__________________________________________

Jeffrey Albert MD



DD:  09/14/2018 14:08:05

DT:  09/14/2018 14:11:19

Job # [id number]

## 2018-09-14 NOTE — PN
DATE:  09/14/2018



NEUROLOGICAL PROBLEM:  Asymptomatic right internal carotid artery stenosis.



PHYSICAL EXAMINATION:

VITAL SIGNS:  Blood pressure 145/68, mean arterial pressure of 93,

respiratory rate 18, temperature 97.7.



The patient is sleepy, arousable on calling her name.  Neck pain is

somewhat improved.  Pain control is better.



Examination is unchanged to compare with my previous examination.



Neuro intervention list consultation is appreciated.  The patient does have

asymptomatic carotid artery stenosis.  At present, we will follow with the

medical management.  However, this has to be followed in six month period

if no symptoms of progression.



Continue steroids.  Steroids being tapered back on her original dose of 20

mg.



The patient definitely needs neurological followup as outpatient.  If

medically stable, the patient can be discharged.





__________________________________________

Shalom Jon MD



DD:  09/14/2018 7:29:39

DT:  09/14/2018 7:48:55

Job # [id number]

## 2018-09-15 VITALS
SYSTOLIC BLOOD PRESSURE: 151 MMHG | TEMPERATURE: 97.5 F | OXYGEN SATURATION: 99 % | HEART RATE: 58 BPM | DIASTOLIC BLOOD PRESSURE: 82 MMHG

## 2018-09-15 VITALS — HEART RATE: 64 BPM

## 2018-09-15 LAB
BASOPHILS # BLD AUTO: 0.1 K/UL (ref 0–0.2)
BASOPHILS NFR BLD: 0.8 % (ref 0–2)
BUN SERPL-MCNC: 26 MG/DL (ref 7–17)
CALCIUM SERPL-MCNC: 8.5 MG/DL (ref 8.6–10.4)
EOSINOPHIL # BLD AUTO: 0 K/UL (ref 0–0.7)
EOSINOPHIL NFR BLD: 0.3 % (ref 0–4)
ERYTHROCYTE [DISTWIDTH] IN BLOOD BY AUTOMATED COUNT: 18.6 % (ref 11.5–14.5)
GFR NON-AFRICAN AMERICAN: > 60
HGB BLD-MCNC: 11.2 G/DL (ref 11–16)
LYMPHOCYTES # BLD AUTO: 3.9 K/UL (ref 1–4.3)
LYMPHOCYTES NFR BLD AUTO: 36.3 % (ref 20–40)
MCH RBC QN AUTO: 32.2 PG (ref 27–31)
MCHC RBC AUTO-ENTMCNC: 33.5 G/DL (ref 33–37)
MCV RBC AUTO: 96.1 FL (ref 81–99)
MONOCYTES # BLD: 1 K/UL (ref 0–0.8)
MONOCYTES NFR BLD: 9.3 % (ref 0–10)
NEUTROPHILS # BLD: 5.7 K/UL (ref 1.8–7)
NEUTROPHILS NFR BLD AUTO: 53.3 % (ref 50–75)
NRBC BLD AUTO-RTO: 0.2 % (ref 0–2)
PLATELET # BLD: 261 K/UL (ref 130–400)
PMV BLD AUTO: 9.7 FL (ref 7.2–11.7)
RBC # BLD AUTO: 3.48 MIL/UL (ref 3.8–5.2)
WBC # BLD AUTO: 10.7 K/UL (ref 4.8–10.8)

## 2018-09-15 RX ADMIN — DIPHENHYDRAMINE HYDROCHLORIDE PRN MG: 50 INJECTION INTRAMUSCULAR; INTRAVENOUS at 16:18

## 2018-09-15 RX ADMIN — METOPROLOL SUCCINATE SCH MG: 50 TABLET, EXTENDED RELEASE ORAL at 10:28

## 2018-09-15 RX ADMIN — PHENOBARBITAL, HYOSCYAMINE SULFATE, ATROPINE SULFATE, SCOPOLAMINE HYDROBROMIDE SCH TAB: 16.2; .1037; .0194; .0065 TABLET ORAL at 13:31

## 2018-09-15 RX ADMIN — OXYBUTYNIN CHLORIDE SCH MG: 10 TABLET, EXTENDED RELEASE ORAL at 10:28

## 2018-09-15 RX ADMIN — HYDROCORTISONE SCH: 10 CREAM TOPICAL at 10:30

## 2018-09-15 RX ADMIN — DIPHENHYDRAMINE HYDROCHLORIDE PRN MG: 50 INJECTION INTRAMUSCULAR; INTRAVENOUS at 00:35

## 2018-09-15 RX ADMIN — PHENOBARBITAL, HYOSCYAMINE SULFATE, ATROPINE SULFATE, SCOPOLAMINE HYDROBROMIDE SCH TAB: 16.2; .1037; .0194; .0065 TABLET ORAL at 10:29

## 2018-09-15 RX ADMIN — Medication SCH CAP: at 18:05

## 2018-09-15 RX ADMIN — DIPHENHYDRAMINE HYDROCHLORIDE PRN MG: 50 INJECTION INTRAMUSCULAR; INTRAVENOUS at 11:59

## 2018-09-15 RX ADMIN — HUMAN INSULIN SCH: 100 INJECTION, SOLUTION SUBCUTANEOUS at 08:05

## 2018-09-15 RX ADMIN — PHENOBARBITAL, HYOSCYAMINE SULFATE, ATROPINE SULFATE, SCOPOLAMINE HYDROBROMIDE SCH TAB: 16.2; .1037; .0194; .0065 TABLET ORAL at 18:05

## 2018-09-15 RX ADMIN — Medication SCH CAP: at 10:29

## 2018-09-15 RX ADMIN — HUMAN INSULIN SCH: 100 INJECTION, SOLUTION SUBCUTANEOUS at 12:31

## 2018-09-15 RX ADMIN — DIPHENHYDRAMINE HYDROCHLORIDE PRN MG: 50 INJECTION INTRAMUSCULAR; INTRAVENOUS at 06:19

## 2018-09-15 RX ADMIN — HUMAN INSULIN SCH UNIT: 100 INJECTION, SOLUTION SUBCUTANEOUS at 18:06

## 2018-09-15 RX ADMIN — DIGOXIN SCH MG: 0.25 TABLET ORAL at 08:30

## 2018-09-15 RX ADMIN — HYDROCORTISONE SCH: 10 CREAM TOPICAL at 18:05

## 2018-09-15 NOTE — CP.PCM.PN
Objective





- Vital Signs/Intake and Output


Vital Signs (last 24 hours): 


 











Temp Pulse Resp BP Pulse Ox


 


 97.5 F L  58 L  20   151/82 H  99 


 


 09/15/18 15:32  09/15/18 15:32  09/15/18 15:32  09/15/18 15:32  09/15/18 15:32











- Medications


Medications: 


 Current Medications





Apixaban (Eliquis)  5 mg PO BID [facility]


   Last Admin: 09/15/18 18:05 Dose:  5 mg


Belladonna/Phenobarbital (Donnatal)  1 tab PO TID [facility]


   Last Admin: 09/15/18 18:05 Dose:  1 tab


Digoxin (Lanoxin)  0.25 mg PO 0800 [facility]


   Last Admin: 09/15/18 08:30 Dose:  0.25 mg


Diphenhydramine HCl (Benadryl)  25 mg IVP Q4 PRN


   PRN Reason: Itching / Pruritus


   Last Admin: 09/15/18 16:18 Dose:  25 mg


Diphenoxylate HCl/Atropine (Lomotil 0.025-2.5 Mg Tablet)  1 tab PO Q8 PRN


   PRN Reason: Diarrhea


Gabapentin (Neurontin)  300 mg PO TID [facility]


   Last Admin: 09/15/18 18:05 Dose:  300 mg


Hydrocortisone (Cortizone 1% Cream)  0 gm TOP BID [facility]


   Last Admin: 09/15/18 18:05 Dose:  Not Given


Insulin Human Regular (Novolin R)  0 unit SC ACHS [facility]


   PRN Reason: Protocol


   Last Admin: 09/15/18 18:06 Dose:  4 unit


Lactobacillus Acidophilus (Bacid Acidophilus)  1 cap PO BID [facility]


   Last Admin: 09/15/18 18:05 Dose:  1 cap


Metoprolol Succinate (Toprol Xl)  50 mg PO DAILY [facility]


   Last Admin: 09/15/18 10:28 Dose:  50 mg


Morphine Sulfate (Morphine)  4 mg IVP Q4 PRN


   PRN Reason: Pain, severe (8-10)


   Last Admin: 09/15/18 16:18 Dose:  4 mg


Ondansetron HCl (Zofran Odt)  4 mg PO Q6H PRN


   PRN Reason: Nausea/Vomiting


Oxybutynin Chloride (Ditropan Xl)  10 mg PO DAILY [facility]


   Last Admin: 09/15/18 10:28 Dose:  10 mg


Prednisone (Prednisone Tab)  20 mg PO DAILY [facility]


   Last Admin: 09/15/18 10:29 Dose:  20 mg


Zolpidem Tartrate (Ambien)  5 mg PO 2200 PRN


   PRN Reason: Insomnia


   Last Admin: 09/15/18 00:34 Dose:  5 mg











- Labs


Labs: 


 





 09/15/18 07:18 





 09/15/18 07:18 





 











PT  12.7 SECONDS (9.7-12.2)  H  09/08/18  11:40    


 


INR  1.2   09/08/18  11:40    


 


APTT  25 SECONDS (21-34)   09/08/18  11:40    














Assessment and Plan


(1) Ascending cholangitis


Status: Acute   





(2) Hypertension


Status: Acute   





(3) Sarcoidosis


Status: Chronic   





(4) UTI (urinary tract infection) due to urinary indwelling Charles catheter


Status: Acute

## 2018-09-15 NOTE — CP.PCM.PN
Subjective





- Date & Time of Evaluation


Date of Evaluation: 09/14/18





- Subjective


Subjective: 





BACK PAIN, NO FEVER, NO SOB, NO CHEST PAIN





Objective





- Vital Signs/Intake and Output


Vital Signs (last 24 hours): 


 











Temp Pulse Resp BP Pulse Ox


 


 97.8 F   58 L  20   130/70   98 


 


 09/14/18 15:41  09/14/18 15:41  09/14/18 15:41  09/14/18 15:41  09/14/18 15:41








Intake and Output: 


 











 09/14/18 09/15/18





 18:59 06:59


 


Output Total  1800


 


Balance  -1800














- Medications


Medications: 


 Current Medications





Apixaban (Eliquis)  5 mg PO BID [facility]


   Last Admin: 09/14/18 17:39 Dose:  5 mg


Belladonna/Phenobarbital (Donnatal)  1 tab PO TID [facility]


   Last Admin: 09/14/18 17:39 Dose:  1 tab


Digoxin (Lanoxin)  0.25 mg PO 0800 [facility]


   Last Admin: 09/14/18 09:39 Dose:  Not Given


Diphenhydramine HCl (Benadryl)  25 mg IVP Q4 PRN


   PRN Reason: Itching / Pruritus


   Last Admin: 09/14/18 20:21 Dose:  25 mg


Diphenoxylate HCl/Atropine (Lomotil 0.025-2.5 Mg Tablet)  1 tab PO Q8 PRN


   PRN Reason: Diarrhea


Gabapentin (Neurontin)  300 mg PO TID [facility]


   Last Admin: 09/14/18 17:39 Dose:  300 mg


Hydrocortisone (Cortizone 1% Cream)  0 gm TOP BID [facility]


   Last Admin: 09/14/18 17:38 Dose:  Not Given


Insulin Human Regular (Novolin R)  0 unit SC [facility]


   PRN Reason: Protocol


   Last Admin: 09/14/18 22:00 Dose:  Not Given


Lactobacillus Acidophilus (Bacid Acidophilus)  1 cap PO BID [facility]


   Last Admin: 09/14/18 17:43 Dose:  1 cap


Metoprolol Succinate (Toprol Xl)  50 mg PO DAILY [facility]


   Last Admin: 09/14/18 09:39 Dose:  50 mg


Morphine Sulfate (Morphine)  4 mg IVP Q4 PRN


   PRN Reason: Pain, severe (8-10)


   Last Admin: 09/14/18 20:21 Dose:  4 mg


Ondansetron HCl (Zofran Odt)  4 mg PO Q6H PRN


   PRN Reason: Nausea/Vomiting


Oxybutynin Chloride (Ditropan Xl)  10 mg PO DAILY [facility]


   Last Admin: 09/13/18 09:45 Dose:  10 mg


Prednisone (Prednisone Tab)  20 mg PO DAILY [facility]


   Last Admin: 09/14/18 09:40 Dose:  20 mg


Zolpidem Tartrate (Ambien)  5 mg PO 2200 PRN


   PRN Reason: Insomnia


   Last Admin: 09/13/18 22:49 Dose:  5 mg











- Labs


Labs: 


 





 09/12/18 08:16 





 09/12/18 08:16 





 











PT  12.7 SECONDS (9.7-12.2)  H  09/08/18  11:40    


 


INR  1.2   09/08/18  11:40    


 


APTT  25 SECONDS (21-34)   09/08/18  11:40    














- Constitutional


Appears: Non-toxic, No Acute Distress, Chronically Ill





- Head Exam


Head Exam: ATRAUMATIC, NORMAL INSPECTION, NORMOCEPHALIC





- ENT Exam


ENT Exam: Mucous Membranes Moist, Normal Exam





- Neck Exam


Neck Exam: Full ROM, Normal Inspection





- Respiratory Exam


Respiratory Exam: Clear to Ausculation Bilateral, NORMAL BREATHING PATTERN





- Cardiovascular Exam


Cardiovascular Exam: REGULAR RHYTHM, +S1, +S2





- GI/Abdominal Exam


GI & Abdominal Exam: Normal Bowel Sounds





- Rectal Exam


Rectal Exam: NORMAL INSPECTION





- Extremities Exam


Extremities Exam: Full ROM.  absent: Normal Capillary Refill





- Neurological Exam


Neurological Exam: Abnormal Gait, Alert, Awake, CN II-XII Intact, Oriented x3


Neuro motor strength exam: Left Upper Extremity: 5, Right Upper Extremity: 5, 

Left Lower Extremity: 5, Right Lower Extremity: 5





- Psychiatric Exam


Psychiatric exam: Normal Mood





- Skin


Skin Exam: Intact





Assessment and Plan


(1) Ascending cholangitis


Status: Acute   





(2) Hypertension


Status: Acute   





(3) Sarcoidosis


Status: Chronic   





(4) UTI (urinary tract infection) due to urinary indwelling Charles catheter


Status: Acute

## 2018-09-15 NOTE — PN
DATE:  09/15/2018



LOCATION:  670, bed B.



SUBJECTIVE:  This is a 53-year-old female post ERCP with biliary stent

exchange, seen and examined in rounds without significant clinical changes

or reported active bleeding with intermittent periods of abdominal pain,

less than before as well as suprapubic abdominal pain.  She still has mild

diarrhea, but less than before.  No reported active bleeding, actual chest

pain, palpitation, significant shortness of breath, or reported chills or

fever.



Most recent lab results today showed blood glucose level of 107.  However,

the patient still has low hemoglobin and hematocrit with low calcium, as

well as mildly elevated total bilirubin before, which is gradually

improving post ERCP.



PHYSICAL EXAMINATION:

GENERAL:  A 53-year-old female, awake, alert, oriented, afebrile,

tolerating oral intake.

VITAL SIGNS:  Pulse of 64, respiratory rate 20-22, blood pressure 140/74.

HEENT:  Showed pale, dry oral mucous membranes.  Bilateral icteric sclerae.

LUNGS:  Few scattered crepitations.  Decreased air entry at bases.

HEART:  Positive S1 and S2.

ABDOMEN:  Soft.  Bowel sounds are present, with slight distention and

generalized tenderness, but mainly in the right upper quadrant suprapubic

area, still has Charles catheter in place.

EXTREMITIES:  With evidence of bilateral above-knee amputation.  No

clubbing or cyanosis.

NEUROLOGIC:  No new reported neurological deficits, sensory or motor.



IMPRESSION:

1.  Hepatic sarcoidosis.

2.  Abnormal liver function test, jaundice, secondary to above with biliary

stenosis, and status post biliary stent insertion.

3.  Poorly controlled diabetes mellitus.

4.  Known history of hypertension, peripheral vascular disease, bilateral

above-knee amputation.

5.  Anemia secondary to above.

6.  Reexacerbation of peptic ulcer disease.

7.  Neurogenic bladder with recurrent urinary tract infection associated

with bacteremia and septicemia before, on antibiotics.

8.  Reported history of cardiac arrhythmia.

9.  Known history of status post partial colon resection due to

intra-abdominal, intrapelvic abscess formation.



SUGGESTIONS:

1.  Continue current management.

2.  Antismooth muscle antibody.

3.  Antimitochondrial antibody.

4.  Further recommendations to follow and the patient still needs steroids

IV due to her sarcoidosis.





__________________________________________

Jeffrey Albert MD





DD:  09/15/2018 7:53:02

DT:  09/15/2018 7:56:20

[facility] # [id number]

## 2018-09-15 NOTE — PN
DATE:  09/15/2018



SUBJECTIVE:  The patient is afebrile.  No shortness of breath.  Decreased

LFTs.  Overall, she is stable.  No nausea or vomiting.



PHYSICAL EXAMINATION:

VITAL SIGNS:  /82, pulse 58, respiratory rate 20 and temperature

97.5.

LUNGS:  Clear.

CARDIOVASCULAR SYSTEM:  S1 and S2, regular.

ABDOMEN:  Soft.



ASSESSMENT:

1.  Hepatic sarcoidosis.

2.  Neurogenic bladder.

3.  Urinary tract infection.

4.  Hypertension.

5.  Diabetes.



PLAN:  The patient's labs have been reviewed.  She is for discharge.  She

is stable for discharge.  She needs to continue her current medications.







__________________________________________

Nasir Dunbar MD





DD:  09/15/2018 20:06:33

DT:  09/15/2018 22:39:10

[facility] # [id number]

## 2018-09-17 NOTE — PCM.HF
Heart Failure Core Measure





- Heart Failure


Ejection Fraction: 40 % or Greater


ACE Inhibitor Prescribed: No


Contraindication/Reason for not providing: ef>45


Beta-Blocker Prescribed: Metoprolol Succinate


Angiotensin II Receptor Blocker Prescribed: No


Contraindication/Reason for not providing: ef>45


AnticoagulationTherapy for Atrial Fibrillation/Atrialflutter: No


Contraindication/Reason for not providing: no hx of a fib 


Aldosterone Antagonist Prescribed: No


Contraindication/Reason for not providing: ef>45


Hydralazine Nitrate Prescribed: No


Contraindication/Reason for not providing: ef.45


Implantable Cardioverter Defibrillator Therapy: No


Contraindication/Reason for not providing: ef.45/pt has AICD


Contraindication/Reason for not providing: ef.45





- Follow up


Will be discharged to: Home


Follow Up Date (must be within 7 days from discharge): 09/19/18


Follow Up Time: 09:00

## 2018-10-15 ENCOUNTER — HOSPITAL ENCOUNTER (INPATIENT)
Dept: HOSPITAL 31 - C.ER | Age: 53
LOS: 9 days | Discharge: HOME HEALTH | DRG: 919 | End: 2018-10-24
Attending: INTERNAL MEDICINE | Admitting: INTERNAL MEDICINE
Payer: MEDICARE

## 2018-10-15 VITALS — BODY MASS INDEX: 18.7 KG/M2

## 2018-10-15 DIAGNOSIS — I25.10: ICD-10-CM

## 2018-10-15 DIAGNOSIS — T85.590A: Primary | ICD-10-CM

## 2018-10-15 DIAGNOSIS — K25.9: ICD-10-CM

## 2018-10-15 DIAGNOSIS — E11.51: ICD-10-CM

## 2018-10-15 DIAGNOSIS — I11.0: ICD-10-CM

## 2018-10-15 DIAGNOSIS — E86.0: ICD-10-CM

## 2018-10-15 DIAGNOSIS — K25.7: ICD-10-CM

## 2018-10-15 DIAGNOSIS — K83.1: ICD-10-CM

## 2018-10-15 DIAGNOSIS — E87.2: ICD-10-CM

## 2018-10-15 DIAGNOSIS — Z95.810: ICD-10-CM

## 2018-10-15 DIAGNOSIS — K44.9: ICD-10-CM

## 2018-10-15 DIAGNOSIS — Z79.4: ICD-10-CM

## 2018-10-15 DIAGNOSIS — Z95.5: ICD-10-CM

## 2018-10-15 DIAGNOSIS — N39.0: ICD-10-CM

## 2018-10-15 DIAGNOSIS — Z89.612: ICD-10-CM

## 2018-10-15 DIAGNOSIS — T85.520A: ICD-10-CM

## 2018-10-15 DIAGNOSIS — E78.00: ICD-10-CM
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
## 2018-10-15 DIAGNOSIS — Z87.440: ICD-10-CM

## 2018-10-15 DIAGNOSIS — D64.9: ICD-10-CM

## 2018-10-15 DIAGNOSIS — K21.9: ICD-10-CM

## 2018-10-15 DIAGNOSIS — Z82.49: ICD-10-CM

## 2018-10-15 DIAGNOSIS — T83.511A: ICD-10-CM

## 2018-10-15 DIAGNOSIS — K86.89: ICD-10-CM

## 2018-10-15 DIAGNOSIS — I50.32: ICD-10-CM

## 2018-10-15 DIAGNOSIS — Z87.891: ICD-10-CM

## 2018-10-15 DIAGNOSIS — T38.0X5A: ICD-10-CM

## 2018-10-15 DIAGNOSIS — I48.91: ICD-10-CM

## 2018-10-15 DIAGNOSIS — Z98.891: ICD-10-CM

## 2018-10-15 DIAGNOSIS — Z86.718: ICD-10-CM

## 2018-10-15 DIAGNOSIS — T82.318A: ICD-10-CM

## 2018-10-15 DIAGNOSIS — E78.5: ICD-10-CM

## 2018-10-15 DIAGNOSIS — T85.520D: ICD-10-CM

## 2018-10-15 DIAGNOSIS — Z87.11: ICD-10-CM
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
## 2018-10-15 DIAGNOSIS — J45.909: ICD-10-CM

## 2018-10-15 DIAGNOSIS — Z90.49: ICD-10-CM

## 2018-10-15 DIAGNOSIS — Z89.611: ICD-10-CM

## 2018-10-15 DIAGNOSIS — Z80.3: ICD-10-CM

## 2018-10-15 DIAGNOSIS — E66.9: ICD-10-CM

## 2018-10-15 DIAGNOSIS — N31.9: ICD-10-CM

## 2018-10-15 DIAGNOSIS — E11.65: ICD-10-CM

## 2018-10-15 DIAGNOSIS — K29.70: ICD-10-CM

## 2018-10-15 DIAGNOSIS — D86.89: ICD-10-CM

## 2018-10-15 LAB
ALBUMIN SERPL-MCNC: 3.4 G/DL (ref 3.5–5)
ALBUMIN/GLOB SERPL: 0.8 {RATIO} (ref 1–2.1)
ALT SERPL-CCNC: 44 U/L (ref 9–52)
AST SERPL-CCNC: 122 U/L (ref 14–36)
BACTERIA #/AREA URNS HPF: (no result) /[HPF]
BASOPHILS # BLD AUTO: 0.1 K/UL (ref 0–0.2)
BASOPHILS NFR BLD: 2 % (ref 0–2)
BILIRUB DIRECT SERPL-MCNC: 16.2 MG/DL (ref 0–0.4)
BILIRUB UR-MCNC: (no result) MG/DL
BUN SERPL-MCNC: 8 MG/DL (ref 7–17)
CALCIUM SERPL-MCNC: 9 MG/DL (ref 8.6–10.4)
EOSINOPHIL # BLD AUTO: 0.3 K/UL (ref 0–0.7)
EOSINOPHIL NFR BLD: 7 % (ref 0–4)
ERYTHROCYTE [DISTWIDTH] IN BLOOD BY AUTOMATED COUNT: 16.6 % (ref 11.5–14.5)
GFR NON-AFRICAN AMERICAN: > 60
GLUCOSE UR STRIP-MCNC: NORMAL MG/DL
HGB BLD-MCNC: 11.1 G/DL (ref 11–16)
HYPHAE YEAST: (no result) /LPF
LEUKOCYTE ESTERASE UR-ACNC: (no result) LEU/UL
LIPASE: 19 U/L (ref 23–300)
LYMPHOCYTES # BLD AUTO: 1.1 K/UL (ref 1–4.3)
LYMPHOCYTES NFR BLD AUTO: 23 % (ref 20–40)
MCH RBC QN AUTO: 31.5 PG (ref 27–31)
MCHC RBC AUTO-ENTMCNC: 34.1 G/DL (ref 33–37)
MCV RBC AUTO: 92.3 FL (ref 81–99)
MONOCYTES # BLD: 0.4 K/UL (ref 0–0.8)
MONOCYTES NFR BLD: 9 % (ref 0–10)
NEUTROPHILS # BLD: 2.9 K/UL (ref 1.8–7)
NEUTROPHILS NFR BLD AUTO: 59 % (ref 50–75)
NRBC BLD AUTO-RTO: 0 % (ref 0–2)
PH UR STRIP: 7 [PH] (ref 5–8)
PLATELET # BLD: 138 K/UL (ref 130–400)
PMV BLD AUTO: 9.2 FL (ref 7.2–11.7)
PROT UR STRIP-MCNC: (no result) MG/DL
RBC # BLD AUTO: 3.54 MIL/UL (ref 3.8–5.2)
RBC # UR STRIP: (no result) /UL
SP GR UR STRIP: 1.01 (ref 1–1.03)
SQUAMOUS EPITHIAL: 1 /HPF (ref 0–5)
UROBILINOGEN UR-MCNC: 4 MG/DL (ref 0.2–1)
WBC # BLD AUTO: 4.8 K/UL (ref 4.8–10.8)
WBC CLUMPS # UR AUTO: (no result) /HPF

## 2018-10-15 RX ADMIN — CIPROFLOXACIN SCH MLS/HR: 2 INJECTION, SOLUTION INTRAVENOUS at 21:02

## 2018-10-15 RX ADMIN — HUMAN INSULIN SCH: 100 INJECTION, SOLUTION SUBCUTANEOUS at 22:09

## 2018-10-15 RX ADMIN — HYDROMORPHONE HYDROCHLORIDE PRN MG: 1 INJECTION, SOLUTION INTRAMUSCULAR; INTRAVENOUS; SUBCUTANEOUS at 21:12

## 2018-10-15 NOTE — C.PDOC
History Of Present Illness


Ms. Jaramillo is a 53 year old  female PMH sarcoidosis, CHF s/p 

pacemaker, DM s/p bilateral AKA comes to [facility] for vomitting x4 days. She 

started feeling nauseous and vomited about 5 min after any attempt at oral intak

e, including water for her medications. Hasn't practically taken any of her PO 

medications in the last 4 days since they have all been regurgitated, being 

observed whole in the vomitus. Bilious, non-bloody vomiting x6/day. States her 

caretakers (, brother) agree that she has jaundicing of her eyes and a 

fishy smell to her urine when emptying her catheter. Admits weakness, 

dehydration. Denies numbness, tingling, headache, current nausea, chest pain, 

shortness of breath.





<Eunice Corbin - Last Filed: 10/15/18 18:38>


History Per: Patient





<Eunice Corbin - Last Filed: 10/15/18 18:38>





<Palmer Orosco DO - Last Filed: 10/15/18 18:51>


Time Seen by Provider: 10/15/18 17:18


Chief Complaint (Nursing): Abdominal Pain





Past Medical History


Reviewed: Historical Data, Nursing Documentation, Vital Signs


Vital Signs: 





                                Last Vital Signs











Temp  98.6 F   10/15/18 16:38


 


Pulse  51 L  10/15/18 16:38


 


Resp  18   10/15/18 16:38


 


BP  137/74   10/15/18 16:38


 


Pulse Ox  99   10/15/18 16:38














- Medical History


PMH: Anemia, Asthma (NO INHALER-ON PREDNISONE DAILY PO.), CAD (stent x 1), 

Cardia Arrhythmia, CHF, Diabetes, Deep Vein Thrombosis, Gall Bladder Disease, 

HTN, Hypercholesterolemia


   Denies: Chronic Kidney Disease


Surgical History: Appendectomy, Cholecystectomy, Coronary Stent, Endoscopy, C-

Section





- [facility]











 (09/08/18)


CENTRAL VENOUS CATHETER PLACEMENT WITH GUIDANCE (05/29/14)


CLOSED ENDOSCOPIC BIOPSY OF LARGE INTESTINE (11/19/14)


ENDOSC RETROGRADE CHOLANGIOPANCREATOGRAPHY [ERCP] (10/30/13)


ENDOSCOP INSERTION OF STENT (TUBE) INTO BILE DUCT (05/29/14)


ESOPHAGOGASTRODUODENOSCOPY [EGD] W/CLOSED BIOPSY (05/29/14)


INSPECTION OF LOWER INTESTINAL TRACT, ENDO (02/05/18)


IRRIGATION OF LOWER GI USING IRRIGATING SUBSTANCE, ENDO (02/05/18)


OTHER LOCAL DESTRUC SKIN (10/30/13)


OTHER SKIN & SUBQ I   D (10/30/13)


REMOVAL OF INTRALUMINAL DEVICE FROM HEPATOBILIARY DUCT, ENDO (09/08/18)








Family History: States: Unknown Family Hx





- Social History


Hx Tobacco Use: No


Hx Alcohol Use: No


Hx Substance Use: No





- Immunization History


Hx Tetanus Toxoid Vaccination: Yes


Hx Influenza Vaccination: No


Hx Pneumococcal Vaccination: Yes (2015)





<Eunice Corbin - Last Filed: 10/15/18 18:38>


Vital Signs: 





                                Last Vital Signs











Temp  98.6 F   10/15/18 16:38


 


Pulse  51 L  10/15/18 16:38


 


Resp  18   10/15/18 16:38


 


BP  137/74   10/15/18 16:38


 


Pulse Ox  99   10/15/18 18:38














- [facility]











 (09/08/18)


CENTRAL VENOUS CATHETER PLACEMENT WITH GUIDANCE (05/29/14)


CLOSED ENDOSCOPIC BIOPSY OF LARGE INTESTINE (11/19/14)


ENDOSC RETROGRADE CHOLANGIOPANCREATOGRAPHY [ERCP] (10/30/13)


ENDOSCOP INSERTION OF STENT (TUBE) INTO BILE DUCT (05/29/14)


ESOPHAGOGASTRODUODENOSCOPY [EGD] W/CLOSED BIOPSY (05/29/14)


INSPECTION OF LOWER INTESTINAL TRACT, ENDO (02/05/18)


IRRIGATION OF LOWER GI USING IRRIGATING SUBSTANCE, ENDO (02/05/18)


OTHER LOCAL DESTRUC SKIN (10/30/13)


OTHER SKIN & SUBQ I   D (10/30/13)


REMOVAL OF INTRALUMINAL DEVICE FROM HEPATOBILIARY DUCT, ENDO (09/08/18)











<Palmer Orosco DO - Last Filed: 10/15/18 18:51>





Review Of Systems


Constitutional: Positive for: Chills.  Negative for: Fever, Sweats, Weakness, 

Malaise, Weight loss


Eyes: Positive for: Other (sceral icterus).  Negative for: Pain, Vision Change, 

Conjunctivae Inflammation


ENT: Negative for: Ear Pain, Ear Discharge, Nose Pain, Nose Discharge, Nose 

Congestion, Mouth Pain


Cardiovascular: Negative for: Chest Pain, Palpitations, Orthopnea, Edema, Light 

Headedness


Respiratory: Negative for: Cough, Shortness of Breath, Hemoptysis, SOB with 

Excertion, Pleuritic Pain, Sputum, Wheezing


Gastrointestinal: Positive for: Nausea, Vomiting, Abdominal Pain.  Negative for:

 Diarrhea, Constipation, Melena, Hematochezia, Hematemesis


Genitourinary: Positive for: Incontinence (Charles in place).  Negative for: 

Dysuria, Frequency


Musculoskeletal: Negative for: Neck Pain, Shoulder Pain, Arm Pain, Back Pain


Skin: Negative for: Rash, Lesions, Jaundice


Neurological: Negative for: Weakness, Numbness, Incoordination, Confusion, 

Seizures





<Eunice Corbin - Last Filed: 10/15/18 18:38>





Physical Exam





- Physical Exam


Appears: Non-toxic, No Acute Distress


Skin: Normal Color, Dry


Head: Atraumatic, Normacephalic


Eye(s): bilateral: PERRL, EOMI, Scleral Icterus


Nose: Normal


Oral Mucosa: Moist


Neck: Normal, Trachea Midline


Chest: Symmetrical, No Deformity (Portacath in L chest in place), No Tenderness


Cardiovascular: Rhythm Regular, Rhythm Irregular, No Murmur, Other (pacemaker in

 L upper chest)


Respiratory: Normal Breath Sounds, Decreased Breath Sounds, No Rales, No 

Rhonchi, No Wheezing


Gastrointestinal/Abdominal: Normal Exam, Bowel Sounds, Soft, Tenderness, No 

Organomegaly, No Mass, No Distention, Guarding, No Rebound, No Hernia, No 

Ascites (diffuse TTP, worst RUQ. palpation of other quadrants radiate to RUQ.)


Back: No CVA Tenderness, No Vertebral Tenderness, No Decreased ROM


Extremity: Other (bilateral AKA)


Pulses: Left Radial: Normal, Right Radial: Normal


DTR: Bicep (R): 2+, Bicep (L): 2+


Neurological/Psych: Oriented x3, Normal Speech, Normal Cognition





<Eunice Corbin - Last Filed: 10/15/18 18:38>





ED Course And Treatment





- Laboratory Results


Result Diagrams: 


                                 10/15/18 17:27





                                 10/15/18 17:27


O2 Sat by Pulse Oximetry: 99





<Eunice Corbin - Last Filed: 10/15/18 18:38>





- Laboratory Results


Result Diagrams: 


                                 10/15/18 17:27





                                 10/15/18 17:27





<Palmer Orosco DO - Last Filed: 10/15/18 18:51>





Medical Decision Making


Medical Decision Making: 


CBC, CMP, bili, lipase


   total bili - 18.3 ,direct bili - 16.2


CT A/P with contrast


UA and Urine Cx ordered


morphine with benadryl given for pain control (allergy to Toradol)


IVF started





Call placed to Dr. Dunbar. Agrees to admission under his service.





<Eunice Corbin - Last Filed: 10/15/18 18:38>





Disposition


Discussed With .: Nasir Dunbar


Doctor Will See Patient In The: Hospital





- Disposition


Disposition Time: 18:45





<Eunice Corbin - Last Filed: 10/15/18 18:38>





<Palmer Orosco DO - Last Filed: 10/15/18 18:51>





- Disposition


Condition: FAIR


Forms:  [facility] (English)





- Clinical Impression


Clinical Impression: 


 Bilirubinemia, Abdominal pain








- PA / NP / Resident Statement


MD/ has reviewed & agrees with the documentation as recorded.


MD/ has examined the patient and agrees with the treatment plan.





<Eunice Corbin - Last Filed: 10/15/18 18:38>

## 2018-10-15 NOTE — CP.PCM.HP
Past Patient History





- Infectious Disease


Hx of Infectious Diseases: None





- Tetanus Immunizations


Tetanus Immunization: Unknown





- Past Medical History & Family History


Past Medical History?: Yes





- Past Social History


Smoking Status: Former Smoker





- CARDIAC


Hx Cardia Arrhythmia: Yes


Hx Congestive Heart Failure: Yes


Hx Hypercholesterolemia: Yes


Hx Hypertension: Yes





- PULMONARY


Hx Asthma: Yes (NO INHALER-ON PREDNISONE DAILY PO.)





- NEUROLOGICAL


Hx Neurological Disorder: No





- HEENT


Hx HEENT Problems: Yes


Hx Cataracts: Yes (BILAT.)





- RENAL


Hx Chronic Kidney Disease: No





- ENDOCRINE/METABOLIC


Hx Endocrine Disorders: Yes


Hx Diabetes Mellitus Type 1: Yes





- HEMATOLOGICAL/ONCOLOGICAL


Hx Anemia: Yes





- INTEGUMENTARY


Hx Dermatological Problems: No





- MUSCULOSKELETAL/RHEUMATOLOGICAL


Hx Falls: Yes





- GASTROINTESTINAL


Hx Gall Bladder Disease: Yes





- GENITOURINARY/GYNECOLOGICAL


Hx Genitourinary Disorders: Yes


Hx Urinary Tract Infection: Yes


Other/Comment: PT HAS LONG TERM GUTIERREZ





- PSYCHIATRIC


Hx Substance Use: No





- SURGICAL HISTORY


Hx Appendectomy: Yes


Hx Cholecystectomy: Yes


Hx Coronary Stent: Yes





- ANESTHESIA


Hx Anesthesia: Yes


Hx Anesthesia Reactions: No


Hx Malignant Hyperthermia: No





Meds


Allergies/Adverse Reactions: 


                                    Allergies











Allergy/AdvReac Type Severity Reaction Status Date / Time


 


avocado Allergy Severe ANAPHYLAXIS Verified 10/04/17 17:28


 


banana Allergy Severe ANAPHYLAXIS Verified 10/04/17 17:28


 


cherry Allergy Severe ANAPHYLAXIS Verified 10/04/17 17:28


 


ketorolac [From Toradol] Allergy   Verified 10/04/17 17:28














Results





- Vital Signs


Recent Vital Signs: 





                                Last Vital Signs











Temp  98.9 F   10/15/18 20:30


 


Pulse  60   10/15/18 20:30


 


Resp  20   10/15/18 20:30


 


BP  161/79 H  10/15/18 20:30


 


Pulse Ox  98   10/15/18 20:30














- Labs


Result Diagrams: 


                                 10/15/18 17:27





                                 10/15/18 17:27


Labs: 





                         Laboratory Results - last 24 hr











  10/15/18 10/15/18 10/15/18





  17:27 17:27 19:40


 


WBC  4.8  D  


 


RBC  3.54 L  


 


Hgb  11.1  


 


Hct  32.7 L  


 


MCV  92.3  D  


 


MCH  31.5 H  


 


MCHC  34.1  


 


RDW  16.6 H  


 


Plt Count  138  D  


 


MPV  9.2  


 


Neut % (Auto)  59.0  


 


Lymph % (Auto)  23.0  


 


Mono % (Auto)  9.0  


 


Eos % (Auto)  7.0 H  


 


Baso % (Auto)  2.0  


 


Neut # (Auto)  2.9  


 


Lymph # (Auto)  1.1  


 


Mono # (Auto)  0.4  


 


Eos # (Auto)  0.3  


 


Baso # (Auto)  0.1  


 


Sodium   139 


 


Potassium   3.6 


 


Chloride   105 


 


Carbon Dioxide   23 


 


Anion Gap   15 


 


BUN   8 


 


Creatinine   0.4 L 


 


Est GFR ( Amer)   > 60 


 


Est GFR (Non-Af Amer)   > 60 


 


POC Glucose (mg/dL)   


 


Random Glucose   85 


 


Calcium   9.0 


 


Total Bilirubin   18.3 H 


 


Direct Bilirubin   16.2 H 


 


AST   122 H 


 


ALT   44 


 


Alkaline Phosphatase   625 H D 


 


Total Protein   7.4 


 


Albumin   3.4 L 


 


Globulin   4.1 H 


 


Albumin/Globulin Ratio   0.8 L 


 


Lipase   19 L 


 


Urine Color    Elise


 


Urine Clarity    Hazy


 


Urine pH    7.0


 


Ur Specific Gravity    1.013


 


Urine Protein    1+ H


 


Urine Glucose (UA)    Normal


 


Urine Ketones    Negative


 


Urine Blood    1+ H


 


Urine Nitrate    Positive H


 


Urine Bilirubin    2+ H


 


Urine Urobilinogen    4.0 H


 


Ur Leukocyte Esterase    3+ H


 


Urine WBC (Auto)    117 H


 


Urine RBC (Auto)    12 H


 


Urine WBC Clumps (Auto)    Few H


 


Ur Squamous Epith Cells    1


 


Urine Bacteria    Few H


 


Ur Yeast w Hyphae    Rare H


 


Urine Yeast (Budding)    Rare H














  10/15/18





  21:42


 


WBC 


 


RBC 


 


Hgb 


 


Hct 


 


MCV 


 


MCH 


 


MCHC 


 


RDW 


 


Plt Count 


 


MPV 


 


Neut % (Auto) 


 


Lymph % (Auto) 


 


Mono % (Auto) 


 


Eos % (Auto) 


 


Baso % (Auto) 


 


Neut # (Auto) 


 


Lymph # (Auto) 


 


Mono # (Auto) 


 


Eos # (Auto) 


 


Baso # (Auto) 


 


Sodium 


 


Potassium 


 


Chloride 


 


Carbon Dioxide 


 


Anion Gap 


 


BUN 


 


Creatinine 


 


Est GFR ( Amer) 


 


Est GFR (Non-Af Amer) 


 


POC Glucose (mg/dL)  159 H


 


Random Glucose 


 


Calcium 


 


Total Bilirubin 


 


Direct Bilirubin 


 


AST 


 


ALT 


 


Alkaline Phosphatase 


 


Total Protein 


 


Albumin 


 


Globulin 


 


Albumin/Globulin Ratio 


 


Lipase 


 


Urine Color 


 


Urine Clarity 


 


Urine pH 


 


Ur Specific Gravity 


 


Urine Protein 


 


Urine Glucose (UA) 


 


Urine Ketones 


 


Urine Blood 


 


Urine Nitrate 


 


Urine Bilirubin 


 


Urine Urobilinogen 


 


Ur Leukocyte Esterase 


 


Urine WBC (Auto) 


 


Urine RBC (Auto) 


 


Urine WBC Clumps (Auto) 


 


Ur Squamous Epith Cells 


 


Urine Bacteria 


 


Ur Yeast w Hyphae 


 


Urine Yeast (Budding)

## 2018-10-16 LAB
APTT BLD: 43 SECONDS (ref 21–34)
INR PPP: 1.5
PROTHROMBIN TIME: 16.4 SECONDS (ref 9.7–12.2)

## 2018-10-16 PROCEDURE — 0DB68ZX EXCISION OF STOMACH, VIA NATURAL OR ARTIFICIAL OPENING ENDOSCOPIC, DIAGNOSTIC: ICD-10-PCS | Performed by: SPECIALIST

## 2018-10-16 RX ADMIN — HYDROMORPHONE HYDROCHLORIDE PRN MG: 1 INJECTION, SOLUTION INTRAMUSCULAR; INTRAVENOUS; SUBCUTANEOUS at 05:23

## 2018-10-16 RX ADMIN — DIPHENHYDRAMINE HYDROCHLORIDE PRN MG: 50 INJECTION INTRAMUSCULAR; INTRAVENOUS at 20:18

## 2018-10-16 RX ADMIN — DIPHENHYDRAMINE HYDROCHLORIDE PRN MG: 50 INJECTION INTRAMUSCULAR; INTRAVENOUS at 05:23

## 2018-10-16 RX ADMIN — HUMAN INSULIN SCH: 100 INJECTION, SOLUTION SUBCUTANEOUS at 08:10

## 2018-10-16 RX ADMIN — HUMAN INSULIN SCH: 100 INJECTION, SOLUTION SUBCUTANEOUS at 11:59

## 2018-10-16 RX ADMIN — DIPHENHYDRAMINE HYDROCHLORIDE PRN MG: 50 INJECTION INTRAMUSCULAR; INTRAVENOUS at 11:24

## 2018-10-16 RX ADMIN — DIGOXIN SCH MG: 0.25 TABLET ORAL at 17:21

## 2018-10-16 RX ADMIN — METHYLPREDNISOLONE SODIUM SUCCINATE SCH MG: 40 INJECTION, POWDER, FOR SOLUTION INTRAMUSCULAR; INTRAVENOUS at 12:04

## 2018-10-16 RX ADMIN — METHYLPREDNISOLONE SODIUM SUCCINATE SCH MG: 40 INJECTION, POWDER, FOR SOLUTION INTRAMUSCULAR; INTRAVENOUS at 21:16

## 2018-10-16 RX ADMIN — HUMAN INSULIN SCH: 100 INJECTION, SOLUTION SUBCUTANEOUS at 21:39

## 2018-10-16 RX ADMIN — CIPROFLOXACIN SCH MLS/HR: 2 INJECTION, SOLUTION INTRAVENOUS at 08:40

## 2018-10-16 RX ADMIN — DEXTROSE AND SODIUM CHLORIDE SCH MLS/HR: 5; 450 INJECTION, SOLUTION INTRAVENOUS at 21:20

## 2018-10-16 RX ADMIN — Medication SCH: at 10:22

## 2018-10-16 RX ADMIN — DEXTROSE AND SODIUM CHLORIDE SCH: 5; 450 INJECTION, SOLUTION INTRAVENOUS at 10:22

## 2018-10-16 RX ADMIN — PHENOBARBITAL, HYOSCYAMINE SULFATE, ATROPINE SULFATE, SCOPOLAMINE HYDROBROMIDE SCH TAB: 16.2; .1037; .0194; .0065 TABLET ORAL at 13:08

## 2018-10-16 RX ADMIN — HYDROMORPHONE HYDROCHLORIDE PRN MG: 1 INJECTION, SOLUTION INTRAMUSCULAR; INTRAVENOUS; SUBCUTANEOUS at 20:18

## 2018-10-16 RX ADMIN — PHENOBARBITAL, HYOSCYAMINE SULFATE, ATROPINE SULFATE, SCOPOLAMINE HYDROBROMIDE SCH TAB: 16.2; .1037; .0194; .0065 TABLET ORAL at 17:20

## 2018-10-16 RX ADMIN — OXYBUTYNIN CHLORIDE SCH: 10 TABLET, EXTENDED RELEASE ORAL at 10:22

## 2018-10-16 RX ADMIN — HUMAN INSULIN SCH: 100 INJECTION, SOLUTION SUBCUTANEOUS at 16:47

## 2018-10-16 RX ADMIN — CIPROFLOXACIN SCH MLS/HR: 2 INJECTION, SOLUTION INTRAVENOUS at 20:24

## 2018-10-16 RX ADMIN — HYDROMORPHONE HYDROCHLORIDE PRN MG: 1 INJECTION, SOLUTION INTRAMUSCULAR; INTRAVENOUS; SUBCUTANEOUS at 15:54

## 2018-10-16 RX ADMIN — HYDROMORPHONE HYDROCHLORIDE PRN MG: 1 INJECTION, SOLUTION INTRAMUSCULAR; INTRAVENOUS; SUBCUTANEOUS at 11:22

## 2018-10-16 RX ADMIN — HYDROMORPHONE HYDROCHLORIDE PRN MG: 1 INJECTION, SOLUTION INTRAMUSCULAR; INTRAVENOUS; SUBCUTANEOUS at 01:22

## 2018-10-16 RX ADMIN — METOPROLOL SUCCINATE SCH: 50 TABLET, EXTENDED RELEASE ORAL at 10:23

## 2018-10-16 RX ADMIN — Medication SCH CAP: at 17:20

## 2018-10-16 RX ADMIN — DIPHENHYDRAMINE HYDROCHLORIDE PRN MG: 50 INJECTION INTRAMUSCULAR; INTRAVENOUS at 01:22

## 2018-10-16 RX ADMIN — DIPHENHYDRAMINE HYDROCHLORIDE PRN MG: 50 INJECTION INTRAMUSCULAR; INTRAVENOUS at 15:55

## 2018-10-16 NOTE — HP
CHIEF COMPLAINT:  Chills, dysuria, and right upper quadrant pain x3 days.



HISTORY OF PRESENT ILLNESS:  This is a 53-year-old -American female,

well known to me with a history of hepatic sarcoidosis.  The patient has a

history of coronary artery disease, status post angioplasty; hypertension;

hyperlipidemia; neurogenic bladder; and she has a long-term chronic

indwelling Charles's catheter in place.  The patient is compliant with diet,

medication, and followup.  She has bilateral above-knee amputation because

of severe peripheral vascular disease, and the patient is on medication. 

For the last three days, she has been having fever, chills, rigors,

abdominal pain, nausea, right upper quadrant pain, and along with that, she

is having frequent urination.  The patient has history of hepatic

sarcoidosis.  She has been seen by Gastroenterology in the past.  According

to the patient, she was nauseous and vomiting every five minutes after she

started eating, and she could not take any of her oral medication and she

came to the emergency room.  The patient has been regurgitating.  It is

bilious, vomitus.  No blood in the vomiting.  The patient has change in

color of the urine.  It is getting darker and darker, more in yellow. 

Along with that, she is having _____ pain, flank pain.  Her urine is more

yellow.  The patient denies any chest pain, shortness of breath, orthopnea,

or PND.  She denies any cough, sore throat, or runny nose.  She denies any

polyuria, polydipsia or polyphagia.  She denies any hematuria or pyuria.



PAST MEDICAL HISTORY:  Type 2 diabetes, on insulin; hypertension;

hyperlipidemia; peripheral vascular disease, status post AKA; hepatic

sarcoidosis.



SOCIAL HISTORY:  She is a nonsmoker, non-EtOH user at home.



MEDICATIONS:  She takes prednisone 20 mg daily, Ditropan-XL, Toprol-XL,

Bacid, Dilaudid tablet, Neurontin, Flonase, Lanoxin, Lomotil, and Eliquis.



PHYSICAL EXAMINATION:

GENERAL:  A middle-aged female, in moderate distress with abdominal pain,

fever, and chills.

VITAL SIGNS:  Blood pressure 161/79, pulse 60, respiratory rate 20,

temperature 98.9.

SKIN:  The patient has no bruises, no purpura or petechia.  Has melissa

coloration of the skin.

HEENT:  Atraumatic, normocephalic.  Negative pallor.  Positive deep

jaundice.  Extraocular movements are intact.

NECK:  Supple.  Flat neck veins.  No JVD.  No lymph node.  No thyromegaly. 

No carotid bruits.

ABDOMEN:  Right upper quadrant enlarged.  Bowel sounds are normoactive and

present.  The patient has a Charles  catheter.

EXTREMITIES:  Bilateral above-knee amputation and her stumps are clean.

CENTRAL NERVOUS SYSTEM:  Awake, alert, and oriented x3.  Cranial nerves II

through XII are normal.  Power 5/5 x4.



ASSESSMENT:

1.   Hepatic sarcoidosis with increased total bilirubin level.  Etiology of

hepatic sarcoidosis, exacerbation versus biliary stent malfunction versus

hepatitis.

2.  Type 2 diabetes.

3.  Hypertension/congestive heart failure.

4.  Complicated urinary tract infection.  Urinary tract infection, I think,

is a consequence of longstanding Charles's catheter in place.





__________________________________________

Nasir Dunbar MD



DD:  10/16/2018 0:00:14

DT:  10/16/2018 2:05:30

Job # [id number]

## 2018-10-16 NOTE — CP.PCM.CON
<Rolanda Cornejo - Last Filed: 10/16/18 18:43>





History of Present Illness





- History of Present Illness


History of Present Illness: 





General surgery consult note for Dr. Tee


Consult for: possible CBD perforation





Mrs. Jaramillo is a 53F with a pmh of sarcoidosis of the liver, CBD stent, DM, 

HTN, afib and CHF and PSH of cholecystectomy who presented to the ED yesterday 

with a four-day history of emesis, abdominal pain, chills, fatigue and 

dizziness. Her abdominal pain mostly localized to the RUQ/RLQ and is rated as a 

sharp 9/10 pain that radiates to her right flank. Patient was in her usual state

of health prior to the vomiting episodes and denies any trauma or recent travel 

history. Describes her emesis as first beginning as food regurgitation and then 

evolving into a green/yellowish color over the 4-day period. Her last emesis was

yesterday, prior to coming into the ED. Last BM was yesterday which was normal 

in consistency, color, and caliber without any blood. Denies hematemesis, 

fevers, palpations, chest pain, SOB, weight changes or any urinary changes 

including hematuria, burning or dysuria. Patient had pain similar to this is the

past when she had her cholecystectomy in . Permanent Gutierrez Cather in place 

for a year, next change due in 2 days, recovering from recent UTI with renal 

infection.





PMH: DM (controlled with diet), HTN, HLD, asthma, sarcoidosis of the liver, CHF,

afib, PVD, GERD


Surgical hx: b/l AKAs (L , R ), cholecystectomy , right colon 

resection including appendectomy for benign colon mass, defibrillator /pacemaker

in , , 2 cardiac stents, CBD stent (replaced 2018)


Medications: Prednisone, metoprolol, digoxin, Eliquis, aspirin, gabapentin, 

oxybutynin, Dilaudid


Family hx: Mother  from breast cancer, father  from heart problems


Social hx: former smoker 20y ago (<10/day). Denies drugs or illicit drug use


Allergies: Tordol, bananas, avocados, cherries 





Review of Systems





- Review of Systems


All systems: reviewed and no additional remarkable complaints except (as per 

HPI)





Past Patient History





- Infectious Disease


Hx of Infectious Diseases: None





- Tetanus Immunizations


Tetanus Immunization: Unknown





- Past Medical History & Family History


Past Medical History?: Yes


Past Family History: Reviewed and not pertinent





- Past Social History


Smoking Status: Former Smoker


Alcohol: None


Drugs: Denies


Home Situation {Lives}: With Family





- CARDIAC


Hx Cardia Arrhythmia: Yes


Hx Congestive Heart Failure: Yes


Hx Hypercholesterolemia: Yes


Hx Hypertension: Yes





- PULMONARY


Hx Asthma: Yes (NO INHALER-ON PREDNISONE DAILY PO.)





- NEUROLOGICAL


Hx Neurological Disorder: No





- HEENT


Hx HEENT Problems: Yes


Hx Cataracts: Yes (BILAT.)





- RENAL


Hx Chronic Kidney Disease: No





- ENDOCRINE/METABOLIC


Hx Endocrine Disorders: Yes


Hx Diabetes Mellitus Type 1: Yes





- HEMATOLOGICAL/ONCOLOGICAL


Hx Anemia: Yes





- INTEGUMENTARY


Hx Dermatological Problems: No





- MUSCULOSKELETAL/RHEUMATOLOGICAL


Hx Falls: Yes





- GASTROINTESTINAL


Hx Gall Bladder Disease: Yes





- GENITOURINARY/GYNECOLOGICAL


Hx Genitourinary Disorders: Yes


Hx Urinary Tract Infection: Yes


Other/Comment: PT HAS LONG TERM GUTIERREZ





- PSYCHIATRIC


Hx Substance Use: No





- SURGICAL HISTORY


Hx Appendectomy: Yes


Hx Cholecystectomy: Yes


Hx Coronary Stent: Yes





- ANESTHESIA


Hx Anesthesia: Yes


Hx Anesthesia Reactions: No


Hx Malignant Hyperthermia: No





Meds


Allergies/Adverse Reactions: 


                                    Allergies











Allergy/AdvReac Type Severity Reaction Status Date / Time


 


avocado Allergy Severe ANAPHYLAXIS Verified 10/04/17 17:28


 


banana Allergy Severe ANAPHYLAXIS Verified 10/04/17 17:28


 


cherry Allergy Severe ANAPHYLAXIS Verified 10/04/17 17:28


 


ketorolac [From Toradol] Allergy   Verified 10/04/17 17:28














- Medications


Medications: 


                               Current Medications





Apixaban (Eliquis)  5 mg PO BID [facility]


Belladonna/Phenobarbital (Donnatal)  1 tab PO TID [facility]


   Last Admin: 10/16/18 17:20 Dose:  1 tab


Digoxin (Lanoxin)  0.25 mg PO DAILY@1800 [facility]


   Last Admin: 10/16/18 17:21 Dose:  0.25 mg


Diphenhydramine HCl (Benadryl)  25 mg IVP Q4 PRN


   PRN Reason: itchiness


   Last Admin: 10/16/18 15:55 Dose:  25 mg


Diphenoxylate HCl/Atropine (Lomotil 0.025-2.5 Mg Tablet)  1 tab PO Q8 PRN


   PRN Reason: Diarrhea


Gabapentin (Neurontin)  100 mg PO BID [facility]


   Last Admin: 10/16/18 17:17 Dose:  100 mg


Hydromorphone HCl (Dilaudid)  1 mg IVP Q4H PRN


   PRN Reason: pain


   Last Admin: 10/16/18 15:54 Dose:  1 mg


Ciprofloxacin (Cipro 400mg/200ml Dsw)  400 mg in 200 mls @ 133 mls/hr IVPB Q12H 

[facility]; Protocol


   Last Admin: 10/16/18 08:40 Dose:  133 mls/hr


Ceftriaxone Sodium 1 gm/ (Sodium Chloride)  100 mls @ 100 mls/hr IVPB DAILY [facility];

Protocol


   Last Admin: 10/16/18 11:22 Dose:  100 mls/hr


Dextrose/Sodium Chloride (Dextrose 5%/0.45% Ns 1000 Ml)  1,000 mls @ 80 mls/hr 

IV .[id number] [facility]


   Last Admin: 10/16/18 10:22 Dose:  Not Given


Insulin Human Regular (Novolin R)  0 unit SC ACHS [facility]; Protocol


   Last Admin: 10/16/18 16:47 Dose:  Not Given


Lactobacillus Acidophilus (Bacid Acidophilus)  1 cap PO BID [facility]


   Last Admin: 10/16/18 17:20 Dose:  1 cap


Methylprednisolone (Solu-Medrol)  40 mg IV Q12 [facility]


   Stop: 10/17/18 22:01


   Last Admin: 10/16/18 12:04 Dose:  40 mg


Methylprednisolone (Solu-Medrol)  30 mg IV Q12 [facility]


Metoclopramide HCl (Reglan)  5 mg IVP Q6 [facility]


   Last Admin: 10/16/18 17:16 Dose:  5 mg


Metoprolol Succinate (Toprol Xl)  50 mg PO DAILY [facility]


   Last Admin: 10/16/18 10:23 Dose:  Not Given


Oxybutynin Chloride (Ditropan Xl)  10 mg PO DAILY [facility]


   Last Admin: 10/16/18 10:22 Dose:  Not Given


Sucralfate (Carafate Tab)  1 gm PO ACBD [facility]


   Last Admin: 10/16/18 17:17 Dose:  1 gm


Zolpidem Tartrate (Ambien)  5 mg PO HS PRN


   PRN Reason: Insomnia


   Last Admin: 10/15/18 21:17 Dose:  5 mg











Physical Exam





- Constitutional


Appears: Well, Non-toxic, No Acute Distress





- Head Exam


Head Exam: ATRAUMATIC, NORMOCEPHALIC





- Eye Exam


Eye Exam: EOMI, Scleral icterus.  absent: Conjunctival injection





- ENT Exam


ENT Exam: Mucous Membranes Moist, Normal Oropharynx





- Respiratory Exam


Respiratory Exam: NORMAL BREATHING PATTERN.  absent: Accessory Muscle Use, 

Respiratory Distress





- Cardiovascular Exam


Cardiovascular Exam: absent: Bradycardia, Tachycardia





- GI/Abdominal Exam


GI & Abdominal Exam: Soft, Tenderness (severe TTP in the RUQ>RLQ).  absent: 

Distended, Guarding, Rebound, Rigid





- Extremities Exam


Additional comments: 





BL AKA





- Back Exam


Back exam: CVA tenderness (R).  absent: CVA tenderness (L)





- Neurological Exam


Neurological exam: Alert, Oriented x3





- Psychiatric Exam


Psychiatric exam: Normal Affect, Normal Mood





- Skin


Skin Exam: Dry, Warm


Additional comments: 


jaundice





Results





- Vital Signs


Recent Vital Signs: 


                                Last Vital Signs











Temp  97.5 F L  10/16/18 15:20


 


Pulse  53 L  10/16/18 15:20


 


Resp  20   10/16/18 15:20


 


BP  137/75   10/16/18 15:20


 


Pulse Ox  99   10/16/18 15:20














- Labs


Result Diagrams: 


                                 10/15/18 17:27





                                 10/15/18 17:27


Labs: 


                         Laboratory Results - last 24 hr











  10/15/18 10/15/18 10/15/18





  17:27 17:27 19:40


 


Neut % (Auto)  59.0  


 


Lymph % (Auto)  23.0  


 


Mono % (Auto)  9.0  


 


Eos % (Auto)  7.0 H  


 


Baso % (Auto)  2.0  


 


Neut # (Auto)  2.9  


 


Lymph # (Auto)  1.1  


 


Mono # (Auto)  0.4  


 


Eos # (Auto)  0.3  


 


Baso # (Auto)  0.1  


 


PT   


 


INR   


 


APTT   


 


Sodium   139 


 


Potassium   3.6 


 


Chloride   105 


 


Carbon Dioxide   23 


 


Anion Gap   15 


 


BUN   8 


 


Creatinine   0.4 L 


 


Est GFR ( Amer)   > 60 


 


Est GFR (Non-Af Amer)   > 60 


 


POC Glucose (mg/dL)   


 


Random Glucose   85 


 


Calcium   9.0 


 


Total Bilirubin   18.3 H 


 


Direct Bilirubin   16.2 H 


 


AST   122 H 


 


ALT   44 


 


Alkaline Phosphatase   625 H D 


 


Total Protein   7.4 


 


Albumin   3.4 L 


 


Globulin   4.1 H 


 


Albumin/Globulin Ratio   0.8 L 


 


Lipase   19 L 


 


Urine Color    Elise


 


Urine Clarity    Hazy


 


Urine pH    7.0


 


Ur Specific Gravity    1.013


 


Urine Protein    1+ H


 


Urine Glucose (UA)    Normal


 


Urine Ketones    Negative


 


Urine Blood    1+ H


 


Urine Nitrate    Positive H


 


Urine Bilirubin    2+ H


 


Urine Urobilinogen    4.0 H


 


Ur Leukocyte Esterase    3+ H


 


Urine WBC (Auto)    117 H


 


Urine RBC (Auto)    12 H


 


Urine WBC Clumps (Auto)    Few H


 


Ur Squamous Epith Cells    1


 


Urine Bacteria    Few H


 


Ur Yeast w Hyphae    Rare H


 


Urine Yeast (Budding)    Rare H


 


Urine HCG, Qual   














  10/15/18 10/16/18 10/16/18





  21:42 02:48 07:18


 


Neut % (Auto)   


 


Lymph % (Auto)   


 


Mono % (Auto)   


 


Eos % (Auto)   


 


Baso % (Auto)   


 


Neut # (Auto)   


 


Lymph # (Auto)   


 


Mono # (Auto)   


 


Eos # (Auto)   


 


Baso # (Auto)   


 


PT   


 


INR   


 


APTT   


 


Sodium   


 


Potassium   


 


Chloride   


 


Carbon Dioxide   


 


Anion Gap   


 


BUN   


 


Creatinine   


 


Est GFR ( Amer)   


 


Est GFR (Non-Af Amer)   


 


POC Glucose (mg/dL)  159 H   75


 


Random Glucose   


 


Calcium   


 


Total Bilirubin   


 


Direct Bilirubin   


 


AST   


 


ALT   


 


Alkaline Phosphatase   


 


Total Protein   


 


Albumin   


 


Globulin   


 


Albumin/Globulin Ratio   


 


Lipase   


 


Urine Color   


 


Urine Clarity   


 


Urine pH   


 


Ur Specific Gravity   


 


Urine Protein   


 


Urine Glucose (UA)   


 


Urine Ketones   


 


Urine Blood   


 


Urine Nitrate   


 


Urine Bilirubin   


 


Urine Urobilinogen   


 


Ur Leukocyte Esterase   


 


Urine WBC (Auto)   


 


Urine RBC (Auto)   


 


Urine WBC Clumps (Auto)   


 


Ur Squamous Epith Cells   


 


Urine Bacteria   


 


Ur Yeast w Hyphae   


 


Urine Yeast (Budding)   


 


Urine HCG, Qual   Negative 














  10/16/18 10/16/18 10/16/18





  11:11 13:41 16:17


 


Neut % (Auto)   


 


Lymph % (Auto)   


 


Mono % (Auto)   


 


Eos % (Auto)   


 


Baso % (Auto)   


 


Neut # (Auto)   


 


Lymph # (Auto)   


 


Mono # (Auto)   


 


Eos # (Auto)   


 


Baso # (Auto)   


 


PT   16.4 H 


 


INR   1.5 


 


APTT   43 H 


 


Sodium   


 


Potassium   


 


Chloride   


 


Carbon Dioxide   


 


Anion Gap   


 


BUN   


 


Creatinine   


 


Est GFR ( Amer)   


 


Est GFR (Non-Af Amer)   


 


POC Glucose (mg/dL)  78   144 H


 


Random Glucose   


 


Calcium   


 


Total Bilirubin   


 


Direct Bilirubin   


 


AST   


 


ALT   


 


Alkaline Phosphatase   


 


Total Protein   


 


Albumin   


 


Globulin   


 


Albumin/Globulin Ratio   


 


Lipase   


 


Urine Color   


 


Urine Clarity   


 


Urine pH   


 


Ur Specific Gravity   


 


Urine Protein   


 


Urine Glucose (UA)   


 


Urine Ketones   


 


Urine Blood   


 


Urine Nitrate   


 


Urine Bilirubin   


 


Urine Urobilinogen   


 


Ur Leukocyte Esterase   


 


Urine WBC (Auto)   


 


Urine RBC (Auto)   


 


Urine WBC Clumps (Auto)   


 


Ur Squamous Epith Cells   


 


Urine Bacteria   


 


Ur Yeast w Hyphae   


 


Urine Yeast (Budding)   


 


Urine HCG, Qual   














- Imaging and Cardiology


  ** CT scan - abdomen


Status: Image reviewed by me, Report reviewed by me





Assessment & Plan





- Assessment and Plan (Free Text)


Assessment: 


53F with RUQ pain and obstructive jaundice possibly secondary to a migrated 

stent vs external obstruction





Plan: 


No surgical intervention indicated at this timepatient is stable. Will follow up

other interventions and further workup results prior to any further surgical 

planning


IR scheduled for tomorrow for a PTC tube


F/U GI recswould recommend a EUS if deemed beneficial by GI


Pain and nausea control PRN


IVF


NPO at midnight


CT abdomen and pelvis with Pancreas protocol


Trend CBC, CMP





Discussed with Dr. Tee, who examined the patient and agrees with above


Rolanda Cornejo, [id number]








<Alistair Tee - Last Filed: 10/22/18 18:47>





Meds





- Medications


Medications: 


                               Current Medications





Apixaban (Eliquis)  5 mg PO BID [facility]


Benzocaine/Menthol (Cepacol Sore Throat)  1 john PO Q2 PRN


   PRN Reason: Sore Throat


   Last Admin: 10/22/18 18:02 Dose:  1 john


Digoxin (Lanoxin)  0.25 mg PO DAILY@1800 [facility]


   Last Admin: 10/22/18 18:04 Dose:  Not Given


Diphenhydramine HCl (Benadryl)  25 mg IVP Q4 PRN


   PRN Reason: itchiness


   Last Admin: 10/22/18 15:01 Dose:  25 mg


Diphenoxylate HCl/Atropine (Lomotil 0.025-2.5 Mg Tablet)  1 tab PO Q8 PRN


   PRN Reason: Diarrhea


   Last Admin: 10/22/18 17:59 Dose:  1 tab


Gabapentin (Neurontin)  100 mg PO BID [facility]


   Last Admin: 10/22/18 17:59 Dose:  100 mg


Insulin Human Regular (Novolin R)  0 unit SC ACHS [facility]; Protocol


   Last Admin: 10/22/18 17:30 Dose:  6 units


Insulin Human Regular (Novolin R)  4 unit SC TIDAC [facility]


   Last Admin: 10/22/18 17:58 Dose:  4 units


Lactobacillus Acidophilus (Bacid Acidophilus)  1 cap PO BID [facility]


   Last Admin: 10/22/18 17:58 Dose:  1 cap


Lidocaine (Lidoderm)  1 ea TD DAILY [facility]


   Last Admin: 10/22/18 10:14 Dose:  1 ea


Methylprednisolone (Solu-Medrol)  30 mg IV Q12 [facility]


   Last Admin: 10/22/18 10:11 Dose:  30 mg


Metoclopramide HCl (Reglan)  5 mg IVP Q6 [facility]


   Last Admin: 10/22/18 17:59 Dose:  5 mg


Metoprolol Succinate (Toprol Xl)  50 mg PO DAILY [facility]


   Last Admin: 10/22/18 10:11 Dose:  50 mg


Morphine Sulfate (Morphine)  1 mg IVP Q4 PRN


   PRN Reason: Pain, severe (8-10)


   Last Admin: 10/22/18 15:01 Dose:  1 mg


Morphine Sulfate (Morphine Immediate Release Tab)  15 mg PO Q8H PRN


   PRN Reason: Pain, severe (8-10)


Oxybutynin Chloride (Ditropan Xl)  10 mg PO DAILY [facility]


   Last Admin: 10/22/18 10:11 Dose:  10 mg


Sucralfate (Carafate Tab)  1 gm PO ACBD [facility]


   Last Admin: 10/22/18 17:15 Dose:  1 gm


Zolpidem Tartrate (Ambien)  5 mg PO HS PRN


   PRN Reason: Insomnia


   Last Admin: 10/21/18 23:34 Dose:  5 mg











Results





- Vital Signs


Recent Vital Signs: 


                                Last Vital Signs











Temp  98 F   10/22/18 16:00


 


Pulse  56 L  10/22/18 16:00


 


Resp  20   10/22/18 16:00


 


BP  152/71 H  10/22/18 16:00


 


Pulse Ox  100   10/22/18 16:00














- Labs


Result Diagrams: 


                                 10/22/18 11:10





                                 10/22/18 11:10


Labs: 


                         Laboratory Results - last 24 hr











  10/20/18 10/21/18 10/21/18





  21:03 02:36 07:33


 


WBC   


 


RBC   


 


Hgb   


 


Hct   


 


MCV   


 


MCH   


 


MCHC   


 


RDW   


 


Plt Count   


 


MPV   


 


Neut % (Auto)   


 


Lymph % (Auto)   


 


Mono % (Auto)   


 


Eos % (Auto)   


 


Baso % (Auto)   


 


Neut # (Auto)   


 


Lymph # (Auto)   


 


Mono # (Auto)   


 


Eos # (Auto)   


 


Baso # (Auto)   


 


Sodium   


 


Potassium   


 


Chloride   


 


Carbon Dioxide   


 


Anion Gap   


 


BUN   


 


Creatinine   


 


Est GFR ( Amer)   


 


Est GFR (Non-Af Amer)   


 


POC Glucose (mg/dL)  490 H*  93  114 H


 


Random Glucose   


 


Calcium   


 


Phosphorus   


 


Magnesium   


 


Urine HCG, Qual   














  10/21/18 10/21/18 10/21/18





  11:07 15:59 20:56


 


WBC   


 


RBC   


 


Hgb   


 


Hct   


 


MCV   


 


MCH   


 


MCHC   


 


RDW   


 


Plt Count   


 


MPV   


 


Neut % (Auto)   


 


Lymph % (Auto)   


 


Mono % (Auto)   


 


Eos % (Auto)   


 


Baso % (Auto)   


 


Neut # (Auto)   


 


Lymph # (Auto)   


 


Mono # (Auto)   


 


Eos # (Auto)   


 


Baso # (Auto)   


 


Sodium   


 


Potassium   


 


Chloride   


 


Carbon Dioxide   


 


Anion Gap   


 


BUN   


 


Creatinine   


 


Est GFR ( Amer)   


 


Est GFR (Non-Af Amer)   


 


POC Glucose (mg/dL)  126 H  383 H  403 H*


 


Random Glucose   


 


Calcium   


 


Phosphorus   


 


Magnesium   


 


Urine HCG, Qual   














  10/22/18 10/22/18 10/22/18





  02:25 06:44 07:41


 


WBC   


 


RBC   


 


Hgb   


 


Hct   


 


MCV   


 


MCH   


 


MCHC   


 


RDW   


 


Plt Count   


 


MPV   


 


Neut % (Auto)   


 


Lymph % (Auto)   


 


Mono % (Auto)   


 


Eos % (Auto)   


 


Baso % (Auto)   


 


Neut # (Auto)   


 


Lymph # (Auto)   


 


Mono # (Auto)   


 


Eos # (Auto)   


 


Baso # (Auto)   


 


Sodium   


 


Potassium   


 


Chloride   


 


Carbon Dioxide   


 


Anion Gap   


 


BUN   


 


Creatinine   


 


Est GFR ( Amer)   


 


Est GFR (Non-Af Amer)   


 


POC Glucose (mg/dL)  172 H   62 L


 


Random Glucose   


 


Calcium   


 


Phosphorus   


 


Magnesium   


 


Urine HCG, Qual   Negative 














  10/22/18 10/22/18 10/22/18





  07:42 08:50 11:06


 


WBC   


 


RBC   


 


Hgb   


 


Hct   


 


MCV   


 


MCH   


 


MCHC   


 


RDW   


 


Plt Count   


 


MPV   


 


Neut % (Auto)   


 


Lymph % (Auto)   


 


Mono % (Auto)   


 


Eos % (Auto)   


 


Baso % (Auto)   


 


Neut # (Auto)   


 


Lymph # (Auto)   


 


Mono # (Auto)   


 


Eos # (Auto)   


 


Baso # (Auto)   


 


Sodium   


 


Potassium   


 


Chloride   


 


Carbon Dioxide   


 


Anion Gap   


 


BUN   


 


Creatinine   


 


Est GFR ( Amer)   


 


Est GFR (Non-Af Amer)   


 


POC Glucose (mg/dL)  64 L  97  88


 


Random Glucose   


 


Calcium   


 


Phosphorus   


 


Magnesium   


 


Urine HCG, Qual   














  10/22/18 10/22/18 10/22/18





  11:10 11:10 13:02


 


WBC  12.1 H  


 


RBC  3.80  


 


Hgb  11.9  


 


Hct  35.2  


 


MCV  92.5  


 


MCH  31.3 H  


 


MCHC  33.9  


 


RDW  17.6 H  


 


Plt Count  203  


 


MPV  8.7  


 


Neut % (Auto)  72.0  


 


Lymph % (Auto)  17.0 L  


 


Mono % (Auto)  9.7  


 


Eos % (Auto)  1.1  


 


Baso % (Auto)  0.2  


 


Neut # (Auto)  8.7 H  


 


Lymph # (Auto)  2.1  


 


Mono # (Auto)  1.2 H  


 


Eos # (Auto)  0.1  


 


Baso # (Auto)  0.0  


 


Sodium   142 


 


Potassium   3.6 


 


Chloride   109 H 


 


Carbon Dioxide   19 L 


 


Anion Gap   18 


 


BUN   23 H 


 


Creatinine   0.6 L 


 


Est GFR ( Amer)   > 60 


 


Est GFR (Non-Af Amer)   > 60 


 


POC Glucose (mg/dL)    136 H


 


Random Glucose   76 


 


Calcium   8.3 L 


 


Phosphorus   2.9 


 


Magnesium   1.9 


 


Urine HCG, Qual   














  10/22/18





  16:44


 


WBC 


 


RBC 


 


Hgb 


 


Hct 


 


MCV 


 


MCH 


 


MCHC 


 


RDW 


 


Plt Count 


 


MPV 


 


Neut % (Auto) 


 


Lymph % (Auto) 


 


Mono % (Auto) 


 


Eos % (Auto) 


 


Baso % (Auto) 


 


Neut # (Auto) 


 


Lymph # (Auto) 


 


Mono # (Auto) 


 


Eos # (Auto) 


 


Baso # (Auto) 


 


Sodium 


 


Potassium 


 


Chloride 


 


Carbon Dioxide 


 


Anion Gap 


 


BUN 


 


Creatinine 


 


Est GFR ( Amer) 


 


Est GFR (Non-Af Amer) 


 


POC Glucose (mg/dL)  328 H


 


Random Glucose 


 


Calcium 


 


Phosphorus 


 


Magnesium 


 


Urine HCG, Qual 














Attending/Attestation





- Attestation


I have personally seen and examined this patient.: Yes


I have fully participated in the care of the patient.: Yes


I have reviewed all pertinent clinical information: Yes


Notes (Text): 





Pt was seen and examined at bedside


Agree with above note and assessment


Pt with obstructive jaundice and abdominal pain


Exam: B/L Above knee amputation, abdominal tenderness


Labs and Radiology reviewed 


Ass: Possible blockage of stent with obstructive jaundice


Plan: GI consult for EUS, ERCP


CT scan of Pancreas


IV antibiotics


C/w current mx


Plan d.w pt in detail


Risk and benefit explained in detail.

## 2018-10-16 NOTE — CT
Date of service:  10/15/18



CT chest, abdomen, and pelvis with IV contrast 



Indication: Diffuse tenderness- history of abdominal surgeries



Technique: 



Contiguous axial images of the chest, abdomen, and pelvis. Coronal 

and Sagittal reformats generated and reviewed. 



This CT exam was performed using 1 or more of the following dose 

reduction techniques: Automated exposure control, adjustment of the 

MAA and/or kV according to patient size, and/or use of iterative 

reconstruction technique. 



Contrast: Oral contrast was not administered.  100 mL Visipaque 320 

IV.



Radiation dose:



Total exam DLP = 569.83 MGy-cm.



Comparison: CT abdomen and pelvis 9/8/18



Findings: 



Bibasilar atelectasis.  No visible pleural effusion or pneumothorax.  

Small hiatal hernia.  Evidence of gastroesophageal reflux.  Partially 

imaged cardiomegaly.  Partially imaged AICD wire. 



Hepatomegaly.  Hypoattenuation of the liver compatible with hepatic 

steatosis.  Splenomegaly.  Additional sub cm probable splenules.  

Punctate splenic calcification, likely granuloma.  Cholecystectomy.  

Common bile duct stent now appears in the more distal common bile 

duct with superior most tip traversing the left lateral wall of the 

CBD, possibly extending into the suprapancreatic soft tissues near 

the pancreatic head.  There is no evidence of adjacent bile 

collection a possibility of perforation must be considered versus 

possibility of distortion of the common bile duct by the stent.  

Small right renal cyst.  Dilated pancreatic duct measures up to 

approximately 6 mm at the level of the pancreatic head.  The adrenal 

glands appear unremarkable. 



Atherosclerotic calcifications of the aorta and presence of scattered 

mild mural plaque. 



The stomach is nondistended.  The bowel loops appear within normal 

limits of caliber without evidence of intestinal obstruction.  

Anastomotic bowel suture material.  There is no definite free air. 



The uterus is present.  Uterine calcifications.  Charles catheter 

within it under distended urinary bladder.  0.6 x 2.0 cm ovoid 

structure within the urinary bladder, likely calculus.  Small air 

within the urinary bladder may be secondary to recent 

instrumentation. 



Osseous demineralization.  Degenerative changes.  Mild compression 

fracture deformities. 



Impression: 



Common bile duct stent now appears in the more distal common bile 

duct with superior most tip traversing the left lateral wall of the 

CBD, possibly extending into the suprapancreatic soft tissues near 

the pancreatic head.  There is no evidence of adjacent bile 

collection a possibility of perforation must be considered versus 

possibility of distortion of the common bile duct by the stent.



Dilated pancreatic duct measuring up to approximately 6 mm at the 

level of the head. 



Hepatomegaly. Hypoattenuation of the liver compatible with hepatic 

steatosis. 



Splenomegaly. 



Charles catheter within it under distended urinary bladder. Small air 

within the urinary bladder may be secondary to recent 

instrumentation.  0.6 x 2.0 cm ovoid structure within the urinary 

bladder, likely calculus. 



Small right renal cyst. 



Additional findings as above. 



Findings discussed with the patient's RN Beverley on 10/16/18 at 11:29 

a.m. 



Preliminary impression was provided by [facility]

## 2018-10-16 NOTE — CP.PCM.PN
Subjective





- Subjective


Subjective: 





dictated   





Objective





- Vital Signs/Intake and Output


Vital Signs (last 24 hours): 


                                        











Temp Pulse Resp BP Pulse Ox


 


 97.5 F L  53 L  20   137/75   99 


 


 10/16/18 15:20  10/16/18 15:20  10/16/18 15:20  10/16/18 15:20  10/16/18 15:20








Intake and Output: 


                                        











 10/16/18 10/17/18





 18:59 06:59


 


Intake Total 150 1140


 


Output Total  650


 


Balance 150 490














- Medications


Medications: 


                               Current Medications





Apixaban (Eliquis)  5 mg PO BID [facility]


Belladonna/Phenobarbital (Donnatal)  1 tab PO TID [facility]


   Last Admin: 10/16/18 17:20 Dose:  1 tab


Digoxin (Lanoxin)  0.25 mg PO DAILY@1800 [facility]


   Last Admin: 10/16/18 17:21 Dose:  0.25 mg


Diphenhydramine HCl (Benadryl)  25 mg IVP Q4 PRN


   PRN Reason: itchiness


   Last Admin: 10/16/18 20:18 Dose:  25 mg


Diphenoxylate HCl/Atropine (Lomotil 0.025-2.5 Mg Tablet)  1 tab PO Q8 PRN


   PRN Reason: Diarrhea


Gabapentin (Neurontin)  100 mg PO BID [facility]


   Last Admin: 10/16/18 17:17 Dose:  100 mg


Hydromorphone HCl (Dilaudid)  1 mg IVP Q4H PRN


   PRN Reason: pain


   Last Admin: 10/16/18 20:18 Dose:  1 mg


Ciprofloxacin (Cipro 400mg/200ml Dsw)  400 mg in 200 mls @ 133 mls/hr IVPB Q12H 

[facility]; Protocol


   Last Admin: 10/16/18 20:24 Dose:  133 mls/hr


Ceftriaxone Sodium 1 gm/ (Sodium Chloride)  100 mls @ 100 mls/hr IVPB DAILY [facility];

Protocol


   Last Admin: 10/16/18 11:22 Dose:  100 mls/hr


Dextrose/Sodium Chloride (Dextrose 5%/0.45% Ns 1000 Ml)  1,000 mls @ 80 mls/hr 

IV .[id number] [facility]


   Last Admin: 10/16/18 21:20 Dose:  80 mls/hr


Insulin Human Regular (Novolin R)  0 unit SC ACHS [facility]; Protocol


   Last Admin: 10/16/18 21:39 Dose:  Not Given


Lactobacillus Acidophilus (Bacid Acidophilus)  1 cap PO BID [facility]


   Last Admin: 10/16/18 17:20 Dose:  1 cap


Methylprednisolone (Solu-Medrol)  40 mg IV Q12 [facility]


   Stop: 10/17/18 22:01


   Last Admin: 10/16/18 21:16 Dose:  40 mg


Methylprednisolone (Solu-Medrol)  30 mg IV Q12 [facility]


Metoclopramide HCl (Reglan)  5 mg IVP Q6 [facility]


   Last Admin: 10/16/18 17:16 Dose:  5 mg


Metoprolol Succinate (Toprol Xl)  50 mg PO DAILY [facility]


   Last Admin: 10/16/18 10:23 Dose:  Not Given


Oxybutynin Chloride (Ditropan Xl)  10 mg PO DAILY [facility]


   Last Admin: 10/16/18 10:22 Dose:  Not Given


Sucralfate (Carafate Tab)  1 gm PO ACBD [facility]


   Last Admin: 10/16/18 17:17 Dose:  1 gm


Zolpidem Tartrate (Ambien)  5 mg PO HS PRN


   PRN Reason: Insomnia


   Last Admin: 10/16/18 20:24 Dose:  5 mg











- Labs


Labs: 


                                        





                                 10/15/18 17:27 





                                 10/15/18 17:27 





                                        











PT  16.4 SECONDS (9.7-12.2)  H  10/16/18  13:41    


 


INR  1.5   10/16/18  13:41    


 


APTT  43 SECONDS (21-34)  H  10/16/18  13:41

## 2018-10-17 LAB
ALBUMIN SERPL-MCNC: 3.1 G/DL (ref 3.5–5)
ALBUMIN/GLOB SERPL: 0.8 {RATIO} (ref 1–2.1)
ALT SERPL-CCNC: 41 U/L (ref 9–52)
APTT BLD: 40 SECONDS (ref 21–34)
AST SERPL-CCNC: 95 U/L (ref 14–36)
BASOPHILS # BLD AUTO: 0 K/UL (ref 0–0.2)
BASOPHILS NFR BLD: 0 % (ref 0–2)
BUN SERPL-MCNC: 19 MG/DL (ref 7–17)
CALCIUM SERPL-MCNC: 8.4 MG/DL (ref 8.6–10.4)
EOSINOPHIL # BLD AUTO: 0 K/UL (ref 0–0.7)
EOSINOPHIL NFR BLD: 0 % (ref 0–4)
ERYTHROCYTE [DISTWIDTH] IN BLOOD BY AUTOMATED COUNT: 17.3 % (ref 11.5–14.5)
GFR NON-AFRICAN AMERICAN: > 60
HGB BLD-MCNC: 10.7 G/DL (ref 11–16)
INR PPP: 1.4
LYMPHOCYTES # BLD AUTO: 0.6 K/UL (ref 1–4.3)
LYMPHOCYTES NFR BLD AUTO: 16 % (ref 20–40)
MCH RBC QN AUTO: 31.6 PG (ref 27–31)
MCHC RBC AUTO-ENTMCNC: 33.6 G/DL (ref 33–37)
MCV RBC AUTO: 93.8 FL (ref 81–99)
MONOCYTES # BLD: 0.1 K/UL (ref 0–0.8)
MONOCYTES NFR BLD: 2 % (ref 0–10)
NEUTROPHILS # BLD: 2.9 K/UL (ref 1.8–7)
NEUTROPHILS NFR BLD AUTO: 82 % (ref 50–75)
NRBC BLD AUTO-RTO: 0 % (ref 0–2)
PLATELET # BLD: 141 K/UL (ref 130–400)
PMV BLD AUTO: 9.5 FL (ref 7.2–11.7)
PROTHROMBIN TIME: 14.8 SECONDS (ref 9.7–12.2)
RBC # BLD AUTO: 3.4 MIL/UL (ref 3.8–5.2)
WBC # BLD AUTO: 3.5 K/UL (ref 4.8–10.8)

## 2018-10-17 RX ADMIN — DIPHENHYDRAMINE HYDROCHLORIDE PRN MG: 50 INJECTION INTRAMUSCULAR; INTRAVENOUS at 00:40

## 2018-10-17 RX ADMIN — DIPHENHYDRAMINE HYDROCHLORIDE PRN MG: 50 INJECTION INTRAMUSCULAR; INTRAVENOUS at 04:40

## 2018-10-17 RX ADMIN — PHENOBARBITAL, HYOSCYAMINE SULFATE, ATROPINE SULFATE, SCOPOLAMINE HYDROBROMIDE SCH TAB: 16.2; .1037; .0194; .0065 TABLET ORAL at 10:49

## 2018-10-17 RX ADMIN — DEXTROSE AND SODIUM CHLORIDE SCH MLS/HR: 5; 450 INJECTION, SOLUTION INTRAVENOUS at 08:25

## 2018-10-17 RX ADMIN — CIPROFLOXACIN SCH MLS/HR: 2 INJECTION, SOLUTION INTRAVENOUS at 08:22

## 2018-10-17 RX ADMIN — HUMAN INSULIN SCH: 100 INJECTION, SOLUTION SUBCUTANEOUS at 12:03

## 2018-10-17 RX ADMIN — OXYBUTYNIN CHLORIDE SCH MG: 10 TABLET, EXTENDED RELEASE ORAL at 10:49

## 2018-10-17 RX ADMIN — Medication SCH CAP: at 17:29

## 2018-10-17 RX ADMIN — DEXTROSE AND SODIUM CHLORIDE SCH: 5; 450 INJECTION, SOLUTION INTRAVENOUS at 10:33

## 2018-10-17 RX ADMIN — DIPHENHYDRAMINE HYDROCHLORIDE PRN MG: 50 INJECTION INTRAMUSCULAR; INTRAVENOUS at 08:50

## 2018-10-17 RX ADMIN — HYDROMORPHONE HYDROCHLORIDE PRN MG: 1 INJECTION, SOLUTION INTRAMUSCULAR; INTRAVENOUS; SUBCUTANEOUS at 16:09

## 2018-10-17 RX ADMIN — PHENOBARBITAL, HYOSCYAMINE SULFATE, ATROPINE SULFATE, SCOPOLAMINE HYDROBROMIDE SCH TAB: 16.2; .1037; .0194; .0065 TABLET ORAL at 17:29

## 2018-10-17 RX ADMIN — DIGOXIN SCH MG: 0.25 TABLET ORAL at 17:33

## 2018-10-17 RX ADMIN — DIPHENHYDRAMINE HYDROCHLORIDE PRN MG: 50 INJECTION INTRAMUSCULAR; INTRAVENOUS at 20:05

## 2018-10-17 RX ADMIN — DIPHENHYDRAMINE HYDROCHLORIDE PRN MG: 50 INJECTION INTRAMUSCULAR; INTRAVENOUS at 16:08

## 2018-10-17 RX ADMIN — HYDROMORPHONE HYDROCHLORIDE PRN MG: 1 INJECTION, SOLUTION INTRAMUSCULAR; INTRAVENOUS; SUBCUTANEOUS at 20:07

## 2018-10-17 RX ADMIN — HYDROMORPHONE HYDROCHLORIDE PRN MG: 1 INJECTION, SOLUTION INTRAMUSCULAR; INTRAVENOUS; SUBCUTANEOUS at 04:40

## 2018-10-17 RX ADMIN — METOPROLOL SUCCINATE SCH MG: 50 TABLET, EXTENDED RELEASE ORAL at 10:33

## 2018-10-17 RX ADMIN — HUMAN INSULIN SCH UNITS: 100 INJECTION, SOLUTION SUBCUTANEOUS at 17:27

## 2018-10-17 RX ADMIN — METHYLPREDNISOLONE SODIUM SUCCINATE SCH MG: 40 INJECTION, POWDER, FOR SOLUTION INTRAMUSCULAR; INTRAVENOUS at 21:40

## 2018-10-17 RX ADMIN — PHENOBARBITAL, HYOSCYAMINE SULFATE, ATROPINE SULFATE, SCOPOLAMINE HYDROBROMIDE SCH TAB: 16.2; .1037; .0194; .0065 TABLET ORAL at 14:46

## 2018-10-17 RX ADMIN — HUMAN INSULIN SCH: 100 INJECTION, SOLUTION SUBCUTANEOUS at 07:51

## 2018-10-17 RX ADMIN — HYDROMORPHONE HYDROCHLORIDE PRN MG: 1 INJECTION, SOLUTION INTRAMUSCULAR; INTRAVENOUS; SUBCUTANEOUS at 00:40

## 2018-10-17 RX ADMIN — CIPROFLOXACIN SCH MLS/HR: 2 INJECTION, SOLUTION INTRAVENOUS at 20:13

## 2018-10-17 RX ADMIN — HYDROMORPHONE HYDROCHLORIDE PRN MG: 1 INJECTION, SOLUTION INTRAMUSCULAR; INTRAVENOUS; SUBCUTANEOUS at 08:42

## 2018-10-17 RX ADMIN — METHYLPREDNISOLONE SODIUM SUCCINATE SCH MG: 40 INJECTION, POWDER, FOR SOLUTION INTRAMUSCULAR; INTRAVENOUS at 10:34

## 2018-10-17 NOTE — CP.PCM.PN
Subjective





- Subjective


Subjective: 





dictated   





Objective





- Vital Signs/Intake and Output


Vital Signs (last 24 hours): 


                                        











Temp Pulse Resp BP Pulse Ox


 


 98 F   60   20   142/81   100 


 


 10/17/18 16:00  10/17/18 16:00  10/17/18 16:00  10/17/18 16:00  10/17/18 16:00








Intake and Output: 


                                        











 10/17/18 10/18/18





 18:59 06:59


 


Intake Total 690 


 


Output Total 1400 


 


Balance -710 














- Medications


Medications: 


                               Current Medications





Apixaban (Eliquis)  5 mg PO BID [facility]


Digoxin (Lanoxin)  0.25 mg PO DAILY@1800 [facility]


   Last Admin: 10/17/18 17:33 Dose:  0.25 mg


Diphenhydramine HCl (Benadryl)  25 mg IVP Q4 PRN


   PRN Reason: itchiness


   Last Admin: 10/17/18 16:08 Dose:  25 mg


Diphenoxylate HCl/Atropine (Lomotil 0.025-2.5 Mg Tablet)  1 tab PO Q8 PRN


   PRN Reason: Diarrhea


Gabapentin (Neurontin)  100 mg PO BID [facility]


   Last Admin: 10/17/18 17:30 Dose:  100 mg


Hydromorphone HCl (Dilaudid)  1 mg IVP Q4H PRN


   PRN Reason: pain


   Last Admin: 10/17/18 16:09 Dose:  1 mg


Ciprofloxacin (Cipro 400mg/200ml Dsw)  400 mg in 200 mls @ 133 mls/hr IVPB Q12H 

[facility]; Protocol


   Last Admin: 10/17/18 08:22 Dose:  133 mls/hr


Ceftriaxone Sodium 1 gm/ (Sodium Chloride)  100 mls @ 100 mls/hr IVPB DAILY [facility];

Protocol


   Last Admin: 10/17/18 11:45 Dose:  100 mls/hr


Dextrose/Sodium Chloride (Dextrose 5%/0.45% Ns 1000 Ml)  1,000 mls @ 80 mls/hr 

IV .[id number] [facility]


   Last Admin: 10/17/18 10:33 Dose:  Not Given


Insulin Human Regular (Novolin R)  0 unit SC ACHS [facility]; Protocol


   Last Admin: 10/17/18 17:27 Dose:  3 units


Lactobacillus Acidophilus (Bacid Acidophilus)  1 cap PO BID [facility]


   Last Admin: 10/17/18 17:29 Dose:  1 cap


Methylprednisolone (Solu-Medrol)  40 mg IV Q12 [facility]


   Stop: 10/17/18 22:01


   Last Admin: 10/17/18 10:34 Dose:  40 mg


Methylprednisolone (Solu-Medrol)  30 mg IV Q12 [facility]


Metoclopramide HCl (Reglan)  5 mg IVP Q6 [facility]


   Last Admin: 10/17/18 17:31 Dose:  5 mg


Metoprolol Succinate (Toprol Xl)  50 mg PO DAILY [facility]


   Last Admin: 10/17/18 10:33 Dose:  50 mg


Oxybutynin Chloride (Ditropan Xl)  10 mg PO DAILY [facility]


   Last Admin: 10/17/18 10:49 Dose:  10 mg


Sucralfate (Carafate Tab)  1 gm PO ACBD [facility]


   Last Admin: 10/17/18 17:33 Dose:  1 gm


Zolpidem Tartrate (Ambien)  5 mg PO HS PRN


   PRN Reason: Insomnia


   Last Admin: 10/16/18 20:24 Dose:  5 mg











- Labs


Labs: 


                                        





                                 10/17/18 07:25 





                                 10/17/18 07:25 





                                        











PT  14.8 SECONDS (9.7-12.2)  H  10/17/18  07:25    


 


INR  1.4   10/17/18  07:25    


 


APTT  40 SECONDS (21-34)  H  10/17/18  07:25

## 2018-10-17 NOTE — PN
DATE:  10/16/2018



SUBJECTIVE:  The patient, Shivers, is status post endoscopy.  The patient

needs CT of abdomen with PE protocol.  She is afebrile.



PHYSICAL EXAMINATION:

VITAL SIGNS:  Blood pressure 137/70, pulse 53, respiratory rate 20, and

temperature 97.5.

LUNGS:  Clear.  No rales.  No rhonchi.

CARDIOVASCULAR SYSTEM:  S1 and S2 plus S3 positive.

ABDOMEN:  Soft.  Positive deep jaundice.



ASSESSMENT:

1.  Hepatic sarcoidosis, rule out biliary obstruction.

2.  Hypertension.

3.  Diabetes.

4.  Coronary artery disease.



PLAN:  Continue current medications.  CT of the abdomen with PE protocol. 

Monitor the patient.





__________________________________________

Nasir Dunbar MD





DD:  10/16/2018 22:40:53

DT:  10/17/2018 1:18:50

Job # [id number]

## 2018-10-17 NOTE — PN
DATE:  10/17/2018



LOCATION:  357, bed B.



SUBJECTIVE:  This is a 53-year-old female post upper endoscopy yesterday,

seen and examined in rounds with somewhat less abdominal pain.  It has to

be mentioned that the official report of the CAT scan was reported to me

late last night and the abdominal x-ray was suggested to be done, which was

not done yet.



The entire chart is reviewed including but not limited to the most recent

lab and radiology study results, current and the previous medication list,

current and the previous medical events and today's lab showed low white

blood cells of 3.5, hemoglobin 10.7, hematocrit 31.9 with PT 14.8, PTT 40

with CO2 content of 18 indicative of metabolic acidosis with BUN 19, but

normal creatinine, blood glucose level of 138 and the calcium 8.4 with

subsequent increase of total bilirubin of 14.9 and AST 95, alkaline

phosphatase 476 with low albumin.



The patient denied any actual chest pain or significant increase of

shortness of breath, generalized jaundice.  No palpitation.  No nausea,

vomiting, chills or fever this morning.



It has to be mentioned that the official report by radiology study results

suggested that the stent may be occluded and request ERCP with change of

stent placement.



PHYSICAL EXAMINATION:

GENERAL:  A 53-year-old female.

VITAL SIGNS:  Afebrile with pulse of 64, blood pressure 142/72, respiratory

rate of _____.

HEENT:  Showed pale, dry oral mucous membrane.  Bilateral icteric sclerae.

LUNGS:  Few scattered crepitation.  Decreased air entry at bases.

HEART:  Positive S1 and S2.

ABDOMEN:  Soft.  Bowel sounds are present.  No mass or organomegaly.  No

rebound tenderness or guarding.  The patient still has generalized

abdominal tenderness with slight distention.

EXTREMITIES:  Evidence of bilateral above-knee amputation.

NEUROLOGICAL:  No reported new neurological deficits, sensory or motor.



IMPRESSION:

1.  Liver sarcoidosis.

2.  Jaundice with subsequent increase of total bilirubin with possible

occluded biliary stent, she needs endoscopic retrograde

cholangiopancreatography with biliary stent change.

3.  Exacerbation of peptic ulcer disease.

4.  Multiple past medical history including but not limited to

hypertension, cardiac arrhythmias, diabetes mellitus, deep venous

thrombosis, status post cholecystectomy, severe biliary spasm with stenosis

with status post endoscopic retrograde cholangiopancreatography with

biliary stent insertion.

5.  Known history of hyperlipidemia with anemia.

6.  Status post partial colon resection due to intraabdominal and pelvic

abscess formation.

7.  Status post appendectomy with  section before.



SUGGESTIONS:

1.  Continue current management.

2.  We will schedule the patient for ERCP with biliary stent change when

she is more stable clinically.

3.  The patient will need IV steroids.







__________________________________________

Jeffrey Albert MD



DD:  10/17/2018 13:44:49

DT:  10/17/2018 13:50:55

Job # [id number]

## 2018-10-17 NOTE — CP.PCM.CON
History of Present Illness





- History of Present Illness


History of Present Illness: 


CC: cardiac follow up 





Mrs. Jaramillo is a 53F with a pmh of sarcoidosis of the liver, CBD stent, DM, 

HTN, afib and CHF and PSH of cholecystectomy who presented to the ED yesterday 

with a four-day history of emesis, abdominal pain, chills, fatigue and 

dizziness. Her abdominal pain mostly localized to the RUQ/RLQ and is rated as a 

sharp 9/10 pain that radiates to her right flank. Patient was in her usual state

of health prior to the vomiting episodes and denies any trauma or recent travel 

history. Describes her emesis as first beginning as food regurgitation and then 

evolving into a green/yellowish color over the 4-day period. Her last emesis was

yesterday, prior to coming into the ED. Last BM was yesterday which was normal 

in consistency, color, and caliber without any blood. Denies hematemesis, 

fevers, palpations, chest pain, SOB, weight changes or any urinary changes 

including hematuria, burning or dysuria. Patient had pain similar to this is the

past when she had her cholecystectomy in . Permanent Gutierrez Cather in place 

for a year, next change due in 2 days, recovering from recent UTI with renal 

infection.





PMH: DM (controlled with diet), HTN, HLD, asthma, sarcoidosis of the liver, CHF,

afib, PVD, GERD


Surgical hx: b/l AKAs (L , R ), cholecystectomy , right colon 

resection including appendectomy for benign colon mass, defibrillator /pacemaker

in , , 2 cardiac stents, CBD stent (replaced 2018)


Medications: Prednisone, metoprolol, digoxin, Eliquis, aspirin, gabapentin, oxyb

utynin, Dilaudid


Family hx: Mother  from breast cancer, father  from heart problems


Social hx: former smoker 20y ago (<10/day). Denies drugs or illicit drug use


Allergies: Tordol, bananas, avocados, cherries 





Review of Systems





- Review of Systems


All systems: reviewed and no additional remarkable complaints except (as per 

HPI)














Physical Exam





- Constitutional


Appears: Well, Non-toxic, No Acute Distress





- Head Exam


Head Exam: ATRAUMATIC, NORMOCEPHALIC





- Eye Exam


Eye Exam: EOMI, Scleral icterus.  absent: Conjunctival injection





- ENT Exam


ENT Exam: Mucous Membranes Moist, Normal Oropharynx





- Respiratory Exam


Respiratory Exam: NORMAL BREATHING PATTERN.  absent: Accessory Muscle Use, 

Respiratory Distress





- Cardiovascular Exam


Cardiovascular Exam: absent: Bradycardia, Tachycardia





- GI/Abdominal Exam


GI & Abdominal Exam: Soft, Tenderness (severe TTP in the RUQ>RLQ).  absent: 

Distended, Guarding, Rebound, Rigid





- Extremities Exam


Additional comments: 





BL AKA





- Back Exam


Back exam: CVA tenderness (R).  absent: CVA tenderness (L)





- Neurological Exam


Neurological exam: Alert, Oriented x3





- Psychiatric Exam


Psychiatric exam: Normal Affect, Normal Mood





- Skin


Skin Exam: Dry, Warm


Additional comments: 


jaundice








- Imaging and Cardiology


  ** CT scan - abdomen


Status: Image reviewed by me, Report reviewed by me





Assessment & Plan





- Assessment and Plan (Free Text)


Assessment: 


53F with RUQ pain and obstructive jaundice possibly secondary to a migrated 

stent vs external obstruction





Patient has exetensive vardiac hx


s/p B/L AKA


No cardiac sx


Continue all meds





Past Patient History





- Infectious Disease


Hx of Infectious Diseases: None





- Tetanus Immunizations


Tetanus Immunization: Unknown





- Past Medical History & Family History


Past Medical History?: Yes


Past Family History: Reviewed and not pertinent





- Past Social History


Smoking Status: Former Smoker


Alcohol: None


Drugs: Denies


Home Situation {Lives}: With Family





- CARDIAC


Hx Cardia Arrhythmia: Yes


Hx Congestive Heart Failure: Yes


Hx Hypercholesterolemia: Yes


Hx Hypertension: Yes





- PULMONARY


Hx Asthma: Yes (NO INHALER-ON PREDNISONE DAILY PO.)





- NEUROLOGICAL


Hx Neurological Disorder: No





- HEENT


Hx HEENT Problems: Yes


Hx Cataracts: Yes (BILAT.)





- RENAL


Hx Chronic Kidney Disease: No





- ENDOCRINE/METABOLIC


Hx Endocrine Disorders: Yes


Hx Diabetes Mellitus Type 1: Yes





- HEMATOLOGICAL/ONCOLOGICAL


Hx Anemia: Yes





- INTEGUMENTARY


Hx Dermatological Problems: No





- MUSCULOSKELETAL/RHEUMATOLOGICAL


Hx Falls: Yes





- GASTROINTESTINAL


Hx Gall Bladder Disease: Yes





- GENITOURINARY/GYNECOLOGICAL


Hx Genitourinary Disorders: Yes


Hx Urinary Tract Infection: Yes


Other/Comment: PT HAS LONG TERM GUTIERREZ





- PSYCHIATRIC


Hx Substance Use: No





- SURGICAL HISTORY


Hx Appendectomy: Yes


Hx Cholecystectomy: Yes


Hx Coronary Stent: Yes





- ANESTHESIA


Hx Anesthesia: Yes


Hx Anesthesia Reactions: No


Hx Malignant Hyperthermia: No





Meds


Allergies/Adverse Reactions: 


                                    Allergies











Allergy/AdvReac Type Severity Reaction Status Date / Time


 


avocado Allergy Severe ANAPHYLAXIS Verified 10/04/17 17:28


 


banana Allergy Severe ANAPHYLAXIS Verified 10/04/17 17:28


 


cherry Allergy Severe ANAPHYLAXIS Verified 10/04/17 17:28


 


ketorolac [From Toradol] Allergy   Verified 10/04/17 17:28














- Medications


Medications: 


                               Current Medications





Apixaban (Eliquis)  5 mg PO BID [facility]


Digoxin (Lanoxin)  0.25 mg PO DAILY@1800 [facility]


   Last Admin: 10/17/18 17:33 Dose:  0.25 mg


Diphenhydramine HCl (Benadryl)  25 mg IVP Q4 PRN


   PRN Reason: itchiness


   Last Admin: 10/17/18 20:05 Dose:  25 mg


Diphenoxylate HCl/Atropine (Lomotil 0.025-2.5 Mg Tablet)  1 tab PO Q8 PRN


   PRN Reason: Diarrhea


Gabapentin (Neurontin)  100 mg PO BID [facility]


   Last Admin: 10/17/18 17:30 Dose:  100 mg


Hydromorphone HCl (Dilaudid)  1 mg IVP Q4H PRN


   PRN Reason: pain


   Last Admin: 10/17/18 20:07 Dose:  1 mg


Ciprofloxacin (Cipro 400mg/200ml Dsw)  400 mg in 200 mls @ 133 mls/hr IVPB Q12H 

[facility]; Protocol


   Last Admin: 10/17/18 20:13 Dose:  133 mls/hr


Ceftriaxone Sodium 1 gm/ (Sodium Chloride)  100 mls @ 100 mls/hr IVPB DAILY [facility];

Protocol


   Last Admin: 10/17/18 11:45 Dose:  100 mls/hr


Dextrose/Sodium Chloride (Dextrose 5%/0.45% Ns 1000 Ml)  1,000 mls @ 80 mls/hr 

IV .[id number] [facility]


   Last Admin: 10/17/18 10:33 Dose:  Not Given


Insulin Human Regular (Novolin R)  0 unit SC ACHS [facility]; Protocol


   Last Admin: 10/17/18 17:27 Dose:  3 units


Lactobacillus Acidophilus (Bacid Acidophilus)  1 cap PO BID [facility]


   Last Admin: 10/17/18 17:29 Dose:  1 cap


Methylprednisolone (Solu-Medrol)  30 mg IV Q12 [facility]


Metoclopramide HCl (Reglan)  5 mg IVP Q6 [facility]


   Last Admin: 10/17/18 17:31 Dose:  5 mg


Metoprolol Succinate (Toprol Xl)  50 mg PO DAILY [facility]


   Last Admin: 10/17/18 10:33 Dose:  50 mg


Oxybutynin Chloride (Ditropan Xl)  10 mg PO DAILY [facility]


   Last Admin: 10/17/18 10:49 Dose:  10 mg


Sucralfate (Carafate Tab)  1 gm PO ACBD [facility]


   Last Admin: 10/17/18 17:33 Dose:  1 gm


Zolpidem Tartrate (Ambien)  5 mg PO HS PRN


   PRN Reason: Insomnia


   Last Admin: 10/16/18 20:24 Dose:  5 mg











Results





- Vital Signs


Recent Vital Signs: 


                                Last Vital Signs











Temp  98 F   10/17/18 16:00


 


Pulse  60   10/17/18 16:00


 


Resp  20   10/17/18 16:00


 


BP  142/81   10/17/18 16:00


 


Pulse Ox  100   10/17/18 16:00














- Labs


Result Diagrams: 


                                 10/17/18 07:25





                                 10/17/18 07:25


Labs: 


                         Laboratory Results - last 24 hr











  10/16/18 10/17/18 10/17/18





  20:59 06:42 07:11


 


WBC   


 


RBC   


 


Hgb   


 


Hct   


 


MCV   


 


MCH   


 


MCHC   


 


RDW   


 


Plt Count   


 


MPV   


 


Neut % (Auto)   


 


Lymph % (Auto)   


 


Mono % (Auto)   


 


Eos % (Auto)   


 


Baso % (Auto)   


 


Neut # (Auto)   


 


Lymph # (Auto)   


 


Mono # (Auto)   


 


Eos # (Auto)   


 


Baso # (Auto)   


 


PT   


 


INR   


 


APTT   


 


Sodium   


 


Potassium   


 


Chloride   


 


Carbon Dioxide   


 


Anion Gap   


 


BUN   


 


Creatinine   


 


Est GFR ( Amer)   


 


Est GFR (Non-Af Amer)   


 


POC Glucose (mg/dL)  222 H   202 H


 


Random Glucose   


 


Calcium   


 


Total Bilirubin   


 


AST   


 


ALT   


 


Alkaline Phosphatase   


 


Total Protein   


 


Albumin   


 


Globulin   


 


Albumin/Globulin Ratio   


 


Urine HCG, Qual   Negative 














  10/17/18 10/17/18 10/17/18





  07:25 07:25 07:25


 


WBC  3.5 L  


 


RBC  3.40 L  


 


Hgb  10.7 L  


 


Hct  31.9 L  


 


MCV  93.8  


 


MCH  31.6 H  


 


MCHC  33.6  


 


RDW  17.3 H  


 


Plt Count  141  


 


MPV  9.5  


 


Neut % (Auto)  82.0 H  


 


Lymph % (Auto)  16.0 L  


 


Mono % (Auto)  2.0  


 


Eos % (Auto)  0.0  


 


Baso % (Auto)  0.0  


 


Neut # (Auto)  2.9  


 


Lymph # (Auto)  0.6 L  


 


Mono # (Auto)  0.1  


 


Eos # (Auto)  0.0  


 


Baso # (Auto)  0.0  


 


PT    14.8 H


 


INR    1.4


 


APTT    40 H


 


Sodium   139 


 


Potassium   4.4 


 


Chloride   105 


 


Carbon Dioxide   18 L 


 


Anion Gap   20 


 


BUN   19 H 


 


Creatinine   0.7 


 


Est GFR ( Amer)   > 60 


 


Est GFR (Non-Af Amer)   > 60 


 


POC Glucose (mg/dL)   


 


Random Glucose   192 H 


 


Calcium   8.4 L 


 


Total Bilirubin   14.9 H 


 


AST   95 H D 


 


ALT   41 


 


Alkaline Phosphatase   476 H D 


 


Total Protein   7.1 


 


Albumin   3.1 L 


 


Globulin   3.9 


 


Albumin/Globulin Ratio   0.8 L 


 


Urine HCG, Qual   














  10/17/18 10/17/18 10/17/18





  11:17 16:03 21:06


 


WBC   


 


RBC   


 


Hgb   


 


Hct   


 


MCV   


 


MCH   


 


MCHC   


 


RDW   


 


Plt Count   


 


MPV   


 


Neut % (Auto)   


 


Lymph % (Auto)   


 


Mono % (Auto)   


 


Eos % (Auto)   


 


Baso % (Auto)   


 


Neut # (Auto)   


 


Lymph # (Auto)   


 


Mono # (Auto)   


 


Eos # (Auto)   


 


Baso # (Auto)   


 


PT   


 


INR   


 


APTT   


 


Sodium   


 


Potassium   


 


Chloride   


 


Carbon Dioxide   


 


Anion Gap   


 


BUN   


 


Creatinine   


 


Est GFR ( Amer)   


 


Est GFR (Non-Af Amer)   


 


POC Glucose (mg/dL)  138 H  212 H  260 H


 


Random Glucose   


 


Calcium   


 


Total Bilirubin   


 


AST   


 


ALT   


 


Alkaline Phosphatase   


 


Total Protein   


 


Albumin   


 


Globulin   


 


Albumin/Globulin Ratio   


 


Urine HCG, Qual

## 2018-10-17 NOTE — CT
Date of service: 



10/17/2018



PROCEDURE:  CT Abdomen with and without contrast



HISTORY:

?panc mass, obstructive jaundice, elevated CA 19-9



COMPARISON:

None.



TECHNIQUE:

2.5 mm contiguous axial sections were acquired from the lung base to 

the iliac crests. Images of the abdomen were acquired without 

intravenous contrast, in the late arterial phase and in the late 

portal venous phase of enhancement. 



Contrast dose: 100 mL Visipaque 320



Radiation dose:



Total exam DLP = 1229.97 mGy-cm.



This CT exam was performed using one or more of the following dose 

reduction techniques: Automated exposure control, adjustment of the 

mA and/or kV according to patient size, and/or use of iterative 

reconstruction technique.



FINDINGS:



LOWER THORAX:

Nonspecific minimal dependent pleural thickening the posterior lung 

base bilaterally. Minimal linear scar/atelectasis both lower lobes. 

Mild cardiomegaly. AICD noted. 



LIVER:

Normal size, contour and attenuation. No intrahepatic biliary 

dilatation. No mass. 



GALLBLADDER AND BILE DUCTS:

Status post cholecystectomy. Dilated common bile duct up to 

approximately 9 mm consistent with history of prior cholecystectomy. 

Biliary stent noted in the distal CBD protruding into the duodenum 

extending to the level of the 2nd and 3rd duodenum. The proximal 

aspect of the stent is just above the intrapancreatic portion of the 

common duct. 



PANCREAS:

No mass. There is pancreatic ductal dilatation. The duct is dilated 

up to 4 mm in the body of the pancreas and up to approximately 9 mm 

in the pancreatic head. No peripancreatic fluid.



SPLEEN:

Unremarkable. 



ADRENALS:

Unremarkable. No mass. 



KIDNEYS AND URETERS:

No hydronephrosis. No calculus. 6 mm right upper pole renal cortical 

cyst. 



VASCULATURE:

Unremarkable. No aortic aneurysm. 



BOWEL:

Moderate retained stool in the colon. Otherwise unremarkable. 



PERITONEUM:

Unremarkable. No free fluid. No free air. 



LYMPH NODES:

Unremarkable. No enlarged lymph nodes. 



BONES:

No acute fracture. 



OTHER FINDINGS:

None.



IMPRESSION:

Dilated common bile duct and pancreatic duct. No pancreatic mass 

identified. Biliary stent extending from the distal common bile duct 

to the junction of the 2nd and 3rd duodenum. Moderate retained stool 

in the colon. Minor findings as above.

## 2018-10-17 NOTE — PCM.IRP
History of Present Illness





- History of Present Illness


History of Present Illness: 





IR requested to evaluate Pt with hyperbilirubinemia. 





Pt is s/p ERCP with CBD stent 09/10/2018.  The bilirubin is elevated.  Stent may

be occluded. There is no IR intervention for a occluded CBD stent that was 

placed during ERCP. 





Pt requires ERCP with stent removal and placement of new stent. 





Objective





- Vital Signs/Intake and Output


Vital Signs (last 24 hours): 


                               Vital Signs - 24 hr











  10/16/18 10/16/18 10/16/18





  10:20 10:35 10:50


 


Temperature 96.1 F L 96.4 F L 96.8 F L


 


Pulse Rate 62 60 60


 


Respiratory 20 17 16





Rate   


 


Blood Pressure 107/66 106/70 121/70


 


O2 Sat by Pulse 100 100 100





Oximetry   














  10/16/18 10/17/18 10/17/18





  15:20 00:21 07:33


 


Temperature 97.5 F L 98.1 F 98.2 F


 


Pulse Rate 53 L 60 59 L


 


Respiratory 20 20 20





Rate   


 


Blood Pressure 137/75 138/81 147/76


 


O2 Sat by Pulse 99 99 97





Oximetry   











Intake and Output (last 12 hours): 


                                 Intake & Output











 10/16/18 10/17/18 10/17/18





 18:59 06:59 18:59


 


Intake Total 150 1780 


 


Output Total  1350 


 


Balance 150 430 


 


Intake:   


 


    


 


  Intake, IV Amount  1480 


 


    Right Forearm  1480 


 


  Oral  300 


 


Output:   


 


  Urine  1350 


 


    Urethral (Charles)  1350 














- Medications


Medications: 


                               Current Medications





Apixaban (Eliquis)  5 mg PO BID [facility]


Belladonna/Phenobarbital (Donnatal)  1 tab PO TID [facility]


   Last Admin: 10/16/18 17:20 Dose:  1 tab


Digoxin (Lanoxin)  0.25 mg PO DAILY@1800 [facility]


   Last Admin: 10/16/18 17:21 Dose:  0.25 mg


Diphenhydramine HCl (Benadryl)  25 mg IVP Q4 PRN


   PRN Reason: itchiness


   Last Admin: 10/17/18 08:50 Dose:  25 mg


Diphenoxylate HCl/Atropine (Lomotil 0.025-2.5 Mg Tablet)  1 tab PO Q8 PRN


   PRN Reason: Diarrhea


Gabapentin (Neurontin)  100 mg PO BID [facility]


   Last Admin: 10/16/18 17:17 Dose:  100 mg


Hydromorphone HCl (Dilaudid)  1 mg IVP Q4H PRN


   PRN Reason: pain


   Last Admin: 10/17/18 08:42 Dose:  1 mg


Ciprofloxacin (Cipro 400mg/200ml Dsw)  400 mg in 200 mls @ 133 mls/hr IVPB Q12H 

[facility]; Protocol


   Last Admin: 10/17/18 08:22 Dose:  133 mls/hr


Ceftriaxone Sodium 1 gm/ (Sodium Chloride)  100 mls @ 100 mls/hr IVPB DAILY [facility];

Protocol


   Last Admin: 10/16/18 11:22 Dose:  100 mls/hr


Dextrose/Sodium Chloride (Dextrose 5%/0.45% Ns 1000 Ml)  1,000 mls @ 80 mls/hr 

IV .[id number] [facility]


   Last Admin: 10/17/18 08:25 Dose:  80 mls/hr


Insulin Human Regular (Novolin R)  0 unit SC ACHS [facility]; Protocol


   Last Admin: 10/17/18 07:51 Dose:  Not Given


Lactobacillus Acidophilus (Bacid Acidophilus)  1 cap PO BID [facility]


   Last Admin: 10/16/18 17:20 Dose:  1 cap


Methylprednisolone (Solu-Medrol)  40 mg IV Q12 [facility]


   Stop: 10/17/18 22:01


   Last Admin: 10/16/18 21:16 Dose:  40 mg


Methylprednisolone (Solu-Medrol)  30 mg IV Q12 [facility]


Metoclopramide HCl (Reglan)  5 mg IVP Q6 [facility]


   Last Admin: 10/17/18 06:05 Dose:  5 mg


Metoprolol Succinate (Toprol Xl)  50 mg PO DAILY [facility]


   Last Admin: 10/16/18 10:23 Dose:  Not Given


Oxybutynin Chloride (Ditropan Xl)  10 mg PO DAILY [facility]


   Last Admin: 10/16/18 10:22 Dose:  Not Given


Sucralfate (Carafate Tab)  1 gm PO ACBD [facility]


   Last Admin: 10/17/18 07:51 Dose:  Not Given


Zolpidem Tartrate (Ambien)  5 mg PO HS PRN


   PRN Reason: Insomnia


   Last Admin: 10/16/18 20:24 Dose:  5 mg











- Labs


Labs (last 24 hours): 


                         Laboratory Results - last 24 hr











  10/16/18 10/16/18 10/16/18





  11:11 13:41 16:17


 


WBC   


 


RBC   


 


Hgb   


 


Hct   


 


MCV   


 


MCH   


 


MCHC   


 


RDW   


 


Plt Count   


 


MPV   


 


Neut % (Auto)   


 


Lymph % (Auto)   


 


Mono % (Auto)   


 


Eos % (Auto)   


 


Baso % (Auto)   


 


Neut # (Auto)   


 


Lymph # (Auto)   


 


Mono # (Auto)   


 


Eos # (Auto)   


 


Baso # (Auto)   


 


PT   16.4 H 


 


INR   1.5 


 


APTT   43 H 


 


Sodium   


 


Potassium   


 


Chloride   


 


Carbon Dioxide   


 


Anion Gap   


 


BUN   


 


Creatinine   


 


Est GFR ( Amer)   


 


Est GFR (Non-Af Amer)   


 


POC Glucose (mg/dL)  78   144 H


 


Random Glucose   


 


Calcium   


 


Total Bilirubin   


 


AST   


 


ALT   


 


Alkaline Phosphatase   


 


Total Protein   


 


Albumin   


 


Globulin   


 


Albumin/Globulin Ratio   


 


Urine HCG, Qual   














  10/16/18 10/17/18 10/17/18





  20:59 06:42 07:11


 


WBC   


 


RBC   


 


Hgb   


 


Hct   


 


MCV   


 


MCH   


 


MCHC   


 


RDW   


 


Plt Count   


 


MPV   


 


Neut % (Auto)   


 


Lymph % (Auto)   


 


Mono % (Auto)   


 


Eos % (Auto)   


 


Baso % (Auto)   


 


Neut # (Auto)   


 


Lymph # (Auto)   


 


Mono # (Auto)   


 


Eos # (Auto)   


 


Baso # (Auto)   


 


PT   


 


INR   


 


APTT   


 


Sodium   


 


Potassium   


 


Chloride   


 


Carbon Dioxide   


 


Anion Gap   


 


BUN   


 


Creatinine   


 


Est GFR ( Amer)   


 


Est GFR (Non-Af Amer)   


 


POC Glucose (mg/dL)  222 H   202 H


 


Random Glucose   


 


Calcium   


 


Total Bilirubin   


 


AST   


 


ALT   


 


Alkaline Phosphatase   


 


Total Protein   


 


Albumin   


 


Globulin   


 


Albumin/Globulin Ratio   


 


Urine HCG, Qual   Negative 














  10/17/18 10/17/18 10/17/18





  07:25 07:25 07:25


 


WBC  3.5 L  


 


RBC  3.40 L  


 


Hgb  10.7 L  


 


Hct  31.9 L  


 


MCV  93.8  


 


MCH  31.6 H  


 


MCHC  33.6  


 


RDW  17.3 H  


 


Plt Count  141  


 


MPV  9.5  


 


Neut % (Auto)  82.0 H  


 


Lymph % (Auto)  16.0 L  


 


Mono % (Auto)  2.0  


 


Eos % (Auto)  0.0  


 


Baso % (Auto)  0.0  


 


Neut # (Auto)  2.9  


 


Lymph # (Auto)  0.6 L  


 


Mono # (Auto)  0.1  


 


Eos # (Auto)  0.0  


 


Baso # (Auto)  0.0  


 


PT    14.8 H


 


INR    1.4


 


APTT    40 H


 


Sodium   139 


 


Potassium   4.4 


 


Chloride   105 


 


Carbon Dioxide   18 L 


 


Anion Gap   20 


 


BUN   19 H 


 


Creatinine   0.7 


 


Est GFR ( Amer)   > 60 


 


Est GFR (Non-Af Amer)   > 60 


 


POC Glucose (mg/dL)   


 


Random Glucose   192 H 


 


Calcium   8.4 L 


 


Total Bilirubin   14.9 H 


 


AST   95 H D 


 


ALT   41 


 


Alkaline Phosphatase   476 H D 


 


Total Protein   7.1 


 


Albumin   3.1 L 


 


Globulin   3.9 


 


Albumin/Globulin Ratio   0.8 L 


 


Urine HCG, Qual

## 2018-10-17 NOTE — CP.PCM.PN
<JavierJuliana - Last Filed: 10/17/18 11:14>





Subjective





- Date & Time of Evaluation


Date of Evaluation: 10/17/18


Time of Evaluation: 11:14





- Subjective


Subjective: 





Surgery 





PT seen and examined. No acute events. C/O abd pain but improved. Denies fever, 

vomiting, diarrhea, CP , SOB. Pt is evaluated by IR and recommends replacement 

of the occluded stent. ambulates. Had EGD done. Pt is hungry.  





Objective





- Vital Signs/Intake and Output


Vital Signs (last 24 hours): 


                                        











Temp Pulse Resp BP Pulse Ox


 


 98.2 F   59 L  20   147/76   97 


 


 10/17/18 07:33  10/17/18 07:33  10/17/18 07:33  10/17/18 07:33  10/17/18 07:33








Intake and Output: 


                                        











 10/17/18 10/17/18





 06:59 18:59


 


Intake Total 1780 


 


Output Total 1350 


 


Balance 430 














- Medications


Medications: 


                               Current Medications





Apixaban (Eliquis)  5 mg PO BID [facility]


Belladonna/Phenobarbital (Donnatal)  1 tab PO TID [facility]


   Last Admin: 10/17/18 10:49 Dose:  1 tab


Digoxin (Lanoxin)  0.25 mg PO DAILY@1800 [facility]


   Last Admin: 10/16/18 17:21 Dose:  0.25 mg


Diphenhydramine HCl (Benadryl)  25 mg IVP Q4 PRN


   PRN Reason: itchiness


   Last Admin: 10/17/18 08:50 Dose:  25 mg


Diphenoxylate HCl/Atropine (Lomotil 0.025-2.5 Mg Tablet)  1 tab PO Q8 PRN


   PRN Reason: Diarrhea


Gabapentin (Neurontin)  100 mg PO BID [facility]


   Last Admin: 10/17/18 10:33 Dose:  100 mg


Hydromorphone HCl (Dilaudid)  1 mg IVP Q4H PRN


   PRN Reason: pain


   Last Admin: 10/17/18 08:42 Dose:  1 mg


Ciprofloxacin (Cipro 400mg/200ml Dsw)  400 mg in 200 mls @ 133 mls/hr IVPB Q12H 

[facility]; Protocol


   Last Admin: 10/17/18 08:22 Dose:  133 mls/hr


Ceftriaxone Sodium 1 gm/ (Sodium Chloride)  100 mls @ 100 mls/hr IVPB DAILY [facility];

Protocol


   Last Admin: 10/16/18 11:22 Dose:  100 mls/hr


Dextrose/Sodium Chloride (Dextrose 5%/0.45% Ns 1000 Ml)  1,000 mls @ 80 mls/hr 

IV .[id number] [facility]


   Last Admin: 10/17/18 10:33 Dose:  Not Given


Insulin Human Regular (Novolin R)  0 unit SC ACHS [facility]; Protocol


   Last Admin: 10/17/18 07:51 Dose:  Not Given


Lactobacillus Acidophilus (Bacid Acidophilus)  1 cap PO BID [facility]


   Last Admin: 10/16/18 17:20 Dose:  1 cap


Methylprednisolone (Solu-Medrol)  40 mg IV Q12 [facility]


   Stop: 10/17/18 22:01


   Last Admin: 10/17/18 10:34 Dose:  40 mg


Methylprednisolone (Solu-Medrol)  30 mg IV Q12 [facility]


Metoclopramide HCl (Reglan)  5 mg IVP Q6 [facility]


   Last Admin: 10/17/18 06:05 Dose:  5 mg


Metoprolol Succinate (Toprol Xl)  50 mg PO DAILY [facility]


   Last Admin: 10/17/18 10:33 Dose:  50 mg


Oxybutynin Chloride (Ditropan Xl)  10 mg PO DAILY [facility]


   Last Admin: 10/17/18 10:49 Dose:  10 mg


Sucralfate (Carafate Tab)  1 gm PO ACBD [facility]


   Last Admin: 10/17/18 07:51 Dose:  Not Given


Zolpidem Tartrate (Ambien)  5 mg PO HS PRN


   PRN Reason: Insomnia


   Last Admin: 10/16/18 20:24 Dose:  5 mg











- Labs


Labs: 


                                        





                                 10/17/18 07:25 





                                 10/17/18 07:25 





                                        











PT  14.8 SECONDS (9.7-12.2)  H  10/17/18  07:25    


 


INR  1.4   10/17/18  07:25    


 


APTT  40 SECONDS (21-34)  H  10/17/18  07:25    














- Constitutional


Appears: No Acute Distress





- Head Exam


Head Exam: ATRAUMATIC, NORMAL INSPECTION, NORMOCEPHALIC





- Eye Exam


Eye Exam: EOMI, Scleral icterus


Pupil Exam: NORMAL ACCOMODATION





- ENT Exam


ENT Exam: Mucous Membranes Moist





- Neck Exam


Neck Exam: Normal Inspection





- Respiratory Exam


Respiratory Exam: NORMAL BREATHING PATTERN





- Cardiovascular Exam


Cardiovascular Exam: REGULAR RHYTHM





- GI/Abdominal Exam


GI & Abdominal Exam: Tenderness.  absent: Distended, Guarding, Rigid, Soft


Additional comments: 





R abd TTP 





- Extremities Exam


Extremities Exam: absent: Full ROM, Normal Inspection, Tenderness


Additional comments: 





b/l AKA. 





- Back Exam


Back Exam: NORMAL INSPECTION





- Neurological Exam


Neurological Exam: Alert, Awake, Oriented x3





- Psychiatric Exam


Psychiatric exam: Normal Affect, Normal Mood





- Skin


Skin Exam: Dry, Intact, Warm.  absent: Erythema, Normal Color


Additional comments: 





[employer]





Assessment and Plan





- Assessment and Plan (Free Text)


Assessment: 





53F with RUQ pain and obstructive jaundice possibly secondary to a migrated or 

occluded stent





Plan: 


No surgical intervention indicated at this time patient is stable. Will follow 

up other interventions and further workup results prior to any further surgical 

planning


IR recommends ERCP stent replacement for occluded stent.


GI on board 


Pain and nausea control PRN


IVF


CT abdomen and pelvis with Pancreas protocol


Trend CBC, CMP, LFT 





WIll DW Dr. Tee








<Alistair Tee - Last Filed: 10/22/18 18:49>





Objective





- Vital Signs/Intake and Output


Vital Signs (last 24 hours): 


                                        











Temp Pulse Resp BP Pulse Ox


 


 98 F   56 L  20   152/71 H  100 


 


 10/22/18 16:00  10/22/18 16:00  10/22/18 16:00  10/22/18 16:00  10/22/18 16:00








Intake and Output: 


                                        











 10/22/18 10/22/18





 06:59 18:59


 


Intake Total 350 400


 


Output Total 2200 300


 


Balance -1850 100














- Medications


Medications: 


                               Current Medications





Apixaban (Eliquis)  5 mg PO BID [facility]


Benzocaine/Menthol (Cepacol Sore Throat)  1 john PO Q2 PRN


   PRN Reason: Sore Throat


   Last Admin: 10/22/18 18:02 Dose:  1 john


Digoxin (Lanoxin)  0.25 mg PO DAILY@1800 [facility]


   Last Admin: 10/22/18 18:04 Dose:  Not Given


Diphenhydramine HCl (Benadryl)  25 mg IVP Q4 PRN


   PRN Reason: itchiness


   Last Admin: 10/22/18 18:47 Dose:  25 mg


Diphenoxylate HCl/Atropine (Lomotil 0.025-2.5 Mg Tablet)  1 tab PO Q8 PRN


   PRN Reason: Diarrhea


   Last Admin: 10/22/18 17:59 Dose:  1 tab


Gabapentin (Neurontin)  100 mg PO BID [facility]


   Last Admin: 10/22/18 17:59 Dose:  100 mg


Insulin Human Regular (Novolin R)  0 unit SC ACHS [facility]; Protocol


   Last Admin: 10/22/18 17:30 Dose:  6 units


Insulin Human Regular (Novolin R)  4 unit SC TIDAC [facility]


   Last Admin: 10/22/18 17:58 Dose:  4 units


Lactobacillus Acidophilus (Bacid Acidophilus)  1 cap PO BID [facility]


   Last Admin: 10/22/18 17:58 Dose:  1 cap


Lidocaine (Lidoderm)  1 ea TD DAILY [facility]


   Last Admin: 10/22/18 10:14 Dose:  1 ea


Methylprednisolone (Solu-Medrol)  30 mg IV Q12 [facility]


   Last Admin: 10/22/18 10:11 Dose:  30 mg


Metoclopramide HCl (Reglan)  5 mg IVP Q6 [facility]


   Last Admin: 10/22/18 17:59 Dose:  5 mg


Metoprolol Succinate (Toprol Xl)  50 mg PO DAILY [facility]


   Last Admin: 10/22/18 10:11 Dose:  50 mg


Morphine Sulfate (Morphine)  1 mg IVP Q4 PRN


   PRN Reason: Pain, severe (8-10)


   Last Admin: 10/22/18 15:01 Dose:  1 mg


Morphine Sulfate (Morphine Immediate Release Tab)  15 mg PO Q8H PRN


   PRN Reason: Pain, severe (8-10)


Oxybutynin Chloride (Ditropan Xl)  10 mg PO DAILY [facility]


   Last Admin: 10/22/18 10:11 Dose:  10 mg


Sucralfate (Carafate Tab)  1 gm PO ACBD [facility]


   Last Admin: 10/22/18 17:15 Dose:  1 gm


Zolpidem Tartrate (Ambien)  5 mg PO HS PRN


   PRN Reason: Insomnia


   Last Admin: 10/21/18 23:34 Dose:  5 mg











- Labs


Labs: 


                                        





                                 10/22/18 11:10 





                                 10/22/18 11:10 





                                        











PT  12.1 SECONDS (9.7-12.2)   10/20/18  07:09    


 


INR  1.1   10/20/18  07:09    


 


APTT  29 SECONDS (21-34)  D 10/20/18  07:09    














Attending/Attestation





- Attestation


I have personally seen and examined this patient.: Yes


I have fully participated in the care of the patient.: Yes


I have reviewed all pertinent clinical information, including history, physical 

exam and plan: Yes


Notes (Text): 





Pt was seen and examined at bedside


Agree with above note and assessment


Pt with abdominal pain and tenderness


Get CT scan of A/P with Pancreatic protocol


IR for PTC


c.w current mx


Plan d.w pt in detail


Risk and benefit explained in detail.

## 2018-10-18 LAB
ALBUMIN SERPL-MCNC: 3.4 G/DL (ref 3.5–5)
ALBUMIN/GLOB SERPL: 0.8 {RATIO} (ref 1–2.1)
ALT SERPL-CCNC: 65 U/L (ref 9–52)
AST SERPL-CCNC: 137 U/L (ref 14–36)
BUN SERPL-MCNC: 20 MG/DL (ref 7–17)
CALCIUM SERPL-MCNC: 8.5 MG/DL (ref 8.6–10.4)
GFR NON-AFRICAN AMERICAN: > 60

## 2018-10-18 PROCEDURE — 0F798DZ DILATION OF COMMON BILE DUCT WITH INTRALUMINAL DEVICE, VIA NATURAL OR ARTIFICIAL OPENING ENDOSCOPIC: ICD-10-PCS | Performed by: SPECIALIST

## 2018-10-18 PROCEDURE — 0FPB8DZ REMOVAL OF INTRALUMINAL DEVICE FROM HEPATOBILIARY DUCT, VIA NATURAL OR ARTIFICIAL OPENING ENDOSCOPIC: ICD-10-PCS | Performed by: SPECIALIST

## 2018-10-18 RX ADMIN — HUMAN INSULIN SCH: 100 INJECTION, SOLUTION SUBCUTANEOUS at 16:30

## 2018-10-18 RX ADMIN — DIPHENHYDRAMINE HYDROCHLORIDE PRN MG: 50 INJECTION INTRAMUSCULAR; INTRAVENOUS at 15:15

## 2018-10-18 RX ADMIN — OXYBUTYNIN CHLORIDE SCH: 10 TABLET, EXTENDED RELEASE ORAL at 09:22

## 2018-10-18 RX ADMIN — CIPROFLOXACIN SCH MLS/HR: 2 INJECTION, SOLUTION INTRAVENOUS at 09:22

## 2018-10-18 RX ADMIN — HYDROMORPHONE HYDROCHLORIDE PRN MG: 1 INJECTION, SOLUTION INTRAMUSCULAR; INTRAVENOUS; SUBCUTANEOUS at 00:12

## 2018-10-18 RX ADMIN — DEXTROSE AND SODIUM CHLORIDE SCH: 5; 450 INJECTION, SOLUTION INTRAVENOUS at 23:45

## 2018-10-18 RX ADMIN — Medication SCH CAP: at 17:48

## 2018-10-18 RX ADMIN — METHYLPREDNISOLONE SODIUM SUCCINATE SCH MG: 40 INJECTION, POWDER, FOR SOLUTION INTRAMUSCULAR; INTRAVENOUS at 09:45

## 2018-10-18 RX ADMIN — DIPHENHYDRAMINE HYDROCHLORIDE PRN MG: 50 INJECTION INTRAMUSCULAR; INTRAVENOUS at 00:12

## 2018-10-18 RX ADMIN — HUMAN INSULIN SCH: 100 INJECTION, SOLUTION SUBCUTANEOUS at 12:06

## 2018-10-18 RX ADMIN — DEXTROSE AND SODIUM CHLORIDE SCH MLS/HR: 5; 450 INJECTION, SOLUTION INTRAVENOUS at 03:07

## 2018-10-18 RX ADMIN — HUMAN INSULIN SCH: 100 INJECTION, SOLUTION SUBCUTANEOUS at 22:58

## 2018-10-18 RX ADMIN — SODIUM CHLORIDE ONE: 0.9 INJECTION, SOLUTION INTRAVENOUS at 10:31

## 2018-10-18 RX ADMIN — SODIUM CHLORIDE ONE MLS/HR: 0.9 INJECTION, SOLUTION INTRAVENOUS at 15:36

## 2018-10-18 RX ADMIN — CIPROFLOXACIN SCH MLS/HR: 2 INJECTION, SOLUTION INTRAVENOUS at 19:26

## 2018-10-18 RX ADMIN — METHYLPREDNISOLONE SODIUM SUCCINATE SCH MG: 40 INJECTION, POWDER, FOR SOLUTION INTRAMUSCULAR; INTRAVENOUS at 21:58

## 2018-10-18 RX ADMIN — Medication SCH: at 09:46

## 2018-10-18 RX ADMIN — METOPROLOL SUCCINATE SCH: 50 TABLET, EXTENDED RELEASE ORAL at 09:58

## 2018-10-18 RX ADMIN — HYDROMORPHONE HYDROCHLORIDE PRN MG: 1 INJECTION, SOLUTION INTRAMUSCULAR; INTRAVENOUS; SUBCUTANEOUS at 19:17

## 2018-10-18 RX ADMIN — HYDROMORPHONE HYDROCHLORIDE PRN MG: 1 INJECTION, SOLUTION INTRAMUSCULAR; INTRAVENOUS; SUBCUTANEOUS at 15:16

## 2018-10-18 RX ADMIN — DEXTROSE AND SODIUM CHLORIDE SCH MLS/HR: 5; 450 INJECTION, SOLUTION INTRAVENOUS at 21:57

## 2018-10-18 RX ADMIN — DIGOXIN SCH: 0.25 TABLET ORAL at 17:54

## 2018-10-18 RX ADMIN — DEXTROSE AND SODIUM CHLORIDE SCH: 5; 450 INJECTION, SOLUTION INTRAVENOUS at 11:18

## 2018-10-18 RX ADMIN — DIPHENHYDRAMINE HYDROCHLORIDE PRN MG: 50 INJECTION INTRAMUSCULAR; INTRAVENOUS at 19:17

## 2018-10-18 RX ADMIN — HUMAN INSULIN SCH: 100 INJECTION, SOLUTION SUBCUTANEOUS at 08:33

## 2018-10-18 NOTE — RAD
Date of service: 



10/18/2018



PROCEDURE:  Intraoperative Fluoroscopy. 



HISTORY:

OBSTR JAUNDICE



FINDINGS:

Fluoroscopic assistance was provided for ERCP.  Please refer to the 

operative report from [facility]. 



 Total fluoroscopic time (continuous mode) utilized during the 

procedure 159.7 (seconds). 



Dose report: DLP [phone] (mGy/m2)

## 2018-10-18 NOTE — CP.PCM.PN
<Rolanda Cornejo - Last Filed: 10/18/18 11:30>





Subjective





- Date & Time of Evaluation


Date of Evaluation: 10/18/18


Time of Evaluation: 06:40





- Subjective


Subjective: 





Surgery progress note for Dr. Tee





Pt seen and examined at bedside this AM. pt still complains of RUQ/Right back 

pain unchanged. Denies nausea or fevers.





Objective





- Vital Signs/Intake and Output


Vital Signs (last 24 hours): 


                                        











Temp Pulse Resp BP Pulse Ox


 


 98.1 F   60   20   168/64 H  96 


 


 10/18/18 08:17  10/18/18 08:17  10/18/18 08:17  10/18/18 08:17  10/18/18 08:17








Intake and Output: 


                                        











 10/18/18 10/18/18





 06:59 18:59


 


Intake Total 1860 


 


Output Total 1900 


 


Balance -40 














- Medications


Medications: 


                               Current Medications





Apixaban (Eliquis)  5 mg PO BID [facility]


Digoxin (Lanoxin)  0.25 mg PO DAILY@1800 [facility]


   Last Admin: 10/17/18 17:33 Dose:  0.25 mg


Diphenhydramine HCl (Benadryl)  25 mg IVP Q4 PRN


   PRN Reason: itchiness


   Last Admin: 10/18/18 00:12 Dose:  25 mg


Diphenoxylate HCl/Atropine (Lomotil 0.025-2.5 Mg Tablet)  1 tab PO Q8 PRN


   PRN Reason: Diarrhea


Gabapentin (Neurontin)  100 mg PO BID [facility]


   Last Admin: 10/17/18 17:30 Dose:  100 mg


Hydromorphone HCl (Dilaudid)  1 mg IVP Q4H PRN


   PRN Reason: pain


   Last Admin: 10/18/18 00:12 Dose:  1 mg


Ciprofloxacin (Cipro 400mg/200ml Dsw)  400 mg in 200 mls @ 133 mls/hr IVPB Q12H 

[facility]; Protocol


   Last Admin: 10/17/18 20:13 Dose:  133 mls/hr


Ceftriaxone Sodium 1 gm/ (Sodium Chloride)  100 mls @ 100 mls/hr IVPB DAILY [facility];

Protocol


   Last Admin: 10/17/18 11:45 Dose:  100 mls/hr


Dextrose/Sodium Chloride (Dextrose 5%/0.45% Ns 1000 Ml)  1,000 mls @ 80 mls/hr 

IV .[id number] [facility]


   Last Admin: 10/18/18 03:07 Dose:  80 mls/hr


Insulin Human Regular (Novolin R)  0 unit SC ACHS [facility]; Protocol


   Last Admin: 10/17/18 17:27 Dose:  3 units


Lactobacillus Acidophilus (Bacid Acidophilus)  1 cap PO BID [facility]


   Last Admin: 10/17/18 17:29 Dose:  1 cap


Methylprednisolone (Solu-Medrol)  30 mg IV Q12 [facility]


Metoclopramide HCl (Reglan)  5 mg IVP Q6 [facility]


   Last Admin: 10/18/18 05:58 Dose:  5 mg


Metoprolol Succinate (Toprol Xl)  50 mg PO DAILY [facility]


   Last Admin: 10/17/18 10:33 Dose:  50 mg


Oxybutynin Chloride (Ditropan Xl)  10 mg PO DAILY [facility]


   Last Admin: 10/17/18 10:49 Dose:  10 mg


Sucralfate (Carafate Tab)  1 gm PO ACBD [facility]


   Last Admin: 10/17/18 17:33 Dose:  1 gm


Zolpidem Tartrate (Ambien)  5 mg PO HS PRN


   PRN Reason: Insomnia


   Last Admin: 10/17/18 23:55 Dose:  5 mg











- Labs


Labs: 


                                        





                                 10/17/18 07:25 





                                 10/18/18 06:41 





                                        











PT  14.8 SECONDS (9.7-12.2)  H  10/17/18  07:25    


 


INR  1.4   10/17/18  07:25    


 


APTT  40 SECONDS (21-34)  H  10/17/18  07:25    














- Constitutional


Appears: Well, Non-toxic, No Acute Distress





- Head Exam


Head Exam: ATRAUMATIC, NORMOCEPHALIC





- Eye Exam


Eye Exam: Scleral icterus.  absent: Conjunctival injection





- ENT Exam


ENT Exam: Mucous Membranes Moist, Normal Oropharynx





- Respiratory Exam


Respiratory Exam: NORMAL BREATHING PATTERN.  absent: Accessory Muscle Use, 

Respiratory Distress





- Cardiovascular Exam


Cardiovascular Exam: RRR





- GI/Abdominal Exam


GI & Abdominal Exam: Soft, Tenderness (RUQ tenderness to palpation).  absent: 

Distended, Guarding, Rebound





- Extremities Exam


Additional comments: 





BL AKA





- Neurological Exam


Neurological Exam: Alert, Awake, Oriented x3





- Psychiatric Exam


Psychiatric exam: Normal Affect, Normal Mood





- Skin


Skin Exam: Dry, Normal Color, Warm





Assessment and Plan





- Assessment and Plan (Free Text)


Assessment: 





53F with hyperbilirubinemia and RUQ abdominal pain--likely obstructive d/t 

malfunctioning CBD stent


Plan: 





F/U GI Recs--F/U ERCP results


Patient unlikely to need a general surgical intervention at this time


Continue current regimen of pain medication


Diet per GI





Discussed per Dr. Nay Cornejo, PGY2





<Alistair Tee - Last Filed: 10/22/18 18:50>





Objective





- Vital Signs/Intake and Output


Vital Signs (last 24 hours): 


                                        











Temp Pulse Resp BP Pulse Ox


 


 98 F   56 L  20   152/71 H  100 


 


 10/22/18 16:00  10/22/18 16:00  10/22/18 16:00  10/22/18 16:00  10/22/18 16:00








Intake and Output: 


                                        











 10/22/18 10/22/18





 06:59 18:59


 


Intake Total 350 400


 


Output Total 2200 300


 


Balance -1850 100














- Medications


Medications: 


                               Current Medications





Apixaban (Eliquis)  5 mg PO BID [facility]


   Last Admin: 10/22/18 18:48 Dose:  5 mg


Benzocaine/Menthol (Cepacol Sore Throat)  1 john PO Q2 PRN


   PRN Reason: Sore Throat


   Last Admin: 10/22/18 18:02 Dose:  1 john


Digoxin (Lanoxin)  0.25 mg PO DAILY@1800 [facility]


   Last Admin: 10/22/18 18:04 Dose:  Not Given


Diphenhydramine HCl (Benadryl)  25 mg IVP Q4 PRN


   PRN Reason: itchiness


   Last Admin: 10/22/18 18:47 Dose:  25 mg


Diphenoxylate HCl/Atropine (Lomotil 0.025-2.5 Mg Tablet)  1 tab PO Q8 PRN


   PRN Reason: Diarrhea


   Last Admin: 10/22/18 17:59 Dose:  1 tab


Gabapentin (Neurontin)  100 mg PO BID [facility]


   Last Admin: 10/22/18 17:59 Dose:  100 mg


Insulin Human Regular (Novolin R)  0 unit SC ACHS [facility]; Protocol


   Last Admin: 10/22/18 17:30 Dose:  6 units


Insulin Human Regular (Novolin R)  4 unit SC TIDAC [facility]


   Last Admin: 10/22/18 17:58 Dose:  4 units


Lactobacillus Acidophilus (Bacid Acidophilus)  1 cap PO BID [facility]


   Last Admin: 10/22/18 17:58 Dose:  1 cap


Lidocaine (Lidoderm)  1 ea TD DAILY [facility]


   Last Admin: 10/22/18 10:14 Dose:  1 ea


Methylprednisolone (Solu-Medrol)  30 mg IV Q12 [facility]


   Last Admin: 10/22/18 10:11 Dose:  30 mg


Metoclopramide HCl (Reglan)  5 mg IVP Q6 [facility]


   Last Admin: 10/22/18 17:59 Dose:  5 mg


Metoprolol Succinate (Toprol Xl)  50 mg PO DAILY [facility]


   Last Admin: 10/22/18 10:11 Dose:  50 mg


Morphine Sulfate (Morphine)  1 mg IVP Q4 PRN


   PRN Reason: Pain, severe (8-10)


   Last Admin: 10/22/18 15:01 Dose:  1 mg


Morphine Sulfate (Morphine Immediate Release Tab)  15 mg PO Q8H PRN


   PRN Reason: Pain, severe (8-10)


Oxybutynin Chloride (Ditropan Xl)  10 mg PO DAILY [facility]


   Last Admin: 10/22/18 10:11 Dose:  10 mg


Sucralfate (Carafate Tab)  1 gm PO ACBD [facility]


   Last Admin: 10/22/18 17:15 Dose:  1 gm


Zolpidem Tartrate (Ambien)  5 mg PO HS PRN


   PRN Reason: Insomnia


   Last Admin: 10/21/18 23:34 Dose:  5 mg











- Labs


Labs: 


                                        





                                 10/22/18 11:10 





                                 10/22/18 11:10 





                                        











PT  12.1 SECONDS (9.7-12.2)   10/20/18  07:09    


 


INR  1.1   10/20/18  07:09    


 


APTT  29 SECONDS (21-34)  D 10/20/18  07:09    














Attending/Attestation





- Attestation


I have personally seen and examined this patient.: Yes


I have fully participated in the care of the patient.: Yes


I have reviewed all pertinent clinical information, including history, physical 

exam and plan: Yes


Notes (Text): 





Pt was seen and examined at bedside


Agree with above note and assessment


Pt is improving clinically


Less abdominal pain and tenderness


Possible ERCP today


c.w current mx


Plan d.w pt in detail

## 2018-10-18 NOTE — PN
DATE:  10/17/2018



SUBJECTIVE:  The patient is status post CT of abdomen with pancreatic

protocol.  The patient still has nausea, occasional vomiting, and

generalized weakness.  The patient is with right upper quadrant abdominal

pain, no fever, lethargy, does feel chills and rigors, tiredness, and

history of malaise and fatigue.



PHYSICAL EXAMINATION:

VITAL SIGNS:  Blood pressure 142/81, pulse 60, respiratory rate 20, and

temperature 98.

LUNGS:  Clear.  No rales.  No rhonchi.

CVS:  S1 and S2 regular.  No lymph nodes.

ABDOMEN:  Soft and nontender.  Bowel sounds are positive.  Right upper

quadrant liver is enlarged.



ASSESSMENT:

1.  Hepatic sarcoidosis with evidence of biliary obstruction on pancreatic

CAT scan.  There is a stent in place, and there does not seem to be any

obstruction on the CAT scan.

2.  Hypertension.

3.  Type 2 diabetes, on Accu-Cheks.

4.  Neurogenic bladder with chronic indwelling Charles catheter in place.



PLAN:  Antibiotics, diet, Reglan, pain medication, and continue

Solu-Medrol, and we will discuss the reports of CAT scan of the pancreas

with GI, and further plan of care will be decided.





__________________________________________

Nasir Dunbar MD





DD:  10/17/2018 19:13:20

DT:  10/18/2018 0:01:29

Job # [id number]

## 2018-10-18 NOTE — CP.PCM.PN
Subjective





- Subjective


Subjective: 





dictated   





Objective





- Vital Signs/Intake and Output


Vital Signs (last 24 hours): 


                                        











Temp Pulse Resp BP Pulse Ox


 


 97.7 F   64   20   124/66   98 


 


 10/18/18 16:00  10/18/18 16:00  10/18/18 16:00  10/18/18 16:00  10/18/18 16:00








Intake and Output: 


                                        











 10/18/18 10/19/18





 18:59 06:59


 


Intake Total  850


 


Balance  850














- Medications


Medications: 


                               Current Medications





Apixaban (Eliquis)  5 mg PO BID [facility]


Digoxin (Lanoxin)  0.25 mg PO DAILY@1800 [facility]


   Last Admin: 10/18/18 17:54 Dose:  Not Given


Diphenhydramine HCl (Benadryl)  25 mg IVP Q4 PRN


   PRN Reason: itchiness


   Last Admin: 10/18/18 19:17 Dose:  25 mg


Diphenoxylate HCl/Atropine (Lomotil 0.025-2.5 Mg Tablet)  1 tab PO Q8 PRN


   PRN Reason: Diarrhea


Gabapentin (Neurontin)  100 mg PO BID [facility]


   Last Admin: 10/18/18 17:48 Dose:  100 mg


Hydromorphone HCl (Dilaudid)  1 mg IVP Q4H PRN


   PRN Reason: pain


   Last Admin: 10/18/18 19:17 Dose:  1 mg


Ciprofloxacin (Cipro 400mg/200ml Dsw)  400 mg in 200 mls @ 133 mls/hr IVPB Q12H 

[facility]; Protocol


   Last Admin: 10/18/18 19:26 Dose:  133 mls/hr


Ceftriaxone Sodium 1 gm/ (Sodium Chloride)  100 mls @ 100 mls/hr IVPB DAILY [facility];

Protocol


   Last Admin: 10/18/18 09:44 Dose:  100 mls/hr


Dextrose/Sodium Chloride (Dextrose 5%/0.45% Ns 1000 Ml)  1,000 mls @ 80 mls/hr 

IV .[id number] [facility]


   Last Admin: 10/18/18 21:57 Dose:  80 mls/hr


Insulin Human Regular (Novolin R)  0 unit SC ACHS [facility]; Protocol


   Last Admin: 10/18/18 22:58 Dose:  Not Given


Lactobacillus Acidophilus (Bacid Acidophilus)  1 cap PO BID [facility]


   Last Admin: 10/18/18 17:48 Dose:  1 cap


Methylprednisolone (Solu-Medrol)  30 mg IV Q12 [facility]


   Last Admin: 10/18/18 21:58 Dose:  30 mg


Metoclopramide HCl (Reglan)  5 mg IVP Q6 [facility]


   Last Admin: 10/18/18 17:47 Dose:  5 mg


Metoprolol Succinate (Toprol Xl)  50 mg PO DAILY [facility]


   Last Admin: 10/18/18 09:58 Dose:  Not Given


Oxybutynin Chloride (Ditropan Xl)  10 mg PO DAILY [facility]


   Last Admin: 10/18/18 09:22 Dose:  Not Given


Sucralfate (Carafate Tab)  1 gm PO ACBD [facility]


   Last Admin: 10/18/18 17:48 Dose:  1 gm


Zolpidem Tartrate (Ambien)  5 mg PO HS PRN


   PRN Reason: Insomnia


   Last Admin: 10/17/18 23:55 Dose:  5 mg











- Labs


Labs: 


                                        





                                 10/17/18 07:25 





                                 10/18/18 06:41 





                                        











PT  14.8 SECONDS (9.7-12.2)  H  10/17/18  07:25    


 


INR  1.4   10/17/18  07:25    


 


APTT  40 SECONDS (21-34)  H  10/17/18  07:25

## 2018-10-18 NOTE — CP.PCM.PN
Subjective





- Date & Time of Evaluation


Date of Evaluation: 10/18/18


Time of Evaluation: 16:20





- Subjective


Subjective: 





Patient seen and evaluated


Denies chest pain and dyspnea


Continue current meds





Objective





- Vital Signs/Intake and Output


Vital Signs (last 24 hours): 


                                        











Temp Pulse Resp BP Pulse Ox


 


 97.7 F   64   20   124/66   98 


 


 10/18/18 16:00  10/18/18 16:00  10/18/18 16:00  10/18/18 16:00  10/18/18 16:00











- Medications


Medications: 


                               Current Medications





Apixaban (Eliquis)  5 mg PO BID [facility]


Digoxin (Lanoxin)  0.25 mg PO DAILY@1800 [facility]


   Last Admin: 10/18/18 17:54 Dose:  Not Given


Diphenhydramine HCl (Benadryl)  25 mg IVP Q4 PRN


   PRN Reason: itchiness


   Last Admin: 10/18/18 19:17 Dose:  25 mg


Diphenoxylate HCl/Atropine (Lomotil 0.025-2.5 Mg Tablet)  1 tab PO Q8 PRN


   PRN Reason: Diarrhea


Gabapentin (Neurontin)  100 mg PO BID [facility]


   Last Admin: 10/18/18 17:48 Dose:  100 mg


Hydromorphone HCl (Dilaudid)  1 mg IVP Q4H PRN


   PRN Reason: pain


   Last Admin: 10/18/18 19:17 Dose:  1 mg


Ciprofloxacin (Cipro 400mg/200ml Dsw)  400 mg in 200 mls @ 133 mls/hr IVPB Q12H 

[facility]; Protocol


   Last Admin: 10/18/18 19:26 Dose:  133 mls/hr


Ceftriaxone Sodium 1 gm/ (Sodium Chloride)  100 mls @ 100 mls/hr IVPB DAILY [facility];

Protocol


   Last Admin: 10/18/18 09:44 Dose:  100 mls/hr


Dextrose/Sodium Chloride (Dextrose 5%/0.45% Ns 1000 Ml)  1,000 mls @ 80 mls/hr 

IV .[id number] [facility]


   Last Admin: 10/18/18 21:57 Dose:  80 mls/hr


Insulin Human Regular (Novolin R)  0 unit SC ACHS [facility]; Protocol


   Last Admin: 10/18/18 22:58 Dose:  Not Given


Lactobacillus Acidophilus (Bacid Acidophilus)  1 cap PO BID [facility]


   Last Admin: 10/18/18 17:48 Dose:  1 cap


Methylprednisolone (Solu-Medrol)  30 mg IV Q12 [facility]


   Last Admin: 10/18/18 21:58 Dose:  30 mg


Metoclopramide HCl (Reglan)  5 mg IVP Q6 [facility]


   Last Admin: 10/18/18 17:47 Dose:  5 mg


Metoprolol Succinate (Toprol Xl)  50 mg PO DAILY [facility]


   Last Admin: 10/18/18 09:58 Dose:  Not Given


Oxybutynin Chloride (Ditropan Xl)  10 mg PO DAILY [facility]


   Last Admin: 10/18/18 09:22 Dose:  Not Given


Sucralfate (Carafate Tab)  1 gm PO ACBD [facility]


   Last Admin: 10/18/18 17:48 Dose:  1 gm


Zolpidem Tartrate (Ambien)  5 mg PO HS PRN


   PRN Reason: Insomnia


   Last Admin: 10/17/18 23:55 Dose:  5 mg











- Labs


Labs: 


                                        





                                 10/17/18 07:25 





                                 10/18/18 06:41 





                                        











PT  14.8 SECONDS (9.7-12.2)  H  10/17/18  07:25    


 


INR  1.4   10/17/18  07:25    


 


APTT  40 SECONDS (21-34)  H  10/17/18  07:25

## 2018-10-19 RX ADMIN — OXYBUTYNIN CHLORIDE SCH MG: 10 TABLET, EXTENDED RELEASE ORAL at 10:16

## 2018-10-19 RX ADMIN — Medication SCH CAP: at 10:16

## 2018-10-19 RX ADMIN — DIPHENHYDRAMINE HYDROCHLORIDE PRN MG: 50 INJECTION INTRAMUSCULAR; INTRAVENOUS at 22:33

## 2018-10-19 RX ADMIN — DIGOXIN SCH: 0.25 TABLET ORAL at 18:42

## 2018-10-19 RX ADMIN — DIPHENHYDRAMINE HYDROCHLORIDE PRN MG: 50 INJECTION INTRAMUSCULAR; INTRAVENOUS at 05:25

## 2018-10-19 RX ADMIN — CIPROFLOXACIN SCH MLS/HR: 2 INJECTION, SOLUTION INTRAVENOUS at 20:00

## 2018-10-19 RX ADMIN — METHYLPREDNISOLONE SODIUM SUCCINATE SCH MG: 40 INJECTION, POWDER, FOR SOLUTION INTRAMUSCULAR; INTRAVENOUS at 21:30

## 2018-10-19 RX ADMIN — DIPHENHYDRAMINE HYDROCHLORIDE PRN MG: 50 INJECTION INTRAMUSCULAR; INTRAVENOUS at 01:25

## 2018-10-19 RX ADMIN — HUMAN INSULIN SCH UNITS: 100 INJECTION, SOLUTION SUBCUTANEOUS at 11:11

## 2018-10-19 RX ADMIN — DIPHENHYDRAMINE HYDROCHLORIDE PRN MG: 50 INJECTION INTRAMUSCULAR; INTRAVENOUS at 18:39

## 2018-10-19 RX ADMIN — METHYLPREDNISOLONE SODIUM SUCCINATE SCH MG: 40 INJECTION, POWDER, FOR SOLUTION INTRAMUSCULAR; INTRAVENOUS at 10:04

## 2018-10-19 RX ADMIN — HUMAN INSULIN SCH UNITS: 100 INJECTION, SOLUTION SUBCUTANEOUS at 07:53

## 2018-10-19 RX ADMIN — DIPHENHYDRAMINE HYDROCHLORIDE PRN MG: 50 INJECTION INTRAMUSCULAR; INTRAVENOUS at 10:08

## 2018-10-19 RX ADMIN — HUMAN INSULIN SCH UNITS: 100 INJECTION, SOLUTION SUBCUTANEOUS at 21:39

## 2018-10-19 RX ADMIN — HUMAN INSULIN SCH UNITS: 100 INJECTION, SOLUTION SUBCUTANEOUS at 17:11

## 2018-10-19 RX ADMIN — CIPROFLOXACIN SCH MLS/HR: 2 INJECTION, SOLUTION INTRAVENOUS at 08:31

## 2018-10-19 RX ADMIN — METOPROLOL SUCCINATE SCH MG: 50 TABLET, EXTENDED RELEASE ORAL at 10:08

## 2018-10-19 RX ADMIN — Medication SCH CAP: at 10:56

## 2018-10-19 RX ADMIN — Medication SCH CAP: at 18:38

## 2018-10-19 NOTE — CP.PCM.CON
History of Present Illness





- History of Present Illness


History of Present Illness: 





Vascular surgery consult or Dr. Monk


Consulted for: portacath malfunction





Patient is a 53F with extensive PMH including vascular disease making peripheral

IV access difficult. Patient is admitted for abdominal pain and obstructive 

hyperbilirubinemia and is getting some medications through the IV. Patient 

states that today when the nurse tried to access the port the nurse felt like 

the needle was just hitting metal. Patient denies any fevers, chills, SOB, chest

pain, or any other symptoms. [place] [facility] [place] 

[place] recent history.





PMH: DM, HTN, HLD, asthma, liver sarcoidosis, CHF, afib, PVD, pacemaker


PSx: R colectomy, cholecystectomy, coronary stent, BL AKA, CBD stent, c-sxn


ALL: Tordol





Review of Systems





- Review of Systems


All systems: reviewed and no additional remarkable complaints except (as per 

HPI)





Past Patient History





- Infectious Disease


Hx of Infectious Diseases: None





- Tetanus Immunizations


Tetanus Immunization: Unknown





- Past Medical History & Family History


Past Medical History?: Yes


Past Family History: Reviewed and not pertinent





- Past Social History


Smoking Status: Former Smoker


Alcohol: None


Drugs: Denies


Home Situation {Lives}: With Family





- CARDIAC


Hx Cardia Arrhythmia: Yes


Hx Congestive Heart Failure: Yes


Hx Hypercholesterolemia: Yes


Hx Hypertension: Yes





- PULMONARY


Hx Asthma: Yes (NO INHALER-ON PREDNISONE DAILY PO.)





- NEUROLOGICAL


Hx Neurological Disorder: No





- HEENT


Hx HEENT Problems: Yes


Hx Cataracts: Yes (BILAT.)





- RENAL


Hx Chronic Kidney Disease: No





- ENDOCRINE/METABOLIC


Hx Endocrine Disorders: Yes


Hx Diabetes Mellitus Type 1: Yes





- HEMATOLOGICAL/ONCOLOGICAL


Hx Anemia: Yes





- INTEGUMENTARY


Hx Dermatological Problems: No





- MUSCULOSKELETAL/RHEUMATOLOGICAL


Hx Falls: Yes





- GASTROINTESTINAL


Hx Gall Bladder Disease: Yes





- GENITOURINARY/GYNECOLOGICAL


Hx Genitourinary Disorders: Yes


Hx Urinary Tract Infection: Yes


Other/Comment: PT HAS LONG TERM GUTIERREZ





- PSYCHIATRIC


Hx Substance Use: No





- SURGICAL HISTORY


Hx Appendectomy: Yes


Hx Cholecystectomy: Yes


Hx Coronary Stent: Yes





- ANESTHESIA


Hx Anesthesia: Yes


Hx Anesthesia Reactions: No


Hx Malignant Hyperthermia: No





Meds


Allergies/Adverse Reactions: 


                                    Allergies











Allergy/AdvReac Type Severity Reaction Status Date / Time


 


avocado Allergy Severe ANAPHYLAXIS Verified 10/04/17 17:28


 


banana Allergy Severe ANAPHYLAXIS Verified 10/04/17 17:28


 


cherry Allergy Severe ANAPHYLAXIS Verified 10/04/17 17:28


 


ketorolac [From Toradol] Allergy   Verified 10/04/17 17:28














- Medications


Medications: 


                               Current Medications





Apixaban (Eliquis)  5 mg PO BID [facility]


Digoxin (Lanoxin)  0.25 mg PO DAILY@1800 [facility]


   Last Admin: 10/18/18 17:54 Dose:  Not Given


Diphenhydramine HCl (Benadryl)  25 mg IVP Q4 PRN


   PRN Reason: itchiness


   Last Admin: 10/19/18 10:08 Dose:  25 mg


Diphenoxylate HCl/Atropine (Lomotil 0.025-2.5 Mg Tablet)  1 tab PO Q8 PRN


   PRN Reason: Diarrhea


Gabapentin (Neurontin)  100 mg PO BID [facility]


   Last Admin: 10/19/18 10:08 Dose:  100 mg


Ciprofloxacin (Cipro 400mg/200ml Dsw)  400 mg in 200 mls @ 133 mls/hr IVPB Q12H 

[facility]; Protocol


   Last Admin: 10/19/18 08:31 Dose:  133 mls/hr


Ceftriaxone Sodium 1 gm/ (Sodium Chloride)  100 mls @ 100 mls/hr IVPB DAILY [facility];

Protocol


   Last Admin: 10/19/18 10:29 Dose:  100 mls/hr


Dextrose/Sodium Chloride (Dextrose 5%/0.45% Ns 1000 Ml)  1,000 mls @ 80 mls/hr 

IV .[id number] [facility]


   Last Admin: 10/18/18 23:45 Dose:  Not Given


Insulin Human Regular (Novolin R)  0 unit SC ACHS [facility]; Protocol


   Last Admin: 10/19/18 17:11 Dose:  6 units


Lactobacillus Acidophilus (Bacid Acidophilus)  1 cap PO BID [facility]


   Last Admin: 10/19/18 10:56 Dose:  1 cap


Methylprednisolone (Solu-Medrol)  30 mg IV Q12 [facility]


   Last Admin: 10/19/18 10:04 Dose:  30 mg


Metoclopramide HCl (Reglan)  5 mg IVP Q6 [facility]


   Last Admin: 10/19/18 11:15 Dose:  5 mg


Metoprolol Succinate (Toprol Xl)  50 mg PO DAILY [facility]


   Last Admin: 10/19/18 10:08 Dose:  50 mg


Morphine Sulfate (Morphine)  1 mg IVP Q4 PRN


   PRN Reason: Pain, severe (8-10)


   Last Admin: 10/19/18 14:27 Dose:  1 mg


Morphine Sulfate (Morphine Immediate Release Tab)  15 mg PO Q8H PRN


   PRN Reason: Pain, severe (8-10)


Oxybutynin Chloride (Ditropan Xl)  10 mg PO DAILY [facility]


   Last Admin: 10/19/18 10:16 Dose:  10 mg


Sucralfate (Carafate Tab)  1 gm PO ACBD [facility]


   Last Admin: 10/19/18 08:31 Dose:  1 gm


Zolpidem Tartrate (Ambien)  5 mg PO HS PRN


   PRN Reason: Insomnia


   Last Admin: 10/17/18 23:55 Dose:  5 mg











Physical Exam





- Constitutional


Appears: Well, Non-toxic, No Acute Distress





- Head Exam


Head Exam: ATRAUMATIC, NORMOCEPHALIC





- Eye Exam


Eye Exam: Normal appearance.  absent: Conjunctival injection, Scleral icterus





- ENT Exam


ENT Exam: Mucous Membranes Moist, Normal Oropharynx





- Respiratory Exam


Respiratory Exam: NORMAL BREATHING PATTERN.  absent: Accessory Muscle Use, 

Respiratory Distress





- Cardiovascular Exam


Cardiovascular Exam: RRR





- GI/Abdominal Exam


GI & Abdominal Exam: Soft (RUQ and epigastrium).  absent: Distended





- Extremities Exam


Additional comments: 





BL AKA





- Back Exam


Back exam: CVA tenderness (R)





- Neurological Exam


Neurological exam: Alert, Oriented x3





- Psychiatric Exam


Psychiatric exam: Normal Affect, Normal Mood





- Skin


Skin Exam: Dry, Normal Color, Warm


Additional comments: 





right upper chest portacath palpable through the skin, no erythema, swelling, or

drainage





Results





- Vital Signs


Recent Vital Signs: 


                                Last Vital Signs











Temp  98.1 F   10/19/18 16:00


 


Pulse  51 L  10/19/18 16:00


 


Resp  20   10/19/18 16:00


 


BP  140/75   10/19/18 16:00


 


Pulse Ox  99   10/19/18 16:00














- Labs


Result Diagrams: 


                                 10/17/18 07:25





                                 10/18/18 06:41


Labs: 


                         Laboratory Results - last 24 hr











  10/18/18 10/19/18 10/19/18





  21:12 07:04 10:53


 


POC Glucose (mg/dL)  194 H  258 H  279 H














  10/19/18





  16:10


 


POC Glucose (mg/dL)  308 H














Assessment & Plan





- Assessment and Plan (Free Text)


Assessment: 





53F with malfunctioning portacath


Plan: 


Use peripheral lines for any IV meds


Plan for Monday for OR for malfunctioning portacath


Continue current medical treatment





Discussed and examined with Dr. Lacho Cornejo, PGY2

## 2018-10-19 NOTE — PN
DATE:  10/18/2018



SUBJECTIVE:  The patient, Shivers, is afebrile.  She has pancreatic CAT

scan with dilated common bile duct.  The patient is afebrile.  No shortness

of breath.  She has deep jaundice.  Her bilirubin is gradually going down. 

She is on steroid.



PHYSICAL EXAMINATION:

VITAL SIGN:  Blood pressure 124/66, pulse 64, respiratory rate 20, and

temperature 97.7.

LUNGS:  Bilaterally clear.  No rales.  No rhonchi.

CVS:  S1, S2 plus S3 positive.

ABDOMEN:  Right upper quadrant tenderness with enlarged liver.



ASSESSMENT:

1.  Biliary obstruction, hepatic sarcoidosis.

2.  Type 2 diabetes.

3.  Hypertension.

4.  Coronary artery disease.



PLAN:  Continue high dose steroids.  Medical management.  The patient was

seen by Dr. Olmedo.  The patient underwent workup.





__________________________________________

Nasir Dunbar MD





DD:  10/18/2018 23:10:21

DT:  10/19/2018 0:43:53

Job # [id number]

## 2018-10-19 NOTE — CP.PCM.PN
Subjective





- Subjective


Subjective: 





dictated





Objective





- Vital Signs/Intake and Output


Vital Signs (last 24 hours): 


                                        











Temp Pulse Resp BP Pulse Ox


 


 98.1 F   51 L  20   140/75   99 


 


 10/19/18 16:00  10/19/18 16:00  10/19/18 16:00  10/19/18 16:00  10/19/18 16:00








Intake and Output: 


                                        











 10/19/18 10/20/18





 18:59 06:59


 


Intake Total 650 


 


Output Total 1000 


 


Balance -350 














- Medications


Medications: 


                               Current Medications





Apixaban (Eliquis)  5 mg PO BID [facility]


Digoxin (Lanoxin)  0.25 mg PO DAILY@1800 [facility]


   Last Admin: 10/19/18 18:42 Dose:  Not Given


Diphenhydramine HCl (Benadryl)  25 mg IVP Q4 PRN


   PRN Reason: itchiness


   Last Admin: 10/19/18 18:39 Dose:  25 mg


Diphenoxylate HCl/Atropine (Lomotil 0.025-2.5 Mg Tablet)  1 tab PO Q8 PRN


   PRN Reason: Diarrhea


Gabapentin (Neurontin)  100 mg PO BID [facility]


   Last Admin: 10/19/18 18:38 Dose:  100 mg


Ciprofloxacin (Cipro 400mg/200ml Dsw)  400 mg in 200 mls @ 133 mls/hr IVPB Q12H 

[facility]; Protocol


   Last Admin: 10/19/18 08:31 Dose:  133 mls/hr


Ceftriaxone Sodium 1 gm/ (Sodium Chloride)  100 mls @ 100 mls/hr IVPB DAILY [facility];

Protocol


   Last Admin: 10/19/18 10:29 Dose:  100 mls/hr


Dextrose/Sodium Chloride (Dextrose 5%/0.45% Ns 1000 Ml)  1,000 mls @ 80 mls/hr 

IV .[id number] [facility]


   Last Admin: 10/18/18 23:45 Dose:  Not Given


Insulin Human Regular (Novolin R)  0 unit SC ACHS [facility]; Protocol


   Last Admin: 10/19/18 17:11 Dose:  6 units


Lactobacillus Acidophilus (Bacid Acidophilus)  1 cap PO BID [facility]


   Last Admin: 10/19/18 18:38 Dose:  1 cap


Methylprednisolone (Solu-Medrol)  30 mg IV Q12 [facility]


   Last Admin: 10/19/18 10:04 Dose:  30 mg


Metoclopramide HCl (Reglan)  5 mg IVP Q6 [facility]


   Last Admin: 10/19/18 18:38 Dose:  5 mg


Metoprolol Succinate (Toprol Xl)  50 mg PO DAILY [facility]


   Last Admin: 10/19/18 10:08 Dose:  50 mg


Morphine Sulfate (Morphine)  1 mg IVP Q4 PRN


   PRN Reason: Pain, severe (8-10)


   Last Admin: 10/19/18 18:39 Dose:  1 mg


Morphine Sulfate (Morphine Immediate Release Tab)  15 mg PO Q8H PRN


   PRN Reason: Pain, severe (8-10)


Oxybutynin Chloride (Ditropan Xl)  10 mg PO DAILY [facility]


   Last Admin: 10/19/18 10:16 Dose:  10 mg


Sucralfate (Carafate Tab)  1 gm PO ACBD [facility]


   Last Admin: 10/19/18 18:38 Dose:  1 gm


Zolpidem Tartrate (Ambien)  5 mg PO HS PRN


   PRN Reason: Insomnia


   Last Admin: 10/17/18 23:55 Dose:  5 mg











- Labs


Labs: 


                                        





                                 10/17/18 07:25 





                                 10/18/18 06:41 





                                        











PT  14.8 SECONDS (9.7-12.2)  H  10/17/18  07:25    


 


INR  1.4   10/17/18  07:25    


 


APTT  40 SECONDS (21-34)  H  10/17/18  07:25

## 2018-10-19 NOTE — PN
DATE:  10/19/2018



LOCATION:  357, bed B.



SUBJECTIVE:  This is a 53-year-old female, post ERCP with biliary stent

removal, then insertion of a new stent yesterday, seen and examined early

in rounds today without reported chest pain, palpitation, significant

shortness of breath but intermittent period of mild abdominal pain.  The

entire chart is reviewed including but not limited to the most recent lab

and radiology study results, current and the previous medication list,

current and the previous medical events.  Case discussed with the staff at

length.



LABORATORY DATA:  Today's lab results showed blood glucose level of 308. 

Rest of the lab results is still pending.



PHYSICAL EXAMINATION:

GENERAL:  A 53-year-old female, awake, alert, and oriented with generalized

jaundice.

VITAL SIGNS:  Afebrile with heart rate of 58, respiratory rate 20 to 22,

blood pressure of 142/70.

HEENT:  Showed pale dry oral mucoid membrane.  Nonicteric sclerae.

LUNGS:  Few scattered crepitation.  Decreased air entry at bases.

HEART:  Positive S1 and S2.

ABDOMEN:  Soft.  Bowel sounds are present.  No mass or organomegaly.  No

rebound tenderness or guarding.  Right-sided, especially, right upper

quadrant tenderness noticed.

EXTREMITIES:  With evidence of bilateral above-knee amputation.

NEUROLOGIC:  No significant, no new reported neurological deficits, sensory

or motor.

.

IMPRESSION:

1.  Jaundice.

2.  Hepatic sarcoidosis.

3.  Abnormal liver function tests secondary to above.

4.  Status post endoscopic retrograde cholangiopancreatography with biliary

stent change.

5.  Peripheral vascular disease with bilateral above-knee amputation.

6.  Known history of diabetes mellitus, hypertension, peptic ulcer disease

as well as cardiac arrhythmias with deep vein thrombosis as well as status

post cholecystectomy.

7.  Known history of hyperlipidemia.

8.  Status post partial colon resection by history due to intraabdominal

and intrapelvic abscess formation.

9.  Status post  as well as appendectomy by history.



SUGGESTIONS:

1.  Continue current management.

2.  Repeat liver function test.

3.  The patient may need Solu-Medrol IV.

4.  Cancer markers.

5.  Further recommendation to follow.





__________________________________________

Jeffrey Albert MD



DD:  10/19/2018 18:18:22

DT:  10/19/2018 18:21:01

[facility] # [id number]

## 2018-10-19 NOTE — CP.PCM.PN
<Dav Hudson - Last Filed: 10/19/18 13:18>





Subjective





- Date & Time of Evaluation


Date of Evaluation: 10/19/18


Time of Evaluation: 07:39





- Subjective


Subjective: 





POD#1 ERCP with new stent placement in CBD. Patient was examined at bedside this

AM. No acute events reported overnight. Patient has minor abdominal pain, but 

denies N/V/D/C, fevers, chest pain or SOB. Vitals are stable. 





Objective





- Vital Signs/Intake and Output


Vital Signs (last 24 hours): 


                                        











Temp Pulse Resp BP Pulse Ox


 


 98 F   56 L  20   106/66   100 


 


 10/19/18 00:00  10/19/18 00:00  10/19/18 00:00  10/19/18 00:00  10/19/18 00:00








Intake and Output: 


                                        











 10/19/18 10/19/18





 06:59 18:59


 


Intake Total 1400 


 


Output Total 1100 


 


Balance 300 














- Medications


Medications: 


                               Current Medications





Apixaban (Eliquis)  5 mg PO BID [facility]


Digoxin (Lanoxin)  0.25 mg PO DAILY@1800 [facility]


   Last Admin: 10/18/18 17:54 Dose:  Not Given


Diphenhydramine HCl (Benadryl)  25 mg IVP Q4 PRN


   PRN Reason: itchiness


   Last Admin: 10/19/18 05:25 Dose:  25 mg


Diphenoxylate HCl/Atropine (Lomotil 0.025-2.5 Mg Tablet)  1 tab PO Q8 PRN


   PRN Reason: Diarrhea


Gabapentin (Neurontin)  100 mg PO BID [facility]


   Last Admin: 10/18/18 17:48 Dose:  100 mg


Ciprofloxacin (Cipro 400mg/200ml Dsw)  400 mg in 200 mls @ 133 mls/hr IVPB Q12H 

[facility]; Protocol


   Last Admin: 10/18/18 19:26 Dose:  133 mls/hr


Ceftriaxone Sodium 1 gm/ (Sodium Chloride)  100 mls @ 100 mls/hr IVPB DAILY [facility];

Protocol


   Last Admin: 10/18/18 09:44 Dose:  100 mls/hr


Dextrose/Sodium Chloride (Dextrose 5%/0.45% Ns 1000 Ml)  1,000 mls @ 80 mls/hr 

IV .[id number] [facility]


   Last Admin: 10/18/18 23:45 Dose:  Not Given


Insulin Human Regular (Novolin R)  0 unit SC ACHS [facility]; Protocol


   Last Admin: 10/18/18 22:58 Dose:  Not Given


Lactobacillus Acidophilus (Bacid Acidophilus)  1 cap PO BID [facility]


   Last Admin: 10/18/18 17:48 Dose:  1 cap


Methylprednisolone (Solu-Medrol)  30 mg IV Q12 [facility]


   Last Admin: 10/18/18 21:58 Dose:  30 mg


Metoclopramide HCl (Reglan)  5 mg IVP Q6 [facility]


   Last Admin: 10/19/18 06:06 Dose:  5 mg


Metoprolol Succinate (Toprol Xl)  50 mg PO DAILY [facility]


   Last Admin: 10/18/18 09:58 Dose:  Not Given


Morphine Sulfate (Morphine)  1 mg IVP Q4 PRN


   PRN Reason: Pain, severe (8-10)


   Last Admin: 10/19/18 05:25 Dose:  1 mg


Morphine Sulfate (Morphine Immediate Release Tab)  15 mg PO Q8H PRN


   PRN Reason: Pain, severe (8-10)


Oxybutynin Chloride (Ditropan Xl)  10 mg PO DAILY [facility]


   Last Admin: 10/18/18 09:22 Dose:  Not Given


Sucralfate (Carafate Tab)  1 gm PO ACBD [facility]


   Last Admin: 10/18/18 17:48 Dose:  1 gm


Zolpidem Tartrate (Ambien)  5 mg PO HS PRN


   PRN Reason: Insomnia


   Last Admin: 10/17/18 23:55 Dose:  5 mg











- Labs


Labs: 


                                        





                                 10/17/18 07:25 





                                 10/18/18 06:41 





                                        











PT  14.8 SECONDS (9.7-12.2)  H  10/17/18  07:25    


 


INR  1.4   10/17/18  07:25    


 


APTT  40 SECONDS (21-34)  H  10/17/18  07:25    














- Constitutional


Appears: Well, Non-toxic, No Acute Distress





- Head Exam


Head Exam: ATRAUMATIC, NORMOCEPHALIC





- Eye Exam


Eye Exam: Scleral icterus





- Respiratory Exam


Respiratory Exam: NORMAL BREATHING PATTERN





- Cardiovascular Exam


Cardiovascular Exam: REGULAR RHYTHM





- GI/Abdominal Exam


GI & Abdominal Exam: Tenderness





- Rectal Exam


Rectal Exam: NORMAL INSPECTION





- Psychiatric Exam


Psychiatric exam: Normal Mood





- Additional Findings


Additional findings: 





B/l AKAs. 





Assessment and Plan





- Assessment and Plan (Free Text)


Assessment: 





Mrs. Jaramillo is a 53F POD#1 ERCP with new stent placement in the CBD. 


Plan: 





- Continue IVF


- Pain and nausea control


- Continue treatment primary and GI


- no plans for surgical intervention


- please reconsult PRN








d/w Dr Nay Hudson, PGY4








<Alistair Tee - Last Filed: 10/22/18 18:51>





Objective





- Vital Signs/Intake and Output


Vital Signs (last 24 hours): 


                                        











Temp Pulse Resp BP Pulse Ox


 


 98 F   56 L  20   152/71 H  100 


 


 10/22/18 16:00  10/22/18 16:00  10/22/18 16:00  10/22/18 16:00  10/22/18 16:00








Intake and Output: 


                                        











 10/22/18 10/22/18





 06:59 18:59


 


Intake Total 350 400


 


Output Total 2200 300


 


Balance -1850 100














- Medications


Medications: 


                               Current Medications





Apixaban (Eliquis)  5 mg PO BID [facility]


   Last Admin: 10/22/18 18:48 Dose:  5 mg


Benzocaine/Menthol (Cepacol Sore Throat)  1 john PO Q2 PRN


   PRN Reason: Sore Throat


   Last Admin: 10/22/18 18:02 Dose:  1 john


Digoxin (Lanoxin)  0.25 mg PO DAILY@1800 [facility]


   Last Admin: 10/22/18 18:04 Dose:  Not Given


Diphenhydramine HCl (Benadryl)  25 mg IVP Q4 PRN


   PRN Reason: itchiness


   Last Admin: 10/22/18 18:47 Dose:  25 mg


Diphenoxylate HCl/Atropine (Lomotil 0.025-2.5 Mg Tablet)  1 tab PO Q8 PRN


   PRN Reason: Diarrhea


   Last Admin: 10/22/18 17:59 Dose:  1 tab


Gabapentin (Neurontin)  100 mg PO BID [facility]


   Last Admin: 10/22/18 17:59 Dose:  100 mg


Insulin Human Regular (Novolin R)  0 unit SC ACHS [facility]; Protocol


   Last Admin: 10/22/18 17:30 Dose:  6 units


Insulin Human Regular (Novolin R)  4 unit SC TIDAC [facility]


   Last Admin: 10/22/18 17:58 Dose:  4 units


Lactobacillus Acidophilus (Bacid Acidophilus)  1 cap PO BID [facility]


   Last Admin: 10/22/18 17:58 Dose:  1 cap


Lidocaine (Lidoderm)  1 ea TD DAILY [facility]


   Last Admin: 10/22/18 10:14 Dose:  1 ea


Methylprednisolone (Solu-Medrol)  30 mg IV Q12 [facility]


   Last Admin: 10/22/18 10:11 Dose:  30 mg


Metoclopramide HCl (Reglan)  5 mg IVP Q6 [facility]


   Last Admin: 10/22/18 17:59 Dose:  5 mg


Metoprolol Succinate (Toprol Xl)  50 mg PO DAILY [facility]


   Last Admin: 10/22/18 10:11 Dose:  50 mg


Morphine Sulfate (Morphine)  1 mg IVP Q4 PRN


   PRN Reason: Pain, severe (8-10)


   Last Admin: 10/22/18 15:01 Dose:  1 mg


Morphine Sulfate (Morphine Immediate Release Tab)  15 mg PO Q8H PRN


   PRN Reason: Pain, severe (8-10)


Oxybutynin Chloride (Ditropan Xl)  10 mg PO DAILY [facility]


   Last Admin: 10/22/18 10:11 Dose:  10 mg


Sucralfate (Carafate Tab)  1 gm PO ACBD [facility]


   Last Admin: 10/22/18 17:15 Dose:  1 gm


Zolpidem Tartrate (Ambien)  5 mg PO HS PRN


   PRN Reason: Insomnia


   Last Admin: 10/21/18 23:34 Dose:  5 mg











- Labs


Labs: 


                                        





                                 10/22/18 11:10 





                                 10/22/18 11:10 





                                        











PT  12.1 SECONDS (9.7-12.2)   10/20/18  07:09    


 


INR  1.1   10/20/18  07:09    


 


APTT  29 SECONDS (21-34)  D 10/20/18  07:09    














Attending/Attestation





- Attestation


I have fully participated in the care of the patient.: Yes


I have reviewed all pertinent clinical information, including history, physical 

exam and plan: Yes


Notes (Text): 





Pt is s/p ERCP


No general surgical intervention required at present


f.u as out pt


c.w current mx


Plan d.w pt in detail

## 2018-10-19 NOTE — RAD
Date of service: 



10/19/2018



HISTORY:

 check placement of mediport no blood return 



COMPARISON:

09/08/2018. 



FINDINGS:

The right MediPort terminates at the cavoatrial junction. 



LUNGS:

The lungs are clear.  There is mild pulmonary venous congestion. 



PLEURA:

No pleural effusions or pneumothorax. 



CARDIOVASCULAR:

Mild cardiomegaly.  Atherosclerotic aortic arch calcifications are 

present.  There is stable position of left-sided AICD. 



OSSEOUS STRUCTURES:

Within normal limits for the patient's age.



VISUALIZED UPPER ABDOMEN:

Normal.



OTHER FINDINGS:

None.



IMPRESSION:

Right MediPort terminates at the cavoatrial junction. 



No acute findings.

## 2018-10-20 LAB
ALBUMIN SERPL-MCNC: 2.8 G/DL (ref 3.5–5)
ALBUMIN/GLOB SERPL: 0.8 {RATIO} (ref 1–2.1)
ALT SERPL-CCNC: 123 U/L (ref 9–52)
APTT BLD: 29 SECONDS (ref 21–34)
AST SERPL-CCNC: 144 U/L (ref 14–36)
BASOPHILS # BLD AUTO: 0 K/UL (ref 0–0.2)
BASOPHILS NFR BLD: 0 % (ref 0–2)
BUN SERPL-MCNC: 19 MG/DL (ref 7–17)
CALCIUM SERPL-MCNC: 8.1 MG/DL (ref 8.6–10.4)
EOSINOPHIL # BLD AUTO: 0 K/UL (ref 0–0.7)
EOSINOPHIL NFR BLD: 0 % (ref 0–4)
ERYTHROCYTE [DISTWIDTH] IN BLOOD BY AUTOMATED COUNT: 17 % (ref 11.5–14.5)
GFR NON-AFRICAN AMERICAN: > 60
HGB BLD-MCNC: 10.7 G/DL (ref 11–16)
INR PPP: 1.1
LYMPHOCYTES # BLD AUTO: 0.7 K/UL (ref 1–4.3)
LYMPHOCYTES NFR BLD AUTO: 14 % (ref 20–40)
MCH RBC QN AUTO: 31.5 PG (ref 27–31)
MCHC RBC AUTO-ENTMCNC: 33.7 G/DL (ref 33–37)
MCV RBC AUTO: 93.4 FL (ref 81–99)
MONOCYTES # BLD: 0.2 K/UL (ref 0–0.8)
MONOCYTES NFR BLD: 4 % (ref 0–10)
NEUTROPHILS # BLD: 4.7 K/UL (ref 1.8–7)
NEUTROPHILS NFR BLD AUTO: 82 % (ref 50–75)
NRBC BLD AUTO-RTO: 0 % (ref 0–2)
PLATELET # BLD: 137 K/UL (ref 130–400)
PMV BLD AUTO: 9.8 FL (ref 7.2–11.7)
PROTHROMBIN TIME: 12.1 SECONDS (ref 9.7–12.2)
RBC # BLD AUTO: 3.42 MIL/UL (ref 3.8–5.2)
WBC # BLD AUTO: 5.7 K/UL (ref 4.8–10.8)

## 2018-10-20 RX ADMIN — METOPROLOL SUCCINATE SCH MG: 50 TABLET, EXTENDED RELEASE ORAL at 10:56

## 2018-10-20 RX ADMIN — DIGOXIN SCH MG: 0.25 TABLET ORAL at 17:21

## 2018-10-20 RX ADMIN — METHYLPREDNISOLONE SODIUM SUCCINATE SCH MG: 40 INJECTION, POWDER, FOR SOLUTION INTRAMUSCULAR; INTRAVENOUS at 11:09

## 2018-10-20 RX ADMIN — DIGOXIN SCH: 0.25 TABLET ORAL at 17:24

## 2018-10-20 RX ADMIN — DIPHENOXYLATE HYDROCHLORIDE AND ATROPINE SULFATE PRN TAB: 2.5; .025 TABLET ORAL at 17:29

## 2018-10-20 RX ADMIN — Medication SCH CAP: at 11:10

## 2018-10-20 RX ADMIN — CIPROFLOXACIN SCH MLS/HR: 2 INJECTION, SOLUTION INTRAVENOUS at 19:10

## 2018-10-20 RX ADMIN — HUMAN INSULIN SCH UNITS: 100 INJECTION, SOLUTION SUBCUTANEOUS at 22:00

## 2018-10-20 RX ADMIN — DIPHENHYDRAMINE HYDROCHLORIDE PRN MG: 50 INJECTION INTRAMUSCULAR; INTRAVENOUS at 22:50

## 2018-10-20 RX ADMIN — PURIFIED WATER PRN LOZ: 99.05 LIQUID OPHTHALMIC at 22:53

## 2018-10-20 RX ADMIN — CIPROFLOXACIN SCH MLS/HR: 2 INJECTION, SOLUTION INTRAVENOUS at 11:18

## 2018-10-20 RX ADMIN — OXYBUTYNIN CHLORIDE SCH MG: 10 TABLET, EXTENDED RELEASE ORAL at 11:11

## 2018-10-20 RX ADMIN — HUMAN INSULIN SCH UNITS: 100 INJECTION, SOLUTION SUBCUTANEOUS at 11:30

## 2018-10-20 RX ADMIN — DIPHENHYDRAMINE HYDROCHLORIDE PRN MG: 50 INJECTION INTRAMUSCULAR; INTRAVENOUS at 14:50

## 2018-10-20 RX ADMIN — Medication SCH CAP: at 17:21

## 2018-10-20 RX ADMIN — METHYLPREDNISOLONE SODIUM SUCCINATE SCH: 40 INJECTION, POWDER, FOR SOLUTION INTRAMUSCULAR; INTRAVENOUS at 21:46

## 2018-10-20 RX ADMIN — DIPHENHYDRAMINE HYDROCHLORIDE PRN MG: 50 INJECTION INTRAMUSCULAR; INTRAVENOUS at 18:50

## 2018-10-20 RX ADMIN — DIPHENHYDRAMINE HYDROCHLORIDE PRN MG: 50 INJECTION INTRAMUSCULAR; INTRAVENOUS at 02:43

## 2018-10-20 RX ADMIN — DIPHENHYDRAMINE HYDROCHLORIDE PRN MG: 50 INJECTION INTRAMUSCULAR; INTRAVENOUS at 10:50

## 2018-10-20 RX ADMIN — DIPHENHYDRAMINE HYDROCHLORIDE PRN MG: 50 INJECTION INTRAMUSCULAR; INTRAVENOUS at 06:54

## 2018-10-20 RX ADMIN — PURIFIED WATER PRN LOZ: 99.05 LIQUID OPHTHALMIC at 17:22

## 2018-10-20 RX ADMIN — DEXTROSE AND SODIUM CHLORIDE SCH: 5; 450 INJECTION, SOLUTION INTRAVENOUS at 14:57

## 2018-10-20 RX ADMIN — HUMAN INSULIN SCH UNITS: 100 INJECTION, SOLUTION SUBCUTANEOUS at 08:52

## 2018-10-20 RX ADMIN — HUMAN INSULIN SCH UNITS: 100 INJECTION, SOLUTION SUBCUTANEOUS at 17:25

## 2018-10-20 NOTE — PN
DATE:  10/20/2018



LOCATION:  357, bed B.



SUBJECTIVE:  This is a 53-year-old female, seen and examined in rounds,

without significant reported clinical changes or active bleeding but with

intermittent period of right-sided abdominal pain, less than before.  No

reported nausea or vomiting.  Somewhat tolerates oral intake well.



The patient denied any chest pain, palpitation or significant shortness of

breath.



The entire chart is reviewed including but not limited to the most recent

lab and radiology study results, current and previous medication list,

current and previous medical events.  Today's lab showed blood glucose

level of 340.  Rest of lab results is still pending.  Most recent chest

x-ray done yesterday, official report and film is reviewed.



PHYSICAL EXAMINATION:

GENERAL:  A 53-year-old female.

VITAL SIGNS:  Afebrile with a pulse of 58, respiratory rate of 20-22, blood

pressure of 150/78.

HEENT:  Showed mildly pale, dry oral mucoid membrane.  Bilateral icteric

sclerae.

LUNGS:  Few scattered crepitation.  Decreased air entry at bases.

HEART:  Positive S1 and S2.

ABDOMEN:  Soft.  Bowel sounds are present with mild generalized tenderness,

mildly distended.  No mass or organomegaly.  No rebound tenderness or

guarding.

EXTREMITIES:  With evidence of bilateral above-knee amputation.  No

clubbing or cyanosis.

NEUROLOGIC:  No new reported neurological deficit, sensory or motor.  No

reported new focal deficits.



IMPRESSION:

1.  Hepatic sarcoidosis.

2.  Jaundice with abnormal liver function test, most likely secondary to

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

3.  Status post endoscopic retrograde cholangiopancreatography with biliary

stent exchange.

4.  Re-exacerbation of peptic ulcer disease.

5.  Peripheral vascular disease, status post bilateral above-knee

amputation by history.

6.  Diabetes mellitus, hypertension and cardiac arrhythmia with deep venous

thrombosis by history.

7.  Cholelithiasis, status post cholecystectomy.

8.  Partial colon resection secondary to intra-abdominal and intrapelvic

abscess formation by history.



SUGGESTIONS:

1.  Continue current management.

2.  Increase the dose of steroids IV.

3.  Adjust oral intake.

4.  Further recommendation to follow.





__________________________________________

Jeffrey Albert MD





DD:  10/20/2018 7:26:40

DT:  10/20/2018 7:29:10

[facility] # [id number]

## 2018-10-20 NOTE — CARD
--------------- APPROVED REPORT --------------





Date of service: 10/16/2018



EKG Measurement

Heart [id number]

NE P253

[id number]

[id number]

[id number]



<Conclusion>

Unusual P axis, possible ectopic atrial rhythm with complete heart 

block and Ventricular-paced rhythm

Abnormal ECG

## 2018-10-20 NOTE — CP.PCM.PN
Subjective





- Date & Time of Evaluation


Date of Evaluation: 10/19/18


Time of Evaluation: 19:20





- Subjective


Subjective: 





Patient without cardiac events





For permaacth revision Monday


Hx of diastolic CHF, PAD, HTN, b/l AKA





Recent ECHO: Shows normal EF


EKG: Paced rhythm


Hemodynamically stable





Objective





- Vital Signs/Intake and Output


Vital Signs (last 24 hours): 


                                        











Temp Pulse Resp BP Pulse Ox


 


 98.9 F   56 L  20   154/80 H  96 


 


 10/20/18 00:00  10/20/18 00:00  10/20/18 00:00  10/20/18 00:00  10/20/18 00:00








Intake and Output: 


                                        











 10/20/18 10/20/18





 06:59 18:59


 


Intake Total 1060 


 


Output Total 2000 


 


Balance -940 














- Medications


Medications: 


                               Current Medications





Apixaban (Eliquis)  5 mg PO BID [facility]


Digoxin (Lanoxin)  0.25 mg PO DAILY@1800 [facility]


   Last Admin: 10/19/18 18:42 Dose:  Not Given


Diphenhydramine HCl (Benadryl)  25 mg IVP Q4 PRN


   PRN Reason: itchiness


   Last Admin: 10/20/18 06:54 Dose:  25 mg


Diphenoxylate HCl/Atropine (Lomotil 0.025-2.5 Mg Tablet)  1 tab PO Q8 PRN


   PRN Reason: Diarrhea


Gabapentin (Neurontin)  100 mg PO BID [facility]


   Last Admin: 10/19/18 18:38 Dose:  100 mg


Ciprofloxacin (Cipro 400mg/200ml Dsw)  400 mg in 200 mls @ 133 mls/hr IVPB Q12H 

[facility]; Protocol


   Last Admin: 10/19/18 20:00 Dose:  133 mls/hr


Ceftriaxone Sodium 1 gm/ (Sodium Chloride)  100 mls @ 100 mls/hr IVPB DAILY [facility];

Protocol


   Last Admin: 10/19/18 10:29 Dose:  100 mls/hr


Dextrose/Sodium Chloride (Dextrose 5%/0.45% Ns 1000 Ml)  1,000 mls @ 80 mls/hr 

IV .[id number] [facility]


   Last Admin: 10/18/18 23:45 Dose:  Not Given


Insulin Human Regular (Novolin R)  0 unit SC ACHS [facility]; Protocol


   Last Admin: 10/19/18 21:39 Dose:  2 units


Lactobacillus Acidophilus (Bacid Acidophilus)  1 cap PO BID [facility]


   Last Admin: 10/19/18 18:38 Dose:  1 cap


Methylprednisolone (Solu-Medrol)  30 mg IV Q12 [facility]


   Last Admin: 10/19/18 21:30 Dose:  30 mg


Metoclopramide HCl (Reglan)  5 mg IVP Q6 [facility]


   Last Admin: 10/20/18 06:53 Dose:  5 mg


Metoprolol Succinate (Toprol Xl)  50 mg PO DAILY [facility]


   Last Admin: 10/19/18 10:08 Dose:  50 mg


Morphine Sulfate (Morphine)  1 mg IVP Q4 PRN


   PRN Reason: Pain, severe (8-10)


   Last Admin: 10/20/18 06:54 Dose:  1 mg


Morphine Sulfate (Morphine Immediate Release Tab)  15 mg PO Q8H PRN


   PRN Reason: Pain, severe (8-10)


Oxybutynin Chloride (Ditropan Xl)  10 mg PO DAILY [facility]


   Last Admin: 10/19/18 10:16 Dose:  10 mg


Sucralfate (Carafate Tab)  1 gm PO ACBD [facility]


   Last Admin: 10/19/18 18:38 Dose:  1 gm


Zolpidem Tartrate (Ambien)  5 mg PO HS PRN


   PRN Reason: Insomnia


   Last Admin: 10/19/18 22:36 Dose:  5 mg











- Labs


Labs: 


                                        





                                 10/17/18 07:25 





                                 10/18/18 06:41 





                                        











PT  14.8 SECONDS (9.7-12.2)  H  10/17/18  07:25    


 


INR  1.4   10/17/18  07:25    


 


APTT  40 SECONDS (21-34)  H  10/17/18  07:25

## 2018-10-21 LAB
ALBUMIN SERPL-MCNC: 3.1 G/DL (ref 3.5–5)
ALBUMIN/GLOB SERPL: 0.9 {RATIO} (ref 1–2.1)
ALT SERPL-CCNC: 166 U/L (ref 9–52)
AST SERPL-CCNC: 217 U/L (ref 14–36)
BASOPHILS # BLD AUTO: 0 K/UL (ref 0–0.2)
BASOPHILS NFR BLD: 0 % (ref 0–2)
BUN SERPL-MCNC: 21 MG/DL (ref 7–17)
CALCIUM SERPL-MCNC: 8 MG/DL (ref 8.6–10.4)
EOSINOPHIL # BLD AUTO: 0.1 K/UL (ref 0–0.7)
EOSINOPHIL NFR BLD: 1 % (ref 0–4)
ERYTHROCYTE [DISTWIDTH] IN BLOOD BY AUTOMATED COUNT: 16.9 % (ref 11.5–14.5)
GFR NON-AFRICAN AMERICAN: > 60
HGB BLD-MCNC: 11.2 G/DL (ref 11–16)
LYMPHOCYTES # BLD AUTO: 2 K/UL (ref 1–4.3)
LYMPHOCYTES NFR BLD AUTO: 21 % (ref 20–40)
MCH RBC QN AUTO: 31.2 PG (ref 27–31)
MCHC RBC AUTO-ENTMCNC: 33.8 G/DL (ref 33–37)
MCV RBC AUTO: 92.2 FL (ref 81–99)
MONOCYTES # BLD: 0.6 K/UL (ref 0–0.8)
MONOCYTES NFR BLD: 6 % (ref 0–10)
NEUTROPHILS # BLD: 6.9 K/UL (ref 1.8–7)
NEUTROPHILS NFR BLD AUTO: 72 % (ref 50–75)
NRBC BLD AUTO-RTO: 0 % (ref 0–2)
PLATELET # BLD: 166 K/UL (ref 130–400)
PMV BLD AUTO: 9.3 FL (ref 7.2–11.7)
RBC # BLD AUTO: 3.6 MIL/UL (ref 3.8–5.2)
WBC # BLD AUTO: 9.6 K/UL (ref 4.8–10.8)

## 2018-10-21 RX ADMIN — METHYLPREDNISOLONE SODIUM SUCCINATE SCH: 40 INJECTION, POWDER, FOR SOLUTION INTRAMUSCULAR; INTRAVENOUS at 21:26

## 2018-10-21 RX ADMIN — DIPHENHYDRAMINE HYDROCHLORIDE PRN MG: 50 INJECTION INTRAMUSCULAR; INTRAVENOUS at 11:25

## 2018-10-21 RX ADMIN — PURIFIED WATER PRN LOZ: 99.05 LIQUID OPHTHALMIC at 02:10

## 2018-10-21 RX ADMIN — Medication SCH CAP: at 17:26

## 2018-10-21 RX ADMIN — PURIFIED WATER PRN LOZ: 99.05 LIQUID OPHTHALMIC at 14:41

## 2018-10-21 RX ADMIN — HUMAN INSULIN SCH UNITS: 100 INJECTION, SOLUTION SUBCUTANEOUS at 22:24

## 2018-10-21 RX ADMIN — DIPHENHYDRAMINE HYDROCHLORIDE PRN MG: 50 INJECTION INTRAMUSCULAR; INTRAVENOUS at 07:02

## 2018-10-21 RX ADMIN — CIPROFLOXACIN SCH MLS/HR: 2 INJECTION, SOLUTION INTRAVENOUS at 08:42

## 2018-10-21 RX ADMIN — OXYBUTYNIN CHLORIDE SCH MG: 10 TABLET, EXTENDED RELEASE ORAL at 10:43

## 2018-10-21 RX ADMIN — DIPHENHYDRAMINE HYDROCHLORIDE PRN MG: 50 INJECTION INTRAMUSCULAR; INTRAVENOUS at 23:35

## 2018-10-21 RX ADMIN — HUMAN INSULIN SCH: 100 INJECTION, SOLUTION SUBCUTANEOUS at 08:10

## 2018-10-21 RX ADMIN — Medication SCH CAP: at 10:43

## 2018-10-21 RX ADMIN — METOPROLOL SUCCINATE SCH MG: 50 TABLET, EXTENDED RELEASE ORAL at 10:42

## 2018-10-21 RX ADMIN — DIPHENOXYLATE HYDROCHLORIDE AND ATROPINE SULFATE PRN TAB: 2.5; .025 TABLET ORAL at 17:37

## 2018-10-21 RX ADMIN — DIGOXIN SCH: 0.25 TABLET ORAL at 17:41

## 2018-10-21 RX ADMIN — DIPHENHYDRAMINE HYDROCHLORIDE PRN MG: 50 INJECTION INTRAMUSCULAR; INTRAVENOUS at 15:33

## 2018-10-21 RX ADMIN — DIPHENHYDRAMINE HYDROCHLORIDE PRN MG: 50 INJECTION INTRAMUSCULAR; INTRAVENOUS at 03:00

## 2018-10-21 RX ADMIN — HUMAN INSULIN SCH UNITS: 100 INJECTION, SOLUTION SUBCUTANEOUS at 17:30

## 2018-10-21 RX ADMIN — DIPHENHYDRAMINE HYDROCHLORIDE PRN MG: 50 INJECTION INTRAMUSCULAR; INTRAVENOUS at 19:35

## 2018-10-21 RX ADMIN — PURIFIED WATER PRN LOZ: 99.05 LIQUID OPHTHALMIC at 08:05

## 2018-10-21 RX ADMIN — HUMAN INSULIN SCH: 100 INJECTION, SOLUTION SUBCUTANEOUS at 11:46

## 2018-10-21 RX ADMIN — PURIFIED WATER PRN LOZ: 99.05 LIQUID OPHTHALMIC at 22:28

## 2018-10-21 RX ADMIN — METHYLPREDNISOLONE SODIUM SUCCINATE SCH MG: 40 INJECTION, POWDER, FOR SOLUTION INTRAMUSCULAR; INTRAVENOUS at 10:42

## 2018-10-21 RX ADMIN — PURIFIED WATER PRN LOZ: 99.05 LIQUID OPHTHALMIC at 16:39

## 2018-10-21 NOTE — PN
DATE:  10/21/2018



LOCATION:  357, bed B.



SUBJECTIVE:  This is a 53-year-old female, seen and examined in rounds,

without significant clinical changes, with generalized weakness and malaise

and intermittent periods of abdominal pain.  No reported chest pain,

palpitation or evidence of active GI bleeding.  The patient complained of

intermittent slight episodes of mild shortness of breath.  Positive for

nausea and mild dyspepsia, but no vomiting.  The entire chart is reviewed

including but not limited to the most recent lab and radiology study

results, current and previous medication list, current and previous medical

events.



LABORATORY DATA:  Today's lab results showed a blood glucose level of 241. 

Yesterday, the lab results showed subsequent drop of liver function test

with decreased total bilirubin to 11.2 post ERCP with biliary stent

insertion.



PHYSICAL EXAMINATION

GENERAL:  A 53-year-old female, awake, alert and oriented.

VITAL SIGNS:  Afebrile with a pulse of 62, respiratory rate of 20-22, blood

pressure of 146/78.

HEENT:  Showed mildly pale, dry oral mucoid membrane.  Bilateral icteric

sclerae.

LUNGS:  Few scattered mild crepitation.  Decreased air entry at bases.

HEART:  Positive S1 and S2.

ABDOMEN:  Soft with slight generalized tenderness.  No mass or

organomegaly.  No rebound tenderness or guarding, but mild distention with

special tenderness at the right upper quadrant area.

EXTREMITIES:  With bilateral above-knee amputation.

NEUROLOGIC:  No reported new neurological deficits, sensory or motor.



IMPRESSION:

1.  Hepatic sarcoidosis.

2.  Jaundice, with abnormal liver function tests secondary to above.

3.  Re-exacerbation of peptic ulcer disease.

4.  Multiple past medical history including mainly but not limited to

diabetes mellitus, hypertension, coronary artery disease, cardiac

arrhythmias and peripheral vascular disease, status post bilateral

above-knee amputation.

5.  Status post cholecystectomy, with partial colon resection due to

intra-abdominal and intrapelvic abscess formation before.

6. Reported history of deep venous thrombosis.



SUGGESTIONS:

1.  Agree with your plan.

2.  Subsequent decrease of Solu-Medrol dose due to the elevated blood

glucose level.

3.  Repeat stool for occult blood.

4.  Further recommendation to follow.





__________________________________________

Jeffrey Albert MD





DD:  10/21/2018 7:27:03

DT:  10/21/2018 7:30:10

[facility] # [id number]

## 2018-10-21 NOTE — CP.PCM.PN
Subjective





- Date & Time of Evaluation


Date of Evaluation: 10/21/18


Time of Evaluation: 06:50





- Subjective


Subjective: 


Vascular surgery progress note for Dr. Monk





Pt seen and examined this AM. No adverse events overnight. Patient states she is

feeling better. Written consent for OR tomorrow obtained at bedside





Objective





- Vital Signs/Intake and Output


Vital Signs (last 24 hours): 


                                        











Temp Pulse Resp BP Pulse Ox


 


 98.1 F   59 L  20   149/81   100 


 


 10/21/18 07:35  10/21/18 07:35  10/21/18 07:35  10/21/18 07:35  10/21/18 07:35








Intake and Output: 


                                        











 10/21/18 10/21/18





 06:59 18:59


 


Intake Total 650 


 


Output Total 1800 


 


Balance -1150 














- Medications


Medications: 


                               Current Medications





Apixaban (Eliquis)  5 mg PO BID [facility]


Benzocaine/Menthol (Cepacol Sore Throat)  1 john PO Q2 PRN


   PRN Reason: Sore Throat


   Last Admin: 10/21/18 08:05 Dose:  1 john


Digoxin (Lanoxin)  0.25 mg PO DAILY@1800 [facility]


   Last Admin: 10/20/18 17:24 Dose:  Not Given


Diphenhydramine HCl (Benadryl)  25 mg IVP Q4 PRN


   PRN Reason: itchiness


   Last Admin: 10/21/18 07:02 Dose:  25 mg


Diphenoxylate HCl/Atropine (Lomotil 0.025-2.5 Mg Tablet)  1 tab PO Q8 PRN


   PRN Reason: Diarrhea


   Last Admin: 10/20/18 17:29 Dose:  1 tab


Gabapentin (Neurontin)  100 mg PO BID [facility]


   Last Admin: 10/20/18 17:21 Dose:  100 mg


Ciprofloxacin (Cipro 400mg/200ml Dsw)  400 mg in 200 mls @ 133 mls/hr IVPB Q12H 

[facility]; Protocol


   Last Admin: 10/20/18 19:10 Dose:  133 mls/hr


Ceftriaxone Sodium 1 gm/ (Sodium Chloride)  100 mls @ 100 mls/hr IVPB DAILY [facility];

Protocol


   Last Admin: 10/20/18 11:15 Dose:  100 mls/hr


Insulin Human Regular (Novolin R)  0 unit SC ACHS [facility]; Protocol


   Last Admin: 10/21/18 08:10 Dose:  Not Given


Lactobacillus Acidophilus (Bacid Acidophilus)  1 cap PO BID [facility]


   Last Admin: 10/20/18 17:21 Dose:  1 cap


Methylprednisolone (Solu-Medrol)  30 mg IV Q12 [facility]


   Last Admin: 10/20/18 21:46 Dose:  Not Given


Metoclopramide HCl (Reglan)  5 mg IVP Q6 [facility]


   Last Admin: 10/21/18 06:05 Dose:  5 mg


Metoprolol Succinate (Toprol Xl)  50 mg PO DAILY [facility]


   Last Admin: 10/20/18 10:56 Dose:  50 mg


Morphine Sulfate (Morphine)  1 mg IVP Q4 PRN


   PRN Reason: Pain, severe (8-10)


   Last Admin: 10/21/18 07:02 Dose:  1 mg


Morphine Sulfate (Morphine Immediate Release Tab)  15 mg PO Q8H PRN


   PRN Reason: Pain, severe (8-10)


Oxybutynin Chloride (Ditropan Xl)  10 mg PO DAILY [facility]


   Last Admin: 10/20/18 11:11 Dose:  10 mg


Sucralfate (Carafate Tab)  1 gm PO ACBD [facility]


   Last Admin: 10/21/18 08:05 Dose:  1 gm


Zolpidem Tartrate (Ambien)  5 mg PO HS PRN


   PRN Reason: Insomnia


   Last Admin: 10/20/18 22:52 Dose:  5 mg











- Labs


Labs: 


                                        





                                 10/20/18 07:12 





                                 10/20/18 07:12 





                                        











PT  12.1 SECONDS (9.7-12.2)   10/20/18  07:09    


 


INR  1.1   10/20/18  07:09    


 


APTT  29 SECONDS (21-34)  D 10/20/18  07:09    














- Constitutional


Appears: Well, Non-toxic, No Acute Distress





- Head Exam


Head Exam: ATRAUMATIC, NORMOCEPHALIC





- Eye Exam


Eye Exam: Scleral icterus.  absent: Conjunctival injection, Normal appearance





- ENT Exam


ENT Exam: Mucous Membranes Moist, Normal Oropharynx





- Respiratory Exam


Respiratory Exam: NORMAL BREATHING PATTERN.  absent: Accessory Muscle Use, 

Respiratory Distress





- Cardiovascular Exam


Cardiovascular Exam: RRR





- GI/Abdominal Exam


GI & Abdominal Exam: Soft, Tenderness.  absent: Distended





- Extremities Exam


Additional comments: 





BL AKA





- Neurological Exam


Neurological Exam: Alert, Awake, Oriented x3





- Psychiatric Exam


Psychiatric exam: Normal Affect, Normal Mood





- Skin


Skin Exam: Dry, Normal Color, Warm


Additional comments: 





portacath palpable subcutaneously right upper chest, no erythema, swelling





Assessment and Plan





- Assessment and Plan (Free Text)


Assessment: 





53F with malfunctioning portacath


Plan: 





plan for OR tomorrow AM for portacath revision


NPO after midnight


F/U AM labs, replete electrolytes as needed


Continue to hold blood thinners for OR





Discussed with DR. Monk, who agrees with above


Rolanda Cornejo, PGY2

## 2018-10-21 NOTE — CP.PCM.PN
Subjective





- Subjective


Subjective: 





dictated   





Objective





- Vital Signs/Intake and Output


Vital Signs (last 24 hours): 


                                        











Temp Pulse Resp BP Pulse Ox


 


 98.5 F   60   20   157/83 H  99 


 


 10/21/18 16:00  10/21/18 16:00  10/21/18 16:00  10/21/18 16:00  10/21/18 16:00








Intake and Output: 


                                        











 10/21/18 10/22/18





 18:59 06:59


 


Intake Total 700 


 


Output Total 780 


 


Balance -80 














- Medications


Medications: 


                               Current Medications





Apixaban (Eliquis)  5 mg PO BID [facility]


Benzocaine/Menthol (Cepacol Sore Throat)  1 john PO Q2 PRN


   PRN Reason: Sore Throat


   Last Admin: 10/21/18 16:39 Dose:  1 john


Digoxin (Lanoxin)  0.25 mg PO DAILY@1800 [facility]


   Last Admin: 10/21/18 17:41 Dose:  Not Given


Diphenhydramine HCl (Benadryl)  25 mg IVP Q4 PRN


   PRN Reason: itchiness


   Last Admin: 10/21/18 19:35 Dose:  25 mg


Diphenoxylate HCl/Atropine (Lomotil 0.025-2.5 Mg Tablet)  1 tab PO Q8 PRN


   PRN Reason: Diarrhea


   Last Admin: 10/21/18 17:37 Dose:  1 tab


Gabapentin (Neurontin)  100 mg PO BID [facility]


   Last Admin: 10/21/18 17:26 Dose:  100 mg


Ceftriaxone Sodium 1 gm/ (Sodium Chloride)  100 mls @ 100 mls/hr IVPB DAILY [facility];

Protocol


   Last Admin: 10/21/18 10:43 Dose:  100 mls/hr


Insulin Human Regular (Novolin R)  0 unit SC ACHS [facility]; Protocol


   Last Admin: 10/21/18 17:30 Dose:  8 units


Lactobacillus Acidophilus (Bacid Acidophilus)  1 cap PO BID [facility]


   Last Admin: 10/21/18 17:26 Dose:  1 cap


Methylprednisolone (Solu-Medrol)  30 mg IV Q12 [facility]


   Last Admin: 10/21/18 21:26 Dose:  Not Given


Metoclopramide HCl (Reglan)  5 mg IVP Q6 [facility]


   Last Admin: 10/21/18 17:26 Dose:  5 mg


Metoprolol Succinate (Toprol Xl)  50 mg PO DAILY [facility]


   Last Admin: 10/21/18 10:42 Dose:  50 mg


Morphine Sulfate (Morphine)  1 mg IVP Q4 PRN


   PRN Reason: Pain, severe (8-10)


   Last Admin: 10/21/18 19:35 Dose:  1 mg


Morphine Sulfate (Morphine Immediate Release Tab)  15 mg PO Q8H PRN


   PRN Reason: Pain, severe (8-10)


Oxybutynin Chloride (Ditropan Xl)  10 mg PO DAILY [facility]


   Last Admin: 10/21/18 10:43 Dose:  10 mg


Sucralfate (Carafate Tab)  1 gm PO ACBD [facility]


   Last Admin: 10/21/18 17:26 Dose:  1 gm


Zolpidem Tartrate (Ambien)  5 mg PO HS PRN


   PRN Reason: Insomnia


   Last Admin: 10/20/18 22:52 Dose:  5 mg











- Labs


Labs: 


                                        





                                 10/21/18 10:38 





                                 10/21/18 10:38 





                                        











PT  12.1 SECONDS (9.7-12.2)   10/20/18  07:09    


 


INR  1.1   10/20/18  07:09    


 


APTT  29 SECONDS (21-34)  D 10/20/18  07:09

## 2018-10-22 VITALS — RESPIRATION RATE: 20 BRPM

## 2018-10-22 LAB
BASOPHILS # BLD AUTO: 0 K/UL (ref 0–0.2)
BASOPHILS NFR BLD: 0.2 % (ref 0–2)
BUN SERPL-MCNC: 23 MG/DL (ref 7–17)
CALCIUM SERPL-MCNC: 8.3 MG/DL (ref 8.6–10.4)
EOSINOPHIL # BLD AUTO: 0.1 K/UL (ref 0–0.7)
EOSINOPHIL NFR BLD: 1.1 % (ref 0–4)
ERYTHROCYTE [DISTWIDTH] IN BLOOD BY AUTOMATED COUNT: 17.6 % (ref 11.5–14.5)
GFR NON-AFRICAN AMERICAN: > 60
HGB BLD-MCNC: 11.9 G/DL (ref 11–16)
LYMPHOCYTES # BLD AUTO: 2.1 K/UL (ref 1–4.3)
LYMPHOCYTES NFR BLD AUTO: 17 % (ref 20–40)
MCH RBC QN AUTO: 31.3 PG (ref 27–31)
MCHC RBC AUTO-ENTMCNC: 33.9 G/DL (ref 33–37)
MCV RBC AUTO: 92.5 FL (ref 81–99)
MONOCYTES # BLD: 1.2 K/UL (ref 0–0.8)
MONOCYTES NFR BLD: 9.7 % (ref 0–10)
NEUTROPHILS # BLD: 8.7 K/UL (ref 1.8–7)
NEUTROPHILS NFR BLD AUTO: 72 % (ref 50–75)
NRBC BLD AUTO-RTO: 0.2 % (ref 0–2)
PLATELET # BLD: 203 K/UL (ref 130–400)
PMV BLD AUTO: 8.7 FL (ref 7.2–11.7)
RBC # BLD AUTO: 3.8 MIL/UL (ref 3.8–5.2)
WBC # BLD AUTO: 12.1 K/UL (ref 4.8–10.8)

## 2018-10-22 PROCEDURE — 0JPT3XZ REMOVAL OF TUNNELED VASCULAR ACCESS DEVICE FROM TRUNK SUBCUTANEOUS TISSUE AND FASCIA, PERCUTANEOUS APPROACH: ICD-10-PCS | Performed by: SURGERY

## 2018-10-22 PROCEDURE — 02PY33Z REMOVAL OF INFUSION DEVICE FROM GREAT VESSEL, PERCUTANEOUS APPROACH: ICD-10-PCS | Performed by: SURGERY

## 2018-10-22 PROCEDURE — 05PYX3Z REMOVAL OF INFUSION DEVICE FROM UPPER VEIN, EXTERNAL APPROACH: ICD-10-PCS | Performed by: SURGERY

## 2018-10-22 PROCEDURE — B518ZZA FLUOROSCOPY OF SUPERIOR VENA CAVA, GUIDANCE: ICD-10-PCS

## 2018-10-22 PROCEDURE — 02HV33Z INSERTION OF INFUSION DEVICE INTO SUPERIOR VENA CAVA, PERCUTANEOUS APPROACH: ICD-10-PCS | Performed by: SURGERY

## 2018-10-22 PROCEDURE — B543ZZA ULTRASONOGRAPHY OF RIGHT JUGULAR VEINS, GUIDANCE: ICD-10-PCS

## 2018-10-22 PROCEDURE — 0JH63XZ INSERTION OF TUNNELED VASCULAR ACCESS DEVICE INTO CHEST SUBCUTANEOUS TISSUE AND FASCIA, PERCUTANEOUS APPROACH: ICD-10-PCS | Performed by: SURGERY

## 2018-10-22 RX ADMIN — HUMAN INSULIN SCH: 100 INJECTION, SOLUTION SUBCUTANEOUS at 13:37

## 2018-10-22 RX ADMIN — METHYLPREDNISOLONE SODIUM SUCCINATE SCH MG: 40 INJECTION, POWDER, FOR SOLUTION INTRAMUSCULAR; INTRAVENOUS at 23:10

## 2018-10-22 RX ADMIN — Medication SCH CAP: at 17:58

## 2018-10-22 RX ADMIN — HUMAN INSULIN SCH: 100 INJECTION, SOLUTION SUBCUTANEOUS at 08:20

## 2018-10-22 RX ADMIN — DIPHENOXYLATE HYDROCHLORIDE AND ATROPINE SULFATE PRN TAB: 2.5; .025 TABLET ORAL at 17:59

## 2018-10-22 RX ADMIN — Medication SCH CAP: at 10:11

## 2018-10-22 RX ADMIN — METHYLPREDNISOLONE SODIUM SUCCINATE SCH MG: 40 INJECTION, POWDER, FOR SOLUTION INTRAMUSCULAR; INTRAVENOUS at 10:11

## 2018-10-22 RX ADMIN — HUMAN INSULIN SCH UNITS: 100 INJECTION, SOLUTION SUBCUTANEOUS at 23:10

## 2018-10-22 RX ADMIN — HUMAN INSULIN SCH UNITS: 100 INJECTION, SOLUTION SUBCUTANEOUS at 17:30

## 2018-10-22 RX ADMIN — DIPHENHYDRAMINE HYDROCHLORIDE PRN MG: 50 INJECTION INTRAMUSCULAR; INTRAVENOUS at 08:58

## 2018-10-22 RX ADMIN — DIPHENHYDRAMINE HYDROCHLORIDE PRN MG: 50 INJECTION INTRAMUSCULAR; INTRAVENOUS at 15:01

## 2018-10-22 RX ADMIN — DIGOXIN SCH: 0.25 TABLET ORAL at 18:04

## 2018-10-22 RX ADMIN — DIPHENHYDRAMINE HYDROCHLORIDE PRN MG: 50 INJECTION INTRAMUSCULAR; INTRAVENOUS at 03:26

## 2018-10-22 RX ADMIN — METOPROLOL SUCCINATE SCH MG: 50 TABLET, EXTENDED RELEASE ORAL at 10:11

## 2018-10-22 RX ADMIN — DIPHENHYDRAMINE HYDROCHLORIDE PRN MG: 50 INJECTION INTRAMUSCULAR; INTRAVENOUS at 18:47

## 2018-10-22 RX ADMIN — OXYBUTYNIN CHLORIDE SCH MG: 10 TABLET, EXTENDED RELEASE ORAL at 10:11

## 2018-10-22 RX ADMIN — DIPHENHYDRAMINE HYDROCHLORIDE PRN MG: 50 INJECTION INTRAMUSCULAR; INTRAVENOUS at 23:07

## 2018-10-22 RX ADMIN — PURIFIED WATER PRN LOZ: 99.05 LIQUID OPHTHALMIC at 18:02

## 2018-10-22 RX ADMIN — HUMAN INSULIN SCH: 100 INJECTION, SOLUTION SUBCUTANEOUS at 08:19

## 2018-10-22 RX ADMIN — HUMAN INSULIN SCH UNITS: 100 INJECTION, SOLUTION SUBCUTANEOUS at 17:58

## 2018-10-22 RX ADMIN — PURIFIED WATER PRN LOZ: 99.05 LIQUID OPHTHALMIC at 20:00

## 2018-10-22 NOTE — PN
DATE:  10/20/2018



HISTORY OF PRESENT ILLNESS:  Laura Jaramillo is feeling better.  She has some

cough.  She has decreased right upper quadrant abdominal pain.  She denies

any fever, chills now.  She is on Solu-Medrol and she is responding to it

well.  Her total bilirubin has gone down to 11.  The patient is for OR with

Dr. Monk on Monday.  No fever.  No chills.



PHYSICAL EXAMINATION:

VITAL SIGNS:  Blood pressure 144/78, pulse 60, respiratory rate 20,

temperature 98.4.

LUNGS:  Clear.

CARDIOVASCULAR SYSTEM:  S1, S2, plus S3 positive.

ABDOMEN:  Soft, nontender.  Right upper quadrant, large liver.



ASSESSMENT:

1.  Hepatic sarcoidosis exacerbation.

2.  Hypertension.

3.  Coronary artery disease.

4.  Neurogenic bladder with Charles.



PLAN:  Continue Solu-Medrol, Accu-Chek, sliding scale.  GI followup. 

Monitor the patient.







__________________________________________

Nasir Dunbar MD





DD:  10/20/2018 22:01:15

DT:  10/21/2018 6:38:43

Job # [id number]

## 2018-10-22 NOTE — RAD
Date of service: 



10/22/2018



PROCEDURE:  Intraoperative Fluoroscopy. 



HISTORY:

MALFUNCTIONING PORTACATH



FINDINGS:

Fluoroscopic assistance was provided for right-sided Port-A-Cath 

placement.  Please refer to the operative report from ARACELY Deal.

## 2018-10-22 NOTE — RAD
Date of service: 



10/22/2018



PROCEDURE:  Radiographs of the Lumbar Spine.



HISTORY:

fall back pain







COMPARISON:

No prior.



FINDINGS:



BONES:

Profound osteopenia.  Loss of height of several lumbar vertebral 

bodies.



Mild levoscoliosis.  Degenerative changes in both hips incompletely 

visualized 



DISC SPACES:

Unremarkable.



OTHER FINDINGS:

Calcified nonaneurysmal abdominal aorta. 



Common bile duct stent identified.



IMPRESSION:

. No acute findings related to/accounting  for the clinical 

presentation.

## 2018-10-22 NOTE — CP.PCM.CON
History of Present Illness





- History of Present Illness


History of Present Illness: 





DM





Past Patient History





- Infectious Disease


Hx of Infectious Diseases: None





- Tetanus Immunizations


Tetanus Immunization: Unknown





- Past Medical History & Family History


Past Medical History?: Yes


Past Family History: Reviewed and not pertinent





- Past Social History


Smoking Status: Former Smoker


Alcohol: None


Drugs: Denies


Home Situation {Lives}: With Family





- CARDIAC


Hx Cardia Arrhythmia: Yes


Hx Congestive Heart Failure: Yes


Hx Hypercholesterolemia: Yes


Hx Hypertension: Yes





- PULMONARY


Hx Asthma: Yes (NO INHALER-ON PREDNISONE DAILY PO.)





- NEUROLOGICAL


Hx Neurological Disorder: No





- HEENT


Hx HEENT Problems: Yes


Hx Cataracts: Yes (BILAT.)





- RENAL


Hx Chronic Kidney Disease: No





- ENDOCRINE/METABOLIC


Hx Endocrine Disorders: Yes


Hx Diabetes Mellitus Type 1: Yes





- HEMATOLOGICAL/ONCOLOGICAL


Hx Anemia: Yes





- INTEGUMENTARY


Hx Dermatological Problems: No





- MUSCULOSKELETAL/RHEUMATOLOGICAL


Hx Falls: Yes





- GASTROINTESTINAL


Hx Gall Bladder Disease: Yes





- GENITOURINARY/GYNECOLOGICAL


Hx Genitourinary Disorders: Yes


Hx Urinary Tract Infection: Yes


Other/Comment: PT HAS LONG TERM GUTIERREZ





- PSYCHIATRIC


Hx Substance Use: No





- SURGICAL HISTORY


Hx Appendectomy: Yes


Hx Cholecystectomy: Yes


Hx Coronary Stent: Yes





- ANESTHESIA


Hx Anesthesia: Yes


Hx Anesthesia Reactions: No


Hx Malignant Hyperthermia: No





Meds


Allergies/Adverse Reactions: 


                                    Allergies











Allergy/AdvReac Type Severity Reaction Status Date / Time


 


avocado Allergy Severe ANAPHYLAXIS Verified 10/04/17 17:28


 


banana Allergy Severe ANAPHYLAXIS Verified 10/04/17 17:28


 


cherry Allergy Severe ANAPHYLAXIS Verified 10/04/17 17:28


 


ketorolac [From Toradol] Allergy   Verified 10/04/17 17:28














- Medications


Medications: 


                               Current Medications





Apixaban (Eliquis)  5 mg PO BID [facility]


   Last Admin: 10/22/18 18:48 Dose:  5 mg


Benzocaine/Menthol (Cepacol Sore Throat)  1 john PO Q2 PRN


   PRN Reason: Sore Throat


   Last Admin: 10/22/18 18:02 Dose:  1 john


Digoxin (Lanoxin)  0.25 mg PO DAILY@1800 [facility]


   Last Admin: 10/22/18 18:04 Dose:  Not Given


Diphenhydramine HCl (Benadryl)  25 mg IVP Q4 PRN


   PRN Reason: itchiness


   Last Admin: 10/22/18 18:47 Dose:  25 mg


Diphenoxylate HCl/Atropine (Lomotil 0.025-2.5 Mg Tablet)  1 tab PO Q8 PRN


   PRN Reason: Diarrhea


   Last Admin: 10/22/18 17:59 Dose:  1 tab


Gabapentin (Neurontin)  100 mg PO BID [facility]


   Last Admin: 10/22/18 17:59 Dose:  100 mg


Insulin Human Regular (Novolin R)  4 unit SC TIDAC [facility]


   Last Admin: 10/22/18 17:58 Dose:  4 units


Insulin Human Regular (Novolin R)  0 unit SC ACHS [facility]; Protocol


Lactobacillus Acidophilus (Bacid Acidophilus)  1 cap PO BID [facility]


   Last Admin: 10/22/18 17:58 Dose:  1 cap


Lidocaine (Lidoderm)  1 ea TD DAILY [facility]


   Last Admin: 10/22/18 10:14 Dose:  1 ea


Methylprednisolone (Solu-Medrol)  30 mg IV Q12 [facility]


   Last Admin: 10/22/18 10:11 Dose:  30 mg


Metoclopramide HCl (Reglan)  5 mg IVP Q6 [facility]


   Last Admin: 10/22/18 17:59 Dose:  5 mg


Metoprolol Succinate (Toprol Xl)  50 mg PO DAILY [facility]


   Last Admin: 10/22/18 10:11 Dose:  50 mg


Morphine Sulfate (Morphine)  1 mg IVP Q4 PRN


   PRN Reason: Pain, severe (8-10)


   Last Admin: 10/22/18 15:01 Dose:  1 mg


Morphine Sulfate (Morphine Immediate Release Tab)  15 mg PO Q8H PRN


   PRN Reason: Pain, severe (8-10)


Morphine Sulfate (Morphine)  1 mg IV Q4 PRN


   PRN Reason: Pain, moderate (4-7)


Oxybutynin Chloride (Ditropan Xl)  10 mg PO DAILY [facility]


   Last Admin: 10/22/18 10:11 Dose:  10 mg


Sucralfate (Carafate Tab)  1 gm PO ACBD [facility]


   Last Admin: 10/22/18 17:15 Dose:  1 gm


Zolpidem Tartrate (Ambien)  5 mg PO HS PRN


   PRN Reason: Insomnia


   Last Admin: 10/21/18 23:34 Dose:  5 mg











Results





- Vital Signs


Recent Vital Signs: 


                                Last Vital Signs











Temp  98 F   10/22/18 16:00


 


Pulse  56 L  10/22/18 16:00


 


Resp  20   10/22/18 16:00


 


BP  152/71 H  10/22/18 16:00


 


Pulse Ox  100   10/22/18 16:00














- Labs


Result Diagrams: 


                                 10/22/18 11:10





                                 10/22/18 11:10


Labs: 


                         Laboratory Results - last 24 hr











  10/20/18 10/21/18 10/21/18





  21:03 02:36 07:33


 


WBC   


 


RBC   


 


Hgb   


 


Hct   


 


MCV   


 


MCH   


 


MCHC   


 


RDW   


 


Plt Count   


 


MPV   


 


Neut % (Auto)   


 


Lymph % (Auto)   


 


Mono % (Auto)   


 


Eos % (Auto)   


 


Baso % (Auto)   


 


Neut # (Auto)   


 


Lymph # (Auto)   


 


Mono # (Auto)   


 


Eos # (Auto)   


 


Baso # (Auto)   


 


Sodium   


 


Potassium   


 


Chloride   


 


Carbon Dioxide   


 


Anion Gap   


 


BUN   


 


Creatinine   


 


Est GFR ( Amer)   


 


Est GFR (Non-Af Amer)   


 


POC Glucose (mg/dL)  490 H*  93  114 H


 


Random Glucose   


 


Calcium   


 


Phosphorus   


 


Magnesium   


 


Urine HCG, Qual   














  10/21/18 10/21/18 10/21/18





  11:07 15:59 20:56


 


WBC   


 


RBC   


 


Hgb   


 


Hct   


 


MCV   


 


MCH   


 


MCHC   


 


RDW   


 


Plt Count   


 


MPV   


 


Neut % (Auto)   


 


Lymph % (Auto)   


 


Mono % (Auto)   


 


Eos % (Auto)   


 


Baso % (Auto)   


 


Neut # (Auto)   


 


Lymph # (Auto)   


 


Mono # (Auto)   


 


Eos # (Auto)   


 


Baso # (Auto)   


 


Sodium   


 


Potassium   


 


Chloride   


 


Carbon Dioxide   


 


Anion Gap   


 


BUN   


 


Creatinine   


 


Est GFR ( Amer)   


 


Est GFR (Non-Af Amer)   


 


POC Glucose (mg/dL)  126 H  383 H  403 H*


 


Random Glucose   


 


Calcium   


 


Phosphorus   


 


Magnesium   


 


Urine HCG, Qual   














  10/22/18 10/22/18 10/22/18





  02:25 06:44 07:41


 


WBC   


 


RBC   


 


Hgb   


 


Hct   


 


MCV   


 


MCH   


 


MCHC   


 


RDW   


 


Plt Count   


 


MPV   


 


Neut % (Auto)   


 


Lymph % (Auto)   


 


Mono % (Auto)   


 


Eos % (Auto)   


 


Baso % (Auto)   


 


Neut # (Auto)   


 


Lymph # (Auto)   


 


Mono # (Auto)   


 


Eos # (Auto)   


 


Baso # (Auto)   


 


Sodium   


 


Potassium   


 


Chloride   


 


Carbon Dioxide   


 


Anion Gap   


 


BUN   


 


Creatinine   


 


Est GFR ( Amer)   


 


Est GFR (Non-Af Amer)   


 


POC Glucose (mg/dL)  172 H   62 L


 


Random Glucose   


 


Calcium   


 


Phosphorus   


 


Magnesium   


 


Urine HCG, Qual   Negative 














  10/22/18 10/22/18 10/22/18





  07:42 08:50 11:06


 


WBC   


 


RBC   


 


Hgb   


 


Hct   


 


MCV   


 


MCH   


 


MCHC   


 


RDW   


 


Plt Count   


 


MPV   


 


Neut % (Auto)   


 


Lymph % (Auto)   


 


Mono % (Auto)   


 


Eos % (Auto)   


 


Baso % (Auto)   


 


Neut # (Auto)   


 


Lymph # (Auto)   


 


Mono # (Auto)   


 


Eos # (Auto)   


 


Baso # (Auto)   


 


Sodium   


 


Potassium   


 


Chloride   


 


Carbon Dioxide   


 


Anion Gap   


 


BUN   


 


Creatinine   


 


Est GFR ( Amer)   


 


Est GFR (Non-Af Amer)   


 


POC Glucose (mg/dL)  64 L  97  88


 


Random Glucose   


 


Calcium   


 


Phosphorus   


 


Magnesium   


 


Urine HCG, Qual   














  10/22/18 10/22/18 10/22/18





  11:10 11:10 13:02


 


WBC  12.1 H  


 


RBC  3.80  


 


Hgb  11.9  


 


Hct  35.2  


 


MCV  92.5  


 


MCH  31.3 H  


 


MCHC  33.9  


 


RDW  17.6 H  


 


Plt Count  203  


 


MPV  8.7  


 


Neut % (Auto)  72.0  


 


Lymph % (Auto)  17.0 L  


 


Mono % (Auto)  9.7  


 


Eos % (Auto)  1.1  


 


Baso % (Auto)  0.2  


 


Neut # (Auto)  8.7 H  


 


Lymph # (Auto)  2.1  


 


Mono # (Auto)  1.2 H  


 


Eos # (Auto)  0.1  


 


Baso # (Auto)  0.0  


 


Sodium   142 


 


Potassium   3.6 


 


Chloride   109 H 


 


Carbon Dioxide   19 L 


 


Anion Gap   18 


 


BUN   23 H 


 


Creatinine   0.6 L 


 


Est GFR ( Amer)   > 60 


 


Est GFR (Non-Af Amer)   > 60 


 


POC Glucose (mg/dL)    136 H


 


Random Glucose   76 


 


Calcium   8.3 L 


 


Phosphorus   2.9 


 


Magnesium   1.9 


 


Urine HCG, Qual   














  10/22/18 10/22/18 10/22/18





  16:44 21:14 21:21


 


WBC   


 


RBC   


 


Hgb   


 


Hct   


 


MCV   


 


MCH   


 


MCHC   


 


RDW   


 


Plt Count   


 


MPV   


 


Neut % (Auto)   


 


Lymph % (Auto)   


 


Mono % (Auto)   


 


Eos % (Auto)   


 


Baso % (Auto)   


 


Neut # (Auto)   


 


Lymph # (Auto)   


 


Mono # (Auto)   


 


Eos # (Auto)   


 


Baso # (Auto)   


 


Sodium   


 


Potassium   


 


Chloride   


 


Carbon Dioxide   


 


Anion Gap   


 


BUN   


 


Creatinine   


 


Est GFR ( Amer)   


 


Est GFR (Non-Af Amer)   


 


POC Glucose (mg/dL)  328 H  393 H  409 H*


 


Random Glucose   


 


Calcium   


 


Phosphorus   


 


Magnesium   


 


Urine HCG, Qual   














Assessment & Plan


(1) Hypoglycemia associated with diabetes


Assessment and Plan: 


Endocrine consult  


reason for consult: uncontrolled diabetes 


Source: patient  and chart review 


 Ms. Jaramillo  is  52 y/o   admitted for abdominal pain  


as per pt. has DM diagnosed    as per steroids induced since on steroids for

scarcoidosis  almost continuos ( once gwen it  relapse & restart another  

taper dose)   (-)  neuropathy , (-) retinopathy , (-) nephropathy  (+) CAD s/p 

defibrillator & pacemaker  (+) PVD s/p 


b/L AKA 


outpatient diabetes management regimen :  meformin ,Novolog& levemir ,not sure 

of the dose all stopped 2 month ago [id number] 


inpatient diabetes management regimen:  medium dose of regular insulin & given  

also extra 6 units last night for glucose of 400





blood glucose log  : @ 7am 64 , ,130-300-400 just after bed time meal !! s/p med

port today for poor access on IV solumedrol 30 units q12h @ 10am & 10 pm 


                                   


Allergy noted  


Past medical history:HTN , hyperlipidemia , sarcodosis ,asthma 


Past surgical history: cholecystectomy, appendectomy ,bilateral cataract , 

bilateral AKA,s/p defibrillator & pacemaker , abdominal mass removal ( benign as

per pt.)


Psychiatry history: denies 


Social history:  ex-smoking   ,denies  ETOH use , illicit drug use  


Family history:  father  from heart disease/CHF , mother  from breast 

cancer 


ROS: Constitutional: denies fever, tiredness/weakness. HEENT: denies  earache,  

change in voice .Respiratory: denies cough,  sob . CVS :no chest pain, no 

palpitations . 


Abdomen: no abdominal pain, no nausea /vomiting, no change bowel movement. 


CNS : denies light-headedness, dizziness. Extremities: no edema, no tremors. 

Skin: no itching, no rash 


Physical exam 


 Well-developed AAO x3 ,  NAD , eating cereal & milk 


VSS 


HEENT: norm cephalic, atraumatic, no lid lag , no exophthalmos 


NECK: supple, no palpable lymphadenopathy 


THYROID: no palpable thyromegaly, not tender 


CHEST: fair air entry, bilateral, 


CVS: S1,S2 


ABDOMEN: bowel sound present, benign, obese, no wide purple striae , no bruises 


EXTREMITIES: bilateral AKA , no palpable hand tremors  


Skin:  acanthosis nigricans 


-lab: CMP wnl 


 Assessment: 


hypoglycemia 


diabetes with PVD s/p bilateral AKA


steroids induced hyperglycemia 


abdominal pain /sacroidosis 


obesity 


plan :


change Novoloin R low dose coverage, no 3 am coverage 


start  novoloin R 4 units tid with meals if eat > 60% 


obtain a1c & TSH 


plan communicated with nurse in charge who read back the instructions correctly 




Thank you for allowing me to participate in the care of the patient, we will 

follow with you. 





Blanka Patel # [phone]


office Fridays 10-6  & Saturdays 9-3


address:  [address] [place] [address] ,phone # [phone] ,[phone]











Status: Acute   





(2) Diabetes mellitus with peripheral circulatory disorder


Status: Acute   





(3) S/P AKA (above knee amputation) bilateral


Status: Acute   





(4) Steroid-induced hyperglycemia


Status: Acute   





(5) Abdominal pain


Status: Acute

## 2018-10-22 NOTE — OP
PROCEDURE DATE:  10/22/2018



PREOPERATIVE DIAGNOSIS:  Malfunctioning port-A-Cath, right chest wall.



POSTOPERATIVE DIAGNOSIS:  Malfunctioning port-A-Cath, right chest wall.



PROCEDURE CARRIED OUT:  Removal of port-A-Cath and placement of new

port-A-Cath, right jugular vein.



HISTORY:  The patient is a middle-aged woman with history of multiple

problems and requires a port-A-Cath with venous access.



OPERATIVE FINDINGS:  The previous catheter was non-functioning, ____ by the

clavicle.



DESCRIPTION OF PROCEDURE:  Cutdown was made in the neck, the catheter port

was isolated, divided, the old port removed.  We then placed the guidewire

through the catheter, placed the sheath dilator over this, and placed a new

catheter originating on the right jugular vein, terminating at the superior

vena cava, and going out on the right chest wall, a different location than

had previously been placed with a new pocket.  This was sutured to the

skin.  The catheter was positioned appropriately.  It was checked for flow,

which was perfect.  Wounds were all closed and Steri-Strips were applied. 

Blood loss during the procedure was 5 mL to 10 mL.



OPERATION CARRIED OUT:  Port-A-Cath removal and replacement, right jugular

vein.





__________________________________________

Jaron Monk Jr., MD





DD:  10/22/2018 13:27:43

DT:  10/22/2018 14:05:52

Job # [id number]

## 2018-10-22 NOTE — PN
DATE:  10/21/2018



SUBJECTIVE:  _____ Shivers is improving.  Her bilirubin is coming down. 

She is for OR tomorrow morning.  No shortness of breath.  No chest pain. 

No cough.  Her blood sugars have been fluctuating.



PHYSICAL EXAMINATION:

VITAL SIGNS:  Blood pressure 157/83, pulse 60, respiratory rate 20,

temperature 98.5.

LUNGS:  Clear.  No rales, rhonchi.

CARDIOVASCULAR SYSTEM:  S1 and S2, regular.  No heave, no thrill.

ABDOMEN:  Soft, nontender.  Bowel sounds are positive.

CENTRAL NERVOUS SYSTEM:  Awake, alert, oriented x3.



ASSESSMENT AND PLAN:

1.  Uncontrolled diabetes due to steroids, get Endocrinology consult.

2.  Hepatic sarcoidosis.  Bilirubin is improving.

3.  Hypertension.

4.  Type 2 diabetes.

5.  Port-A-Cath problems.  OR in a.m.







__________________________________________

Nasir Dunbar MD







DD:  10/21/2018 21:49:48

DT:  10/21/2018 23:43:15

Job # [id number]

## 2018-10-22 NOTE — RAD
HISTORY:

Post op port 



COMPARISON:

Chest x-ray performed 10/19/18 



TECHNIQUE:

Chest, one view.



FINDINGS:

Examination limited by habitus, hypoinflation, patient obliquity.



Right-sided MediPort extends to the right atrium.



LUNGS:

No focal consolidation.



Please note that chest x-ray has limited sensitivity for the 

detection of pulmonary masses.



PLEURA:

No significant pleural effusion identified. No definite pneumothorax .



CARDIOVASCULAR:

Left-sided AICD.  Cardiomegaly.  Dense atherosclerotic calcification 

of the aorta present. 



OSSEOUS STRUCTURES:

Scoliosis.  Degenerative changes.  Osseous demineralization.



VISUALIZED UPPER ABDOMEN:

Right upper quadrant surgical clips.  Partially imaged probable stent 

in the right upper quadrant. 



OTHER FINDINGS:

None.



IMPRESSION:

Hypoinflation.  Right-sided MediPort.  Left-sided AICD.  

Cardiomegaly.  Partially imaged probable stent in the right upper 

quadrant.  Right upper quadrant surgical clips.

## 2018-10-22 NOTE — PCM.SURG1
Surgeon's Initial Post Op Note





- Surgeon's Notes


Surgeon: urvashi


Assistant: 0


Type of Anesthesia: IV Sedation


Anesthesia Administered By: damien


Pre-Operative Diagnosis: avenia


Operative Findings: new cath places and old port removed right jugular


Post-Operative Diagnosis: same


Operation Performed: removal old port and placement of new portacath


Specimen/Specimens Removed: old one removed and discarded


Estimated Blood Loss: EBL {In ML}: 5


Blood Products Given: N/A


Drains Used: No Drains


Post-Op Condition: Good


Date of Surgery/Procedure: 10/22/18


Time of Surgery/Procedure: 13:11

## 2018-10-22 NOTE — CP.PCM.PN
Subjective





- Subjective


Subjective: 





dictated   





Objective





- Vital Signs/Intake and Output


Vital Signs (last 24 hours): 


                                        











Temp Pulse Resp BP Pulse Ox


 


 98 F   56 L  20   152/71 H  100 


 


 10/22/18 16:00  10/22/18 16:00  10/22/18 16:00  10/22/18 16:00  10/22/18 16:00








Intake and Output: 


                                        











 10/22/18 10/23/18





 18:59 06:59


 


Intake Total 400 300


 


Output Total 300 650


 


Balance 100 -350














- Medications


Medications: 


                               Current Medications





Apixaban (Eliquis)  5 mg PO BID [facility]


   Last Admin: 10/22/18 18:48 Dose:  5 mg


Benzocaine/Menthol (Cepacol Sore Throat)  1 john PO Q2 PRN


   PRN Reason: Sore Throat


   Last Admin: 10/22/18 20:00 Dose:  1 john


Digoxin (Lanoxin)  0.25 mg PO DAILY@1800 [facility]


   Last Admin: 10/22/18 18:04 Dose:  Not Given


Diphenhydramine HCl (Benadryl)  25 mg IVP Q4 PRN


   PRN Reason: itchiness


   Last Admin: 10/22/18 23:07 Dose:  25 mg


Diphenoxylate HCl/Atropine (Lomotil 0.025-2.5 Mg Tablet)  1 tab PO Q8 PRN


   PRN Reason: Diarrhea


   Last Admin: 10/22/18 17:59 Dose:  1 tab


Gabapentin (Neurontin)  100 mg PO BID [facility]


   Last Admin: 10/22/18 17:59 Dose:  100 mg


Insulin Human Regular (Novolin R)  4 unit SC TIDAC [facility]


   Last Admin: 10/22/18 17:58 Dose:  4 units


Insulin Human Regular (Novolin R)  0 unit SC ACHS [facility]; Protocol


   Last Admin: 10/22/18 23:10 Dose:  3 units


Lactobacillus Acidophilus (Bacid Acidophilus)  1 cap PO BID [facility]


   Last Admin: 10/22/18 17:58 Dose:  1 cap


Lidocaine (Lidoderm)  1 ea TD DAILY [facility]


   Last Admin: 10/22/18 10:14 Dose:  1 ea


Methylprednisolone (Solu-Medrol)  30 mg IV Q12 [facility]


   Last Admin: 10/22/18 23:10 Dose:  30 mg


Metoclopramide HCl (Reglan)  5 mg IVP Q6 [facility]


   Last Admin: 10/22/18 17:59 Dose:  5 mg


Metoprolol Succinate (Toprol Xl)  50 mg PO DAILY [facility]


   Last Admin: 10/22/18 10:11 Dose:  50 mg


Morphine Sulfate (Morphine)  1 mg IVP Q4 PRN


   PRN Reason: Pain, severe (8-10)


   Last Admin: 10/22/18 23:08 Dose:  1 mg


Morphine Sulfate (Morphine Immediate Release Tab)  15 mg PO Q8H PRN


   PRN Reason: Pain, severe (8-10)


Morphine Sulfate (Morphine)  1 mg IV Q4 PRN


   PRN Reason: Pain, moderate (4-7)


Oxybutynin Chloride (Ditropan Xl)  10 mg PO DAILY [facility]


   Last Admin: 10/22/18 10:11 Dose:  10 mg


Sucralfate (Carafate Tab)  1 gm PO ACBD [facility]


   Last Admin: 10/22/18 17:15 Dose:  1 gm


Zolpidem Tartrate (Ambien)  5 mg PO HS PRN


   PRN Reason: Insomnia


   Last Admin: 10/22/18 23:07 Dose:  5 mg











- Labs


Labs: 


                                        





                                 10/22/18 11:10 





                                 10/22/18 11:10 





                                        











PT  12.1 SECONDS (9.7-12.2)   10/20/18  07:09    


 


INR  1.1   10/20/18  07:09    


 


APTT  29 SECONDS (21-34)  D 10/20/18  07:09

## 2018-10-22 NOTE — PCM.FALL
Post Fall Progress Note





- Post Fall


Fall Date: 10/22/18


Fall Time: 02:00


Description of Fall: 





Code Star called at 2:00AM





Responded to code star. Upon arrival, patient was found on the floor next to the

bed with nurses present. Patient was conscious, A&O x3. Patient states she did 

not loose consciousness and remembers all events. Patient stated her IV line had

come out prior to fall and she was bleeding from the IV site in her hand. She 

was repositioning her self in the bed to call for the nurse but in the process 

of repositioning, she some how slid off the bed. Patient denies hitting her head

upon fall. Patient denies pain except for a back pain she has had since last 

several days. 











- Post Fall Exam


Vital Sign: 


                                        











Temp Pulse Resp BP Pulse Ox


 


 97.4 F L  60   20   125/64   100 


 


 10/22/18 00:16  10/22/18 00:16  10/22/18 00:16  10/22/18 00:16  10/22/18 00:16








Skull Exam: Negative for: Scalp wound, Scalp hematoma


Eye Exam: Positive for: Pupils equal


Ear Exam: Negative for: Discharge, Bleeding


Nose Exam: Negative for: Discharge, Bleeding


Skin Exam: Positive for: Lacerations


Neck Exam: Negative for: Tingling, Weakness


Spinal Exam: Negative for: Weakness


Chest Exam: Negative for: Difficulty breathing, Tenderness in collar bones


Abdomen Exam: Negative for: Tenderness


Pelvic Exam: Negative for: Tenderness


Arm Exam: Negative for: Deformity


Leg Exam: Positive for: Deformity


Other pertinent findings: 





B/L Above knee amputation 2015 (chronic). B/L Scleral icterus (reason for 

admission). Dry blood on left hand at IV insertion site. Urinary john in place.




Impression/Plan: 


House Doctor note: 





53 Y female with PMHx: DM, HTN, HLD, asthma, liver sarcoidosis, CHF, afib, PVD, 

pacemaker, B/L AKA, currently hospitalized for for hepatic sarcoidosis, had code

star called at 2:00AM. 





1) S/p fall. 


- Vitals stable @  /62 HR 62 Temp 97.6F O2 98%


- No hematoma, head injury observed


- No Neurological deficits observed


- S1, S2


- Lungs CTA B/L


- Tenderness on palpation R lumbar paraspinal region, rated 8/10 (patient states

this is the same as the last few days)


- No bowel incontinence


- No active bleeding


- No imaging warranted at this time 


- Nurse will attempt new IV access


- Patient is NPO for procedure today


- Patient does not want IM analgesic pain relief for now

## 2018-10-22 NOTE — PN
DATE:  10/22/2018



LOCATION:  357, bed B.



SUBJECTIVE:  This is a 53-year-old female seen and examined in rounds

without significant clinical changes, but with generalized jaundice.



The entire chart is reviewed including but not limited to the most recent

lab and radiology study results, current and the previous medication list,

current and the previous medical events.  No chest pain, palpitation,

significant shortness of breath, or evidence of active GI bleeding, but the

patient had an accident of falling off the bed this morning.



Today's lab results showed blood glucose level of 97.  Rest of the lab

results still pending.



PHYSICAL EXAMINATION:

GENERAL:  A 53-year-old female.

VITAL SIGNS:  Afebrile with pulse of 72, respiratory rate 20 to 22 with

blood pressure of 136/84.

HEENT:  Showed pale, dry oral mucous membrane.  Nonicteric sclerae.

LUNGS:  Few scattered crepitation.  Decreased air entry at bases.

HEART:  Positive S1 and S2.

ABDOMEN:  Soft.  Bowel sounds are present with midepigastric and right

upper quadrant mild tenderness.  No mass or organomegaly.  No rebound

tenderness or guarding.

EXTREMITIES:  With evidence of bilateral above-knee amputation.

NEUROLOGICAL:  No reported new neurological deficits, sensory or motor.



IMPRESSION:  The patient still has generalized jaundice.



SUGGESTIONS:

1.  Agree with your plan.

2.  Antireflux measures.

3.  Follow up liver function test.

4.  Further recommendation to follow.







__________________________________________

Jeffrey Albert MD



DD:  10/22/2018 10:31:55

DT:  10/22/2018 11:23:54

Job # [id number]

## 2018-10-23 VITALS — HEART RATE: 64 BPM

## 2018-10-23 LAB
ALBUMIN SERPL-MCNC: 3.1 G/DL (ref 3.5–5)
ALBUMIN/GLOB SERPL: 0.9 {RATIO} (ref 1–2.1)
ALT SERPL-CCNC: 190 U/L (ref 9–52)
ANISOCYTOSIS BLD QL SMEAR: SLIGHT
AST SERPL-CCNC: 159 U/L (ref 14–36)
BASOPHILS # BLD AUTO: 0 K/UL (ref 0–0.2)
BASOPHILS NFR BLD: 0.1 % (ref 0–2)
BUN SERPL-MCNC: 28 MG/DL (ref 7–17)
CALCIUM SERPL-MCNC: 7.7 MG/DL (ref 8.6–10.4)
CK MB SERPL-MCNC: 0.61 NG/ML (ref 0–3.38)
EOSINOPHIL # BLD AUTO: 0 K/UL (ref 0–0.7)
EOSINOPHIL NFR BLD: 0 % (ref 0–4)
ERYTHROCYTE [DISTWIDTH] IN BLOOD BY AUTOMATED COUNT: 17.6 % (ref 11.5–14.5)
GFR NON-AFRICAN AMERICAN: > 60
HGB BLD-MCNC: 10.7 G/DL (ref 11–16)
LYMPHOCYTE: 3 % (ref 20–40)
LYMPHOCYTES # BLD AUTO: 0.5 K/UL (ref 1–4.3)
LYMPHOCYTES NFR BLD AUTO: 4.8 % (ref 20–40)
MCH RBC QN AUTO: 31.7 PG (ref 27–31)
MCHC RBC AUTO-ENTMCNC: 33.6 G/DL (ref 33–37)
MCV RBC AUTO: 94.5 FL (ref 81–99)
MONOCYTE: 3 % (ref 0–10)
MONOCYTES # BLD: 0.4 K/UL (ref 0–0.8)
MONOCYTES NFR BLD: 4.5 % (ref 0–10)
NEUTROPHILS # BLD: 9 K/UL (ref 1.8–7)
NEUTROPHILS NFR BLD AUTO: 90.6 % (ref 50–75)
NEUTROPHILS NFR BLD AUTO: 93 % (ref 50–75)
NEUTS BAND NFR BLD: 1 % (ref 0–2)
NRBC BLD AUTO-RTO: 0 % (ref 0–2)
PLATELET # BLD EST: NORMAL 10*3/UL
PLATELET # BLD: 200 K/UL (ref 130–400)
PMV BLD AUTO: 10.2 FL (ref 7.2–11.7)
POIKILOCYTOSIS BLD QL SMEAR: SLIGHT
RBC # BLD AUTO: 3.36 MIL/UL (ref 3.8–5.2)
TARGETS BLD QL SMEAR: (no result)
TOTAL CELLS COUNTED BLD: 100
WBC # BLD AUTO: 9.9 K/UL (ref 4.8–10.8)

## 2018-10-23 RX ADMIN — Medication SCH CAP: at 18:00

## 2018-10-23 RX ADMIN — DIPHENHYDRAMINE HYDROCHLORIDE PRN MG: 50 INJECTION INTRAMUSCULAR; INTRAVENOUS at 22:14

## 2018-10-23 RX ADMIN — Medication SCH CAP: at 10:03

## 2018-10-23 RX ADMIN — HUMAN INSULIN SCH UNITS: 100 INJECTION, SOLUTION SUBCUTANEOUS at 11:57

## 2018-10-23 RX ADMIN — OXYBUTYNIN CHLORIDE SCH MG: 10 TABLET, EXTENDED RELEASE ORAL at 10:03

## 2018-10-23 RX ADMIN — HUMAN INSULIN SCH UNITS: 100 INJECTION, SOLUTION SUBCUTANEOUS at 17:30

## 2018-10-23 RX ADMIN — HUMAN INSULIN SCH UNITS: 100 INJECTION, SOLUTION SUBCUTANEOUS at 11:56

## 2018-10-23 RX ADMIN — METOPROLOL SUCCINATE SCH: 50 TABLET, EXTENDED RELEASE ORAL at 10:16

## 2018-10-23 RX ADMIN — DIPHENHYDRAMINE HYDROCHLORIDE PRN MG: 50 INJECTION INTRAMUSCULAR; INTRAVENOUS at 10:00

## 2018-10-23 RX ADMIN — HUMAN INSULIN SCH UNITS: 100 INJECTION, SOLUTION SUBCUTANEOUS at 07:49

## 2018-10-23 RX ADMIN — DIPHENOXYLATE HYDROCHLORIDE AND ATROPINE SULFATE PRN TAB: 2.5; .025 TABLET ORAL at 09:58

## 2018-10-23 RX ADMIN — DIGOXIN SCH MG: 0.25 TABLET ORAL at 17:48

## 2018-10-23 RX ADMIN — DIPHENHYDRAMINE HYDROCHLORIDE PRN MG: 50 INJECTION INTRAMUSCULAR; INTRAVENOUS at 18:28

## 2018-10-23 RX ADMIN — METHYLPREDNISOLONE SODIUM SUCCINATE SCH MG: 40 INJECTION, POWDER, FOR SOLUTION INTRAMUSCULAR; INTRAVENOUS at 21:55

## 2018-10-23 RX ADMIN — HUMAN INSULIN SCH: 100 INJECTION, SOLUTION SUBCUTANEOUS at 22:10

## 2018-10-23 RX ADMIN — METHYLPREDNISOLONE SODIUM SUCCINATE SCH MG: 40 INJECTION, POWDER, FOR SOLUTION INTRAMUSCULAR; INTRAVENOUS at 10:04

## 2018-10-23 RX ADMIN — DIPHENHYDRAMINE HYDROCHLORIDE PRN MG: 50 INJECTION INTRAMUSCULAR; INTRAVENOUS at 05:54

## 2018-10-23 NOTE — CP.PCM.PN
Subjective





- Subjective


Subjective: 





dictated   





Objective





- Vital Signs/Intake and Output


Vital Signs (last 24 hours): 


                                        











Temp Pulse Resp BP Pulse Ox


 


 98.3 F   63   20   123/71   98 


 


 10/23/18 15:00  10/23/18 15:00  10/23/18 15:00  10/23/18 15:00  10/23/18 15:00








Intake and Output: 


                                        











 10/23/18 10/24/18





 18:59 06:59


 


Output Total  550


 


Balance  -550














- Medications


Medications: 


                               Current Medications





Apixaban (Eliquis)  5 mg PO BID [facility]


   Last Admin: 10/23/18 17:48 Dose:  5 mg


Benzocaine/Menthol (Cepacol Sore Throat)  1 john PO Q2 PRN


   PRN Reason: Sore Throat


   Last Admin: 10/22/18 20:00 Dose:  1 john


Digoxin (Lanoxin)  0.25 mg PO DAILY@1800 [facility]


   Last Admin: 10/23/18 17:48 Dose:  0.25 mg


Diphenhydramine HCl (Benadryl)  25 mg IVP Q4 PRN


   PRN Reason: itchiness


   Last Admin: 10/23/18 22:14 Dose:  25 mg


Diphenoxylate HCl/Atropine (Lomotil 0.025-2.5 Mg Tablet)  1 tab PO Q8 PRN


   PRN Reason: Diarrhea


   Last Admin: 10/23/18 09:58 Dose:  1 tab


Gabapentin (Neurontin)  100 mg PO BID [facility]


   Last Admin: 10/23/18 17:48 Dose:  100 mg


Insulin Human Regular (Novolin R)  4 unit SC TIDAC [facility]


   Last Admin: 10/23/18 17:30 Dose:  4 units


Insulin Human Regular (Novolin R)  0 unit SC ACHS [facility]; Protocol


   Last Admin: 10/23/18 22:10 Dose:  Not Given


Lactobacillus Acidophilus (Bacid Acidophilus)  1 cap PO BID [facility]


   Last Admin: 10/23/18 18:00 Dose:  1 cap


Lidocaine (Lidoderm)  1 ea TD DAILY [facility]


   Last Admin: 10/23/18 10:02 Dose:  1 ea


Methylprednisolone (Solu-Medrol)  20 mg IV Q12 [facility]


   Last Admin: 10/23/18 21:55 Dose:  20 mg


Metoclopramide HCl (Reglan)  5 mg IVP Q6 [facility]


   Last Admin: 10/23/18 17:48 Dose:  5 mg


Metoprolol Succinate (Toprol Xl)  50 mg PO DAILY [facility]


   Last Admin: 10/23/18 10:16 Dose:  Not Given


Morphine Sulfate (Morphine)  1 mg IV Q4 PRN


   PRN Reason: Pain, moderate (4-7)


   Last Admin: 10/23/18 22:18 Dose:  1 mg


Oxybutynin Chloride (Ditropan Xl)  10 mg PO DAILY [facility]


   Last Admin: 10/23/18 10:03 Dose:  10 mg


Sucralfate (Carafate Tab)  1 gm PO ACBD [facility]


   Last Admin: 10/23/18 16:19 Dose:  1 gm


Zolpidem Tartrate (Ambien)  5 mg PO HS PRN


   PRN Reason: Insomnia


   Last Admin: 10/23/18 22:25 Dose:  5 mg











- Labs


Labs: 


                                        





                                 10/23/18 08:07 





                                 10/23/18 08:07 





                                        











PT  12.1 SECONDS (9.7-12.2)   10/20/18  07:09    


 


INR  1.1   10/20/18  07:09    


 


APTT  29 SECONDS (21-34)  D 10/20/18  07:09

## 2018-10-23 NOTE — CP.PCM.PN
Subjective





- Date & Time of Evaluation


Date of Evaluation: 10/23/18


Time of Evaluation: 11:00





- Subjective


Subjective: 





alert, oriented, denies acute pain.





Objective





- Vital Signs/Intake and Output


Vital Signs (last 24 hours): 


                                        











Temp Pulse Resp BP Pulse Ox


 


 98.3 F   63   20   123/71   98 


 


 10/23/18 15:00  10/23/18 15:00  10/23/18 15:00  10/23/18 15:00  10/23/18 15:00








Intake and Output: 


                                        











 10/23/18 10/23/18





 06:59 18:59


 


Intake Total 540 


 


Output Total 1000 


 


Balance -460 














- Medications


Medications: 


                               Current Medications





Apixaban (Eliquis)  5 mg PO BID [facility]


   Last Admin: 10/23/18 10:01 Dose:  5 mg


Benzocaine/Menthol (Cepacol Sore Throat)  1 john PO Q2 PRN


   PRN Reason: Sore Throat


   Last Admin: 10/22/18 20:00 Dose:  1 john


Digoxin (Lanoxin)  0.25 mg PO DAILY@1800 [facility]


   Last Admin: 10/22/18 18:04 Dose:  Not Given


Diphenhydramine HCl (Benadryl)  25 mg IVP Q4 PRN


   PRN Reason: itchiness


   Last Admin: 10/23/18 10:00 Dose:  25 mg


Diphenoxylate HCl/Atropine (Lomotil 0.025-2.5 Mg Tablet)  1 tab PO Q8 PRN


   PRN Reason: Diarrhea


   Last Admin: 10/23/18 09:58 Dose:  1 tab


Gabapentin (Neurontin)  100 mg PO BID [facility]


   Last Admin: 10/23/18 10:01 Dose:  100 mg


Insulin Human Regular (Novolin R)  4 unit SC TIDAC [facility]


   Last Admin: 10/23/18 11:56 Dose:  4 units


Insulin Human Regular (Novolin R)  0 unit SC ACHS [facility]; Protocol


   Last Admin: 10/23/18 11:57 Dose:  3 units


Lactobacillus Acidophilus (Bacid Acidophilus)  1 cap PO BID [facility]


   Last Admin: 10/23/18 10:03 Dose:  1 cap


Lidocaine (Lidoderm)  1 ea TD DAILY [facility]


   Last Admin: 10/23/18 10:02 Dose:  1 ea


Methylprednisolone (Solu-Medrol)  20 mg IV Q12 [facility]


Metoclopramide HCl (Reglan)  5 mg IVP Q6 [facility]


   Last Admin: 10/23/18 12:14 Dose:  5 mg


Metoprolol Succinate (Toprol Xl)  50 mg PO DAILY [facility]


   Last Admin: 10/23/18 10:16 Dose:  Not Given


Morphine Sulfate (Morphine)  1 mg IV Q4 PRN


   PRN Reason: Pain, moderate (4-7)


   Last Admin: 10/23/18 14:28 Dose:  1 mg


Oxybutynin Chloride (Ditropan Xl)  10 mg PO DAILY [facility]


   Last Admin: 10/23/18 10:03 Dose:  10 mg


Sucralfate (Carafate Tab)  1 gm PO ACBD [facility]


   Last Admin: 10/23/18 16:19 Dose:  1 gm


Zolpidem Tartrate (Ambien)  5 mg PO HS PRN


   PRN Reason: Insomnia


   Last Admin: 10/22/18 23:07 Dose:  5 mg











- Labs


Labs: 


                                        





                                 10/23/18 08:07 





                                 10/23/18 08:07 





                                        











PT  12.1 SECONDS (9.7-12.2)   10/20/18  07:09    


 


INR  1.1   10/20/18  07:09    


 


APTT  29 SECONDS (21-34)  D 10/20/18  07:09    














Assessment and Plan





- Assessment and Plan (Free Text)


Assessment: 





53 year old female with bilateral AKA, s/p stent replacement CBD, seen and 

examined. Alert and oriented x3, had complaints of some chest pain this am. YOSI

and EKG done, normal. Patient is re-assured. Discussed with DR Dunbar, plan to 

discharge home in am. Transportation will be arranged in am.

## 2018-10-23 NOTE — PN
DATE:  10/22/2018



SUBJECTIVE:  Ms. Laura Jaramillo underwent OR with Dr. Monk today. 

Postoperatively, she is afebrile.  Yesterday, she had a fall.  X-rays were

done, and the patient did not have any fracture.  The patient is afebrile. 

She is having loose bowel movement.  No fever.



PHYSICAL EXAMINATION:

VITAL SIGNS:  Blood pressure 152/71, pulse 56, respiratory rate 20,

temperature 98.

LUNGS:  Clear.

CARDIOVASCULAR SYSTEM:  S1 and S2, regular.

ABDOMEN:  Enlarged liver.



ASSESSMENT:

1.  Hepatic sarcoidosis.

2.  Port-A-Cath dysfunction, status post replacement.

3.  Type 2 diabetes.

4.  Hypertension.



PLAN:  We will do stool workup if patient continues to have diarrhea, and

we will monitor the patient.





__________________________________________

Nasir Dunbar MD



DD:  10/23/2018 0:01:29

DT:  10/23/2018 2:56:01

Job # [id number]

## 2018-10-23 NOTE — CP.PCM.PN
Subjective





- Date & Time of Evaluation


Date of Evaluation: 10/23/18


Time of Evaluation: 17:20





- Subjective


Subjective: 





Patient with no cardiac events





Physical examination





- Constitutional


Appears: Well, Toxic, No Acute Distress





- Head Exam


Head Exam: ATRAUMATIC, NORMAL INSPECTION, NORMOCEPHALIC





- Respiratory Exam


Respiratory Exam: NORMAL BREATHING PATTERN.  absent: Accessory Muscle Use





- Cardiovascular Exam


Cardiovascular Exam: REGULAR RHYTHM





- GI/Abdominal Exam


GI & Abdominal Exam: Normal Bowel Sounds





- Psychiatric Exam


Psychiatric exam: Normal Affect, Normal Mood





- Skin


Skin Exam: Normal Color, Warm








Objective





- Vital Signs/Intake and Output


Vital Signs (last 24 hours): 


                                        











Temp Pulse Resp BP Pulse Ox


 


 98.3 F   63   20   123/71   98 


 


 10/23/18 15:00  10/23/18 15:00  10/23/18 15:00  10/23/18 15:00  10/23/18 15:00








Intake and Output: 


                                        











 10/23/18 10/24/18





 18:59 06:59


 


Output Total  550


 


Balance  -550














- Medications


Medications: 


                               Current Medications





Apixaban (Eliquis)  5 mg PO BID [facility]


   Last Admin: 10/23/18 17:48 Dose:  5 mg


Benzocaine/Menthol (Cepacol Sore Throat)  1 john PO Q2 PRN


   PRN Reason: Sore Throat


   Last Admin: 10/22/18 20:00 Dose:  1 john


Digoxin (Lanoxin)  0.25 mg PO DAILY@1800 [facility]


   Last Admin: 10/23/18 17:48 Dose:  0.25 mg


Diphenhydramine HCl (Benadryl)  25 mg IVP Q4 PRN


   PRN Reason: itchiness


   Last Admin: 10/23/18 22:14 Dose:  25 mg


Diphenoxylate HCl/Atropine (Lomotil 0.025-2.5 Mg Tablet)  1 tab PO Q8 PRN


   PRN Reason: Diarrhea


   Last Admin: 10/23/18 09:58 Dose:  1 tab


Gabapentin (Neurontin)  100 mg PO BID [facility]


   Last Admin: 10/23/18 17:48 Dose:  100 mg


Insulin Human Regular (Novolin R)  4 unit SC TIDAC [facility]


   Last Admin: 10/23/18 17:30 Dose:  4 units


Insulin Human Regular (Novolin R)  0 unit SC ACHS [facility]; Protocol


   Last Admin: 10/23/18 22:10 Dose:  Not Given


Lactobacillus Acidophilus (Bacid Acidophilus)  1 cap PO BID [facility]


   Last Admin: 10/23/18 10:03 Dose:  1 cap


Lidocaine (Lidoderm)  1 ea TD DAILY [facility]


   Last Admin: 10/23/18 10:02 Dose:  1 ea


Methylprednisolone (Solu-Medrol)  20 mg IV Q12 [facility]


   Last Admin: 10/23/18 21:55 Dose:  20 mg


Metoclopramide HCl (Reglan)  5 mg IVP Q6 [facility]


   Last Admin: 10/23/18 17:48 Dose:  5 mg


Metoprolol Succinate (Toprol Xl)  50 mg PO DAILY [facility]


   Last Admin: 10/23/18 10:16 Dose:  Not Given


Morphine Sulfate (Morphine)  1 mg IV Q4 PRN


   PRN Reason: Pain, moderate (4-7)


   Last Admin: 10/23/18 22:18 Dose:  1 mg


Oxybutynin Chloride (Ditropan Xl)  10 mg PO DAILY [facility]


   Last Admin: 10/23/18 10:03 Dose:  10 mg


Sucralfate (Carafate Tab)  1 gm PO ACBD [facility]


   Last Admin: 10/23/18 16:19 Dose:  1 gm


Zolpidem Tartrate (Ambien)  5 mg PO HS PRN


   PRN Reason: Insomnia


   Last Admin: 10/23/18 22:25 Dose:  5 mg











- Labs


Labs: 


                                        





                                 10/23/18 08:07 





                                 10/23/18 08:07 





                                        











PT  12.1 SECONDS (9.7-12.2)   10/20/18  07:09    


 


INR  1.1   10/20/18  07:09    


 


APTT  29 SECONDS (21-34)  D 10/20/18  07:09    














Assessment and Plan





- Assessment and Plan (Free Text)


Assessment: 





CAD


PAD s/p AKA


DM 2








Continue all meds

## 2018-10-23 NOTE — CP.PCM.PN
Subjective





- Date & Time of Evaluation


Date of Evaluation: 10/23/18


Time of Evaluation: 06:30





- Subjective


Subjective: 


Vascular Surgery Progress note for Dr. Monk 





53F was seen and examined at bedside this morning. Patient is s/p new right 

jugular vein port-A-Cath placement due to malfunctioning port-A-Cath. Patient is

able to use the new port. Patient is not in any acute distress. Patient denies 

any fever, chills, or other associated symptoms. 








Objective





- Vital Signs/Intake and Output


Vital Signs (last 24 hours): 


                                        











Temp Pulse Resp BP Pulse Ox


 


 98.3 F   61   20   143/80   100 


 


 10/23/18 07:52  10/23/18 07:52  10/23/18 07:52  10/23/18 07:52  10/23/18 07:52








Intake and Output: 


                                        











 10/23/18 10/23/18





 06:59 18:59


 


Intake Total 540 


 


Output Total 1000 


 


Balance -460 














- Medications


Medications: 


                               Current Medications





Apixaban (Eliquis)  5 mg PO BID [facility]


   Last Admin: 10/22/18 18:48 Dose:  5 mg


Benzocaine/Menthol (Cepacol Sore Throat)  1 john PO Q2 PRN


   PRN Reason: Sore Throat


   Last Admin: 10/22/18 20:00 Dose:  1 john


Digoxin (Lanoxin)  0.25 mg PO DAILY@1800 [facility]


   Last Admin: 10/22/18 18:04 Dose:  Not Given


Diphenhydramine HCl (Benadryl)  25 mg IVP Q4 PRN


   PRN Reason: itchiness


   Last Admin: 10/23/18 05:54 Dose:  25 mg


Diphenoxylate HCl/Atropine (Lomotil 0.025-2.5 Mg Tablet)  1 tab PO Q8 PRN


   PRN Reason: Diarrhea


   Last Admin: 10/22/18 17:59 Dose:  1 tab


Gabapentin (Neurontin)  100 mg PO BID [facility]


   Last Admin: 10/22/18 17:59 Dose:  100 mg


Insulin Human Regular (Novolin R)  4 unit SC TIDAC [facility]


   Last Admin: 10/23/18 07:49 Dose:  4 units


Insulin Human Regular (Novolin R)  0 unit SC ACHS [facility]; Protocol


   Last Admin: 10/23/18 07:49 Dose:  6 units


Lactobacillus Acidophilus (Bacid Acidophilus)  1 cap PO BID [facility]


   Last Admin: 10/22/18 17:58 Dose:  1 cap


Lidocaine (Lidoderm)  1 ea TD DAILY [facility]


   Last Admin: 10/22/18 10:14 Dose:  1 ea


Methylprednisolone (Solu-Medrol)  30 mg IV Q12 [facility]


   Last Admin: 10/22/18 23:10 Dose:  30 mg


Metoclopramide HCl (Reglan)  5 mg IVP Q6 [facility]


   Last Admin: 10/23/18 05:54 Dose:  5 mg


Metoprolol Succinate (Toprol Xl)  50 mg PO DAILY [facility]


   Last Admin: 10/22/18 10:11 Dose:  50 mg


Morphine Sulfate (Morphine)  1 mg IV Q4 PRN


   PRN Reason: Pain, moderate (4-7)


   Last Admin: 10/23/18 05:45 Dose:  1 mg


Oxybutynin Chloride (Ditropan Xl)  10 mg PO DAILY [facility]


   Last Admin: 10/22/18 10:11 Dose:  10 mg


Sucralfate (Carafate Tab)  1 gm PO ACBD [facility]


   Last Admin: 10/23/18 07:51 Dose:  Not Given


Zolpidem Tartrate (Ambien)  5 mg PO HS PRN


   PRN Reason: Insomnia


   Last Admin: 10/22/18 23:07 Dose:  5 mg











- Labs


Labs: 


                                        





                                 10/23/18 08:07 





                                 10/23/18 08:07 





                                        











PT  12.1 SECONDS (9.7-12.2)   10/20/18  07:09    


 


INR  1.1   10/20/18  07:09    


 


APTT  29 SECONDS (21-34)  D 10/20/18  07:09    














- Constitutional


Appears: Well, Toxic, No Acute Distress





- Head Exam


Head Exam: ATRAUMATIC, NORMAL INSPECTION, NORMOCEPHALIC





- Respiratory Exam


Respiratory Exam: NORMAL BREATHING PATTERN.  absent: Accessory Muscle Use





- Cardiovascular Exam


Cardiovascular Exam: REGULAR RHYTHM





- GI/Abdominal Exam


GI & Abdominal Exam: Normal Bowel Sounds





- Psychiatric Exam


Psychiatric exam: Normal Affect, Normal Mood





- Skin


Skin Exam: Normal Color, Warm





Assessment and Plan





- Assessment and Plan (Free Text)


Assessment: 


53F s/p new right jugular vein  port-A-Cath placement 





Plan: 


s/p Right JV Port-A-Cath 


No further surgical management needed at this time, please re-consult us if 

patient has other symptoms.

## 2018-10-24 VITALS
OXYGEN SATURATION: 100 % | TEMPERATURE: 97.5 F | SYSTOLIC BLOOD PRESSURE: 152 MMHG | DIASTOLIC BLOOD PRESSURE: 77 MMHG | HEART RATE: 66 BPM

## 2018-10-24 RX ADMIN — OXYBUTYNIN CHLORIDE SCH MG: 10 TABLET, EXTENDED RELEASE ORAL at 09:55

## 2018-10-24 RX ADMIN — METHYLPREDNISOLONE SODIUM SUCCINATE SCH MG: 40 INJECTION, POWDER, FOR SOLUTION INTRAMUSCULAR; INTRAVENOUS at 09:53

## 2018-10-24 RX ADMIN — METOPROLOL SUCCINATE SCH MG: 50 TABLET, EXTENDED RELEASE ORAL at 09:54

## 2018-10-24 RX ADMIN — HUMAN INSULIN SCH UNITS: 100 INJECTION, SOLUTION SUBCUTANEOUS at 08:18

## 2018-10-24 RX ADMIN — HUMAN INSULIN SCH: 100 INJECTION, SOLUTION SUBCUTANEOUS at 12:22

## 2018-10-24 RX ADMIN — HUMAN INSULIN SCH: 100 INJECTION, SOLUTION SUBCUTANEOUS at 11:56

## 2018-10-24 RX ADMIN — DIPHENOXYLATE HYDROCHLORIDE AND ATROPINE SULFATE PRN TAB: 2.5; .025 TABLET ORAL at 08:58

## 2018-10-24 RX ADMIN — DIPHENHYDRAMINE HYDROCHLORIDE PRN MG: 50 INJECTION INTRAMUSCULAR; INTRAVENOUS at 14:47

## 2018-10-24 RX ADMIN — DIPHENHYDRAMINE HYDROCHLORIDE PRN MG: 50 INJECTION INTRAMUSCULAR; INTRAVENOUS at 10:44

## 2018-10-24 RX ADMIN — Medication SCH CAP: at 09:54

## 2018-10-24 NOTE — CP.PCM.PN
Subjective





- Date & Time of Evaluation


Date of Evaluation: 10/23/18


Time of Evaluation: 09:00





- Subjective


Subjective: 


DM 





Objective





- Vital Signs/Intake and Output


Vital Signs (last 24 hours): 


                                        











Temp Pulse Resp BP Pulse Ox


 


 98.8 F   62   20   138/76   99 


 


 10/24/18 00:00  10/24/18 00:00  10/24/18 00:00  10/24/18 00:00  10/24/18 00:00








Intake and Output: 


                                        











 10/23/18 10/24/18





 18:59 06:59


 


Output Total  550


 


Balance  -550














- Medications


Medications: 


                               Current Medications





Apixaban (Eliquis)  5 mg PO BID [facility]


   Last Admin: 10/23/18 17:48 Dose:  5 mg


Benzocaine/Menthol (Cepacol Sore Throat)  1 john PO Q2 PRN


   PRN Reason: Sore Throat


   Last Admin: 10/22/18 20:00 Dose:  1 john


Digoxin (Lanoxin)  0.25 mg PO DAILY@1800 [facility]


   Last Admin: 10/23/18 17:48 Dose:  0.25 mg


Diphenhydramine HCl (Benadryl)  25 mg IVP Q4 PRN


   PRN Reason: itchiness


   Last Admin: 10/23/18 22:14 Dose:  25 mg


Diphenoxylate HCl/Atropine (Lomotil 0.025-2.5 Mg Tablet)  1 tab PO Q8 PRN


   PRN Reason: Diarrhea


   Last Admin: 10/23/18 09:58 Dose:  1 tab


Gabapentin (Neurontin)  100 mg PO BID [facility]


   Last Admin: 10/23/18 17:48 Dose:  100 mg


Insulin Human Regular (Novolin R)  4 unit SC TIDAC [facility]


   Last Admin: 10/23/18 17:30 Dose:  4 units


Insulin Human Regular (Novolin R)  0 unit SC ACHS [facility]; Protocol


   Last Admin: 10/23/18 22:10 Dose:  Not Given


Lactobacillus Acidophilus (Bacid Acidophilus)  1 cap PO BID [facility]


   Last Admin: 10/23/18 18:00 Dose:  1 cap


Lidocaine (Lidoderm)  1 ea TD DAILY [facility]


   Last Admin: 10/23/18 10:02 Dose:  1 ea


Methylprednisolone (Solu-Medrol)  20 mg IV Q12 [facility]


   Last Admin: 10/23/18 21:55 Dose:  20 mg


Metoclopramide HCl (Reglan)  5 mg IVP Q6 [facility]


   Last Admin: 10/23/18 17:48 Dose:  5 mg


Metoprolol Succinate (Toprol Xl)  50 mg PO DAILY [facility]


   Last Admin: 10/23/18 10:16 Dose:  Not Given


Morphine Sulfate (Morphine)  1 mg IV Q4 PRN


   PRN Reason: Pain, moderate (4-7)


   Last Admin: 10/23/18 22:18 Dose:  1 mg


Oxybutynin Chloride (Ditropan Xl)  10 mg PO DAILY [facility]


   Last Admin: 10/23/18 10:03 Dose:  10 mg


Sucralfate (Carafate Tab)  1 gm PO ACBD [facility]


   Last Admin: 10/23/18 16:19 Dose:  1 gm


Zolpidem Tartrate (Ambien)  5 mg PO HS PRN


   PRN Reason: Insomnia


   Last Admin: 10/23/18 22:25 Dose:  5 mg











- Labs


Labs: 


                                        





                                 10/23/18 08:07 





                                 10/23/18 08:07 





                                        











PT  12.1 SECONDS (9.7-12.2)   10/20/18  07:09    


 


INR  1.1   10/20/18  07:09    


 


APTT  29 SECONDS (21-34)  D 10/20/18  07:09    














Assessment and Plan


(1) Hypoglycemia associated with diabetes


Assessment & Plan: 


Endocrine consult  


reason for consult: uncontrolled diabetes 


Source: patient  and chart review 


 Ms. Jaramillo  is  54 y/o   admitted for abdominal pain  


as per pt. has DM diagnosed    as per steroids induced since on steroids for

scarcoidosis  almost continuos ( once gwen it  relapse & restart another  

taper dose)   (-)  neuropathy , (-) retinopathy , (-) nephropathy  (+) CAD s/p 

defibrillator & pacemaker  (+) PVD s/p 


b/L AKA 








blood glucose log  : 200-400 , solumedrol dcreased today to 20 units q12h @ 10am

& 10 pm , no hypoglycemia 


                                   


Allergy noted  


Past medical history:HTN , hyperlipidemia , sarcodosis ,asthma 


Past surgical history: cholecystectomy, appendectomy ,bilateral cataract , 

bilateral AKA,s/p defibrillator & pacemaker , abdominal mass removal ( benign as

per pt.)


Psychiatry history: denies 


Social history:  ex-smoking   ,denies  ETOH use , illicit drug use  


Family history:  father  from heart disease/CHF , mother  from breast 

cancer 


ROS: Constitutional: denies fever, tiredness/weakness. HEENT: denies  earache,  

change in voice .Respiratory: denies cough,  sob . CVS :no chest pain, no 

palpitations . 


Abdomen: no abdominal pain, no nausea /vomiting, no change bowel movement. 


CNS : denies light-headedness, dizziness. Extremities: no edema, no tremors. 

Skin: no itching, no rash 


Physical exam 


 Well-developed AAO x3 ,  NAD ,


VSS 


HEENT: norm cephalic, atraumatic, no lid lag , no exophthalmos 


NECK: supple, no palpable lymphadenopathy 


THYROID: no palpable thyromegaly, not tender 


CHEST: fair air entry, bilateral, 


CVS: S1,S2 


ABDOMEN: bowel sound present, benign, obese, no wide purple striae , no bruises 


EXTREMITIES: bilateral AKA , no palpable hand tremors  


Skin:  acanthosis nigricans 


-lab: CMP wnl , 10/2018 a1c 4 , tsh 1.05 


 Assessment: 


hypoglycemia 


diabetes with PVD s/p bilateral AKA


steroids induced hyperglycemia 


abdominal pain /sacroidosis 


obesity 


plan :


continue  Novoloin R low dose coverage, no 3 am coverage 


increase   novoloin R 6 units tid with meals if eat > 60% 


start levemir 10 units @ 8am 


 we will follow with you. 





Blanka Patel # [phone]


office Fridays 10-6  & Saturdays 9-3


address:  [address] [place] [address] ,phone # [phone]





Status: Acute   





(2) Diabetes mellitus with peripheral circulatory disorder


Status: Acute   





(3) S/P AKA (above knee amputation) bilateral


Status: Acute   





(4) Steroid-induced hyperglycemia


Status: Acute   





(5) Abdominal pain


Status: Acute

## 2018-10-24 NOTE — CARD
--------------- APPROVED REPORT --------------





Date of service: 10/22/2018



EKG Measurement

Heart [id number]

NM 184P

[id number]

[id number]

[id number]



<Conclusion>

Sinus bradycardia

T wave abnormality, consider inferior ischemia

T wave abnormality, consider anterolateral ischemia

Abnormal ECG

## 2018-10-24 NOTE — CP.PCM.DIS
Provider





- Provider


Date of Admission: 


10/15/18 18:49





Attending physician: 


Nasir Dunbar MD








Hospital Course





- Lab Results


Lab Results: 


                                  Micro Results





10/19/18 07:01   Urine   Urine Culture - Final


                            10-50,000 CFU/ML.


                            MULTIPLE SPECIES. PROBABLE CONTAMINATION.


10/15/18 19:40   Urine   Urine Culture - Final


                            ,000 CFU/ML.


                            MULTIPLE SPECIES. SUGGEST REPEAT SPECIMEN.





                             Most Recent Lab Values











WBC  9.9 K/uL (4.8-10.8)   10/23/18  08:07    


 


RBC  3.36 Mil/uL (3.80-5.20)  L  10/23/18  08:07    


 


Hgb  10.7 g/dL (11.0-16.0)  L  10/23/18  08:07    


 


Hct  31.8 % (34.0-47.0)  L  10/23/18  08:07    


 


MCV  94.5 fL (81.0-99.0)  D 10/23/18  08:07    


 


MCH  31.7 pg (27.0-31.0)  H  10/23/18  08:07    


 


MCHC  33.6 g/dL (33.0-37.0)   10/23/18  08:07    


 


RDW  17.6 % (11.5-14.5)  H  10/23/18  08:07    


 


Plt Count  200 K/uL (130-400)   10/23/18  08:07    


 


MPV  10.2 fL (7.2-11.7)   10/23/18  08:07    


 


Neut % (Auto)  90.6 % (50.0-75.0)  H  10/23/18  08:07    


 


Lymph % (Auto)  4.8 % (20.0-40.0)  L  10/23/18  08:07    


 


Mono % (Auto)  4.5 % (0.0-10.0)   10/23/18  08:07    


 


Eos % (Auto)  0.0 % (0.0-4.0)   10/23/18  08:07    


 


Baso % (Auto)  0.1 % (0.0-2.0)   10/23/18  08:07    


 


Neut # (Auto)  9.0 K/uL (1.8-7.0)  H  10/23/18  08:07    


 


Lymph # (Auto)  0.5 K/uL (1.0-4.3)  L  10/23/18  08:07    


 


Mono # (Auto)  0.4 K/uL (0.0-0.8)   10/23/18  08:07    


 


Eos # (Auto)  0.0 K/uL (0.0-0.7)   10/23/18  08:07    


 


Baso # (Auto)  0.0 K/uL (0.0-0.2)   10/23/18  08:07    


 


Neutrophils % (Manual)  93 % (50-75)  H  10/23/18  08:07    


 


Band Neutrophils %  1 % (0-2)   10/23/18  08:07    


 


Lymphocytes % (Manual)  3 % (20-40)  L  10/23/18  08:07    


 


Monocytes % (Manual)  3 % (0-10)   10/23/18  08:07    


 


Platelet Estimate  Normal  (NORMAL)   10/23/18  08:07    


 


Poikilocytosis (manual  Slight   10/23/18  08:07    


 


Anisocytosis (manual)  Slight   10/23/18  08:07    


 


Target Cells  Moderate   10/23/18  08:07    


 


PT  12.1 SECONDS (9.7-12.2)   10/20/18  07:09    


 


INR  1.1   10/20/18  07:09    


 


APTT  29 SECONDS (21-34)  D 10/20/18  07:09    


 


Sodium  137 mmol/L (132-148)   10/23/18  08:07    


 


Potassium  4.9 mmol/L (3.6-5.2)   10/23/18  08:07    


 


Chloride  111 mmol/L ()  H  10/23/18  08:07    


 


Carbon Dioxide  13 mmol/L (22-30)  L  10/23/18  08:07    


 


Anion Gap  19  (10-20)   10/23/18  08:07    


 


BUN  28 mg/dL (7-17)  H  10/23/18  08:07    


 


Creatinine  0.7 mg/dL (0.7-1.2)   10/23/18  08:07    


 


Est GFR ( Amer)  > 60   10/23/18  08:07    


 


Est GFR (Non-Af Amer)  > 60   10/23/18  08:07    


 


POC Glucose (mg/dL)  101 mg/dL ()   10/24/18  11:24    


 


Random Glucose  425 mg/dL ()  H* D 10/23/18  08:07    


 


Calcium  7.7 mg/dl (8.6-10.4)  L  10/23/18  08:07    


 


Phosphorus  3.3 mg/dL (2.5-4.5)   10/23/18  08:07    


 


Magnesium  2.0 mg/dL (1.6-2.3)   10/23/18  08:07    


 


Total Bilirubin  12.5 mg/dL (0.2-1.3)  H  10/23/18  08:07    


 


Direct Bilirubin  16.2 mg/dL (0.0-0.4)  H  10/15/18  17:27    


 


AST  159 U/L (14-36)  H D 10/23/18  08:07    


 


ALT  190 U/L (9-52)  H  10/23/18  08:07    


 


Alkaline Phosphatase  362 U/L ()  H  10/23/18  08:07    


 


Total Creatine Kinase  20 U/L ()  L  10/23/18  12:28    


 


CK-MB (Mass)  0.61 ng/mL (0.0-3.38)   10/23/18  12:28    


 


Troponin I  < 0.0120 ng/mL (0.00-0.120)   10/23/18  12:28    


 


Total Protein  6.5 g/dL (6.3-8.3)   10/23/18  08:07    


 


Albumin  3.1 g/dL (3.5-5.0)  L  10/23/18  08:07    


 


Globulin  3.4 gm/dL (2.2-3.9)   10/23/18  08:07    


 


Albumin/Globulin Ratio  0.9  (1.0-2.1)  L  10/23/18  08:07    


 


Lipase  19 U/L ()  L  10/15/18  17:27    


 


Alpha Fetoprotein  9.0 ng/mL (0.0-7.5)  H  10/20/18  07:09    


 


Carcinoembryonic Ag  6.8 ng/mL (0-3.0)  H  10/20/18  07:09    


 


Urine Color  Elise  (YELLOW)   10/15/18  19:40    


 


Urine Clarity  Hazy  (Clear)   10/15/18  19:40    


 


Urine pH  7.0  (5.0-8.0)   10/15/18  19:40    


 


Ur Specific Gravity  1.013  (1.003-1.030)   10/15/18  19:40    


 


Urine Protein  1+ mg/dL (NEGATIVE)  H  10/15/18  19:40    


 


Urine Glucose (UA)  Normal mg/dL (Normal)   10/15/18  19:40    


 


Urine Ketones  Negative mg/dL (NEGATIVE)   10/15/18  19:40    


 


Urine Blood  1+  (NEGATIVE)  H  10/15/18  19:40    


 


Urine Nitrate  Positive  (NEGATIVE)  H  10/15/18  19:40    


 


Urine Bilirubin  2+  (NEGATIVE)  H  10/15/18  19:40    


 


Urine Urobilinogen  4.0 mg/dL (0.2-1.0)  H  10/15/18  19:40    


 


Ur Leukocyte Esterase  3+ Abiodun/uL (Negative)  H  10/15/18  19:40    


 


Urine WBC (Auto)  117 /hpf (0-5)  H  10/15/18  19:40    


 


Urine RBC (Auto)  12 /hpf (0-3)  H  10/15/18  19:40    


 


Urine WBC Clumps (Auto)  Few /hpf (NONE)  H  10/15/18  19:40    


 


Ur Squamous Epith Cells  1 /hpf (0-5)   10/15/18  19:40    


 


Urine Bacteria  Few  (<OCC)  H  10/15/18  19:40    


 


Ur Yeast w Hyphae  Rare /lpf (NEGATIVE)  H  10/15/18  19:40    


 


Urine Yeast (Budding)  Rare /hpf (NEGATIVE)  H  10/15/18  19:40    


 


Urine HCG, Qual  Negative  (NEGATIVE)   10/22/18  06:44    














Discharge Exam





- Head Exam


Head Exam: ATRAUMATIC, NORMAL INSPECTION, NORMOCEPHALIC





Discharge Plan





- Discharge Medications


Prescriptions: 


Nystatin [Nystop Topical Powder] 15 applic EXT BID #1 bottle


oxyCODONE/Acetaminophen [Percocet 5/325 mg Tab] 1 ea PO Q8H PRN #20 tab


 PRN Reason: Pain, Severe (8-10)





- Follow Up Plan


Condition: FAIR


Disposition: HOME/ ROUTINE


Instructions:  Nystatin (Topical), Low Blood Sugar, Adult (DC), Acute Abdomen 

(Belly Pain), Adult (DC), How to Care for Your Charles Catheter, Female, Charles 

Catheter, Female, Bilirubin Level, Oxycodone and Acetaminophen, Amputation, 

Above-the-Knee (DC)


Referrals: 


Nasir Dunbar MD [Staff Provider] -

## 2018-10-24 NOTE — PCM.HF
Heart Failure Core Measure





- Heart Failure


Ejection Fraction: 40 % or Greater


ACE Inhibitor Prescribed: No


Contraindication/Reason for not providing: ef>45


Beta-Blocker Prescribed: Metoprolol Succinate


Angiotensin II Receptor Blocker Prescribed: No


Contraindication/Reason for not providing: ef>45


AnticoagulationTherapy for Atrial Fibrillation/Atrialflutter: Yes


Aldosterone Antagonist Prescribed: No


Contraindication/Reason for not providing: ef>45


Hydralazine Nitrate Prescribed: No


Contraindication/Reason for not providing: ef>45


Implantable Cardioverter Defibrillator Therapy: No


Contraindication/Reason for not providing: pt has AICD 


Cardiac Resynchronization Therapy Prescribed: No


Contraindication/Reason for not providing: pt has AICD





- Follow up


Will be discharged to: Home


Follow Up Date (must be within 7 days from discharge): 10/29/18


Follow Up Time: 12:00

## 2018-10-24 NOTE — PN
DATE:  10/23/2018



SUBJECTIVE:  The patient is on Solu-Medrol.  The patient is afebrile.  She

is status post ERCP.  Feels better.  No shortness of breath.  No chest

pain.  No nausea or vomiting.  She is more alert.



PHYSICAL EXAMINATION:

VITAL SIGNS:  Blood pressure 123/71, pulse 63, respiratory rate 20, and

temperature 98.3.

LUNGS:  Clear.

CVS:  S1, S2 regular.  No heave.  No thrill.

ABDOMEN:  Soft, nontender.  Bowel sounds are present.  Enlarged liver.



ASSESSMENT:

1.  Hepatic sarcoidosis.

2.  Hypertension.

3.  Coronary artery disease.

4.  Anemia.

5.  Neurogenic bladder.



PLAN:  Medical management.  Taper steroids until DC.  Monitor the patient.





__________________________________________

Nasir Dunbar MD





DD:  10/23/2018 23:01:33

DT:  10/24/2018 2:21:59

Job # [id number]

## 2018-11-02 ENCOUNTER — HOSPITAL ENCOUNTER (INPATIENT)
Dept: HOSPITAL 31 - C.ER | Age: 53
LOS: 11 days | Discharge: HOME | DRG: 197 | End: 2018-11-13
Attending: INTERNAL MEDICINE | Admitting: INTERNAL MEDICINE
Payer: MEDICARE

## 2018-11-02 VITALS — BODY MASS INDEX: 18.7 KG/M2

## 2018-11-02 DIAGNOSIS — E11.65: ICD-10-CM

## 2018-11-02 DIAGNOSIS — D64.9: ICD-10-CM
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
## 2018-11-02 DIAGNOSIS — E78.00: ICD-10-CM

## 2018-11-02 DIAGNOSIS — J45.909: ICD-10-CM

## 2018-11-02 DIAGNOSIS — T83.511A: ICD-10-CM

## 2018-11-02 DIAGNOSIS — I11.0: ICD-10-CM

## 2018-11-02 DIAGNOSIS — N39.0: ICD-10-CM

## 2018-11-02 DIAGNOSIS — I25.10: ICD-10-CM

## 2018-11-02 DIAGNOSIS — E80.6: ICD-10-CM

## 2018-11-02 DIAGNOSIS — I50.9: ICD-10-CM

## 2018-11-02 DIAGNOSIS — B96.1: ICD-10-CM

## 2018-11-02 DIAGNOSIS — N31.9: ICD-10-CM

## 2018-11-02 DIAGNOSIS — Z95.5: ICD-10-CM

## 2018-11-02 DIAGNOSIS — D86.89: Primary | ICD-10-CM

## 2018-11-02 LAB
ALBUMIN SERPL-MCNC: 3.3 G/DL (ref 3.5–5)
ALBUMIN/GLOB SERPL: 0.9 {RATIO} (ref 1–2.1)
ALT SERPL-CCNC: 45 U/L (ref 9–52)
APTT BLD: 43 SECONDS (ref 21–34)
AST SERPL-CCNC: 135 U/L (ref 14–36)
BACTERIA #/AREA URNS HPF: (no result) /[HPF]
BASOPHILS # BLD AUTO: 0 K/UL (ref 0–0.2)
BASOPHILS NFR BLD: 0 % (ref 0–2)
BILIRUB UR-MCNC: (no result) MG/DL
BNP SERPL-MCNC: 170 PG/ML (ref 0–900)
BUN SERPL-MCNC: 15 MG/DL (ref 7–17)
CALCIUM SERPL-MCNC: 8.5 MG/DL (ref 8.6–10.4)
EOSINOPHIL # BLD AUTO: 0.1 K/UL (ref 0–0.7)
EOSINOPHIL NFR BLD: 1 % (ref 0–4)
ERYTHROCYTE [DISTWIDTH] IN BLOOD BY AUTOMATED COUNT: 23.1 % (ref 11.5–14.5)
GFR NON-AFRICAN AMERICAN: > 60
GLUCOSE UR STRIP-MCNC: NORMAL MG/DL
HGB BLD-MCNC: 9.7 G/DL (ref 11–16)
INR PPP: 1.2
LEUKOCYTE ESTERASE UR-ACNC: (no result) LEU/UL
LYMPHOCYTES # BLD AUTO: 1 K/UL (ref 1–4.3)
LYMPHOCYTES NFR BLD AUTO: 13 % (ref 20–40)
MCH RBC QN AUTO: 33.2 PG (ref 27–31)
MCHC RBC AUTO-ENTMCNC: 34.4 G/DL (ref 33–37)
MCV RBC AUTO: 96.4 FL (ref 81–99)
MONOCYTES # BLD: 0.2 K/UL (ref 0–0.8)
MONOCYTES NFR BLD: 2 % (ref 0–10)
NEUTROPHILS # BLD: 6.2 K/UL (ref 1.8–7)
NEUTROPHILS NFR BLD AUTO: 84 % (ref 50–75)
NRBC BLD AUTO-RTO: 0 % (ref 0–2)
PH UR STRIP: 6 [PH] (ref 5–8)
PLATELET # BLD: 125 K/UL (ref 130–400)
PMV BLD AUTO: 9.7 FL (ref 7.2–11.7)
PROT UR STRIP-MCNC: (no result) MG/DL
PROTHROMBIN TIME: 12.7 SECONDS (ref 9.7–12.2)
RBC # BLD AUTO: 2.93 MIL/UL (ref 3.8–5.2)
RBC # UR STRIP: (no result) /UL
SP GR UR STRIP: 1.01 (ref 1–1.03)
SQUAMOUS EPITHIAL: 3 /HPF (ref 0–5)
UROBILINOGEN UR-MCNC: 4 MG/DL (ref 0.2–1)
WBC # BLD AUTO: 7.6 K/UL (ref 4.8–10.8)
WBC CLUMPS # UR AUTO: (no result) /HPF

## 2018-11-02 RX ADMIN — INSULIN ASPART SCH: 100 INJECTION, SOLUTION INTRAVENOUS; SUBCUTANEOUS at 22:03

## 2018-11-02 RX ADMIN — DIPHENHYDRAMINE HYDROCHLORIDE PRN MG: 50 INJECTION INTRAMUSCULAR; INTRAVENOUS at 22:03

## 2018-11-02 RX ADMIN — WATER SCH MLS/HR: 1 INJECTION INTRAMUSCULAR; INTRAVENOUS; SUBCUTANEOUS at 22:13

## 2018-11-02 NOTE — C.PDOC
History Of Present Illness


54 y/o female w,/PMhx of diabetes, HTN, and CHF, presents to the ER complaining 

of RUQ abdominal pain and vomiting which has been present for the past 3 days. 

Patient states that the pain radiates to the back. Patient denies having fever, 

chills, diarrhea, constipation, dysuria, and hematuria.





PMD: 


GI: 


Time Seen by Provider: 18 16:39


Chief Complaint (Nursing): Abdominal Pain


History Per: Patient


History/Exam Limitations: no limitations


Onset/Duration Of Symptoms: Days


Current Symptoms Are (Timing): Still Present


Severity: Moderate





Past Medical History


Reviewed: Historical Data, Nursing Documentation, Vital Signs


Vital Signs: 





                                Last Vital Signs











Temp  98.9 F   18 16:17


 


Pulse  50 L  18 16:17


 


Resp  16   18 16:17


 


BP  154/84 H  18 16:17


 


Pulse Ox  100   18 16:17














- Medical History


PMH: Anemia, Asthma (NO INHALER-ON PREDNISONE DAILY PO.), CAD (stent x 1), 

Cardia Arrhythmia, CHF, Diabetes, Deep Vein Thrombosis, Gall Bladder Disease, 

HTN, Hypercholesterolemia


   Denies: Chronic Kidney Disease


Surgical History: Appendectomy, Cholecystectomy, Coronary Stent, Endoscopy, C-

Section





- [facility] Procedures











 (18)


CENTRAL VENOUS CATHETER PLACEMENT WITH GUIDANCE (14)


CLOSED ENDOSCOPIC BIOPSY OF LARGE INTESTINE (14)


DILATION OF COMMON BILE DUCT WITH INTRALUMINAL DEVICE, ENDO (10/15/18)


ENDOSC RETROGRADE CHOLANGIOPANCREATOGRAPHY [ERCP] (10/30/13)


ENDOSCOP INSERTION OF STENT (TUBE) INTO BILE DUCT (14)


ESOPHAGOGASTRODUODENOSCOPY [EGD] W/CLOSED BIOPSY (14)


EXCISION OF STOMACH, ENDO, DIAGN (10/15/18)


FLUOROSCOPY OF SUPERIOR VENA CAVA, GUIDANCE (10/15/18)


INSERTION OF INFUSION DEV INTO SUP VENA CAVA, PERC APPROACH (10/15/18)


INSERTION OF VAD INTO CHEST SUBCU/FASCIA, PERC APPROACH (10/15/18)


INSPECTION OF LOWER INTESTINAL TRACT, ENDO (18)


IRRIGATION OF LOWER GI USING IRRIGATING SUBSTANCE, ENDO (18)


OTHER LOCAL DESTRUC SKIN (10/30/13)


OTHER SKIN & SUBQ I   D (10/30/13)


REMOVAL OF INFUSION DEVICE FROM GREAT VESSEL, PERC APPROACH (10/15/18)


REMOVAL OF INFUSION DEVICE FROM UPPER VEIN, EXTERN APPROACH (10/15/18)


REMOVAL OF INTRALUMINAL DEVICE FROM HEPATOBILIARY DUCT, ENDO (10/15/18)


REMOVAL OF VAD FROM TRUNK SUBCU/FASCIA, PERC APPROACH (10/15/18)


ULTRASONOGRAPHY OF RIGHT JUGULAR VEINS, GUIDANCE (10/15/18)








Family History: States: No Known Family Hx





- Social History


Hx Tobacco Use: No


Hx Alcohol Use: Yes (years ago)


Hx Substance Use: No





- Immunization History


Hx Tetanus Toxoid Vaccination: Yes


Hx Influenza Vaccination: No


Hx Pneumococcal Vaccination: Yes ()





Review Of Systems


Except As Marked, All Systems Reviewed And Found Negative.


Constitutional: Negative for: Fever, Chills


Gastrointestinal: Positive for: Vomiting, Abdominal Pain (RUQ abdominal pain).  

Negative for: Nausea, Diarrhea


Genitourinary: Negative for: Dysuria, Hematuria





Physical Exam





- Physical Exam


Appears: Non-toxic, No Acute Distress


Skin: Normal Color, Warm, Dry


Head: Atraumatic, Normacephalic


Eye(s): bilateral: Normal Inspection


Nose: Normal


Oral Mucosa: Moist


Neck: Supple


Chest: Symmetrical, Other (port-a-cath to right anterior chest wall)


Cardiovascular: Rhythm Regular


Respiratory: Normal Breath Sounds, No Rales, No Rhonchi, No Wheezing


Gastrointestinal/Abdominal: Soft, Tenderness (RUQ tenderness), No Guarding, No 

Rebound


Extremity: Normal ROM, Other (bilateral AKA, indwelling john catheter)


Neurological/Psych: Oriented x3, Normal Speech





ED Course And Treatment





- Laboratory Results


Result Diagrams: 


                                 18 17:46





                                 18 17:46


ECG: Interpreted By Me, Viewed By Me


ECG Rhythm: Sinus Rhythm


Interpretation Of ECG: NSR with atrial ectopic beats, left axis deviation, and 

non-specific T wave changes


Rate From EC


O2 Sat by Pulse Oximetry: 100 (RA)


Pulse Ox Interpretation: Normal





- Other Rad


  ** CXR


X-Ray: Viewed By Me, Read By Radiologist


Interpretation: Date of service:  2018.  PROCEDURE:  CHEST RADIOGRAPH, 1 

VIEW.  HISTORY:  SOB.  COMPARISON:  Chest radiograph dated 10/22/2018.  

FINDINGS:  LUNGS:  Clear.  PLEURA:  No pneumothorax or pleural fluid seen.  

CARDIOVASCULAR:  Left subclavian access AICD/pacemaker redemonstrated.  

Atherosclerotic aortic calcifications.  Cardiomediastinal silhouette stably 

enlarged.  OSSEOUS STRUCTURES:  Unchanged.  VISUALIZED UPPER ABDOMEN:  Right 

upper quadrant surgical clips redemonstrated.  OTHER FINDINGS:  Right internal 

jugular access chest port, unchanged.  IMPRESSION:  No active disease.





Medical Decision Making


Medical Decision Making: 


Assessment:


Abdominal Pain


Plan:


--Labs


--ECG


--CXR


--UA


--CT-Abd & Pelv.


--Morphine IV


--Benadryl IV


--Pepcid IV


--Zofran IV





abd pain - case s/o to Dr. Slater at 1900 pending ct of abd/pelvis, 

reevaluation and disposition 





Disposition





- Disposition


Disposition Time: 19:00


Condition: FAIR


Forms:  [facility] (English)





- Clinical Impression


Clinical Impression: 


 Abdominal pain








- Scribe Statement


The provider has reviewed the documentation as recorded by the Scribe


Angel Wahl


Provider Attestation: 





All medical record entries made by the Scribe were at my direction and 

personally dictated by me. I have reviewed the chart and agree that the record 

accurately reflects my personal performance of the history, physical exam, 

medical decision making, and the department course for this patient. I have also

 personally directed, reviewed, and agree with the discharge instructions and 

disposition.





Physician Patient Turnover


Patient Signed Over To: Suraj Slater


Handoff Comments: pending ct abd/pelvis, reevaluation and disposition

## 2018-11-02 NOTE — RAD
Date of service: 



11/02/2018



PROCEDURE:  CHEST RADIOGRAPH, 1 VIEW



HISTORY:

SOB



COMPARISON:

Chest radiograph dated 10/22/2018.



FINDINGS:



LUNGS:

Clear.



PLEURA:

No pneumothorax or pleural fluid seen.



CARDIOVASCULAR:

Left subclavian access AICD/pacemaker redemonstrated.  

Atherosclerotic aortic calcifications.  Cardiomediastinal silhouette 

stably enlarged.



OSSEOUS STRUCTURES:

Unchanged.



VISUALIZED UPPER ABDOMEN:

Right upper quadrant surgical clips redemonstrated.



OTHER FINDINGS:

Right internal jugular access chest port, unchanged. 



IMPRESSION:

No active disease.

## 2018-11-03 LAB
ALBUMIN SERPL-MCNC: 3.1 G/DL (ref 3.5–5)
ALBUMIN/GLOB SERPL: 0.8 {RATIO} (ref 1–2.1)
ALT SERPL-CCNC: 41 U/L (ref 9–52)
APTT BLD: 45 SECONDS (ref 21–34)
AST SERPL-CCNC: 70 U/L (ref 14–36)
BASOPHILS # BLD AUTO: 0 K/UL (ref 0–0.2)
BASOPHILS NFR BLD: 0 % (ref 0–2)
BUN SERPL-MCNC: 13 MG/DL (ref 7–17)
CALCIUM SERPL-MCNC: 8.3 MG/DL (ref 8.6–10.4)
EOSINOPHIL # BLD AUTO: 0.4 K/UL (ref 0–0.7)
EOSINOPHIL NFR BLD: 6 % (ref 0–4)
ERYTHROCYTE [DISTWIDTH] IN BLOOD BY AUTOMATED COUNT: 23.8 % (ref 11.5–14.5)
GFR NON-AFRICAN AMERICAN: > 60
HGB BLD-MCNC: 9.4 G/DL (ref 11–16)
INR PPP: 1.2
LYMPHOCYTES # BLD AUTO: 0.9 K/UL (ref 1–4.3)
LYMPHOCYTES NFR BLD AUTO: 14 % (ref 20–40)
MCH RBC QN AUTO: 33.7 PG (ref 27–31)
MCHC RBC AUTO-ENTMCNC: 34.1 G/DL (ref 33–37)
MCV RBC AUTO: 98.1 FL (ref 81–99)
MONOCYTES # BLD: 0.2 K/UL (ref 0–0.8)
MONOCYTES NFR BLD: 4 % (ref 0–10)
NEUTROPHILS # BLD: 4.8 K/UL (ref 1.8–7)
NEUTROPHILS NFR BLD AUTO: 76 % (ref 50–75)
NRBC BLD AUTO-RTO: 0 % (ref 0–2)
PLATELET # BLD: 117 K/UL (ref 130–400)
PMV BLD AUTO: 9.8 FL (ref 7.2–11.7)
PROTHROMBIN TIME: 12.9 SECONDS (ref 9.7–12.2)
RBC # BLD AUTO: 2.78 MIL/UL (ref 3.8–5.2)
WBC # BLD AUTO: 6.3 K/UL (ref 4.8–10.8)

## 2018-11-03 RX ADMIN — DIPHENHYDRAMINE HYDROCHLORIDE PRN MG: 50 INJECTION INTRAMUSCULAR; INTRAVENOUS at 11:39

## 2018-11-03 RX ADMIN — INSULIN ASPART SCH: 100 INJECTION, SOLUTION INTRAVENOUS; SUBCUTANEOUS at 12:51

## 2018-11-03 RX ADMIN — WATER SCH MLS/HR: 1 INJECTION INTRAMUSCULAR; INTRAVENOUS; SUBCUTANEOUS at 14:10

## 2018-11-03 RX ADMIN — DIPHENHYDRAMINE HYDROCHLORIDE PRN MG: 50 INJECTION INTRAMUSCULAR; INTRAVENOUS at 00:53

## 2018-11-03 RX ADMIN — NYSTATIN SCH APPLIC: 100000 POWDER TOPICAL at 17:14

## 2018-11-03 RX ADMIN — INSULIN ASPART SCH: 100 INJECTION, SOLUTION INTRAVENOUS; SUBCUTANEOUS at 08:01

## 2018-11-03 RX ADMIN — NYSTATIN SCH APPLIC: 100000 POWDER TOPICAL at 09:17

## 2018-11-03 RX ADMIN — METOPROLOL SUCCINATE SCH MG: 50 TABLET, EXTENDED RELEASE ORAL at 09:08

## 2018-11-03 RX ADMIN — DIGOXIN SCH: 0.25 TABLET ORAL at 17:12

## 2018-11-03 RX ADMIN — INSULIN ASPART SCH: 100 INJECTION, SOLUTION INTRAVENOUS; SUBCUTANEOUS at 21:44

## 2018-11-03 RX ADMIN — DIPHENHYDRAMINE HYDROCHLORIDE PRN MG: 50 INJECTION INTRAMUSCULAR; INTRAVENOUS at 20:39

## 2018-11-03 RX ADMIN — DIPHENHYDRAMINE HYDROCHLORIDE PRN MG: 50 INJECTION INTRAMUSCULAR; INTRAVENOUS at 14:51

## 2018-11-03 RX ADMIN — DIPHENHYDRAMINE HYDROCHLORIDE PRN MG: 50 INJECTION INTRAMUSCULAR; INTRAVENOUS at 03:59

## 2018-11-03 RX ADMIN — OXYBUTYNIN CHLORIDE SCH MG: 10 TABLET, EXTENDED RELEASE ORAL at 09:08

## 2018-11-03 RX ADMIN — DIPHENHYDRAMINE HYDROCHLORIDE PRN MG: 50 INJECTION INTRAMUSCULAR; INTRAVENOUS at 08:35

## 2018-11-03 RX ADMIN — DIPHENHYDRAMINE HYDROCHLORIDE PRN MG: 50 INJECTION INTRAMUSCULAR; INTRAVENOUS at 17:48

## 2018-11-03 RX ADMIN — INSULIN ASPART SCH: 100 INJECTION, SOLUTION INTRAVENOUS; SUBCUTANEOUS at 16:30

## 2018-11-03 RX ADMIN — WATER SCH MLS/HR: 1 INJECTION INTRAMUSCULAR; INTRAVENOUS; SUBCUTANEOUS at 05:58

## 2018-11-03 NOTE — CT
Date of service: 



11/02/2018



PROCEDURE:  CT Abdomen and Pelvis with contrast



HISTORY:

abd. pain



COMPARISON:

Abdomen pelvis CT 10/17/2018.



TECHNIQUE:

Following the intravenous administration of iodinated contrast 

material, a CT examination of the abdomen and pelvis performed from 

the domes of the diaphragms to the symphysis pubis with reformatted 

datasets provided in axial, sagittal and coronal planes. Oral 

contrast was not administered as per referring physician request. 

Coronal and sagittal reformats were generated.



 contrast dose: Omnipaque 300, 100 cc



Radiation dose:



Total exam DLP = 332.64 mGy-cm.



This CT exam was performed using one or more of the following dose 

reduction techniques: Automated exposure control, adjustment of the 

mA and/or kV according to patient size, and/or use of iterative 

reconstruction technique.



FINDINGS:



LOWER THORAX:

Cardiomegaly is reiterated as well as coronary artery 

calcifications/stents and right heart pacemaker/AICD leads.  No 

pleural pericardial effusion.  Limited bibasilar linear atelectasis 

reiterated. 



LIVER:

Pneumobilia reiterated status post prior cholecystectomy. 



GALLBLADDER AND BILE DUCTS:

Prior cholecystectomy.  Gas again seen in the common bile duct, as 

well as biliary stent terminating at the duodenum.  No radiodense 

choledocholithiasis appreciable grossly. 



PANCREAS:

Unremarkable. No gross lesion or ductal dilatation.



SPLEEN:

Unremarkable. 



ADRENALS:

Unremarkable. No mass. 



KIDNEYS AND URETERS:

2 cm cyst seen the pole right kidney.  Vascular calcification is 

identified both at the renal hilae and intrarenal arterial 

distribution with likely small intrarenal calculi also identified 

bilaterally but nonobstructive. 



VASCULATURE:

Nonaneurysmal abdominal aortic calcific atherosclerotic changes are 

identified.



BOWEL:

Apparent prior right hemicolectomy reiterated with anastomotic suture 

line unremarkable appearing grossly.  



Evaluation of the gastrointestinal tract is limited due to the lack 

of oral contrast administration.



No obstruction.  Prominent retained fecal material throughout 

residual large bowel may indicate constipation.  Clinically correlate.



PERITONEUM:

Unremarkable. No free fluid. No free air. 



LYMPH NODES:

Unremarkable. No enlarged lymph nodes. 



BLADDER:

Charles catheter decompresses urinary bladder. 



REPRODUCTIVE:

Unremarkable. 



BONES:

Likely mild fractures of L1, L2 and L3 as well as L5 potential the 

basis of insufficiency but of indeterminate age.  No 

spondylolisthesis. 



OTHER FINDINGS:

None.



IMPRESSION:

1. Prior cholecystectomy with pneumobilia identified in the common 

bile duct and intrahepatic biliary tree.  Biliary stent is identified 

at the common bile duct terminating at the duodenum. 



2. Right renal cyst.  No obstructive uropathy bilaterally.  Extensive 

small vessel calcifications are identified in the abdomen pelvis 

including bilateral renal arteries with likely punctate intrarenal 

calculi identified is nonobstructive. 



3. Calcified atherosclerosis of abdominal aorta without aneurysm. 



4. Prior right hemicolectomy within and anastomotic suture line 

unremarkable.  Residual large bowel appears distended with retained 

fecal material may indicate an element of constipation. 



5. Charles catheter decompresses urinary bladder.

## 2018-11-04 LAB
ALBUMIN SERPL-MCNC: 3.1 G/DL (ref 3.5–5)
ALBUMIN/GLOB SERPL: 0.7 {RATIO} (ref 1–2.1)
ALT SERPL-CCNC: 42 U/L (ref 9–52)
AST SERPL-CCNC: 80 U/L (ref 14–36)
BACTERIA #/AREA URNS HPF: (no result) /[HPF]
BASOPHILS # BLD AUTO: 0 K/UL (ref 0–0.2)
BASOPHILS NFR BLD: 0 % (ref 0–2)
BILIRUB UR-MCNC: (no result) MG/DL
BUN SERPL-MCNC: 17 MG/DL (ref 7–17)
CALCIUM SERPL-MCNC: 8.4 MG/DL (ref 8.6–10.4)
EOSINOPHIL # BLD AUTO: 0.2 K/UL (ref 0–0.7)
EOSINOPHIL NFR BLD: 3 % (ref 0–4)
ERYTHROCYTE [DISTWIDTH] IN BLOOD BY AUTOMATED COUNT: 23 % (ref 11.5–14.5)
GFR NON-AFRICAN AMERICAN: > 60
GLUCOSE UR STRIP-MCNC: NORMAL MG/DL
HGB BLD-MCNC: 9.7 G/DL (ref 11–16)
LEUKOCYTE ESTERASE UR-ACNC: (no result) LEU/UL
LYMPHOCYTES # BLD AUTO: 0.7 K/UL (ref 1–4.3)
LYMPHOCYTES NFR BLD AUTO: 12 % (ref 20–40)
MCH RBC QN AUTO: 33.6 PG (ref 27–31)
MCHC RBC AUTO-ENTMCNC: 34.8 G/DL (ref 33–37)
MCV RBC AUTO: 96.5 FL (ref 81–99)
MONOCYTES # BLD: 0.6 K/UL (ref 0–0.8)
MONOCYTES NFR BLD: 10 % (ref 0–10)
NEUTROPHILS # BLD: 4.6 K/UL (ref 1.8–7)
NEUTROPHILS NFR BLD AUTO: 75 % (ref 50–75)
NRBC BLD AUTO-RTO: 0 % (ref 0–2)
PH UR STRIP: 5 [PH] (ref 5–8)
PLATELET # BLD: 116 K/UL (ref 130–400)
PMV BLD AUTO: 9.9 FL (ref 7.2–11.7)
PROT UR STRIP-MCNC: NEGATIVE MG/DL
RBC # BLD AUTO: 2.89 MIL/UL (ref 3.8–5.2)
RBC # UR STRIP: (no result) /UL
SP GR UR STRIP: 1.01 (ref 1–1.03)
SQUAMOUS EPITHIAL: 1 /HPF (ref 0–5)
UROBILINOGEN UR-MCNC: 2 MG/DL (ref 0.2–1)
WBC # BLD AUTO: 6.1 K/UL (ref 4.8–10.8)

## 2018-11-04 RX ADMIN — AMPICILLIN SODIUM SCH MLS/HR: 2 INJECTION, POWDER, FOR SOLUTION INTRAVENOUS at 16:30

## 2018-11-04 RX ADMIN — INSULIN ASPART SCH: 100 INJECTION, SOLUTION INTRAVENOUS; SUBCUTANEOUS at 17:19

## 2018-11-04 RX ADMIN — DIPHENHYDRAMINE HYDROCHLORIDE PRN MG: 50 INJECTION INTRAMUSCULAR; INTRAVENOUS at 00:02

## 2018-11-04 RX ADMIN — NYSTATIN SCH APPLIC: 100000 POWDER TOPICAL at 18:24

## 2018-11-04 RX ADMIN — DIPHENHYDRAMINE HYDROCHLORIDE PRN MG: 50 INJECTION INTRAMUSCULAR; INTRAVENOUS at 14:41

## 2018-11-04 RX ADMIN — AMPICILLIN SODIUM SCH MLS/HR: 2 INJECTION, POWDER, FOR SOLUTION INTRAVENOUS at 21:54

## 2018-11-04 RX ADMIN — WATER SCH MLS/HR: 1 INJECTION INTRAMUSCULAR; INTRAVENOUS; SUBCUTANEOUS at 05:41

## 2018-11-04 RX ADMIN — DIPHENHYDRAMINE HYDROCHLORIDE PRN MG: 50 INJECTION INTRAMUSCULAR; INTRAVENOUS at 17:40

## 2018-11-04 RX ADMIN — OXYBUTYNIN CHLORIDE SCH MG: 10 TABLET, EXTENDED RELEASE ORAL at 11:00

## 2018-11-04 RX ADMIN — INSULIN ASPART SCH: 100 INJECTION, SOLUTION INTRAVENOUS; SUBCUTANEOUS at 12:14

## 2018-11-04 RX ADMIN — DIPHENHYDRAMINE HYDROCHLORIDE PRN MG: 50 INJECTION INTRAMUSCULAR; INTRAVENOUS at 08:15

## 2018-11-04 RX ADMIN — DIPHENHYDRAMINE HYDROCHLORIDE PRN MG: 50 INJECTION INTRAMUSCULAR; INTRAVENOUS at 11:47

## 2018-11-04 RX ADMIN — INSULIN ASPART SCH: 100 INJECTION, SOLUTION INTRAVENOUS; SUBCUTANEOUS at 21:53

## 2018-11-04 RX ADMIN — WATER SCH MLS/HR: 1 INJECTION INTRAMUSCULAR; INTRAVENOUS; SUBCUTANEOUS at 12:40

## 2018-11-04 RX ADMIN — WATER SCH: 1 INJECTION INTRAMUSCULAR; INTRAVENOUS; SUBCUTANEOUS at 14:40

## 2018-11-04 RX ADMIN — DIPHENHYDRAMINE HYDROCHLORIDE PRN MG: 50 INJECTION INTRAMUSCULAR; INTRAVENOUS at 20:45

## 2018-11-04 RX ADMIN — METOPROLOL SUCCINATE SCH MG: 50 TABLET, EXTENDED RELEASE ORAL at 11:00

## 2018-11-04 RX ADMIN — NYSTATIN SCH APPLIC: 100000 POWDER TOPICAL at 11:48

## 2018-11-04 RX ADMIN — DIPHENHYDRAMINE HYDROCHLORIDE PRN MG: 50 INJECTION INTRAMUSCULAR; INTRAVENOUS at 05:16

## 2018-11-04 RX ADMIN — DIGOXIN SCH: 0.25 TABLET ORAL at 17:20

## 2018-11-04 RX ADMIN — INSULIN ASPART SCH: 100 INJECTION, SOLUTION INTRAVENOUS; SUBCUTANEOUS at 07:46

## 2018-11-04 NOTE — CP.PCM.CON
Past Patient History





- Infectious Disease


Hx of Infectious Diseases: None





- Tetanus Immunizations


Tetanus Immunization: Unknown





- Past Medical History & Family History


Past Medical History?: Yes





- Past Social History


Smoking Status: Never Smoked





- CARDIAC


Hx Cardia Arrhythmia: Yes


Hx Congestive Heart Failure: Yes


Hx Hypercholesterolemia: Yes


Hx Hypertension: Yes





- PULMONARY


Hx Asthma: Yes (NO INHALER-ON PREDNISONE DAILY PO.)





- NEUROLOGICAL


Hx Neurological Disorder: No





- HEENT


Hx HEENT Problems: Yes


Hx Cataracts: Yes (BILAT.)





- RENAL


Hx Chronic Kidney Disease: No





- ENDOCRINE/METABOLIC


Hx Endocrine Disorders: Yes


Hx Diabetes Mellitus Type 1: Yes





- HEMATOLOGICAL/ONCOLOGICAL


Hx Anemia: Yes





- INTEGUMENTARY


Hx Dermatological Problems: No





- MUSCULOSKELETAL/RHEUMATOLOGICAL


Hx Falls: Yes





- GASTROINTESTINAL


Hx Gall Bladder Disease: Yes





- GENITOURINARY/GYNECOLOGICAL


Hx Genitourinary Disorders: Yes


Hx Urinary Tract Infection: Yes


Other/Comment: PT HAS LONG TERM GUTIERREZ





- PSYCHIATRIC


Hx Substance Use: No





- SURGICAL HISTORY


Hx Appendectomy: Yes


Hx Cholecystectomy: Yes


Hx Coronary Stent: Yes





- ANESTHESIA


Hx Anesthesia: Yes


Hx Anesthesia Reactions: No


Hx Malignant Hyperthermia: No





Meds


Allergies/Adverse Reactions: 


                                    Allergies











Allergy/AdvReac Type Severity Reaction Status Date / Time


 


avocado Allergy Severe ANAPHYLAXIS Verified 11/02/18 16:13


 


banana Allergy Severe ANAPHYLAXIS Verified 11/02/18 16:13


 


cherry Allergy Severe ANAPHYLAXIS Verified 11/02/18 16:13


 


ketorolac [From Toradol] Allergy   Verified 11/02/18 16:13














- Medications


Medications: 


                               Current Medications





Apixaban (Eliquis)  5 mg PO BID [facility]


   Last Admin: 11/04/18 17:40 Dose:  5 mg


Digoxin (Lanoxin)  0.25 mg PO Q24H [facility]


   Last Admin: 11/04/18 17:20 Dose:  Not Given


Diphenhydramine HCl (Benadryl)  12.5 mg IVP Q3 PRN


   PRN Reason: Itching / Pruritus


   Last Admin: 11/04/18 17:40 Dose:  12.5 mg


Diphenoxylate HCl/Atropine (Lomotil 0.025-2.5 Mg Tablet)  1 tab PO Q8 PRN


   PRN Reason: Diarrhea


Docusate Sodium (Colace)  100 mg PO TID PRN


   PRN Reason: Constipation


Gabapentin (Neurontin)  100 mg PO BID [facility]


   Last Admin: 11/04/18 17:40 Dose:  100 mg


Ampicillin 2 gm/ Sodium (Chloride)  100 mls @ 50 mls/hr IVPB Q6H [facility]; Protocol


   Last Admin: 11/04/18 16:30 Dose:  50 mls/hr


Ceftazidime/Avibactam 2.5 gm/ (Sodium Chloride)  100 mls @ 50 mls/hr IV Q8H [facility];

Protocol


   Last Admin: 11/04/18 17:45 Dose:  50 mls/hr


Gentamicin Sulfate 120 mg/ (Sodium Chloride)  103 mls @ 100 mls/hr IVPB Q24H 

[facility]; Protocol


   Last Admin: 11/04/18 19:03 Dose:  100 mls/hr


Insulin Aspart (Novolog)  0 unit SC ACHS [facility]; Protocol


   Last Admin: 11/04/18 17:19 Dose:  Not Given


Metoprolol Succinate (Toprol Xl)  50 mg PO DAILY [facility]


   Last Admin: 11/04/18 11:00 Dose:  50 mg


Morphine Sulfate (Morphine)  1 mg IV Q3 PRN


   PRN Reason: Pain, moderate (4-7)


   Last Admin: 11/04/18 17:39 Dose:  1 mg


Nystatin (Nystop Topical Powder)  0 gm EXT BID [facility]


   Last Admin: 11/04/18 18:24 Dose:  1 applic


Ondansetron HCl (Zofran Inj)  4 mg IVP Q4 PRN


   PRN Reason: Nausea/Vomiting


Oxybutynin Chloride (Ditropan Xl)  10 mg PO DAILY [facility]


   Last Admin: 11/04/18 11:00 Dose:  10 mg


Pantoprazole Sodium (Protonix Inj)  40 mg IVP DAILY [facility]


   Last Admin: 11/04/18 11:00 Dose:  40 mg











Results





- Vital Signs


Recent Vital Signs: 


                                Last Vital Signs











Temp  98 F   11/04/18 16:09


 


Pulse  60   11/04/18 16:09


 


Resp  20   11/04/18 16:09


 


BP  108/70   11/04/18 16:09


 


Pulse Ox  100   11/04/18 16:09














- Labs


Result Diagrams: 


                                 11/04/18 07:52





                                 11/04/18 07:52


Labs: 


                         Laboratory Results - last 24 hr











  11/03/18 11/04/18 11/04/18





  21:15 07:33 07:52


 


WBC    6.1


 


RBC    2.89 L


 


Hgb    9.7 L


 


Hct    27.9 L


 


MCV    96.5


 


MCH    33.6 H


 


MCHC    34.8


 


RDW    23.0 H


 


Plt Count    116 L


 


MPV    9.9


 


Neut % (Auto)    75.0


 


Lymph % (Auto)    12.0 L


 


Mono % (Auto)    10.0


 


Eos % (Auto)    3.0


 


Baso % (Auto)    0.0


 


Neut # (Auto)    4.6


 


Lymph # (Auto)    0.7 L


 


Mono # (Auto)    0.6


 


Eos # (Auto)    0.2


 


Baso # (Auto)    0.0


 


Sodium   


 


Potassium   


 


Chloride   


 


Carbon Dioxide   


 


Anion Gap   


 


BUN   


 


Creatinine   


 


Est GFR ( Amer)   


 


Est GFR (Non-Af Amer)   


 


POC Glucose (mg/dL)  108  79 


 


Random Glucose   


 


Calcium   


 


Total Bilirubin   


 


AST   


 


ALT   


 


Alkaline Phosphatase   


 


Ammonia   


 


Total Protein   


 


Albumin   


 


Globulin   


 


Albumin/Globulin Ratio   


 


Urine Color   


 


Urine Clarity   


 


Urine pH   


 


Ur Specific Gravity   


 


Urine Protein   


 


Urine Glucose (UA)   


 


Urine Ketones   


 


Urine Blood   


 


Urine Nitrate   


 


Urine Bilirubin   


 


Urine Urobilinogen   


 


Ur Leukocyte Esterase   


 


Urine WBC (Auto)   


 


Urine RBC (Auto)   


 


Ur Squamous Epith Cells   


 


Urine Bacteria   














  11/04/18 11/04/18 11/04/18





  07:52 07:52 11:58


 


WBC   


 


RBC   


 


Hgb   


 


Hct   


 


MCV   


 


MCH   


 


MCHC   


 


RDW   


 


Plt Count   


 


MPV   


 


Neut % (Auto)   


 


Lymph % (Auto)   


 


Mono % (Auto)   


 


Eos % (Auto)   


 


Baso % (Auto)   


 


Neut # (Auto)   


 


Lymph # (Auto)   


 


Mono # (Auto)   


 


Eos # (Auto)   


 


Baso # (Auto)   


 


Sodium  138  


 


Potassium  4.4  


 


Chloride  111 H  


 


Carbon Dioxide  17 L  


 


Anion Gap  14  


 


BUN  17  


 


Creatinine  0.6 L  


 


Est GFR ( Amer)  > 60  


 


Est GFR (Non-Af Amer)  > 60  


 


POC Glucose (mg/dL)    75


 


Random Glucose  100  


 


Calcium  8.4 L  


 


Total Bilirubin  13.3 H  


 


AST  80 H  


 


ALT  42  


 


Alkaline Phosphatase  397 H  


 


Ammonia   65 H D 


 


Total Protein  7.2  


 


Albumin  3.1 L  


 


Globulin  4.1 H  


 


Albumin/Globulin Ratio  0.7 L  


 


Urine Color   


 


Urine Clarity   


 


Urine pH   


 


Ur Specific Gravity   


 


Urine Protein   


 


Urine Glucose (UA)   


 


Urine Ketones   


 


Urine Blood   


 


Urine Nitrate   


 


Urine Bilirubin   


 


Urine Urobilinogen   


 


Ur Leukocyte Esterase   


 


Urine WBC (Auto)   


 


Urine RBC (Auto)   


 


Ur Squamous Epith Cells   


 


Urine Bacteria   














  11/04/18 11/04/18





  16:33 17:34


 


WBC  


 


RBC  


 


Hgb  


 


Hct  


 


MCV  


 


MCH  


 


MCHC  


 


RDW  


 


Plt Count  


 


MPV  


 


Neut % (Auto)  


 


Lymph % (Auto)  


 


Mono % (Auto)  


 


Eos % (Auto)  


 


Baso % (Auto)  


 


Neut # (Auto)  


 


Lymph # (Auto)  


 


Mono # (Auto)  


 


Eos # (Auto)  


 


Baso # (Auto)  


 


Sodium  


 


Potassium  


 


Chloride  


 


Carbon Dioxide  


 


Anion Gap  


 


BUN  


 


Creatinine  


 


Est GFR ( Amer)  


 


Est GFR (Non-Af Amer)  


 


POC Glucose (mg/dL)  89 


 


Random Glucose  


 


Calcium  


 


Total Bilirubin  


 


AST  


 


ALT  


 


Alkaline Phosphatase  


 


Ammonia  


 


Total Protein  


 


Albumin  


 


Globulin  


 


Albumin/Globulin Ratio  


 


Urine Color   Elise


 


Urine Clarity   Clear


 


Urine pH   5.0


 


Ur Specific Gravity   1.013


 


Urine Protein   Negative


 


Urine Glucose (UA)   Normal


 


Urine Ketones   Negative


 


Urine Blood   1+ H


 


Urine Nitrate   Negative


 


Urine Bilirubin   2+ H


 


Urine Urobilinogen   2.0 H


 


Ur Leukocyte Esterase   3+ H


 


Urine WBC (Auto)   83 H


 


Urine RBC (Auto)   11 H


 


Ur Squamous Epith Cells   1


 


Urine Bacteria   Few H

## 2018-11-04 NOTE — CP.PCM.CON
History of Present Illness





- History of Present Illness


History of Present Illness: 





52 y/o female w,/PMhx of diabetes, HTN, and CHF, presents to the ER complaining 

of RUQ abdominal pain and vomiting which has been present for the past 3 days. 

Patient states that the pain radiates to the back. Patient denies having fever, 

chills, diarrhea, constipation, dysuria, and hematuria.








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  

has had stents placed in bile duct 





urine culture + for MDRO    ESBL neg    ID consulted for this 











- Medical History


PMH: Anemia, Asthma (NO INHALER-ON PREDNISONE DAILY PO.), CAD (stent x 1), 

Cardia Arrhythmia, CHF, Diabetes, Deep Vein Thrombosis, Gall Bladder Disease, 

HTN, Hypercholesterolemia


   Denies: Chronic Kidney Disease


Surgical History: Appendectomy, Cholecystectomy, Coronary Stent, Endoscopy, C-

Section





- [facility] Procedures











 (09/08/18)


CENTRAL VENOUS CATHETER PLACEMENT WITH GUIDANCE (05/29/14)


CLOSED ENDOSCOPIC BIOPSY OF LARGE INTESTINE (11/19/14)


DILATION OF COMMON BILE DUCT WITH INTRALUMINAL DEVICE, ENDO (10/15/18)


ENDOSC RETROGRADE CHOLANGIOPANCREATOGRAPHY [ERCP] (10/30/13)


ENDOSCOP INSERTION OF STENT (TUBE) INTO BILE DUCT (05/29/14)


ESOPHAGOGASTRODUODENOSCOPY [EGD] W/CLOSED BIOPSY (05/29/14)


EXCISION OF STOMACH, ENDO, DIAGN (10/15/18)


FLUOROSCOPY OF SUPERIOR VENA CAVA, GUIDANCE (10/15/18)


INSERTION OF INFUSION DEV INTO SUP VENA CAVA, PERC APPROACH (10/15/18)


INSERTION OF VAD INTO CHEST SUBCU/FASCIA, PERC APPROACH (10/15/18)


INSPECTION OF LOWER INTESTINAL TRACT, ENDO (02/05/18)


IRRIGATION OF LOWER GI USING IRRIGATING SUBSTANCE, ENDO (02/05/18)


OTHER LOCAL DESTRUC SKIN (10/30/13)


OTHER SKIN & SUBQ I   D (10/30/13)


REMOVAL OF INFUSION DEVICE FROM GREAT VESSEL, PERC APPROACH (10/15/18)


REMOVAL OF INFUSION DEVICE FROM UPPER VEIN, EXTERN APPROACH (10/15/18)


REMOVAL OF INTRALUMINAL DEVICE FROM HEPATOBILIARY DUCT, ENDO (10/15/18)


REMOVAL OF VAD FROM TRUNK SUBCU/FASCIA, PERC APPROACH (10/15/18)


ULTRASONOGRAPHY OF RIGHT JUGULAR VEINS, GUIDANCE (10/15/18)








Review of Systems





- Constitutional


Constitutional: Fatigue





- EENT


Eyes: absent: As Per HPI, Blind Spots, Blurred Vision, Change in Vision, 

Decreased Night Vision, Diplopia, Discharge, Dry Eye, Exophthalmos, Floaters, 

Irritation, Itchy Eyes, Loss of Peripheral Vision, Pain, Photophobia, Requires 

Corrective Lenses, Sees Flashes, Spots in Vision, Tunnel Vision, Other Visual 

Disturbances, Loss of Vision, Other


Ears: absent: As Per HPI, Decreased Hearing, Ear Discharge, Ear Pain, Tinnitus, 

Abnormal Hearing, Disequilibrium, Dizziness, Other


Nose/Mouth/Throat: absent: As Per HPI, Epistaxis, Nasal Congestion, Nasal 

Discharge, Nasal Obstruction, Nasal Trauma, Nose Pain, Post Nasal Drip, Sinus 

Pain, Sinus Pressure, Bleeding Gums, Change in Voice, Dental Pain, Dry Mouth, 

Dysphagia, Halitosis, Hoarsness, Lip Swelling, Mouth Lesions, Mouth Pain, 

Odynophagia, Sore Throat, Throat Swelling, Tongue Swelling, Facial Pain, Neck 

Pain, Neck Mass, Other





- Breasts


Breasts: absent: As Per HPI, Change in Shape, Mass, Pain, Nipple Discharge, 

Nipple Inversion, Skin Changes, Swelling, Other





- Cardiovascular


Cardiovascular: absent: As Per HPI, Acrocyanosis, Chest Pain, Chest Pain at 

Rest, Chest Pain with Activity, Claudication, Diaphoresis, Dyspnea, Dyspnea on 

Exertion, Edema, Irregular Heart Rhythm, Pain Radiating to Arm/Neck/Jaw, Leg 

Edema, Leg Ulcers, Lightheadedness, Orthopnea, Palpitations, Paroxysmal 

Nocturnal Dyspnea, Pedal Edema, Radiating Pain, Rapid Heart Rate, Slow Heart 

Rate, Syncope, Other





- Respiratory


Respiratory: absent: As Per HPI, Cough, Dyspnea, Hemoptysis, Dyspnea on 

Exertion, Wheezing, Snoring, Stridor, Pain on Inspiration, Chest Congestion, 

Excessive Mucous Production, Change in Mucous Color, Pain with Coughing, Other





- Gastrointestinal


Gastrointestinal: As Per HPI





- Genitourinary


Genitourinary: As Per HPI





- Reproductive: Female


Reproductive:Female: absent: As Per HPI, Amenorrhea, Amenorrhea/Birth Control, 

Currently Menstual, Cycle <21 Days, Cycle >35 Days, Cycle Variable, Menses 1-7 

Days, Menses >/= 8 Days, Menses Variable, Cycle > 4 Weeks Between, No Menses for

 6 Months, Heavy Menses, Light Menses, Normal Menses, Spotting Between Cycles, 

S/P Hysterectomy, Menopausal, Post Menopausal, Premenarche, Abnormal Vaginal 

Bleeding, Dysmenorrhea, Dyspareunia, Genital Lesions, Genital Pruritis, Pelvic 

Pain, Prolapse Symptoms, Sexual Dysfunction, Vaginal Discharge, Vaginal Dryness,

 Vaginal Odor, Vaginal Pruritis, Other





- Menstruation


Menstruation: absent: As Per HPI, Amenorrhea, Amenorrhea/Birth Control, 

Currently Menstual, Cycle <21 Days, Cycle >35 Days, Cycle Variable, Menses 1-7 

Days, Menses >/= 8 Days, Menses Variable, Cycle > 4 Weeks Between, No Menses for

 6 Months, Heavy Menses, Light Menses, Normal Menses, Spotting Between Cycles, 

S/P Hysterectomy, Menopausal, Post Menopausal, Premenarche, Abnormal Vaginal Bl

eeding, Dysmenorrhea, Other





- Musculoskeletal


Musculoskeletal: As Per HPI


Additional comments: 





bilat aka 





- Integumentary


Integumentary: absent: As Per HPI, Acne, Alopecia, Bleeding Lesions, Change in 

Hair, Change in Nails, Change in Pigmentation, Changing Lesions, Dry Skin, 

Erythema, Furuncle, Hirsutism, Lesions, New Lesions, Non-Healing Lesions, 

Photosensitivity, Pruritus, Rash, Skin Pain, Skin Ulcer, Sores, Striae, 

Swelling, Unusual Bruising, Wounds, Jaundice, Other





- Neurological


Neurological: absent: As Per HPI, Abnormal Gait, Abnormal Hearing, Abnormal 

Movements, Abnormal Speech, Behavioral Changes, Burning Sensations, Confusion, 

Convulsions, Disequilibrium, Dizziness, Numbness, Focal Weakness, Frequent 

Falls, Headaches, Lack of Coordination, Loss of Vision, Memory Loss, 

Paresthesias, Radicular Pain, Restless Legs, Sensory Deficit, Syncope, Tingling,

 Tremor, Vertigo, Weakness, Other Visual Disturbances, Other





- Psychiatric


Psychiatric: absent: As Per HPI, Abnormal Sleep Pattern, Anhedonia, Anxiety, 

Auditory Hallucinations, Behavioral Changes, Change in Appetite, Change in 

Libido, Confusion, Depression, Difficulty Concentrating, Hallucinations, 

Homicidal Ideation, Hopelessness, Irritability, Memory Loss, Mood Swings, Panic 

Attacks, Paranoia, Suicidal Ideation, Visual Hallucinations, Tactile 

Hallucinations, Other





- Endocrine


Endocrine: absent: As Per HPI, Change in Body Appearance, Change in Libido, Cold

 Intolorance, Deepening of Voice, Excessive Sweating, Fatigue, Flushing, Heat 

Intolorance, Increase in Ring/Shoe/Hat Size, Palpitations, Polydipsia, 

Polyphagia, Polyuria, Other





- Hematologic/Lymphatic


Hematologic: absent: As Per HPI, Easy Bleeding, Easy Bruising, Lymphadenopathy, 

Other





Past Patient History





- Infectious Disease


Hx of Infectious Diseases: None





- Tetanus Immunizations


Tetanus Immunization: Unknown





- Past Medical History & Family History


Past Medical History?: Yes





- Past Social History


Smoking Status: Never Smoked





- CARDIAC


Hx Cardia Arrhythmia: Yes


Hx Congestive Heart Failure: Yes


Hx Hypercholesterolemia: Yes


Hx Hypertension: Yes





- PULMONARY


Hx Asthma: Yes (NO INHALER-ON PREDNISONE DAILY PO.)





- NEUROLOGICAL


Hx Neurological Disorder: No





- HEENT


Hx HEENT Problems: Yes


Hx Cataracts: Yes (BILAT.)





- RENAL


Hx Chronic Kidney Disease: No





- ENDOCRINE/METABOLIC


Hx Endocrine Disorders: Yes


Hx Diabetes Mellitus Type 1: Yes





- HEMATOLOGICAL/ONCOLOGICAL


Hx Anemia: Yes





- INTEGUMENTARY


Hx Dermatological Problems: No





- MUSCULOSKELETAL/RHEUMATOLOGICAL


Hx Falls: Yes





- GASTROINTESTINAL


Hx Gall Bladder Disease: Yes





- GENITOURINARY/GYNECOLOGICAL


Hx Genitourinary Disorders: Yes


Hx Urinary Tract Infection: Yes


Other/Comment: PT HAS LONG TERM GUTIERREZ





- PSYCHIATRIC


Hx Substance Use: No





- SURGICAL HISTORY


Hx Appendectomy: Yes


Hx Cholecystectomy: Yes


Hx Coronary Stent: Yes





- ANESTHESIA


Hx Anesthesia: Yes


Hx Anesthesia Reactions: No


Hx Malignant Hyperthermia: No





Meds


Allergies/Adverse Reactions: 


                                    Allergies











Allergy/AdvReac Type Severity Reaction Status Date / Time


 


avocado Allergy Severe ANAPHYLAXIS Verified 11/02/18 16:13


 


banana Allergy Severe ANAPHYLAXIS Verified 11/02/18 16:13


 


cherry Allergy Severe ANAPHYLAXIS Verified 11/02/18 16:13


 


ketorolac [From Toradol] Allergy   Verified 11/02/18 16:13














- Medications


Medications: 


                               Current Medications





Apixaban (Eliquis)  5 mg PO BID [facility]


   Last Admin: 11/04/18 11:00 Dose:  5 mg


Digoxin (Lanoxin)  0.25 mg PO Q24H [facility]


   Last Admin: 11/03/18 17:12 Dose:  Not Given


Diphenhydramine HCl (Benadryl)  12.5 mg IVP Q3 PRN


   PRN Reason: Itching / Pruritus


   Last Admin: 11/04/18 14:41 Dose:  12.5 mg


Diphenoxylate HCl/Atropine (Lomotil 0.025-2.5 Mg Tablet)  1 tab PO Q8 PRN


   PRN Reason: Diarrhea


Gabapentin (Neurontin)  100 mg PO BID [facility]


   Last Admin: 11/04/18 11:00 Dose:  100 mg


Metronidazole 250 mg/ (Miscellaneous)  50 mls @ 100 mls/hr IVPB Q8H [facility]; 

Protocol


   Last Admin: 11/04/18 14:40 Dose:  Not Given


Ampicillin 2 gm/ Sodium (Chloride)  100 mls @ 50 mls/hr IVPB Q6H [facility]; Protocol


Insulin Aspart (Novolog)  0 unit SC ACHS [facility]; Protocol


   Last Admin: 11/04/18 12:14 Dose:  Not Given


Metoprolol Succinate (Toprol Xl)  50 mg PO DAILY [facility]


   Last Admin: 11/04/18 11:00 Dose:  50 mg


Morphine Sulfate (Morphine)  1 mg IV Q3 PRN


   PRN Reason: Pain, moderate (4-7)


   Last Admin: 11/04/18 14:40 Dose:  1 mg


Nystatin (Nystop Topical Powder)  0 gm EXT BID [facility]


   Last Admin: 11/04/18 11:48 Dose:  1 applic


Ondansetron HCl (Zofran Inj)  4 mg IVP Q4 PRN


   PRN Reason: Nausea/Vomiting


Oxybutynin Chloride (Ditropan Xl)  10 mg PO DAILY [facility]


   Last Admin: 11/04/18 11:00 Dose:  10 mg


Pantoprazole Sodium (Protonix Inj)  40 mg IVP DAILY [facility]


   Last Admin: 11/04/18 11:00 Dose:  40 mg











Physical Exam





- Constitutional


Appears: Chronically Ill





- Head Exam


Head Exam: NORMOCEPHALIC





- Eye Exam


Eye Exam: Scleral icterus





- ENT Exam


ENT Exam: Mucous Membranes Dry





- Neck Exam


Neck exam: Negative for: Lymphadenopathy





- Respiratory Exam


Respiratory Exam: Decreased Breath Sounds





- Cardiovascular Exam


Cardiovascular Exam: REGULAR RHYTHM, +S1, +S2





- GI/Abdominal Exam


GI & Abdominal Exam: Diminished Bowel Sounds, Soft.  absent: Tenderness





- Rectal Exam


Rectal Exam: Deferred





-  Exam


 Exam: NORMAL INSPECTION





- Extremities Exam


Extremities exam: Negative for: pedal edema


Additional comments: 





bilat aka 





- Back Exam


Back exam: absent: CVA tenderness (L), CVA tenderness (R)





- Neurological Exam


Neurological exam: Alert, CN II-XII Intact, Oriented x3, Reflexes Normal





- Psychiatric Exam


Psychiatric exam: Normal Mood





- Skin


Skin Exam: Dry





Results





- Vital Signs


Recent Vital Signs: 


                                Last Vital Signs











Temp  98 F   11/04/18 16:09


 


Pulse  60   11/04/18 16:09


 


Resp  20   11/04/18 16:09


 


BP  108/70   11/04/18 16:09


 


Pulse Ox  100   11/04/18 16:09














- Labs


Result Diagrams: 


                                 11/04/18 07:52





                                 11/04/18 07:52


Labs: 


                         Laboratory Results - last 24 hr











  11/03/18 11/04/18 11/04/18





  21:15 07:33 07:52


 


WBC    6.1


 


RBC    2.89 L


 


Hgb    9.7 L


 


Hct    27.9 L


 


MCV    96.5


 


MCH    33.6 H


 


MCHC    34.8


 


RDW    23.0 H


 


Plt Count    116 L


 


MPV    9.9


 


Neut % (Auto)    75.0


 


Lymph % (Auto)    12.0 L


 


Mono % (Auto)    10.0


 


Eos % (Auto)    3.0


 


Baso % (Auto)    0.0


 


Neut # (Auto)    4.6


 


Lymph # (Auto)    0.7 L


 


Mono # (Auto)    0.6


 


Eos # (Auto)    0.2


 


Baso # (Auto)    0.0


 


Sodium   


 


Potassium   


 


Chloride   


 


Carbon Dioxide   


 


Anion Gap   


 


BUN   


 


Creatinine   


 


Est GFR ( Amer)   


 


Est GFR (Non-Af Amer)   


 


POC Glucose (mg/dL)  108  79 


 


Random Glucose   


 


Calcium   


 


Total Bilirubin   


 


AST   


 


ALT   


 


Alkaline Phosphatase   


 


Ammonia   


 


Total Protein   


 


Albumin   


 


Globulin   


 


Albumin/Globulin Ratio   














  11/04/18 11/04/18 11/04/18





  07:52 07:52 11:58


 


WBC   


 


RBC   


 


Hgb   


 


Hct   


 


MCV   


 


MCH   


 


MCHC   


 


RDW   


 


Plt Count   


 


MPV   


 


Neut % (Auto)   


 


Lymph % (Auto)   


 


Mono % (Auto)   


 


Eos % (Auto)   


 


Baso % (Auto)   


 


Neut # (Auto)   


 


Lymph # (Auto)   


 


Mono # (Auto)   


 


Eos # (Auto)   


 


Baso # (Auto)   


 


Sodium  138  


 


Potassium  4.4  


 


Chloride  111 H  


 


Carbon Dioxide  17 L  


 


Anion Gap  14  


 


BUN  17  


 


Creatinine  0.6 L  


 


Est GFR ( Amer)  > 60  


 


Est GFR (Non-Af Amer)  > 60  


 


POC Glucose (mg/dL)    75


 


Random Glucose  100  


 


Calcium  8.4 L  


 


Total Bilirubin  13.3 H  


 


AST  80 H  


 


ALT  42  


 


Alkaline Phosphatase  397 H  


 


Ammonia   65 H D 


 


Total Protein  7.2  


 


Albumin  3.1 L  


 


Globulin  4.1 H  


 


Albumin/Globulin Ratio  0.7 L  














Assessment & Plan


(1) Abdominal pain


Status: Acute   





(2) Complicated urinary tract infection


Status: Acute   





(3) Bilirubinemia


Status: Acute   





- Assessment and Plan (Free Text)


Assessment: 





will repeat cultures


await GI eval


cont iv antibiotics

## 2018-11-04 NOTE — CP.PCM.PN
Subjective





- Date & Time of Evaluation


Date of Evaluation: 11/04/18


Time of Evaluation: 21:26





- Subjective


Subjective: 





dictated   





Objective





- Vital Signs/Intake and Output


Vital Signs (last 24 hours): 


                                        











Temp Pulse Resp BP Pulse Ox


 


 98 F   60   20   108/70   100 


 


 11/04/18 16:09  11/04/18 16:09  11/04/18 16:09  11/04/18 16:09  11/04/18 16:09











- Medications


Medications: 


                               Current Medications





Apixaban (Eliquis)  5 mg PO BID [facility]


   Last Admin: 11/04/18 17:40 Dose:  5 mg


Digoxin (Lanoxin)  0.25 mg PO Q24H [facility]


   Last Admin: 11/04/18 17:20 Dose:  Not Given


Diphenhydramine HCl (Benadryl)  12.5 mg IVP Q3 PRN


   PRN Reason: Itching / Pruritus


   Last Admin: 11/04/18 20:45 Dose:  12.5 mg


Diphenoxylate HCl/Atropine (Lomotil 0.025-2.5 Mg Tablet)  1 tab PO Q8 PRN


   PRN Reason: Diarrhea


Docusate Sodium (Colace)  100 mg PO TID PRN


   PRN Reason: Constipation


Gabapentin (Neurontin)  100 mg PO BID [facility]


   Last Admin: 11/04/18 17:40 Dose:  100 mg


Ampicillin 2 gm/ Sodium (Chloride)  100 mls @ 50 mls/hr IVPB Q6H [facility]; Protocol


   Last Admin: 11/04/18 16:30 Dose:  50 mls/hr


Ceftazidime/Avibactam 2.5 gm/ (Sodium Chloride)  100 mls @ 50 mls/hr IV Q8H [facility];

Protocol


   Last Admin: 11/04/18 17:45 Dose:  50 mls/hr


Gentamicin Sulfate 120 mg/ (Sodium Chloride)  103 mls @ 100 mls/hr IVPB Q24H 

[facility]; Protocol


   Last Admin: 11/04/18 19:03 Dose:  100 mls/hr


Insulin Aspart (Novolog)  0 unit SC ACHS [facility]; Protocol


   Last Admin: 11/04/18 17:19 Dose:  Not Given


Metoprolol Succinate (Toprol Xl)  50 mg PO DAILY [facility]


   Last Admin: 11/04/18 11:00 Dose:  50 mg


Morphine Sulfate (Morphine)  1 mg IV Q3 PRN


   PRN Reason: Pain, moderate (4-7)


   Last Admin: 11/04/18 20:45 Dose:  1 mg


Nystatin (Nystop Topical Powder)  0 gm EXT BID [facility]


   Last Admin: 11/04/18 18:24 Dose:  1 applic


Ondansetron HCl (Zofran Inj)  4 mg IVP Q4 PRN


   PRN Reason: Nausea/Vomiting


Oxybutynin Chloride (Ditropan Xl)  10 mg PO DAILY [facility]


   Last Admin: 11/04/18 11:00 Dose:  10 mg


Pantoprazole Sodium (Protonix Inj)  40 mg IVP DAILY [facility]


   Last Admin: 11/04/18 11:00 Dose:  40 mg











- Labs


Labs: 


                                        





                                 11/04/18 07:52 





                                 11/04/18 07:52 





                                        











PT  12.9 SECONDS (9.7-12.2)  H  11/03/18  07:00    


 


INR  1.2   11/03/18  07:00    


 


APTT  45 SECONDS (21-34)  H  11/03/18  07:00    














Assessment and Plan


(1) Complicated urinary tract infection


Status: Acute   





(2) Diabetes mellitus with peripheral circulatory disorder


Status: Chronic   





(3) Neurogenic bladder


Status: Chronic   





(4) Obstructive hyperbilirubinemia


Status: Acute

## 2018-11-04 NOTE — CP.PCM.HP
History of Present Illness





- History of Present Illness


History of Present Illness: 





this is 53 y/oBF with hepatic sarcoidosis, with neurogenic bladder, she is here 

with nausea, vomiting, weakness, and scleral icterous, no fever, positive 

chills,





Present on Admission





- Present on Admission


Any Indicators Present on Admission: No


History of DVT/PE: No


History of Uncontrolled Diabetes: No


Urinary Catheter: Yes


Decubitus Ulcer Present: No





Review of Systems





- Constitutional


Constitutional: Headache, Weight Gain





- EENT


Eyes: absent: Blind Spots, Blurred Vision, Change in Vision, Dry Eye, 

Irritation, Itchy Eyes, Loss of Peripheral Vision, Photophobia, Requires 

Corrective Lenses


Ears: absent: As Per HPI, Decreased Hearing, Ear Discharge, Ear Pain, Tinnitus, 

Disequilibrium, Dizziness


Nose/Mouth/Throat: absent: Nasal Congestion, Nasal Discharge, Nasal Obstruction,

Nasal Trauma, Nose Pain, Post Nasal Drip, Sinus Pain, Sinus Pressure, Bleeding 

Gums, Change in Voice, Dry Mouth, Dysphagia, Halitosis, Hoarsness





- Breasts


Breasts: Pain.  absent: Change in Shape





- Cardiovascular


Cardiovascular: Dyspnea, Edema





- Respiratory


Respiratory: Chest Congestion





- Gastrointestinal


Gastrointestinal: Abdominal Pain, Loose Stools.  absent: Coffee Ground Emesis, 

Constipation, Cramping, Diarrhea, Dyspepsia, Dysphagia, Early Satiety, Excessive

Flatus, Fecal Incontinence, Hematemesis, Hematochezia, Melena, Nausea, Temesmus





- Genitourinary


Genitourinary: Difficulty Urinating, Dysuria, Freq UTI





- Reproductive: Female


Reproductive:Female: Menopausal.  absent: Amenorrhea, Amenorrhea/Birth Control, 

Currently Menstual, Cycle >35 Days, Menses 1-7 Days, Menses Variable, Heavy M

enses, S/P Hysterectomy





- Menstruation


Menstruation: absent: No Menses for 6 Months, Heavy Menses, Light Menses, Normal

Menses, S/P Hysterectomy, Menopausal





- Musculoskeletal


Musculoskeletal: Arthralgias, Back Pain, Muscle Weakness, Stiffness





- Integumentary


Integumentary: Dry Skin.  absent: Erythema, Furuncle, Hirsutism, Non-Healing 

Lesions, Photosensitivity, Skin Pain





- Neurological


Neurological: Weakness.  absent: Abnormal Hearing, Abnormal Movements, 

Convulsions, Disequilibrium, Headaches, Lack of Coordination, Loss of Vision





- Psychiatric


Psychiatric: absent: Anhedonia, Anxiety, Difficulty Concentrating, Hopelessness,

Irritability





- Endocrine


Endocrine: Fatigue, Palpitations.  absent: Change in Libido





Past Patient History





- Infectious Disease


Hx of Infectious Diseases: None





- Tetanus Immunizations


Tetanus Immunization: Unknown





- Past Medical History & Family History


Past Medical History?: Yes





- Past Social History


Smoking Status: Never Smoked





- CARDIAC


Hx Cardia Arrhythmia: Yes


Hx Congestive Heart Failure: Yes


Hx Hypercholesterolemia: Yes


Hx Hypertension: Yes





- PULMONARY


Hx Asthma: Yes (NO INHALER-ON PREDNISONE DAILY PO.)





- NEUROLOGICAL


Hx Neurological Disorder: No





- HEENT


Hx HEENT Problems: Yes


Hx Cataracts: Yes (BILAT.)





- RENAL


Hx Chronic Kidney Disease: No





- ENDOCRINE/METABOLIC


Hx Endocrine Disorders: Yes


Hx Diabetes Mellitus Type 1: Yes





- HEMATOLOGICAL/ONCOLOGICAL


Hx Anemia: Yes





- INTEGUMENTARY


Hx Dermatological Problems: No





- MUSCULOSKELETAL/RHEUMATOLOGICAL


Hx Falls: Yes





- GASTROINTESTINAL


Hx Gall Bladder Disease: Yes





- GENITOURINARY/GYNECOLOGICAL


Hx Genitourinary Disorders: Yes


Hx Urinary Tract Infection: Yes


Other/Comment: PT HAS LONG TERM GUTIERREZ





- PSYCHIATRIC


Hx Substance Use: No





- SURGICAL HISTORY


Hx Appendectomy: Yes


Hx Cholecystectomy: Yes


Hx Coronary Stent: Yes





- ANESTHESIA


Hx Anesthesia: Yes


Hx Anesthesia Reactions: No


Hx Malignant Hyperthermia: No





Meds


Allergies/Adverse Reactions: 


                                    Allergies











Allergy/AdvReac Type Severity Reaction Status Date / Time


 


avocado Allergy Severe ANAPHYLAXIS Verified 11/02/18 16:13


 


banana Allergy Severe ANAPHYLAXIS Verified 11/02/18 16:13


 


cherry Allergy Severe ANAPHYLAXIS Verified 11/02/18 16:13


 


ketorolac [From Toradol] Allergy   Verified 11/02/18 16:13














Physical Exam





- Head Exam


Head Exam: ATRAUMATIC, NORMAL INSPECTION, NORMOCEPHALIC





- Eye Exam


Eye Exam: Normal appearance, PERRL, Scleral icterus


Pupil Exam: NORMAL ACCOMODATION





- ENT Exam


ENT Exam: Mucous Membranes Moist, Normal Exam, Normal Oropharynx, TM's Normal 

Bilaterally





- Neck Exam


Neck exam: Positive for: Normal Inspection





- Respiratory Exam


Respiratory Exam: Clear to Auscultation Bilateral, NORMAL BREATHING PATTERN





- Cardiovascular Exam


Cardiovascular Exam: Gallop, REGULAR RHYTHM, +S1, +S2





- GI/Abdominal Exam


GI & Abdominal Exam: Distended, Normal Bowel Sounds





- Rectal Exam


Rectal Exam: absent: Black Stool, Bloody Stool, Hemorrhoids, Fecal Impaction





- Back Exam


Back exam: FULL ROM.  absent: rash noted





- Neurological Exam


Neurological exam: Abnormal Gait, Alert, CN II-XII Intact, Oriented x3





- Skin


Skin Exam: Dry, Intact





Results





- Vital Signs


Recent Vital Signs: 





                                Last Vital Signs











Temp  98 F   11/04/18 16:09


 


Pulse  60   11/04/18 16:09


 


Resp  20   11/04/18 16:09


 


BP  108/70   11/04/18 16:09


 


Pulse Ox  100   11/04/18 16:09














- Labs


Result Diagrams: 


                                 11/06/18 07:03





                                 11/06/18 07:03


Labs: 





                         Laboratory Results - last 24 hr











  11/04/18 11/04/18 11/04/18





  07:33 07:52 07:52


 


WBC   6.1 


 


RBC   2.89 L 


 


Hgb   9.7 L 


 


Hct   27.9 L 


 


MCV   96.5 


 


MCH   33.6 H 


 


MCHC   34.8 


 


RDW   23.0 H 


 


Plt Count   116 L 


 


MPV   9.9 


 


Neut % (Auto)   75.0 


 


Lymph % (Auto)   12.0 L 


 


Mono % (Auto)   10.0 


 


Eos % (Auto)   3.0 


 


Baso % (Auto)   0.0 


 


Neut # (Auto)   4.6 


 


Lymph # (Auto)   0.7 L 


 


Mono # (Auto)   0.6 


 


Eos # (Auto)   0.2 


 


Baso # (Auto)   0.0 


 


Sodium    138


 


Potassium    4.4


 


Chloride    111 H


 


Carbon Dioxide    17 L


 


Anion Gap    14


 


BUN    17


 


Creatinine    0.6 L


 


Est GFR ( Amer)    > 60


 


Est GFR (Non-Af Amer)    > 60


 


POC Glucose (mg/dL)  79  


 


Random Glucose    100


 


Lactic Acid   


 


Calcium    8.4 L


 


Total Bilirubin    13.3 H


 


AST    80 H


 


ALT    42


 


Alkaline Phosphatase    397 H


 


Ammonia   


 


Total Protein    7.2


 


Albumin    3.1 L


 


Globulin    4.1 H


 


Albumin/Globulin Ratio    0.7 L


 


Urine Color   


 


Urine Clarity   


 


Urine pH   


 


Ur Specific Gravity   


 


Urine Protein   


 


Urine Glucose (UA)   


 


Urine Ketones   


 


Urine Blood   


 


Urine Nitrate   


 


Urine Bilirubin   


 


Urine Urobilinogen   


 


Ur Leukocyte Esterase   


 


Urine WBC (Auto)   


 


Urine RBC (Auto)   


 


Ur Squamous Epith Cells   


 


Urine Bacteria   














  11/04/18 11/04/18 11/04/18





  07:52 11:58 16:33


 


WBC   


 


RBC   


 


Hgb   


 


Hct   


 


MCV   


 


MCH   


 


MCHC   


 


RDW   


 


Plt Count   


 


MPV   


 


Neut % (Auto)   


 


Lymph % (Auto)   


 


Mono % (Auto)   


 


Eos % (Auto)   


 


Baso % (Auto)   


 


Neut # (Auto)   


 


Lymph # (Auto)   


 


Mono # (Auto)   


 


Eos # (Auto)   


 


Baso # (Auto)   


 


Sodium   


 


Potassium   


 


Chloride   


 


Carbon Dioxide   


 


Anion Gap   


 


BUN   


 


Creatinine   


 


Est GFR ( Amer)   


 


Est GFR (Non-Af Amer)   


 


POC Glucose (mg/dL)   75  89


 


Random Glucose   


 


Lactic Acid   


 


Calcium   


 


Total Bilirubin   


 


AST   


 


ALT   


 


Alkaline Phosphatase   


 


Ammonia  65 H D  


 


Total Protein   


 


Albumin   


 


Globulin   


 


Albumin/Globulin Ratio   


 


Urine Color   


 


Urine Clarity   


 


Urine pH   


 


Ur Specific Gravity   


 


Urine Protein   


 


Urine Glucose (UA)   


 


Urine Ketones   


 


Urine Blood   


 


Urine Nitrate   


 


Urine Bilirubin   


 


Urine Urobilinogen   


 


Ur Leukocyte Esterase   


 


Urine WBC (Auto)   


 


Urine RBC (Auto)   


 


Ur Squamous Epith Cells   


 


Urine Bacteria   














  11/04/18 11/04/18





  17:34 20:58


 


WBC  


 


RBC  


 


Hgb  


 


Hct  


 


MCV  


 


MCH  


 


MCHC  


 


RDW  


 


Plt Count  


 


MPV  


 


Neut % (Auto)  


 


Lymph % (Auto)  


 


Mono % (Auto)  


 


Eos % (Auto)  


 


Baso % (Auto)  


 


Neut # (Auto)  


 


Lymph # (Auto)  


 


Mono # (Auto)  


 


Eos # (Auto)  


 


Baso # (Auto)  


 


Sodium  


 


Potassium  


 


Chloride  


 


Carbon Dioxide  


 


Anion Gap  


 


BUN  


 


Creatinine  


 


Est GFR ( Amer)  


 


Est GFR (Non-Af Amer)  


 


POC Glucose (mg/dL)  


 


Random Glucose  


 


Lactic Acid   2.1


 


Calcium  


 


Total Bilirubin  


 


AST  


 


ALT  


 


Alkaline Phosphatase  


 


Ammonia  


 


Total Protein  


 


Albumin  


 


Globulin  


 


Albumin/Globulin Ratio  


 


Urine Color  Elise 


 


Urine Clarity  Clear 


 


Urine pH  5.0 


 


Ur Specific Gravity  1.013 


 


Urine Protein  Negative 


 


Urine Glucose (UA)  Normal 


 


Urine Ketones  Negative 


 


Urine Blood  1+ H 


 


Urine Nitrate  Negative 


 


Urine Bilirubin  2+ H 


 


Urine Urobilinogen  2.0 H 


 


Ur Leukocyte Esterase  3+ H 


 


Urine WBC (Auto)  83 H 


 


Urine RBC (Auto)  11 H 


 


Ur Squamous Epith Cells  1 


 


Urine Bacteria  Few H 














Assessment & Plan


(1) Complicated urinary tract infection


Status: Acute   Priority: High   





(2) Diabetes mellitus with peripheral circulatory disorder


Status: Chronic   Priority: Medium   





(3) Neurogenic bladder


Status: Chronic   





(4) Obstructive hyperbilirubinemia


Status: Acute

## 2018-11-05 VITALS — RESPIRATION RATE: 20 BRPM

## 2018-11-05 LAB
ALBUMIN SERPL-MCNC: 3.3 G/DL (ref 3.5–5)
ALBUMIN/GLOB SERPL: 0.8 {RATIO} (ref 1–2.1)
ALT SERPL-CCNC: 38 U/L (ref 9–52)
AST SERPL-CCNC: 92 U/L (ref 14–36)
BASOPHILS # BLD AUTO: 0 K/UL (ref 0–0.2)
BASOPHILS NFR BLD: 0 % (ref 0–2)
BUN SERPL-MCNC: 11 MG/DL (ref 7–17)
CALCIUM SERPL-MCNC: 8.7 MG/DL (ref 8.6–10.4)
EOSINOPHIL # BLD AUTO: 0.1 K/UL (ref 0–0.7)
EOSINOPHIL NFR BLD: 2 % (ref 0–4)
ERYTHROCYTE [DISTWIDTH] IN BLOOD BY AUTOMATED COUNT: 22.9 % (ref 11.5–14.5)
GFR NON-AFRICAN AMERICAN: > 60
HEPATITIS A IGM: NEGATIVE
HEPATITIS B CORE AB: NEGATIVE
HEPATITIS C ANTIBODY: NEGATIVE
HGB BLD-MCNC: 10 G/DL (ref 11–16)
LYMPHOCYTES # BLD AUTO: 0.7 K/UL (ref 1–4.3)
LYMPHOCYTES NFR BLD AUTO: 12 % (ref 20–40)
MCH RBC QN AUTO: 33.1 PG (ref 27–31)
MCHC RBC AUTO-ENTMCNC: 34.4 G/DL (ref 33–37)
MCV RBC AUTO: 96.3 FL (ref 81–99)
MONOCYTES # BLD: 0.2 K/UL (ref 0–0.8)
MONOCYTES NFR BLD: 4 % (ref 0–10)
NEUTROPHILS # BLD: 4.5 K/UL (ref 1.8–7)
NEUTROPHILS NFR BLD AUTO: 82 % (ref 50–75)
NRBC BLD AUTO-RTO: 0 % (ref 0–2)
PLATELET # BLD: 116 K/UL (ref 130–400)
PMV BLD AUTO: 9.6 FL (ref 7.2–11.7)
RBC # BLD AUTO: 3.04 MIL/UL (ref 3.8–5.2)
WBC # BLD AUTO: 5.5 K/UL (ref 4.8–10.8)

## 2018-11-05 RX ADMIN — AMPICILLIN SODIUM SCH MLS/HR: 2 INJECTION, POWDER, FOR SOLUTION INTRAVENOUS at 10:40

## 2018-11-05 RX ADMIN — DIPHENHYDRAMINE HYDROCHLORIDE PRN MG: 50 INJECTION INTRAMUSCULAR; INTRAVENOUS at 00:36

## 2018-11-05 RX ADMIN — DIGOXIN SCH: 0.25 TABLET ORAL at 17:38

## 2018-11-05 RX ADMIN — INSULIN ASPART SCH: 100 INJECTION, SOLUTION INTRAVENOUS; SUBCUTANEOUS at 07:52

## 2018-11-05 RX ADMIN — NYSTATIN SCH APPLIC: 100000 POWDER TOPICAL at 10:39

## 2018-11-05 RX ADMIN — OXYBUTYNIN CHLORIDE SCH MG: 10 TABLET, EXTENDED RELEASE ORAL at 10:38

## 2018-11-05 RX ADMIN — AMPICILLIN SODIUM SCH MLS/HR: 2 INJECTION, POWDER, FOR SOLUTION INTRAVENOUS at 04:02

## 2018-11-05 RX ADMIN — NYSTATIN SCH APPLIC: 100000 POWDER TOPICAL at 17:38

## 2018-11-05 RX ADMIN — METOPROLOL SUCCINATE SCH MG: 50 TABLET, EXTENDED RELEASE ORAL at 10:38

## 2018-11-05 RX ADMIN — DIPHENHYDRAMINE HYDROCHLORIDE PRN MG: 50 INJECTION INTRAMUSCULAR; INTRAVENOUS at 19:31

## 2018-11-05 RX ADMIN — AMPICILLIN SODIUM SCH MLS/HR: 2 INJECTION, POWDER, FOR SOLUTION INTRAVENOUS at 21:25

## 2018-11-05 RX ADMIN — INSULIN ASPART SCH: 100 INJECTION, SOLUTION INTRAVENOUS; SUBCUTANEOUS at 21:25

## 2018-11-05 RX ADMIN — DIPHENHYDRAMINE HYDROCHLORIDE PRN MG: 50 INJECTION INTRAMUSCULAR; INTRAVENOUS at 13:30

## 2018-11-05 RX ADMIN — DIPHENHYDRAMINE HYDROCHLORIDE PRN MG: 50 INJECTION INTRAMUSCULAR; INTRAVENOUS at 16:30

## 2018-11-05 RX ADMIN — INSULIN ASPART SCH: 100 INJECTION, SOLUTION INTRAVENOUS; SUBCUTANEOUS at 16:41

## 2018-11-05 RX ADMIN — DIPHENHYDRAMINE HYDROCHLORIDE PRN MG: 50 INJECTION INTRAMUSCULAR; INTRAVENOUS at 22:30

## 2018-11-05 RX ADMIN — DIPHENHYDRAMINE HYDROCHLORIDE PRN MG: 50 INJECTION INTRAMUSCULAR; INTRAVENOUS at 03:38

## 2018-11-05 RX ADMIN — INSULIN ASPART SCH: 100 INJECTION, SOLUTION INTRAVENOUS; SUBCUTANEOUS at 11:25

## 2018-11-05 RX ADMIN — AMPICILLIN SODIUM SCH MLS/HR: 2 INJECTION, POWDER, FOR SOLUTION INTRAVENOUS at 16:31

## 2018-11-05 NOTE — CP.PCM.PN
Subjective





- Date & Time of Evaluation


Date of Evaluation: 11/05/18


Time of Evaluation: 23:48





- Subjective


Subjective: 





dictated   





Objective





- Vital Signs/Intake and Output


Vital Signs (last 24 hours): 


                                        











Temp Pulse Resp BP Pulse Ox


 


 98.9 F   58 L  20   114/69   100 


 


 11/05/18 16:10  11/05/18 16:10  11/05/18 16:10  11/05/18 16:10  11/05/18 16:10








Intake and Output: 


                                        











 11/05/18 11/06/18





 18:59 06:59


 


Intake Total 475 600


 


Output Total 1000 1200


 


Balance -525 -600














- Medications


Medications: 


                               Current Medications





Apixaban (Eliquis)  5 mg PO BID [facility]


   Last Admin: 11/05/18 17:37 Dose:  5 mg


Digoxin (Lanoxin)  0.25 mg PO Q24H [facility]


   Last Admin: 11/05/18 17:38 Dose:  Not Given


Diphenhydramine HCl (Benadryl)  12.5 mg IVP Q3 PRN


   PRN Reason: Itching / Pruritus


   Last Admin: 11/05/18 22:30 Dose:  12.5 mg


Diphenoxylate HCl/Atropine (Lomotil 0.025-2.5 Mg Tablet)  1 tab PO Q8 PRN


   PRN Reason: Diarrhea


Docusate Sodium (Colace)  100 mg PO TID PRN


   PRN Reason: Constipation


Gabapentin (Neurontin)  100 mg PO BID [facility]


   Last Admin: 11/05/18 17:37 Dose:  100 mg


Ampicillin 2 gm/ Sodium (Chloride)  100 mls @ 50 mls/hr IVPB Q6H [facility]; Protocol


   Last Admin: 11/05/18 21:25 Dose:  50 mls/hr


Ceftazidime/Avibactam 2.5 gm/ (Sodium Chloride)  100 mls @ 50 mls/hr IV Q8H [facility];

Protocol


   Last Admin: 11/05/18 17:36 Dose:  50 mls/hr


Gentamicin Sulfate 120 mg/ (Sodium Chloride)  103 mls @ 100 mls/hr IVPB Q24H 

[facility]; Protocol


   Last Admin: 11/05/18 19:06 Dose:  100 mls/hr


Insulin Aspart (Novolog)  0 unit SC ACHS [facility]; Protocol


   Last Admin: 11/05/18 21:25 Dose:  Not Given


Metoprolol Succinate (Toprol Xl)  50 mg PO DAILY [facility]


   Last Admin: 11/05/18 10:38 Dose:  50 mg


Morphine Sulfate (Morphine)  1 mg IV Q3 PRN


   PRN Reason: Pain, moderate (4-7)


   Last Admin: 11/05/18 22:30 Dose:  1 mg


Nystatin (Nystop Topical Powder)  0 gm EXT BID [facility]


   Last Admin: 11/05/18 17:38 Dose:  1 applic


Ondansetron HCl (Zofran Inj)  4 mg IVP Q4 PRN


   PRN Reason: Nausea/Vomiting


Oxybutynin Chloride (Ditropan Xl)  10 mg PO DAILY [facility]


   Last Admin: 11/05/18 10:38 Dose:  10 mg


Pantoprazole Sodium (Protonix Inj)  40 mg IVP DAILY [facility]


   Last Admin: 11/05/18 10:50 Dose:  40 mg











- Labs


Labs: 


                                        





                                 11/05/18 08:20 





                                 11/05/18 08:20 





                                        











PT  12.9 SECONDS (9.7-12.2)  H  11/03/18  07:00    


 


INR  1.2   11/03/18  07:00    


 


APTT  45 SECONDS (21-34)  H  11/03/18  07:00    














Assessment and Plan


(1) Complicated urinary tract infection


Status: Acute   





(2) Diabetes mellitus with peripheral circulatory disorder


Status: Chronic   





(3) Neurogenic bladder


Status: Chronic   





(4) Obstructive hyperbilirubinemia


Status: Acute

## 2018-11-05 NOTE — CARD
--------------- APPROVED REPORT --------------





Date of service: 11/02/2018



EKG Measurement

Heart [id number]

TX [id number]

[id number]

[id number]

[id number]



<Conclusion>

Unusual P axis, possible ectopic atrial bradycardia

T wave abnormality, consider anterior ischemia

Abnormal ECG

## 2018-11-05 NOTE — PN
DATE:  11/04/2018



SUBJECTIVE:  Laura Jaramillo has jaundice.  She has urine cultures positive

for ESBL, positive for Klebsiella and Enterococcus.  She is on isolation. 

ID is on the case.  She has neurogenic bladder.  No nausea or vomiting.



PHYSICAL EXAMINATION:

VITAL SIGNS:  Blood pressure is 108/70, pulse 60, respiratory 20,

temperature 98.

LUNGS:  Clear.

CARDIOVASCULAR SYSTEM:  S1 and S2 plus S3 positive.

ABDOMEN:  Soft.  Charles is in place.



ASSESSMENT:

1.  Hepatic sarcoidosis.

2.  Urinary tract infection, rule out septicemia.

3.  Type 2 diabetes.

4.  Hypertension.



PLAN:  Antibiotics.  ID followup.  Monitor the patient.





__________________________________________

Nasir Dunbar MD





DD:  11/04/2018 22:16:37

DT:  11/05/2018 0:16:14

Job # [id number]

## 2018-11-05 NOTE — PN
DATE:  11/05/2018



LOCATION  [address], Bed A.



SUBJECTIVE:  This is a 53-year-old female, seen and examined early in

rounds without reported significant clinical changes with intermittent

period again of midepigastric right upper quadrant abdominal pain with

generalized jaundice.  No actual chest pain, palpitation or reported active

bleeding.



Most recent lab results showed hemoglobin of 10, hematocrit 29.2 with

thrombocytopenia of 116 with normal blood glucose level, total bilirubin of

13.4, AST 92, alkaline phosphatase 507 with ammonia level 48 with low

albumin 3.3.



Official report of CAT scan of the abdomen and pelvis, reviewed again.



PHYSICAL EXAMINATION:

GENERAL:  A 53-year-old female, awake and alert, oriented, afebrile with

generalized jaundice, pulse of 62, respiratory rate 20-22 with blood

pressure of 116/82.

HEENT:  Showed pale dry oral mucoid membrane.  Bilateral icteric sclerae.

LUNGS:  Few scattered crepitation.  Decreased air entry at bases.

HEART:  Positive S1 and S2.

ABDOMEN:  Soft with mild generalized tenderness.  No mass or organomegaly. 

No rebound tenderness or guarding.  Abdominal distention noticed with

active bowel sounds.

EXTREMITIES:  With bilateral above-knee amputation.  No clubbing or

cyanosis.

NEUROLOGIC:  No reported new neurological deficits, sensory or motor.



IMPRESSION:

1.  Liver sarcoidosis.

2.  Abnormal liver function test, most likely secondary to above.

3.  Jaundice with recent evidence of biliary spasm and status post biliary

stent insertion.

4.  Recurrent urinary tract infection.

5.  Known history of status post cholecystectomy, status post cardiac stent

in insertion due to coronary artery disease with status post partial right

colon resection.

6.  Multiple past medical history including a bronchial asthma,

hypertension with hyperlipidemia and poorly-controlled diabetes mellitus as

well as cardiac arrhythmias.



SUGGESTIONS:

1.  Continue current management.

2.  Solu-Medrol IV, to be discussed with the ID consultant on the case. 

Further recommendation to follow.







__________________________________________

Jeffrey Albert MD



DD:  11/05/2018 10:00:57

DT:  11/05/2018 10:03:51

Job # [id number]

## 2018-11-05 NOTE — CP.PCM.PN
Subjective





- Date & Time of Evaluation


Date of Evaluation: 11/05/18


Time of Evaluation: 09:30





- Subjective


Subjective: 





Patient seen and evaluated


Denies chest pain and dyspnea





C/O abdominal pain but improving





Objective





- Vital Signs/Intake and Output


Vital Signs (last 24 hours): 


                                        











Temp Pulse Resp BP Pulse Ox


 


 98.9 F   58 L  20   114/69   100 


 


 11/05/18 16:10  11/05/18 16:10  11/05/18 16:10  11/05/18 16:10  11/05/18 16:10








Intake and Output: 


                                        











 11/05/18 11/06/18





 18:59 06:59


 


Intake Total 475 600


 


Output Total 1000 1200


 


Balance -525 -600














- Medications


Medications: 


                               Current Medications





Apixaban (Eliquis)  5 mg PO BID [facility]


   Last Admin: 11/05/18 17:37 Dose:  5 mg


Digoxin (Lanoxin)  0.25 mg PO Q24H [facility]


   Last Admin: 11/05/18 17:38 Dose:  Not Given


Diphenhydramine HCl (Benadryl)  12.5 mg IVP Q3 PRN


   PRN Reason: Itching / Pruritus


   Last Admin: 11/05/18 22:30 Dose:  12.5 mg


Diphenoxylate HCl/Atropine (Lomotil 0.025-2.5 Mg Tablet)  1 tab PO Q8 PRN


   PRN Reason: Diarrhea


Docusate Sodium (Colace)  100 mg PO TID PRN


   PRN Reason: Constipation


Gabapentin (Neurontin)  100 mg PO BID [facility]


   Last Admin: 11/05/18 17:37 Dose:  100 mg


Ampicillin 2 gm/ Sodium (Chloride)  100 mls @ 50 mls/hr IVPB Q6H [facility]; Protocol


   Last Admin: 11/05/18 21:25 Dose:  50 mls/hr


Ceftazidime/Avibactam 2.5 gm/ (Sodium Chloride)  100 mls @ 50 mls/hr IV Q8H [facility];

Protocol


   Last Admin: 11/05/18 17:36 Dose:  50 mls/hr


Gentamicin Sulfate 120 mg/ (Sodium Chloride)  103 mls @ 100 mls/hr IVPB Q24H 

[facility]; Protocol


   Last Admin: 11/05/18 19:06 Dose:  100 mls/hr


Insulin Aspart (Novolog)  0 unit SC ACHS [facility]; Protocol


   Last Admin: 11/05/18 21:25 Dose:  Not Given


Metoprolol Succinate (Toprol Xl)  50 mg PO DAILY [facility]


   Last Admin: 11/05/18 10:38 Dose:  50 mg


Morphine Sulfate (Morphine)  1 mg IV Q3 PRN


   PRN Reason: Pain, moderate (4-7)


   Last Admin: 11/05/18 22:30 Dose:  1 mg


Nystatin (Nystop Topical Powder)  0 gm EXT BID [facility]


   Last Admin: 11/05/18 17:38 Dose:  1 applic


Ondansetron HCl (Zofran Inj)  4 mg IVP Q4 PRN


   PRN Reason: Nausea/Vomiting


Oxybutynin Chloride (Ditropan Xl)  10 mg PO DAILY [facility]


   Last Admin: 11/05/18 10:38 Dose:  10 mg


Pantoprazole Sodium (Protonix Inj)  40 mg IVP DAILY [facility]


   Last Admin: 11/05/18 10:50 Dose:  40 mg











- Labs


Labs: 


                                        





                                 11/05/18 08:20 





                                 11/05/18 08:20 





                                        











PT  12.9 SECONDS (9.7-12.2)  H  11/03/18  07:00    


 


INR  1.2   11/03/18  07:00    


 


APTT  45 SECONDS (21-34)  H  11/03/18  07:00

## 2018-11-05 NOTE — CP.PCM.PN
Subjective





- Date & Time of Evaluation


Date of Evaluation: 11/05/18


Time of Evaluation: 09:00





- Subjective


Subjective: 





on IV rx


still vomiting


await GI eval


cont rx





Objective





- Vital Signs/Intake and Output


Vital Signs (last 24 hours): 


                                        











Temp Pulse Resp BP Pulse Ox


 


 98.3 F   59 L  20   109/73   99 


 


 11/05/18 08:05  11/05/18 08:05  11/05/18 08:05  11/05/18 08:05  11/05/18 08:05








Intake and Output: 


                                        











 11/05/18 11/05/18





 06:59 18:59


 


Intake Total 650 


 


Output Total 1500 


 


Balance -850 














- Medications


Medications: 


                               Current Medications





Apixaban (Eliquis)  5 mg PO BID [facility]


   Last Admin: 11/05/18 10:39 Dose:  5 mg


Digoxin (Lanoxin)  0.25 mg PO Q24H [facility]


   Last Admin: 11/04/18 17:20 Dose:  Not Given


Diphenhydramine HCl (Benadryl)  12.5 mg IVP Q3 PRN


   PRN Reason: Itching / Pruritus


   Last Admin: 11/05/18 03:38 Dose:  12.5 mg


Diphenoxylate HCl/Atropine (Lomotil 0.025-2.5 Mg Tablet)  1 tab PO Q8 PRN


   PRN Reason: Diarrhea


Docusate Sodium (Colace)  100 mg PO TID PRN


   PRN Reason: Constipation


Gabapentin (Neurontin)  100 mg PO BID [facility]


   Last Admin: 11/05/18 10:38 Dose:  100 mg


Ampicillin 2 gm/ Sodium (Chloride)  100 mls @ 50 mls/hr IVPB Q6H [facility]; Protocol


   Last Admin: 11/05/18 10:40 Dose:  50 mls/hr


Ceftazidime/Avibactam 2.5 gm/ (Sodium Chloride)  100 mls @ 50 mls/hr IV Q8H [facility];

Protocol


   Last Admin: 11/05/18 08:14 Dose:  50 mls/hr


Gentamicin Sulfate 120 mg/ (Sodium Chloride)  103 mls @ 100 mls/hr IVPB Q24H 

[facility]; Protocol


   Last Admin: 11/04/18 19:03 Dose:  100 mls/hr


Insulin Aspart (Novolog)  0 unit SC ACHS [facility]; Protocol


   Last Admin: 11/05/18 11:25 Dose:  Not Given


Metoprolol Succinate (Toprol Xl)  50 mg PO DAILY [facility]


   Last Admin: 11/05/18 10:38 Dose:  50 mg


Morphine Sulfate (Morphine)  1 mg IV Q3 PRN


   PRN Reason: Pain, moderate (4-7)


   Last Admin: 11/05/18 10:39 Dose:  1 mg


Nystatin (Nystop Topical Powder)  0 gm EXT BID [facility]


   Last Admin: 11/05/18 10:39 Dose:  1 applic


Ondansetron HCl (Zofran Inj)  4 mg IVP Q4 PRN


   PRN Reason: Nausea/Vomiting


Oxybutynin Chloride (Ditropan Xl)  10 mg PO DAILY [facility]


   Last Admin: 11/05/18 10:38 Dose:  10 mg


Pantoprazole Sodium (Protonix Inj)  40 mg IVP DAILY [facility]


   Last Admin: 11/05/18 10:50 Dose:  40 mg











- Labs


Labs: 


                                        





                                 11/05/18 08:20 





                                 11/05/18 08:20 





                                        











PT  12.9 SECONDS (9.7-12.2)  H  11/03/18  07:00    


 


INR  1.2   11/03/18  07:00    


 


APTT  45 SECONDS (21-34)  H  11/03/18  07:00    














- Constitutional


Appears: Chronically Ill





- Head Exam


Head Exam: NORMOCEPHALIC





- Eye Exam


Eye Exam: absent: Scleral icterus





- ENT Exam


ENT Exam: Mucous Membranes Dry





- Neck Exam


Neck Exam: absent: Lymphadenopathy





- Respiratory Exam


Respiratory Exam: Decreased Breath Sounds





- Cardiovascular Exam


Cardiovascular Exam: REGULAR RHYTHM





- GI/Abdominal Exam


GI & Abdominal Exam: Distended, Soft





- Rectal Exam


Rectal Exam: Deferred





-  Exam


 Exam: NORMAL INSPECTION





Assessment and Plan


(1) Abdominal pain


Status: Acute   





(2) Complicated urinary tract infection


Status: Acute   





(3) Bilirubinemia


Status: Acute

## 2018-11-06 LAB
ALBUMIN SERPL-MCNC: 2.9 G/DL (ref 3.5–5)
ALBUMIN/GLOB SERPL: 0.8 {RATIO} (ref 1–2.1)
ALT SERPL-CCNC: 36 U/L (ref 9–52)
AST SERPL-CCNC: 84 U/L (ref 14–36)
BASOPHILS # BLD AUTO: 0 K/UL (ref 0–0.2)
BASOPHILS NFR BLD: 0 % (ref 0–2)
BUN SERPL-MCNC: 10 MG/DL (ref 7–17)
CALCIUM SERPL-MCNC: 8.4 MG/DL (ref 8.6–10.4)
EOSINOPHIL # BLD AUTO: 0.3 K/UL (ref 0–0.7)
EOSINOPHIL NFR BLD: 6 % (ref 0–4)
ERYTHROCYTE [DISTWIDTH] IN BLOOD BY AUTOMATED COUNT: 22.7 % (ref 11.5–14.5)
GFR NON-AFRICAN AMERICAN: > 60
HGB BLD-MCNC: 9.5 G/DL (ref 11–16)
LYMPHOCYTES # BLD AUTO: 1.2 K/UL (ref 1–4.3)
LYMPHOCYTES NFR BLD AUTO: 26 % (ref 20–40)
MCH RBC QN AUTO: 33 PG (ref 27–31)
MCHC RBC AUTO-ENTMCNC: 34.1 G/DL (ref 33–37)
MCV RBC AUTO: 96.9 FL (ref 81–99)
MONOCYTES # BLD: 0.2 K/UL (ref 0–0.8)
MONOCYTES NFR BLD: 5 % (ref 0–10)
NEUTROPHILS # BLD: 3 K/UL (ref 1.8–7)
NEUTROPHILS NFR BLD AUTO: 63 % (ref 50–75)
NRBC BLD AUTO-RTO: 0 % (ref 0–2)
PLATELET # BLD: 107 K/UL (ref 130–400)
PMV BLD AUTO: 9.5 FL (ref 7.2–11.7)
RBC # BLD AUTO: 2.89 MIL/UL (ref 3.8–5.2)
WBC # BLD AUTO: 4.7 K/UL (ref 4.8–10.8)

## 2018-11-06 RX ADMIN — AMPICILLIN SODIUM SCH MLS/HR: 2 INJECTION, POWDER, FOR SOLUTION INTRAVENOUS at 10:56

## 2018-11-06 RX ADMIN — DIPHENHYDRAMINE HYDROCHLORIDE PRN MG: 50 INJECTION INTRAMUSCULAR; INTRAVENOUS at 01:34

## 2018-11-06 RX ADMIN — DIGOXIN SCH: 0.25 TABLET ORAL at 17:42

## 2018-11-06 RX ADMIN — DIPHENHYDRAMINE HYDROCHLORIDE PRN MG: 50 INJECTION INTRAMUSCULAR; INTRAVENOUS at 07:17

## 2018-11-06 RX ADMIN — INSULIN ASPART SCH: 100 INJECTION, SOLUTION INTRAVENOUS; SUBCUTANEOUS at 11:21

## 2018-11-06 RX ADMIN — INSULIN ASPART SCH: 100 INJECTION, SOLUTION INTRAVENOUS; SUBCUTANEOUS at 17:43

## 2018-11-06 RX ADMIN — AMPICILLIN SODIUM SCH MLS/HR: 2 INJECTION, POWDER, FOR SOLUTION INTRAVENOUS at 17:26

## 2018-11-06 RX ADMIN — NYSTATIN SCH APPLIC: 100000 POWDER TOPICAL at 17:41

## 2018-11-06 RX ADMIN — METOPROLOL SUCCINATE SCH MG: 50 TABLET, EXTENDED RELEASE ORAL at 10:53

## 2018-11-06 RX ADMIN — DIPHENHYDRAMINE HYDROCHLORIDE PRN MG: 50 INJECTION INTRAMUSCULAR; INTRAVENOUS at 17:27

## 2018-11-06 RX ADMIN — INSULIN ASPART SCH: 100 INJECTION, SOLUTION INTRAVENOUS; SUBCUTANEOUS at 07:10

## 2018-11-06 RX ADMIN — NYSTATIN SCH APPLIC: 100000 POWDER TOPICAL at 10:55

## 2018-11-06 RX ADMIN — OXYBUTYNIN CHLORIDE SCH MG: 10 TABLET, EXTENDED RELEASE ORAL at 10:53

## 2018-11-06 RX ADMIN — DIPHENHYDRAMINE HYDROCHLORIDE PRN MG: 50 INJECTION INTRAMUSCULAR; INTRAVENOUS at 04:26

## 2018-11-06 RX ADMIN — INSULIN ASPART SCH: 100 INJECTION, SOLUTION INTRAVENOUS; SUBCUTANEOUS at 21:41

## 2018-11-06 RX ADMIN — AMPICILLIN SODIUM SCH MLS/HR: 2 INJECTION, POWDER, FOR SOLUTION INTRAVENOUS at 04:26

## 2018-11-06 RX ADMIN — DIPHENHYDRAMINE HYDROCHLORIDE PRN MG: 50 INJECTION INTRAMUSCULAR; INTRAVENOUS at 20:52

## 2018-11-06 RX ADMIN — DIPHENHYDRAMINE HYDROCHLORIDE PRN MG: 50 INJECTION INTRAMUSCULAR; INTRAVENOUS at 10:53

## 2018-11-06 RX ADMIN — AMPICILLIN SODIUM SCH MLS/HR: 2 INJECTION, POWDER, FOR SOLUTION INTRAVENOUS at 21:04

## 2018-11-06 NOTE — PN
DATE:  11/05/2018



SUBJECTIVE:  The patient is afebrile.  She has positive urine cultures. 

She has neurogenic bladder.  Urine culture initially is positive for

enterococcus and Klebsiella pneumoniae.  Repeat urine cultures are benign. 

She has deep jaundice.  She has right upper quadrant abdominal pain, and

the patient has no fevers.



PHYSICAL EXAMINATION:

VITAL SIGNS:  Blood pressure 107/60, pulse 61, respiratory rate 20,

temperature 98.4.

LUNGS:  Clear.

CARDIOVASCULAR SYSTEM:  S1 and S2, regular.

ABDOMEN:  Positive enlarged liver.  Positive jaundice.



ASSESSMENT:

1.  Hepatic sarcoidosis.

2.  Urinary tract infection.

3.  Neurogenic bladder.

4.  Diabetes.

5.  Hypertension.



PLAN:  Admit.  Monitor the patient.





__________________________________________

Nasir Dunbar MD







DD:  11/06/2018 0:19:01

DT:  11/06/2018 5:58:36

Job # [id number]

## 2018-11-06 NOTE — CP.PCM.PN
Subjective





- Date & Time of Evaluation


Date of Evaluation: 11/06/18


Time of Evaluation: 09:00





- Subjective


Subjective: 





genta level high


will hold genta





Objective





- Vital Signs/Intake and Output


Vital Signs (last 24 hours): 


                                        











Temp Pulse Resp BP Pulse Ox


 


 98.4 F   61   20   107/68   99 


 


 11/05/18 23:52  11/05/18 23:52  11/05/18 23:52  11/05/18 23:52  11/05/18 23:52








Intake and Output: 


                                        











 11/06/18 11/06/18





 06:59 18:59


 


Intake Total 800 


 


Output Total 2000 


 


Balance -1200 














- Medications


Medications: 


                               Current Medications





Apixaban (Eliquis)  5 mg PO BID [facility]


   Last Admin: 11/06/18 10:53 Dose:  5 mg


Digoxin (Lanoxin)  0.25 mg PO Q24H [facility]


   Last Admin: 11/05/18 17:38 Dose:  Not Given


Diphenhydramine HCl (Benadryl)  12.5 mg IVP Q3 PRN


   PRN Reason: Itching / Pruritus


   Last Admin: 11/06/18 10:53 Dose:  12.5 mg


Diphenoxylate HCl/Atropine (Lomotil 0.025-2.5 Mg Tablet)  1 tab PO Q8 PRN


   PRN Reason: Diarrhea


Docusate Sodium (Colace)  100 mg PO TID PRN


   PRN Reason: Constipation


Gabapentin (Neurontin)  100 mg PO BID [facility]


   Last Admin: 11/06/18 10:54 Dose:  100 mg


Ampicillin 2 gm/ Sodium (Chloride)  100 mls @ 50 mls/hr IVPB Q6H [facility]; Protocol


   Last Admin: 11/06/18 10:56 Dose:  50 mls/hr


Ceftazidime/Avibactam 2.5 gm/ (Sodium Chloride)  100 mls @ 50 mls/hr IV Q8H [facility];

Protocol


   Last Admin: 11/06/18 07:47 Dose:  50 mls/hr


Insulin Aspart (Novolog)  0 unit SC ACHS [facility]; Protocol


   Last Admin: 11/06/18 11:21 Dose:  Not Given


Metoprolol Succinate (Toprol Xl)  50 mg PO DAILY [facility]


   Last Admin: 11/06/18 10:53 Dose:  50 mg


Morphine Sulfate (Morphine)  1 mg IV Q3 PRN


   PRN Reason: Pain, moderate (4-7)


   Last Admin: 11/06/18 10:54 Dose:  1 mg


Nystatin (Nystop Topical Powder)  0 gm EXT BID [facility]


   Last Admin: 11/06/18 10:55 Dose:  1 applic


Ondansetron HCl (Zofran Inj)  4 mg IVP Q4 PRN


   PRN Reason: Nausea/Vomiting


Oxybutynin Chloride (Ditropan Xl)  10 mg PO DAILY [facility]


   Last Admin: 11/06/18 10:53 Dose:  10 mg


Pantoprazole Sodium (Protonix Inj)  40 mg IVP DAILY [facility]


   Last Admin: 11/06/18 10:55 Dose:  40 mg











- Labs


Labs: 


                                        





                                 11/06/18 07:03 





                                 11/06/18 07:03 





                                        











PT  12.9 SECONDS (9.7-12.2)  H  11/03/18  07:00    


 


INR  1.2   11/03/18  07:00    


 


APTT  45 SECONDS (21-34)  H  11/03/18  07:00    














Assessment and Plan


(1) Abdominal pain


Status: Acute   





(2) Complicated urinary tract infection


Status: Acute   





(3) Bilirubinemia


Status: Acute

## 2018-11-07 RX ADMIN — DIPHENHYDRAMINE HYDROCHLORIDE PRN MG: 50 INJECTION INTRAMUSCULAR; INTRAVENOUS at 03:00

## 2018-11-07 RX ADMIN — INSULIN ASPART SCH: 100 INJECTION, SOLUTION INTRAVENOUS; SUBCUTANEOUS at 21:19

## 2018-11-07 RX ADMIN — OXYBUTYNIN CHLORIDE SCH MG: 10 TABLET, EXTENDED RELEASE ORAL at 09:24

## 2018-11-07 RX ADMIN — DIPHENHYDRAMINE HYDROCHLORIDE PRN MG: 50 INJECTION INTRAMUSCULAR; INTRAVENOUS at 18:04

## 2018-11-07 RX ADMIN — NYSTATIN SCH APPLIC: 100000 POWDER TOPICAL at 17:28

## 2018-11-07 RX ADMIN — DIPHENHYDRAMINE HYDROCHLORIDE PRN MG: 50 INJECTION INTRAMUSCULAR; INTRAVENOUS at 09:05

## 2018-11-07 RX ADMIN — AMPICILLIN SODIUM SCH MLS/HR: 2 INJECTION, POWDER, FOR SOLUTION INTRAVENOUS at 03:01

## 2018-11-07 RX ADMIN — DIPHENHYDRAMINE HYDROCHLORIDE PRN MG: 50 INJECTION INTRAMUSCULAR; INTRAVENOUS at 00:10

## 2018-11-07 RX ADMIN — AMPICILLIN SODIUM SCH MLS/HR: 2 INJECTION, POWDER, FOR SOLUTION INTRAVENOUS at 10:36

## 2018-11-07 RX ADMIN — INSULIN ASPART SCH: 100 INJECTION, SOLUTION INTRAVENOUS; SUBCUTANEOUS at 08:09

## 2018-11-07 RX ADMIN — PHENOBARBITAL, HYOSCYAMINE SULFATE, ATROPINE SULFATE, SCOPOLAMINE HYDROBROMIDE SCH TAB: 16.2; .1037; .0194; .0065 TABLET ORAL at 13:35

## 2018-11-07 RX ADMIN — DIPHENHYDRAMINE HYDROCHLORIDE PRN MG: 50 INJECTION INTRAMUSCULAR; INTRAVENOUS at 06:05

## 2018-11-07 RX ADMIN — DIPHENHYDRAMINE HYDROCHLORIDE PRN MG: 50 INJECTION INTRAMUSCULAR; INTRAVENOUS at 12:07

## 2018-11-07 RX ADMIN — DIPHENHYDRAMINE HYDROCHLORIDE PRN MG: 50 INJECTION INTRAMUSCULAR; INTRAVENOUS at 21:00

## 2018-11-07 RX ADMIN — NYSTATIN SCH APPLIC: 100000 POWDER TOPICAL at 09:51

## 2018-11-07 RX ADMIN — INSULIN ASPART SCH: 100 INJECTION, SOLUTION INTRAVENOUS; SUBCUTANEOUS at 11:39

## 2018-11-07 RX ADMIN — INSULIN ASPART SCH: 100 INJECTION, SOLUTION INTRAVENOUS; SUBCUTANEOUS at 16:44

## 2018-11-07 RX ADMIN — DIPHENHYDRAMINE HYDROCHLORIDE PRN MG: 50 INJECTION INTRAMUSCULAR; INTRAVENOUS at 15:02

## 2018-11-07 RX ADMIN — DIGOXIN SCH: 0.25 TABLET ORAL at 17:23

## 2018-11-07 RX ADMIN — AMPICILLIN SODIUM SCH MLS/HR: 2 INJECTION, POWDER, FOR SOLUTION INTRAVENOUS at 16:30

## 2018-11-07 RX ADMIN — METOPROLOL SUCCINATE SCH MG: 50 TABLET, EXTENDED RELEASE ORAL at 09:22

## 2018-11-07 RX ADMIN — AMPICILLIN SODIUM SCH MLS/HR: 2 INJECTION, POWDER, FOR SOLUTION INTRAVENOUS at 21:12

## 2018-11-07 RX ADMIN — PHENOBARBITAL, HYOSCYAMINE SULFATE, ATROPINE SULFATE, SCOPOLAMINE HYDROBROMIDE SCH TAB: 16.2; .1037; .0194; .0065 TABLET ORAL at 17:24

## 2018-11-07 NOTE — CP.PCM.PN
Subjective





- Subjective


Subjective: 





dictated   





Objective





- Vital Signs/Intake and Output


Vital Signs (last 24 hours): 


                                        











Temp Pulse Resp BP Pulse Ox


 


 98.0 F   60   20   122/72   100 


 


 11/07/18 15:15  11/07/18 15:15  11/07/18 15:15  11/07/18 15:15  11/07/18 15:15











- Medications


Medications: 


                               Current Medications





Apixaban (Eliquis)  5 mg PO BID [facility]


   Last Admin: 11/07/18 17:24 Dose:  5 mg


Belladonna/Phenobarbital (Donnatal)  1 tab PO TID [facility]


   Last Admin: 11/07/18 17:24 Dose:  1 tab


Dicyclomine HCl (Bentyl)  20 mg PO QID [facility]


   Last Admin: 11/07/18 21:12 Dose:  20 mg


Digoxin (Lanoxin)  0.25 mg PO Q24H [facility]


   Last Admin: 11/07/18 17:23 Dose:  Not Given


Diphenhydramine HCl (Benadryl)  12.5 mg IVP Q3 PRN


   PRN Reason: Itching / Pruritus


   Last Admin: 11/07/18 21:00 Dose:  12.5 mg


Diphenoxylate HCl/Atropine (Lomotil 0.025-2.5 Mg Tablet)  1 tab PO Q8 PRN


   PRN Reason: Diarrhea


Docusate Sodium (Colace)  100 mg PO TID PRN


   PRN Reason: Constipation


   Last Admin: 11/07/18 10:27 Dose:  100 mg


Gabapentin (Neurontin)  100 mg PO BID [facility]


   Last Admin: 11/07/18 17:24 Dose:  100 mg


Ampicillin 2 gm/ Sodium (Chloride)  100 mls @ 50 mls/hr IVPB Q6H [facility]; Protocol


   Last Admin: 11/07/18 21:12 Dose:  50 mls/hr


Ceftazidime/Avibactam 2.5 gm/ (Sodium Chloride)  100 mls @ 50 mls/hr IV Q8H [facility];

Protocol


   Last Admin: 11/07/18 17:23 Dose:  50 mls/hr


Insulin Aspart (Novolog)  0 unit SC ACHS [facility]; Protocol


   Last Admin: 11/07/18 21:19 Dose:  Not Given


Metoprolol Succinate (Toprol Xl)  50 mg PO DAILY [facility]


   Last Admin: 11/07/18 09:22 Dose:  50 mg


Morphine Sulfate (Morphine)  1 mg IV Q3 PRN


   PRN Reason: Pain, moderate (4-7)


   Last Admin: 11/07/18 21:00 Dose:  1 mg


Nystatin (Nystop Topical Powder)  0 gm EXT BID [facility]


   Last Admin: 11/07/18 17:28 Dose:  1 applic


Ondansetron HCl (Zofran Inj)  4 mg IVP Q4 PRN


   PRN Reason: Nausea/Vomiting


Oxybutynin Chloride (Ditropan Xl)  10 mg PO DAILY [facility]


   Last Admin: 11/07/18 09:24 Dose:  10 mg


Pantoprazole Sodium (Protonix Ec Tab)  40 mg PO DAILY [facility]











- Labs


Labs: 


                                        





                                 11/06/18 07:03 





                                 11/06/18 07:03 





                                        











PT  12.9 SECONDS (9.7-12.2)  H  11/03/18  07:00    


 


INR  1.2   11/03/18  07:00    


 


APTT  45 SECONDS (21-34)  H  11/03/18  07:00    














Assessment and Plan


(1) Complicated urinary tract infection


Status: Acute   





(2) Diabetes mellitus with peripheral circulatory disorder


Status: Chronic   





(3) Neurogenic bladder


Status: Chronic   





(4) Obstructive hyperbilirubinemia


Status: Acute

## 2018-11-07 NOTE — CP.PCM.PN
Subjective





- Date & Time of Evaluation


Date of Evaluation: 11/06/18


Time of Evaluation: 15:20





- Subjective


Subjective: 





dictated   





Objective





- Vital Signs/Intake and Output


Vital Signs (last 24 hours): 


                                        











Temp Pulse Resp BP Pulse Ox


 


 98.7 F   59 L  20   120/76   100 


 


 11/07/18 00:00  11/07/18 00:00  11/07/18 00:00  11/07/18 00:00  11/07/18 00:00








Intake and Output: 


                                        











 11/06/18 11/07/18





 18:59 06:59


 


Intake Total 600 350


 


Output Total 1200 


 


Balance -600 350














- Medications


Medications: 


                               Current Medications





Apixaban (Eliquis)  5 mg PO BID [facility]


   Last Admin: 11/06/18 17:40 Dose:  5 mg


Digoxin (Lanoxin)  0.25 mg PO Q24H [facility]


   Last Admin: 11/06/18 17:42 Dose:  Not Given


Diphenhydramine HCl (Benadryl)  12.5 mg IVP Q3 PRN


   PRN Reason: Itching / Pruritus


   Last Admin: 11/07/18 06:05 Dose:  12.5 mg


Diphenoxylate HCl/Atropine (Lomotil 0.025-2.5 Mg Tablet)  1 tab PO Q8 PRN


   PRN Reason: Diarrhea


Docusate Sodium (Colace)  100 mg PO TID PRN


   PRN Reason: Constipation


Gabapentin (Neurontin)  100 mg PO BID [facility]


   Last Admin: 11/06/18 17:40 Dose:  100 mg


Ampicillin 2 gm/ Sodium (Chloride)  100 mls @ 50 mls/hr IVPB Q6H [facility]; Protocol


   Last Admin: 11/07/18 03:01 Dose:  50 mls/hr


Ceftazidime/Avibactam 2.5 gm/ (Sodium Chloride)  100 mls @ 50 mls/hr IV Q8H [facility];

Protocol


   Last Admin: 11/07/18 00:03 Dose:  50 mls/hr


Insulin Aspart (Novolog)  0 unit SC ACHS [facility]; Protocol


   Last Admin: 11/06/18 21:41 Dose:  Not Given


Metoprolol Succinate (Toprol Xl)  50 mg PO DAILY [facility]


   Last Admin: 11/06/18 10:53 Dose:  50 mg


Morphine Sulfate (Morphine)  1 mg IV Q3 PRN


   PRN Reason: Pain, moderate (4-7)


   Last Admin: 11/07/18 06:05 Dose:  1 mg


Nystatin (Nystop Topical Powder)  0 gm EXT BID [facility]


   Last Admin: 11/06/18 17:41 Dose:  1 applic


Ondansetron HCl (Zofran Inj)  4 mg IVP Q4 PRN


   PRN Reason: Nausea/Vomiting


Oxybutynin Chloride (Ditropan Xl)  10 mg PO DAILY [facility]


   Last Admin: 11/06/18 10:53 Dose:  10 mg


Pantoprazole Sodium (Protonix Inj)  40 mg IVP DAILY [facility]


   Last Admin: 11/06/18 10:55 Dose:  40 mg











- Labs


Labs: 


                                        





                                 11/06/18 07:03 





                                 11/06/18 07:03 





                                        











PT  12.9 SECONDS (9.7-12.2)  H  11/03/18  07:00    


 


INR  1.2   11/03/18  07:00    


 


APTT  45 SECONDS (21-34)  H  11/03/18  07:00    














Assessment and Plan


(1) Complicated urinary tract infection


Status: Acute   





(2) Diabetes mellitus with peripheral circulatory disorder


Status: Chronic   





(3) Neurogenic bladder


Status: Chronic   





(4) Obstructive hyperbilirubinemia


Status: Acute

## 2018-11-07 NOTE — PN
DATE:  11/07/2018



LOCATION:  354, bed A.



SUBJECTIVE:  This is a 53-year-old female seen and examined in rounds with

intermittent periods of severe crampy abdominal pain and less oral intake,

but no reported chest pain, palpitation, significant shortness of breath,

or reported active GI bleeding.  The patient is still complaining of

generalized jaundice.



The entire chart is reviewed including but not limited to the most recent

lab and radiology study results, current and the previous medication list,

current and the previous medical events.



Most recent lab results showed hemoglobin of 9.5, hematocrit 27.9 with

thrombocytopenia of 107, but today's blood glucose level was 134 with

subsequent _____ of total bilirubin to 12.1.  AST 84, alkaline phosphatase

387, albumin 2.9.



PHYSICAL EXAMINATION:

GENERAL:  A 53-year-old female.

VITAL SIGNS:  Afebrile with pulse of 64, respiratory rate 20 to 22, blood

pressure 124/74.

HEENT:  Showed dry pale mucous membrane with bilateral icteric sclerae.

LUNGS:  Few scattered crepitation.  Decreased air entry at bases.

HEART:  Positive S1 and S2.

ABDOMEN:  Mild generalized tenderness and distention, especially in the

epigastric and right upper quadrant area.  No mass or organomegaly.  No

rebound tenderness or guarding.

EXTREMITIES:  Evidence of bilateral above-knee amputation.

NEUROLOGIC:  No reported new neurological deficits, sensory or motor.



IMPRESSION:

1.  Hepatic sarcoidosis.

2.  Abnormal liver function test, jaundice secondary to above.

3.  Status post endoscopic retrograde cholangiopancreatography with biliary

stent insertion.

4.  Poorly-controlled diabetes mellitus.

5.  Peripheral vascular disease, bilateral above-knee amputation.

6.  Hypertension.

7.  Coronary artery disease.

8.  Borderline chronic obstructive pulmonary disease by history.

9.  Anemia, secondary to above.



SUGGESTIONS:

1.  I agree with your plan.

2.  Abdominal ultrasound.

3.  Further recommendation to follow and pain management consultation

should be considered.







__________________________________________

Jeffrey Albert MD





DD:  11/07/2018 10:30:15

DT:  11/07/2018 11:25:00

Job # [id number]

## 2018-11-07 NOTE — PN
DATE:  11/07/2018



SUBJECTIVE:  The patient's urine culture is positive for Klebsiella and

Enterococcus.  The patient is on antibiotics.  She has neurogenic bladder. 

No fever.  She has nausea and no vomiting.



PHYSICAL EXAMINATION:

VITAL SIGNS:  Blood pressure 120/76, pulse 59, respiratory rate 20,

temperature 93.7.

LUNGS:  Clear.

CARDIOVASCULAR SYSTEM:  S1 and S2 _____.

ABDOMEN:  Soft and nontender.  She has Charles in place.

CENTRAL NERVOUS SYSTEM:  Awake, alert, and oriented x3.



ASSESSMENT:

1.  Exacerbation of hepatic sarcoidosis with the patient's total bilirubin

above her limits.

2.  Urinary tract infection _____ due to indwelling Charles catheter.

3.  Hypertension.

4.  Type 2 diabetes.



PLAN:  Continue Solu-Medrol and antibiotic, ID followup.  Monitor the

patient.







__________________________________________

Nasir Dunbar MD



DD:  11/07/2018 6:44:02

DT:  11/07/2018 7:24:08

Job # [id number]

## 2018-11-08 LAB
ALBUMIN SERPL-MCNC: 2.8 G/DL (ref 3.5–5)
ALBUMIN/GLOB SERPL: 0.7 {RATIO} (ref 1–2.1)
ALT SERPL-CCNC: 36 U/L (ref 9–52)
AST SERPL-CCNC: 94 U/L (ref 14–36)
BILIRUB DIRECT SERPL-MCNC: 8.8 MG/DL (ref 0–0.4)
BUN SERPL-MCNC: 9 MG/DL (ref 7–17)
CALCIUM SERPL-MCNC: 8.2 MG/DL (ref 8.6–10.4)
GFR NON-AFRICAN AMERICAN: > 60

## 2018-11-08 RX ADMIN — NYSTATIN SCH APPLIC: 100000 POWDER TOPICAL at 09:28

## 2018-11-08 RX ADMIN — AMPICILLIN SODIUM SCH MLS/HR: 2 INJECTION, POWDER, FOR SOLUTION INTRAVENOUS at 11:12

## 2018-11-08 RX ADMIN — DIPHENHYDRAMINE HYDROCHLORIDE PRN MG: 50 INJECTION INTRAMUSCULAR; INTRAVENOUS at 22:01

## 2018-11-08 RX ADMIN — INSULIN ASPART SCH: 100 INJECTION, SOLUTION INTRAVENOUS; SUBCUTANEOUS at 17:37

## 2018-11-08 RX ADMIN — INSULIN ASPART SCH: 100 INJECTION, SOLUTION INTRAVENOUS; SUBCUTANEOUS at 08:30

## 2018-11-08 RX ADMIN — PHENOBARBITAL, HYOSCYAMINE SULFATE, ATROPINE SULFATE, SCOPOLAMINE HYDROBROMIDE SCH TAB: 16.2; .1037; .0194; .0065 TABLET ORAL at 09:05

## 2018-11-08 RX ADMIN — PANTOPRAZOLE SODIUM SCH MG: 40 TABLET, DELAYED RELEASE ORAL at 09:06

## 2018-11-08 RX ADMIN — AMPICILLIN SODIUM SCH MLS/HR: 2 INJECTION, POWDER, FOR SOLUTION INTRAVENOUS at 21:28

## 2018-11-08 RX ADMIN — DIPHENHYDRAMINE HYDROCHLORIDE PRN MG: 50 INJECTION INTRAMUSCULAR; INTRAVENOUS at 18:50

## 2018-11-08 RX ADMIN — METOPROLOL SUCCINATE SCH MG: 50 TABLET, EXTENDED RELEASE ORAL at 09:05

## 2018-11-08 RX ADMIN — DIPHENHYDRAMINE HYDROCHLORIDE PRN MG: 50 INJECTION INTRAMUSCULAR; INTRAVENOUS at 15:19

## 2018-11-08 RX ADMIN — PHENOBARBITAL, HYOSCYAMINE SULFATE, ATROPINE SULFATE, SCOPOLAMINE HYDROBROMIDE SCH TAB: 16.2; .1037; .0194; .0065 TABLET ORAL at 17:33

## 2018-11-08 RX ADMIN — DIPHENHYDRAMINE HYDROCHLORIDE PRN MG: 50 INJECTION INTRAMUSCULAR; INTRAVENOUS at 00:16

## 2018-11-08 RX ADMIN — DIPHENHYDRAMINE HYDROCHLORIDE PRN MG: 50 INJECTION INTRAMUSCULAR; INTRAVENOUS at 09:12

## 2018-11-08 RX ADMIN — DIPHENHYDRAMINE HYDROCHLORIDE PRN MG: 50 INJECTION INTRAMUSCULAR; INTRAVENOUS at 03:14

## 2018-11-08 RX ADMIN — DIGOXIN SCH MG: 0.25 TABLET ORAL at 17:36

## 2018-11-08 RX ADMIN — DIPHENHYDRAMINE HYDROCHLORIDE PRN MG: 50 INJECTION INTRAMUSCULAR; INTRAVENOUS at 06:08

## 2018-11-08 RX ADMIN — DIPHENHYDRAMINE HYDROCHLORIDE PRN MG: 50 INJECTION INTRAMUSCULAR; INTRAVENOUS at 12:13

## 2018-11-08 RX ADMIN — INSULIN ASPART SCH: 100 INJECTION, SOLUTION INTRAVENOUS; SUBCUTANEOUS at 12:03

## 2018-11-08 RX ADMIN — PHENOBARBITAL, HYOSCYAMINE SULFATE, ATROPINE SULFATE, SCOPOLAMINE HYDROBROMIDE SCH TAB: 16.2; .1037; .0194; .0065 TABLET ORAL at 13:53

## 2018-11-08 RX ADMIN — OXYBUTYNIN CHLORIDE SCH MG: 10 TABLET, EXTENDED RELEASE ORAL at 09:05

## 2018-11-08 RX ADMIN — INSULIN ASPART SCH: 100 INJECTION, SOLUTION INTRAVENOUS; SUBCUTANEOUS at 21:33

## 2018-11-08 RX ADMIN — NYSTATIN SCH APPLIC: 100000 POWDER TOPICAL at 17:33

## 2018-11-08 RX ADMIN — AMPICILLIN SODIUM SCH MLS/HR: 2 INJECTION, POWDER, FOR SOLUTION INTRAVENOUS at 17:00

## 2018-11-08 RX ADMIN — AMPICILLIN SODIUM SCH MLS/HR: 2 INJECTION, POWDER, FOR SOLUTION INTRAVENOUS at 03:16

## 2018-11-08 NOTE — PN
DATE:  11/07/2018



SUBJECTIVE:  The patient, Shivers, is afebrile.  No shortness of breath. 

Blood sugar is better controlled.  She is being evaluated by GI.  No

nausea, vomiting.



PHYSICAL EXAMINATION:

VITAL SIGNS:  Blood pressure is 122/72, pulse 60, respiratory rate 20, and

temperature 98.

LUNGS:  Clear.

CARDIOVASCULAR SYSTEM:  S1 and S2 plus S3 positive.

ABDOMEN:  Positive hepatomegaly.



ASSESSMENT:

1.  Hepatic sarcoidosis, rule out obstructive biliary disease.

2.  Hypertension.

3.  Diabetes.

4.  Neurogenic bladder.



PLAN:  Continue antibiotics, prednisone.  Monitor the patient.





__________________________________________

Nasir Dunbar MD



DD:  11/07/2018 23:16:17

DT:  11/08/2018 2:10:06

Job # [id number]

## 2018-11-08 NOTE — CP.PCM.PN
Subjective





- Date & Time of Evaluation


Date of Evaluation: 11/07/18


Time of Evaluation: 15:20





- Subjective


Subjective: 





Patient seen and evaluated


denies chest pain and dyspnea





CAD


s/p b/l AKA


HTN


Abdominal pain





Medical management





Objective





- Vital Signs/Intake and Output


Vital Signs (last 24 hours): 


                                        











Temp Pulse Resp BP Pulse Ox


 


 98.0 F   60   20   122/72   100 


 


 11/07/18 15:15  11/07/18 15:15  11/07/18 15:15  11/07/18 15:15  11/07/18 15:15








Intake and Output: 


                                        











 11/07/18 11/08/18





 18:59 06:59


 


Intake Total  540


 


Output Total  450


 


Balance  90














- Medications


Medications: 


                               Current Medications





Apixaban (Eliquis)  5 mg PO BID [facility]


   Last Admin: 11/07/18 17:24 Dose:  5 mg


Belladonna/Phenobarbital (Donnatal)  1 tab PO TID [facility]


   Last Admin: 11/07/18 17:24 Dose:  1 tab


Dicyclomine HCl (Bentyl)  20 mg PO QID [facility]


   Last Admin: 11/07/18 21:12 Dose:  20 mg


Digoxin (Lanoxin)  0.25 mg PO Q24H [facility]


   Last Admin: 11/07/18 17:23 Dose:  Not Given


Diphenhydramine HCl (Benadryl)  12.5 mg IVP Q3 PRN


   PRN Reason: Itching / Pruritus


   Last Admin: 11/07/18 21:00 Dose:  12.5 mg


Diphenoxylate HCl/Atropine (Lomotil 0.025-2.5 Mg Tablet)  1 tab PO Q8 PRN


   PRN Reason: Diarrhea


Docusate Sodium (Colace)  100 mg PO TID PRN


   PRN Reason: Constipation


   Last Admin: 11/07/18 10:27 Dose:  100 mg


Gabapentin (Neurontin)  100 mg PO BID [facility]


   Last Admin: 11/07/18 17:24 Dose:  100 mg


Ampicillin 2 gm/ Sodium (Chloride)  100 mls @ 50 mls/hr IVPB Q6H [facility]; Protocol


   Last Admin: 11/07/18 21:12 Dose:  50 mls/hr


Ceftazidime/Avibactam 2.5 gm/ (Sodium Chloride)  100 mls @ 50 mls/hr IV Q8H [facility];

Protocol


   Last Admin: 11/07/18 17:23 Dose:  50 mls/hr


Insulin Aspart (Novolog)  0 unit SC ACHS [facility]; Protocol


   Last Admin: 11/07/18 21:19 Dose:  Not Given


Metoprolol Succinate (Toprol Xl)  50 mg PO DAILY [facility]


   Last Admin: 11/07/18 09:22 Dose:  50 mg


Morphine Sulfate (Morphine)  1 mg IV Q3 PRN


   PRN Reason: Pain, moderate (4-7)


   Last Admin: 11/07/18 21:00 Dose:  1 mg


Nystatin (Nystop Topical Powder)  0 gm EXT BID [facility]


   Last Admin: 11/07/18 17:28 Dose:  1 applic


Ondansetron HCl (Zofran Inj)  4 mg IVP Q4 PRN


   PRN Reason: Nausea/Vomiting


Oxybutynin Chloride (Ditropan Xl)  10 mg PO DAILY [facility]


   Last Admin: 11/07/18 09:24 Dose:  10 mg


Pantoprazole Sodium (Protonix Ec Tab)  40 mg PO DAILY [facility]











- Labs


Labs: 


                                        





                                 11/06/18 07:03 





                                 11/06/18 07:03 





                                        











PT  12.9 SECONDS (9.7-12.2)  H  11/03/18  07:00    


 


INR  1.2   11/03/18  07:00    


 


APTT  45 SECONDS (21-34)  H  11/03/18  07:00

## 2018-11-08 NOTE — PN
DATE:  11/08/2018



LOCATION:  354, bed A.



SUBJECTIVE:  This is a 53-year-old female seen and examined early in rounds

today without significant clinical changes or reported active bleeding, but

with right upper quadrant abdominal pain on and off.



The entire chart is reviewed including but not limited to the most recent

lab and radiology study results, current and the previous medication list,

current and the previous medical events, allergy to medication list as well

as all the available current and the previous medical records.



Case discussed with the staff at length.



The patient denied any actual chest pain, palpitation, shortness of breath

and no reported chills or fever.



Most recent lab results showed blood glucose level of 75; however, the

patient still has low hemoglobin and hematocrit with low platelet count and

elevated liver function test including total bilirubin, AST and alkaline

phosphatase with low albumin by recent lab results.



PHYSICAL EXAMINATION:

GENERAL:  A 53-year-old female.

VITAL SIGNS:  Afebrile with pulse of 62, respiratory rate 20 to 22, blood

pressure of 116/70.

HEENT:  Showed pale, dry mucous membrane.  Bilateral icteric sclerae.

LUNGS:  Few scattered crepitation.  Decreased air entry at bases.

HEART:  Positive S1 and S2.

ABDOMEN:  Soft with mild distention with generalized tenderness, but mainly

in the right upper quadrant area.  No mass or organomegaly.  No rebound

tenderness or guarding.

EXTREMITIES:  With evidence of bilateral above-knee amputation.

NEUROLOGICAL:  No reported new neurological deficits, sensory or motor.



IMPRESSION:

1.  Jaundice, most likely secondary to hepatocellular injury.

2.  Hepatic sarcoidosis.

3.  Abnormal liver function tests secondary to above.

4.  Papillary stenosis with status post endoscopic retrograde

cholangiopancreatography with biliary stent insertion.

5.  Known history of hypertension, diabetes mellitus, coronary artery

disease.

6.  Peripheral vascular disease with status post bilateral above-knee

amputation.

7.  Status post cholecystectomy as well as partial colon resection.

8.  Borderline chronic obstructive pulmonary disease.

9.  Anemia secondary to above.



SUGGESTIONS:

1.  Continue current management.

2.  The patient to be evaluated by the liver transplant team from

[facility] in [place], [place] for potential liver transplant

after full evaluation.

3.  Further recommendation to follow.  We will follow up closely with you.







__________________________________________

Jeffrey Albert MD



DD:  11/08/2018 7:58:49

DT:  11/08/2018 8:02:18

[facility] # [id number]

## 2018-11-09 LAB
ALBUMIN SERPL-MCNC: 2.9 G/DL (ref 3.5–5)
ALBUMIN/GLOB SERPL: 0.8 {RATIO} (ref 1–2.1)
ALT SERPL-CCNC: 30 U/L (ref 9–52)
AST SERPL-CCNC: 97 U/L (ref 14–36)
BILIRUB DIRECT SERPL-MCNC: 8.8 MG/DL (ref 0–0.4)
BUN SERPL-MCNC: 10 MG/DL (ref 7–17)
CALCIUM SERPL-MCNC: 8.2 MG/DL (ref 8.6–10.4)
GFR NON-AFRICAN AMERICAN: > 60

## 2018-11-09 RX ADMIN — PANTOPRAZOLE SODIUM SCH MG: 40 TABLET, DELAYED RELEASE ORAL at 09:21

## 2018-11-09 RX ADMIN — PHENOBARBITAL, HYOSCYAMINE SULFATE, ATROPINE SULFATE, SCOPOLAMINE HYDROBROMIDE SCH TAB: 16.2; .1037; .0194; .0065 TABLET ORAL at 17:40

## 2018-11-09 RX ADMIN — DIPHENHYDRAMINE HYDROCHLORIDE PRN MG: 50 INJECTION INTRAMUSCULAR; INTRAVENOUS at 19:57

## 2018-11-09 RX ADMIN — DIPHENHYDRAMINE HYDROCHLORIDE PRN MG: 50 INJECTION INTRAMUSCULAR; INTRAVENOUS at 01:14

## 2018-11-09 RX ADMIN — NYSTATIN SCH APPLIC: 100000 POWDER TOPICAL at 09:21

## 2018-11-09 RX ADMIN — DIPHENHYDRAMINE HYDROCHLORIDE PRN MG: 50 INJECTION INTRAMUSCULAR; INTRAVENOUS at 10:16

## 2018-11-09 RX ADMIN — AMPICILLIN SODIUM SCH MLS/HR: 2 INJECTION, POWDER, FOR SOLUTION INTRAVENOUS at 09:22

## 2018-11-09 RX ADMIN — METOPROLOL SUCCINATE SCH MG: 50 TABLET, EXTENDED RELEASE ORAL at 09:21

## 2018-11-09 RX ADMIN — NYSTATIN SCH APPLIC: 100000 POWDER TOPICAL at 17:43

## 2018-11-09 RX ADMIN — DIPHENHYDRAMINE HYDROCHLORIDE PRN MG: 50 INJECTION INTRAMUSCULAR; INTRAVENOUS at 23:00

## 2018-11-09 RX ADMIN — INSULIN ASPART SCH: 100 INJECTION, SOLUTION INTRAVENOUS; SUBCUTANEOUS at 07:46

## 2018-11-09 RX ADMIN — OXYBUTYNIN CHLORIDE SCH MG: 10 TABLET, EXTENDED RELEASE ORAL at 09:20

## 2018-11-09 RX ADMIN — INSULIN ASPART SCH: 100 INJECTION, SOLUTION INTRAVENOUS; SUBCUTANEOUS at 12:29

## 2018-11-09 RX ADMIN — DIPHENHYDRAMINE HYDROCHLORIDE PRN MG: 50 INJECTION INTRAMUSCULAR; INTRAVENOUS at 04:06

## 2018-11-09 RX ADMIN — DIPHENHYDRAMINE HYDROCHLORIDE PRN MG: 50 INJECTION INTRAMUSCULAR; INTRAVENOUS at 16:24

## 2018-11-09 RX ADMIN — PHENOBARBITAL, HYOSCYAMINE SULFATE, ATROPINE SULFATE, SCOPOLAMINE HYDROBROMIDE SCH TAB: 16.2; .1037; .0194; .0065 TABLET ORAL at 13:29

## 2018-11-09 RX ADMIN — PHENOBARBITAL, HYOSCYAMINE SULFATE, ATROPINE SULFATE, SCOPOLAMINE HYDROBROMIDE SCH TAB: 16.2; .1037; .0194; .0065 TABLET ORAL at 09:20

## 2018-11-09 RX ADMIN — DIPHENHYDRAMINE HYDROCHLORIDE PRN MG: 50 INJECTION INTRAMUSCULAR; INTRAVENOUS at 13:27

## 2018-11-09 RX ADMIN — AMPICILLIN SODIUM SCH MLS/HR: 2 INJECTION, POWDER, FOR SOLUTION INTRAVENOUS at 21:22

## 2018-11-09 RX ADMIN — AMPICILLIN SODIUM SCH MLS/HR: 2 INJECTION, POWDER, FOR SOLUTION INTRAVENOUS at 16:15

## 2018-11-09 RX ADMIN — AMPICILLIN SODIUM SCH MLS/HR: 2 INJECTION, POWDER, FOR SOLUTION INTRAVENOUS at 04:05

## 2018-11-09 RX ADMIN — DIGOXIN SCH: 0.25 TABLET ORAL at 17:42

## 2018-11-09 RX ADMIN — DIPHENHYDRAMINE HYDROCHLORIDE PRN MG: 50 INJECTION INTRAMUSCULAR; INTRAVENOUS at 07:01

## 2018-11-09 NOTE — PN
DATE:  11/08/2018



SUBJECTIVE:  The patient, Shivers, is feeling better.  She is afebrile. 

She has loose stool, but she denies any diarrhea.  No fever.



PHYSICAL EXAMINATION:

VITAL SIGNS:  Blood pressure 118/64, pulse 74, respiratory rate 20,

temperature 98.1.

LUNGS:  Clear.

CARDIOVASCULAR SYSTEM:  S1 and S2 are regular.

ABDOMEN:  Enlarged liver.



ASSESSMENT:

1.  Advanced hepatic sarcoidosis.

2.  Type 2 diabetes.

3.  Hypertension.

4.  Neurogenic bladder with _____.



PLAN:  Continue current medications.  Monitor the patient.





__________________________________________

Nasir Dunbar MD





DD:  11/08/2018 23:10:24

DT:  11/09/2018 1:03:04

Job # [id number]

## 2018-11-09 NOTE — CP.PCM.PN
Subjective





- Date & Time of Evaluation


Date of Evaluation: 11/09/18


Time of Evaluation: 08:00





- Subjective


Subjective: 





events noted


afeb on IV rx


antibiotics renewed 





Objective





- Vital Signs/Intake and Output


Vital Signs (last 24 hours): 


                                        











Temp Pulse Resp BP Pulse Ox


 


 98.7 F   60   20   124/75   100 


 


 11/09/18 16:00  11/09/18 16:00  11/09/18 16:00  11/09/18 16:00  11/09/18 16:00








Intake and Output: 


                                        











 11/09/18 11/10/18





 18:59 06:59


 


Intake Total 700 


 


Output Total 400 


 


Balance 300 














- Medications


Medications: 


                               Current Medications





Apixaban (Eliquis)  5 mg PO BID [facility]


   Last Admin: 11/09/18 17:41 Dose:  5 mg


Belladonna/Phenobarbital (Donnatal)  1 tab PO TID [facility]


   Last Admin: 11/09/18 17:40 Dose:  1 tab


Dicyclomine HCl (Bentyl)  20 mg PO QID [facility]


   Last Admin: 11/09/18 17:41 Dose:  20 mg


Digoxin (Lanoxin)  0.25 mg PO Q24H [facility]


   Last Admin: 11/09/18 17:42 Dose:  Not Given


Diphenhydramine HCl (Benadryl)  12.5 mg IVP Q3 PRN


   PRN Reason: Itching / Pruritus


   Last Admin: 11/09/18 16:24 Dose:  12.5 mg


Diphenoxylate HCl/Atropine (Lomotil 0.025-2.5 Mg Tablet)  1 tab PO Q8 PRN


   PRN Reason: Diarrhea


Docusate Sodium (Colace)  100 mg PO TID PRN


   PRN Reason: Constipation


   Last Admin: 11/09/18 09:21 Dose:  100 mg


Gabapentin (Neurontin)  100 mg PO BID [facility]


   Last Admin: 11/09/18 17:40 Dose:  100 mg


Ampicillin 2 gm/ Sodium (Chloride)  100 mls @ 50 mls/hr IVPB Q6H [facility]; Protocol


   Last Admin: 11/09/18 16:15 Dose:  50 mls/hr


Ceftazidime/Avibactam 2.5 gm/ (Sodium Chloride)  100 mls @ 50 mls/hr IV Q8H [facility];

Protocol


   Last Admin: 11/09/18 17:43 Dose:  50 mls/hr


Insulin Aspart (Novolog)  0 unit SC ACHS [facility]; Protocol


   Last Admin: 11/09/18 12:29 Dose:  Not Given


Metoprolol Succinate (Toprol Xl)  50 mg PO DAILY [facility]


   Last Admin: 11/09/18 09:21 Dose:  50 mg


Morphine Sulfate (Morphine)  1 mg IV Q3 PRN


   PRN Reason: Pain, moderate (4-7)


   Last Admin: 11/09/18 16:25 Dose:  1 mg


Nystatin (Nystop Topical Powder)  0 gm EXT BID [facility]


   Last Admin: 11/09/18 17:43 Dose:  1 applic


Ondansetron HCl (Zofran Inj)  4 mg IVP Q4 PRN


   PRN Reason: Nausea/Vomiting


Oxybutynin Chloride (Ditropan Xl)  10 mg PO DAILY [facility]


   Last Admin: 11/09/18 09:20 Dose:  10 mg


Pantoprazole Sodium (Protonix Ec Tab)  40 mg PO DAILY [facility]


   Last Admin: 11/09/18 09:21 Dose:  40 mg











- Labs


Labs: 


                                        





                                 11/06/18 07:03 





                                 11/09/18 07:17 





                                        











PT  12.9 SECONDS (9.7-12.2)  H  11/03/18  07:00    


 


INR  1.2   11/03/18  07:00    


 


APTT  45 SECONDS (21-34)  H  11/03/18  07:00    














- Constitutional


Appears: Chronically Ill





- Head Exam


Head Exam: NORMAL INSPECTION





- Eye Exam


Eye Exam: Scleral icterus





- ENT Exam


ENT Exam: Mucous Membranes Dry





- Neck Exam


Neck Exam: absent: Lymphadenopathy





- Respiratory Exam


Respiratory Exam: Decreased Breath Sounds





- Cardiovascular Exam


Cardiovascular Exam: REGULAR RHYTHM





- GI/Abdominal Exam


GI & Abdominal Exam: Distended, Soft





Assessment and Plan


(1) Abdominal pain


Status: Acute   





(2) Complicated urinary tract infection


Status: Acute   





(3) Bilirubinemia


Status: Acute

## 2018-11-09 NOTE — CP.PCM.PN
Subjective





- Subjective


Subjective: 





dictated   





Objective





- Vital Signs/Intake and Output


Vital Signs (last 24 hours): 


                                        











Temp Pulse Resp BP Pulse Ox


 


 98.7 F   60   20   124/75   100 


 


 11/09/18 16:00  11/09/18 16:00  11/09/18 16:00  11/09/18 16:00  11/09/18 16:00








Intake and Output: 


                                        











 11/09/18 11/10/18





 18:59 06:59


 


Intake Total 700 


 


Output Total 400 


 


Balance 300 














- Medications


Medications: 


                               Current Medications





Apixaban (Eliquis)  5 mg PO BID [facility]


   Last Admin: 11/09/18 17:41 Dose:  5 mg


Belladonna/Phenobarbital (Donnatal)  1 tab PO TID [facility]


   Last Admin: 11/09/18 17:40 Dose:  1 tab


Dicyclomine HCl (Bentyl)  20 mg PO QID [facility]


   Last Admin: 11/09/18 21:22 Dose:  20 mg


Digoxin (Lanoxin)  0.25 mg PO Q24H [facility]


   Last Admin: 11/09/18 17:42 Dose:  Not Given


Diphenhydramine HCl (Benadryl)  12.5 mg IVP Q3 PRN


   PRN Reason: Itching / Pruritus


   Last Admin: 11/09/18 19:57 Dose:  12.5 mg


Diphenoxylate HCl/Atropine (Lomotil 0.025-2.5 Mg Tablet)  1 tab PO Q8 PRN


   PRN Reason: Diarrhea


Docusate Sodium (Colace)  100 mg PO TID PRN


   PRN Reason: Constipation


   Last Admin: 11/09/18 09:21 Dose:  100 mg


Gabapentin (Neurontin)  100 mg PO BID [facility]


   Last Admin: 11/09/18 17:40 Dose:  100 mg


Ampicillin 2 gm/ Sodium (Chloride)  100 mls @ 50 mls/hr IVPB Q6H [facility]; Protocol


   Last Admin: 11/09/18 21:22 Dose:  50 mls/hr


Ceftazidime/Avibactam 2.5 gm/ (Sodium Chloride)  100 mls @ 50 mls/hr IV Q8H [facility];

Protocol


   Last Admin: 11/09/18 17:43 Dose:  50 mls/hr


Insulin Aspart (Novolog)  0 unit SC ACHS [facility]; Protocol


   Last Admin: 11/09/18 12:29 Dose:  Not Given


Metoprolol Succinate (Toprol Xl)  50 mg PO DAILY [facility]


   Last Admin: 11/09/18 09:21 Dose:  50 mg


Morphine Sulfate (Morphine)  1 mg IV Q3 PRN


   PRN Reason: Pain, moderate (4-7)


   Last Admin: 11/09/18 19:57 Dose:  1 mg


Nystatin (Nystop Topical Powder)  0 gm EXT BID [facility]


   Last Admin: 11/09/18 17:43 Dose:  1 applic


Ondansetron HCl (Zofran Inj)  4 mg IVP Q4 PRN


   PRN Reason: Nausea/Vomiting


Oxybutynin Chloride (Ditropan Xl)  10 mg PO DAILY [facility]


   Last Admin: 11/09/18 09:20 Dose:  10 mg


Pantoprazole Sodium (Protonix Ec Tab)  40 mg PO DAILY [facility]


   Last Admin: 11/09/18 09:21 Dose:  40 mg











- Labs


Labs: 


                                        





                                 11/06/18 07:03 





                                 11/09/18 07:17 





                                        











PT  12.9 SECONDS (9.7-12.2)  H  11/03/18  07:00    


 


INR  1.2   11/03/18  07:00    


 


APTT  45 SECONDS (21-34)  H  11/03/18  07:00    














Assessment and Plan


(1) Complicated urinary tract infection


Status: Acute   





(2) Diabetes mellitus with peripheral circulatory disorder


Status: Chronic   





(3) Neurogenic bladder


Status: Chronic   





(4) Obstructive hyperbilirubinemia


Status: Acute

## 2018-11-09 NOTE — CP.PCM.PN
Subjective





- Date & Time of Evaluation


Date of Evaluation: 11/08/18


Time of Evaluation: 18:30





- Subjective


Subjective: 





this is 53 y/oBF with hepatic sarcoidosis, with neurogenic bladder, she is here 

with nausea, vomiting, weakness, and scleral icterous, no fever, positive 

chills,





Review of Systems





- Constitutional


Constitutional: Headache, Weight Gain





- EENT


Eyes: absent: Blind Spots, Blurred Vision, Change in Vision, Dry Eye, 

Irritation, Itchy Eyes, Loss of Peripheral Vision, Photophobia, Requires 

Corrective Lenses


Ears: absent: As Per HPI, Decreased Hearing, Ear Discharge, Ear Pain, Tinnitus, 

Disequilibrium, Dizziness


Nose/Mouth/Throat: absent: Nasal Congestion, Nasal Discharge, Nasal Obstruction,

Nasal Trauma, Nose Pain, Post Nasal Drip, Sinus Pain, Sinus Pressure, Bleeding 

Gums, Change in Voice, Dry Mouth, Dysphagia, Halitosis, Hoarsness





- Breasts


Breasts: Pain.  absent: Change in Shape





- Cardiovascular


Cardiovascular: Dyspnea, Edema





- Respiratory


Respiratory: Chest Congestion





- Gastrointestinal


Gastrointestinal: Abdominal Pain, Loose Stools.  absent: Coffee Ground Emesis, 

Constipation, Cramping, Diarrhea, Dyspepsia, Dysphagia, Early Satiety, Excessive

Flatus, Fecal Incontinence, Hematemesis, Hematochezia, Melena, Nausea, Temesmus





- Genitourinary


Genitourinary: Difficulty Urinating, Dysuria, Freq UTI





- Reproductive: Female


Reproductive:Female: Menopausal.  absent: Amenorrhea, Amenorrhea/Birth Control, 

Currently Menstual, Cycle >35 Days, Menses 1-7 Days, Menses Variable, Heavy 

Menses, S/P Hysterectomy





- Menstruation


Menstruation: absent: No Menses for 6 Months, Heavy Menses, Light Menses, Normal

Menses, S/P Hysterectomy, Menopausal





- Musculoskeletal


Musculoskeletal: Arthralgias, Back Pain, Muscle Weakness, Stiffness





- Integumentary


Integumentary: Dry Skin.  absent: Erythema, Furuncle, Hirsutism, Non-Healing 

Lesions, Photosensitivity, Skin Pain





- Neurological


Neurological: Weakness.  absent: Abnormal Hearing, Abnormal Movements, 

Convulsions, Disequilibrium, Headaches, Lack of Coordination, Loss of Vision





- Psychiatric


Psychiatric: absent: Anhedonia, Anxiety, Difficulty Concentrating, Hopelessness,

Irritability





- Endocrine


Endocrine: Fatigue, Palpitations.  absent: Change in Libido











Physical Exam





- Head Exam


Head Exam: ATRAUMATIC, NORMAL INSPECTION, NORMOCEPHALIC





- Eye Exam


Eye Exam: Normal appearance, PERRL, Scleral icterus


Pupil Exam: NORMAL ACCOMODATION





- ENT Exam


ENT Exam: Mucous Membranes Moist, Normal Exam, Normal Oropharynx, TM's Normal 

Bilaterally





- Neck Exam


Neck exam: Positive for: Normal Inspection





- Respiratory Exam


Respiratory Exam: Clear to Auscultation Bilateral, NORMAL BREATHING PATTERN





- Cardiovascular Exam


Cardiovascular Exam: Gallop, REGULAR RHYTHM, +S1, +S2





- GI/Abdominal Exam


GI & Abdominal Exam: Distended, Normal Bowel Sounds





- Rectal Exam


Rectal Exam: absent: Black Stool, Bloody Stool, Hemorrhoids, Fecal Impaction





- Back Exam


Back exam: FULL ROM.  absent: rash noted





- Neurological Exam


Neurological exam: Abnormal Gait, Alert, CN II-XII Intact, Oriented x3





- Skin


Skin Exam: Dry, Intact











Assessment & Plan


(1) Complicated urinary tract infection


Status: Acute   Priority: High   





(2) Diabetes mellitus with peripheral circulatory disorder


Status: Chronic   Priority: Medium   





(3) Neurogenic bladder


Status: Chronic   





(4) Obstructive hyperbilirubinemia


Status: Acute   





(5) CAD Hx of Coronary stents


Status: Stable   








Objective





- Vital Signs/Intake and Output


Vital Signs (last 24 hours): 


                                        











Temp Pulse Resp BP Pulse Ox


 


 98.5 F   60   20   122/77   100 


 


 11/09/18 00:00  11/09/18 00:00  11/09/18 00:00  11/09/18 00:00  11/09/18 00:00








Intake and Output: 


                                        











 11/09/18 11/09/18





 06:59 18:59


 


Intake Total 380 


 


Output Total 500 


 


Balance -120 














- Medications


Medications: 


                               Current Medications





Apixaban (Eliquis)  5 mg PO BID [facility]


   Last Admin: 11/08/18 17:33 Dose:  5 mg


Belladonna/Phenobarbital (Donnatal)  1 tab PO TID [facility]


   Last Admin: 11/08/18 17:33 Dose:  1 tab


Dicyclomine HCl (Bentyl)  20 mg PO QID [facility]


   Last Admin: 11/08/18 21:31 Dose:  20 mg


Digoxin (Lanoxin)  0.25 mg PO Q24H [facility]


   Last Admin: 11/08/18 17:36 Dose:  0.25 mg


Diphenhydramine HCl (Benadryl)  12.5 mg IVP Q3 PRN


   PRN Reason: Itching / Pruritus


   Last Admin: 11/09/18 07:01 Dose:  12.5 mg


Diphenoxylate HCl/Atropine (Lomotil 0.025-2.5 Mg Tablet)  1 tab PO Q8 PRN


   PRN Reason: Diarrhea


Docusate Sodium (Colace)  100 mg PO TID PRN


   PRN Reason: Constipation


   Last Admin: 11/07/18 10:27 Dose:  100 mg


Gabapentin (Neurontin)  100 mg PO BID [facility]


   Last Admin: 11/08/18 17:33 Dose:  100 mg


Ampicillin 2 gm/ Sodium (Chloride)  100 mls @ 50 mls/hr IVPB Q6H [facility]; Protocol


   Last Admin: 11/09/18 04:05 Dose:  50 mls/hr


Ceftazidime/Avibactam 2.5 gm/ (Sodium Chloride)  100 mls @ 50 mls/hr IV Q8H [facility];

Protocol


   Last Admin: 11/09/18 00:25 Dose:  50 mls/hr


Insulin Aspart (Novolog)  0 unit SC ACHS [facility]; Protocol


   Last Admin: 11/08/18 21:33 Dose:  Not Given


Metoprolol Succinate (Toprol Xl)  50 mg PO DAILY [facility]


   Last Admin: 11/08/18 09:05 Dose:  50 mg


Morphine Sulfate (Morphine)  1 mg IV Q3 PRN


   PRN Reason: Pain, moderate (4-7)


   Last Admin: 11/09/18 07:01 Dose:  1 mg


Nystatin (Nystop Topical Powder)  0 gm EXT BID [facility]


   Last Admin: 11/08/18 17:33 Dose:  1 applic


Ondansetron HCl (Zofran Inj)  4 mg IVP Q4 PRN


   PRN Reason: Nausea/Vomiting


Oxybutynin Chloride (Ditropan Xl)  10 mg PO DAILY [facility]


   Last Admin: 11/08/18 09:05 Dose:  10 mg


Pantoprazole Sodium (Protonix Ec Tab)  40 mg PO DAILY [facility]


   Last Admin: 11/08/18 09:06 Dose:  40 mg











- Labs


Labs: 


                                        





                                 11/06/18 07:03 





                                 11/08/18 11:38 





                                        











PT  12.9 SECONDS (9.7-12.2)  H  11/03/18  07:00    


 


INR  1.2   11/03/18  07:00    


 


APTT  45 SECONDS (21-34)  H  11/03/18  07:00

## 2018-11-10 RX ADMIN — AMPICILLIN SODIUM SCH MLS/HR: 2 INJECTION, POWDER, FOR SOLUTION INTRAVENOUS at 17:24

## 2018-11-10 RX ADMIN — DIPHENHYDRAMINE HYDROCHLORIDE PRN MG: 50 INJECTION INTRAMUSCULAR; INTRAVENOUS at 09:28

## 2018-11-10 RX ADMIN — METOPROLOL SUCCINATE SCH MG: 50 TABLET, EXTENDED RELEASE ORAL at 09:26

## 2018-11-10 RX ADMIN — AMPICILLIN SODIUM SCH MLS/HR: 2 INJECTION, POWDER, FOR SOLUTION INTRAVENOUS at 22:10

## 2018-11-10 RX ADMIN — DIPHENHYDRAMINE HYDROCHLORIDE PRN MG: 50 INJECTION INTRAMUSCULAR; INTRAVENOUS at 02:00

## 2018-11-10 RX ADMIN — DIPHENHYDRAMINE HYDROCHLORIDE PRN MG: 50 INJECTION INTRAMUSCULAR; INTRAVENOUS at 20:53

## 2018-11-10 RX ADMIN — DIPHENHYDRAMINE HYDROCHLORIDE PRN MG: 50 INJECTION INTRAMUSCULAR; INTRAVENOUS at 17:31

## 2018-11-10 RX ADMIN — AMPICILLIN SODIUM SCH MLS/HR: 2 INJECTION, POWDER, FOR SOLUTION INTRAVENOUS at 11:00

## 2018-11-10 RX ADMIN — AMPICILLIN SODIUM SCH MLS/HR: 2 INJECTION, POWDER, FOR SOLUTION INTRAVENOUS at 03:17

## 2018-11-10 RX ADMIN — INSULIN ASPART SCH: 100 INJECTION, SOLUTION INTRAVENOUS; SUBCUTANEOUS at 07:42

## 2018-11-10 RX ADMIN — PHENOBARBITAL, HYOSCYAMINE SULFATE, ATROPINE SULFATE, SCOPOLAMINE HYDROBROMIDE SCH TAB: 16.2; .1037; .0194; .0065 TABLET ORAL at 17:25

## 2018-11-10 RX ADMIN — INSULIN ASPART SCH: 100 INJECTION, SOLUTION INTRAVENOUS; SUBCUTANEOUS at 22:17

## 2018-11-10 RX ADMIN — PHENOBARBITAL, HYOSCYAMINE SULFATE, ATROPINE SULFATE, SCOPOLAMINE HYDROBROMIDE SCH TAB: 16.2; .1037; .0194; .0065 TABLET ORAL at 09:27

## 2018-11-10 RX ADMIN — PANTOPRAZOLE SODIUM SCH MG: 40 TABLET, DELAYED RELEASE ORAL at 09:25

## 2018-11-10 RX ADMIN — PHENOBARBITAL, HYOSCYAMINE SULFATE, ATROPINE SULFATE, SCOPOLAMINE HYDROBROMIDE SCH TAB: 16.2; .1037; .0194; .0065 TABLET ORAL at 13:05

## 2018-11-10 RX ADMIN — DIGOXIN SCH: 0.25 TABLET ORAL at 17:30

## 2018-11-10 RX ADMIN — INSULIN ASPART SCH: 100 INJECTION, SOLUTION INTRAVENOUS; SUBCUTANEOUS at 11:56

## 2018-11-10 RX ADMIN — NYSTATIN SCH APPLIC: 100000 POWDER TOPICAL at 10:17

## 2018-11-10 RX ADMIN — OXYBUTYNIN CHLORIDE SCH MG: 10 TABLET, EXTENDED RELEASE ORAL at 09:27

## 2018-11-10 RX ADMIN — DIPHENHYDRAMINE HYDROCHLORIDE PRN MG: 50 INJECTION INTRAMUSCULAR; INTRAVENOUS at 13:05

## 2018-11-10 RX ADMIN — NYSTATIN SCH APPLIC: 100000 POWDER TOPICAL at 18:17

## 2018-11-10 RX ADMIN — DIPHENHYDRAMINE HYDROCHLORIDE PRN MG: 50 INJECTION INTRAMUSCULAR; INTRAVENOUS at 05:00

## 2018-11-10 RX ADMIN — INSULIN ASPART SCH: 100 INJECTION, SOLUTION INTRAVENOUS; SUBCUTANEOUS at 17:33

## 2018-11-10 NOTE — CP.PCM.PN
Subjective





- Date & Time of Evaluation


Date of Evaluation: 11/09/18


Time of Evaluation: 16:20





- Subjective


Subjective: 





this is 53 y/oBF with hepatic sarcoidosis, with neurogenic bladder, she is here 

with nausea, vomiting, weakness, and scleral icterous, no fever, positive 

chills,





Review of Systems





- Constitutional


Constitutional: Headache, Weight Gain





- EENT


Eyes: absent: Blind Spots, Blurred Vision, Change in Vision, Dry Eye, 

Irritation, Itchy Eyes, Loss of Peripheral Vision, Photophobia, Requires 

Corrective Lenses


Ears: absent: As Per HPI, Decreased Hearing, Ear Discharge, Ear Pain, Tinnitus, 

Disequilibrium, Dizziness


Nose/Mouth/Throat: absent: Nasal Congestion, Nasal Discharge, Nasal Obstruction,

Nasal Trauma, Nose Pain, Post Nasal Drip, Sinus Pain, Sinus Pressure, Bleeding 

Gums, Change in Voice, Dry Mouth, Dysphagia, Halitosis, Hoarsness





- Breasts


Breasts: Pain.  absent: Change in Shape





- Cardiovascular


Cardiovascular: Dyspnea, Edema





- Respiratory


Respiratory: Chest Congestion





- Gastrointestinal


Gastrointestinal: Abdominal Pain, Loose Stools.  absent: Coffee Ground Emesis, 

Constipation, Cramping, Diarrhea, Dyspepsia, Dysphagia, Early Satiety, Excessive

Flatus, Fecal Incontinence, Hematemesis, Hematochezia, Melena, Nausea, Temesmus





- Genitourinary


Genitourinary: Difficulty Urinating, Dysuria, Freq UTI





- Reproductive: Female


Reproductive:Female: Menopausal.  absent: Amenorrhea, Amenorrhea/Birth Control, 

Currently Menstual, Cycle >35 Days, Menses 1-7 Days, Menses Variable, Heavy 

Menses, S/P Hysterectomy





- Menstruation


Menstruation: absent: No Menses for 6 Months, Heavy Menses, Light Menses, Normal

Menses, S/P Hysterectomy, Menopausal





- Musculoskeletal


Musculoskeletal: Arthralgias, Back Pain, Muscle Weakness, Stiffness





- Integumentary


Integumentary: Dry Skin.  absent: Erythema, Furuncle, Hirsutism, Non-Healing 

Lesions, Photosensitivity, Skin Pain





- Neurological


Neurological: Weakness.  absent: Abnormal Hearing, Abnormal Movements, 

Convulsions, Disequilibrium, Headaches, Lack of Coordination, Loss of Vision





- Psychiatric


Psychiatric: absent: Anhedonia, Anxiety, Difficulty Concentrating, Hopelessness,

Irritability





- Endocrine


Endocrine: Fatigue, Palpitations.  absent: Change in Libido











Physical Exam





- Head Exam


Head Exam: ATRAUMATIC, NORMAL INSPECTION, NORMOCEPHALIC





- Eye Exam


Eye Exam: Normal appearance, PERRL, Scleral icterus


Pupil Exam: NORMAL ACCOMODATION





- ENT Exam


ENT Exam: Mucous Membranes Moist, Normal Exam, Normal Oropharynx, TM's Normal 

Bilaterally





- Neck Exam


Neck exam: Positive for: Normal Inspection





- Respiratory Exam


Respiratory Exam: Clear to Auscultation Bilateral, NORMAL BREATHING PATTERN





- Cardiovascular Exam


Cardiovascular Exam: Gallop, REGULAR RHYTHM, +S1, +S2





- GI/Abdominal Exam


GI & Abdominal Exam: Distended, Normal Bowel Sounds





- Rectal Exam


Rectal Exam: absent: Black Stool, Bloody Stool, Hemorrhoids, Fecal Impaction





- Back Exam


Back exam: FULL ROM.  absent: rash noted





- Neurological Exam


Neurological exam: Abnormal Gait, Alert, CN II-XII Intact, Oriented x3





- Skin


Skin Exam: Dry, Intact











Assessment & Plan


(1) Complicated urinary tract infection


Status: Acute   Priority: High   





(2) Diabetes mellitus with peripheral circulatory disorder


Status: Chronic   Priority: Medium   





(3) Neurogenic bladder


Status: Chronic   





(4) Obstructive hyperbilirubinemia


Status: Acute   





(5) CAD Hx of Coronary stents


Status: Stable   








Objective





- Vital Signs/Intake and Output


Vital Signs (last 24 hours): 


                                        











Temp Pulse Resp BP Pulse Ox


 


 98 F   61   20   116/69   99 


 


 11/10/18 08:06  11/10/18 08:06  11/10/18 08:06  11/10/18 08:06  11/10/18 08:06








Intake and Output: 


                                        











 11/10/18 11/10/18





 06:59 18:59


 


Intake Total 200 450


 


Output Total 1300 800


 


Balance -1100 -350














- Medications


Medications: 


                               Current Medications





Apixaban (Eliquis)  5 mg PO BID [facility]


   Last Admin: 11/10/18 09:25 Dose:  5 mg


Belladonna/Phenobarbital (Donnatal)  1 tab PO TID [facility]


   Last Admin: 11/10/18 09:27 Dose:  1 tab


Dicyclomine HCl (Bentyl)  20 mg PO QID [facility]


   Last Admin: 11/10/18 09:27 Dose:  20 mg


Digoxin (Lanoxin)  0.25 mg PO Q24H [facility]


   Last Admin: 11/09/18 17:42 Dose:  Not Given


Diphenhydramine HCl (Benadryl)  12.5 mg IVP Q3 PRN


   PRN Reason: Itching / Pruritus


   Last Admin: 11/10/18 09:28 Dose:  12.5 mg


Diphenoxylate HCl/Atropine (Lomotil 0.025-2.5 Mg Tablet)  1 tab PO Q8 PRN


   PRN Reason: Diarrhea


Docusate Sodium (Colace)  100 mg PO TID PRN


   PRN Reason: Constipation


   Last Admin: 11/10/18 09:25 Dose:  100 mg


Gabapentin (Neurontin)  100 mg PO BID [facility]


   Last Admin: 11/10/18 09:25 Dose:  100 mg


Ampicillin 2 gm/ Sodium (Chloride)  100 mls @ 50 mls/hr IVPB Q6H [facility]; Protocol


   Last Admin: 11/10/18 03:17 Dose:  50 mls/hr


Ceftazidime/Avibactam 2.5 gm/ (Sodium Chloride)  100 mls @ 50 mls/hr IV Q8H [facility];

Protocol


   Last Admin: 11/10/18 09:25 Dose:  50 mls/hr


Insulin Aspart (Novolog)  0 unit SC ACHS [facility]; Protocol


   Last Admin: 11/10/18 07:42 Dose:  Not Given


Metoprolol Succinate (Toprol Xl)  50 mg PO DAILY [facility]


   Last Admin: 11/10/18 09:26 Dose:  50 mg


Morphine Sulfate (Morphine)  1 mg IV Q3 PRN


   PRN Reason: Pain, moderate (4-7)


   Last Admin: 11/10/18 09:26 Dose:  1 mg


Nystatin (Nystop Topical Powder)  0 gm EXT BID [facility]


   Last Admin: 11/09/18 17:43 Dose:  1 applic


Ondansetron HCl (Zofran Inj)  4 mg IVP Q4 PRN


   PRN Reason: Nausea/Vomiting


Oxybutynin Chloride (Ditropan Xl)  10 mg PO DAILY [facility]


   Last Admin: 11/10/18 09:27 Dose:  10 mg


Pantoprazole Sodium (Protonix Ec Tab)  40 mg PO DAILY [facility]


   Last Admin: 11/10/18 09:25 Dose:  40 mg











- Labs


Labs: 


                                        





                                 11/06/18 07:03 





                                 11/09/18 07:17 





                                        











PT  12.9 SECONDS (9.7-12.2)  H  11/03/18  07:00    


 


INR  1.2   11/03/18  07:00    


 


APTT  45 SECONDS (21-34)  H  11/03/18  07:00

## 2018-11-10 NOTE — CP.PCM.PN
Subjective





- Subjective


Subjective: 





dictated   





Objective





- Vital Signs/Intake and Output


Vital Signs (last 24 hours): 


                                        











Temp Pulse Resp BP Pulse Ox


 


 97.7 F   60   20   103/67   98 


 


 11/10/18 16:00  11/10/18 16:00  11/10/18 16:00  11/10/18 16:00  11/10/18 16:00








Intake and Output: 


                                        











 11/10/18 11/11/18





 18:59 06:59


 


Intake Total 1150 


 


Output Total 1500 


 


Balance -350 














- Medications


Medications: 


                               Current Medications





Apixaban (Eliquis)  5 mg PO BID [facility]


   Last Admin: 11/10/18 17:31 Dose:  5 mg


Belladonna/Phenobarbital (Donnatal)  1 tab PO TID [facility]


   Last Admin: 11/10/18 17:25 Dose:  1 tab


Dicyclomine HCl (Bentyl)  20 mg PO QID [facility]


   Last Admin: 11/10/18 17:26 Dose:  20 mg


Digoxin (Lanoxin)  0.25 mg PO Q24H [facility]


   Last Admin: 11/10/18 17:30 Dose:  Not Given


Diphenhydramine HCl (Benadryl)  12.5 mg IVP Q3 PRN


   PRN Reason: Itching / Pruritus


   Last Admin: 11/10/18 17:31 Dose:  12.5 mg


Diphenoxylate HCl/Atropine (Lomotil 0.025-2.5 Mg Tablet)  1 tab PO Q8 PRN


   PRN Reason: Diarrhea


Docusate Sodium (Colace)  100 mg PO TID PRN


   PRN Reason: Constipation


   Last Admin: 11/10/18 09:25 Dose:  100 mg


Gabapentin (Neurontin)  100 mg PO BID [facility]


   Last Admin: 11/10/18 17:26 Dose:  100 mg


Ampicillin 2 gm/ Sodium (Chloride)  100 mls @ 50 mls/hr IVPB Q6H [facility]; Protocol


   Last Admin: 11/10/18 17:24 Dose:  50 mls/hr


Ceftazidime/Avibactam 2.5 gm/ (Sodium Chloride)  100 mls @ 50 mls/hr IV Q8H [facility];

Protocol


   Last Admin: 11/10/18 18:51 Dose:  50 mls/hr


Insulin Aspart (Novolog)  0 unit SC ACHS [facility]; Protocol


   Last Admin: 11/10/18 17:33 Dose:  Not Given


Metoprolol Succinate (Toprol Xl)  50 mg PO DAILY [facility]


   Last Admin: 11/10/18 09:26 Dose:  50 mg


Morphine Sulfate (Morphine)  1 mg IV Q3 PRN


   PRN Reason: Pain, moderate (4-7)


   Last Admin: 11/10/18 17:32 Dose:  1 mg


Nystatin (Nystop Topical Powder)  0 gm EXT BID [facility]


   Last Admin: 11/10/18 10:17 Dose:  1 applic


Ondansetron HCl (Zofran Inj)  4 mg IVP Q4 PRN


   PRN Reason: Nausea/Vomiting


Oxybutynin Chloride (Ditropan Xl)  10 mg PO DAILY [facility]


   Last Admin: 11/10/18 09:27 Dose:  10 mg


Pantoprazole Sodium (Protonix Ec Tab)  40 mg PO DAILY [facility]


   Last Admin: 11/10/18 09:25 Dose:  40 mg











- Labs


Labs: 


                                        





                                 11/06/18 07:03 





                                 11/09/18 07:17 





                                        











PT  12.9 SECONDS (9.7-12.2)  H  11/03/18  07:00    


 


INR  1.2   11/03/18  07:00    


 


APTT  45 SECONDS (21-34)  H  11/03/18  07:00    














Assessment and Plan


(1) Complicated urinary tract infection


Status: Acute   





(2) Diabetes mellitus with peripheral circulatory disorder


Status: Chronic   





(3) Neurogenic bladder


Status: Chronic   





(4) Obstructive hyperbilirubinemia


Status: Acute

## 2018-11-10 NOTE — CP.PCM.PN
Subjective





- Subjective


Subjective: 





dictated   





Objective





- Vital Signs/Intake and Output


Vital Signs (last 24 hours): 


                                        











Temp Pulse Resp BP Pulse Ox


 


 97.7 F   60   20   103/67   98 


 


 11/10/18 16:00  11/10/18 16:00  11/10/18 16:00  11/10/18 16:00  11/10/18 16:00








Intake and Output: 


                                        











 11/10/18 11/11/18





 18:59 06:59


 


Intake Total 1150 


 


Output Total 1500 


 


Balance -350 














- Medications


Medications: 


                               Current Medications





Apixaban (Eliquis)  5 mg PO BID [facility]


   Last Admin: 11/10/18 17:31 Dose:  5 mg


Belladonna/Phenobarbital (Donnatal)  1 tab PO TID [facility]


   Last Admin: 11/10/18 17:25 Dose:  1 tab


Dicyclomine HCl (Bentyl)  20 mg PO QID [facility]


   Last Admin: 11/10/18 17:26 Dose:  20 mg


Digoxin (Lanoxin)  0.25 mg PO Q24H [facility]


   Last Admin: 11/10/18 17:30 Dose:  Not Given


Diphenhydramine HCl (Benadryl)  12.5 mg IVP Q3 PRN


   PRN Reason: Itching / Pruritus


   Last Admin: 11/10/18 17:31 Dose:  12.5 mg


Diphenoxylate HCl/Atropine (Lomotil 0.025-2.5 Mg Tablet)  1 tab PO Q8 PRN


   PRN Reason: Diarrhea


Docusate Sodium (Colace)  100 mg PO TID PRN


   PRN Reason: Constipation


   Last Admin: 11/10/18 09:25 Dose:  100 mg


Gabapentin (Neurontin)  100 mg PO BID [facility]


   Last Admin: 11/10/18 17:26 Dose:  100 mg


Ampicillin 2 gm/ Sodium (Chloride)  100 mls @ 50 mls/hr IVPB Q6H [facility]; Protocol


   Last Admin: 11/10/18 17:24 Dose:  50 mls/hr


Ceftazidime/Avibactam 2.5 gm/ (Sodium Chloride)  100 mls @ 50 mls/hr IV Q8H [facility];

Protocol


   Last Admin: 11/10/18 18:51 Dose:  50 mls/hr


Insulin Aspart (Novolog)  0 unit SC ACHS [facility]; Protocol


   Last Admin: 11/10/18 17:33 Dose:  Not Given


Metoprolol Succinate (Toprol Xl)  50 mg PO DAILY [facility]


   Last Admin: 11/10/18 09:26 Dose:  50 mg


Morphine Sulfate (Morphine)  1 mg IV Q3 PRN


   PRN Reason: Pain, moderate (4-7)


   Last Admin: 11/10/18 17:32 Dose:  1 mg


Nystatin (Nystop Topical Powder)  0 gm EXT BID [facility]


   Last Admin: 11/10/18 10:17 Dose:  1 applic


Ondansetron HCl (Zofran Inj)  4 mg IVP Q4 PRN


   PRN Reason: Nausea/Vomiting


Oxybutynin Chloride (Ditropan Xl)  10 mg PO DAILY [facility]


   Last Admin: 11/10/18 09:27 Dose:  10 mg


Pantoprazole Sodium (Protonix Ec Tab)  40 mg PO DAILY [facility]


   Last Admin: 11/10/18 09:25 Dose:  40 mg











- Labs


Labs: 


                                        





                                 11/06/18 07:03 





                                 11/09/18 07:17 





                                        











PT  12.9 SECONDS (9.7-12.2)  H  11/03/18  07:00    


 


INR  1.2   11/03/18  07:00    


 


APTT  45 SECONDS (21-34)  H  11/03/18  07:00    














Assessment and Plan


(1) Complicated urinary tract infection


Status: Acute   





(2) Diabetes mellitus with peripheral circulatory disorder


Status: Chronic   





(3) Neurogenic bladder


Status: Chronic   





(4) Obstructive hyperbilirubinemia


Status: Acute

## 2018-11-10 NOTE — PN
DATE:  11/09/2018



SUBJECTIVE:  Laura Jaramillo is being seen by ID.  Her bilirubin remains

elevated.  The patient is on Solu-Medrol.  Case discussed with Dr. Olmedo. 

No fever.  No chills.  Has loose stools.  No C. difficile.



PHYSICAL EXAMINATION:

VITAL SIGNS:  /75, pulse 60, respiratory rate 16, temperature 98.7.

LUNGS:  Clear.  No rales.  No rhonchi.

CARDIOVASCULAR SYSTEM:  S1, S2, plus S3 positive.

ABDOMEN:  Enlarged liver.

CENTRAL NERVOUS SYSTEM:  Awake, alert, oriented x3.



ASSESSMENT:

1.  Hepatic sarcoidosis.

2.  Diabetes.

3.  Hypertension.

4.  Congestive heart failure.



PLAN:  Continue Solu-Medrol.  Patient's bilirubin is trending down. 

Monitor the patient.







__________________________________________

Nasir Dunbar MD





DD:  11/09/2018 22:30:53

DT:  11/09/2018 23:48:58

Job # [id number]

## 2018-11-11 LAB
ALBUMIN SERPL-MCNC: 2.9 G/DL (ref 3.5–5)
ALBUMIN/GLOB SERPL: 0.7 {RATIO} (ref 1–2.1)
ALT SERPL-CCNC: 33 U/L (ref 9–52)
AST SERPL-CCNC: 97 U/L (ref 14–36)
BASOPHILS # BLD AUTO: 0.1 K/UL (ref 0–0.2)
BASOPHILS NFR BLD: 1 % (ref 0–2)
BILIRUB DIRECT SERPL-MCNC: 7.7 MG/DL (ref 0–0.4)
BUN SERPL-MCNC: 10 MG/DL (ref 7–17)
CALCIUM SERPL-MCNC: 8 MG/DL (ref 8.6–10.4)
EOSINOPHIL # BLD AUTO: 0.3 K/UL (ref 0–0.7)
EOSINOPHIL NFR BLD: 6 % (ref 0–4)
ERYTHROCYTE [DISTWIDTH] IN BLOOD BY AUTOMATED COUNT: 21.9 % (ref 11.5–14.5)
GFR NON-AFRICAN AMERICAN: > 60
HGB BLD-MCNC: 9.6 G/DL (ref 11–16)
LYMPHOCYTES # BLD AUTO: 0.8 K/UL (ref 1–4.3)
LYMPHOCYTES NFR BLD AUTO: 20 % (ref 20–40)
MCH RBC QN AUTO: 33.1 PG (ref 27–31)
MCHC RBC AUTO-ENTMCNC: 34.1 G/DL (ref 33–37)
MCV RBC AUTO: 97 FL (ref 81–99)
MONOCYTES # BLD: 0.5 K/UL (ref 0–0.8)
MONOCYTES NFR BLD: 14 % (ref 0–10)
NEUTROPHILS # BLD: 2.3 K/UL (ref 1.8–7)
NEUTROPHILS NFR BLD AUTO: 59 % (ref 50–75)
NRBC BLD AUTO-RTO: 0 % (ref 0–2)
PLATELET # BLD: 135 K/UL (ref 130–400)
PMV BLD AUTO: 10 FL (ref 7.2–11.7)
RBC # BLD AUTO: 2.91 MIL/UL (ref 3.8–5.2)
WBC # BLD AUTO: 4 K/UL (ref 4.8–10.8)

## 2018-11-11 RX ADMIN — DIPHENHYDRAMINE HYDROCHLORIDE PRN MG: 50 INJECTION INTRAMUSCULAR; INTRAVENOUS at 03:02

## 2018-11-11 RX ADMIN — PANTOPRAZOLE SODIUM SCH MG: 40 TABLET, DELAYED RELEASE ORAL at 09:21

## 2018-11-11 RX ADMIN — DIPHENHYDRAMINE HYDROCHLORIDE PRN MG: 50 INJECTION INTRAMUSCULAR; INTRAVENOUS at 09:19

## 2018-11-11 RX ADMIN — NYSTATIN SCH APPLIC: 100000 POWDER TOPICAL at 09:22

## 2018-11-11 RX ADMIN — NYSTATIN SCH APPLIC: 100000 POWDER TOPICAL at 18:50

## 2018-11-11 RX ADMIN — AMPICILLIN SODIUM SCH MLS/HR: 2 INJECTION, POWDER, FOR SOLUTION INTRAVENOUS at 03:05

## 2018-11-11 RX ADMIN — DIPHENHYDRAMINE HYDROCHLORIDE PRN MG: 50 INJECTION INTRAMUSCULAR; INTRAVENOUS at 16:37

## 2018-11-11 RX ADMIN — AMPICILLIN SODIUM SCH MLS/HR: 2 INJECTION, POWDER, FOR SOLUTION INTRAVENOUS at 16:45

## 2018-11-11 RX ADMIN — DIPHENHYDRAMINE HYDROCHLORIDE PRN MG: 50 INJECTION INTRAMUSCULAR; INTRAVENOUS at 00:00

## 2018-11-11 RX ADMIN — DIPHENHYDRAMINE HYDROCHLORIDE PRN MG: 50 INJECTION INTRAMUSCULAR; INTRAVENOUS at 23:48

## 2018-11-11 RX ADMIN — METOPROLOL SUCCINATE SCH MG: 50 TABLET, EXTENDED RELEASE ORAL at 09:21

## 2018-11-11 RX ADMIN — DIPHENHYDRAMINE HYDROCHLORIDE PRN MG: 50 INJECTION INTRAMUSCULAR; INTRAVENOUS at 06:04

## 2018-11-11 RX ADMIN — PHENOBARBITAL, HYOSCYAMINE SULFATE, ATROPINE SULFATE, SCOPOLAMINE HYDROBROMIDE SCH TAB: 16.2; .1037; .0194; .0065 TABLET ORAL at 09:21

## 2018-11-11 RX ADMIN — INSULIN ASPART SCH: 100 INJECTION, SOLUTION INTRAVENOUS; SUBCUTANEOUS at 12:37

## 2018-11-11 RX ADMIN — DIPHENHYDRAMINE HYDROCHLORIDE PRN MG: 50 INJECTION INTRAMUSCULAR; INTRAVENOUS at 12:43

## 2018-11-11 RX ADMIN — DIGOXIN SCH: 0.25 TABLET ORAL at 17:46

## 2018-11-11 RX ADMIN — INSULIN ASPART SCH: 100 INJECTION, SOLUTION INTRAVENOUS; SUBCUTANEOUS at 17:10

## 2018-11-11 RX ADMIN — INSULIN ASPART SCH: 100 INJECTION, SOLUTION INTRAVENOUS; SUBCUTANEOUS at 08:22

## 2018-11-11 RX ADMIN — PHENOBARBITAL, HYOSCYAMINE SULFATE, ATROPINE SULFATE, SCOPOLAMINE HYDROBROMIDE SCH TAB: 16.2; .1037; .0194; .0065 TABLET ORAL at 17:43

## 2018-11-11 RX ADMIN — PHENOBARBITAL, HYOSCYAMINE SULFATE, ATROPINE SULFATE, SCOPOLAMINE HYDROBROMIDE SCH TAB: 16.2; .1037; .0194; .0065 TABLET ORAL at 13:05

## 2018-11-11 RX ADMIN — DIPHENHYDRAMINE HYDROCHLORIDE PRN MG: 50 INJECTION INTRAMUSCULAR; INTRAVENOUS at 20:11

## 2018-11-11 RX ADMIN — INSULIN ASPART SCH: 100 INJECTION, SOLUTION INTRAVENOUS; SUBCUTANEOUS at 21:33

## 2018-11-11 RX ADMIN — AMPICILLIN SODIUM SCH MLS/HR: 2 INJECTION, POWDER, FOR SOLUTION INTRAVENOUS at 21:27

## 2018-11-11 RX ADMIN — AMPICILLIN SODIUM SCH MLS/HR: 2 INJECTION, POWDER, FOR SOLUTION INTRAVENOUS at 09:22

## 2018-11-11 RX ADMIN — OXYBUTYNIN CHLORIDE SCH MG: 10 TABLET, EXTENDED RELEASE ORAL at 09:21

## 2018-11-11 NOTE — PN
DATE:  11/10/2018



SUBJECTIVE:  Laura Jaramillo has loose stool, but no diarrhea.  She denies

any fever or chills.  Her total albumin is coming down with medical

therapy.  No fever.  No chills.  Seen by GI.  Her alkaline phosphatase is

down.  She has pain, and no nausea or vomiting.



PHYSICAL EXAMINATION:

VITAL SIGNS:  Blood pressure 103/67, pulse 60, respiratory rate 20, and

temperature 97.7.

LUNGS:  Clear.

CARDIOVASCULAR SYSTEM:  S1, S2 regular.

ABDOMEN:  Enlarged liver in the right upper quadrant.



ASSESSMENT:

1.  Hepatic sarcoidosis.

2.  Diabetes.

3.  Hypertension.

4.  Coronary artery disease.



PLAN:  Medical management.  Monitor the patient





__________________________________________

Nasir Dunbar MD



DD:  11/10/2018 22:50:14

DT:  11/11/2018 4:38:35

Job # [id number]

## 2018-11-11 NOTE — PN
DATE:  11/11/2018



LOCATION:  354, bed A.



HISTORY OF PRESENT ILLNESS:  This is a 53-year-old female, seen and

examined in rounds without significant clinical changes or reported active

bleeding, but with intermittent period of abdominal pain on and off,

especially in the midepigastric and right upper quadrant area.  No chest

pain or palpitation.  No reported significant shortness of breath, chills,

or fever at that time.  The entire chart is reviewed, including, but not

limited to most recent lab and radiology study results, current and the

previous medication list, current and the previous medical events, and

today's lab results still pending.  The latest blood glucose level reported

to be 70, and the patient still has abnormal liver function test, but with

total bilirubin decreased to 9.9, AST 97, normal ALT, alkaline phosphatase

439 with low albumin 2.9.



The patient has persistently low hemoglobin and hematocrit.



PHYSICAL EXAMINATION:

GENERAL:  A 53-year-old female febrile with pulse of 62, respiratory rate

20-22, blood pressure of 120/74.

HEENT:  Showed pale dry oral mucoid membrane.  Bilateral icteric sclerae.

LUNGS:  Few scattered crepitation.  Decreased air entry at bases.

HEART:  Positive S1 and S2.

ABDOMEN:  Soft.  Bowel sounds are present with mild generalized tenderness.

No mass or organomegaly.  No rebound tenderness or guarding.

EXTREMITIES:  Without significant clubbing, cyanosis, or edema.  No

reported new neurological deficits, sensory or motor.



IMPRESSION:

1.  Hepatic sarcoidosis.

2.  Abnormal liver function tests secondary to above.

3.  Jaundice due to above versus hepatocellular injury, papillary stenosis,

and an infectious process including urinary tract infection.

4.  Status post endoscopic retrograde cholangiopancreatography with biliary

stent insertion by history.  Re-exacerbation of peptic ulcer disease.

5.  Diabetes mellitus, hypertension, and element of chronic obstructive

pulmonary disease, peripheral vascular disease with status post bilateral

above-knee amputation, by history.

6.  Status post cholecystectomy, status post partial colon resection by

history.

7.  Anemia secondary to above.



SUGGESTION:

1.  Continue current management.

2.  More than one attempt done to contact [place], [place] to transfer the patient for potential evaluation for possible

liver transplant, so far no response yet.

3.  Low dose of steroids IV in the meantime due to the patient's

sarcoidosis.

4.  Guaiac _____ stool daily x3.

5.  Further recommendation to follow.





__________________________________________

Jeffrey Albert MD





DD:  11/11/2018 7:23:43

DT:  11/11/2018 7:26:34

Job # [id number]

## 2018-11-11 NOTE — CP.PCM.PN
Subjective





- Subjective


Subjective: 





dictated   





Objective





- Vital Signs/Intake and Output


Vital Signs (last 24 hours): 


                                        











Temp Pulse Resp BP Pulse Ox


 


 98.3 F   60   20   129/69   100 


 


 11/11/18 16:00  11/11/18 16:00  11/11/18 16:00  11/11/18 16:00  11/11/18 16:00








Intake and Output: 


                                        











 11/11/18 11/12/18





 18:59 06:59


 


Intake Total 380 750


 


Output Total 1100 600


 


Balance -720 150














- Medications


Medications: 


                               Current Medications





Apixaban (Eliquis)  5 mg PO BID [facility]


   Last Admin: 11/11/18 17:42 Dose:  5 mg


Belladonna/Phenobarbital (Donnatal)  1 tab PO TID [facility]


   Last Admin: 11/11/18 17:43 Dose:  1 tab


Dicyclomine HCl (Bentyl)  20 mg PO QID [facility]


   Last Admin: 11/11/18 21:31 Dose:  20 mg


Digoxin (Lanoxin)  0.25 mg PO Q24H [facility]


   Last Admin: 11/11/18 17:46 Dose:  Not Given


Diphenhydramine HCl (Benadryl)  12.5 mg IVP Q3 PRN


   PRN Reason: Itching / Pruritus


   Last Admin: 11/11/18 20:11 Dose:  12.5 mg


Diphenoxylate HCl/Atropine (Lomotil 0.025-2.5 Mg Tablet)  1 tab PO Q8 PRN


   PRN Reason: Diarrhea


Docusate Sodium (Colace)  100 mg PO TID PRN


   PRN Reason: Constipation


   Last Admin: 11/10/18 09:25 Dose:  100 mg


Gabapentin (Neurontin)  100 mg PO BID [facility]


   Last Admin: 11/11/18 17:42 Dose:  100 mg


Ampicillin 2 gm/ Sodium (Chloride)  100 mls @ 50 mls/hr IVPB Q6H [facility]; Protocol


   Last Admin: 11/11/18 21:27 Dose:  50 mls/hr


Ceftazidime/Avibactam 2.5 gm/ (Sodium Chloride)  100 mls @ 50 mls/hr IV Q8H [facility];

Protocol


   Last Admin: 11/11/18 17:37 Dose:  50 mls/hr


Insulin Aspart (Novolog)  0 unit SC ACHS [facility]; Protocol


   Last Admin: 11/11/18 21:33 Dose:  Not Given


Metoprolol Succinate (Toprol Xl)  50 mg PO DAILY [facility]


   Last Admin: 11/11/18 09:21 Dose:  50 mg


Morphine Sulfate (Morphine)  1 mg IVP Q3 PRN


   PRN Reason: Pain, moderate (4-7)


   Last Admin: 11/11/18 20:13 Dose:  1 mg


Nystatin (Nystop Topical Powder)  0 gm EXT BID [facility]


   Last Admin: 11/11/18 18:50 Dose:  1 applic


Ondansetron HCl (Zofran Inj)  4 mg IVP Q4 PRN


   PRN Reason: Nausea/Vomiting


Oxybutynin Chloride (Ditropan Xl)  10 mg PO DAILY [facility]


   Last Admin: 11/11/18 09:21 Dose:  10 mg


Pantoprazole Sodium (Protonix Ec Tab)  40 mg PO DAILY [facility]


   Last Admin: 11/11/18 09:21 Dose:  40 mg











- Labs


Labs: 


                                        





                                 11/11/18 08:17 





                                 11/11/18 08:17 





                                        











PT  12.9 SECONDS (9.7-12.2)  H  11/03/18  07:00    


 


INR  1.2   11/03/18  07:00    


 


APTT  45 SECONDS (21-34)  H  11/03/18  07:00    














Assessment and Plan


(1) Complicated urinary tract infection


Status: Acute   





(2) Diabetes mellitus with peripheral circulatory disorder


Status: Chronic   





(3) Neurogenic bladder


Status: Chronic   





(4) Obstructive hyperbilirubinemia


Status: Acute

## 2018-11-11 NOTE — CP.PCM.PN
Subjective





- Date & Time of Evaluation


Date of Evaluation: 11/11/18


Time of Evaluation: 07:00





- Subjective


Subjective: 





day 6 avycaz


overall improved


cont rx for UTI


rx as per Dr Olmedo





Objective





- Vital Signs/Intake and Output


Vital Signs (last 24 hours): 


                                        











Temp Pulse Resp BP Pulse Ox


 


 98.3 F   60   20   129/69   100 


 


 11/11/18 16:00  11/11/18 16:00  11/11/18 16:00  11/11/18 16:00  11/11/18 16:00








Intake and Output: 


                                        











 11/11/18 11/11/18





 06:59 18:59


 


Intake Total  380


 


Output Total  1100


 


Balance  -720














- Medications


Medications: 


                               Current Medications





Apixaban (Eliquis)  5 mg PO BID [facility]


   Last Admin: 11/11/18 09:21 Dose:  5 mg


Belladonna/Phenobarbital (Donnatal)  1 tab PO TID [facility]


   Last Admin: 11/11/18 13:05 Dose:  1 tab


Dicyclomine HCl (Bentyl)  20 mg PO QID [facility]


   Last Admin: 11/11/18 13:05 Dose:  20 mg


Digoxin (Lanoxin)  0.25 mg PO Q24H [facility]


   Last Admin: 11/10/18 17:30 Dose:  Not Given


Diphenhydramine HCl (Benadryl)  12.5 mg IVP Q3 PRN


   PRN Reason: Itching / Pruritus


   Last Admin: 11/11/18 16:37 Dose:  12.5 mg


Diphenoxylate HCl/Atropine (Lomotil 0.025-2.5 Mg Tablet)  1 tab PO Q8 PRN


   PRN Reason: Diarrhea


Docusate Sodium (Colace)  100 mg PO TID PRN


   PRN Reason: Constipation


   Last Admin: 11/10/18 09:25 Dose:  100 mg


Gabapentin (Neurontin)  100 mg PO BID [facility]


   Last Admin: 11/11/18 09:21 Dose:  100 mg


Ampicillin 2 gm/ Sodium (Chloride)  100 mls @ 50 mls/hr IVPB Q6H [facility]; Protocol


   Last Admin: 11/11/18 16:45 Dose:  50 mls/hr


Ceftazidime/Avibactam 2.5 gm/ (Sodium Chloride)  100 mls @ 50 mls/hr IV Q8H [facility];

Protocol


   Last Admin: 11/11/18 09:19 Dose:  50 mls/hr


Insulin Aspart (Novolog)  0 unit SC ACHS [facility]; Protocol


   Last Admin: 11/11/18 12:37 Dose:  Not Given


Metoprolol Succinate (Toprol Xl)  50 mg PO DAILY [facility]


   Last Admin: 11/11/18 09:21 Dose:  50 mg


Morphine Sulfate (Morphine)  1 mg IVP Q3 PRN


   PRN Reason: Pain, moderate (4-7)


   Last Admin: 11/11/18 16:38 Dose:  1 mg


Nystatin (Nystop Topical Powder)  0 gm EXT BID [facility]


   Last Admin: 11/11/18 09:22 Dose:  1 applic


Ondansetron HCl (Zofran Inj)  4 mg IVP Q4 PRN


   PRN Reason: Nausea/Vomiting


Oxybutynin Chloride (Ditropan Xl)  10 mg PO DAILY [facility]


   Last Admin: 11/11/18 09:21 Dose:  10 mg


Pantoprazole Sodium (Protonix Ec Tab)  40 mg PO DAILY [facility]


   Last Admin: 11/11/18 09:21 Dose:  40 mg











- Labs


Labs: 


                                        





                                 11/11/18 08:17 





                                 11/11/18 08:17 





                                        











PT  12.9 SECONDS (9.7-12.2)  H  11/03/18  07:00    


 


INR  1.2   11/03/18  07:00    


 


APTT  45 SECONDS (21-34)  H  11/03/18  07:00    














- Constitutional


Appears: Non-toxic, Chronically Ill





- Head Exam


Head Exam: NORMOCEPHALIC





- Eye Exam


Eye Exam: Scleral icterus





- ENT Exam


ENT Exam: Mucous Membranes Dry





- Neck Exam


Neck Exam: absent: Lymphadenopathy





- Respiratory Exam


Respiratory Exam: Decreased Breath Sounds





- Cardiovascular Exam


Cardiovascular Exam: REGULAR RHYTHM





- GI/Abdominal Exam


GI & Abdominal Exam: Distended





- Rectal Exam


Rectal Exam: Deferred





- Extremities Exam


Additional comments: 





bilat amputee





- Back Exam


Back Exam: absent: CVA tenderness (L), CVA tenderness (R)





- Neurological Exam


Neurological Exam: Alert, Awake





Assessment and Plan


(1) Abdominal pain


Status: Acute   





(2) Complicated urinary tract infection


Status: Acute   





(3) Bilirubinemia


Status: Acute   





- Assessment and Plan (Free Text)


Assessment: 





cont iv avycaz


low dose steroids

## 2018-11-12 VITALS — HEART RATE: 62 BPM

## 2018-11-12 VITALS — OXYGEN SATURATION: 100 %

## 2018-11-12 RX ADMIN — INSULIN ASPART SCH: 100 INJECTION, SOLUTION INTRAVENOUS; SUBCUTANEOUS at 08:14

## 2018-11-12 RX ADMIN — OXYBUTYNIN CHLORIDE SCH MG: 10 TABLET, EXTENDED RELEASE ORAL at 09:24

## 2018-11-12 RX ADMIN — INSULIN ASPART SCH: 100 INJECTION, SOLUTION INTRAVENOUS; SUBCUTANEOUS at 11:34

## 2018-11-12 RX ADMIN — DIPHENHYDRAMINE HYDROCHLORIDE PRN MG: 50 INJECTION INTRAMUSCULAR; INTRAVENOUS at 02:46

## 2018-11-12 RX ADMIN — NYSTATIN SCH APPLIC: 100000 POWDER TOPICAL at 17:42

## 2018-11-12 RX ADMIN — AMPICILLIN SODIUM SCH MLS/HR: 2 INJECTION, POWDER, FOR SOLUTION INTRAVENOUS at 21:39

## 2018-11-12 RX ADMIN — INSULIN ASPART SCH: 100 INJECTION, SOLUTION INTRAVENOUS; SUBCUTANEOUS at 16:47

## 2018-11-12 RX ADMIN — PHENOBARBITAL, HYOSCYAMINE SULFATE, ATROPINE SULFATE, SCOPOLAMINE HYDROBROMIDE SCH TAB: 16.2; .1037; .0194; .0065 TABLET ORAL at 14:39

## 2018-11-12 RX ADMIN — PHENOBARBITAL, HYOSCYAMINE SULFATE, ATROPINE SULFATE, SCOPOLAMINE HYDROBROMIDE SCH TAB: 16.2; .1037; .0194; .0065 TABLET ORAL at 17:39

## 2018-11-12 RX ADMIN — AMPICILLIN SODIUM SCH MLS/HR: 2 INJECTION, POWDER, FOR SOLUTION INTRAVENOUS at 04:14

## 2018-11-12 RX ADMIN — DIPHENHYDRAMINE HYDROCHLORIDE PRN MG: 50 INJECTION INTRAMUSCULAR; INTRAVENOUS at 16:34

## 2018-11-12 RX ADMIN — DIPHENHYDRAMINE HYDROCHLORIDE PRN MG: 50 INJECTION INTRAMUSCULAR; INTRAVENOUS at 05:46

## 2018-11-12 RX ADMIN — PANTOPRAZOLE SODIUM SCH MG: 40 TABLET, DELAYED RELEASE ORAL at 09:17

## 2018-11-12 RX ADMIN — INSULIN ASPART SCH: 100 INJECTION, SOLUTION INTRAVENOUS; SUBCUTANEOUS at 21:37

## 2018-11-12 RX ADMIN — DIPHENHYDRAMINE HYDROCHLORIDE PRN MG: 50 INJECTION INTRAMUSCULAR; INTRAVENOUS at 22:42

## 2018-11-12 RX ADMIN — DIGOXIN SCH MG: 0.25 TABLET ORAL at 17:40

## 2018-11-12 RX ADMIN — AMPICILLIN SODIUM SCH MLS/HR: 2 INJECTION, POWDER, FOR SOLUTION INTRAVENOUS at 16:00

## 2018-11-12 RX ADMIN — DIPHENHYDRAMINE HYDROCHLORIDE PRN MG: 50 INJECTION INTRAMUSCULAR; INTRAVENOUS at 13:31

## 2018-11-12 RX ADMIN — DIPHENHYDRAMINE HYDROCHLORIDE PRN MG: 50 INJECTION INTRAMUSCULAR; INTRAVENOUS at 09:33

## 2018-11-12 RX ADMIN — PHENOBARBITAL, HYOSCYAMINE SULFATE, ATROPINE SULFATE, SCOPOLAMINE HYDROBROMIDE SCH TAB: 16.2; .1037; .0194; .0065 TABLET ORAL at 09:24

## 2018-11-12 RX ADMIN — AMPICILLIN SODIUM SCH MLS/HR: 2 INJECTION, POWDER, FOR SOLUTION INTRAVENOUS at 10:10

## 2018-11-12 RX ADMIN — NYSTATIN SCH APPLIC: 100000 POWDER TOPICAL at 09:18

## 2018-11-12 RX ADMIN — METOPROLOL SUCCINATE SCH MG: 50 TABLET, EXTENDED RELEASE ORAL at 09:17

## 2018-11-12 RX ADMIN — DIPHENHYDRAMINE HYDROCHLORIDE PRN MG: 50 INJECTION INTRAMUSCULAR; INTRAVENOUS at 19:36

## 2018-11-12 NOTE — CP.PCM.PN
Subjective





- Date & Time of Evaluation


Date of Evaluation: 11/12/18


Time of Evaluation: 11:40





Objective





- Vital Signs/Intake and Output


Vital Signs (last 24 hours): 


                                        











Temp Pulse Resp BP Pulse Ox


 


 98.1 F   62   20   123/75   100 


 


 11/12/18 16:05  11/12/18 16:05  11/12/18 16:05  11/12/18 16:05  11/12/18 16:05








Intake and Output: 


                                        











 11/12/18 11/12/18





 06:59 18:59


 


Intake Total 750 820


 


Output Total 600 1500


 


Balance 150 -680














- Medications


Medications: 


                               Current Medications





Apixaban (Eliquis)  5 mg PO BID [facility]


   Last Admin: 11/12/18 09:17 Dose:  5 mg


Belladonna/Phenobarbital (Donnatal)  1 tab PO TID [facility]


   Last Admin: 11/12/18 14:39 Dose:  1 tab


Dicyclomine HCl (Bentyl)  20 mg PO QID [facility]


   Last Admin: 11/12/18 14:39 Dose:  20 mg


Digoxin (Lanoxin)  0.25 mg PO Q24H [facility]


   Last Admin: 11/11/18 17:46 Dose:  Not Given


Diphenhydramine HCl (Benadryl)  12.5 mg IVP Q3 PRN


   PRN Reason: Itching / Pruritus


   Last Admin: 11/12/18 16:34 Dose:  12.5 mg


Diphenoxylate HCl/Atropine (Lomotil 0.025-2.5 Mg Tablet)  1 tab PO Q8 PRN


   PRN Reason: Diarrhea


Docusate Sodium (Colace)  100 mg PO TID PRN


   PRN Reason: Constipation


   Last Admin: 11/10/18 09:25 Dose:  100 mg


Gabapentin (Neurontin)  100 mg PO BID [facility]


   Last Admin: 11/12/18 09:17 Dose:  100 mg


Ampicillin 2 gm/ Sodium (Chloride)  100 mls @ 50 mls/hr IVPB Q6H [facility]; Protocol


   Last Admin: 11/12/18 10:10 Dose:  50 mls/hr


Ceftazidime/Avibactam 2.5 gm/ (Sodium Chloride)  100 mls @ 50 mls/hr IV Q8H [facility];

Protocol


   Last Admin: 11/12/18 08:30 Dose:  50 mls/hr


Insulin Aspart (Novolog)  0 unit SC ACHS [facility]; Protocol


   Last Admin: 11/12/18 16:47 Dose:  Not Given


Loperamide HCl (Imodium)  2 mg PO QID PRN


   PRN Reason: Loose stools


Metoprolol Succinate (Toprol Xl)  50 mg PO DAILY [facility]


   Last Admin: 11/12/18 09:17 Dose:  50 mg


Morphine Sulfate (Morphine)  1 mg IVP Q3 PRN


   PRN Reason: Pain, moderate (4-7)


   Last Admin: 11/12/18 16:36 Dose:  1 mg


Nystatin (Nystop Topical Powder)  0 gm EXT BID [facility]


   Last Admin: 11/12/18 09:18 Dose:  1 applic


Ondansetron HCl (Zofran Inj)  4 mg IVP Q4 PRN


   PRN Reason: Nausea/Vomiting


Oxybutynin Chloride (Ditropan Xl)  10 mg PO DAILY [facility]


   Last Admin: 11/12/18 09:24 Dose:  10 mg


Pantoprazole Sodium (Protonix Ec Tab)  40 mg PO DAILY [facility]


   Last Admin: 11/12/18 09:17 Dose:  40 mg











- Labs


Labs: 


                                        





                                 11/11/18 08:17 





                                 11/11/18 08:17 





                                        











PT  12.9 SECONDS (9.7-12.2)  H  11/03/18  07:00    


 


INR  1.2   11/03/18  07:00    


 


APTT  45 SECONDS (21-34)  H  11/03/18  07:00

## 2018-11-12 NOTE — PN
DATE:  11/11/2018



SUBJECTIVE:  The patient, Shivers, is afebrile.  Decreased abdominal pain. 

Decreased nausea and vomiting.  Has Klebsiella and Enterococcus in the

urine.



PHYSICAL EXAMINATION:

VITAL SIGNS:  Blood pressure 129/69, pulse 60, respiration rate 20,

temperature 98.3.

LUNGS:  Clear.  No rales.  No rhonchi.

CARDIOVASCULAR SYSTEM:  S1 and S2 plus S3 positive.

ABDOMEN:  Positive.  Enlarged liver.



ASSESSMENT:

1.  Hepatic sarcoidosis with positive response to steroids.

2.  Type 2 diabetes.

3.  Neurogenic bladder with urinary tract infection.

4.  Hypertension/congestive heart failure.



PLAN:  Antibiotics.  ID followup.  Monitor the patient.





__________________________________________

Nasir Dunbar MD





DD:  11/11/2018 22:48:19

DT:  11/12/2018 1:40:33

Job # [id number]

## 2018-11-12 NOTE — PN
DATE:  11/09/2018



SUBJECTIVE:  This is a 53-year-old female seen and examined in rounds

without significant clinical changes with intermittent period of mild

nausea, dyspepsia and occasional vomiting with a complaint of abdominal

pain mainly in the mid epigastric and right upper quadrant area with

generalized jaundice.  No reported actual chest pain, palpitation,

significant change of oral intake or evidence of active bleeding.



The entire chart is reviewed including but not limited to most recent lab

and radiology study results, current and the previous medication list,

current and the previous medical events, and today's lab results showed CO2

content of 20 indicative of metabolic acidosis, creatinine 0.5 with normal

BUN, glucose 132, calcium 8.2 with subsequent drop of bilirubin to 9.9, AST

97, alkaline phosphatase 439 with low albumin.



PHYSICAL EXAMINATION:

GENERAL:  A 53-year-old female.

VITAL SIGNS:  Afebrile with pulse of 64, respiratory rate 20 to 22, blood

pressure of 116/72.

HEENT:  Showed pale, dry oral mucous membrane.  Bilateral icteric sclerae.

LUNGS:  Few scattered crepitation.  Decreased air entry at bases.

HEART:  Positive S1 and S2 with increased rate.

ABDOMEN:  Soft with mild distention with midepigastric as well as the right

upper quadrant tenderness.  No mass or organomegaly.  No rebound tenderness

or guarding.

EXTREMITIES:  With evidence of bilateral above-knee amputation.  No

clubbing or cyanosis.

NEUROLOGICAL:  No reported new neurological deficits, sensory or motor.



IMPRESSION:

1.  Liver sarcoidosis.

2.  Abnormal liver function tests secondary to above.

3.  Jaundice with known history of papillary stenosis, with status post

endoscopic retrograde cholangiopancreatography and biliary stent insertion.

4.  Known history of hypertension, diabetes mellitus, status post

cholecystectomy, status post partial colon resection, cardiac arrhythmias.

5.  Coronary artery disease by history.

6.  Anemia secondary to above.

7.  Peripheral vascular disease with bilateral above-knee amputation.



SUGGESTIONS:

1.  Continue current management.

2.  More than one attempt to contact [facility] team for liver

transplant for full evaluation, awaiting response from [facility]

in [place], [place].

3.  Steroids IV to be tapered down gradually.

4.  Reglan IV.

5.  Continue proton pump inhibitors IV.  Further recommendation to follow.





__________________________________________

Jeffrey Albert MD



DD:  11/09/2018 10:22:18

DT:  11/09/2018 10:25:56

[facility] # [id number]

## 2018-11-12 NOTE — CP.PCM.PN
Subjective





- Date & Time of Evaluation


Date of Evaluation: 11/12/18


Time of Evaluation: 07:00





- Subjective


Subjective: 





iv rx renewed





Objective





- Vital Signs/Intake and Output


Vital Signs (last 24 hours): 


                                        











Temp Pulse Resp BP Pulse Ox


 


 98.1 F   62   20   123/75   100 


 


 11/12/18 16:05  11/12/18 16:05  11/12/18 16:05  11/12/18 16:05  11/12/18 16:05








Intake and Output: 


                                        











 11/12/18 11/13/18





 18:59 06:59


 


Intake Total 820 


 


Output Total 1500 600


 


Balance -680 -600














- Medications


Medications: 


                               Current Medications





Apixaban (Eliquis)  5 mg PO BID [facility]


   Last Admin: 11/12/18 17:39 Dose:  5 mg


Belladonna/Phenobarbital (Donnatal)  1 tab PO TID [facility]


   Last Admin: 11/12/18 17:39 Dose:  1 tab


Dicyclomine HCl (Bentyl)  20 mg PO QID [facility]


   Last Admin: 11/12/18 21:38 Dose:  20 mg


Digoxin (Lanoxin)  0.25 mg PO Q24H [facility]


   Last Admin: 11/12/18 17:40 Dose:  0.25 mg


Diphenhydramine HCl (Benadryl)  12.5 mg IVP Q3 PRN


   PRN Reason: Itching / Pruritus


   Last Admin: 11/12/18 22:42 Dose:  12.5 mg


Diphenoxylate HCl/Atropine (Lomotil 0.025-2.5 Mg Tablet)  1 tab PO Q8 PRN


   PRN Reason: Diarrhea


Docusate Sodium (Colace)  100 mg PO TID PRN


   PRN Reason: Constipation


   Last Admin: 11/10/18 09:25 Dose:  100 mg


Gabapentin (Neurontin)  100 mg PO BID [facility]


   Last Admin: 11/12/18 17:39 Dose:  100 mg


Ampicillin 2 gm/ Sodium (Chloride)  100 mls @ 50 mls/hr IVPB Q6H [facility]; Protocol


   Last Admin: 11/12/18 21:39 Dose:  50 mls/hr


Ceftazidime/Avibactam 2.5 gm/ (Sodium Chloride)  100 mls @ 50 mls/hr IV Q8H [facility];

Protocol


   Last Admin: 11/12/18 17:40 Dose:  50 mls/hr


Insulin Aspart (Novolog)  0 unit SC ACHS [facility]; Protocol


   Last Admin: 11/12/18 21:37 Dose:  Not Given


Loperamide HCl (Imodium)  2 mg PO QID PRN


   PRN Reason: Loose stools


Metoprolol Succinate (Toprol Xl)  50 mg PO DAILY [facility]


   Last Admin: 11/12/18 09:17 Dose:  50 mg


Morphine Sulfate (Morphine)  1 mg IVP Q3 PRN


   PRN Reason: Pain, moderate (4-7)


   Last Admin: 11/12/18 22:40 Dose:  1 mg


Nystatin (Nystop Topical Powder)  0 gm EXT BID [facility]


   Last Admin: 11/12/18 17:42 Dose:  1 applic


Ondansetron HCl (Zofran Inj)  4 mg IVP Q4 PRN


   PRN Reason: Nausea/Vomiting


Oxybutynin Chloride (Ditropan Xl)  10 mg PO DAILY [facility]


   Last Admin: 11/12/18 09:24 Dose:  10 mg


Pantoprazole Sodium (Protonix Ec Tab)  40 mg PO DAILY [facility]


   Last Admin: 11/12/18 09:17 Dose:  40 mg











- Labs


Labs: 


                                        





                                 11/11/18 08:17 





                                 11/11/18 08:17 





                                        











PT  12.9 SECONDS (9.7-12.2)  H  11/03/18  07:00    


 


INR  1.2   11/03/18  07:00    


 


APTT  45 SECONDS (21-34)  H  11/03/18  07:00    














- Constitutional


Appears: Non-toxic, Chronically Ill





- Head Exam


Head Exam: NORMOCEPHALIC





- Eye Exam


Eye Exam: Scleral icterus





- ENT Exam


ENT Exam: Mucous Membranes Dry





- Respiratory Exam


Respiratory Exam: Decreased Breath Sounds





- Cardiovascular Exam


Cardiovascular Exam: +S1, +S2





Assessment and Plan


(1) Abdominal pain


Status: Acute   





(2) Complicated urinary tract infection


Status: Acute   





(3) Bilirubinemia


Status: Acute

## 2018-11-12 NOTE — CP.PCM.PN
Subjective





- Subjective


Subjective: 





dictated   





Objective





- Vital Signs/Intake and Output


Vital Signs (last 24 hours): 


                                        











Temp Pulse Resp BP Pulse Ox


 


 98.1 F   62   20   123/75   100 


 


 11/12/18 16:05  11/12/18 16:05  11/12/18 16:05  11/12/18 16:05  11/12/18 16:05








Intake and Output: 


                                        











 11/12/18 11/13/18





 18:59 06:59


 


Intake Total 820 


 


Output Total 1500 600


 


Balance -680 -600














- Medications


Medications: 


                               Current Medications





Apixaban (Eliquis)  5 mg PO BID [facility]


   Last Admin: 11/12/18 17:39 Dose:  5 mg


Belladonna/Phenobarbital (Donnatal)  1 tab PO TID [facility]


   Last Admin: 11/12/18 17:39 Dose:  1 tab


Dicyclomine HCl (Bentyl)  20 mg PO QID [facility]


   Last Admin: 11/12/18 21:38 Dose:  20 mg


Digoxin (Lanoxin)  0.25 mg PO Q24H [facility]


   Last Admin: 11/12/18 17:40 Dose:  0.25 mg


Diphenhydramine HCl (Benadryl)  12.5 mg IVP Q3 PRN


   PRN Reason: Itching / Pruritus


   Last Admin: 11/12/18 22:42 Dose:  12.5 mg


Diphenoxylate HCl/Atropine (Lomotil 0.025-2.5 Mg Tablet)  1 tab PO Q8 PRN


   PRN Reason: Diarrhea


Docusate Sodium (Colace)  100 mg PO TID PRN


   PRN Reason: Constipation


   Last Admin: 11/10/18 09:25 Dose:  100 mg


Gabapentin (Neurontin)  100 mg PO BID [facility]


   Last Admin: 11/12/18 17:39 Dose:  100 mg


Ampicillin 2 gm/ Sodium (Chloride)  100 mls @ 50 mls/hr IVPB Q6H [facility]; Protocol


   Last Admin: 11/12/18 21:39 Dose:  50 mls/hr


Ceftazidime/Avibactam 2.5 gm/ (Sodium Chloride)  100 mls @ 50 mls/hr IV Q8H [facility];

Protocol


   Last Admin: 11/12/18 17:40 Dose:  50 mls/hr


Insulin Aspart (Novolog)  0 unit SC ACHS [facility]; Protocol


   Last Admin: 11/12/18 21:37 Dose:  Not Given


Loperamide HCl (Imodium)  2 mg PO QID PRN


   PRN Reason: Loose stools


Metoprolol Succinate (Toprol Xl)  50 mg PO DAILY [facility]


   Last Admin: 11/12/18 09:17 Dose:  50 mg


Morphine Sulfate (Morphine)  1 mg IVP Q3 PRN


   PRN Reason: Pain, moderate (4-7)


   Last Admin: 11/12/18 22:40 Dose:  1 mg


Nystatin (Nystop Topical Powder)  0 gm EXT BID [facility]


   Last Admin: 11/12/18 17:42 Dose:  1 applic


Ondansetron HCl (Zofran Inj)  4 mg IVP Q4 PRN


   PRN Reason: Nausea/Vomiting


Oxybutynin Chloride (Ditropan Xl)  10 mg PO DAILY [facility]


   Last Admin: 11/12/18 09:24 Dose:  10 mg


Pantoprazole Sodium (Protonix Ec Tab)  40 mg PO DAILY [facility]


   Last Admin: 11/12/18 09:17 Dose:  40 mg











- Labs


Labs: 


                                        





                                 11/11/18 08:17 





                                 11/11/18 08:17 





                                        











PT  12.9 SECONDS (9.7-12.2)  H  11/03/18  07:00    


 


INR  1.2   11/03/18  07:00    


 


APTT  45 SECONDS (21-34)  H  11/03/18  07:00    














Assessment and Plan


(1) Complicated urinary tract infection


Status: Acute   





(2) Diabetes mellitus with peripheral circulatory disorder


Status: Chronic   





(3) Neurogenic bladder


Status: Chronic   





(4) Obstructive hyperbilirubinemia


Status: Acute

## 2018-11-12 NOTE — PN
DATE:  11/12/2018



LOCATION:  Room 354, bed A.



SUBJECTIVE:  This is a 53-year-old female seen and examined in rounds

without significant clinical changes, but intermittent period of abdominal

pain with less abdominal distention, somewhat improving her oral intake and

appetite.  No reported chest pain, palpitation, significant shortness of

breath, chills or fever recently.



The patient is still having generalized jaundice, but less than before.



The entire chart is reviewed including but not limited to the most recent

lab and radiology study results, current and the previous medication list,

current and the previous medical events.  Today's lab is still pending, but

yesterday's lab showed hemoglobin 9.6, hematocrit 28.3 with low white blood

cells of 4 with low CO2 content, increased blood glucose with total

bilirubin dropped to 8.8 with low calcium with AST 97, increased alkaline

phosphatase with low albumin.



PHYSICAL EXAMINATION:

GENERAL:  A 53-year-old female, awake, alert, oriented with generalized

jaundice.

VITAL SIGNS:  Afebrile with pulse of 62, respiratory rate 20 to 22, blood

pressure of 116/58.

HEENT:  Showed pale, dry oral mucous membrane.  Bilateral icteric sclerae.

LUNGS:  Few scattered crepitation.  Decreased air entry at bases.

HEART:  Positive S1 and S2.

ABDOMEN:  Soft with mild generalized tenderness.  No mass or organomegaly. 

No rebound tenderness or guarding.

EXTREMITIES:  Without significant clubbing or cyanosis, but evidence of

bilateral above-knee amputation.

NEUROLOGICAL:  No reported new neurological deficits, sensory or motor.



IMPRESSION:

1.  Hepatic sarcoidosis.

2.  Abnormal liver function test secondary to above.

3.  Jaundice, most likely secondary to hepatocellular injury due to above

with evidence of pyloric stenosis, with status post endoscopic retrograde

cholangiopancreatography and biliary stent insertion, bilirubin improved

since.

4.  Neurogenic bladder with recurrent urinary tract infection.

5.  Known history of hypertension, diabetes mellitus, chronic obstructive

pulmonary disease, cardiac arrhythmias, peripheral vascular disease with

status post bilateral above-knee amputation.

6.  Known history of status post cholecystectomy and partial colon

resection, by history.

7.  Anemia secondary to above.



SUGGESTIONS:

1.  Continue current management.

2.  Low dose of steroids IV.

3.  The patient for potential transferring to [place], [place] when acceptable physician is available for full evaluation

for possible liver transplant.

4.  Further recommendation to follow.







__________________________________________

Jeffrey Albert MD



DD:  11/12/2018 10:08:21

DT:  11/12/2018 10:12:00

[facility] # [id number]

## 2018-11-13 VITALS — SYSTOLIC BLOOD PRESSURE: 124 MMHG | TEMPERATURE: 98.3 F | DIASTOLIC BLOOD PRESSURE: 81 MMHG | HEART RATE: 60 BPM

## 2018-11-13 RX ADMIN — DIPHENHYDRAMINE HYDROCHLORIDE PRN MG: 50 INJECTION INTRAMUSCULAR; INTRAVENOUS at 04:44

## 2018-11-13 RX ADMIN — DIPHENHYDRAMINE HYDROCHLORIDE PRN MG: 50 INJECTION INTRAMUSCULAR; INTRAVENOUS at 01:44

## 2018-11-13 RX ADMIN — AMPICILLIN SODIUM SCH MLS/HR: 2 INJECTION, POWDER, FOR SOLUTION INTRAVENOUS at 03:10

## 2018-11-13 NOTE — PN
DATE:  11/13/2018



SUBJECTIVE:  Decreased abdominal pain.  Decreased nausea.  No fever, no

chills.



PHYSICAL EXAMINATION:

VITAL SIGNS:  Blood pressure 123/75, pulse 62, respiratory rate 20, and

temperature 98.1.

LUNGS:  Clear.

CARDIOVASCULAR SYSTEM:  S1 and S2, regular.

ABDOMEN:  Enlarged liver.



ASSESSMENT:

1.  Hepatic sarcoidosis.

2.  Hypertension.

3.  Type 2 diabetes.



PLAN:  Continue current medication, Solu-Medrol and oxygen.





__________________________________________

Nasir Dunbar MD





DD:  11/13/2018 0:07:17

DT:  11/13/2018 0:40:27

[facility] # [id number]

## 2018-11-13 NOTE — CP.PCM.DIS
Provider





- Provider


Date of Admission: 


11/02/18 21:32





Attending physician: 


Nasir Dunbar MD








Diagnosis





- Discharge Diagnosis


(1) Complicated urinary tract infection


Status: Acute   Priority: High   





(2) Diabetes mellitus with peripheral circulatory disorder


Status: Chronic   Priority: Medium   





(3) Neurogenic bladder


Status: Chronic   





(4) Obstructive hyperbilirubinemia


Status: Acute   





Hospital Course





- Lab Results


Lab Results: 


                                  Micro Results





11/07/18 15:50   Urine,John   Urine Culture - Final


                            Yeast Species


11/02/18 17:51   Blood   Blood Culture - Final


                            NO GROWTH AFTER 5 DAYS


11/02/18 17:51   Blood   Gram Stain - Final


                            TEST NOT PERFORMED


11/02/18 17:49   Blood   Blood Culture - Final


                            NO GROWTH AFTER 5 DAYS


11/02/18 17:49   Blood   Gram Stain - Final


                            TEST NOT PERFORMED


11/05/18 13:57   Urine   Urine Culture - Final


                            10-50,000 CFU/ML.


                            MULTIPLE SPECIES. PROBABLE CONTAMINATION.


11/04/18 17:34   Urine,John   Urine Culture - Final


                            ,000 CFU/ML.


                            MULTIPLE SPECIES. SUGGEST REPEAT SPECIMEN.


11/02/18 18:28   Urine,John   Urine Culture - Final


                            Klebsiella Pneumoniae Ssp Pneu


                            Enterococcus Faecalis





                             Most Recent Lab Values











WBC  4.0 K/uL (4.8-10.8)  L  11/11/18  08:17    


 


RBC  2.91 Mil/uL (3.80-5.20)  L  11/11/18  08:17    


 


Hgb  9.6 g/dL (11.0-16.0)  L  11/11/18  08:17    


 


Hct  28.3 % (34.0-47.0)  L  11/11/18  08:17    


 


MCV  97.0 fL (81.0-99.0)   11/11/18  08:17    


 


MCH  33.1 pg (27.0-31.0)  H  11/11/18  08:17    


 


MCHC  34.1 g/dL (33.0-37.0)   11/11/18  08:17    


 


RDW  21.9 % (11.5-14.5)  H  11/11/18  08:17    


 


Plt Count  135 K/uL (130-400)   11/11/18  08:17    


 


MPV  10.0 fL (7.2-11.7)   11/11/18  08:17    


 


Neut % (Auto)  59.0 % (50.0-75.0)   11/11/18  08:17    


 


Lymph % (Auto)  20.0 % (20.0-40.0)   11/11/18  08:17    


 


Mono % (Auto)  14.0 % (0.0-10.0)  H  11/11/18  08:17    


 


Eos % (Auto)  6.0 % (0.0-4.0)  H  11/11/18  08:17    


 


Baso % (Auto)  1.0 % (0.0-2.0)   11/11/18  08:17    


 


Neut # (Auto)  2.3 K/uL (1.8-7.0)   11/11/18  08:17    


 


Lymph # (Auto)  0.8 K/uL (1.0-4.3)  L  11/11/18  08:17    


 


Mono # (Auto)  0.5 K/uL (0.0-0.8)   11/11/18  08:17    


 


Eos # (Auto)  0.3 K/uL (0.0-0.7)   11/11/18  08:17    


 


Baso # (Auto)  0.1 K/uL (0.0-0.2)   11/11/18  08:17    


 


Differential Comment     11/02/18  17:46    


 


PT  12.9 SECONDS (9.7-12.2)  H  11/03/18  07:00    


 


INR  1.2   11/03/18  07:00    


 


APTT  45 SECONDS (21-34)  H  11/03/18  07:00    


 


Sodium  138 mmol/L (132-148)   11/11/18  08:17    


 


Potassium  3.8 mmol/L (3.6-5.2)   11/11/18  08:17    


 


Chloride  105 mmol/L ()   11/11/18  08:17    


 


Carbon Dioxide  20 mmol/L (22-30)  L  11/11/18  08:17    


 


Anion Gap  16  (10-20)   11/11/18  08:17    


 


BUN  10 mg/dL (7-17)   11/11/18  08:17    


 


Creatinine  0.5 mg/dL (0.7-1.2)  L  11/11/18  08:17    


 


Est GFR ( Amer)  > 60   11/11/18  08:17    


 


Est GFR (Non-Af Amer)  > 60   11/11/18  08:17    


 


POC Glucose (mg/dL)  94 mg/dL ()   11/12/18  21:34    


 


Random Glucose  106 mg/dL ()  H  11/11/18  08:17    


 


Lactic Acid  2.1 mmol/L (0.7-2.1)   11/04/18  20:58    


 


Calcium  8.0 mg/dl (8.6-10.4)  L  11/11/18  08:17    


 


Phosphorus  2.9 mg/dL (2.5-4.5)   11/08/18  11:38    


 


Magnesium  1.8 mg/dL (1.6-2.3)   11/08/18  11:38    


 


Total Bilirubin  8.8 mg/dL (0.2-1.3)  H  11/11/18  08:17    


 


Direct Bilirubin  7.7 mg/dL (0.0-0.4)  H  11/11/18  08:17    


 


AST  97 U/L (14-36)  H  11/11/18  08:17    


 


ALT  33 U/L (9-52)   11/11/18  08:17    


 


Alkaline Phosphatase  381 U/L ()  H  11/11/18  08:17    


 


Ammonia  48 umol/L (9-33)  H D 11/05/18  08:20    


 


Troponin I  < 0.0120 ng/mL (0.00-0.120)   11/02/18  17:46    


 


NT-Pro-B Natriuret Pep  170 pg/mL (0-900)   11/02/18  17:46    


 


Total Protein  6.9 g/dL (6.3-8.3)   11/11/18  08:17    


 


Albumin  2.9 g/dL (3.5-5.0)  L  11/11/18  08:17    


 


Globulin  4.0 gm/dL (2.2-3.9)  H  11/11/18  08:17    


 


Albumin/Globulin Ratio  0.7  (1.0-2.1)  L  11/11/18  08:17    


 


Angiotensin Convert Enz  77 U/L (9-67)  H  11/05/18  08:20    


 


Urine Color  Elise  (YELLOW)   11/04/18  17:34    


 


Urine Clarity  Clear  (Clear)   11/04/18  17:34    


 


Urine pH  5.0  (5.0-8.0)   11/04/18  17:34    


 


Ur Specific Gravity  1.013  (1.003-1.030)   11/04/18  17:34    


 


Urine Protein  Negative mg/dL (NEGATIVE)   11/04/18  17:34    


 


Urine Glucose (UA)  Normal mg/dL (Normal)   11/04/18  17:34    


 


Urine Ketones  Negative mg/dL (NEGATIVE)   11/04/18  17:34    


 


Urine Blood  1+  (NEGATIVE)  H  11/04/18  17:34    


 


Urine Nitrate  Negative  (NEGATIVE)   11/04/18  17:34    


 


Urine Bilirubin  2+  (NEGATIVE)  H  11/04/18  17:34    


 


Urine Urobilinogen  2.0 mg/dL (0.2-1.0)  H  11/04/18  17:34    


 


Ur Leukocyte Esterase  3+ Abiodun/uL (Negative)  H  11/04/18  17:34    


 


Urine WBC (Auto)  83 /hpf (0-5)  H  11/04/18  17:34    


 


Urine RBC (Auto)  11 /hpf (0-3)  H  11/04/18  17:34    


 


Urine WBC Clumps (Auto)  Few /hpf (NONE)  H  11/02/18  18:28    


 


Ur Squamous Epith Cells  1 /hpf (0-5)   11/04/18  17:34    


 


Urine Bacteria  Few  (<OCC)  H  11/04/18  17:34    


 


Gentamicin Trough  1.6 ug/mL (0.0-0.9)  H  11/06/18  07:03    


 


Hepatitis A IgM Ab  Negative  (NEGATIVE)   11/05/18  08:20    


 


Hep Bs Antigen  Negative  (NEGATIVE)   11/05/18  08:20    


 


Hep B Core IgM Ab  Negative  (NEGATIVE)   11/05/18  08:20    


 


Hepatitis C Antibody  Negative  (NEGATIVE)   11/05/18  08:20    


 


HIV 1&2 Antibody Screen  Negative  (NEGATIVE)   11/05/18  08:20    














Discharge Exam





- Head Exam


Head Exam: NORMOCEPHALIC





Discharge Plan





- Discharge Medications


Prescriptions: 


predniSONE [Prednisone] 10 mg PO DAILY #14 tab





- Follow Up Plan


Condition: FAIR


Disposition: HOME/ ROUTINE


Instructions:  Heart Failure, Adult (DC), Prednisone, Acute Abdominal Pain (DC)


Additional Instructions: 


Please f/u with Dr. Dunbar office in 1 week ( needs blood work ) 


Please f/u with Dr. Olmedo office in 2 weeks 


[facility] service for home care/ john cath care /


Continuje medication as per med. rec.


Referrals: 


Jeffrey Olmedo [Staff Provider] - 


Nasir Dunbar MD [Staff Provider] -

## 2018-11-14 NOTE — DS
The patient, Shivers, is afebrile.  No shortness of breath.  She is for

discharge.



PHYSICAL EXAMINATION:

VITAL SIGNS:   Blood pressure 124/81, pulse 60, respiratory rate 20,

temperature 98.3.

LUNGS:  Clear.  No rales or rhonchi.

CARDIOVASCULAR SYSTEM:  S1 and S2 plus S3 positive.

ABDOMEN:  Soft with enlarged liver.



DISCHARGE DIAGNOSES:

1.  Hepatic sarcoidosis.  The patient's sarcoidosis in the liver is

worsening.  It has responded to steroid, but her bilirubin remained high. 

The patient has been referred to Gastroenterology _____.

2.  Neurogenic bladder with multiple urinary tract infections.

3.  Diabetes.

4.  Hypertension.



PLAN:  Discharge the patient.





__________________________________________

Nasir Dunbar MD





DD:  11/13/2018 23:15:51

DT:  11/14/2018 2:52:47

Job # [id number]

## 2018-12-01 ENCOUNTER — HOSPITAL ENCOUNTER (INPATIENT)
Dept: HOSPITAL 31 - C.ER | Age: 53
LOS: 11 days | Discharge: HOME | DRG: 699 | End: 2018-12-12
Attending: INTERNAL MEDICINE | Admitting: INTERNAL MEDICINE
Payer: MEDICARE

## 2018-12-01 VITALS — BODY MASS INDEX: 18.7 KG/M2

## 2018-12-01 VITALS — RESPIRATION RATE: 20 BRPM

## 2018-12-01 DIAGNOSIS — E11.65: ICD-10-CM

## 2018-12-01 DIAGNOSIS — E86.0: ICD-10-CM

## 2018-12-01 DIAGNOSIS — Z95.5: ICD-10-CM

## 2018-12-01 DIAGNOSIS — R78.81: ICD-10-CM

## 2018-12-01 DIAGNOSIS — N39.0: ICD-10-CM

## 2018-12-01 DIAGNOSIS — D86.89: ICD-10-CM

## 2018-12-01 DIAGNOSIS — E87.2: ICD-10-CM

## 2018-12-01 DIAGNOSIS — B95.2: ICD-10-CM
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
## 2018-12-01 DIAGNOSIS — Z79.4: ICD-10-CM

## 2018-12-01 DIAGNOSIS — T83.511A: Primary | ICD-10-CM

## 2018-12-01 DIAGNOSIS — T36.8X5A: ICD-10-CM

## 2018-12-01 DIAGNOSIS — A09: ICD-10-CM

## 2018-12-01 DIAGNOSIS — E78.5: ICD-10-CM

## 2018-12-01 DIAGNOSIS — I50.9: ICD-10-CM

## 2018-12-01 DIAGNOSIS — D69.6: ICD-10-CM

## 2018-12-01 DIAGNOSIS — E11.51: ICD-10-CM

## 2018-12-01 DIAGNOSIS — I11.0: ICD-10-CM

## 2018-12-01 DIAGNOSIS — E11.49: ICD-10-CM

## 2018-12-01 DIAGNOSIS — Z89.612: ICD-10-CM

## 2018-12-01 DIAGNOSIS — B37.49: ICD-10-CM

## 2018-12-01 DIAGNOSIS — E46: ICD-10-CM

## 2018-12-01 DIAGNOSIS — Z16.21: ICD-10-CM

## 2018-12-01 DIAGNOSIS — Z89.611: ICD-10-CM

## 2018-12-01 DIAGNOSIS — K90.9: ICD-10-CM

## 2018-12-01 DIAGNOSIS — J44.9: ICD-10-CM

## 2018-12-01 DIAGNOSIS — G89.29: ICD-10-CM

## 2018-12-01 DIAGNOSIS — I25.10: ICD-10-CM

## 2018-12-01 DIAGNOSIS — N31.9: ICD-10-CM

## 2018-12-01 LAB
ALBUMIN SERPL-MCNC: 3.3 G/DL (ref 3.5–5)
ALBUMIN/GLOB SERPL: 0.7 {RATIO} (ref 1–2.1)
ALT SERPL-CCNC: 38 U/L (ref 9–52)
AST SERPL-CCNC: 108 U/L (ref 14–36)
BACTERIA #/AREA URNS HPF: (no result) /[HPF]
BASOPHILS # BLD AUTO: 0.1 K/UL (ref 0–0.2)
BASOPHILS NFR BLD: 1.4 % (ref 0–2)
BILIRUB UR-MCNC: (no result) MG/DL
BUN SERPL-MCNC: 10 MG/DL (ref 7–17)
CALCIUM SERPL-MCNC: 8.8 MG/DL (ref 8.6–10.4)
EOSINOPHIL # BLD AUTO: 0.4 K/UL (ref 0–0.7)
EOSINOPHIL NFR BLD: 8 % (ref 0–4)
ERYTHROCYTE [DISTWIDTH] IN BLOOD BY AUTOMATED COUNT: 18.1 % (ref 11.5–14.5)
GFR NON-AFRICAN AMERICAN: > 60
GLUCOSE UR STRIP-MCNC: NORMAL MG/DL
HGB BLD-MCNC: 11.7 G/DL (ref 11–16)
HYPHAE YEAST: (no result) /LPF
LEUKOCYTE ESTERASE UR-ACNC: (no result) LEU/UL
LIPASE: 10 U/L (ref 23–300)
LYMPHOCYTES # BLD AUTO: 1 K/UL (ref 1–4.3)
LYMPHOCYTES NFR BLD AUTO: 18.3 % (ref 20–40)
MCH RBC QN AUTO: 32.1 PG (ref 27–31)
MCHC RBC AUTO-ENTMCNC: 33.7 G/DL (ref 33–37)
MCV RBC AUTO: 95.4 FL (ref 81–99)
MONOCYTES # BLD: 0.4 K/UL (ref 0–0.8)
MONOCYTES NFR BLD: 7.7 % (ref 0–10)
NEUTROPHILS # BLD: 3.5 K/UL (ref 1.8–7)
NEUTROPHILS NFR BLD AUTO: 64.6 % (ref 50–75)
NRBC BLD AUTO-RTO: 0.1 % (ref 0–2)
PH UR STRIP: 6 [PH] (ref 5–8)
PLATELET # BLD: 123 K/UL (ref 130–400)
PMV BLD AUTO: 9.5 FL (ref 7.2–11.7)
PROT UR STRIP-MCNC: (no result) MG/DL
RBC # BLD AUTO: 3.64 MIL/UL (ref 3.8–5.2)
RBC # UR STRIP: NEGATIVE /UL
SP GR UR STRIP: 1.01 (ref 1–1.03)
SQUAMOUS EPITHIAL: 1 /HPF (ref 0–5)
UROBILINOGEN UR-MCNC: 4 MG/DL (ref 0.2–1)
WBC # BLD AUTO: 5.4 K/UL (ref 4.8–10.8)
WBC CLUMPS # UR AUTO: (no result) /HPF

## 2018-12-01 RX ADMIN — HUMAN INSULIN SCH: 100 INJECTION, SOLUTION SUBCUTANEOUS at 22:05

## 2018-12-01 RX ADMIN — METHYLPREDNISOLONE SODIUM SUCCINATE SCH MG: 40 INJECTION, POWDER, FOR SOLUTION INTRAMUSCULAR; INTRAVENOUS at 22:00

## 2018-12-01 NOTE — C.PDOC
History Of Present Illness


53 year old female with a history of sarcoidosis and diabetes (with bilateral 

AKA) presents to the emergency department with complaints of epigastric pain and

vomiting. Patient states that she takes Dilaudid PO at home for her sarcoidosis,

and that because she has been vomiting she is unable to take it and is in pain. 

She states that the sarcoidosis makes her itch. Patient states that she has a 

history of frequent UTIs. As of right now, she states that she felt febrile and 

nauseous yesterday but did not experience vomiting or epigastric pain. 


Time Seen by Provider: 12/01/18 15:09


Chief Complaint (Nursing): Pain, Chronic


History Per: Patient


History/Exam Limitations: no limitations


Onset/Duration Of Symptoms: Days (1)


Current Symptoms Are (Timing): Still Present





Past Medical History


Reviewed: Historical Data, Nursing Documentation, Vital Signs


Vital Signs: 





                                Last Vital Signs











Temp  97.8 F   12/01/18 15:19


 


Pulse  85   12/01/18 15:19


 


Resp  17   12/01/18 15:19


 


BP  133/87   12/01/18 15:19


 


Pulse Ox  100   12/01/18 15:19














- Medical History


PMH: Anemia, Asthma (NO INHALER-ON PREDNISONE DAILY PO.), CAD (stent x 1), 

Cardia Arrhythmia, CHF, Diabetes, Deep Vein Thrombosis, Gall Bladder Disease, 

HTN, Hypercholesterolemia


   Denies: Chronic Kidney Disease


Surgical History: Appendectomy, Cholecystectomy, Coronary Stent, Endoscopy, C-

Section





- [facility] Procedures











 (09/08/18)


CENTRAL VENOUS CATHETER PLACEMENT WITH GUIDANCE (05/29/14)


CLOSED ENDOSCOPIC BIOPSY OF LARGE INTESTINE (11/19/14)


DILATION OF COMMON BILE DUCT WITH INTRALUMINAL DEVICE, ENDO (10/15/18)


ENDOSC RETROGRADE CHOLANGIOPANCREATOGRAPHY [ERCP] (10/30/13)


ENDOSCOP INSERTION OF STENT (TUBE) INTO BILE DUCT (05/29/14)


ESOPHAGOGASTRODUODENOSCOPY [EGD] W/CLOSED BIOPSY (05/29/14)


EXCISION OF STOMACH, ENDO, DIAGN (10/15/18)


FLUOROSCOPY OF SUPERIOR VENA CAVA, GUIDANCE (10/15/18)


INSERTION OF INFUSION DEV INTO SUP VENA CAVA, PERC APPROACH (10/15/18)


INSERTION OF VAD INTO CHEST SUBCU/FASCIA, PERC APPROACH (10/15/18)


INSPECTION OF LOWER INTESTINAL TRACT, ENDO (02/05/18)


IRRIGATION OF LOWER GI USING IRRIGATING SUBSTANCE, ENDO (02/05/18)


OTHER LOCAL DESTRUC SKIN (10/30/13)


OTHER SKIN & SUBQ I   D (10/30/13)


REMOVAL OF INFUSION DEVICE FROM GREAT VESSEL, PERC APPROACH (10/15/18)


REMOVAL OF INFUSION DEVICE FROM UPPER VEIN, EXTERN APPROACH (10/15/18)


REMOVAL OF INTRALUMINAL DEVICE FROM HEPATOBILIARY DUCT, ENDO (10/15/18)


REMOVAL OF VAD FROM TRUNK SUBCU/FASCIA, PERC APPROACH (10/15/18)


ULTRASONOGRAPHY OF RIGHT JUGULAR VEINS, GUIDANCE (10/15/18)








Family History: States: Unknown Family Hx





- Social History


Hx Tobacco Use: No


Hx Alcohol Use: Yes (years ago)


Hx Substance Use: No





- Immunization History


Hx Tetanus Toxoid Vaccination: Yes


Hx Influenza Vaccination: Yes


Hx Pneumococcal Vaccination: Yes (2015)





Review Of Systems


Except As Marked, All Systems Reviewed And Found Negative.


Constitutional: Positive for: Fever.  Negative for: Chills


Gastrointestinal: Positive for: Nausea, Vomiting, Abdominal Pain


Neurological: Positive for: Headache





Physical Exam





- Physical Exam


Appears: Non-toxic, No Acute Distress


Skin: Warm, Dry


Head: Atraumatic, Normacephalic


Eye(s): bilateral: Other (jaundiced)


Neck: Normal, Supple


Chest: Symmetrical, No Tenderness


Cardiovascular: Rhythm Regular, No Murmur


Respiratory: No Rales, No Rhonchi, No Wheezing


Gastrointestinal/Abdominal: Soft, No Tenderness, No Guarding, No Rebound


Extremity: Other (bilateral above knee amputations)


Neurological/Psych: Oriented x3





ED Course And Treatment





- Laboratory Results


Result Diagrams: 


                                 12/01/18 17:44





                                 12/01/18 17:44


O2 Sat by Pulse Oximetry: 100 (RA)


Pulse Ox Interpretation: Normal





Medical Decision Making


Medical Decision Making: 


Plan:


Chemistry


Bloodwork


Benadryl 25mg PO


Morphine 2mg IVP


NaCl IV Fluid


Rocephin 50ml IVPB


Urinalysis





Patient's white count is not elevated, her urine is positive for UTI and her 

toxicology is positive for opiates. 





Disposition


Discussed With : Nasir Dunbar


Counseled Patient/Family Regarding: Studies Performed, Diagnosis, Need For 

Followup





- Disposition


Disposition: HOSPITALIZED


Disposition Time: 18:55


Condition: GUARDED


Forms:  [facility] (English)





- Clinical Impression


Clinical Impression: 


 Sarcoidosis, Chronic pain, UTI (urinary tract infection) due to urinary 

indwelling Charles catheter








- Scribe Statement


The provider has reviewed the documentation as recorded by the Scribe (Dharmesh Brown)


Provider Attestation: 


All medical record entries made by the Scribe were at my direction and 

personally dictated by me. I have reviewed the chart and agree that the record 

accurately reflects my personal performance of the history, physical exam, 

medical decision making, and the department course for this patient. I have also

 personally directed, reviewed, and agree with the discharge instructions and 

disposition.





Decision To Admit





- Pt Status Changed To:


Hospital Disposition Of: Observation





- .


Bed Request Type: Regular


Patient Diagnosis: 


 Sarcoidosis, Chronic pain, UTI (urinary tract infection) due to urinary 

indwelling Charles catheter

## 2018-12-01 NOTE — CP.PCM.HP
Past Patient History





- Infectious Disease


Hx of Infectious Diseases: None





- Tetanus Immunizations


Tetanus Immunization: Unknown





- Past Medical History & Family History


Past Medical History?: Yes





- Past Social History


Smoking Status: Never Smoked





- CARDIAC


Hx Cardia Arrhythmia: Yes


Hx Congestive Heart Failure: Yes


Hx Hypercholesterolemia: Yes


Hx Hypertension: Yes





- PULMONARY


Hx Asthma: Yes (NO INHALER-ON PREDNISONE DAILY PO.)





- NEUROLOGICAL


Hx Neurological Disorder: No





- HEENT


Hx HEENT Problems: Yes


Hx Cataracts: Yes (BILAT.)





- RENAL


Hx Chronic Kidney Disease: No





- ENDOCRINE/METABOLIC


Hx Endocrine Disorders: Yes


Hx Diabetes Mellitus Type 1: Yes





- HEMATOLOGICAL/ONCOLOGICAL


Hx Anemia: Yes





- INTEGUMENTARY


Hx Dermatological Problems: No





- MUSCULOSKELETAL/RHEUMATOLOGICAL


Hx Falls: Yes





- GASTROINTESTINAL


Hx Gall Bladder Disease: Yes





- GENITOURINARY/GYNECOLOGICAL


Hx Genitourinary Disorders: Yes


Hx Urinary Tract Infection: Yes


Other/Comment: PT HAS LONG TERM GUTIERREZ





- PSYCHIATRIC


Hx Substance Use: No





- SURGICAL HISTORY


Hx Appendectomy: Yes


Hx Cholecystectomy: Yes


Hx Coronary Stent: Yes





- ANESTHESIA


Hx Anesthesia: Yes


Hx Anesthesia Reactions: No


Hx Malignant Hyperthermia: No





Meds


Allergies/Adverse Reactions: 


                                    Allergies











Allergy/AdvReac Type Severity Reaction Status Date / Time


 


avocado Allergy Severe ANAPHYLAXIS Verified 12/01/18 15:16


 


banana Allergy Severe ANAPHYLAXIS Verified 12/01/18 15:16


 


cherry Allergy Severe ANAPHYLAXIS Verified 12/01/18 15:16


 


ketorolac [From Toradol] Allergy   Verified 12/01/18 15:16














Results





- Vital Signs


Recent Vital Signs: 





                                Last Vital Signs











Temp  97.8 F   12/01/18 15:19


 


Pulse  66   12/01/18 18:20


 


Resp  18   12/01/18 18:20


 


BP  124/77   12/01/18 18:20


 


Pulse Ox  100   12/01/18 18:56














- Labs


Result Diagrams: 


                                 12/01/18 17:44





                                 12/01/18 17:44


Labs: 





                         Laboratory Results - last 24 hr











  12/01/18 12/01/18 12/01/18





  17:44 17:44 18:14


 


WBC  5.4  


 


RBC  3.64 L  


 


Hgb  11.7  D  


 


Hct  34.8  


 


MCV  95.4  


 


MCH  32.1 H  


 


MCHC  33.7  


 


RDW  18.1 H  


 


Plt Count  123 L  


 


MPV  9.5  


 


Neut % (Auto)  64.6  


 


Lymph % (Auto)  18.3 L  


 


Mono % (Auto)  7.7  


 


Eos % (Auto)  8.0 H  


 


Baso % (Auto)  1.4  


 


Neut # (Auto)  3.5  


 


Lymph # (Auto)  1.0  


 


Mono # (Auto)  0.4  


 


Eos # (Auto)  0.4  


 


Baso # (Auto)  0.1  


 


Sodium   139 


 


Potassium   3.8 


 


Chloride   108 H 


 


Carbon Dioxide   17 L 


 


Anion Gap   17 


 


BUN   10 


 


Creatinine   0.3 L 


 


Est GFR ( Amer)   > 60 


 


Est GFR (Non-Af Amer)   > 60 


 


Random Glucose   76 


 


Calcium   8.8 


 


Total Bilirubin   14.4 H 


 


AST   108 H 


 


ALT   38 


 


Alkaline Phosphatase   508 H D 


 


Total Protein   7.8 


 


Albumin   3.3 L 


 


Globulin   4.4 H 


 


Albumin/Globulin Ratio   0.7 L 


 


Lipase   10 L 


 


Urine Color    Elise


 


Urine Clarity    Hazy


 


Urine pH    6.0


 


Ur Specific Gravity    1.012


 


Urine Protein    1+ H


 


Urine Glucose (UA)    Normal


 


Urine Ketones    Trace


 


Urine Blood    Negative


 


Urine Nitrate    Positive H


 


Urine Bilirubin    2+ H


 


Urine Urobilinogen    4.0 H


 


Ur Leukocyte Esterase    3+ H


 


Urine WBC (Auto)    94 H


 


Urine WBC Clumps (Auto)    Few H


 


Ur Squamous Epith Cells    1


 


Urine Bacteria    Many H


 


Ur Yeast w Hyphae    Occ H


 


Urine Yeast (Budding)    Many H


 


Urine Opiates Screen   


 


Urine Methadone Screen   


 


Ur Barbiturates Screen   


 


Ur Phencyclidine Scrn   


 


Ur Amphetamines Screen   


 


U Benzodiazepines Scrn   


 


U Oth Cocaine Metabols   


 


U Cannabinoids Screen   














  12/01/18





  18:14


 


WBC 


 


RBC 


 


Hgb 


 


Hct 


 


MCV 


 


MCH 


 


MCHC 


 


RDW 


 


Plt Count 


 


MPV 


 


Neut % (Auto) 


 


Lymph % (Auto) 


 


Mono % (Auto) 


 


Eos % (Auto) 


 


Baso % (Auto) 


 


Neut # (Auto) 


 


Lymph # (Auto) 


 


Mono # (Auto) 


 


Eos # (Auto) 


 


Baso # (Auto) 


 


Sodium 


 


Potassium 


 


Chloride 


 


Carbon Dioxide 


 


Anion Gap 


 


BUN 


 


Creatinine 


 


Est GFR ( Amer) 


 


Est GFR (Non-Af Amer) 


 


Random Glucose 


 


Calcium 


 


Total Bilirubin 


 


AST 


 


ALT 


 


Alkaline Phosphatase 


 


Total Protein 


 


Albumin 


 


Globulin 


 


Albumin/Globulin Ratio 


 


Lipase 


 


Urine Color 


 


Urine Clarity 


 


Urine pH 


 


Ur Specific Gravity 


 


Urine Protein 


 


Urine Glucose (UA) 


 


Urine Ketones 


 


Urine Blood 


 


Urine Nitrate 


 


Urine Bilirubin 


 


Urine Urobilinogen 


 


Ur Leukocyte Esterase 


 


Urine WBC (Auto) 


 


Urine WBC Clumps (Auto) 


 


Ur Squamous Epith Cells 


 


Urine Bacteria 


 


Ur Yeast w Hyphae 


 


Urine Yeast (Budding) 


 


Urine Opiates Screen  Positive H


 


Urine Methadone Screen  Negative


 


Ur Barbiturates Screen  Positive H


 


Ur Phencyclidine Scrn  Negative


 


Ur Amphetamines Screen  Negative


 


U Benzodiazepines Scrn  Negative


 


U Oth Cocaine Metabols  Negative


 


U Cannabinoids Screen  Negative

## 2018-12-01 NOTE — RAD
Chest x-ray single frontal view 



HISTORY:

Shortness of breath. 



COMPARISON:

11/02/2018 



FINDINGS:

Right chest wall port with tip extending into the right atrium. 



Left-sided pacemaker. 



Atherosclerotic calcification at the aortic knob. 



Enlarged ectatic aorta. 



Cardiomegaly.  Mild venous congestion. 



Degenerative changes in the spine. 



IMPRESSION:

Right chest wall port with tip extending into the right atrium. 



Left-sided pacemaker. 



Atherosclerotic calcification at the aortic knob. 



Enlarged ectatic aorta. 



Cardiomegaly.  Mild venous congestion.

## 2018-12-02 LAB
APTT BLD: 45 SECONDS (ref 21–34)
INR PPP: 1.6
PROTHROMBIN TIME: 17.3 SECONDS (ref 9.7–12.2)

## 2018-12-02 RX ADMIN — HUMAN INSULIN SCH UNIT: 100 INJECTION, SOLUTION SUBCUTANEOUS at 12:18

## 2018-12-02 RX ADMIN — HUMAN INSULIN SCH UNIT: 100 INJECTION, SOLUTION SUBCUTANEOUS at 08:22

## 2018-12-02 RX ADMIN — OXYBUTYNIN CHLORIDE SCH MG: 10 TABLET, EXTENDED RELEASE ORAL at 10:04

## 2018-12-02 RX ADMIN — DIPHENHYDRAMINE HYDROCHLORIDE PRN MG: 50 INJECTION INTRAMUSCULAR; INTRAVENOUS at 20:31

## 2018-12-02 RX ADMIN — METOPROLOL SUCCINATE SCH MG: 50 TABLET, EXTENDED RELEASE ORAL at 10:05

## 2018-12-02 RX ADMIN — DIPHENHYDRAMINE HYDROCHLORIDE PRN MG: 50 INJECTION INTRAMUSCULAR; INTRAVENOUS at 16:10

## 2018-12-02 RX ADMIN — DIGOXIN SCH MG: 0.25 TABLET ORAL at 20:29

## 2018-12-02 RX ADMIN — METHYLPREDNISOLONE SODIUM SUCCINATE SCH MG: 40 INJECTION, POWDER, FOR SOLUTION INTRAMUSCULAR; INTRAVENOUS at 21:19

## 2018-12-02 RX ADMIN — HUMAN INSULIN SCH: 100 INJECTION, SOLUTION SUBCUTANEOUS at 17:45

## 2018-12-02 RX ADMIN — DIPHENHYDRAMINE HYDROCHLORIDE PRN MG: 50 INJECTION INTRAMUSCULAR; INTRAVENOUS at 12:18

## 2018-12-02 RX ADMIN — METHYLPREDNISOLONE SODIUM SUCCINATE SCH MG: 40 INJECTION, POWDER, FOR SOLUTION INTRAMUSCULAR; INTRAVENOUS at 10:05

## 2018-12-02 RX ADMIN — HUMAN INSULIN SCH: 100 INJECTION, SOLUTION SUBCUTANEOUS at 21:23

## 2018-12-02 NOTE — PN
DATE:  2018



LOCATION:  364, bed A.



SUBJECTIVE:  This is an 53-year-old female, seen and examined initially for

GI consultation on 2018, he is seen again early this morning in

rounds.  Appears to be awake, alert, oriented with intermittent period of

abdominal pain, awaiting the abdominal ultrasound ordered by myself as the

patient has generalized jaundice, some itching with increased total

bilirubin of about 14 with increased AST.



The entire chart is reviewed including but not limited to most recent lab

and radiology study results, current and the previous medication list,

current and the previous medical events and today's PTT was 45.  Blood

glucose level 158.  The patient's lipase and amylase levels reported to be

within normal limit recently post admission.



PHYSICAL EXAMINATION:

GENERAL:  A 53-year-old female.

VITAL SIGNS:  Afebrile with pulse of 58, respiratory rate 20 to 22, blood

pressure of 124/62.

HEENT:  Pale dry oral mucoid membrane with bilateral icteric sclerae.

LUNGS:  Mild scattered crepitation.  Breathing sounds are present

bilaterally.

HEART:  Positive S1 and S2.

ABDOMEN:  Soft with mild generalized tenderness with mild distention.  No

mass or organomegaly.  No rebound tenderness or guarding.

EXTREMITIES:  Bilateral above-knee amputation.  No clubbing or cyanosis.

NEUROLOGIC:  No reported new neurological deficits, sensory or motor.



IMPRESSION:

1.  Hepatic sarcoidosis.

2.  Biliary spasm and stenosis with status post endoscopic retrograde

cholangiopancreatography and biliary stent insertion.

3.  Abnormal liver function tests secondary to above, the possibility of

obstructive jaundice was raised again with possible dysfunction of the

biliary stent or being blocked.

4.  Known history of bronchial asthma, hypertension, coronary artery

disease status post cardiac stent insertion, congestive heart failure,

cardiac arrhythmia.

5.  Known history of peripheral vascular disease with bilateral above-knee

amputation.

6.  Poorly controlled diabetes mellitus.

7.  History of hyperlipidemia.

8.  Cholelithiasis with status post cholecystectomy, status post partial

colon resection due to intra-abdominal intrapelvic abscess formation before

done in [facility].

9.  Status post appendectomy.

10.  Status post  section.



SUGGESTIONS:

1.  Agree with your plan.

2.  Abdominal ultrasound.

3.  Repeat cancer markers.

4.  If there is evidence of continuous elevation of total bilirubin, then

biliary stent exchange is to be scheduled.  Further recommendation to

follow.







__________________________________________

Jeffrey Albert MD



DD:  2018 8:23:39

DT:  2018 8:28:23

[facility] # [id number]

## 2018-12-02 NOTE — CP.PCM.PN
Subjective





- Subjective


Subjective: 





dictated   





Objective





- Vital Signs/Intake and Output


Vital Signs (last 24 hours): 


                                        











Temp Pulse Resp BP Pulse Ox


 


 98.3 F   58 L  20   120/59 L  98 


 


 12/02/18 17:03  12/02/18 17:03  12/02/18 17:03  12/02/18 17:03  12/02/18 17:03








Intake and Output: 


                                        











 12/02/18 12/03/18





 18:59 06:59


 


Intake Total 780 


 


Output Total 500 


 


Balance 280 














- Medications


Medications: 


                               Current Medications





Apixaban (Eliquis)  5 mg PO BID [facility]


   Last Admin: 12/02/18 17:45 Dose:  5 mg


Digoxin (Lanoxin)  0.25 mg PO DAILY@1800 [facility]


   Last Admin: 12/02/18 20:29 Dose:  0.25 mg


Diphenhydramine HCl (Benadryl)  25 mg IVP Q4 PRN


   PRN Reason: Itching / Pruritus


   Last Admin: 12/02/18 20:31 Dose:  25 mg


Diphenoxylate HCl/Atropine (Lomotil 0.025-2.5 Mg Tablet)  1 tab PO Q8 PRN


   PRN Reason: Diarrhea


Ezetimibe (Zetia)  10 mg PO DAILY [facility]


   Last Admin: 12/02/18 10:05 Dose:  10 mg


Gabapentin (Neurontin)  100 mg PO BID [facility]


   Last Admin: 12/02/18 17:45 Dose:  100 mg


Ceftriaxone Sodium 1 gm/ (Sodium Chloride)  100 mls @ 100 mls/hr IVPB DAILY [facility];

Protocol


   Last Admin: 12/02/18 10:08 Dose:  100 mls/hr


Insulin Human Regular (Novolin R)  [facility]; Protocol


   Last Admin: 12/02/18 21:23 Dose:  Not Given


Methylprednisolone (Solu-Medrol)  20 mg IV Q12 [facility]


   Last Admin: 12/02/18 21:19 Dose:  20 mg


Metoprolol Succinate (Toprol Xl)  50 mg PO DAILY [facility]


   Last Admin: 12/02/18 10:05 Dose:  50 mg


Morphine Sulfate (Morphine)  2 mg IVP Q4H PRN


   PRN Reason: Pain, moderate (4-7)


   Last Admin: 12/02/18 20:30 Dose:  2 mg


Oxybutynin Chloride (Ditropan Xl)  10 mg PO DAILY [facility]


   Last Admin: 12/02/18 10:04 Dose:  10 mg











- Labs


Labs: 


                                        





                                 12/01/18 17:44 





                                 12/01/18 17:44 





                                        











PT  17.3 SECONDS (9.7-12.2)  H  12/02/18  07:06    


 


INR  1.6   12/02/18  07:06    


 


APTT  45 SECONDS (21-34)  H  12/02/18  07:06

## 2018-12-02 NOTE — US
Abdominal ultrasound 



HISTORY:

Sarcoidosis.  Abdominal pain 



COMPARISON:

CT dated 11/02/2018 



Technique: Real-time sonography was performed through the abdomen. 



Findings: 



Liver: 13.5 centimeters in length.  Normal echogenicity.  

Intrahepatic biliary ductal dilatation.  Air/gas noted within the 

intrahepatic biliary ductal system.  Internal biliary stent noted. 



Gallbladder: Prior cholecystectomy. 



Common bile duct is prominent measuring up to 1.1 centimeters in 

width.  Stent noted.  Associated intrahepatic biliary ductal 

dilatation. 



Limited visualization of the pancreas.  Prominence of the pancreatic 

duct measuring up to 4.2 millimeters. 



Spleen is prominent measuring 13.5 centimeters in length.  Associated 

splenic calcifications.  Accessory splenule measures 2.2 x 2.3 x 2.1 

centimeters.



Visualized aorta and IVC are preserved. 



Right kidney: 11.3 x 5.6 x 6.1 centimeters.  No calculi or 

hydronephrosis.  Upper pole hypoechoic right renal cyst measuring 1.6 

x 1.4 x 1.5 centimeters.  Trace free fluid adjacent to the right 

kidney. 



Left Kidney: 10.0 x 4.4 x 4.5 centimeters.  No calculi or 

hydronephrosis. 



Impression: 



1. Intrahepatic biliary ductal dilatation.  Air/gas noted within the 

intrahepatic biliary ductal system.  Internal biliary stent noted. 

Common bile duct is prominent measuring up to 1.1 centimeters in 

width.  Clinical correlation. 



2. Prominence of the pancreatic duct measuring up to 4.2 millimeters. 



3. Spleen is prominent measuring 13.5 centimeters in length.  

Associated splenic calcifications.  Accessory splenule measures 2.2 x 

2.3 x 2.1 centimeters.



4. Upper pole hypoechoic right renal cyst measuring 1.6 x 1.4 x 1.5 

centimeters.  Trace free fluid adjacent to the right kidney.

## 2018-12-03 LAB
ALBUMIN SERPL-MCNC: 3.1 G/DL (ref 3.5–5)
ALBUMIN/GLOB SERPL: 0.8 {RATIO} (ref 1–2.1)
ALT SERPL-CCNC: 42 U/L (ref 9–52)
ANISOCYTOSIS BLD QL SMEAR: SLIGHT
AST SERPL-CCNC: 97 U/L (ref 14–36)
BASOPHILS # BLD AUTO: 0 K/UL (ref 0–0.2)
BASOPHILS NFR BLD: 0 % (ref 0–2)
BUN SERPL-MCNC: 26 MG/DL (ref 7–17)
CALCIUM SERPL-MCNC: 7.9 MG/DL (ref 8.6–10.4)
EOSINOPHIL # BLD AUTO: 0 K/UL (ref 0–0.7)
EOSINOPHIL NFR BLD: 0 % (ref 0–4)
ERYTHROCYTE [DISTWIDTH] IN BLOOD BY AUTOMATED COUNT: 18.7 % (ref 11.5–14.5)
GFR NON-AFRICAN AMERICAN: > 60
HGB BLD-MCNC: 10.6 G/DL (ref 11–16)
HYPOCHROMIC: SLIGHT
LYMPHOCYTE: 8 % (ref 20–40)
LYMPHOCYTES # BLD AUTO: 0.3 K/UL (ref 1–4.3)
LYMPHOCYTES NFR BLD AUTO: 8 % (ref 20–40)
MCH RBC QN AUTO: 32.1 PG (ref 27–31)
MCHC RBC AUTO-ENTMCNC: 33.6 G/DL (ref 33–37)
MCV RBC AUTO: 95.4 FL (ref 81–99)
MONOCYTE: 1 % (ref 0–10)
MONOCYTES # BLD: 0 K/UL (ref 0–0.8)
MONOCYTES NFR BLD: 1 % (ref 0–10)
NEUTROPHILS # BLD: 3.6 K/UL (ref 1.8–7)
NEUTROPHILS NFR BLD AUTO: 91 % (ref 50–75)
NEUTROPHILS NFR BLD AUTO: 91 % (ref 50–75)
NRBC BLD AUTO-RTO: 0 % (ref 0–2)
PLATELET # BLD EST: NORMAL 10*3/UL
PLATELET # BLD: 124 K/UL (ref 130–400)
PMV BLD AUTO: 10 FL (ref 7.2–11.7)
POLYCHROMIC: SLIGHT
RBC # BLD AUTO: 3.29 MIL/UL (ref 3.8–5.2)
TARGETS BLD QL SMEAR: SLIGHT
TOTAL CELLS COUNTED BLD: 100
WBC # BLD AUTO: 3.9 K/UL (ref 4.8–10.8)

## 2018-12-03 RX ADMIN — DIGOXIN SCH MG: 0.25 TABLET ORAL at 17:04

## 2018-12-03 RX ADMIN — METHYLPREDNISOLONE SODIUM SUCCINATE SCH MG: 40 INJECTION, POWDER, FOR SOLUTION INTRAMUSCULAR; INTRAVENOUS at 21:20

## 2018-12-03 RX ADMIN — HUMAN INSULIN SCH UNIT: 100 INJECTION, SOLUTION SUBCUTANEOUS at 11:35

## 2018-12-03 RX ADMIN — HUMAN INSULIN SCH: 100 INJECTION, SOLUTION SUBCUTANEOUS at 21:21

## 2018-12-03 RX ADMIN — DIPHENHYDRAMINE HYDROCHLORIDE PRN MG: 50 INJECTION INTRAMUSCULAR; INTRAVENOUS at 21:21

## 2018-12-03 RX ADMIN — HUMAN INSULIN SCH UNIT: 100 INJECTION, SOLUTION SUBCUTANEOUS at 07:30

## 2018-12-03 RX ADMIN — DIPHENHYDRAMINE HYDROCHLORIDE PRN MG: 50 INJECTION INTRAMUSCULAR; INTRAVENOUS at 04:28

## 2018-12-03 RX ADMIN — METOPROLOL SUCCINATE SCH MG: 50 TABLET, EXTENDED RELEASE ORAL at 10:18

## 2018-12-03 RX ADMIN — HUMAN INSULIN SCH: 100 INJECTION, SOLUTION SUBCUTANEOUS at 17:05

## 2018-12-03 RX ADMIN — DIPHENHYDRAMINE HYDROCHLORIDE PRN MG: 50 INJECTION INTRAMUSCULAR; INTRAVENOUS at 00:28

## 2018-12-03 RX ADMIN — DIPHENHYDRAMINE HYDROCHLORIDE PRN MG: 50 INJECTION INTRAMUSCULAR; INTRAVENOUS at 17:03

## 2018-12-03 RX ADMIN — OXYBUTYNIN CHLORIDE SCH MG: 10 TABLET, EXTENDED RELEASE ORAL at 10:17

## 2018-12-03 RX ADMIN — METHYLPREDNISOLONE SODIUM SUCCINATE SCH MG: 40 INJECTION, POWDER, FOR SOLUTION INTRAMUSCULAR; INTRAVENOUS at 10:18

## 2018-12-03 RX ADMIN — DIPHENHYDRAMINE HYDROCHLORIDE PRN MG: 50 INJECTION INTRAMUSCULAR; INTRAVENOUS at 12:55

## 2018-12-03 NOTE — HP
CHIEF COMPLIANT:  Abdominal pain.



HISTORY OF PRESENT ILLNESS:  This is 53-year-old -American female,

well-known to me with history of type 2 diabetes with multiple

complications including peripheral arterial disease, coronary artery

disease, congestive heart failure, cerebral arteriosclerosis.  She has

bilateral above-knee amputation for peripheral arterial disease,

hypertension.  She also has history of hepatic sarcoidosis on medical

management.  She came in because of abdominal pain, nausea, vomiting,

generalized weakness, tiredness.  According to the patient, she had been

taking oral Dilaudid at home but because of worsening condition, nausea and

vomiting, she had not been able to tolerate oral pain medication and pain

is getting worse and she came to the emergency room.  According to her, she

has pruritus.  She always has yellow discoloration of the eyes, generalized

weakness, bad taste in the mouth.  She has neurogenic bladder with chronic

indwelling Charles catheter in place and recurrent urinary tract infection. 

She denies any fever, chills, or rigors.  She denies any polyuria or

polydipsia.  She denies any cough, sore throat, or running nose.  There is

no history of sneezing, itchy eyes, or itchy nose.  She denies any neck

pain or head pain.  She denies any history of trauma, fall, loss of

consciousness.  No seizure like activity.  She has bilateral AKA stump.



PAST MEDICAL HISTORY:  Type 2 diabetes, peripheral arterial disease,

hypertension, hyperlipidemia, and hepatic sarcoidosis.



SOCIAL HISTORY:  Nonsmoker.  Non-ETOH user.



CURRENT MEDICATIONS:  At home, she is taking oral Dilaudid, Ditropan XL,

nystatin topical powder, Toprol XL, Neurontin, Lanoxin, Lomotil, Eliquis,

and prednisone.



PHYSICAL EXAMINATION:

GENERAL:  An elderly middle-aged female, in distress with abdominal pain.

VITAL SIGNS:  Blood pressure 118/67, pulse 56, respiratory rate 20, and

temperature 98.1.

SKIN:  Pale, icteric, or dry turgor.

HEENT:  Atraumatic and normocephalic.  Positive pallor.  Positive jaundice.

Extraocular moments are intact.

NECK:  Supple.  Flat neck vein.  No JVD.  No lymph nodes.  No thyromegaly. 

No carotid bruit.  Poor oral hygiene with few cavities.

CHEST:  Chest wall, bilateral symmetrical expansion.  No deformity.

BREASTS:  No masses.

LUNGS:  Bilaterally clear.  No rales.  No rhonchi.

CARDIOVASCULAR SYSTEM:  PMI not localized.  S1 and S2, plus S3 positive.

ABDOMEN:  Soft and nontender.  Bowel sounds are positive.  There is

enlarged liver in right upper quadrant.

RECTAL:  No masses.  No bleeding.  She has Charles catheter with cloudy

urine.

EXTREMITIES:  Bilateral above knee amputation.  Stumps are clean.

CENTRAL NERVOUS SYSTEM:  Awake, alert, and oriented x3.



ASSESSMENT:

1.  Urinary tract infection due to indwelling Charles catheter.

2.  Neurogenic bladder due to diabetes.

3.  Diabetes.

4.  Hypertension.

5.  Hepatic sarcoidosis with elevated bilirubin.



PLAN:  Detailed orders are written.  The patient is seen and examined.





__________________________________________

Nasir Dunbar MD





DD:  12/02/2018 22:15:53

DT:  12/03/2018 2:13:36

Job # [id number]

## 2018-12-03 NOTE — PN
DATE:  12/02/2018



SUBJECTIVE:  Ella is afebrile.  No shortness of breath, nausea.  No

vomiting.  She has weakness, abdominal pain.



PHYSICAL EXAMINATION

VITAL SIGNS:  Blood pressure 122/72, pulse 60, respiratory rate 20,

temperature 98.1.

LUNGS:  Clear.  No rales.  No rhonchi.

CARDIOVASCULAR SYSTEM:  S1, S2 plus S3 positive.

ABDOMEN:  Soft.



ASSESSMENT:

1.  Urinary tract infection.

2.  Neurogenic bladder.

3.  Coronary artery disease.

4.  Hypertension.

5.  Hepatic sarcoidosis.



PLAN:  Continue Rocephin pending urine culture.  Monitor the patient.





__________________________________________

Nasir Dunbar MD





DD:  12/02/2018 22:16:35

DT:  12/02/2018 23:36:16

Job # [id number]

## 2018-12-03 NOTE — CP.PCM.PN
Subjective





- Subjective


Subjective: 





dictated   





Objective





- Vital Signs/Intake and Output


Vital Signs (last 24 hours): 


                                        











Temp Pulse Resp BP Pulse Ox


 


 97.5 F L  60   20   147/75   100 


 


 12/03/18 15:00  12/03/18 15:00  12/03/18 15:00  12/03/18 15:00  12/03/18 15:00








Intake and Output: 


                                        











 12/03/18 12/04/18





 18:59 06:59


 


Intake Total 280 


 


Output Total 1000 


 


Balance -720 














- Medications


Medications: 


                               Current Medications





Apixaban (Eliquis)  5 mg PO BID [facility]


   Last Admin: 12/03/18 17:03 Dose:  5 mg


Digoxin (Lanoxin)  0.25 mg PO DAILY@1800 [facility]


   Last Admin: 12/03/18 17:04 Dose:  0.25 mg


Diphenhydramine HCl (Benadryl)  25 mg IVP Q4 PRN


   PRN Reason: Itching / Pruritus


   Last Admin: 12/03/18 21:21 Dose:  25 mg


Diphenhydramine HCl (Benadryl)  25 mg PO Q4 PRN


   PRN Reason: Itching / Pruritus


   Last Admin: 12/03/18 08:44 Dose:  25 mg


Diphenoxylate HCl/Atropine (Lomotil 0.025-2.5 Mg Tablet)  1 tab PO Q8 PRN


   PRN Reason: Diarrhea


Ezetimibe (Zetia)  10 mg PO DAILY [facility]


   Last Admin: 12/03/18 10:18 Dose:  10 mg


Gabapentin (Neurontin)  100 mg PO BID [facility]


   Last Admin: 12/03/18 17:03 Dose:  100 mg


Ceftriaxone Sodium 1 gm/ (Sodium Chloride)  100 mls @ 100 mls/hr IVPB DAILY [facility];

Protocol


   Last Admin: 12/03/18 10:17 Dose:  100 mls/hr


Sodium Chloride (Sodium Chloride 0.9%)  1,000 mls @ 50 mls/hr IV .Q20H [facility]


   Last Admin: 12/03/18 13:10 Dose:  50 mls/hr


Insulin Human Regular (Novolin R)  0 unit SC [facility]; Protocol


   Last Admin: 12/03/18 21:21 Dose:  Not Given


Methylprednisolone (Solu-Medrol)  20 mg IV Q12 [facility]


   Last Admin: 12/03/18 21:20 Dose:  20 mg


Metoprolol Succinate (Toprol Xl)  50 mg PO DAILY [facility]


   Last Admin: 12/03/18 10:18 Dose:  50 mg


Morphine Sulfate (Morphine)  2 mg IVP Q4H PRN


   PRN Reason: Pain, moderate (4-7)


   Last Admin: 12/03/18 21:19 Dose:  2 mg


Oxybutynin Chloride (Ditropan Xl)  10 mg PO DAILY [facility]


   Last Admin: 12/03/18 10:17 Dose:  10 mg


Pantoprazole Sodium (Protonix Inj)  40 mg IVP DAILY [facility]


   Last Admin: 12/03/18 13:07 Dose:  40 mg











- Labs


Labs: 


                                        





                                 12/03/18 07:12 





                                 12/03/18 07:12 





                                        











PT  17.3 SECONDS (9.7-12.2)  H  12/02/18  07:06    


 


INR  1.6   12/02/18  07:06    


 


APTT  45 SECONDS (21-34)  H  12/02/18  07:06

## 2018-12-03 NOTE — CON
DATE:  2018



This is from Dr. Albert to Dr. Nasir Dunbar



I was called for GI consultation by the admitting MD.  The patient is seen

and fully examined on 2001 to  as requested by the admitting

medical staff.  The entire chart is reviewed including but not limited to

most recent lab and radiology study results, current and previous

medication lists, current and previous medical events, allergies to

medication list as well as all the available current and previous medical

records. Case discussed with the staff at length.



HISTORY OF PRESENT ILLNESS:  This is a 53-year-old female, very well-known

case for me with multiple extensive past medical history, mainly liver

sarcoidosis, who was admitted to the hospital with recurrent complaint of

severe abdominal pain, through the emergency room with nausea and vomiting,

generalized weakness and malaise with increased abdominal gas, more

jaundice, loss of appetite with dysuria through her Charles catheter.



No reported active GI bleeding, actual chest pain, palpitation, or

significant shortness of breath.



PAST MEDICAL HISTORY:  Including but not limited to;

1.  Bronchial asthma.

2.  Hypertension.

3.  Coronary artery disease with status post cardiac stent insertion.

4.  Poorly controlled diabetes mellitus.

5.  Known history of cardiac arrhythmias with congestive heart failure.

6.  Hyperlipidemia.

7.  Peptic ulcer disease.

8.  Peripheral vascular disease with deep venous thrombosis, with bilateral

above-knee amputation.

9.  Cholelithiasis, status post cholecystectomy.

10.  Biliary tree stenosis, status post ERCP with biliary stent insertion,

then change more than once.

11.  Status post  with appendectomy.

12.  Status post partial colon resection due to intra-abdominal,

intrapelvic abscess formation.



CURRENT MEDICATIONS:  Medication lists were reviewed post-admission.



ALLERGIES:  ALLERGY TO MEDICATION LIST IS REVIEWED.



SOCIAL HISTORY:  Denied any cigarette smoking, however, positive for

alcohol intake.



FAMILY HISTORY:  Unrelated to specific GI disorder.



PHYSICAL EXAMINATION:

GENERAL:  A 53-year-old female, awake, alert, oriented, complaining of

abdominal pain, severe at times suspicion for right upper quadrant and

midepigastric area as well as suprapubic area, most likely related to her

neurologic bladder and recurrent urinary tract infection.

VITAL SIGNS:  The patient is afebrile with pulse of 82, respiratory rate

20-22, blood pressure 128/84.

HEENT:  Showed pale dry mucous membrane mildly with bilateral icteric

sclerae.

LYMPH NODES:  No lymphadenitis or lymphadenopathy.

LUNGS:  Scattered crepitation with decreased air entry at bases

bilaterally.

HEART:  Positive S1 and S2 with increased rate than usual.

ABDOMEN:  Soft with mild-to-moderate distention with generalized

tenderness.  Bowel sounds are present.  No mass or organomegaly.  No

rebound tenderness or guarding.

EXTREMITIES:  With evidence of bilateral above-knee amputation.

NEUROLOGIC:  No reported new neurological deficits, sensory or motor.



LABORATORY DATA:  Initial blood workup post admission showed

thrombocytopenia of 123 with normal hemoglobin and hematocrit, but low

creatinine of 0.3 with total bilirubin above 14 with increased AST more

than ALT, raising the question of possible alcohol-induced abnormal liver

function tests beside her sarcoidosis with elevated alkaline phosphatase.



IMPRESSION:

1.  Re-exacerbation of hepatic sarcoidosis.

2.  Abnormal liver function tests, most likely secondary to above and/or

possible alcohol-induced in the past.

3.  Rule out an early phase of obstructive _____ with obstructed biliary

stent, inducing elevated bilirubin furthermore.

4.  Multiple past medical history as mentioned above.



SUGGESTIONS:

1.  Agree with your plan.

2.  Abdominal ultrasound.

3.  Serum lipase, amylase level.  The steroids IV and repeat liver function

tests.

4  Antireflux measure.

5.  If there is evidence of dilated common bile duct, then ERCP with change

of biliary stent should be kept in mind, otherwise close observation to

follow.

6.  Thiamine IV.

7.  We will follow up closely with you.



Thank you for letting me participate in your patient's case management.







__________________________________________

Jeffrey Albert MD







DD:  2018 22:11:33

DT:  2018 22:18:34

Job # [id number]

## 2018-12-04 LAB
ALBUMIN SERPL-MCNC: 2.9 G/DL (ref 3.5–5)
ALBUMIN/GLOB SERPL: 0.7 {RATIO} (ref 1–2.1)
ALT SERPL-CCNC: 80 U/L (ref 9–52)
AST SERPL-CCNC: 170 U/L (ref 14–36)
BUN SERPL-MCNC: 19 MG/DL (ref 7–17)
CALCIUM SERPL-MCNC: 7.8 MG/DL (ref 8.6–10.4)
GFR NON-AFRICAN AMERICAN: > 60

## 2018-12-04 RX ADMIN — DIPHENHYDRAMINE HYDROCHLORIDE PRN MG: 50 INJECTION INTRAMUSCULAR; INTRAVENOUS at 01:56

## 2018-12-04 RX ADMIN — METHYLPREDNISOLONE SODIUM SUCCINATE SCH MG: 40 INJECTION, POWDER, FOR SOLUTION INTRAMUSCULAR; INTRAVENOUS at 10:18

## 2018-12-04 RX ADMIN — METHYLPREDNISOLONE SODIUM SUCCINATE SCH MG: 40 INJECTION, POWDER, FOR SOLUTION INTRAMUSCULAR; INTRAVENOUS at 22:46

## 2018-12-04 RX ADMIN — DIPHENHYDRAMINE HYDROCHLORIDE PRN MG: 50 INJECTION INTRAMUSCULAR; INTRAVENOUS at 14:47

## 2018-12-04 RX ADMIN — HUMAN INSULIN SCH UNIT: 100 INJECTION, SOLUTION SUBCUTANEOUS at 17:51

## 2018-12-04 RX ADMIN — OXYBUTYNIN CHLORIDE SCH MG: 10 TABLET, EXTENDED RELEASE ORAL at 10:39

## 2018-12-04 RX ADMIN — HUMAN INSULIN SCH UNIT: 100 INJECTION, SOLUTION SUBCUTANEOUS at 08:16

## 2018-12-04 RX ADMIN — DIPHENHYDRAMINE HYDROCHLORIDE PRN MG: 50 INJECTION INTRAMUSCULAR; INTRAVENOUS at 06:01

## 2018-12-04 RX ADMIN — DIPHENHYDRAMINE HYDROCHLORIDE PRN MG: 50 INJECTION INTRAMUSCULAR; INTRAVENOUS at 10:41

## 2018-12-04 RX ADMIN — DIPHENHYDRAMINE HYDROCHLORIDE PRN MG: 50 INJECTION INTRAMUSCULAR; INTRAVENOUS at 18:42

## 2018-12-04 RX ADMIN — HUMAN INSULIN SCH: 100 INJECTION, SOLUTION SUBCUTANEOUS at 22:58

## 2018-12-04 RX ADMIN — DIGOXIN SCH MG: 0.25 TABLET ORAL at 17:46

## 2018-12-04 RX ADMIN — HUMAN INSULIN SCH: 100 INJECTION, SOLUTION SUBCUTANEOUS at 11:58

## 2018-12-04 RX ADMIN — DIPHENHYDRAMINE HYDROCHLORIDE PRN MG: 50 INJECTION INTRAMUSCULAR; INTRAVENOUS at 22:48

## 2018-12-04 RX ADMIN — METOPROLOL SUCCINATE SCH MG: 50 TABLET, EXTENDED RELEASE ORAL at 10:19

## 2018-12-04 NOTE — PN
DATE:  12/03/2018



SUBJECTIVE:  The patient, Shivers, is afebrile.  She still has some

abdominal pain, more right upper quadrant and pelvic pain.  Positive

nausea.  No vomiting.  No fever.  On antibiotics.  Urine cultures are

pending.



PHYSICAL EXAMINATION:

VITAL SIGNS:  Blood pressure 147/75, pulse 60, respiratory rate 20,

temperature 97.5.

LUNGS:  Clear.

CARDIOVASCULAR:  S1, S2, regular.

ABDOMEN:  Right upper quadrant tenderness and enlarged liver.  Bowel sound

are present.

GENITOURINARY:  Positive Charles's with cloudy urine.



ASSESSMENT:

1.  Neurogenic bladder.

2.  Urinary tract infection due to indwelling Charles catheter.

3.  Type 2 diabetes.

4.  Hepatic sarcoidosis with elevated bilirubin.



PLAN:  GI consult.  IV antibiotics.  IV fluids.  Antiemetics.  Monitor the

patient.





__________________________________________

Nasir Dunbar MD





DD:  12/03/2018 22:16:35

DT:  12/03/2018 23:49:08

Job # [id number]

## 2018-12-05 LAB
ALBUMIN SERPL-MCNC: 2.9 G/DL (ref 3.5–5)
ALBUMIN/GLOB SERPL: 0.8 {RATIO} (ref 1–2.1)
ALT SERPL-CCNC: 121 U/L (ref 9–52)
AST SERPL-CCNC: 159 U/L (ref 14–36)
BUN SERPL-MCNC: 24 MG/DL (ref 7–17)
CALCIUM SERPL-MCNC: 7.7 MG/DL (ref 8.6–10.4)
GFR NON-AFRICAN AMERICAN: > 60

## 2018-12-05 RX ADMIN — DIPHENHYDRAMINE HYDROCHLORIDE PRN MG: 50 INJECTION INTRAMUSCULAR; INTRAVENOUS at 12:07

## 2018-12-05 RX ADMIN — DIPHENHYDRAMINE HYDROCHLORIDE PRN MG: 50 INJECTION INTRAMUSCULAR; INTRAVENOUS at 02:53

## 2018-12-05 RX ADMIN — METOPROLOL SUCCINATE SCH MG: 50 TABLET, EXTENDED RELEASE ORAL at 09:35

## 2018-12-05 RX ADMIN — METHYLPREDNISOLONE SODIUM SUCCINATE SCH MG: 40 INJECTION, POWDER, FOR SOLUTION INTRAMUSCULAR; INTRAVENOUS at 09:35

## 2018-12-05 RX ADMIN — HUMAN INSULIN SCH UNIT: 100 INJECTION, SOLUTION SUBCUTANEOUS at 16:30

## 2018-12-05 RX ADMIN — HUMAN INSULIN SCH UNIT: 100 INJECTION, SOLUTION SUBCUTANEOUS at 12:30

## 2018-12-05 RX ADMIN — METHYLPREDNISOLONE SODIUM SUCCINATE SCH MG: 40 INJECTION, POWDER, FOR SOLUTION INTRAMUSCULAR; INTRAVENOUS at 21:37

## 2018-12-05 RX ADMIN — DIPHENHYDRAMINE HYDROCHLORIDE PRN MG: 50 INJECTION INTRAMUSCULAR; INTRAVENOUS at 06:53

## 2018-12-05 RX ADMIN — HUMAN INSULIN SCH UNIT: 100 INJECTION, SOLUTION SUBCUTANEOUS at 08:30

## 2018-12-05 RX ADMIN — DIPHENHYDRAMINE HYDROCHLORIDE PRN MG: 50 INJECTION INTRAMUSCULAR; INTRAVENOUS at 17:20

## 2018-12-05 RX ADMIN — HUMAN INSULIN SCH: 100 INJECTION, SOLUTION SUBCUTANEOUS at 21:52

## 2018-12-05 RX ADMIN — DIGOXIN SCH MG: 0.25 TABLET ORAL at 18:09

## 2018-12-05 RX ADMIN — OXYBUTYNIN CHLORIDE SCH MG: 10 TABLET, EXTENDED RELEASE ORAL at 09:35

## 2018-12-05 NOTE — CP.PCM.PN
Subjective





- Subjective


Subjective: 





dictated   





Objective





- Vital Signs/Intake and Output


Vital Signs (last 24 hours): 


                                        











Temp Pulse Resp BP Pulse Ox


 


 98.1 F   90   20   149/76   99 


 


 12/05/18 15:00  12/05/18 15:00  12/05/18 15:00  12/05/18 15:00  12/05/18 15:00








Intake and Output: 


                                        











 12/05/18 12/06/18





 18:59 06:59


 


Intake Total 930 


 


Output Total 900 


 


Balance 30 














- Medications


Medications: 


                               Current Medications





Apixaban (Eliquis)  5 mg PO BID [facility]


   Last Admin: 12/05/18 18:06 Dose:  5 mg


Digoxin (Lanoxin)  0.25 mg PO DAILY@1800 [facility]


   Last Admin: 12/05/18 18:09 Dose:  0.25 mg


Diphenhydramine HCl (Benadryl)  25 mg IVP Q4 PRN


   PRN Reason: Itching / Pruritus


   Last Admin: 12/05/18 17:20 Dose:  25 mg


Diphenhydramine HCl (Benadryl)  25 mg PO Q4 PRN


   PRN Reason: Itching / Pruritus


   Last Admin: 12/03/18 08:44 Dose:  25 mg


Diphenoxylate HCl/Atropine (Lomotil 0.025-2.5 Mg Tablet)  1 tab PO Q8 PRN


   PRN Reason: Diarrhea


Ezetimibe (Zetia)  10 mg PO DAILY [facility]


   Last Admin: 12/05/18 09:35 Dose:  10 mg


Gabapentin (Neurontin)  100 mg PO BID [facility]


   Last Admin: 12/05/18 18:06 Dose:  100 mg


Sodium Chloride (Sodium Chloride 0.9%)  1,000 mls @ 50 mls/hr IV .Q20H [facility]


   Last Admin: 12/05/18 06:51 Dose:  Not Given


Ceftriaxone Sodium 1 gm/ (Sodium Chloride)  100 mls @ 100 mls/hr IVPB DAILY [facility];

Protocol


Insulin Human Regular (Novolin R)  0 unit SC ACHS [facility]; Protocol


   Last Admin: 12/05/18 16:30 Dose:  2 unit


Methylprednisolone (Solu-Medrol)  20 mg IV Q12 [facility]


   Last Admin: 12/05/18 21:37 Dose:  20 mg


Metoprolol Succinate (Toprol Xl)  50 mg PO DAILY [facility]


   Last Admin: 12/05/18 09:35 Dose:  50 mg


Morphine Sulfate (Morphine)  2 mg IVP Q4H PRN


   PRN Reason: Pain, moderate (4-7)


   Last Admin: 12/05/18 17:20 Dose:  2 mg


Oxybutynin Chloride (Ditropan Xl)  10 mg PO DAILY [facility]


   Last Admin: 12/05/18 09:35 Dose:  10 mg


Pantoprazole Sodium (Protonix Inj)  40 mg IVP DAILY [facility]


   Last Admin: 12/05/18 09:36 Dose:  40 mg











- Labs


Labs: 


                                        





                                 12/03/18 07:12 





                                 12/05/18 12:44 





                                        











PT  17.3 SECONDS (9.7-12.2)  H  12/02/18  07:06    


 


INR  1.6   12/02/18  07:06    


 


APTT  45 SECONDS (21-34)  H  12/02/18  07:06

## 2018-12-05 NOTE — PN
DATE:  12/05/2018



LOCATION:  Room #364, bed A.



SUBJECTIVE:  This is a 53-year-old female seen and examined in rounds with

history of generalized jaundice, generalized weakness and fatigue,

postprandial abdominal distention with reported abdominal pain with dysuria

again.  The patient still has intermittent periods of nausea and dyspepsia,

but no chest pain, palpitation, significant complaint of shortness of

breath, chills or fever recently.  No reported active GI bleeding.



The entire chart is reviewed including but not limited to the most recent

lab and radiology study results, current and the previous medication list,

current and the previous medical events and today's lab is still pending.



Case discussed with the admitting medical team.



Most recent lab results showed elevated total bilirubin as well as elevated

liver function test with pancytopenia with low albumin and low calcium with

persistent elevated blood glucose level, the patient had been on steroids

only .



PHYSICAL EXAMINATION:

GENERAL:  A 53-year-old female, afebrile with pulse of 62, respiratory rate

20 to 22, blood pressure 134/74.

HEENT:  Pale dry mucoid membrane.  Positive bilateral icteric sclerae.

LUNGS:  Scattered mild crepitation.  Decreased air entry at bases.

HEART:  Positive S1 and S2.

ABDOMEN:  Soft with mild generalized tenderness and mild distention,

especially in the right upper quadrant area.  No mass or organomegaly.  No

rebound tenderness or guarding.

EXTREMITIES:   Evidence of bilateral above-knee amputation.

CNS:  No new reported neurological deficits, sensory or motor.



IMPRESSION:

1.  Hepatic sarcoidosis.

2.  Abnormal liver function tests secondary to above most likely.

3.  Status post cholecystectomy, status post endoscopic retrograde

cholangiopancreatography with biliary stent insertion due to biliary tree

stenosis, status post partial colon resection.

4.  Poorly controlled diabetes mellitus.

5.  Known history of hypertension with coronary artery disease and cardiac

arrhythmias, by history.

7.  Peripheral vascular disease status post bilateral above-knee

amputation.

8.  Newly recurrent urinary tract infection due to neurogenic bladder and

Charles catheter insertion.



SUGGESTIONS:

1.  Agree with your plan.

2.  Again repeat liver function test, ____ bilirubin.

3.  Antireflux measures.

4.  Further recommendation to follow.





__________________________________________

Jeffrey Albert MD





DD:  12/05/2018 11:00:17

DT:  12/05/2018 11:05:46

[facility] # [id number]

## 2018-12-05 NOTE — CP.PCM.PN
Subjective





- Subjective


Subjective: 





dictated   





Objective





- Vital Signs/Intake and Output


Vital Signs (last 24 hours): 


                                        











Temp Pulse Resp BP Pulse Ox


 


 97.7 F   70   20   129/68   100 


 


 12/04/18 23:52  12/04/18 23:52  12/04/18 23:52  12/04/18 23:52  12/04/18 23:52








Intake and Output: 


                                        











 12/04/18 12/05/18





 18:59 06:59


 


Intake Total 1400 950


 


Output Total 1901 800


 


Balance -501 150














- Medications


Medications: 


                               Current Medications





Apixaban (Eliquis)  5 mg PO BID [facility]


   Last Admin: 12/04/18 17:47 Dose:  5 mg


Digoxin (Lanoxin)  0.25 mg PO DAILY@1800 [facility]


   Last Admin: 12/04/18 17:46 Dose:  0.25 mg


Diphenhydramine HCl (Benadryl)  25 mg IVP Q4 PRN


   PRN Reason: Itching / Pruritus


   Last Admin: 12/04/18 22:48 Dose:  25 mg


Diphenhydramine HCl (Benadryl)  25 mg PO Q4 PRN


   PRN Reason: Itching / Pruritus


   Last Admin: 12/03/18 08:44 Dose:  25 mg


Diphenoxylate HCl/Atropine (Lomotil 0.025-2.5 Mg Tablet)  1 tab PO Q8 PRN


   PRN Reason: Diarrhea


Ezetimibe (Zetia)  10 mg PO DAILY [facility]


   Last Admin: 12/04/18 10:39 Dose:  10 mg


Gabapentin (Neurontin)  100 mg PO BID [facility]


   Last Admin: 12/04/18 17:50 Dose:  100 mg


Ceftriaxone Sodium 1 gm/ (Sodium Chloride)  100 mls @ 100 mls/hr IVPB DAILY [facility];

Protocol


   Last Admin: 12/04/18 10:20 Dose:  100 mls/hr


Sodium Chloride (Sodium Chloride 0.9%)  1,000 mls @ 50 mls/hr IV .Q20H [facility]


   Last Admin: 12/04/18 09:23 Dose:  Not Given


Insulin Human Regular (Novolin R)  0 unit SC ACHS [facility]; Protocol


   Last Admin: 12/04/18 22:58 Dose:  Not Given


Methylprednisolone (Solu-Medrol)  20 mg IV Q12 [facility]


   Last Admin: 12/04/18 22:46 Dose:  20 mg


Metoprolol Succinate (Toprol Xl)  50 mg PO DAILY [facility]


   Last Admin: 12/04/18 10:19 Dose:  50 mg


Morphine Sulfate (Morphine)  2 mg IVP Q4H PRN


   PRN Reason: Pain, moderate (4-7)


   Last Admin: 12/04/18 22:47 Dose:  2 mg


Oxybutynin Chloride (Ditropan Xl)  10 mg PO DAILY [facility]


   Last Admin: 12/04/18 10:39 Dose:  10 mg


Pantoprazole Sodium (Protonix Inj)  40 mg IVP DAILY [facility]


   Last Admin: 12/04/18 10:17 Dose:  40 mg











- Labs


Labs: 


                                        





                                 12/03/18 07:12 





                                 12/04/18 07:38 





                                        











PT  17.3 SECONDS (9.7-12.2)  H  12/02/18  07:06    


 


INR  1.6   12/02/18  07:06    


 


APTT  45 SECONDS (21-34)  H  12/02/18  07:06

## 2018-12-05 NOTE — WC
DATE:  12/04/2018



LOCATION:  364, bed A.



SUBJECTIVE:  This is a 53-year-old female seen and examined in rounds with

less complaint of abdominal pain, postprandial abdominal distention with

generalized weakness and malaise as well as jaundice.  The entire chart is

reviewed including but not limited to the most recent lab and the radiology

study results, current and the previous medication list, current and the

previous medical events.  The case discussed with the staff at length and

today's lab result showed BUN 90, creatinine 1.6, blood glucose level 255,

calcium 7.9, total bilirubin went down to 10.3, increased , ALT 80,

alkaline phosphatase stood well with low albumin.



PHYSICAL EXAMINATION:

GENERAL:  This is a 53-year-old female awake, alert, oriented.  Denied any

actual chest pain, significant shortness of breath,  palpitation, chills,

or fever, but abdominal pain.

VITAL SIGNS:  Afebrile with pulse of 64, respiratory rate 22, and blood

pressure 140/72.

HEENT:  Pale and dry oral mucous membrane.  Nonicteric sclerae.

LUNGS:  Few scattered crepitation.  Decreased air entry at the bases.

HEART:  Positive S1 and S2.

ABDOMEN:  Soft.  There is mild generalized tenderness.  No mass or

organomegaly.  No rebound tenderness or guarding.

EXTREMITIES:  Evidence of bilateral upper knee amputation.

NEUROLOGIC:  No neurological deficits sensory or motor.



IMPRESSION:

1.  Hepatic sarcoidosis.

2.  Abnormal liver function tests with jaundice, the possibility of blocked

biliary stent with malfunction was raised.  However, the patient's

bilirubin had been improving gradually since the steroids intravenously IV

began.

3.  Multiple past medical history including but not limited to

hypertension, poorly controlled diabetes mellitus, peptic ulcer disease. 

Referral to Vascular Disease.  Status post bilateral above-knee amputation,

cholecystectomy, partial colon  resection, by history.



SUGGESTION:

1.  Continue current management.

2.  Follow up liver function tests.

3.  There is significant increase of total bilirubin and/or evidence of

ascending cholangitis clinically and biochemically.  Then, the biliary

stent to be removed and new biliary stent to be inserted.  That to be

discussed with the primary MD.







__________________________________________

Jeffrey Albert MD



DD:  12/04/2018 10:55:18

DT:  12/05/2018 7:06:51

[facility] # [id number]

## 2018-12-05 NOTE — PN
DATE:  09/05/2018



SUBJECTIVE:  The patient, Laura Jraamillo, is weak.  No fever.  No chills. 

Has abdominal pain, dysuria.  She has Charles catheter.  No chest pain.



PHYSICAL EXAMINATION:

VITAL SIGNS:  Blood pressure 129/68, pulse 70, respiratory rate 20,

temperature 97.7.

LUNGS:  Clear.  No rales.  No rhonchi.

CARDIOVASCULAR EXAM:  S1, S2 plus.  S3 positive.

ABDOMEN:  Soft, nontender, right upper quadrant enlarged liver.



Charles catheter in place with cloudy urine.



ASSESSMENT:

1.  Urinary tract infection.

2.  Neurogenic bladder.

3.  Type 2 diabetes.

4.  Hepatic sarcoidosis.  The patient's ____.





__________________________________________

Nasir Dunbar MD







DD:  12/05/2018 7:28:54

DT:  12/05/2018 8:38:49

Job # [id number]

## 2018-12-06 LAB
ALBUMIN SERPL-MCNC: 2.9 G/DL (ref 3.5–5)
ALBUMIN/GLOB SERPL: 0.8 {RATIO} (ref 1–2.1)
ALT SERPL-CCNC: 150 U/L (ref 9–52)
ANISOCYTOSIS BLD QL SMEAR: SLIGHT
AST SERPL-CCNC: 161 U/L (ref 14–36)
BACTERIA #/AREA URNS HPF: (no result) /[HPF]
BASOPHILS # BLD AUTO: 0 K/UL (ref 0–0.2)
BASOPHILS NFR BLD: 0.1 % (ref 0–2)
BILIRUB UR-MCNC: NEGATIVE MG/DL
BUN SERPL-MCNC: 31 MG/DL (ref 7–17)
CALCIUM SERPL-MCNC: 7.7 MG/DL (ref 8.6–10.4)
COLOR UR: YELLOW
EOSINOPHIL # BLD AUTO: 0 K/UL (ref 0–0.7)
EOSINOPHIL NFR BLD: 0 % (ref 0–4)
ERYTHROCYTE [DISTWIDTH] IN BLOOD BY AUTOMATED COUNT: 18.9 % (ref 11.5–14.5)
GFR NON-AFRICAN AMERICAN: > 60
GLUCOSE UR STRIP-MCNC: (no result) MG/DL
HGB BLD-MCNC: 11 G/DL (ref 11–16)
HYPHAE YEAST: (no result) /LPF
HYPOCHROMIC: (no result)
LEUKOCYTE ESTERASE UR-ACNC: (no result) LEU/UL
LYMPHOCYTE: 8 % (ref 20–40)
LYMPHOCYTES # BLD AUTO: 0.3 K/UL (ref 1–4.3)
LYMPHOCYTES NFR BLD AUTO: 7.9 % (ref 20–40)
MACROCYTES BLD QL SMEAR: SLIGHT
MCH RBC QN AUTO: 31.5 PG (ref 27–31)
MCHC RBC AUTO-ENTMCNC: 33.1 G/DL (ref 33–37)
MCV RBC AUTO: 95.4 FL (ref 81–99)
MONOCYTE: 1 % (ref 0–10)
MONOCYTES # BLD: 0.2 K/UL (ref 0–0.8)
MONOCYTES NFR BLD: 3.8 % (ref 0–10)
NEUTROPHILS # BLD: 3.8 K/UL (ref 1.8–7)
NEUTROPHILS NFR BLD AUTO: 88.2 % (ref 50–75)
NEUTROPHILS NFR BLD AUTO: 91 % (ref 50–75)
NRBC BLD AUTO-RTO: 0.1 % (ref 0–2)
OVALOCYTES BLD QL SMEAR: SLIGHT
PH UR STRIP: 6 [PH] (ref 5–8)
PLATELET # BLD EST: NORMAL 10*3/UL
PLATELET # BLD: 141 K/UL (ref 130–400)
PMV BLD AUTO: 10 FL (ref 7.2–11.7)
POLYCHROMIC: SLIGHT
PROT UR STRIP-MCNC: NEGATIVE MG/DL
RBC # BLD AUTO: 3.48 MIL/UL (ref 3.8–5.2)
RBC # UR STRIP: NEGATIVE /UL
SP GR UR STRIP: 1.01 (ref 1–1.03)
SQUAMOUS EPITHIAL: 1 /HPF (ref 0–5)
TARGETS BLD QL SMEAR: (no result)
TOTAL CELLS COUNTED BLD: 100
UROBILINOGEN UR-MCNC: 2 MG/DL (ref 0.2–1)
WBC # BLD AUTO: 4.3 K/UL (ref 4.8–10.8)

## 2018-12-06 RX ADMIN — DIPHENHYDRAMINE HYDROCHLORIDE PRN MG: 50 INJECTION INTRAMUSCULAR; INTRAVENOUS at 06:12

## 2018-12-06 RX ADMIN — DIPHENHYDRAMINE HYDROCHLORIDE PRN MG: 50 INJECTION INTRAMUSCULAR; INTRAVENOUS at 02:08

## 2018-12-06 RX ADMIN — HUMAN INSULIN SCH UNIT: 100 INJECTION, SOLUTION SUBCUTANEOUS at 16:53

## 2018-12-06 RX ADMIN — OXYBUTYNIN CHLORIDE SCH MG: 10 TABLET, EXTENDED RELEASE ORAL at 11:00

## 2018-12-06 RX ADMIN — HUMAN INSULIN SCH: 100 INJECTION, SOLUTION SUBCUTANEOUS at 21:27

## 2018-12-06 RX ADMIN — DIPHENHYDRAMINE HYDROCHLORIDE PRN MG: 50 INJECTION INTRAMUSCULAR; INTRAVENOUS at 15:46

## 2018-12-06 RX ADMIN — METHYLPREDNISOLONE SODIUM SUCCINATE SCH MG: 40 INJECTION, POWDER, FOR SOLUTION INTRAMUSCULAR; INTRAVENOUS at 21:39

## 2018-12-06 RX ADMIN — DIGOXIN SCH: 0.25 TABLET ORAL at 18:11

## 2018-12-06 RX ADMIN — METOPROLOL SUCCINATE SCH MG: 50 TABLET, EXTENDED RELEASE ORAL at 11:00

## 2018-12-06 RX ADMIN — HUMAN INSULIN SCH UNIT: 100 INJECTION, SOLUTION SUBCUTANEOUS at 08:30

## 2018-12-06 RX ADMIN — DIPHENHYDRAMINE HYDROCHLORIDE PRN MG: 50 INJECTION INTRAMUSCULAR; INTRAVENOUS at 21:31

## 2018-12-06 RX ADMIN — METHYLPREDNISOLONE SODIUM SUCCINATE SCH MG: 40 INJECTION, POWDER, FOR SOLUTION INTRAMUSCULAR; INTRAVENOUS at 11:00

## 2018-12-06 RX ADMIN — FLUCONAZOLE SCH MLS/HR: 200 INJECTION, SOLUTION INTRAVENOUS at 16:54

## 2018-12-06 RX ADMIN — HUMAN INSULIN SCH UNIT: 100 INJECTION, SOLUTION SUBCUTANEOUS at 12:30

## 2018-12-06 RX ADMIN — LINEZOLID SCH MLS/HR: 600 INJECTION, SOLUTION INTRAVENOUS at 18:12

## 2018-12-06 NOTE — PN
DATE:  12/05/2018



SUBJECTIVE:  The patient, Shivers, still has abdominal pain, nausea and

dysuria.  She remains on IV antibiotics.



PHYSICAL EXAMINATION:

VITAL SIGNS:  Blood pressure 149/76, pulse 90, respiratory rate 20,

temperature 98.1.

LUNGS:  Bilaterally clear.  No rales.  No rhonchi.

CARDIOVASCULAR SYSTEM:  PMI not localized.  S1 and S2 plus S3 positive.

ABDOMEN:  Soft, nontender.  Enlarged liver.  Charles catheter with cloudy

urine.

EXTREMITIES:  Bilateral above-knee amputation.



ASSESSMENT:

1.  Urinary tract infection, ruled out septicemia.  This is a complicated

urinary tract infection with indwelling Charles catheter for a neurogenic

bladder.

2.  Hepatic sarcoidosis with worsening liver function.  The patient is on

Solu-Medrol 20 mg intravenously b.i.d.  The patient's liver function is

improving with decreased total bilirubin.

3.  Type 2 diabetes.

4.  Hypertension.



PLAN:  Continue IV Solu-Medrol.  Continue IV antibiotics.  Monitor the

patient.





__________________________________________

Nasir Dunbar MD





DD:  12/05/2018 22:32:33

DT:  12/05/2018 23:29:30

Job # [id number]

## 2018-12-06 NOTE — CP.PCM.PN
Subjective





- Subjective


Subjective: 





DICTATED   





Objective





- Vital Signs/Intake and Output


Vital Signs (last 24 hours): 


                                        











Temp Pulse Resp BP Pulse Ox


 


 97.7 F   74   20   114/75   99 


 


 12/06/18 23:21  12/06/18 23:21  12/06/18 23:21  12/06/18 23:21  12/06/18 23:21








Intake and Output: 


                                        











 12/06/18 12/07/18





 18:59 06:59


 


Intake Total 930 


 


Output Total 1000 


 


Balance -70 














- Medications


Medications: 


                               Current Medications





Apixaban (Eliquis)  5 mg PO BID [facility]


   Last Admin: 12/06/18 18:10 Dose:  5 mg


Digoxin (Lanoxin)  0.25 mg PO DAILY@1800 [facility]


   Last Admin: 12/06/18 18:11 Dose:  Not Given


Diphenhydramine HCl (Benadryl)  25 mg IVP Q4 PRN


   PRN Reason: Itching / Pruritus


   Last Admin: 12/06/18 21:31 Dose:  25 mg


Diphenhydramine HCl (Benadryl)  25 mg PO Q4 PRN


   PRN Reason: Itching / Pruritus


   Last Admin: 12/03/18 08:44 Dose:  25 mg


Diphenoxylate HCl/Atropine (Lomotil 0.025-2.5 Mg Tablet)  1 tab PO Q8 PRN


   PRN Reason: Diarrhea


Ezetimibe (Zetia)  10 mg PO DAILY [facility]


   Last Admin: 12/06/18 11:00 Dose:  10 mg


Gabapentin (Neurontin)  100 mg PO BID [facility]


   Last Admin: 12/06/18 18:10 Dose:  100 mg


Fluconazole 100 mg/ (Miscellaneous)  50 mls @ 100 mls/hr IVPB Q24H [facility]; Protocol


   Last Admin: 12/06/18 16:54 Dose:  100 mls/hr


Linezolid (Zyvox 600mg/300ml D5w)  600 mg in 300 mls @ 200 mls/hr IVPB Q12H [facility];

Protocol


   Last Admin: 12/06/18 18:12 Dose:  200 mls/hr


Insulin Human Regular (Novolin R)  0 unit SC ACHS [facility]; Protocol


   Last Admin: 12/06/18 21:27 Dose:  Not Given


Methylprednisolone (Solu-Medrol)  20 mg IV Q12 [facility]


   Last Admin: 12/06/18 21:39 Dose:  20 mg


Metoprolol Succinate (Toprol Xl)  50 mg PO DAILY [facility]


   Last Admin: 12/06/18 11:00 Dose:  50 mg


Morphine Sulfate (Morphine)  2 mg IVP Q4H PRN


   PRN Reason: Pain, moderate (4-7)


   Last Admin: 12/06/18 21:32 Dose:  2 mg


Oxybutynin Chloride (Ditropan Xl)  10 mg PO DAILY [facility]


   Last Admin: 12/06/18 11:00 Dose:  10 mg


Pantoprazole Sodium (Protonix Inj)  40 mg IVP DAILY [facility]


   Last Admin: 12/06/18 11:00 Dose:  40 mg











- Labs


Labs: 


                                        





                                 12/06/18 08:41 





                                 12/06/18 08:41 





                                        











PT  17.3 SECONDS (9.7-12.2)  H  12/02/18  07:06    


 


INR  1.6   12/02/18  07:06    


 


APTT  45 SECONDS (21-34)  H  12/02/18  07:06

## 2018-12-06 NOTE — CP.PCM.PCO
Physician Communication Note





- Physician Communication Note


Physician Communication Note: RN called and informed about new micro- urine

## 2018-12-06 NOTE — PN
DATE:  12/06/2018



LOCATION:  364, bed A.



SUBJECTIVE:  This is a 53-year-old female seen and examined in rounds with

intermittent period of severe abdominal pain, mainly in the midepigastric

and the right upper quadrant area, had been on contact isolation for VRE in

the urine infection with dysuria, suprapubic abdominal pain.  The entire

chart is reviewed including but not limited to the most recent lab and

radiology study results, current and the previous medication list, current

and the previous medical events.  Case discussed with the staff at length

and today's lab results showed white blood cells of 4.3, hematocrit 33.2

with normal platelet count.  Blood glucose level 319 with recently reported

total bilirubin of 9 with subsequent decrease of previously reported

abnormal liver function test, but albumin 2.9.



PHYSICAL EXAMINATION:

GENERAL:  A 53-year-old female.  Denied any chest pain, palpitation,

significant shortness of breath, chills or fever.

VITAL SIGNS:  Heart rate of 62, respiratory rate 20 to 22 with blood

pressure of 120/66, afebrile.

HEENT:  Showed pale, dry oral mucous membrane.  Nonicteric sclerae.

LUNGS:  Few scattered crepitation.  Decreased air entry at bases.

HEART:  Positive S1 and S2.

ABDOMEN:  Soft with mild distention and mild generalized tenderness, but

mainly in midepigastric, suprapubic and right upper quadrant area.  No mass

or organomegaly.

EXTREMITIES:  With evidence of bilateral above-knee amputation.

NEUROLOGICAL:  No new reported neurological deficits, sensory or motor.



IMPRESSION:

1.  Jaundice.

2.  Evidence of recurrent urinary tract infection.  Charles catheter is still

in place due to neurogenic bladder.

3.  Hepatic sarcoidosis with abnormal liver function tests.

4.  Multiple past medical history including peripheral vascular disease,

bilateral above-knee amputation, poorly-controlled diabetes mellitus,

hypertension, status post cholecystectomy, status post endoscopic

retrograde cholangiopancreatography with biliary stent insertion as well as

cardiac arrhythmias with coronary artery disease, by history.



SUGGESTIONS:

1.  Continue current management.

2.  Subsequently decrease _____ IV dose.

3.  If there is evidence of ascending cholangitis, then change of the

biliary stent is to be scheduled; however, the patient does not need it in

the meantime for now.

4.  Further recommendation to follow.







__________________________________________

Jeffrey Albert MD



DD:  12/06/2018 10:09:24

DT:  12/06/2018 10:14:07

[facility] # [id number]

## 2018-12-06 NOTE — CP.PCM.CON
History of Present Illness





- History of Present Illness


History of Present Illness: 


INFECTIOUS DISEASE CONSULT;





53-year-old female with multiple medical problems including type 2 diabetes 

mellitus, peripheral arterial disease, CAD, CHF who presented to [facility]

on 12/3/18 with abdominal pains, nausea vomiting and generalized weakness.


Patient has history of sarcoidosis of the liver with history of ascending 

cholangitis in the past and is closely being followed by GI DR SILVERIO who knows 

her well.  She has history of stents placed and bile ducts.


Patient also has history offneurogenic bladder with chronic Gutierrez catheter in 

place NG recurrent urinary tract infections.


On admission patient had Gutierrez changed on December 3 and urine culture was 

obtained which came back positive for VRE in the urine and also YEAST in the 

urine  Patient presently on IV ceftriaxone.  Patient is markedly jaundiced with 

total bilirubin of 8.6 and increasing transaminitis with AST of 161 ALT of 150, 

alkaline phosphatase increasing to 323.


Patient continues to complain off right flank pain and right upper quadrant 

pain.


Infectious disease consultation requested by PMD for positive VRE and 

candiduria.


Patient denies fever, diarrhea constipation dysuria or hematuria.PATIENT DOES 

ADMIT TO FOUL-SMELLING URINE.





PMH: Anemia, Asthma (NO INHALER-ON PREDNISONE DAILY PO.), CAD (stent x 1), 

Cardia Arrhythmia, CHF, Diabetes, Deep Vein Thrombosis, Gall Bladder Disease, 

HTN, Hypercholesterolemia


   Denies: Chronic Kidney Disease


Surgical History: Appendectomy, Cholecystectomy, Coronary Stent, Endoscopy, C-

Section


ALLERGIES; AVOCADO, BANANA , ANGEL, KETOROLAC.





Family History: States: Unknown Family Hx





- Social History


Hx Tobacco Use: No


Hx Alcohol Use: Yes (years ago)


Hx Substance Use: No





- Immunization History


Hx Tetanus Toxoid Vaccination: Yes


Hx Influenza Vaccination: Yes


Hx Pneumococcal Vaccination: Yes (2015)








Review of Systems





- Constitutional


Constitutional: Fever (SUBJECTIVE FEVER), Lethargy, Malaise.  absent: Chills





- Cardiovascular


Cardiovascular: absent: Chest Pain





- Respiratory


Respiratory: absent: Cough, Hemoptysis





- Gastrointestinal


Gastrointestinal: Nausea, Vomiting (WITH ORAL dILAUDID.).  absent: Constipation,

Diarrhea, Odynophagia





- Genitourinary


Genitourinary: Flank Pain, Pyuria, Urinary Hesitance, Voiding Freq/Small Amts, 

Freq UTI





- Musculoskeletal


Musculoskeletal: Limited Range of Motion (HIS HISTORY OF BILATERAL 

ABOVE-THE-KNEE AMPUTATION.)





- Neurological


Neurological: absent: Headaches





- Hematologic/Lymphatic


Hematologic: As Per HPI.  absent: Easy Bleeding, Easy Bruising





Past Patient History





- Infectious Disease


Hx of Infectious Diseases: None





- Tetanus Immunizations


Tetanus Immunization: Unknown





- Past Medical History & Family History


Past Medical History?: Yes





- Past Social History


Smoking Status: Never Smoked





- CARDIAC


Hx Congestive Heart Failure: Yes


Hx Hypercholesterolemia: Yes


Hx Hypertension: Yes





- PULMONARY


Hx Asthma: Yes (NO INHALER-ON PREDNISONE DAILY PO.)





- NEUROLOGICAL


Hx Neurological Disorder: No





- HEENT


Hx HEENT Problems: Yes


Hx Cataracts: Yes (BILAT.)





- RENAL


Hx Chronic Kidney Disease: No





- ENDOCRINE/METABOLIC


Hx Diabetes Mellitus Type 1: Yes





- HEMATOLOGICAL/ONCOLOGICAL


Hx Anemia: Yes





- INTEGUMENTARY


Hx Dermatological Problems: No





- MUSCULOSKELETAL/RHEUMATOLOGICAL


Hx Falls: Yes





- GASTROINTESTINAL


Hx Gall Bladder Disease: Yes





- GENITOURINARY/GYNECOLOGICAL


Hx Genitourinary Disorders: Yes


Hx Urinary Tract Infection: Yes


Other/Comment: PT HAS LONG TERM GUTIERREZ





- PSYCHIATRIC


Hx Substance Use: No





- SURGICAL HISTORY


Hx Appendectomy: Yes


Hx Cholecystectomy: Yes


Hx Coronary Stent: Yes





- ANESTHESIA


Hx Anesthesia: Yes


Hx Anesthesia Reactions: No


Hx Malignant Hyperthermia: No





Meds


Allergies/Adverse Reactions: 


                                    Allergies











Allergy/AdvReac Type Severity Reaction Status Date / Time


 


avocado Allergy Severe ANAPHYLAXIS Verified 12/01/18 15:16


 


banana Allergy Severe ANAPHYLAXIS Verified 12/01/18 15:16


 


cherry Allergy Severe ANAPHYLAXIS Verified 12/01/18 15:16


 


ketorolac [From Toradol] Allergy   Verified 12/01/18 15:16














- Medications


Medications: 


                               Current Medications





Apixaban (Eliquis)  5 mg PO BID [facility]


   Last Admin: 12/06/18 11:00 Dose:  5 mg


Digoxin (Lanoxin)  0.25 mg PO DAILY@1800 [facility]


   Last Admin: 12/05/18 18:09 Dose:  0.25 mg


Diphenhydramine HCl (Benadryl)  25 mg IVP Q4 PRN


   PRN Reason: Itching / Pruritus


   Last Admin: 12/06/18 06:12 Dose:  25 mg


Diphenhydramine HCl (Benadryl)  25 mg PO Q4 PRN


   PRN Reason: Itching / Pruritus


   Last Admin: 12/03/18 08:44 Dose:  25 mg


Diphenoxylate HCl/Atropine (Lomotil 0.025-2.5 Mg Tablet)  1 tab PO Q8 PRN


   PRN Reason: Diarrhea


Ezetimibe (Zetia)  10 mg PO DAILY [facility]


   Last Admin: 12/06/18 11:00 Dose:  10 mg


Gabapentin (Neurontin)  100 mg PO BID [facility]


   Last Admin: 12/06/18 11:00 Dose:  100 mg


Insulin Human Regular (Novolin R)  0 unit SC ACHS [facility]; Protocol


   Last Admin: 12/06/18 12:30 Dose:  1 unit


Methylprednisolone (Solu-Medrol)  20 mg IV Q12 [facility]


   Last Admin: 12/06/18 11:00 Dose:  20 mg


Metoprolol Succinate (Toprol Xl)  50 mg PO DAILY [facility]


   Last Admin: 12/06/18 11:00 Dose:  50 mg


Morphine Sulfate (Morphine)  2 mg IVP Q4H PRN


   PRN Reason: Pain, moderate (4-7)


   Last Admin: 12/06/18 11:25 Dose:  2 mg


Oxybutynin Chloride (Ditropan Xl)  10 mg PO DAILY [facility]


   Last Admin: 12/06/18 11:00 Dose:  10 mg


Pantoprazole Sodium (Protonix Inj)  40 mg IVP DAILY [facility]


   Last Admin: 12/06/18 11:00 Dose:  40 mg











Physical Exam





- Constitutional


Appears: No Acute Distress





- Head Exam


Head Exam: NORMAL INSPECTION





- Eye Exam


Eye Exam: EOMI, PERRL, Scleral icterus





- ENT Exam


ENT Exam: Normal Oropharynx





- Neck Exam


Neck exam: Positive for: Normal Inspection





- Respiratory Exam


Respiratory Exam: Clear to Auscultation Bilateral, NORMAL BREATHING PATTERN





- Cardiovascular Exam


Cardiovascular Exam: REGULAR RHYTHM, +S1, +S2





- GI/Abdominal Exam


GI & Abdominal Exam: Soft, Tenderness (RIGHT UPPER QUADRANT AND RIGHT FLANK.).  

absent: Distended, Organomegaly





- Extremities Exam


Additional comments: 





BILATERAL ABOVE-KNEE AMPUTATIONS.STUMPS ARE CLEAN/AND WELL HEALED.





- Neurological Exam


Neurological exam: Alert, CN II-XII Intact, Oriented x3





- Psychiatric Exam


Psychiatric exam: Normal Mood





- Skin


Skin Exam: Normal Color, Warm





Results





- Vital Signs


Recent Vital Signs: 


                                Last Vital Signs











Temp  97.4 F L  12/06/18 07:00


 


Pulse  60   12/06/18 07:00


 


Resp  20   12/06/18 07:00


 


BP  132/69   12/06/18 07:00


 


Pulse Ox  99   12/06/18 07:00














- Labs


Result Diagrams: 


                                 12/07/18 07:13





                                 12/07/18 07:13


Labs: 


                         Laboratory Results - last 24 hr











  12/05/18 12/05/18 12/06/18





  16:24 21:43 07:24


 


WBC   


 


RBC   


 


Hgb   


 


Hct   


 


MCV   


 


MCH   


 


MCHC   


 


RDW   


 


Plt Count   


 


MPV   


 


Neut % (Auto)   


 


Lymph % (Auto)   


 


Mono % (Auto)   


 


Eos % (Auto)   


 


Baso % (Auto)   


 


Neut # (Auto)   


 


Lymph # (Auto)   


 


Mono # (Auto)   


 


Eos # (Auto)   


 


Baso # (Auto)   


 


Neutrophils % (Manual)   


 


Lymphocytes % (Manual)   


 


Monocytes % (Manual)   


 


Platelet Estimate   


 


Polychromasia   


 


Hypochromasia (manual)   


 


Anisocytosis (manual)   


 


Macrocytosis (manual)   


 


Target Cells   


 


Ovalocytes   


 


Sodium   


 


Potassium   


 


Chloride   


 


Carbon Dioxide   


 


Anion Gap   


 


BUN   


 


Creatinine   


 


Est GFR ( Amer)   


 


Est GFR (Non-Af Amer)   


 


POC Glucose (mg/dL)  224 H  259 H  319 H


 


Random Glucose   


 


Calcium   


 


Total Bilirubin   


 


AST   


 


ALT   


 


Alkaline Phosphatase   


 


Total Protein   


 


Albumin   


 


Globulin   


 


Albumin/Globulin Ratio   














  12/06/18 12/06/18 12/06/18





  08:41 08:41 11:18


 


WBC  4.3 L  


 


RBC  3.48 L  


 


Hgb  11.0  


 


Hct  33.2 L  


 


MCV  95.4  


 


MCH  31.5 H  


 


MCHC  33.1  


 


RDW  18.9 H  


 


Plt Count  141  


 


MPV  10.0  


 


Neut % (Auto)  88.2 H  


 


Lymph % (Auto)  7.9 L  


 


Mono % (Auto)  3.8  


 


Eos % (Auto)  0.0  


 


Baso % (Auto)  0.1  


 


Neut # (Auto)  3.8  


 


Lymph # (Auto)  0.3 L  


 


Mono # (Auto)  0.2  


 


Eos # (Auto)  0.0  


 


Baso # (Auto)  0.0  


 


Neutrophils % (Manual)  91 H  


 


Lymphocytes % (Manual)  8 L  


 


Monocytes % (Manual)  1  


 


Platelet Estimate  Normal  


 


Polychromasia  Slight  


 


Hypochromasia (manual)  Moderate  


 


Anisocytosis (manual)  Slight  


 


Macrocytosis (manual)  Slight  


 


Target Cells  Moderate  


 


Ovalocytes  Slight  


 


Sodium   136 


 


Potassium   4.2 


 


Chloride   111 H 


 


Carbon Dioxide   14 L 


 


Anion Gap   15 


 


BUN   31 H 


 


Creatinine   0.6 L 


 


Est GFR ( Amer)   > 60 


 


Est GFR (Non-Af Amer)   > 60 


 


POC Glucose (mg/dL)    195 H


 


Random Glucose   345 H 


 


Calcium   7.7 L 


 


Total Bilirubin   8.6 H 


 


AST   161 H 


 


ALT   150 H D 


 


Alkaline Phosphatase   323 H 


 


Total Protein   6.5 


 


Albumin   2.9 L 


 


Globulin   3.6 


 


Albumin/Globulin Ratio   0.8 L 














- Imaging and Cardiology


  ** Chest x-ray


Status: Report reviewed by me (chest x-ray 12/1/18 right Port-A-Cath, left-sided

permanent pacemaker.  Ascending aortic knob calcifications.)





Assessment & Plan


(1) Complicated urinary tract infection


Status: Acute   





(2) Abdominal pain


Status: Acute   





(3) Obstructive hyperbilirubinemia


Status: Acute   





(4) S/P AKA (above knee amputation) bilateral


Status: Acute   





(5) Diabetes mellitus with peripheral circulatory disorder


Status: Chronic   Priority: Medium   





(6) Neurogenic bladder


Status: Chronic   





(7) Sarcoidosis


Status: Chronic   Priority: Medium   





- Assessment and Plan (Free Text)


Plan: 





PLAN;


PANCULTURES.


REPEAT UA/ URINE CULTURES


START iv ZYVOX 600 MG iv PIGGYBACK EVERY 12 HOURLY. 12/6/18.


ADD IV DIFLUCAN 100 MG iv PIGGYBACK ONCE A DAY DAILY.12/6/18


CONSIDER STOPPING HEPATOTOXIC DRUGS.





F/U LFTS CLOSELY.


F/U CBC W/DIFF cLOSELY AS zYVOX CAUSES BONE MARROW DEPRESSION.


GI ON BOARD.





PATIENT STATES THE Gutierrez WAS CHANGED ON dECEMBER 3 WHEN SHE WAS ADMITTED.


cASE DISCUSSED WITH THE STAFF AT LENGTH.


PATIENT CONTINUES TO ASK FOR  ANALGESICS/AND COMPLAINING OF INSOMNIA.





Patient needs to be transferred to a tertiary care center FOR EVALUATION OF HER 

SARCOID LIVER.


WILL FOLLOW WHILE PATIENT IN HOSPITAL.

## 2018-12-07 LAB
ALBUMIN SERPL-MCNC: 2.9 G/DL (ref 3.5–5)
ALBUMIN/GLOB SERPL: 0.8 {RATIO} (ref 1–2.1)
ALT SERPL-CCNC: 196 U/L (ref 9–52)
AMYLASE SERPL-CCNC: 65 U/L (ref 30–110)
ANISOCYTOSIS BLD QL SMEAR: SLIGHT
AST SERPL-CCNC: 184 U/L (ref 14–36)
BASOPHILS # BLD AUTO: 0 K/UL (ref 0–0.2)
BASOPHILS NFR BLD: 0 % (ref 0–2)
BILIRUB DIRECT SERPL-MCNC: 7.5 MG/DL (ref 0–0.4)
BUN SERPL-MCNC: 27 MG/DL (ref 7–17)
CALCIUM SERPL-MCNC: 8 MG/DL (ref 8.6–10.4)
EOSINOPHIL # BLD AUTO: 0 K/UL (ref 0–0.7)
EOSINOPHIL NFR BLD: 0 % (ref 0–4)
ERYTHROCYTE [DISTWIDTH] IN BLOOD BY AUTOMATED COUNT: 18.7 % (ref 11.5–14.5)
GFR NON-AFRICAN AMERICAN: > 60
HGB BLD-MCNC: 11.4 G/DL (ref 11–16)
LIPASE: 51 U/L (ref 23–300)
LYMPHOCYTE: 7 % (ref 20–40)
LYMPHOCYTES # BLD AUTO: 0.3 K/UL (ref 1–4.3)
LYMPHOCYTES NFR BLD AUTO: 7.2 % (ref 20–40)
MACROCYTES BLD QL SMEAR: SLIGHT
MCH RBC QN AUTO: 31.7 PG (ref 27–31)
MCHC RBC AUTO-ENTMCNC: 33.3 G/DL (ref 33–37)
MCV RBC AUTO: 95.1 FL (ref 81–99)
MONOCYTE: 2 % (ref 0–10)
MONOCYTES # BLD: 0.1 K/UL (ref 0–0.8)
MONOCYTES NFR BLD: 2.4 % (ref 0–10)
NEUTROPHILS # BLD: 4.1 K/UL (ref 1.8–7)
NEUTROPHILS NFR BLD AUTO: 90.4 % (ref 50–75)
NEUTROPHILS NFR BLD AUTO: 91 % (ref 50–75)
NRBC BLD AUTO-RTO: 0.1 % (ref 0–2)
PLATELET # BLD EST: NORMAL 10*3/UL
PLATELET # BLD: 139 K/UL (ref 130–400)
PMV BLD AUTO: 9.7 FL (ref 7.2–11.7)
RBC # BLD AUTO: 3.61 MIL/UL (ref 3.8–5.2)
TARGETS BLD QL SMEAR: SLIGHT
TOTAL CELLS COUNTED BLD: 100
WBC # BLD AUTO: 4.5 K/UL (ref 4.8–10.8)

## 2018-12-07 RX ADMIN — DIPHENHYDRAMINE HYDROCHLORIDE PRN MG: 50 INJECTION INTRAMUSCULAR; INTRAVENOUS at 01:34

## 2018-12-07 RX ADMIN — HUMAN INSULIN SCH UNIT: 100 INJECTION, SOLUTION SUBCUTANEOUS at 09:16

## 2018-12-07 RX ADMIN — LINEZOLID SCH MLS/HR: 600 INJECTION, SOLUTION INTRAVENOUS at 05:47

## 2018-12-07 RX ADMIN — METHYLPREDNISOLONE SODIUM SUCCINATE SCH MG: 40 INJECTION, POWDER, FOR SOLUTION INTRAMUSCULAR; INTRAVENOUS at 09:15

## 2018-12-07 RX ADMIN — HUMAN INSULIN SCH UNIT: 100 INJECTION, SOLUTION SUBCUTANEOUS at 12:57

## 2018-12-07 RX ADMIN — OXYBUTYNIN CHLORIDE SCH MG: 10 TABLET, EXTENDED RELEASE ORAL at 09:51

## 2018-12-07 RX ADMIN — DIPHENHYDRAMINE HYDROCHLORIDE PRN MG: 50 INJECTION INTRAMUSCULAR; INTRAVENOUS at 09:56

## 2018-12-07 RX ADMIN — DIPHENHYDRAMINE HYDROCHLORIDE PRN MG: 50 INJECTION INTRAMUSCULAR; INTRAVENOUS at 18:41

## 2018-12-07 RX ADMIN — DIGOXIN SCH MG: 0.25 TABLET ORAL at 17:23

## 2018-12-07 RX ADMIN — METHYLPREDNISOLONE SODIUM SUCCINATE SCH MG: 40 INJECTION, POWDER, FOR SOLUTION INTRAMUSCULAR; INTRAVENOUS at 21:03

## 2018-12-07 RX ADMIN — FLUCONAZOLE SCH MLS/HR: 200 INJECTION, SOLUTION INTRAVENOUS at 16:50

## 2018-12-07 RX ADMIN — METOPROLOL SUCCINATE SCH MG: 50 TABLET, EXTENDED RELEASE ORAL at 09:15

## 2018-12-07 RX ADMIN — HUMAN INSULIN SCH: 100 INJECTION, SOLUTION SUBCUTANEOUS at 21:49

## 2018-12-07 RX ADMIN — HUMAN INSULIN SCH UNIT: 100 INJECTION, SOLUTION SUBCUTANEOUS at 17:21

## 2018-12-07 RX ADMIN — LINEZOLID SCH MLS/HR: 600 INJECTION, SOLUTION INTRAVENOUS at 18:30

## 2018-12-07 NOTE — CP.PCM.PN
Subjective





- Subjective


Subjective: 





dictated   





Objective





- Vital Signs/Intake and Output


Vital Signs (last 24 hours): 


                                        











Temp Pulse Resp BP Pulse Ox


 


 97.7 F   64   20   127/77   99 


 


 12/07/18 15:00  12/07/18 15:00  12/07/18 15:00  12/07/18 15:00  12/07/18 15:00








Intake and Output: 


                                        











 12/07/18 12/08/18





 18:59 06:59


 


Intake Total 600 


 


Output Total 1000 


 


Balance -400 














- Medications


Medications: 


                               Current Medications





Apixaban (Eliquis)  5 mg PO BID [facility]


   Last Admin: 12/07/18 17:22 Dose:  5 mg


Digoxin (Lanoxin)  0.25 mg PO DAILY@1800 [facility]


   Last Admin: 12/07/18 17:23 Dose:  0.25 mg


Diphenhydramine HCl (Benadryl)  25 mg IVP Q4 PRN


   PRN Reason: Itching / Pruritus


   Last Admin: 12/07/18 18:41 Dose:  25 mg


Diphenhydramine HCl (Benadryl)  25 mg PO Q4 PRN


   PRN Reason: Itching / Pruritus


   Last Admin: 12/07/18 14:23 Dose:  25 mg


Diphenoxylate HCl/Atropine (Lomotil 0.025-2.5 Mg Tablet)  1 tab PO Q8 PRN


   PRN Reason: Diarrhea


Ezetimibe (Zetia)  10 mg PO DAILY [facility]


   Last Admin: 12/07/18 09:51 Dose:  10 mg


Gabapentin (Neurontin)  100 mg PO BID [facility]


   Last Admin: 12/07/18 17:22 Dose:  100 mg


Hydrocortisone (Anusol-Hc)  25 mg RC BID [facility]


   Last Admin: 12/07/18 19:30 Dose:  25 mg


Fluconazole 100 mg/ (Miscellaneous)  50 mls @ 100 mls/hr IVPB Q24H [facility]; Protocol


   Last Admin: 12/07/18 16:50 Dose:  100 mls/hr


Linezolid (Zyvox 600mg/300ml D5w)  600 mg in 300 mls @ 200 mls/hr IVPB Q12H [facility];

Protocol


   Last Admin: 12/07/18 18:30 Dose:  200 mls/hr


Insulin Human Regular (Novolin R)  0 unit SC ACHS [facility]; Protocol


   Last Admin: 12/07/18 21:49 Dose:  Not Given


Methylprednisolone (Solu-Medrol)  20 mg IV Q12 [facility]


   Last Admin: 12/07/18 21:03 Dose:  20 mg


Metoprolol Succinate (Toprol Xl)  50 mg PO DAILY [facility]


   Last Admin: 12/07/18 09:15 Dose:  50 mg


Morphine Sulfate (Morphine)  2 mg IVP Q4H PRN


   PRN Reason: Pain, moderate (4-7)


   Last Admin: 12/07/18 18:39 Dose:  2 mg


Oxybutynin Chloride (Ditropan Xl)  10 mg PO DAILY [facility]


   Last Admin: 12/07/18 09:51 Dose:  10 mg


Pantoprazole Sodium (Protonix Inj)  40 mg IVP DAILY [facility]


   Last Admin: 12/07/18 09:56 Dose:  40 mg











- Labs


Labs: 


                                        





                                 12/07/18 07:13 





                                 12/07/18 07:13 





                                        











PT  17.3 SECONDS (9.7-12.2)  H  12/02/18  07:06    


 


INR  1.6   12/02/18  07:06    


 


APTT  45 SECONDS (21-34)  H  12/02/18  07:06

## 2018-12-07 NOTE — CP.PCM.PN
Subjective





- Date & Time of Evaluation


Date of Evaluation: 12/07/18


Time of Evaluation: 20:24





- Subjective


Subjective: 





AFEBRILE,


C/O RT. FLANK PAIN.





ON IV ZYVOX -DAY2


IV DIFLUCAN..-DAY 2





LABS REVIEWED.





LFTS -NOTED


INCREASING TRANSAMINITIS


WBC 4.5.


H/H STABLE





BS >400





Objective





- Vital Signs/Intake and Output


Vital Signs (last 24 hours): 


                                        











Temp Pulse Resp BP Pulse Ox


 


 97.7 F   64   20   127/77   99 


 


 12/07/18 15:00  12/07/18 15:00  12/07/18 15:00  12/07/18 15:00  12/07/18 15:00








Intake and Output: 


                                        











 12/07/18 12/08/18





 18:59 06:59


 


Intake Total 600 


 


Output Total 1000 


 


Balance -400 














- Medications


Medications: 


                               Current Medications





Apixaban (Eliquis)  5 mg PO BID [facility]


   Last Admin: 12/07/18 17:22 Dose:  5 mg


Digoxin (Lanoxin)  0.25 mg PO DAILY@1800 [facility]


   Last Admin: 12/07/18 17:23 Dose:  0.25 mg


Diphenhydramine HCl (Benadryl)  25 mg IVP Q4 PRN


   PRN Reason: Itching / Pruritus


   Last Admin: 12/07/18 18:41 Dose:  25 mg


Diphenhydramine HCl (Benadryl)  25 mg PO Q4 PRN


   PRN Reason: Itching / Pruritus


   Last Admin: 12/07/18 14:23 Dose:  25 mg


Diphenoxylate HCl/Atropine (Lomotil 0.025-2.5 Mg Tablet)  1 tab PO Q8 PRN


   PRN Reason: Diarrhea


Ezetimibe (Zetia)  10 mg PO DAILY [facility]


   Last Admin: 12/07/18 09:51 Dose:  10 mg


Gabapentin (Neurontin)  100 mg PO BID [facility]


   Last Admin: 12/07/18 17:22 Dose:  100 mg


Hydrocortisone (Anusol-Hc)  25 mg RC BID [facility]


Fluconazole 100 mg/ (Miscellaneous)  50 mls @ 100 mls/hr IVPB Q24H [facility]; Protocol


   Last Admin: 12/07/18 16:50 Dose:  100 mls/hr


Linezolid (Zyvox 600mg/300ml D5w)  600 mg in 300 mls @ 200 mls/hr IVPB Q12H [facility];

Protocol


   Last Admin: 12/07/18 18:30 Dose:  200 mls/hr


Insulin Human Regular (Novolin R)  0 unit SC ACHS [facility]; Protocol


   Last Admin: 12/07/18 17:21 Dose:  3 unit


Methylprednisolone (Solu-Medrol)  20 mg IV Q12 [facility]


   Last Admin: 12/07/18 09:15 Dose:  20 mg


Metoprolol Succinate (Toprol Xl)  50 mg PO DAILY [facility]


   Last Admin: 12/07/18 09:15 Dose:  50 mg


Morphine Sulfate (Morphine)  2 mg IVP Q4H PRN


   PRN Reason: Pain, moderate (4-7)


   Last Admin: 12/07/18 18:39 Dose:  2 mg


Oxybutynin Chloride (Ditropan Xl)  10 mg PO DAILY [facility]


   Last Admin: 12/07/18 09:51 Dose:  10 mg


Pantoprazole Sodium (Protonix Inj)  40 mg IVP DAILY [facility]


   Last Admin: 12/07/18 09:56 Dose:  40 mg











- Labs


Labs: 


                                        





                                 12/07/18 07:13 





                                 12/07/18 07:13 





                                        











PT  17.3 SECONDS (9.7-12.2)  H  12/02/18  07:06    


 


INR  1.6   12/02/18  07:06    


 


APTT  45 SECONDS (21-34)  H  12/02/18  07:06    














- Constitutional


Appears: No Acute Distress





- Head Exam


Head Exam: NORMAL INSPECTION





- Eye Exam


Eye Exam: EOMI, PERRL, Scleral icterus





- ENT Exam


ENT Exam: Normal Oropharynx





- Neck Exam


Neck Exam: Normal Inspection





- Respiratory Exam


Respiratory Exam: Decreased Breath Sounds





- Cardiovascular Exam


Cardiovascular Exam: REGULAR RHYTHM, +S1, +S2





- GI/Abdominal Exam


GI & Abdominal Exam: Soft, Tenderness (RT FLANK/RUQ), Normal Bowel Sounds





- Extremities Exam


Additional comments: 





B/L AKA.


STUMPS CLEAN.





- Neurological Exam


Neurological Exam: Alert, Awake, CN II-XII Intact, Oriented x3, Reflexes Normal





- Psychiatric Exam


Psychiatric exam: Normal Mood





- Skin


Skin Exam: Normal Color, Warm





Assessment and Plan


(1) Complicated urinary tract infection


Status: Acute   





(2) Abdominal pain


Status: Acute   





(3) Obstructive hyperbilirubinemia


Status: Acute   





(4) S/P AKA (above knee amputation) bilateral


Status: Acute   





(5) Diabetes mellitus with peripheral circulatory disorder


Status: Chronic   





(6) Neurogenic bladder


Status: Chronic   





(7) Sarcoidosis


Status: Chronic   





- Assessment and Plan (Free Text)


Plan: 





REPEAT UA/ URINE CULTURES-P


ON iv ZYVOX 600 MG iv PIGGYBACK EVERY 12 HOURLY. 12/6/18.


DC IV DIFLUCAN 


CONSIDER STOPPING HEPATOTOXIC DRUGS.





F/U LFTS CLOSELY.

## 2018-12-07 NOTE — PN
DATE:  12/07/2018



LOCATION:  365, bed B.



SUBJECTIVE:  This is a 53-year-old female, seen and examined early in

rounds without significant clinical changes but complained of generalized

weakness and malaise with reported elevated blood glucose level to 465

early in the morning.  The patient still has periods of nausea with

dyspepsia as well as suprapubic abdominal pain but no chest pain,

palpitation, significant shortness of breath, or reported chills or fever. 

No reported active GI bleeding, but less generalized jaundice.



The entire chart is reviewed including but not limited to the most recent

lab and radiology study results, current and the previous medication list,

current and the previous medical events and today's lab results showed

white blood cells of 4.5 with normal hemoglobin and hematocrit, normal

platelet count with CO2 content of 16 indicative of metabolic acidosis, BUN

27, creatinine 0.8 with persistently elevated blood glucose level with

subsequent drop of total bilirubin to 8.4, but , , alkaline

phosphatase 288 with low albumin of 2.9.



PHYSICAL EXAMINATION:

GENERAL:  A 53-year-old female.

VITAL SIGNS:  Afebrile with pulse of 60, respiratory rate 20 to 22, blood

pressure of 130/72.

HEENT:  Mildly pale dry oral mucoid membrane.  Bilateral icteric sclerae.

LUNGS:  Few scattered crepitations.  Decreased air entry at bases.

HEART:  Positive S1 and S2.

ABDOMEN:  Soft with mild generalized tenderness.  No mass or organomegaly. 

No rebound tenderness or guarding.

EXTREMITIES:  Without significant clubbing, cyanosis or edema.  The patient

has evidence of bilateral above-knee amputation.

CNS:  No reported new neurological deficits, sensory or motor.



IMPRESSION:

1.  Hepatic sarcoidosis with elevated liver function tests and jaundice,

gradually improving in response to steroids intravenous.

2.  Re-exacerbation of peptic ulcer disease.

3.  Recurrent urinary tract infection.

4.  Poorly controlled hyperglycemia.

5.  Known history of but not limited to peripheral vascular disease,

bilateral above-knee amputation. status post cholecystectomy, status post

endoscopic retrograde cholangiopancreatography with biliary stent insertion

and exchange done by myself about 4 weeks ago.



SUGGESTIONS:

1.  Agree with your plan.

2.  Follow up liver function tests.

3.  No further aggressive GI workup in the meantime until the patient is

more stable clinically.  Further recommendation to follow.





__________________________________________

Jeffrey Albert MD



DD:  12/07/2018 11:11:08

DT:  12/07/2018 11:16:29

[facility] # [id number]

## 2018-12-08 RX ADMIN — LINEZOLID SCH MLS/HR: 600 INJECTION, SOLUTION INTRAVENOUS at 17:34

## 2018-12-08 RX ADMIN — DIGOXIN SCH MG: 0.25 TABLET ORAL at 17:33

## 2018-12-08 RX ADMIN — DIPHENHYDRAMINE HYDROCHLORIDE PRN MG: 50 INJECTION INTRAMUSCULAR; INTRAVENOUS at 03:27

## 2018-12-08 RX ADMIN — DIPHENHYDRAMINE HYDROCHLORIDE PRN MG: 50 INJECTION INTRAMUSCULAR; INTRAVENOUS at 13:51

## 2018-12-08 RX ADMIN — METOPROLOL SUCCINATE SCH MG: 50 TABLET, EXTENDED RELEASE ORAL at 09:03

## 2018-12-08 RX ADMIN — HUMAN INSULIN SCH: 100 INJECTION, SOLUTION SUBCUTANEOUS at 21:27

## 2018-12-08 RX ADMIN — DIPHENHYDRAMINE HYDROCHLORIDE PRN MG: 50 INJECTION INTRAMUSCULAR; INTRAVENOUS at 08:54

## 2018-12-08 RX ADMIN — METHYLPREDNISOLONE SODIUM SUCCINATE SCH: 40 INJECTION, POWDER, FOR SOLUTION INTRAMUSCULAR; INTRAVENOUS at 10:22

## 2018-12-08 RX ADMIN — LINEZOLID SCH MLS/HR: 600 INJECTION, SOLUTION INTRAVENOUS at 05:42

## 2018-12-08 RX ADMIN — HUMAN INSULIN SCH UNIT: 100 INJECTION, SOLUTION SUBCUTANEOUS at 11:47

## 2018-12-08 RX ADMIN — METHYLPREDNISOLONE SODIUM SUCCINATE SCH MG: 40 INJECTION, POWDER, FOR SOLUTION INTRAMUSCULAR; INTRAVENOUS at 09:03

## 2018-12-08 RX ADMIN — OXYBUTYNIN CHLORIDE SCH MG: 10 TABLET, EXTENDED RELEASE ORAL at 09:03

## 2018-12-08 RX ADMIN — HUMAN INSULIN SCH UNIT: 100 INJECTION, SOLUTION SUBCUTANEOUS at 17:00

## 2018-12-08 RX ADMIN — HUMAN INSULIN SCH UNIT: 100 INJECTION, SOLUTION SUBCUTANEOUS at 08:18

## 2018-12-08 NOTE — CP.PCM.PN
Subjective





- Date & Time of Evaluation


Date of Evaluation: 12/08/18


Time of Evaluation: 23:56





- Subjective


Subjective: 





AFEBRILE,


C/O RUQ ABDOMINAL PAIN


C/O POOR APPETITE


+VE JAUNDICE




















Objective





- Vital Signs/Intake and Output


Vital Signs (last 24 hours): 


                                        











Temp Pulse Resp BP Pulse Ox


 


 98.1 F   60   20   133/75   99 


 


 12/08/18 16:00  12/08/18 16:00  12/08/18 16:00  12/08/18 16:00  12/08/18 16:00








Intake and Output: 


                                        











 12/08/18 12/09/18





 18:59 06:59


 


Intake Total 1190 900


 


Output Total  800


 


Balance 1190 100














- Medications


Medications: 


                               Current Medications





Apixaban (Eliquis)  5 mg PO BID [facility]


   Last Admin: 12/08/18 17:33 Dose:  5 mg


Digoxin (Lanoxin)  0.25 mg PO DAILY@1800 [facility]


   Last Admin: 12/08/18 17:33 Dose:  0.25 mg


Diphenhydramine HCl (Benadryl)  25 mg IVP Q4 PRN


   PRN Reason: Itching / Pruritus


   Last Admin: 12/08/18 13:51 Dose:  25 mg


Diphenhydramine HCl (Benadryl)  25 mg PO Q4 PRN


   PRN Reason: Itching / Pruritus


   Last Admin: 12/08/18 17:55 Dose:  25 mg


Diphenoxylate HCl/Atropine (Lomotil 0.025-2.5 Mg Tablet)  1 tab PO Q8 PRN


   PRN Reason: Diarrhea


Ezetimibe (Zetia)  10 mg PO DAILY [facility]


   Last Admin: 12/08/18 09:03 Dose:  10 mg


Gabapentin (Neurontin)  100 mg PO BID [facility]


   Last Admin: 12/08/18 17:33 Dose:  100 mg


Hydrocortisone (Anusol-Hc)  25 mg RC BID [facility]


   Last Admin: 12/08/18 17:33 Dose:  25 mg


Linezolid (Zyvox 600mg/300ml D5w)  600 mg in 300 mls @ 200 mls/hr IVPB Q12H [facility];

Protocol


   Last Admin: 12/08/18 17:34 Dose:  200 mls/hr


Insulin Human Regular (Novolin R)  0 unit SC ACHS [facility]; Protocol


   Last Admin: 12/08/18 21:27 Dose:  Not Given


Methylprednisolone (Solu-Medrol)  20 mg IV DAILY [facility]


   Last Admin: 12/08/18 10:22 Dose:  Not Given


Metoprolol Succinate (Toprol Xl)  50 mg PO DAILY [facility]


   Last Admin: 12/08/18 09:03 Dose:  50 mg


Morphine Sulfate (Morphine)  2 mg IVP Q4H PRN


   PRN Reason: Pain, moderate (4-7)


   Last Admin: 12/08/18 18:00 Dose:  2 mg


Oxybutynin Chloride (Ditropan Xl)  10 mg PO DAILY [facility]


   Last Admin: 12/08/18 09:03 Dose:  10 mg


Pantoprazole Sodium (Protonix Inj)  40 mg IVP DAILY [facility]


   Last Admin: 12/08/18 09:04 Dose:  40 mg











- Labs


Labs: 


                                        





                                 12/07/18 07:13 





                                 12/07/18 07:13 





                                        











PT  17.3 SECONDS (9.7-12.2)  H  12/02/18  07:06    


 


INR  1.6   12/02/18  07:06    


 


APTT  45 SECONDS (21-34)  H  12/02/18  07:06    














- Constitutional


Appears: No Acute Distress





- Head Exam


Head Exam: NORMAL INSPECTION





- Eye Exam


Eye Exam: EOMI, PERRL, Scleral icterus





- ENT Exam


ENT Exam: Normal Oropharynx





- Neck Exam


Neck Exam: Normal Inspection





- Respiratory Exam


Respiratory Exam: Clear to Ausculation Bilateral





- Cardiovascular Exam


Cardiovascular Exam: REGULAR RHYTHM, +S1, +S2





- GI/Abdominal Exam


GI & Abdominal Exam: Soft, Tenderness (RUQ/RT FLANK), Normal Bowel Sounds





- Extremities Exam


Additional comments: 





B/L AKA





- Neurological Exam


Neurological Exam: Alert, Awake, CN II-XII Intact





- Psychiatric Exam


Psychiatric exam: Normal Mood





- Skin


Skin Exam: Normal Color, Warm





Assessment and Plan


(1) Complicated urinary tract infection


Status: Acute   





(2) Abdominal pain


Status: Acute   





(3) Obstructive hyperbilirubinemia


Status: Acute   





(4) S/P AKA (above knee amputation) bilateral


Status: Acute   





(5) Diabetes mellitus with peripheral circulatory disorder


Status: Chronic   





(6) Neurogenic bladder


Status: Chronic   





(7) Sarcoidosis


Status: Chronic   





- Assessment and Plan (Free Text)


Plan: 





REPEAT UA/ URINE CULTURES-P


ON iv ZYVOX 600 MG iv PIGGYBACK EVERY 12 HOURLY. 12/6/18.





CONSIDER STOPPING HEPATOTOXIC DRUGS.





F/U LFTS


AS PER GI RECOMMENDATIONS

## 2018-12-08 NOTE — CP.PCM.PN
Subjective





- Subjective


Subjective: 





dictated   





Objective





- Vital Signs/Intake and Output


Vital Signs (last 24 hours): 


                                        











Temp Pulse Resp BP Pulse Ox


 


 98.1 F   60   20   133/75   99 


 


 12/08/18 16:00  12/08/18 16:00  12/08/18 16:00  12/08/18 16:00  12/08/18 16:00








Intake and Output: 


                                        











 12/08/18 12/09/18





 18:59 06:59


 


Intake Total 1190 


 


Balance 1190 














- Medications


Medications: 


                               Current Medications





Apixaban (Eliquis)  5 mg PO BID [facility]


   Last Admin: 12/08/18 17:33 Dose:  5 mg


Digoxin (Lanoxin)  0.25 mg PO DAILY@1800 [facility]


   Last Admin: 12/08/18 17:33 Dose:  0.25 mg


Diphenhydramine HCl (Benadryl)  25 mg IVP Q4 PRN


   PRN Reason: Itching / Pruritus


   Last Admin: 12/08/18 13:51 Dose:  25 mg


Diphenhydramine HCl (Benadryl)  25 mg PO Q4 PRN


   PRN Reason: Itching / Pruritus


   Last Admin: 12/08/18 17:55 Dose:  25 mg


Diphenoxylate HCl/Atropine (Lomotil 0.025-2.5 Mg Tablet)  1 tab PO Q8 PRN


   PRN Reason: Diarrhea


Ezetimibe (Zetia)  10 mg PO DAILY [facility]


   Last Admin: 12/08/18 09:03 Dose:  10 mg


Gabapentin (Neurontin)  100 mg PO BID [facility]


   Last Admin: 12/08/18 17:33 Dose:  100 mg


Hydrocortisone (Anusol-Hc)  25 mg RC BID [facility]


   Last Admin: 12/08/18 17:33 Dose:  25 mg


Linezolid (Zyvox 600mg/300ml D5w)  600 mg in 300 mls @ 200 mls/hr IVPB Q12H [facility];

Protocol


   Last Admin: 12/08/18 17:34 Dose:  200 mls/hr


Insulin Human Regular (Novolin R)  0 unit SC ACHS [facility]; Protocol


   Last Admin: 12/08/18 21:27 Dose:  Not Given


Methylprednisolone (Solu-Medrol)  20 mg IV DAILY [facility]


   Last Admin: 12/08/18 10:22 Dose:  Not Given


Metoprolol Succinate (Toprol Xl)  50 mg PO DAILY [facility]


   Last Admin: 12/08/18 09:03 Dose:  50 mg


Morphine Sulfate (Morphine)  2 mg IVP Q4H PRN


   PRN Reason: Pain, moderate (4-7)


   Last Admin: 12/08/18 18:00 Dose:  2 mg


Oxybutynin Chloride (Ditropan Xl)  10 mg PO DAILY [facility]


   Last Admin: 12/08/18 09:03 Dose:  10 mg


Pantoprazole Sodium (Protonix Inj)  40 mg IVP DAILY [facility]


   Last Admin: 12/08/18 09:04 Dose:  40 mg











- Labs


Labs: 


                                        





                                 12/07/18 07:13 





                                 12/07/18 07:13 





                                        











PT  17.3 SECONDS (9.7-12.2)  H  12/02/18  07:06    


 


INR  1.6   12/02/18  07:06    


 


APTT  45 SECONDS (21-34)  H  12/02/18  07:06

## 2018-12-08 NOTE — PN
DATE:  12/08/2018



LOCATION:  365, bed A.



SUBJECTIVE:  This is a 53-year-old female, seen and examined in rounds, was

still complaining of generalized abdominal pain as well as suprapubic pain

on and off with mild nausea and dyspepsia.



No actual chest pain, palpitation, significant complaint of shortness of

breath, chills or fever.



Generalized jaundice again reported.



LABORATORY DATA:  Most recent lab results showed hemoglobin of 11.4,

hematocrit 34.3, white blood cells of 4.5 but normal platelet count with

subsequent decrease of total bilirubin to 8.24, , , alkaline

phosphatase 288 with the latest blood glucose level of 197, albumin 2.9 by

the latest results.



PHYSICAL EXAMINATION :

GENERAL:  A 53-year-old female, awake, alert, oriented.

VITAL SIGNS:  Afebrile with pulse 62, respiratory 20 to 22. blood pressure

136/76.

HEENT:  Showed pale mucoid membrane with bilateral icteric sclerae.

LUNGS:  Few scattered crepitation.  Decreased air entry at bases.

HEART:  Positive S1 and S2.

ABDOMEN:  Soft with mild distention with mild-to-moderate generalized

tenderness but mainly in the mid epigastric and the right upper quadrant

area.  No mass or organomegaly.  No rebound tenderness or guarding.

EXTREMITIES:  Bilateral above-knee amputation.

NEUROLOGIC:  No reported new neurological deficits, sensory or motor.



IMPRESSION:

1.  Hepatic sarcoidosis.

2.  Acute, on top of chronic urinary tract infection with complications.

3.  Re-exacerbation of peptic ulcer disease.

4.  Abnormal liver function test, most likely secondary to above.  No

evidence of common bile duct obstruction.  The patient had biliary tree

stent insertion through an endoscopic retrograde cholangiopancreatography

recently.

5.  Peripheral vascular disease with status post above-knee amputation

bilaterally.

6.  Poorly controlled diabetes mellitus.

7.  Neurogenic bladder with recurrent urinary tract infection.

8.  Status post cholecystectomy, biliary stent insertion, partial right

colon resection due to intra-abdominal, intrapelvic abscess formation

several months ago.



SUGGESTIONS:

1.  Continue current management.

2.  Subsequent decrease of the steroids IV dose and frequency.

3.  If there is recurrent elevation of the total bilirubin, then ERCP with

insertion of a new biliary stent to be considered.







__________________________________________

Jeffrey Albert MD





DD:  12/08/2018 7:15:47

DT:  12/08/2018 7:19:32

[facility] # [id number]

## 2018-12-08 NOTE — PN
DATE:  12/07/2018



SUBJECTIVE:  The patient is afebrile.  No nausea or vomiting.  She has

perianal burning and itching.  She denies any abdominal pain.  She earlier

had severe pain _____.  No shortness of breath.  Blood sugars have been

high.



PHYSICAL EXAMINATION

VITAL SIGNS:  Blood pressure 127/77, pulse 64, respiratory rate 20, and

temperature 97.7.

LUNGS:  Clear.  No rales.  No rhonchi.

CVS:  S1 and S2, plus S3 positive.

ABDOMEN:  Enlarged liver.  Nontender.



ASSESSMENT AND PLAN:

1.  Hepatic sarcoidosis, continue Solu-Medrol.

2.  Urinary tract infection with neurogenic bladder.  Urine positive for

vancomycin-resistant Enterococcus.  The patient is on Zyvox.

3.  Diabetes.

4.  Hypertension.



PLAN:  Continue current medication.  Monitor the patient.





__________________________________________

Nasir Dunbar MD



DD:  12/07/2018 23:16:22

DT:  12/08/2018 0:33:56

Job # [id number]

## 2018-12-09 LAB
ALBUMIN SERPL-MCNC: 3 G/DL (ref 3.5–5)
ALBUMIN/GLOB SERPL: 0.9 {RATIO} (ref 1–2.1)
ALT SERPL-CCNC: 273 U/L (ref 9–52)
APTT BLD: 31 SECONDS (ref 21–34)
AST SERPL-CCNC: 197 U/L (ref 14–36)
BASOPHILS # BLD AUTO: 0 K/UL (ref 0–0.2)
BASOPHILS NFR BLD: 0.1 % (ref 0–2)
BUN SERPL-MCNC: 29 MG/DL (ref 7–17)
CALCIUM SERPL-MCNC: 7.9 MG/DL (ref 8.6–10.4)
EOSINOPHIL # BLD AUTO: 0 K/UL (ref 0–0.7)
EOSINOPHIL NFR BLD: 0.4 % (ref 0–4)
ERYTHROCYTE [DISTWIDTH] IN BLOOD BY AUTOMATED COUNT: 19.3 % (ref 11.5–14.5)
GFR NON-AFRICAN AMERICAN: > 60
HGB BLD-MCNC: 13.4 G/DL (ref 11–16)
INR PPP: 1.3
LYMPHOCYTES # BLD AUTO: 1.9 K/UL (ref 1–4.3)
LYMPHOCYTES NFR BLD AUTO: 25.4 % (ref 20–40)
MCH RBC QN AUTO: 32.5 PG (ref 27–31)
MCHC RBC AUTO-ENTMCNC: 34 G/DL (ref 33–37)
MCV RBC AUTO: 95.6 FL (ref 81–99)
MONOCYTES # BLD: 0.8 K/UL (ref 0–0.8)
MONOCYTES NFR BLD: 11.3 % (ref 0–10)
NEUTROPHILS # BLD: 4.7 K/UL (ref 1.8–7)
NEUTROPHILS NFR BLD AUTO: 62.8 % (ref 50–75)
NRBC BLD AUTO-RTO: 0.1 % (ref 0–2)
PLATELET # BLD: 182 K/UL (ref 130–400)
PMV BLD AUTO: 9.4 FL (ref 7.2–11.7)
PROTHROMBIN TIME: 13.9 SECONDS (ref 9.7–12.2)
RBC # BLD AUTO: 4.14 MIL/UL (ref 3.8–5.2)
WBC # BLD AUTO: 7.5 K/UL (ref 4.8–10.8)

## 2018-12-09 RX ADMIN — DIGOXIN SCH MG: 0.25 TABLET ORAL at 17:25

## 2018-12-09 RX ADMIN — HUMAN INSULIN SCH UNIT: 100 INJECTION, SOLUTION SUBCUTANEOUS at 08:26

## 2018-12-09 RX ADMIN — METOPROLOL SUCCINATE SCH MG: 50 TABLET, EXTENDED RELEASE ORAL at 09:57

## 2018-12-09 RX ADMIN — HUMAN INSULIN SCH UNIT: 100 INJECTION, SOLUTION SUBCUTANEOUS at 17:26

## 2018-12-09 RX ADMIN — METHYLPREDNISOLONE SODIUM SUCCINATE SCH MG: 40 INJECTION, POWDER, FOR SOLUTION INTRAMUSCULAR; INTRAVENOUS at 09:55

## 2018-12-09 RX ADMIN — LINEZOLID SCH MLS/HR: 600 INJECTION, SOLUTION INTRAVENOUS at 05:38

## 2018-12-09 RX ADMIN — DIPHENHYDRAMINE HYDROCHLORIDE PRN MG: 50 INJECTION INTRAMUSCULAR; INTRAVENOUS at 16:20

## 2018-12-09 RX ADMIN — HUMAN INSULIN SCH: 100 INJECTION, SOLUTION SUBCUTANEOUS at 21:45

## 2018-12-09 RX ADMIN — DIPHENHYDRAMINE HYDROCHLORIDE PRN MG: 50 INJECTION INTRAMUSCULAR; INTRAVENOUS at 10:01

## 2018-12-09 RX ADMIN — HUMAN INSULIN SCH UNIT: 100 INJECTION, SOLUTION SUBCUTANEOUS at 12:27

## 2018-12-09 RX ADMIN — OXYBUTYNIN CHLORIDE SCH MG: 10 TABLET, EXTENDED RELEASE ORAL at 09:58

## 2018-12-09 NOTE — CP.PCM.PN
Subjective





- Subjective


Subjective: 





dictated   





Objective





- Vital Signs/Intake and Output


Vital Signs (last 24 hours): 


                                        











Temp Pulse Resp BP Pulse Ox


 


 97.6 F   60   20   122/86   97 


 


 12/09/18 16:00  12/09/18 16:00  12/09/18 16:00  12/09/18 16:00  12/09/18 16:00








Intake and Output: 


                                        











 12/09/18 12/10/18





 18:59 06:59


 


Intake Total 480 


 


Output Total 700 


 


Balance -220 














- Medications


Medications: 


                               Current Medications





Apixaban (Eliquis)  5 mg PO BID [facility]


   Last Admin: 12/09/18 17:26 Dose:  5 mg


Digoxin (Lanoxin)  0.25 mg PO DAILY@1800 [facility]


   Last Admin: 12/09/18 17:25 Dose:  0.25 mg


Diphenhydramine HCl (Benadryl)  25 mg IVP Q4 PRN


   PRN Reason: Itching / Pruritus


   Last Admin: 12/09/18 16:20 Dose:  25 mg


Diphenhydramine HCl (Benadryl)  25 mg PO Q4 PRN


   PRN Reason: Itching / Pruritus


   Last Admin: 12/09/18 00:07 Dose:  25 mg


Diphenoxylate HCl/Atropine (Lomotil 0.025-2.5 Mg Tablet)  1 tab PO Q8 PRN


   PRN Reason: Diarrhea


Ezetimibe (Zetia)  10 mg PO DAILY [facility]


   Last Admin: 12/09/18 09:58 Dose:  10 mg


Gabapentin (Neurontin)  100 mg PO BID [facility]


   Last Admin: 12/09/18 17:26 Dose:  100 mg


Hydrocortisone (Anusol-Hc)  25 mg RC BID [facility]


   Last Admin: 12/09/18 10:03 Dose:  Not Given


Insulin Human Regular (Novolin R)  0 unit SC [facility]; Protocol


   Last Admin: 12/09/18 17:26 Dose:  5 unit


Linezolid (Zyvox)  600 mg PO Q12H [facility]


Methylprednisolone (Solu-Medrol)  20 mg IV DAILY [facility]


   Last Admin: 12/09/18 09:55 Dose:  20 mg


Metoprolol Succinate (Toprol Xl)  50 mg PO DAILY [facility]


   Last Admin: 12/09/18 09:57 Dose:  50 mg


Morphine Sulfate (Morphine)  2 mg IVP Q4 PRN


   PRN Reason: Pain, moderate (4-7)


   Last Admin: 12/09/18 16:20 Dose:  2 mg


Oxybutynin Chloride (Ditropan Xl)  10 mg PO DAILY [facility]


   Last Admin: 12/09/18 09:58 Dose:  10 mg


Pantoprazole Sodium (Protonix Ec Tab)  40 mg PO DAILY [facility]











- Labs


Labs: 


                                        





                                 12/09/18 11:36 





                                 12/09/18 11:36 





                                        











PT  13.9 SECONDS (9.7-12.2)  H  12/09/18  11:36    


 


INR  1.3   12/09/18  11:36    


 


APTT  31 SECONDS (21-34)   12/09/18  11:36

## 2018-12-09 NOTE — PN
DATE:  12/09/2018



LOCATION:  365, bed A.



SUBJECTIVE:  This is a 53-year-old female seen and examined in rounds

without reported significant clinical changes with intermittent period of

midepigastric right upper quadrant and suprapubic abdominal pain with

distention associated with periods of softer bowel movement, nausea and

dyspepsia on and off.  The entire chart is reviewed including but not

limited to the most recent lab and radiology study results, current and the

previous medication list, current and the previous medical events.  Case

discussed with the staff at length.



Today's lab showed blood glucose level of 289.  Rest of the lab results,

however still pending.



PHYSICAL EXAMINATION:

GENERAL:  A 53-year-old female afebrile with pulse of 60, respiratory

20-22, blood pressure of 138/70.

HEENT:  Showed pale dry oral mucous membrane with bilateral icteric

sclerae.

LUNGS:  Few scattered mild crepitation.  Decreased air entry at bases.

HEART:  Positive S1 and S2.

ABDOMEN:  Soft with mild generalized tenderness, mildly distended with

hyperactive bowel sounds.  No mass or organomegaly.  No rebound tenderness

or guarding.

EXTREMITIES:  With bilateral above-knee amputation.  No clubbing or

cyanosis.

NEUROLOGIC:  No reported new neurological deficits, sensory or motor.

GENITOURINARY:  Charles catheter still in place.



IMPRESSION:

1.  Hepatic sarcoidosis.

2.  Abnormal liver function test with jaundice secondary to above.  No

clear evidence of obstructive jaundice, the patient is status post

endoscopic retrograde cholangiopancreatography with biliary stent insertion

before.

3.  Peripheral vascular disease with status post bilateral above-knee

amputation.

4.  Neurogenic bladder with recurrent complicated urinary tract infection.

5.  Past medical history including also diabetes mellitus, poorly

controlled, hypertension with hyperlipidemia, chronic obstructive pulmonary

disease , cardiac arrhythmias, status post cholecystectomy, status post

partial colon resection by history.



SUGGESTIONS:

1.  Continue current management.

2.  Follow up on liver function test as well as cancer markers.

3.  Further recommendation to follow.







__________________________________________

Jeffrey Albert MD



DD:  12/09/2018 7:47:17

DT:  12/09/2018 7:50:02

Job # [id number]

## 2018-12-10 VITALS — HEART RATE: 68 BPM

## 2018-12-10 RX ADMIN — HUMAN INSULIN SCH: 100 INJECTION, SOLUTION SUBCUTANEOUS at 22:16

## 2018-12-10 RX ADMIN — HUMAN INSULIN SCH UNIT: 100 INJECTION, SOLUTION SUBCUTANEOUS at 08:10

## 2018-12-10 RX ADMIN — METOPROLOL SUCCINATE SCH MG: 50 TABLET, EXTENDED RELEASE ORAL at 10:10

## 2018-12-10 RX ADMIN — DIGOXIN SCH MG: 0.25 TABLET ORAL at 18:11

## 2018-12-10 RX ADMIN — DIPHENHYDRAMINE HYDROCHLORIDE PRN MG: 50 INJECTION INTRAMUSCULAR; INTRAVENOUS at 10:01

## 2018-12-10 RX ADMIN — DIPHENHYDRAMINE HYDROCHLORIDE PRN MG: 50 INJECTION INTRAMUSCULAR; INTRAVENOUS at 18:20

## 2018-12-10 RX ADMIN — DIPHENHYDRAMINE HYDROCHLORIDE PRN MG: 50 INJECTION INTRAMUSCULAR; INTRAVENOUS at 14:19

## 2018-12-10 RX ADMIN — METHYLPREDNISOLONE SODIUM SUCCINATE SCH MG: 40 INJECTION, POWDER, FOR SOLUTION INTRAMUSCULAR; INTRAVENOUS at 10:10

## 2018-12-10 RX ADMIN — HUMAN INSULIN SCH UNIT: 100 INJECTION, SOLUTION SUBCUTANEOUS at 16:56

## 2018-12-10 RX ADMIN — PANTOPRAZOLE SODIUM SCH MG: 40 TABLET, DELAYED RELEASE ORAL at 10:09

## 2018-12-10 RX ADMIN — OXYBUTYNIN CHLORIDE SCH MG: 10 TABLET, EXTENDED RELEASE ORAL at 10:20

## 2018-12-10 RX ADMIN — HUMAN INSULIN SCH UNIT: 100 INJECTION, SOLUTION SUBCUTANEOUS at 12:03

## 2018-12-10 NOTE — PN
DATE:  12/09/2018



SUBJECTIVE:  Ella has loose bowel movement.  She is afebrile.  No

shortness of breath.  No nausea or vomiting.  No chest pain.



PHYSICAL EXAMINATION:

VITAL SIGNS:  Blood pressure 123/86, pulse 60, respiratory rate 20,

temperature 97.6.

LUNGS:  Clear.

CVS:  S1 and S2 regular.

ABDOMEN:  Soft.

GENITOURINARY:  Urinary bladder, Charles in place.



ASSESSMENT:

1.  Vancomycin-resistant enterococci urinary tract infection.

2.  Hepatic sarcoidosis.

3.  Hypertension.

4.  Type 2 diabetes.



PLAN:  Zyvox.  Continue current medication.  Monitor patient.





__________________________________________

Nasir Dunbar MD



DD:  12/09/2018 21:52:33

DT:  12/09/2018 22:09:42

Job # [id number]

## 2018-12-10 NOTE — CP.PCM.PN
Subjective





- Subjective


Subjective: 





dictated   





Objective





- Vital Signs/Intake and Output


Vital Signs (last 24 hours): 


                                        











Temp Pulse Resp BP Pulse Ox


 


 98.0 F   58 L  20   126/77   100 


 


 12/10/18 15:24  12/10/18 15:24  12/10/18 15:24  12/10/18 15:24  12/10/18 15:24








Intake and Output: 


                                        











 12/10/18 12/11/18





 18:59 06:59


 


Intake Total 650 


 


Output Total 550 


 


Balance 100 














- Medications


Medications: 


                               Current Medications





Apixaban (Eliquis)  5 mg PO BID [facility]


   Last Admin: 12/10/18 17:01 Dose:  5 mg


Digoxin (Lanoxin)  0.25 mg PO DAILY@1800 [facility]


   Last Admin: 12/10/18 18:11 Dose:  0.25 mg


Diphenhydramine HCl (Benadryl)  25 mg IVP Q4 PRN


   PRN Reason: Itching / Pruritus


   Last Admin: 12/10/18 18:20 Dose:  25 mg


Diphenhydramine HCl (Benadryl)  25 mg PO Q4 PRN


   PRN Reason: Itching / Pruritus


   Last Admin: 12/10/18 22:20 Dose:  25 mg


Diphenoxylate HCl/Atropine (Lomotil 0.025-2.5 Mg Tablet)  1 tab PO Q8 PRN


   PRN Reason: Diarrhea


Ezetimibe (Zetia)  10 mg PO DAILY [facility]


   Last Admin: 12/10/18 10:20 Dose:  10 mg


Gabapentin (Neurontin)  100 mg PO BID [facility]


   Last Admin: 12/10/18 17:01 Dose:  100 mg


Hydrocortisone (Anusol-Hc)  25 mg RC BID [facility]


   Last Admin: 12/10/18 17:04 Dose:  Not Given


Insulin Human Regular (Novolin R)  0 unit SC [facility]; Protocol


   Last Admin: 12/10/18 22:16 Dose:  Not Given


Linezolid (Zyvox)  600 mg PO Q12H [facility]


   Last Admin: 12/10/18 18:10 Dose:  600 mg


Methylprednisolone (Solu-Medrol)  20 mg IV DAILY [facility]


   Last Admin: 12/10/18 10:10 Dose:  20 mg


Metoprolol Succinate (Toprol Xl)  50 mg PO DAILY [facility]


   Last Admin: 12/10/18 10:10 Dose:  50 mg


Morphine Sulfate (Morphine)  2 mg IVP Q4 PRN


   PRN Reason: Pain, moderate (4-7)


   Last Admin: 12/10/18 22:20 Dose:  2 mg


Oxybutynin Chloride (Ditropan Xl)  10 mg PO DAILY [facility]


   Last Admin: 12/10/18 10:20 Dose:  10 mg


Pantoprazole Sodium (Protonix Ec Tab)  40 mg PO DAILY [facility]


   Last Admin: 12/10/18 10:09 Dose:  40 mg











- Labs


Labs: 


                                        





                                 12/09/18 11:36 





                                 12/09/18 11:36 





                                        











PT  13.9 SECONDS (9.7-12.2)  H  12/09/18  11:36    


 


INR  1.3   12/09/18  11:36    


 


APTT  31 SECONDS (21-34)   12/09/18  11:36

## 2018-12-10 NOTE — PN
DATE:  12/10/2018



LOCATION:  365, bed A.



SUBJECTIVE:  This is a 53-year-old female, seen and examined in rounds with

a complaint of generalized abdominal pain and generalized body ache,

intermittent period of shortness of breath but no chest pain, palpitation

or vomiting, but nausea with dyspepsia.  No reported chills or fever.  The

entire chart is reviewed including but not limited to the most recent lab

and radiology study results, current and the previous medication lists,

current and the previous medical events.   Today's blood glucose level

showed 322 with normal hemoglobin and hematocrit.



PHYSICAL EXAMINATION:

GENERAL:  A 53-year-old female.  Complaining of profuse diarrhea again with

abdominal distention and generalized jaundice.

VITAL SIGNS:  Afebrile with pulse of 60, respiratory rate 20 to 24, blood

pressure of 120/74.

HEENT:  Showed pale dry, oral mucous membrane.  Bilateral icteric sclerae.

LUNGS:  Few scattered crepitation.  Decreased air entry at bases.

HEART:  Positive S1 and S2 with increased rate.

ABDOMEN:  Mild-to-moderate distention with generalized tenderness.  Bowel

sounds are hyperactive.  No mass or organomegaly.  No rebound tenderness or

guarding.

EXTREMITIES:  With evidence of bilateral above-knee amputation.



IMPRESSION:

1.  Hepatic sarcoidosis with abnormal liver function tests and jaundice.

2.  Status post endoscopic retrograde cholangiopancreatography with biliary

stent insertion due to severe biliary tree stenosis.

3.  Recurrent urinary tract infection.

4.  Multiple past medical histories including but not limited to poorly

controlled diabetes mellitus.

5.  Hypertension, hyperlipidemia with chronic obstructive pulmonary disease

and cardiac arrhythmias.

6.  Known history of status post cholecystectomy.

7.  Diarrhea, infectious versus mechanical.

8.  Status post partial colon resection by history which could be a main

factor for the patient also recurrent diarrhea.



SUGGESTIONS:

1.  Continue current management.

2.  Vancomycin p.o.

3.  Decrease the rate of steroids intake gradually.

4.  Further recommendation to follow.





__________________________________________

Jeffrey Albert MD





DD:  12/10/2018 18:06:18

DT:  12/10/2018 18:09:05

Job # [id number]

## 2018-12-10 NOTE — CP.PCM.PN
Subjective





- Date & Time of Evaluation


Date of Evaluation: 12/10/18


Time of Evaluation: 22:12





- Subjective


Subjective: 





AFEBRILEAFEBRILE,


C/O RUQ ABDOMINAL PAIN





+VE JAUNDICE


+VE LOOSE STOOLS ON LOMOTIL AS NOTED AS PER GI





LABS NOT DONE TODAY.





PT ON ZYVOX  SINCE 12/6/18-DAY 5


REPEAT URINE CULTURE -MULTIPLE SPECIES ? CONTAMINANT.


PT HAS A FOLY AND /NEUROGENIC BLADDER.





CASE DISCUSSED WITH NP.MS DUFFY


PT HAS INCREASING TRANSAMINITIS 


[id number]


ALK PO4 316


BILI9.2.





PT NEEDS TO BE [facility]  [facility] .


F/U REPEAT LFTS IN AM.


CHECK STOOL C.DIFFICILE TOXIN.


WILL DISCUSS WITH GI.








Objective





- Vital Signs/Intake and Output


Vital Signs (last 24 hours): 


                                        











Temp Pulse Resp BP Pulse Ox


 


 98.0 F   58 L  20   126/77   100 


 


 12/10/18 15:24  12/10/18 15:24  12/10/18 15:24  12/10/18 15:24  12/10/18 15:24








Intake and Output: 


                                        











 12/10/18 12/11/18





 18:59 06:59


 


Intake Total 650 


 


Output Total 550 


 


Balance 100 














- Medications


Medications: 


                               Current Medications





Apixaban (Eliquis)  5 mg PO BID [facility]


   Last Admin: 12/10/18 17:01 Dose:  5 mg


Digoxin (Lanoxin)  0.25 mg PO DAILY@1800 [facility]


   Last Admin: 12/10/18 18:11 Dose:  0.25 mg


Diphenhydramine HCl (Benadryl)  25 mg IVP Q4 PRN


   PRN Reason: Itching / Pruritus


   Last Admin: 12/10/18 18:20 Dose:  25 mg


Diphenhydramine HCl (Benadryl)  25 mg PO Q4 PRN


   PRN Reason: Itching / Pruritus


   Last Admin: 12/10/18 05:52 Dose:  25 mg


Diphenoxylate HCl/Atropine (Lomotil 0.025-2.5 Mg Tablet)  1 tab PO Q8 PRN


   PRN Reason: Diarrhea


Ezetimibe (Zetia)  10 mg PO DAILY [facility]


   Last Admin: 12/10/18 10:20 Dose:  10 mg


Gabapentin (Neurontin)  100 mg PO BID [facility]


   Last Admin: 12/10/18 17:01 Dose:  100 mg


Hydrocortisone (Anusol-Hc)  25 mg RC BID [facility]


   Last Admin: 12/10/18 17:04 Dose:  Not Given


Insulin Human Regular (Novolin R)  0 unit SC ACHS [facility]; Protocol


   Last Admin: 12/10/18 16:56 Dose:  5 unit


Linezolid (Zyvox)  600 mg PO Q12H [facility]


   Last Admin: 12/10/18 18:10 Dose:  600 mg


Methylprednisolone (Solu-Medrol)  20 mg IV DAILY [facility]


   Last Admin: 12/10/18 10:10 Dose:  20 mg


Metoprolol Succinate (Toprol Xl)  50 mg PO DAILY [facility]


   Last Admin: 12/10/18 10:10 Dose:  50 mg


Morphine Sulfate (Morphine)  2 mg IVP Q4 PRN


   PRN Reason: Pain, moderate (4-7)


   Last Admin: 12/10/18 18:20 Dose:  2 mg


Oxybutynin Chloride (Ditropan Xl)  10 mg PO DAILY [facility]


   Last Admin: 12/10/18 10:20 Dose:  10 mg


Pantoprazole Sodium (Protonix Ec Tab)  40 mg PO DAILY [facility]


   Last Admin: 12/10/18 10:09 Dose:  40 mg











- Labs


Labs: 


                                        





                                 12/09/18 11:36 





                                 12/09/18 11:36 





                                        











PT  13.9 SECONDS (9.7-12.2)  H  12/09/18  11:36    


 


INR  1.3   12/09/18  11:36    


 


APTT  31 SECONDS (21-34)   12/09/18  11:36    














- Constitutional


Appears: No Acute Distress





- Head Exam


Head Exam: NORMAL INSPECTION





- Eye Exam


Eye Exam: EOMI, Scleral icterus





- ENT Exam


ENT Exam: Normal Oropharynx





- Neck Exam


Neck Exam: Normal Inspection





- Respiratory Exam


Respiratory Exam: Clear to Ausculation Bilateral, NORMAL BREATHING PATTERN





- Cardiovascular Exam


Cardiovascular Exam: REGULAR RHYTHM, +S1, +S2





- GI/Abdominal Exam


GI & Abdominal Exam: Soft, Tenderness (RUQ/EPIGASTRIUM), Normal Bowel Sounds





- Extremities Exam


Additional comments: 





B/L AKA.





- Neurological Exam


Neurological Exam: Alert, Awake, CN II-XII Intact, Oriented x3





- Psychiatric Exam


Psychiatric exam: Normal Mood





- Skin


Skin Exam: Warm





Assessment and Plan


(1) Complicated urinary tract infection


Status: Acute   





(2) Abdominal pain


Status: Acute   





(3) Obstructive hyperbilirubinemia


Status: Acute   





(4) S/P AKA (above knee amputation) bilateral


Status: Acute   





(5) Diabetes mellitus with peripheral circulatory disorder


Status: Chronic   





(6) Neurogenic bladder


Status: Chronic   





(7) Sarcoidosis


Status: Chronic

## 2018-12-11 LAB
ALBUMIN SERPL-MCNC: 3.2 G/DL (ref 3.5–5)
ALBUMIN/GLOB SERPL: 1 {RATIO} (ref 1–2.1)
ALT SERPL-CCNC: 325 U/L (ref 9–52)
AST SERPL-CCNC: 261 U/L (ref 14–36)
BASOPHILS # BLD AUTO: 0 K/UL (ref 0–0.2)
BASOPHILS NFR BLD: 0.3 % (ref 0–2)
BUN SERPL-MCNC: 29 MG/DL (ref 7–17)
CALCIUM SERPL-MCNC: 7.8 MG/DL (ref 8.6–10.4)
EOSINOPHIL # BLD AUTO: 0.1 K/UL (ref 0–0.7)
EOSINOPHIL NFR BLD: 0.8 % (ref 0–4)
ERYTHROCYTE [DISTWIDTH] IN BLOOD BY AUTOMATED COUNT: 19.2 % (ref 11.5–14.5)
GFR NON-AFRICAN AMERICAN: > 60
HGB BLD-MCNC: 14.1 G/DL (ref 11–16)
LYMPHOCYTES # BLD AUTO: 1.8 K/UL (ref 1–4.3)
LYMPHOCYTES NFR BLD AUTO: 23.4 % (ref 20–40)
MCH RBC QN AUTO: 32.3 PG (ref 27–31)
MCHC RBC AUTO-ENTMCNC: 34.5 G/DL (ref 33–37)
MCV RBC AUTO: 93.4 FL (ref 81–99)
MONOCYTES # BLD: 0.8 K/UL (ref 0–0.8)
MONOCYTES NFR BLD: 9.9 % (ref 0–10)
NEUTROPHILS # BLD: 5.1 K/UL (ref 1.8–7)
NEUTROPHILS NFR BLD AUTO: 65.6 % (ref 50–75)
NRBC BLD AUTO-RTO: 0.1 % (ref 0–2)
PLATELET # BLD: 175 K/UL (ref 130–400)
PMV BLD AUTO: 9 FL (ref 7.2–11.7)
RBC # BLD AUTO: 4.37 MIL/UL (ref 3.8–5.2)
WBC # BLD AUTO: 7.8 K/UL (ref 4.8–10.8)

## 2018-12-11 RX ADMIN — DIPHENHYDRAMINE HYDROCHLORIDE PRN MG: 50 INJECTION INTRAMUSCULAR; INTRAVENOUS at 19:58

## 2018-12-11 RX ADMIN — DIPHENHYDRAMINE HYDROCHLORIDE PRN MG: 50 INJECTION INTRAMUSCULAR; INTRAVENOUS at 02:32

## 2018-12-11 RX ADMIN — HUMAN INSULIN SCH UNIT: 100 INJECTION, SOLUTION SUBCUTANEOUS at 16:30

## 2018-12-11 RX ADMIN — DIGOXIN SCH MG: 0.25 TABLET ORAL at 17:47

## 2018-12-11 RX ADMIN — HUMAN INSULIN SCH: 100 INJECTION, SOLUTION SUBCUTANEOUS at 11:25

## 2018-12-11 RX ADMIN — METHYLPREDNISOLONE SODIUM SUCCINATE SCH MG: 40 INJECTION, POWDER, FOR SOLUTION INTRAMUSCULAR; INTRAVENOUS at 09:58

## 2018-12-11 RX ADMIN — PANTOPRAZOLE SODIUM SCH MG: 40 TABLET, DELAYED RELEASE ORAL at 09:57

## 2018-12-11 RX ADMIN — HUMAN INSULIN SCH: 100 INJECTION, SOLUTION SUBCUTANEOUS at 07:45

## 2018-12-11 RX ADMIN — METOPROLOL SUCCINATE SCH: 50 TABLET, EXTENDED RELEASE ORAL at 09:57

## 2018-12-11 RX ADMIN — OXYBUTYNIN CHLORIDE SCH MG: 10 TABLET, EXTENDED RELEASE ORAL at 09:57

## 2018-12-11 RX ADMIN — HUMAN INSULIN SCH: 100 INJECTION, SOLUTION SUBCUTANEOUS at 21:24

## 2018-12-11 NOTE — PN
DATE:  12/11/2018



LOCATION:  365, bed A.



SUBJECTIVE:  This is a 53-year-old female, seen and examined in rounds with

less complaint of abdominal pain, but mild possible anterior abdominal

distention with less bowel movement, frequency, and jaundice.  No chest

pain, palpitation, or significant shortness of breath.



Today's lab showed normal CBC with CO2 content of 20, BUN of 29, and normal

creatinine.  Blood glucose level 129, calcium 7.8 with total bilirubin 9.5,

but ,  with alkaline phosphatase of 339, and albumin 2.3.



PHYSICAL EXAMINATION:

GENERAL:  A 53-year-old female.

VITAL SIGNS:  Afebrile with pulse of 62, respiratory rate of 20 to 22, and

blood pressure 110/66.

HEENT:  Showed pale and dry oral mucous membrane, bilateral icteric

sclerae.

HEART:  Positive S1 and S2.

LUNGS:  Few scattered crepitation with decreased air entry at bases.

EXTREMITIES:  With evidence of bilateral above-knee amputation.  No

clubbing or cyanosis.

NEUROLOGICAL:  No reported new neurological deficits, sensory, or motor.



IMPRESSION:

1.  Hepatic sarcoidosis.

2.  Diarrhea, most likely secondary to malabsorption, possibility of

diabetic diarrhea was raised, keeping in mind that the patient has a

history of recurrent acute on top of chronic pseudomembranous colitis.

3.  Recurrent urinary tract infection.

4.  Multiple past medical history including, but not limited to

poorly-controlled diabetes mellitus, hypertension, hyperlipidemia, cardiac

arrhythmia, and peripheral vascular disease with bilateral above-knee

amputation.

5.  Peptic ulcer disease, by history.



SUGGESTIONS:

1.  Agree with your plan.

2.  The patient will follow up as outpatient with [facility] _____

for full evaluation as suggested before.

3.  Continue current management with low-dose of steroids.







__________________________________________

Jeffrey Albert MD



DD:  12/11/2018 11:59:40

DT:  12/11/2018 14:21:14

Job # [id number]

## 2018-12-11 NOTE — CP.PCM.PN
<Nasir Dunbar - Last Filed: 12/11/18 22:48>





Subjective





- Subjective


Subjective: 





dictated   





Objective





- Vital Signs/Intake and Output


Vital Signs (last 24 hours): 


                                        











Temp Pulse Resp BP Pulse Ox


 


 97.0 F L  68   20   119/73   96 


 


 12/11/18 16:32  12/11/18 16:32  12/11/18 16:32  12/11/18 16:32  12/11/18 16:32








Intake and Output: 


                                        











 12/11/18 12/12/18





 18:59 06:59


 


Intake Total 240 


 


Output Total 1000 


 


Balance -760 














- Medications


Medications: 


                               Current Medications





Apixaban (Eliquis)  5 mg PO BID [facility]


   Last Admin: 12/11/18 17:51 Dose:  5 mg


Digoxin (Lanoxin)  0.25 mg PO DAILY@1800 [facility]


   Last Admin: 12/11/18 17:47 Dose:  0.25 mg


Diphenhydramine HCl (Benadryl)  25 mg IVP Q4 PRN


   PRN Reason: Itching / Pruritus


   Last Admin: 12/11/18 19:58 Dose:  25 mg


Diphenhydramine HCl (Benadryl)  25 mg PO Q4 PRN


   PRN Reason: Itching / Pruritus


   Last Admin: 12/11/18 15:45 Dose:  25 mg


Diphenoxylate HCl/Atropine (Lomotil 0.025-2.5 Mg Tablet)  1 tab PO Q8 PRN


   PRN Reason: Diarrhea


Ezetimibe (Zetia)  10 mg PO DAILY [facility]


   Last Admin: 12/11/18 09:57 Dose:  10 mg


Gabapentin (Neurontin)  100 mg PO BID [facility]


   Last Admin: 12/11/18 17:49 Dose:  100 mg


Hydrocortisone (Anusol-Hc)  25 mg RC BID [facility]


   Last Admin: 12/11/18 18:00 Dose:  25 mg


Insulin Human Regular (Novolin R)  0 unit SC [facility]; Protocol


   Last Admin: 12/11/18 21:24 Dose:  Not Given


Linezolid (Zyvox)  600 mg PO Q12H [facility]


   Last Admin: 12/11/18 18:00 Dose:  600 mg


Methylprednisolone (Solu-Medrol)  20 mg IV DAILY [facility]


   Last Admin: 12/11/18 09:58 Dose:  20 mg


Metoprolol Succinate (Toprol Xl)  50 mg PO DAILY [facility]


   Last Admin: 12/11/18 09:57 Dose:  Not Given


Morphine Sulfate (Morphine)  2 mg IVP Q4 PRN


   PRN Reason: Pain, moderate (4-7)


   Last Admin: 12/11/18 19:56 Dose:  2 mg


Oxybutynin Chloride (Ditropan Xl)  10 mg PO DAILY [facility]


   Last Admin: 12/11/18 09:57 Dose:  10 mg


Pantoprazole Sodium (Protonix Ec Tab)  40 mg PO DAILY [facility]


   Last Admin: 12/11/18 09:57 Dose:  40 mg











- Labs


Labs: 


                                        





                                 12/11/18 07:08 





                                 12/11/18 07:08 





                                        











PT  13.9 SECONDS (9.7-12.2)  H  12/09/18  11:36    


 


INR  1.3   12/09/18  11:36    


 


APTT  31 SECONDS (21-34)   12/09/18  11:36    














<Nancy Munoz - Last Filed: 12/13/18 16:52>





Objective





- Vital Signs/Intake and Output


Vital Signs (last 24 hours): 


                                        











Temp Pulse Resp BP Pulse Ox


 


 97.2 F L  61   20   113/77   99 


 


 12/12/18 07:59  12/12/18 07:59  12/12/18 07:59  12/12/18 07:59  12/12/18 07:59











- Labs


Labs: 


                                        





                                 12/11/18 07:08 





                                 12/11/18 07:08 





                                        











PT  13.9 SECONDS (9.7-12.2)  H  12/09/18  11:36    


 


INR  1.3   12/09/18  11:36    


 


APTT  31 SECONDS (21-34)   12/09/18  11:36

## 2018-12-11 NOTE — CP.PCM.PN
Subjective





- Date & Time of Evaluation


Date of Evaluation: 12/11/18


Time of Evaluation: 15:23





- Subjective


Subjective: 





alert, orientedx3, denies acute pain, tolerating diet.





Objective





- Vital Signs/Intake and Output


Vital Signs (last 24 hours): 


                                        











Temp Pulse Resp BP Pulse Ox


 


 97.4 F L  59 L  20   96/64 L  99 


 


 12/11/18 07:00  12/11/18 07:00  12/11/18 07:00  12/11/18 07:00  12/11/18 07:00








Intake and Output: 


                                        











 12/11/18 12/11/18





 06:59 18:59


 


Intake Total 850 240


 


Output Total 750 1000


 


Balance 100 -760














- Medications


Medications: 


                               Current Medications





Apixaban (Eliquis)  5 mg PO BID [facility]


   Last Admin: 12/11/18 09:58 Dose:  5 mg


Digoxin (Lanoxin)  0.25 mg PO DAILY@1800 [facility]


   Last Admin: 12/10/18 18:11 Dose:  0.25 mg


Diphenhydramine HCl (Benadryl)  25 mg IVP Q4 PRN


   PRN Reason: Itching / Pruritus


   Last Admin: 12/11/18 02:32 Dose:  25 mg


Diphenhydramine HCl (Benadryl)  25 mg PO Q4 PRN


   PRN Reason: Itching / Pruritus


   Last Admin: 12/11/18 11:03 Dose:  25 mg


Diphenoxylate HCl/Atropine (Lomotil 0.025-2.5 Mg Tablet)  1 tab PO Q8 PRN


   PRN Reason: Diarrhea


Ezetimibe (Zetia)  10 mg PO DAILY [facility]


   Last Admin: 12/11/18 09:57 Dose:  10 mg


Gabapentin (Neurontin)  100 mg PO BID [facility]


   Last Admin: 12/11/18 09:58 Dose:  100 mg


Hydrocortisone (Anusol-Hc)  25 mg RC BID [facility]


   Last Admin: 12/11/18 09:56 Dose:  Not Given


Insulin Human Regular (Novolin R)  0 unit SC [facility]; Protocol


   Last Admin: 12/11/18 11:25 Dose:  Not Given


Linezolid (Zyvox)  600 mg PO Q12H [facility]


   Last Admin: 12/11/18 06:32 Dose:  600 mg


Methylprednisolone (Solu-Medrol)  20 mg IV DAILY [facility]


   Last Admin: 12/11/18 09:58 Dose:  20 mg


Metoprolol Succinate (Toprol Xl)  50 mg PO DAILY [facility]


   Last Admin: 12/11/18 09:57 Dose:  Not Given


Morphine Sulfate (Morphine)  2 mg IVP Q4 PRN


   PRN Reason: Pain, moderate (4-7)


   Last Admin: 12/11/18 11:03 Dose:  2 mg


Oxybutynin Chloride (Ditropan Xl)  10 mg PO DAILY [facility]


   Last Admin: 12/11/18 09:57 Dose:  10 mg


Pantoprazole Sodium (Protonix Ec Tab)  40 mg PO DAILY [facility]


   Last Admin: 12/11/18 09:57 Dose:  40 mg











- Labs


Labs: 


                                        





                                 12/11/18 07:08 





                                 12/11/18 07:08 





                                        











PT  13.9 SECONDS (9.7-12.2)  H  12/09/18  11:36    


 


INR  1.3   12/09/18  11:36    


 


APTT  31 SECONDS (21-34)   12/09/18  11:36    














Assessment and Plan





- Assessment and Plan (Free Text)


Assessment: 





Patient is seen and examined.  Complaining of diarrhea, no acute, stool c-diff 

is negative. Alert and orientedx3, no acute distress. Discussed with DR Dunbar,

DR Tsang and DR Olmedo. Plan to discharge home on zyvox po for 5 days. She will go

to [facility] as outpatient and contact DR Olmedo so she can get admitted there for 

her further treatment by GI. Patient verbalized understanding. Transportation 

will be arranged.

## 2018-12-11 NOTE — PN
DATE:  12/10/2018



SUBJECTIVE:  Laura Jaramillo is afebrile.  She is feeling better.  No

shortness of breath.  No chest pain.  Decreased jaundice.



PHYSICAL EXAMINATION:

VITAL SIGNS:  Blood pressure 126/77, pulse 58, respiratory rate 20,

temperature 98.

LUNGS:  Clear.

CARDIOVASCULAR SYSTEM:  S1, S2 regular.

ABDOMEN:  Soft.



LABORATORY DATA:  The patient had VRE in the urine.



ASSESSMENT:

1.  Vancomycin resistant enterococcus urinary tract infection.

2.  Neurogenic bladder.

3.  Type 2 diabetes.

4.  Hypertension.



PLAN:  Continue Zyvox and monitor patient.







__________________________________________

Nasir Dunbar MD







DD:  12/10/2018 23:07:37

DT:  12/11/2018 0:34:29

Job # [id number]

## 2018-12-12 VITALS
DIASTOLIC BLOOD PRESSURE: 77 MMHG | SYSTOLIC BLOOD PRESSURE: 113 MMHG | OXYGEN SATURATION: 99 % | HEART RATE: 61 BPM | TEMPERATURE: 97.2 F

## 2018-12-12 RX ADMIN — HUMAN INSULIN SCH: 100 INJECTION, SOLUTION SUBCUTANEOUS at 07:44

## 2018-12-12 RX ADMIN — METOPROLOL SUCCINATE SCH MG: 50 TABLET, EXTENDED RELEASE ORAL at 09:24

## 2018-12-12 RX ADMIN — PANTOPRAZOLE SODIUM SCH MG: 40 TABLET, DELAYED RELEASE ORAL at 09:24

## 2018-12-12 RX ADMIN — METHYLPREDNISOLONE SODIUM SUCCINATE SCH MG: 40 INJECTION, POWDER, FOR SOLUTION INTRAMUSCULAR; INTRAVENOUS at 09:24

## 2018-12-12 NOTE — PN
DATE:  12/11/2018



SUBJECTIVE:  The patient, Laura Jaramillo, is doing well.  She is afebrile. 

No shortness of breath.  Decreased abdominal pain.  No nausea or vomiting. 

Feeling better.  She is for possible discharge.



PHYSICAL EXAMINATION

VITAL SIGNS:  Blood pressure 119/73, pulse 68, respiratory rate 20,

temperature 97.

LUNGS:  Clear.  No rales.  No rhonchi.

CARDIOVASCULAR SYSTEM:  S1, S2 plus.  S3 positive.

ABDOMEN:  Soft and nontender.  Bowel sounds are positive.

GENITOURINARY:  Charles in place.



ASSESSMENT:

1.  Urinary tract infection, which is complicated with Charles catheter use.

2.  Neurogenic bladder with Charles catheter.

3.  Type 2 diabetes.

4.  Hepatic sarcoidosis.

5.  Congestive heart failure.



PLAN:  The patient is to undergo discharge.  The patient has VRE in the

urine and the patient is on antibiotics.





__________________________________________

Nasir Dunbar MD





DD:  12/11/2018 23:00:33

DT:  12/12/2018 2:52:22

Job # [id number]

## 2018-12-12 NOTE — PN
DATE:  12/12/2018



LOCATION:  365, bed A.



SUBJECTIVE:  This is a 53-year-old female, seen and examined in rounds,

with less abdominal pain, less bowel movement frequency and jaundice.  No

reported nausea or vomiting.  Somewhat tolerating oral intake well.  No

reported chest pain, palpitation, significant shortness of breath, chills

or fever.



The entire chart is reviewed including but not limited to most recent lab

and radiology study results, current and the previous medication list,

current and the previous medical events and today's blood glucose level is

91, normal.



PHYSICAL EXAMINATION:

GENERAL:  A 53-year-old female.

VITAL SIGNS:  Afebrile with generalized jaundice with pulse of 64,

respiratory 20 to 22, blood pressure 120/74.

HEENT:  Showed pale dry oral mucoid membrane.  Bilateral icteric sclerae.

LUNGS:  Few scattered crepitation.  Decreased air entry at bases.

HEART:  Positive S1 and S2.

ABDOMEN:  Soft with mild generalized tenderness.  No mass or organomegaly. 

No rebound tenderness or guarding.  Abdominal distention is noted.

EXTREMITIES:  With bilateral above-knee amputation.

NEUROLOGIC:  No reported new neurological deficits, sensory or motor.



IMPRESSION:

1.  Hepatic sarcoidosis.

2.  Abnormal liver function test with jaundice secondary to above as well

as status post endoscopic retrograde cholangiopancreatography with biliary

stent insertion due to papillary stenosis and spasm.

3.  Poorly controlled diabetes mellitus, hypertension.

4.  Recurrent urinary tract infection with bacteremia.

5.  Diarrhea, infectious versus secondary to mild absorption syndrome

and/or possible diabetic diarrhea.

6.  Malnutrition secondary to above.

7.  Known history of hyperlipidemia, hyperlipidemia, cardiac arrhythmia,

peptic ulcer disease, peripheral vascular disease with status post

bilateral above-knee amputation.



SUGGESTIONS:

1.  Continue current management.

2.  The patient to be discharged home and to be followed up in [facility] for full evaluation of potential liver transplant if she is a

candidate of.

3.  Further recommendation to follow.  The patient is to be seen as

outpatient.







__________________________________________

Jeffrey Albert MD





DD:  12/12/2018 17:48:03

DT:  12/12/2018 17:52:54

Job # [id number]

## 2018-12-12 NOTE — PCM.HF
Heart Failure Core Measure





- Heart Failure


Ejection Fraction: 40 % or Greater


ACE Inhibitor Prescribed: No


Contraindication/Reason for not providing: CRF/eF>45


Beta-Blocker Prescribed: Metoprolol Succinate


Angiotensin II Receptor Blocker Prescribed: No


Contraindication/Reason for not providing: EF>45


AnticoagulationTherapy for Atrial Fibrillation/Atrialflutter: Yes


Aldosterone Antagonist Prescribed: No


Contraindication/Reason for not providing: EF.45


Hydralazine Nitrate Prescribed: No


Contraindication/Reason for not providing: EF>45


Implantable Cardioverter Defibrillator Therapy: No


Contraindication/Reason for not providing: PT HAS aicd 


Cardiac Resynchronization Therapy Prescribed: No


Contraindication/Reason for not providing: pt has AICD 





- Follow up


Will be discharged to: Home


Follow Up Date (must be within 7 days from discharge): 12/17/18


Follow Up Time: 12:00

## 2018-12-12 NOTE — CP.PCM.DIS
Provider





- Provider


Date of Admission: 


12/03/18 12:37





Attending physician: 


Nasir Dunbar MD





Consults: 








12/01/18 20:07


Gastroenterology Consult Routine 


   Comment: 


   Consulting Provider: Jeffrey Olmedo


   Consulting Physician: Jeffrey Olmedo


   Reason for Consult: h,sarcoidosis





12/06/18 09:36


Infectious Disease Consult Routine 


   Comment: 


   Consulting Provider: Namita Tsang


   Consulting Physician: Namita Tsang


   Reason for Consult: VRE IN URINE / SARCODOSIS OF LIVER














Hospital Course





- Lab Results


Lab Results: 


                                  Micro Results





12/06/18 15:50   Blood-Thru Central Line   Blood Culture - Final


                            NO GROWTH AFTER 5 DAYS


12/06/18 15:50   Blood-Thru Central Line   Gram Stain - Final


                            TEST NOT PERFORMED


12/06/18 16:10   Blood-Thru Central Line   Blood Culture - Final


                            NO GROWTH AFTER 5 DAYS


12/06/18 16:10   Blood-Thru Central Line   Gram Stain - Final


                            TEST NOT PERFORMED


12/06/18 21:20   Urine   Urine Culture - Final


                            ,000 CFU/ML.


                            MULTIPLE SPECIES. SUGGEST REPEAT SPECIMEN.


12/03/18 14:15   Urine,Catheterized   Urine Culture - Final


                            Vancomycin Resistant E.faecium


                            Yeast Species





                             Most Recent Lab Values











WBC  7.8 K/uL (4.8-10.8)   12/11/18  07:08    


 


RBC  4.37 Mil/uL (3.80-5.20)   12/11/18  07:08    


 


Hgb  14.1 g/dL (11.0-16.0)   12/11/18  07:08    


 


Hct  40.8 % (34.0-47.0)   12/11/18  07:08    


 


MCV  93.4 fL (81.0-99.0)  D 12/11/18  07:08    


 


MCH  32.3 pg (27.0-31.0)  H  12/11/18  07:08    


 


MCHC  34.5 g/dL (33.0-37.0)   12/11/18  07:08    


 


RDW  19.2 % (11.5-14.5)  H  12/11/18  07:08    


 


Plt Count  175 K/uL (130-400)   12/11/18  07:08    


 


MPV  9.0 fL (7.2-11.7)   12/11/18  07:08    


 


Neut % (Auto)  65.6 % (50.0-75.0)   12/11/18  07:08    


 


Lymph % (Auto)  23.4 % (20.0-40.0)   12/11/18  07:08    


 


Mono % (Auto)  9.9 % (0.0-10.0)   12/11/18  07:08    


 


Eos % (Auto)  0.8 % (0.0-4.0)   12/11/18  07:08    


 


Baso % (Auto)  0.3 % (0.0-2.0)   12/11/18  07:08    


 


Neut # (Auto)  5.1 K/uL (1.8-7.0)   12/11/18  07:08    


 


Lymph # (Auto)  1.8 K/uL (1.0-4.3)   12/11/18  07:08    


 


Mono # (Auto)  0.8 K/uL (0.0-0.8)   12/11/18  07:08    


 


Eos # (Auto)  0.1 K/uL (0.0-0.7)   12/11/18  07:08    


 


Baso # (Auto)  0.0 K/uL (0.0-0.2)   12/11/18  07:08    


 


Neutrophils % (Manual)  91 % (50-75)  H  12/07/18  07:13    


 


Lymphocytes % (Manual)  7 % (20-40)  L  12/07/18  07:13    


 


Monocytes % (Manual)  2 % (0-10)   12/07/18  07:13    


 


Platelet Estimate  Normal  (NORMAL)   12/07/18  07:13    


 


Polychromasia  Slight   12/06/18  08:41    


 


Hypochromasia (manual)  Moderate   12/06/18  08:41    


 


Anisocytosis (manual)  Slight   12/07/18  07:13    


 


Macrocytosis (manual)  Slight   12/07/18  07:13    


 


Target Cells  Slight   12/07/18  07:13    


 


Ovalocytes  Slight   12/06/18  08:41    


 


PT  13.9 SECONDS (9.7-12.2)  H  12/09/18  11:36    


 


INR  1.3   12/09/18  11:36    


 


APTT  31 SECONDS (21-34)   12/09/18  11:36    


 


Sodium  134 mmol/L (132-148)   12/11/18  07:08    


 


Potassium  4.5 mmol/L (3.6-5.2)   12/11/18  07:08    


 


Chloride  102 mmol/L ()   12/11/18  07:08    


 


Carbon Dioxide  20 mmol/L (22-30)  L  12/11/18  07:08    


 


Anion Gap  17  (10-20)   12/11/18  07:08    


 


BUN  29 mg/dL (7-17)  H  12/11/18  07:08    


 


Creatinine  0.9 mg/dL (0.7-1.2)   12/11/18  07:08    


 


Est GFR ( Amer)  > 60   12/11/18  07:08    


 


Est GFR (Non-Af Amer)  > 60   12/11/18  07:08    


 


POC Glucose (mg/dL)  91 mg/dL ()   12/12/18  07:21    


 


Random Glucose  123 mg/dL ()  H  12/11/18  07:08    


 


Calcium  7.8 mg/dl (8.6-10.4)  L  12/11/18  07:08    


 


Total Bilirubin  9.5 mg/dL (0.2-1.3)  H  12/11/18  07:08    


 


Direct Bilirubin  7.5 mg/dL (0.0-0.4)  H  12/07/18  07:13    


 


AST  261 U/L (14-36)  H D 12/11/18  07:08    


 


ALT  325 U/L (9-52)  H  12/11/18  07:08    


 


Alkaline Phosphatase  339 U/L ()  H  12/11/18  07:08    


 


Total Protein  6.5 g/dL (6.3-8.3)   12/11/18  07:08    


 


Albumin  3.2 g/dL (3.5-5.0)  L  12/11/18  07:08    


 


Globulin  3.3 gm/dL (2.2-3.9)   12/11/18  07:08    


 


Albumin/Globulin Ratio  1.0  (1.0-2.1)   12/11/18  07:08    


 


Amylase  65 U/L ()   12/07/18  07:13    


 


Lipase  51 U/L ()   12/07/18  07:13    


 


Alpha Fetoprotein  5.1 ng/mL (0.0-7.5)   12/02/18  07:06    


 


Carcinoembryonic Ag  5.5 ng/mL (0-3.0)  H  12/02/18  09:54    


 


CA 19-9 Antigen  89.3 U/mL (0-37)  H D 12/02/18  09:54    


 


 Antigen  5.8 U/mL (0-35)   12/02/18  09:54    


 


Urine Color  Yellow  (YELLOW)   12/06/18  21:20    


 


Urine Clarity  Clear  (Clear)   12/06/18  21:20    


 


Urine pH  6.0  (5.0-8.0)   12/06/18  21:20    


 


Ur Specific Gravity  1.012  (1.003-1.030)   12/06/18  21:20    


 


Urine Protein  Negative mg/dL (NEGATIVE)   12/06/18  21:20    


 


Urine Glucose (UA)  2+ mg/dL (Normal)  H  12/06/18  21:20    


 


Urine Ketones  Negative mg/dL (NEGATIVE)   12/06/18  21:20    


 


Urine Blood  Negative  (NEGATIVE)   12/06/18  21:20    


 


Urine Nitrate  Negative  (NEGATIVE)   12/06/18  21:20    


 


Urine Bilirubin  Negative  (NEGATIVE)   12/06/18  21:20    


 


Urine Urobilinogen  2.0 mg/dL (0.2-1.0)  H  12/06/18  21:20    


 


Ur Leukocyte Esterase  3+ Abiodun/uL (Negative)  H  12/06/18  21:20    


 


Urine WBC (Auto)  51 /hpf (0-5)  H  12/06/18  21:20    


 


Urine WBC Clumps (Auto)  Few /hpf (NONE)  H  12/01/18  18:14    


 


Ur Squamous Epith Cells  1 /hpf (0-5)   12/06/18  21:20    


 


Urine Bacteria  Occ  (<OCC)  H  12/06/18  21:20    


 


Ur Yeast w Hyphae  Occ /lpf (NEGATIVE)  H  12/06/18  21:20    


 


Urine Yeast (Budding)  Many /hpf (NEGATIVE)  H  12/06/18  21:20    


 


Stool Occult Blood  Negative  (NEGATIVE)   12/06/18  21:20    


 


Urine Opiates Screen  Positive  (NEGATIVE)  H  12/01/18  18:14    


 


Urine Methadone Screen  Negative  (NEGATIVE)   12/01/18  18:14    


 


Ur Barbiturates Screen  Positive  (NEGATIVE)  H  12/01/18  18:14    


 


Ur Phencyclidine Scrn  Negative  (NEGATIVE)   12/01/18  18:14    


 


Ur Amphetamines Screen  Negative  (NEGATIVE)   12/01/18  18:14    


 


U Benzodiazepines Scrn  Negative  (NEGATIVE)   12/01/18  18:14    


 


U Oth Cocaine Metabols  Negative  (NEGATIVE)   12/01/18  18:14    


 


U Cannabinoids Screen  Negative  (NEGATIVE)   12/01/18  18:14    


 


C. difficile Ag & Toxin  Negative  (NEGATIVE)   12/11/18  01:14    














Discharge Exam





- Head Exam


Head Exam: NORMAL INSPECTION





Discharge Plan





- Discharge Medications


Prescriptions: 


Nystatin [Nystop Topical Powder] 15 applic EXT BID #1 bottle


oxyCODONE/Acetaminophen [Percocet 5/325 mg Tab] 1 ea PO Q8H PRN #20 tab


 PRN Reason: Pain, Severe (8-10)


predniSONE [Prednisone] 10 mg PO DAILY #7 tab


Linezolid [Zyvox] 600 mg PO Q12H #10 tab





- Follow Up Plan


Condition: GUARDED


Disposition: HOME/ ROUTINE


Instructions:  Chronic Pain (DC), Linezolid, Oxycodone and Acetaminophen, 

Prednisone, Urinary Tract Infection in Women (DC)


Additional Instructions: 





advised to contact DR Olmedo as instructed


zyvox po to continue for 5 more days as per DR Tsang.                            

       





advised to go to [facility] ER for furhter treatment


 f





Referrals: 


Nasir Dunbar MD [Staff Provider] -

## 2018-12-13 NOTE — DS
DISCHARGE DIAGNOSES:  Complicated urinary tract infection due to

vancomycin-resistant enterococcus, type 2 diabetes, hypertension, and

hepatic sarcoidosis.



HISTORY OF PRESENT ILLNESS:  This is a 53-year-old  female

with a history of hepatic sarcoidosis, on steroid, hypertension, type 2

diabetes, coronary artery disease, congestive heart failure, and bilateral

above-knee amputation, who came in because of abdominal pain, nausea,

vomiting, and dysuria.  She was found to have urinary tract infection.  She

was treated with antibiotics, found to have vancomycin-resistant

enterococcus, treated with Zyvox.  She felt better and she is being

discharged with outpatient followup.



CONDITION UPON DISCHARGE:  Stable.



PHYSICAL EXAMINATION:

VITAL SIGNS:  Blood pressure 113/77, pulse 61, respiratory rate 20,

temperature 97.2.

LUNGS:  Clear.

CARDIOVASCULAR:  S1 and S2, regular.

ABDOMEN:  Soft.



PLAN:  Discharge the patient.





__________________________________________

Nasir Dunbar MD





DD:  12/12/2018 22:00:34

DT:  12/13/2018 5:45:00

Job # [id number]

## 2019-03-14 ENCOUNTER — HOSPITAL ENCOUNTER (INPATIENT)
Dept: HOSPITAL 31 - C.ER | Age: 54
LOS: 13 days | Discharge: HOME | DRG: 197 | End: 2019-03-27
Attending: INTERNAL MEDICINE | Admitting: INTERNAL MEDICINE
Payer: MEDICARE

## 2019-03-14 VITALS — BODY MASS INDEX: 65.7 KG/M2

## 2019-03-14 DIAGNOSIS — E11.51: ICD-10-CM

## 2019-03-14 DIAGNOSIS — K72.90: ICD-10-CM

## 2019-03-14 DIAGNOSIS — E78.00: ICD-10-CM

## 2019-03-14 DIAGNOSIS — Z79.4: ICD-10-CM

## 2019-03-14 DIAGNOSIS — E11.65: ICD-10-CM

## 2019-03-14 DIAGNOSIS — E87.2: ICD-10-CM

## 2019-03-14 DIAGNOSIS — N39.0: ICD-10-CM

## 2019-03-14 DIAGNOSIS — I25.10: ICD-10-CM

## 2019-03-14 DIAGNOSIS — I50.20: ICD-10-CM

## 2019-03-14 DIAGNOSIS — B37.49: ICD-10-CM

## 2019-03-14 DIAGNOSIS — Z89.612: ICD-10-CM

## 2019-03-14 DIAGNOSIS — I11.0: ICD-10-CM

## 2019-03-14 DIAGNOSIS — I27.20: ICD-10-CM

## 2019-03-14 DIAGNOSIS — B37.81: ICD-10-CM

## 2019-03-14 DIAGNOSIS — J44.9: ICD-10-CM

## 2019-03-14 DIAGNOSIS — K44.9: ICD-10-CM

## 2019-03-14 DIAGNOSIS — B95.2: ICD-10-CM

## 2019-03-14 DIAGNOSIS — E78.5: ICD-10-CM

## 2019-03-14 DIAGNOSIS — Z89.611: ICD-10-CM

## 2019-03-14 DIAGNOSIS — D86.89: Primary | ICD-10-CM

## 2019-03-14 DIAGNOSIS — K92.0: ICD-10-CM

## 2019-03-14 DIAGNOSIS — D62: ICD-10-CM

## 2019-03-14 DIAGNOSIS — Z96.89: ICD-10-CM

## 2019-03-14 DIAGNOSIS — K27.9: ICD-10-CM

## 2019-03-14 DIAGNOSIS — Z16.21: ICD-10-CM

## 2019-03-14 DIAGNOSIS — K29.70: ICD-10-CM

## 2019-03-14 LAB
ALBUMIN SERPL-MCNC: 3.4 G/DL (ref 3.5–5)
ALBUMIN/GLOB SERPL: 0.7 {RATIO} (ref 1–2.1)
ALT SERPL-CCNC: 36 U/L (ref 9–52)
AST SERPL-CCNC: 161 U/L (ref 14–36)
BACTERIA #/AREA URNS HPF: (no result) /[HPF]
BASOPHILS # BLD AUTO: 0 K/UL (ref 0–0.2)
BASOPHILS NFR BLD: 0.4 % (ref 0–2)
BILIRUB UR-MCNC: (no result) MG/DL
BUN SERPL-MCNC: 17 MG/DL (ref 7–17)
CALCIUM SERPL-MCNC: 8.9 MG/DL (ref 8.6–10.4)
EOSINOPHIL # BLD AUTO: 0.5 K/UL (ref 0–0.7)
EOSINOPHIL NFR BLD: 8.5 % (ref 0–4)
ERYTHROCYTE [DISTWIDTH] IN BLOOD BY AUTOMATED COUNT: 20.5 % (ref 11.5–14.5)
GFR NON-AFRICAN AMERICAN: > 60
GLUCOSE UR STRIP-MCNC: NORMAL MG/DL
HGB BLD-MCNC: 11.2 G/DL (ref 11–16)
LEUKOCYTE ESTERASE UR-ACNC: (no result) LEU/UL
LIPASE: 31 U/L (ref 23–300)
LYMPHOCYTES # BLD AUTO: 1.3 K/UL (ref 1–4.3)
LYMPHOCYTES NFR BLD AUTO: 20.8 % (ref 20–40)
MCH RBC QN AUTO: 31.5 PG (ref 27–31)
MCHC RBC AUTO-ENTMCNC: 33.5 G/DL (ref 33–37)
MCV RBC AUTO: 93.9 FL (ref 81–99)
MONOCYTES # BLD: 0.4 K/UL (ref 0–0.8)
MONOCYTES NFR BLD: 7.3 % (ref 0–10)
NEUTROPHILS # BLD: 3.8 K/UL (ref 1.8–7)
NEUTROPHILS NFR BLD AUTO: 63 % (ref 50–75)
NRBC BLD AUTO-RTO: 0 % (ref 0–2)
PH UR STRIP: 6 [PH] (ref 5–8)
PLATELET # BLD: 150 K/UL (ref 130–400)
PMV BLD AUTO: 9.8 FL (ref 7.2–11.7)
PROT UR STRIP-MCNC: (no result) MG/DL
RBC # BLD AUTO: 3.55 MIL/UL (ref 3.8–5.2)
RBC # UR STRIP: (no result) /UL
SP GR UR STRIP: 1.01 (ref 1–1.03)
UROBILINOGEN UR-MCNC: 4 MG/DL (ref 0.2–1)
WBC # BLD AUTO: 6.1 K/UL (ref 4.8–10.8)
WBC CLUMPS # UR AUTO: (no result) /HPF

## 2019-03-14 RX ADMIN — METHYLPREDNISOLONE SODIUM SUCCINATE SCH: 40 INJECTION, POWDER, FOR SOLUTION INTRAMUSCULAR; INTRAVENOUS at 23:40

## 2019-03-14 NOTE — C.PDOC
History Of Present Illness


The patient, whose past medical history includes jaundice and sarcoidosis, 

presents to the ED for evaluation of abdominal pain which began one week ago. 

Patient reports some nausea. Otherwise, denies fever, chills, and vomiting. 


Time Seen by Provider: 03/14/19 19:35


Chief Complaint (Nursing): Abdominal Pain


History Per: Patient


History/Exam Limitations: no limitations


Onset/Duration Of Symptoms: Days


Current Symptoms Are (Timing): Still Present


Severity: Mild


Pain Scale Rating Of: 2


Location Of Pain/Discomfort: Diffuse


Radiation Of Pain To:: None


Quality Of Discomfort: "Pain"


Associated Symptoms: Nausea.  denies: Fever, Chills, Vomiting


Exacerbating Factors: None


Alleviating Factors: None


Last Bowel Movement: Today


Recent travel outside of the [place]: No


Additional History Per: Patient


Abnormal Vaginal Bleeding: No





Past Medical History


Reviewed: Historical Data, Nursing Documentation, Vital Signs


Vital Signs: 





                                Last Vital Signs











Temp  97.9 F   03/14/19 18:52


 


Pulse  60   03/14/19 18:52


 


Resp  16   03/14/19 18:52


 


BP  140/69   03/14/19 18:52


 


Pulse Ox  100   03/14/19 18:52














- Medical History


PMH: Anemia, Asthma (NO INHALER-ON PREDNISONE DAILY PO.), CAD (stent x 1), 

Cardia Arrhythmia, CHF, Diabetes, Deep Vein Thrombosis, Gall Bladder Disease, 

HTN, Hypercholesterolemia


   Denies: Chronic Kidney Disease


Surgical History: Appendectomy, Cholecystectomy, Coronary Stent, Endoscopy, C-

Section





- [facility]











 (09/08/18)


CENTRAL VENOUS CATHETER PLACEMENT WITH GUIDANCE (05/29/14)


CLOSED ENDOSCOPIC BIOPSY OF LARGE INTESTINE (11/19/14)


DILATION OF COMMON BILE DUCT WITH INTRALUMINAL DEVICE, ENDO (10/15/18)


ENDOSC RETROGRADE CHOLANGIOPANCREATOGRAPHY [ERCP] (10/30/13)


ENDOSCOP INSERTION OF STENT (TUBE) INTO BILE DUCT (05/29/14)


ESOPHAGOGASTRODUODENOSCOPY [EGD] W/CLOSED BIOPSY (05/29/14)


EXCISION OF STOMACH, ENDO, DIAGN (10/15/18)


FLUOROSCOPY OF SUPERIOR VENA CAVA, GUIDANCE (10/15/18)


INSERTION OF INFUSION DEV INTO SUP VENA CAVA, PERC APPROACH (10/15/18)


INSERTION OF VAD INTO CHEST SUBCU/FASCIA, PERC APPROACH (10/15/18)


INSPECTION OF LOWER INTESTINAL TRACT, ENDO (02/05/18)


IRRIGATION OF LOWER GI USING IRRIGATING SUBSTANCE, ENDO (02/05/18)


OTHER LOCAL DESTRUC SKIN (10/30/13)


OTHER SKIN & SUBQ I   D (10/30/13)


REMOVAL OF INFUSION DEVICE FROM GREAT VESSEL, PERC APPROACH (10/15/18)


REMOVAL OF INFUSION DEVICE FROM UPPER VEIN, EXTERN APPROACH (10/15/18)


REMOVAL OF INTRALUMINAL DEVICE FROM HEPATOBILIARY DUCT, ENDO (10/15/18)


REMOVAL OF VAD FROM TRUNK SUBCU/FASCIA, PERC APPROACH (10/15/18)


ULTRASONOGRAPHY OF RIGHT JUGULAR VEINS, GUIDANCE (10/15/18)








Family History: States: Unknown Family Hx





- Social History


Hx Tobacco Use: No


Hx Alcohol Use: No


Hx Substance Use: No





- Immunization History


Hx Tetanus Toxoid Vaccination: No


Hx Influenza Vaccination: Yes


Hx Pneumococcal Vaccination: Yes (2015)





Review Of Systems


Constitutional: Negative for: Fever, Chills


Eyes: Positive for: Other


Cardiovascular: Negative for: Chest Pain, Palpitations


Respiratory: Negative for: Cough, Shortness of Breath


Gastrointestinal: Positive for: Nausea, Abdominal Pain.  Negative for: Vomiting,

 Diarrhea, Constipation


Genitourinary: Negative for: Dysuria, Frequency, Hematuria, Vaginal Discharge, 

Vaginal Bleeding


Musculoskeletal: Negative for: Back Pain


Skin: Negative for: Rash, Lesions, Jaundice, Bruising


Neurological: Negative for: Weakness, Numbness





Physical Exam





- Physical Exam


Appears: Non-toxic, No Acute Distress


Skin: Warm, Dry, Jaundice


Head: Normacephalic


Eye(s): bilateral: Scleral Icterus


Oral Mucosa: Moist


Neck: Supple


Chest: Symmetrical, No Deformity, No Tenderness, Other (pacer left)


Cardiovascular: Rhythm Regular, No Murmur


Respiratory: No Rales, No Rhonchi, No Wheezing


Gastrointestinal/Abdominal: Soft, Tenderness (diffuse), No Guarding, No Rebound,

 Other (hepatomegaly )


Back: No CVA Tenderness


Extremity: Other (bilateral above-knee amputations )


Neurological/Psych: Oriented x3





ED Course And Treatment





- Laboratory Results


Result Diagrams: 


                                 03/14/19 20:05





                                 03/14/19 20:05


ECG: Interpreted By Me, Viewed By Me


ECG Rhythm: Sinus Rhythm (60), Nonspecific Changes (ventricular paced)


O2 Sat by Pulse Oximetry: 100 (on RA )


Pulse Ox Interpretation: Normal


Progress Note: Bloodwork, urinalysis, and EKG ordered and reviewed.





Disposition


Discussed With : Nasir Dunbar


Comment: accepted the pt on his service and took over the care at 8:42 PM


Doctor Will See Patient In The: Hospital


Counseled Patient/Family Regarding: Studies Performed, Diagnosis





- Disposition


Disposition: HOSPITALIZED


Disposition Time: 19:35


Condition: FAIR


Forms:  [facility] (English)





- POA


Present On Arrival: None





- Clinical Impression


Clinical Impression: 


 Abdominal pain, Sarcoidosis, Bilirubinemia, Jaundice








- Scribe Statement


The provider has reviewed the documentation as recorded by the Scribe


Provider Attestation: 








All medical record entries made by the Scribe were at my direction and per

sonally dictated by me. I have reviewed the chart and agree that the record 

accurately reflects my personal performance of the history, physical exam, 

medical decision making, and the department course for this patient. I have also

 personally directed, reviewed, and agree with the discharge instructions and 

disposition.





Decision To Admit





- Pt Status Changed To:


Hospital Disposition Of: Inpatient





- Admit Certification


Admit to Inpatient:: After my assessment, the patient will require 

hospitalization for at least two midnights.  This is because of the severity of 

symptoms shown, intensity of services needed, and/or the medical risk in this 

patient being treated as an outpatient.





- InPatient:


Physician Admission Certification:: After my assessment, the patient will 

require hospitalization for at least two midnights.  This is because of the 

severity of symptoms shown, intensity of services needed, and/or the medical 

risk in this patient being treated as an outpatient.





- .


Bed Request Type: Regular


Admitting Physician: Nasir Dunbar


Patient Diagnosis: 


 Abdominal pain, Sarcoidosis, Bilirubinemia, Jaundice

## 2019-03-15 LAB
ALBUMIN SERPL-MCNC: 3.2 G/DL (ref 3.5–5)
ALBUMIN/GLOB SERPL: 0.8 {RATIO} (ref 1–2.1)
ALT SERPL-CCNC: 36 U/L (ref 9–52)
APTT BLD: 46 SECONDS (ref 21–34)
AST SERPL-CCNC: 148 U/L (ref 14–36)
BUN SERPL-MCNC: 19 MG/DL (ref 7–17)
CALCIUM SERPL-MCNC: 8.5 MG/DL (ref 8.6–10.4)
ERYTHROCYTE [DISTWIDTH] IN BLOOD BY AUTOMATED COUNT: 20.8 % (ref 11.5–14.5)
GFR NON-AFRICAN AMERICAN: > 60
HGB BLD-MCNC: 10.7 G/DL (ref 11–16)
INR PPP: 1.4
MCH RBC QN AUTO: 31.6 PG (ref 27–31)
MCHC RBC AUTO-ENTMCNC: 33.3 G/DL (ref 33–37)
MCV RBC AUTO: 94.9 FL (ref 81–99)
PLATELET # BLD: 147 K/UL (ref 130–400)
PMV BLD AUTO: 9.8 FL (ref 7.2–11.7)
PROTHROMBIN TIME: 14.9 SECONDS (ref 9.7–12.2)
RBC # BLD AUTO: 3.39 MIL/UL (ref 3.8–5.2)
WBC # BLD AUTO: 6.3 K/UL (ref 4.8–10.8)

## 2019-03-15 RX ADMIN — METHYLPREDNISOLONE SODIUM SUCCINATE SCH MG: 40 INJECTION, POWDER, FOR SOLUTION INTRAMUSCULAR; INTRAVENOUS at 09:48

## 2019-03-15 RX ADMIN — DIPHENHYDRAMINE HYDROCHLORIDE PRN MG: 50 INJECTION INTRAMUSCULAR; INTRAVENOUS at 21:27

## 2019-03-15 RX ADMIN — DIPHENHYDRAMINE HYDROCHLORIDE PRN MG: 50 INJECTION INTRAMUSCULAR; INTRAVENOUS at 07:32

## 2019-03-15 RX ADMIN — METOPROLOL SUCCINATE SCH MG: 50 TABLET, EXTENDED RELEASE ORAL at 09:54

## 2019-03-15 RX ADMIN — DIGOXIN SCH: 0.25 TABLET ORAL at 18:19

## 2019-03-15 RX ADMIN — HUMAN INSULIN SCH: 100 INJECTION, SOLUTION SUBCUTANEOUS at 11:35

## 2019-03-15 RX ADMIN — DIPHENHYDRAMINE HYDROCHLORIDE PRN MG: 50 INJECTION INTRAMUSCULAR; INTRAVENOUS at 14:26

## 2019-03-15 RX ADMIN — METHYLPREDNISOLONE SODIUM SUCCINATE SCH MG: 40 INJECTION, POWDER, FOR SOLUTION INTRAMUSCULAR; INTRAVENOUS at 21:27

## 2019-03-15 RX ADMIN — HUMAN INSULIN SCH: 100 INJECTION, SOLUTION SUBCUTANEOUS at 21:27

## 2019-03-15 RX ADMIN — HUMAN INSULIN SCH: 100 INJECTION, SOLUTION SUBCUTANEOUS at 08:04

## 2019-03-15 RX ADMIN — HUMAN INSULIN SCH: 100 INJECTION, SOLUTION SUBCUTANEOUS at 17:46

## 2019-03-15 NOTE — CP.PCM.HP
Present on Admission





- Present on Admission


Any Indicators Present on Admission: Yes


History of Uncontrolled Diabetes: Yes


Urinary Catheter: Yes (neurogenic bladder)





Past Patient History





- Infectious Disease


Hx of Infectious Diseases: None





- Tetanus Immunizations


Tetanus Immunization: Unknown





- Past Medical History & Family History


Past Medical History?: Yes





- Past Social History


Smoking Status: Never Smoked





- CARDIAC


Hx Cardiac Disorders: Yes (x 1 stent; arrhythmia)


Hx Congestive Heart Failure: Yes


Hx Hypercholesterolemia: Yes


Hx Hypertension: Yes





- PULMONARY


Hx Chronic Obstructive Pulmonary Disease (COPD): Yes (asthma)





- NEUROLOGICAL


Hx Neurological Disorder: No





- HEENT


Hx HEENT Problems: Yes


Hx Cataracts: Yes (BILAT.)





- RENAL


Hx Chronic Kidney Disease: No





- ENDOCRINE/METABOLIC


Hx Diabetes Mellitus Type 1: Yes





- HEMATOLOGICAL/ONCOLOGICAL


Hx Blood Disorders: Yes


Hx Anemia: Yes





- INTEGUMENTARY


Hx Dermatological Problems: No





- MUSCULOSKELETAL/RHEUMATOLOGICAL


Hx Musculoskeletal Disorders: Yes


Hx Falls: Yes





- GASTROINTESTINAL


Hx Gastrointestinal Disorders: Yes


Hx Gall Bladder Disease: Yes





- GENITOURINARY/GYNECOLOGICAL


Hx Genitourinary Disorders: Yes


Hx Urinary Tract Infection: Yes


Other/Comment: PT HAS LONG TERM GUTIERREZ





- PSYCHIATRIC


Hx Substance Use: No





- SURGICAL HISTORY


Hx Surgeries: Yes


Hx Appendectomy: Yes


Hx Cholecystectomy: Yes


Hx Coronary Stent: Yes





- ANESTHESIA


Hx Anesthesia: Yes


Hx Anesthesia Reactions: No


Hx Malignant Hyperthermia: No





Meds


Allergies/Adverse Reactions: 


                                    Allergies











Allergy/AdvReac Type Severity Reaction Status Date / Time


 


avocado Allergy Severe ANAPHYLAXIS Verified 03/14/19 18:57


 


banana Allergy Severe ANAPHYLAXIS Verified 03/14/19 18:57


 


cherry Allergy Severe ANAPHYLAXIS Verified 03/14/19 18:57


 


ketorolac [From Toradol] Allergy   Verified 03/14/19 18:57














Results





- Vital Signs


Recent Vital Signs: 





                                Last Vital Signs











Temp  97.2 F L  03/15/19 15:00


 


Pulse  61   03/15/19 15:00


 


Resp  20   03/15/19 15:00


 


BP  138/78   03/15/19 15:00


 


Pulse Ox  100   03/15/19 15:00














- Labs


Result Diagrams: 


                                 03/15/19 07:34





                                 03/15/19 07:34


Labs: 





                         Laboratory Results - last 24 hr











  03/15/19 03/15/19 03/15/19





  07:34 07:34 07:34


 


WBC  6.3  


 


RBC  3.39 L  


 


Hgb  10.7 L  


 


Hct  32.2 L  


 


MCV  94.9  


 


MCH  31.6 H  


 


MCHC  33.3  


 


RDW  20.8 H  


 


Plt Count  147  


 


MPV  9.8  


 


PT    14.9 H


 


INR    1.4


 


APTT    46 H


 


Sodium   137 


 


Potassium   3.8 


 


Chloride   107 


 


Carbon Dioxide   21 L 


 


Anion Gap   12 


 


BUN   19 H 


 


Creatinine   0.5 L 


 


Est GFR ( Amer)   > 60 


 


Est GFR (Non-Af Amer)   > 60 


 


POC Glucose (mg/dL)   


 


Random Glucose   83 


 


Calcium   8.5 L 


 


Phosphorus   2.7 


 


Magnesium   1.8 


 


Total Bilirubin   12.7 H 


 


AST   148 H 


 


ALT   36 


 


Alkaline Phosphatase   389 H 


 


Total Protein   7.5 


 


Albumin   3.2 L 


 


Globulin   4.2 H 


 


Albumin/Globulin Ratio   0.8 L 














  03/15/19 03/15/19





  07:37 11:03


 


WBC  


 


RBC  


 


Hgb  


 


Hct  


 


MCV  


 


MCH  


 


MCHC  


 


RDW  


 


Plt Count  


 


MPV  


 


PT  


 


INR  


 


APTT  


 


Sodium  


 


Potassium  


 


Chloride  


 


Carbon Dioxide  


 


Anion Gap  


 


BUN  


 


Creatinine  


 


Est GFR ( Amer)  


 


Est GFR (Non-Af Amer)  


 


POC Glucose (mg/dL)  85  97


 


Random Glucose  


 


Calcium  


 


Phosphorus  


 


Magnesium  


 


Total Bilirubin  


 


AST  


 


ALT  


 


Alkaline Phosphatase  


 


Total Protein  


 


Albumin  


 


Globulin  


 


Albumin/Globulin Ratio

## 2019-03-15 NOTE — PN
DATE:  03/15/2019



LOCATION:  4.



SUBJECTIVE:  This is a 53-year-old female, seen and examined initially for

GI consultation on 2019.  Reexamined again today in rounds with a

complaint of abdominal pain, post upper abdominal distention, jaundice,

generalized weakness and malaises with intermittent period of nausea and

vomiting.



The patient denied any actual chest pain, palpitation or significant

shortness of breath this morning.



The entire chart is reviewed including but not limited to the most recent

lab and radiology study results, current and the previous medication list,

current and the previous medical events and today's lab results showed low

hemoglobin of 10.7, hematocrit 32.2 with normal white blood cells and

platelet count, PT 14.9, PTT 46, CO2 content 21 indicative of metabolic

acidosis, BUN 19 but creatinine 0.5, calcium 8.24 with total bilirubin of

12.7, , abnormal ALT, alkaline phosphatase 389, albumin 3.2.



PHYSICAL EXAMINATION:

GENERAL:  A 53-year-old female, afebrile with generalized jaundice.

VITAL SIGNS:  Heart rate of 64, respiratory rate 20 to 22, blood pressure

130/70.

HEENT:  Showed pale dry mucous membrane.  Nonicteric sclerae.

LUNGS:  Few scattered crepitation.  Decreased air entry at bases.

HEART:  Positive S1 and S2.

ABDOMEN:  Soft with mild generalized tenderness.  No mass or organomegaly. 

No rebound tenderness or guarding, but diffuse tenderness.

EXTREMITIES:  With evidence of bilateral above-knee amputation.  No

clubbing or cyanosis.

NEUROLOGIC:  No reported new neurological deficits, sensory or motor.



IMPRESSION:

1.  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

secondary to above.

2.  To rule out early stage of ascending cholangitis with blockage of

previously inserted biliary stent.

3.  Re-exacerbation of peptic ulcer disease.

4.  Multiple past medical history including but not limited to diabetes

mellitus, cardiac arrhythmia, hypertension, congestive heart failure,

coronary artery disease with status post cardiac stent insertion.

5.  Known history of hyperlipidemia, status post cholecystectomy, deep

venous thrombosis with status post appendectomy as well as endoscopic

retrograde cholangiopancreatography with biliary stent insertion with

 before.

6.  Peripheral vascular disease with status post bilateral above-knee

amputation.



SUGGESTIONS:

1.  Agree with your plan.

2.  Repeat abdominal ultrasound.

3.  Low dose of steroids.  The patient may benefit from Solu-Medrol IV.

4.  If the patient's jaundice persists, then ERCP with change of biliary

stent should be kept in mind.  Further recommendation to follow and

Rheumatology consultation to be considered due to the patient's known

history of hepatic sarcoidosis.







__________________________________________

Jeffrey Albert MD





DD:  03/15/2019 13:55:21

DT:  03/15/2019 13:58:57

Job # [id number]

## 2019-03-15 NOTE — US
Date of service: 



03/15/2019



HISTORY:

ABNORMAL  L F T



COMPARISON:

12/02/2018



TECHNIQUE:

Sonographic evaluation of the abdomen.



FINDINGS:



LIVER:

Measures 14.4 cm.  There is diffuse increased echogenicity of the 

liver parenchyma. No mass. No intrahepatic bile duct dilatation.



GALLBLADDER:

Surgically absent.



COMMON BILE DUCT:

Measures 10 mm. No stones.  Mild diffuse dilatation in keeping with 

post cholecystectomy status.  And intraluminal stent remains in 

place. 



PANCREAS:

Unremarkable as visualized. No mass. No ductal dilatation.



RIGHT KIDNEY:

Measures 9.9cm.  Diffuse increased echogenicity.  No mass, or 

hydronephrosis.  There is a 1.6 x 1.3 x 1.4 cm simple cyst in the 

upper pole.  There is a 7 mm nonobstructing stone in the upper pole. 



LEFT KIDNEY:

Measures 9.3cm.  Diffuse increased echogenicity.  No mass, or 

hydronephrosis.  There is a 



6 mm nonobstructing stone in the lower pole. 



SPLEEN:

Stable mild splenomegaly.  Normal echotexture.  Scattered 

calcifications in the spleen. 



AORTA:

No aneurysmal dilatation. 



IVC:

Unremarkable. 



OTHER FINDINGS:

None. 



IMPRESSION:

1.  Fatty liver.  Mild splenomegaly with nonspecific coarse 

calcifications. 



2.  Status post cholecystectomy, stable diffuse dilatation of the 

common bile duct with an intraluminal stent in place. 



3.  Medical renal parenchymal disease.  



4.  7 mm nonobstructing stone in the upper pole of the right kidney 

and 6 mm nonobstructing stone in the lower pole of the left kidney, 

new since the prior examination.

## 2019-03-15 NOTE — CARD
--------------- APPROVED REPORT --------------





Date of service: 03/14/2019



EKG Measurement

Heart [id number]

[id number]

[id number]

[id number]



<Conclusion>

Ventricular-paced rhythm

Abnormal ECG

## 2019-03-16 RX ADMIN — DIPHENHYDRAMINE HYDROCHLORIDE PRN MG: 50 INJECTION INTRAMUSCULAR; INTRAVENOUS at 06:14

## 2019-03-16 RX ADMIN — HUMAN INSULIN SCH UNITS: 100 INJECTION, SOLUTION SUBCUTANEOUS at 12:23

## 2019-03-16 RX ADMIN — HUMAN INSULIN SCH UNITS: 100 INJECTION, SOLUTION SUBCUTANEOUS at 08:20

## 2019-03-16 RX ADMIN — DIGOXIN SCH MG: 0.25 TABLET ORAL at 17:59

## 2019-03-16 RX ADMIN — METHYLPREDNISOLONE SODIUM SUCCINATE SCH MG: 40 INJECTION, POWDER, FOR SOLUTION INTRAMUSCULAR; INTRAVENOUS at 09:01

## 2019-03-16 RX ADMIN — DIPHENHYDRAMINE HYDROCHLORIDE PRN MG: 50 INJECTION INTRAMUSCULAR; INTRAVENOUS at 20:09

## 2019-03-16 RX ADMIN — HUMAN INSULIN SCH: 100 INJECTION, SOLUTION SUBCUTANEOUS at 21:29

## 2019-03-16 RX ADMIN — METHYLPREDNISOLONE SODIUM SUCCINATE SCH MG: 40 INJECTION, POWDER, FOR SOLUTION INTRAMUSCULAR; INTRAVENOUS at 21:26

## 2019-03-16 RX ADMIN — METOPROLOL SUCCINATE SCH MG: 50 TABLET, EXTENDED RELEASE ORAL at 09:02

## 2019-03-16 RX ADMIN — HUMAN INSULIN SCH UNITS: 100 INJECTION, SOLUTION SUBCUTANEOUS at 16:52

## 2019-03-16 RX ADMIN — DIPHENHYDRAMINE HYDROCHLORIDE PRN MG: 50 INJECTION INTRAMUSCULAR; INTRAVENOUS at 12:44

## 2019-03-16 NOTE — HP
CHIEF COMPLAINT:  Deep discoloration of the eye.



HISTORY OF PRESENT ILLNESS:  This is a 53-year-old -American female,

well known to me with history of coronary artery disease, status post

angioplasty.  She has severe systolic heart failure with low LV ejection

fraction with AICD in place.  She has peripheral arterial disease with

bilateral above-knee amputation.  She has neurogenic bladder with chronic

indwelling Charles.  She is home bound.  She is fully dependent on family

members on activities of daily living.  She is compliant with her diet,

medication, and followup.  The patient is being followed up by a home

visiting advanced practice nurse.  The patient is being followed up by

[facility], and she needs a liver transplant because of end-stage

liver disease due to hepatic sarcoidosis.  The patient also has history of

type 2 diabetes and hypertension.  The patient has been seen by liver

clinic in [facility], and she is waiting for cadaver liver donor for liver

transplant.  On the day of admission, visiting nurse saw the patient at

home and found her to be deeply jaundiced and will refer patient to

emergency room with dark discoloration of urine, cloudy urine, generalized

weakness, chills, nausea, occasional vomiting.  There is fevers, chills,

rigors, body aches, tiredness, anoxia, malaise, and fatigue.  She has

neurogenic bladder with Charles.  She has tingling and numbness in the hand. 

She denies any history of abdominal pain.  She has headache, dizziness. 

She denies any history of seizure-like activity.  The patient denies any

paresthesias.  She denied any joint pain, hip pain.



PAST MEDICAL HISTORY:  Type 2 diabetes, congestive heart failure,

hypertension, hyperlipidemia.



SOCIAL HISTORY:  Nonsmoker, non-ETOH user.



CURRENT MEDICATION:  At home, she is on prednisone, gabapentin, Toprol-XL,

digoxin, Lomotil, Eliquis, prednisone, Percocet.



FAMILY HISTORY:  Noncontributory.



PHYSICAL EXAMINATION:

GENERAL:  A middle aged female, in no acute distress, deeply jaundiced with

dark urine in the Charles catheter with bag.

VITAL SIGNS:  Blood pressure 138/78, pulse 61, respiratory rate 20,

temperature 97.2.

SKIN:  Poor turgor, pale.  No bruises.  No purpura.

HEENT:  Atraumatic, normocephalic.  Positive jaundice.  Negative pallor. 

Extraocular movements are intact.

NECK:  Supple.  Flat neck vein.  No JVD.  No lymph node.  No thyromegaly.

CHEST WALL:  Bilateral symmetrical expansion.  No tenderness.

BREAST:  No masses.

NIPPLE:  No discharge.

CARDIOVASCULAR SYSTEM:  PMI no localized.  S1, S2 regular.  No heaves, no

thrills.  There is S3 positive.

ABDOMEN:  Soft.  Nontender.  Bowel sounds are positive.  Neurogenic bladder

with Charles with dark yellow urine and large liver.

LUNGS:  Bilaterally clear.  No rales.  No rhonchi.

EXTREMITIES:  Bilateral above knee amputation.

CENTRAL NERVOUS SYSTEM:  Awake, alert, oriented x3.  Cranial nerves II

through XII are normal.  Power 5/5 x4.  Plantars are downgoing.



ASSESSMENT:

1.  Exacerbation of hepatic sarcoidosis with worsening liver function,

worsening obstruction, worsening sarcoidosis in the liver.  [facility] she has been told that she needs her liver transplant.  The

patient has elevated total bilirubin.  Her total bilirubin has steadily

gone up from a level of 6 to 13 in a year or so.

2.  Diabetes.

3.  Rule out urinary tract infection, rule out sepsis.

4.  Neurogenic bladder.

5.  Hypertension.



PLAN:  Admit.  Detailed orders are written.  Seen and examined.





__________________________________________

Nasir Dunbar MD





DD:  03/15/2019 22:58:41

DT:  03/16/2019 3:53:55

Job # [id number]

## 2019-03-16 NOTE — CP.PCM.PN
Subjective





- Date & Time of Evaluation


Date of Evaluation: 03/16/19


Time of Evaluation: 08:00





- Subjective


Subjective: 





dictated   





Objective





- Vital Signs/Intake and Output


Vital Signs (last 24 hours): 


                                        











Temp Pulse Resp BP Pulse Ox


 


 98.2 F   60   16   120/80   98 


 


 03/16/19 19:19  03/16/19 19:19  03/16/19 19:19  03/16/19 19:19  03/16/19 16:00








Intake and Output: 


                                        











 03/16/19 03/17/19





 18:59 06:59


 


Intake Total 100 240


 


Output Total 750 


 


Balance -650 240














- Medications


Medications: 


                               Current Medications





Apixaban (Eliquis)  5 mg PO BID [facility]


   Last Admin: 03/16/19 17:59 Dose:  5 mg


Digoxin (Lanoxin)  0.25 mg PO DAILY@1800 [facility]


   Last Admin: 03/16/19 17:59 Dose:  0.25 mg


Diphenhydramine HCl (Benadryl)  25 mg IVP Q6 PRN


   PRN Reason: Itching / Pruritus


   Last Admin: 03/16/19 20:09 Dose:  25 mg


Diphenoxylate HCl/Atropine (Lomotil 0.025-2.5 Mg Tablet)  1 tab PO Q8 PRN


   PRN Reason: Diarrhea


Gabapentin (Neurontin)  100 mg PO BID [facility]


   Last Admin: 03/16/19 17:59 Dose:  100 mg


Ceftriaxone Sodium 1 gm/ (Sodium Chloride)  100 mls @ 100 mls/hr IVPB DAILY [facility];

Protocol


   Last Admin: 03/16/19 09:02 Dose:  100 mls/hr


Insulin Human Regular (Novolin R)  0 unit SC ACHS [facility]; Protocol


   Last Admin: 03/16/19 16:52 Dose:  3 units


Methylprednisolone (Solu-Medrol)  40 mg IV Q12 [facility]


   Last Admin: 03/16/19 09:01 Dose:  40 mg


Metoclopramide HCl (Reglan)  5 mg IVP Q6H [facility]


   Last Admin: 03/16/19 14:15 Dose:  Not Given


Metoprolol Succinate (Toprol Xl)  50 mg PO DAILY [facility]


   Last Admin: 03/16/19 09:02 Dose:  50 mg


Morphine Sulfate (Morphine)  1 mg IVP Q3 PRN


   PRN Reason: Pain, moderate (4-7)


   Last Admin: 03/16/19 20:09 Dose:  1 mg


Ondansetron HCl (Zofran Inj)  4 mg IVP Q6 PRN


   PRN Reason: Nausea/Vomiting


   Last Admin: 03/16/19 20:09 Dose:  4 mg











- Labs


Labs: 


                                        





                                 03/15/19 07:34 





                                 03/15/19 07:34 





                                        











PT  14.9 SECONDS (9.7-12.2)  H  03/15/19  07:34    


 


INR  1.4   03/15/19  07:34    


 


APTT  46 SECONDS (21-34)  H  03/15/19  07:34

## 2019-03-16 NOTE — PN
DATE:  03/16/2019



LOCATION:  554.



SUBJECTIVE:  This is a 53-year-old female seen and examined early in rounds

today with the staff in the floor, was still complaining of generalized

weakness and malaise, generalized jaundice, abdominal pain with nausea and

less oral intake on and off with recent change of bowel movement.



The entire chart is reviewed including but not limited to the most recent

lab and radiology study results, current and previous medication list and

today's blood glucose level is 161, but the patient still have low

hemoglobin and hematocrit with elevated total protein as well as AST and

alkaline phosphatase, but normal ALT with low albumin, raising the question

of possible, also alcohol-induced liver disease.



I ordered abdominal ultrasound which was done yesterday, official report is

seen with intraluminal biliary stent is in place, but dilated diffusely,

bile duct.  The possibility of obstructed biliary stent was raised inducing

_____ as well as elevated total bilirubin with renal stone on the right

side as per the report.



PHYSICAL EXAMINATION:

GENERAL:  A 53-year-old female.

VITAL SIGNS:  Afebrile with pulse of 60, respiratory rate 20-22, blood

pressure 132/78.

HEENT:  Showed pale dry mucous membrane.  Bilateral icteric sclerae.

LUNGS:  Few scattered crepitation.  Decreased air entry at bases.

HEART:  Positive S1 and S2.

ABDOMEN:  Soft with mild generalized tenderness.  No mass or organomegaly. 

No rebound tenderness or guarding.  Abdominal distention noticed.

EXTREMITIES:  With evidence of bilateral above-knee amputation.



No reported new neurological deficits, sensory or motor.



IMPRESSION:

1.  Hepatic sarcoidosis with abnormal liver function test.

2.  Abnormal ultrasound of the abdomen with possible occlusion of the

biliary stent, need to be changed most likely.

3.  Multiple past medical history as mentioned before including but not

limited to diabetes mellitus, cholelithiasis with status post

cholecystectomy, endoscopic retrograde cholangiopancreatography with

biliary stent insertion, hypertension, cardiac arrhythmias, intra-abdominal

intrapelvic abscess formation for which partial colon resection was

performed before.

4.  Known history of peripheral vascular disease with status post bilateral

above-knee amputation.

5.  Reported history of deep venous thrombosis status post appendectomy.

6.  Coronary artery disease status post cardiac stent insertion.

7.  History of congestive heart failure.

8.  Recurrent urinary tract infection.



SUGGESTIONS:

1.  Agree with your plan.

2.  Subsequent decrease of steroids dose.

3.  The patient to be scheduled for ERCP with biliary stent change.

4.  Biliary surgical consultation.  The patient was sent for full

evaluation for possible liver transplant, so far no progress on this issue

despite my calls to the [facility] team that to be discussed with

the admitting medical team as well as Dr. Dunbar for further evaluation

and recommendation.







__________________________________________

Jeffrey Albert MD





DD:  03/16/2019 7:08:57

DT:  03/16/2019 7:14:22

Job # [id number]

## 2019-03-17 LAB
ALBUMIN SERPL-MCNC: 3.2 G/DL (ref 3.5–5)
ALBUMIN/GLOB SERPL: 0.8 {RATIO} (ref 1–2.1)
ALT SERPL-CCNC: 46 U/L (ref 9–52)
AST SERPL-CCNC: 111 U/L (ref 14–36)
BUN SERPL-MCNC: 32 MG/DL (ref 7–17)
CALCIUM SERPL-MCNC: 8.5 MG/DL (ref 8.6–10.4)
GFR NON-AFRICAN AMERICAN: > 60

## 2019-03-17 RX ADMIN — DIPHENHYDRAMINE HYDROCHLORIDE PRN MG: 50 INJECTION INTRAMUSCULAR; INTRAVENOUS at 17:57

## 2019-03-17 RX ADMIN — HUMAN INSULIN SCH UNITS: 100 INJECTION, SOLUTION SUBCUTANEOUS at 17:55

## 2019-03-17 RX ADMIN — METHYLPREDNISOLONE SODIUM SUCCINATE SCH MG: 40 INJECTION, POWDER, FOR SOLUTION INTRAMUSCULAR; INTRAVENOUS at 21:08

## 2019-03-17 RX ADMIN — HUMAN INSULIN SCH UNITS: 100 INJECTION, SOLUTION SUBCUTANEOUS at 12:28

## 2019-03-17 RX ADMIN — METHYLPREDNISOLONE SODIUM SUCCINATE SCH MG: 40 INJECTION, POWDER, FOR SOLUTION INTRAMUSCULAR; INTRAVENOUS at 09:57

## 2019-03-17 RX ADMIN — HUMAN INSULIN SCH: 100 INJECTION, SOLUTION SUBCUTANEOUS at 21:44

## 2019-03-17 RX ADMIN — METOPROLOL SUCCINATE SCH MG: 50 TABLET, EXTENDED RELEASE ORAL at 09:57

## 2019-03-17 RX ADMIN — DIGOXIN SCH MG: 0.25 TABLET ORAL at 17:54

## 2019-03-17 RX ADMIN — DIPHENHYDRAMINE HYDROCHLORIDE PRN MG: 50 INJECTION INTRAMUSCULAR; INTRAVENOUS at 11:01

## 2019-03-17 RX ADMIN — DIPHENHYDRAMINE HYDROCHLORIDE PRN MG: 50 INJECTION INTRAMUSCULAR; INTRAVENOUS at 03:45

## 2019-03-17 RX ADMIN — HUMAN INSULIN SCH UNITS: 100 INJECTION, SOLUTION SUBCUTANEOUS at 09:56

## 2019-03-17 NOTE — PN
DATE:  03/17/2019



LOCATION:  554.



SUBJECTIVE:  This is a 53-year-old female seen and examined early this

morning in rounds with recurrent episode of abdominal pain, mainly in the

right upper quadrant and midepigastric area associated with nausea,

dyspepsia, less oral intake with generalized jaundice.



The entire chart is reviewed including but not limited to the most recent

lab and radiology study results, current and the previous medication list,

current and the previous medical events.



The patient denied any actual chest pain or palpitation.  The most recent

lab results showed elevated PT and PTT with blood glucose level today of

238.  Rest of the lab results still pending.



PHYSICAL EXAMINATION:

GENERAL:  A 53-year-old female, awake, alert, oriented with complaint of

abdominal pain with loose bowel movement and generalized jaundice.

VITAL SIGNS:  Afebrile with pulse of 78, respiratory rate 20 to 22, blood

pressure of 150/82.

HEENT:  Pale dry mucous membrane.  Bilateral icteric sclerae.

LUNGS:  Few scattered crepitation.  Decreased air entry at bases.

HEART:  Positive S1 and S2.

ABDOMEN:  Soft.  Bowel sounds are present with mild to moderate abdominal

distention.  No mass or organomegaly.  No rebound tenderness or guarding.

EXTREMITIES:  With evidence of bilateral above-knee amputation.  No edema

or clubbing or cyanosis.

NEUROLOGIC:  No reported new neurological deficits, sensory or motor.



IMPRESSION:

1.  Jaundice with abnormal liver function test.

2.  Hyperbilirubinemia with abnormal ultrasound.  The possibility of

obstructed biliary stent was raised due to the excessive dilation of the

common bile duct in the ultrasound, the patient will need ERCP with biliary

stent exchange.

3.  Recurrent episode of urinary tract infection, on antibiotics.

4.  Anemia secondary to above.

5.  Poorly controlled diabetes mellitus.

6.  Peripheral vascular disease with bilateral above-knee amputation.

7.  Multiple past medical history including but not limited to deep venous

thrombosis, status post appendectomy, poorly controlled diabetes mellitus,

hypertension, congestive heart failure by history, cholelithiasis with

status post cholecystectomy, severe papillary spasm with status post ERCP

and biliary stent insertion.

8.  Reported history of intra-abdominal and intrapelvic abscess formation

with partial colon resection.

9  Re-exacerbation of peptic ulcer disease.

10.  Hepatic sarcoidosis with abnormal liver function test by history.



SUGGESTIONS:

1.  Agree with your plan.

2.  Due to the elevated PT and PTT, vitamin K to be given.

3.  Scheduled the patient for ERCP with change of biliary stent at a.m. due

to recurrent abdominal pain and abnormal ultrasound of the abdomen with

dilated common bile duct.

4.  Subsequent decrease of steroid dose.



Further recommendation to follow.







__________________________________________

Jeffrey Albert MD





DD:  03/17/2019 7:15:42

DT:  03/17/2019 7:19:05

Job # [id number]

## 2019-03-17 NOTE — PN
DATE:  03/16/2019



SUBJECTIVE:  The patient is afebrile.  Decreased nausea and vomiting. 

Tolerating diet.  Blood cultures and urine cultures are negative.  Her

total bilirubin is persistent.  She does need a liver transplant, for that

she is waiting for.



PHYSICAL EXAMINATION:

VITAL SIGNS:  Blood pressure 120/80, pulse 60, respiratory rate 16, and

temperature 98.2.

LUNGS:  Bilaterally clear.  No rales.  No rhonchi.

CARDIOVASCULAR SYSTEM:  S1, S2 plus S3 positive.

ABDOMEN:  Soft and nontender.  Bowel sounds are positive.  Charles with dark

yellow urine.

CENTRAL NERVOUS SYSTEM:  Awake, alert, and oriented x3.



ASSESSMENT:

1.  Hepatic sarcoidosis with end-stage liver disease.  The patient needs

liver transplant and the patient is pursuing that.  She is being followed

up by [facility], waiting for her

match.

2.  Type 2 diabetes.

3.  Congestive heart failure with coronary artery disease.

4.  Neurogenic bladder with Charles and antibiotics, Solu-Medrol, taper

steroids, medical management.







__________________________________________

Nasir Dunbar MD





DD:  03/16/2019 22:12:13

DT:  03/16/2019 23:25:10

Job # [id number]

## 2019-03-17 NOTE — CP.PCM.PN
Subjective





- Date & Time of Evaluation


Date of Evaluation: 03/17/19


Time of Evaluation: 17:00





- Subjective


Subjective: 





dictated   





Objective





- Vital Signs/Intake and Output


Vital Signs (last 24 hours): 


                                        











Temp Pulse Resp BP Pulse Ox


 


 97.3 F L  58 L  22   172/81 H  100 


 


 03/17/19 17:00  03/17/19 17:00  03/17/19 17:00  03/17/19 17:00  03/17/19 17:00








Intake and Output: 


                                        











 03/17/19 03/18/19





 18:59 06:59


 


Intake Total 100 300


 


Output Total 900 700


 


Balance -800 -400














- Medications


Medications: 


                               Current Medications





Apixaban (Eliquis)  5 mg PO BID [facility]


   Last Admin: 03/17/19 17:57 Dose:  5 mg


Digoxin (Lanoxin)  0.25 mg PO DAILY@1800 [facility]


   Last Admin: 03/17/19 17:54 Dose:  0.25 mg


Diphenhydramine HCl (Benadryl)  25 mg IVP Q6 PRN


   PRN Reason: Itching / Pruritus


   Last Admin: 03/17/19 17:57 Dose:  25 mg


Diphenoxylate HCl/Atropine (Lomotil 0.025-2.5 Mg Tablet)  1 tab PO Q8 PRN


   PRN Reason: Diarrhea


Gabapentin (Neurontin)  100 mg PO BID [facility]


   Last Admin: 03/17/19 17:55 Dose:  100 mg


Ceftriaxone Sodium 1 gm/ (Sodium Chloride)  100 mls @ 100 mls/hr IVPB DAILY [facility];

Protocol


   Last Admin: 03/17/19 09:57 Dose:  100 mls/hr


Insulin Human Regular (Novolin R)  0 unit SC ACHS [facility]; Protocol


   Last Admin: 03/17/19 21:44 Dose:  Not Given


Methylprednisolone (Solu-Medrol)  40 mg IV Q12 [facility]


   Last Admin: 03/17/19 21:08 Dose:  40 mg


Metoclopramide HCl (Reglan)  5 mg IVP Q6H [facility]


   Last Admin: 03/17/19 20:09 Dose:  5 mg


Metoprolol Succinate (Toprol Xl)  50 mg PO DAILY [facility]


   Last Admin: 03/17/19 09:57 Dose:  50 mg


Morphine Sulfate (Morphine)  1 mg IVP Q3 PRN


   PRN Reason: Pain, moderate (4-7)


   Last Admin: 03/17/19 21:11 Dose:  1 mg


Ondansetron HCl (Zofran Inj)  4 mg IVP Q6 PRN


   PRN Reason: Nausea/Vomiting


   Last Admin: 03/16/19 20:09 Dose:  4 mg











- Labs


Labs: 


                                        





                                 03/15/19 07:34 





                                 03/17/19 09:32 





                                        











PT  14.9 SECONDS (9.7-12.2)  H  03/15/19  07:34    


 


INR  1.4   03/15/19  07:34    


 


APTT  46 SECONDS (21-34)  H  03/15/19  07:34

## 2019-03-18 LAB
ALBUMIN SERPL-MCNC: 3.2 G/DL (ref 3.5–5)
ALBUMIN/GLOB SERPL: 0.8 {RATIO} (ref 1–2.1)
ALT SERPL-CCNC: 105 U/L (ref 9–52)
AST SERPL-CCNC: 168 U/L (ref 14–36)
BACTERIA #/AREA URNS HPF: (no result) /[HPF]
BASOPHILS # BLD AUTO: 0 K/UL (ref 0–0.2)
BASOPHILS NFR BLD: 0.1 % (ref 0–2)
BILIRUB UR-MCNC: (no result) MG/DL
BUN SERPL-MCNC: 27 MG/DL (ref 7–17)
CALCIUM SERPL-MCNC: 8.7 MG/DL (ref 8.6–10.4)
COLOR UR: YELLOW
EOSINOPHIL # BLD AUTO: 0 K/UL (ref 0–0.7)
EOSINOPHIL NFR BLD: 0 % (ref 0–4)
ERYTHROCYTE [DISTWIDTH] IN BLOOD BY AUTOMATED COUNT: 20.1 % (ref 11.5–14.5)
GFR NON-AFRICAN AMERICAN: > 60
GLUCOSE UR STRIP-MCNC: (no result) MG/DL
HGB BLD-MCNC: 12 G/DL (ref 11–16)
LEUKOCYTE ESTERASE UR-ACNC: (no result) LEU/UL
LYMPHOCYTES # BLD AUTO: 0.5 K/UL (ref 1–4.3)
LYMPHOCYTES NFR BLD AUTO: 10.9 % (ref 20–40)
MCH RBC QN AUTO: 33.1 PG (ref 27–31)
MCHC RBC AUTO-ENTMCNC: 35.1 G/DL (ref 33–37)
MCV RBC AUTO: 94.4 FL (ref 81–99)
MONOCYTES # BLD: 0.2 K/UL (ref 0–0.8)
MONOCYTES NFR BLD: 4.1 % (ref 0–10)
NEUTROPHILS # BLD: 3.7 K/UL (ref 1.8–7)
NEUTROPHILS NFR BLD AUTO: 84.9 % (ref 50–75)
NRBC BLD AUTO-RTO: 0.1 % (ref 0–2)
PH UR STRIP: 5 [PH] (ref 5–8)
PLATELET # BLD: 169 K/UL (ref 130–400)
PMV BLD AUTO: 10.1 FL (ref 7.2–11.7)
PROT UR STRIP-MCNC: (no result) MG/DL
RBC # BLD AUTO: 3.63 MIL/UL (ref 3.8–5.2)
RBC # UR STRIP: NEGATIVE /UL
SP GR UR STRIP: 1.02 (ref 1–1.03)
SQUAMOUS EPITHIAL: < 1 /HPF (ref 0–5)
UROBILINOGEN UR-MCNC: 2 MG/DL (ref 0.2–1)
WBC # BLD AUTO: 4.3 K/UL (ref 4.8–10.8)

## 2019-03-18 RX ADMIN — LINEZOLID SCH MLS/HR: 600 INJECTION, SOLUTION INTRAVENOUS at 19:30

## 2019-03-18 RX ADMIN — DIPHENHYDRAMINE HYDROCHLORIDE PRN MG: 50 INJECTION INTRAMUSCULAR; INTRAVENOUS at 16:52

## 2019-03-18 RX ADMIN — HUMAN INSULIN SCH: 100 INJECTION, SOLUTION SUBCUTANEOUS at 11:35

## 2019-03-18 RX ADMIN — METOPROLOL SUCCINATE SCH: 50 TABLET, EXTENDED RELEASE ORAL at 10:29

## 2019-03-18 RX ADMIN — DIPHENHYDRAMINE HYDROCHLORIDE PRN MG: 50 INJECTION INTRAMUSCULAR; INTRAVENOUS at 06:00

## 2019-03-18 RX ADMIN — DIPHENHYDRAMINE HYDROCHLORIDE PRN MG: 50 INJECTION INTRAMUSCULAR; INTRAVENOUS at 21:21

## 2019-03-18 RX ADMIN — DIPHENHYDRAMINE HYDROCHLORIDE PRN MG: 50 INJECTION INTRAMUSCULAR; INTRAVENOUS at 00:10

## 2019-03-18 RX ADMIN — DIGOXIN SCH MG: 0.25 TABLET ORAL at 18:17

## 2019-03-18 RX ADMIN — HUMAN INSULIN SCH: 100 INJECTION, SOLUTION SUBCUTANEOUS at 08:25

## 2019-03-18 RX ADMIN — HUMAN INSULIN SCH UNITS: 100 INJECTION, SOLUTION SUBCUTANEOUS at 18:18

## 2019-03-18 RX ADMIN — METHYLPREDNISOLONE SODIUM SUCCINATE SCH MG: 40 INJECTION, POWDER, FOR SOLUTION INTRAMUSCULAR; INTRAVENOUS at 09:24

## 2019-03-18 RX ADMIN — HUMAN INSULIN SCH UNITS: 100 INJECTION, SOLUTION SUBCUTANEOUS at 21:33

## 2019-03-18 NOTE — RAD
Date of service: 



03/18/2019



HISTORY:

 sob 



COMPARISON:

12/1/2018 



FINDINGS:



LUNGS:

No active pulmonary disease.



PLEURA:

No significant pleural effusion identified, no pneumothorax apparent.



CARDIOVASCULAR:

There is atherosclerotic calcification of the thoracic aorta.



Normal cardiac size. No congestive change.  AICD noted.  Central 

venous infusion port noted on the right side.



OSSEOUS STRUCTURES:

No significant abnormalities.



VISUALIZED UPPER ABDOMEN:

Normal.



OTHER FINDINGS:

None.



IMPRESSION:

No active disease.

## 2019-03-18 NOTE — CP.PCM.CON
History of Present Illness





- History of Present Illness


History of Present Illness: 


INFECTIOUS DISEASE CONSULT;


REASON FOR CONSULT UTI;


HPI;


53-year-old female with multiple medical problems including type 2 diabetes 

mellitus, peripheral arterial disease, CAD, CHF, sarcoidosis of the liver with 

history off cholangitis in the past and closely being followed by GI Dr. SILVERIO 

who knows her well.  Patient was admitted because of abdominal pains which began

1 week ago.


Patient also complains of nausea and vomiting.


She has history off stents placed in the past.


Patient also has history off neurogenic bladder with chronic Gutierrez in place and 

recurrent UTIs.


Patient is markedly jaundiced with total bilirubin of 11.8 today, AST of 168, 

ALT of 105, alkaline phosphatase of 303.and previous history off biliary stents.







PATIENT'S ABDOMINAL ULTRASOUND-intraluminal stent with diffuse dilatation  of 

common bile duct. s/p cholecystectomy.


7 mm stone in upper pole of right kidney, 6 mm stone in left pole of left kidney

nonobstructive.





Infectious disease consultation requested by PMD because of  urine culture 

positive for VRE and Enterobacter cloaca..


As noted patient also went for ERCP today and received IV ceftriaxone.








PMH: Anemia, Asthma (NO INHALER-ON PREDNISONE DAILY PO.), CAD (stent x 1), 

Cardia Arrhythmia, CHF, Diabetes, Deep Vein Thrombosis, Gall Bladder Disease, 

HTN, Hypercholesterolemia.


   Denies: Chronic Kidney Disease


Surgical History: Appendectomy, Cholecystectomy, Coronary Stent, Endoscopy, C-

Section, b/l AKA


ALLERGIES; AVOCADO, BANANA , ANGEL, KETOROLAC.





Family History: States: Unknown Family Hx





- Social History


Hx Tobacco Use: No


Hx Alcohol Use: Yes (years ago)


Hx Substance Use: No





- Immunization History


Hx Tetanus Toxoid Vaccination: Yes


Hx Influenza Vaccination: Yes


Hx Pneumococcal Vaccination: Yes (2015)








Review of Systems





- Constitutional


Constitutional: absent: Fever





- EENT


Nose/Mouth/Throat: absent: Dysphagia, Mouth Lesions





- Cardiovascular


Cardiovascular: absent: Chest Pain





- Respiratory


Respiratory: absent: Cough, Hemoptysis





- Gastrointestinal


Gastrointestinal: Abdominal Pain, Nausea, Vomiting





- Genitourinary


Genitourinary: Difficulty Urinating, Freq UTI





- Neurological


Neurological: absent: Headaches





- Hematologic/Lymphatic


Hematologic: As Per HPI.  absent: Easy Bleeding, Easy Bruising, Lymphadenopathy





Past Patient History





- Infectious Disease


Hx of Infectious Diseases: None





- Tetanus Immunizations


Tetanus Immunization: Unknown





- Past Medical History & Family History


Past Medical History?: Yes





- Past Social History


Smoking Status: Never Smoked





- CARDIAC


Hx Cardiac Disorders: Yes (x 1 stent; arrhythmia)


Hx Congestive Heart Failure: Yes


Hx Hypercholesterolemia: Yes


Hx Hypertension: Yes





- PULMONARY


Hx Chronic Obstructive Pulmonary Disease (COPD): Yes (asthma)





- NEUROLOGICAL


Hx Neurological Disorder: No





- HEENT


Hx HEENT Problems: Yes


Hx Cataracts: Yes (BILAT.)





- RENAL


Hx Chronic Kidney Disease: No





- ENDOCRINE/METABOLIC


Hx Diabetes Mellitus Type 1: Yes





- HEMATOLOGICAL/ONCOLOGICAL


Hx Blood Disorders: Yes


Hx Anemia: Yes





- INTEGUMENTARY


Hx Dermatological Problems: No





- MUSCULOSKELETAL/RHEUMATOLOGICAL


Hx Musculoskeletal Disorders: Yes


Hx Falls: Yes





- GASTROINTESTINAL


Hx Gastrointestinal Disorders: Yes


Hx Gall Bladder Disease: Yes





- GENITOURINARY/GYNECOLOGICAL


Hx Genitourinary Disorders: Yes


Hx Urinary Tract Infection: Yes


Other/Comment: PT HAS LONG TERM GUTIERREZ





- PSYCHIATRIC


Hx Substance Use: No





- SURGICAL HISTORY


Hx Surgeries: Yes


Hx Appendectomy: Yes


Hx Cholecystectomy: Yes


Hx Coronary Stent: Yes





- ANESTHESIA


Hx Anesthesia: Yes


Hx Anesthesia Reactions: No


Hx Malignant Hyperthermia: No





Meds


Allergies/Adverse Reactions: 


                                    Allergies











Allergy/AdvReac Type Severity Reaction Status Date / Time


 


avocado Allergy Severe ANAPHYLAXIS Verified 03/14/19 18:57


 


banana Allergy Severe ANAPHYLAXIS Verified 03/14/19 18:57


 


cherry Allergy Severe ANAPHYLAXIS Verified 03/14/19 18:57


 


ketorolac [From Toradol] Allergy   Verified 03/14/19 18:57














- Medications


Medications: 


                               Current Medications





Apixaban (Eliquis)  5 mg PO BID On license of [facility]


   Last Admin: 03/18/19 10:29 Dose:  Not Given


Digoxin (Lanoxin)  0.25 mg PO DAILY@1800 On license of [facility]


   Last Admin: 03/17/19 17:54 Dose:  0.25 mg


Diphenhydramine HCl (Benadryl)  25 mg IVP Q6 PRN


   PRN Reason: Itching / Pruritus


   Last Admin: 03/18/19 06:00 Dose:  25 mg


Diphenoxylate HCl/Atropine (Lomotil 0.025-2.5 Mg Tablet)  1 tab PO Q8 PRN


   PRN Reason: Diarrhea


Gabapentin (Neurontin)  100 mg PO BID On license of [facility]


   Last Admin: 03/18/19 10:29 Dose:  Not Given


Insulin Human Regular (Novolin R)  0 unit SC [facility]; Protocol


   Last Admin: 03/18/19 11:35 Dose:  Not Given


Methylprednisolone (Solu-Medrol)  40 mg IV Q12 On license of [facility]


   Last Admin: 03/18/19 09:24 Dose:  40 mg


Metoclopramide HCl (Reglan)  5 mg IVP Q6H On license of [facility]


   Last Admin: 03/18/19 14:06 Dose:  Not Given


Metoprolol Succinate (Toprol Xl)  50 mg PO DAILY On license of [facility]


   Last Admin: 03/18/19 10:29 Dose:  Not Given


Morphine Sulfate (Morphine)  1 mg IVP Q3 PRN


   PRN Reason: Pain, moderate (4-7)


   Last Admin: 03/18/19 09:25 Dose:  1 mg


Ondansetron HCl (Zofran Inj)  4 mg IVP Q6 PRN


   PRN Reason: Nausea/Vomiting


   Last Admin: 03/16/19 20:09 Dose:  4 mg











Physical Exam





- Constitutional


Appears: No Acute Distress





- Head Exam


Head Exam: NORMAL INSPECTION





- Eye Exam


Eye Exam: EOMI, PERRL, Scleral icterus





- ENT Exam


ENT Exam: Normal Oropharynx





- Neck Exam


Neck exam: Positive for: Normal Inspection





- Respiratory Exam


Respiratory Exam: Decreased Breath Sounds, NORMAL BREATHING PATTERN





- Cardiovascular Exam


Cardiovascular Exam: REGULAR RHYTHM, +S1, +S2





- GI/Abdominal Exam


GI & Abdominal Exam: Hypoactive Bowel Sounds, Soft, Tenderness (EPIGASTRIC AND 

RIGHT UPPER QUADRANT)





- Extremities Exam


Additional comments: 





B/L AKA





- Neurological Exam


Neurological exam: Alert, CN II-XII Intact





- Psychiatric Exam


Psychiatric exam: Normal Mood





- Skin


Skin Exam: Normal Color, Warm





Results





- Vital Signs


Recent Vital Signs: 


                                Last Vital Signs











Temp  97.4 F L  03/18/19 12:40


 


Pulse  98 H  03/18/19 13:25


 


Resp  13   03/18/19 13:25


 


BP  156/90 H  03/18/19 13:25


 


Pulse Ox  100   03/18/19 13:25














- Labs


Result Diagrams: 


                                 03/18/19 14:18





                                 03/18/19 14:18


Labs: 


                         Laboratory Results - last 24 hr











  03/17/19 03/17/19 03/18/19





  17:23 20:59 06:31


 


WBC   


 


RBC   


 


Hgb   


 


Hct   


 


MCV   


 


MCH   


 


MCHC   


 


RDW   


 


Plt Count   


 


MPV   


 


Neut % (Auto)   


 


Lymph % (Auto)   


 


Mono % (Auto)   


 


Eos % (Auto)   


 


Baso % (Auto)   


 


Neut # (Auto)   


 


Lymph # (Auto)   


 


Mono # (Auto)   


 


Eos # (Auto)   


 


Baso # (Auto)   


 


Sodium   


 


Potassium   


 


Chloride   


 


Carbon Dioxide   


 


Anion Gap   


 


BUN   


 


Creatinine   


 


Est GFR ( Amer)   


 


Est GFR (Non-Af Amer)   


 


POC Glucose (mg/dL)  186 H  155 H  189 H


 


Random Glucose   


 


Calcium   


 


Total Bilirubin   


 


AST   


 


ALT   


 


Alkaline Phosphatase   


 


Total Protein   


 


Albumin   


 


Globulin   


 


Albumin/Globulin Ratio   


 


Urine HCG, Qual   














  03/18/19 03/18/19 03/18/19





  07:16 11:11 14:18


 


WBC    4.3 L


 


RBC    3.63 L


 


Hgb    12.0


 


Hct    34.3


 


MCV    94.4


 


MCH    33.1 H


 


MCHC    35.1


 


RDW    20.1 H


 


Plt Count    169


 


MPV    10.1


 


Neut % (Auto)    84.9 H


 


Lymph % (Auto)    10.9 L


 


Mono % (Auto)    4.1


 


Eos % (Auto)    0.0


 


Baso % (Auto)    0.1


 


Neut # (Auto)    3.7


 


Lymph # (Auto)    0.5 L


 


Mono # (Auto)    0.2


 


Eos # (Auto)    0.0


 


Baso # (Auto)    0.0


 


Sodium   


 


Potassium   


 


Chloride   


 


Carbon Dioxide   


 


Anion Gap   


 


BUN   


 


Creatinine   


 


Est GFR ( Amer)   


 


Est GFR (Non-Af Amer)   


 


POC Glucose (mg/dL)   129 H 


 


Random Glucose   


 


Calcium   


 


Total Bilirubin   


 


AST   


 


ALT   


 


Alkaline Phosphatase   


 


Total Protein   


 


Albumin   


 


Globulin   


 


Albumin/Globulin Ratio   


 


Urine HCG, Qual  Negative  














  03/18/19





  14:18


 


WBC 


 


RBC 


 


Hgb 


 


Hct 


 


MCV 


 


MCH 


 


MCHC 


 


RDW 


 


Plt Count 


 


MPV 


 


Neut % (Auto) 


 


Lymph % (Auto) 


 


Mono % (Auto) 


 


Eos % (Auto) 


 


Baso % (Auto) 


 


Neut # (Auto) 


 


Lymph # (Auto) 


 


Mono # (Auto) 


 


Eos # (Auto) 


 


Baso # (Auto) 


 


Sodium  137


 


Potassium  4.0


 


Chloride  113 H


 


Carbon Dioxide  13 L


 


Anion Gap  15


 


BUN  27 H


 


Creatinine  0.6 L


 


Est GFR ( Amer)  > 60


 


Est GFR (Non-Af Amer)  > 60


 


POC Glucose (mg/dL) 


 


Random Glucose  170 H D


 


Calcium  8.7


 


Total Bilirubin  11.8 H


 


AST  168 H D


 


ALT  105 H D


 


Alkaline Phosphatase  303 H


 


Total Protein  7.4


 


Albumin  3.2 L


 


Globulin  4.2 H


 


Albumin/Globulin Ratio  0.8 L


 


Urine HCG, Qual 














- Imaging and Cardiology


  ** Chest x-ray


Status: Report reviewed by me (NAD)





Assessment & Plan


(1) Abdominal pain


Status: Acute   





(2) Jaundice


Status: Acute   





(3) Sarcoidosis


Status: Chronic   Priority: Medium   





(4) Complicated urinary tract infection


Status: Acute   Priority: High   





(5) Diabetes


Status: Acute   





(6) S/P AKA (above knee amputation) bilateral


Status: Acute   





(7) Neurogenic bladder


Status: Chronic   





- Assessment and Plan (Free Text)


Plan: 





PLAN;


PANCULTURES.





REPEAT UA/ URINE CULTURES


D/C ROCEPHIN





START iv ZYVOX 600 MG iv PIGGYBACK EVERY 12 HOURLY. 3/18/19


( CONSIDER STOPPING rEGLAN AS IT HAS DRUG DRUG INTERACTIONS WITH zYVOX )





ADD IV GENTAMICIN 120MG IVPB X1 DOSE  3/18/19





CONSIDER STOPPING HEPATOTOXIC DRUGS.





F/U LFTS CLOSELY.


F/U CBC W/DIFF cLOSELY AS zYVOX CAUSES BONE MARROW DEPRESSION.





GI ON BOARD.





 EVAL WITH CHANGING Gutierrez CATHETER IF IT HAS NOT BEEN DONE RECENTLY.





CASE DISCUSSED WITH THE STAFF.

## 2019-03-18 NOTE — RAD
Date of service: 



03/18/2019



PROCEDURE:  Intraoperative Fluoroscopy. 



HISTORY:

ABD PAIN, ABNORMAL ULTRASOUND



FINDINGS:

Fluoroscopic assistance was provided for ERCP.  Please refer to the 

operative report from [facility]. 



 Total fluoroscopic time (continuous mode) utilized during the 

procedure 121.2 seconds. 



Dose report: DLP [phone] (mGy/m2)

## 2019-03-18 NOTE — PN
DATE:  03/17/2019



SUBJECTIVE:  The patient is feeling better.  Her bilirubin has gone down a

little bit more.  She has no nausea, vomiting.  No abdominal pain.  No

dysuria.



PHYSICAL EXAMINATION:

VITAL SIGNS:  Blood pressure 172/81, pulse _____, respiratory rate 22,

temperature 97.3.

LUNGS:  Bilaterally clear.  No rales.  No rhonchi.

CARDIOVASCULAR SYSTEM:  S1, S2 plus S3 positive.

ABDOMEN:  Soft, nontender.  Charles catheter with dark yellow urine.



ASSESSMENT:

1.  Hepatic sarcoidosis with worsening liver function, worsening total

bilirubin.

2.  Type 2 diabetes.

3.  Hypertension.

4.  Congestive heart failure with coronary artery disease status post

bilateral above-knee amputation.



PLAN:  Continue steroids.  Continue IV fluids.  Monitor the patient.







__________________________________________

Nasir Dunbar MD





DD:  03/17/2019 23:36:36

DT:  03/18/2019 1:13:56

Job # [id number]

## 2019-03-18 NOTE — CP.PCM.PCO
Physician Communication Note





- Physician Communication Note


Physician Communication Note: medically stable for OR average risk

## 2019-03-18 NOTE — CP.PCM.PN
Subjective





- Date & Time of Evaluation


Date of Evaluation: 03/18/19


Time of Evaluation: 07:40





- Subjective


Subjective: 





dictated   





Objective





- Vital Signs/Intake and Output


Vital Signs (last 24 hours): 


                                        











Temp Pulse Resp BP Pulse Ox


 


 97.3 F L  98 H  20   160/93 H  100 


 


 03/18/19 15:20  03/18/19 15:20  03/18/19 15:20  03/18/19 15:20  03/18/19 15:20








Intake and Output: 


                                        











 03/18/19 03/19/19





 18:59 06:59


 


Intake Total 100 


 


Output Total 800 


 


Balance -700 














- Medications


Medications: 


                               Current Medications





Apixaban (Eliquis)  5 mg PO BID [facility]


   Last Admin: 03/18/19 18:18 Dose:  5 mg


Digoxin (Lanoxin)  0.25 mg PO DAILY@1800 [facility]


   Last Admin: 03/18/19 18:17 Dose:  0.25 mg


Diphenhydramine HCl (Benadryl)  25 mg IVP Q6 PRN


   PRN Reason: Itching / Pruritus


   Last Admin: 03/18/19 16:52 Dose:  25 mg


Diphenoxylate HCl/Atropine (Lomotil 0.025-2.5 Mg Tablet)  1 tab PO Q8 PRN


   PRN Reason: Diarrhea


Gabapentin (Neurontin)  100 mg PO BID [facility]


   Last Admin: 03/18/19 10:29 Dose:  Not Given


Linezolid (Zyvox 600mg/300ml D5w)  600 mg in 300 mls @ 200 mls/hr IVPB Q12H [facility];

Protocol


   Last Admin: 03/18/19 19:30 Dose:  200 mls/hr


Insulin Human Regular (Novolin R)  0 unit SC ACHS [facility]; Protocol


   Last Admin: 03/18/19 18:18 Dose:  6 units


Morphine Sulfate (Morphine)  1 mg IVP Q3 PRN


   PRN Reason: Pain, moderate (4-7)


   Last Admin: 03/18/19 16:42 Dose:  1 mg


Ondansetron HCl (Zofran Inj)  8 mg IVP Q8 [facility]











- Labs


Labs: 


                                        





                                 03/18/19 14:18 





                                 03/18/19 14:18 





                                        











PT  14.9 SECONDS (9.7-12.2)  H  03/15/19  07:34    


 


INR  1.4   03/15/19  07:34    


 


APTT  46 SECONDS (21-34)  H  03/15/19  07:34

## 2019-03-19 RX ADMIN — HUMAN INSULIN SCH UNITS: 100 INJECTION, SOLUTION SUBCUTANEOUS at 08:02

## 2019-03-19 RX ADMIN — DIPHENHYDRAMINE HYDROCHLORIDE PRN MG: 50 INJECTION INTRAMUSCULAR; INTRAVENOUS at 08:01

## 2019-03-19 RX ADMIN — DIPHENHYDRAMINE HYDROCHLORIDE PRN MG: 50 INJECTION INTRAMUSCULAR; INTRAVENOUS at 22:15

## 2019-03-19 RX ADMIN — HUMAN INSULIN SCH: 100 INJECTION, SOLUTION SUBCUTANEOUS at 17:10

## 2019-03-19 RX ADMIN — LINEZOLID SCH MLS/HR: 600 INJECTION, SOLUTION INTRAVENOUS at 05:09

## 2019-03-19 RX ADMIN — HUMAN INSULIN SCH UNITS: 100 INJECTION, SOLUTION SUBCUTANEOUS at 22:25

## 2019-03-19 RX ADMIN — METHYLPREDNISOLONE SODIUM SUCCINATE SCH MG: 40 INJECTION, POWDER, FOR SOLUTION INTRAMUSCULAR; INTRAVENOUS at 09:03

## 2019-03-19 RX ADMIN — DIGOXIN SCH: 0.25 TABLET ORAL at 18:10

## 2019-03-19 RX ADMIN — LINEZOLID SCH MLS/HR: 600 INJECTION, SOLUTION INTRAVENOUS at 18:14

## 2019-03-19 RX ADMIN — HUMAN INSULIN SCH: 100 INJECTION, SOLUTION SUBCUTANEOUS at 13:27

## 2019-03-19 RX ADMIN — METOPROLOL SUCCINATE SCH MG: 100 TABLET, EXTENDED RELEASE ORAL at 09:03

## 2019-03-19 RX ADMIN — DIPHENHYDRAMINE HYDROCHLORIDE PRN MG: 50 INJECTION INTRAMUSCULAR; INTRAVENOUS at 16:00

## 2019-03-19 NOTE — PN
DATE:  03/19/2019



LOCATION:  554.



SUBJECTIVE:  This 53-year-old female with status post ERCP and biliary

stent exchange yesterday, appeared to be awake, alert, oriented with less

abdominal pain and less generalized jaundice.  No reported chest pain,

palpitation, or significant shortness of breath this morning.



The entire chart is reviewed including but not limited to the most recent

lab and radiology study results, current and the previous medication list,

current and the previous medical events.  Today's lab results showed blood

glucose level of 322.  Rest of the lab results still pending, and the

patient still has mild generalized jaundice.



PHYSICAL EXAMINATION:

GENERAL:  A 53-year-old female; denied any actual chest pain or palpitation

with less abdominal pain.

VITAL SIGNS:  Afebrile with pulse of 58, respiratory rate 20 to 22, blood

pressure 130/72.

HEENT:  Showed pale dry oral mucoid membrane with bilateral icteric

sclerae.

LUNGS:  Few scattered crepitation.  Decreased air entry at bases.

HEART:  Positive S1 and S2.

ABDOMEN:  Soft with mild generalized tenderness, mainly in the

midepigastric and right upper quadrant area.  No mass or organomegaly.  No

rebound tenderness or guarding.

EXTREMITIES:  With evidence of bilateral above-knee amputation.  No

clubbing, cyanosis.

NEUROLOGIC:  No reported new neurological deficits, sensory or motor.



IMPRESSION:

1.  Liver sarcoidosis with abnormal liver function tests.  Abnormal

ultrasound with abnormal liver function tests, with status post biliary

stent exchange, removal and insertion of anyone by endoscopic retrograde

cholangiopancreatography done yesterday.

2.  Multiple past medical history including but not limited to

hypertension, diabetes mellitus, cardiac arrhythmias, status post pacemaker

insertion, status post cholecystectomy.

3.  Peripheral vascular disease with status post bilateral above-knee

amputation.

4.  Known history of congestive heart failure.

5.  Status post partial colon resection due to intraabdominal and

intrapelvic abscess formation.

6.  Re-exacerbation of peptic ulcer disease.



SUGGESTIONS:

1.  Continue current management.

2.  Advance diet.

3.  Test as per ID consultant.





__________________________________________

Jeffrey Albert MD





DD:  03/19/2019 7:20:55

DT:  03/19/2019 7:23:16

Job # [id number]

## 2019-03-19 NOTE — CP.PCM.PN
Subjective





- Date & Time of Evaluation


Date of Evaluation: 03/19/19


Time of Evaluation: 23:40





- Subjective


Subjective: 





AFEBRILE,


C/O EPIGASTRIC AND RUQ PAIN.


ICTERIC





S/P ERCP 3/18/19 AND NEW STENT REPLACEMENT AS PER GI





Objective





- Vital Signs/Intake and Output


Vital Signs (last 24 hours): 


                                        











Temp Pulse Resp BP Pulse Ox


 


 97.3 F L  52 L  20   145/69   98 


 


 03/19/19 15:40  03/19/19 15:40  03/19/19 15:40  03/19/19 15:40  03/19/19 15:40








Intake and Output: 


                                        











 03/19/19 03/20/19





 18:59 06:59


 


Intake Total 680 1050


 


Output Total  900


 


Balance 680 150














- Medications


Medications: 


                               Current Medications





Apixaban (Eliquis)  [facility]ID [facility]


   Last Admin: 03/19/19 18:09 Dose:  5 mg


Digoxin (Lanoxin)  0.25 mg PO DAILY@1800 [facility]


   Last Admin: 03/19/19 18:10 Dose:  Not Given


Diphenhydramine HCl (Benadryl)  25 mg IVP Q6 PRN


   PRN Reason: Itching / Pruritus


   Last Admin: 03/19/19 22:15 Dose:  25 mg


Diphenoxylate HCl/Atropine (Lomotil 0.025-2.5 Mg Tablet)  1 tab PO Q8 PRN


   PRN Reason: Diarrhea


Gabapentin (Neurontin)  100 mg PO BID [facility]


   Last Admin: 03/19/19 18:06 Dose:  100 mg


Linezolid (Zyvox 600mg/300ml D5w)  600 mg in 300 mls @ 200 mls/hr IVPB [facility]12H [facility];

Protocol


   Last Admin: 03/19/19 18:14 Dose:  200 mls/hr


Insulin Human Regular (Novolin R)  0 unit SC [facility]; Protocol


   Last Admin: 03/19/19 22:25 Dose:  2 units


Methylprednisolone (Solu-Medrol)  40 mg IV DAILY [facility]


   Last Admin: 03/19/19 09:03 Dose:  40 mg


Metoprolol Succinate (Toprol Xl)  100 mg PO DAILY [facility]


   Last Admin: 03/19/19 09:03 Dose:  Not Given


Morphine Sulfate (Morphine)  1 mg IVP Q3 PRN


   PRN Reason: Pain, moderate (4-7)


   Last Admin: 03/19/19 22:15 Dose:  1 mg


Ondansetron HCl (Zofran Inj)  8 mg IVP Q8 [facility]


   Last Admin: 03/19/19 22:07 Dose:  8 mg











- Labs


Labs: 


                                        





                                 03/18/19 14:18 





                                 03/18/19 14:18 





                                        











PT  14.9 SECONDS (9.7-12.2)  H  03/15/19  07:34    


 


INR  1.4   03/15/19  07:34    


 


APTT  46 SECONDS (21-34)  H  03/15/19  07:34    














- Constitutional


Appears: No Acute Distress





- Head Exam


Head Exam: NORMAL INSPECTION





- Eye Exam


Eye Exam: EOMI, Scleral icterus





- ENT Exam


ENT Exam: Normal Oropharynx





- Neck Exam


Neck Exam: Normal Inspection





- Respiratory Exam


Respiratory Exam: Clear to Ausculation Bilateral, NORMAL BREATHING PATTERN





- Cardiovascular Exam


Cardiovascular Exam: REGULAR RHYTHM, +S1, +S2





- GI/Abdominal Exam


GI & Abdominal Exam: Soft, Tenderness (EPIGASTRIUM/ RUQ ), Normal Bowel Sounds





- Extremities Exam


Additional comments: 





B/L AKA.





- Neurological Exam


Neurological Exam: Alert, Awake, CN II-XII Intact, Oriented x3





- Psychiatric Exam


Psychiatric exam: Normal Mood





- Skin


Skin Exam: Normal Color, Warm





Assessment and Plan


(1) Abdominal pain


Status: Acute   





(2) Jaundice


Status: Acute   





(3) Sarcoidosis


Status: Chronic   





(4) Complicated urinary tract infection


Status: Acute   





(5) Diabetes


Status: Acute   





(6) S/P AKA (above knee amputation) bilateral


Status: Acute   





(7) Neurogenic bladder


Status: Chronic   





- Assessment and Plan (Free Text)


Plan: 





CONTINUE  iv ZYVOX 600 MG iv PIGGYBACK EVERY 12 HOURLY. 3/18/19





GOT IV GENTAMICIN 120MG IVPB X1 DOSE  3/18/19


F/U CULTURES TO ADJUST ABX.





CONSIDER STOPPING HEPATOTOXIC DRUGS.





F/U LFTS CLOSELY.


F/U CBC W/DIFF CLOSELY AS zYVOX CAUSES BONE MARROW DEPRESSION.





GI ON BOARD.

## 2019-03-19 NOTE — PN
DATE:  03/18/2019



SUBJECTIVE:  The patient is status post ERCP.  She had a sent placed and

Dr. Olmedo is in the process of discussing with the staff of transplant

department.  The patient in the meantime is feeling better statu post ERCP.

She has deep jaundice.  She has dark yellow discoloration of urine.  She

denied any fever, chills.  Her urine culture is not positive for VRE.  The

patient is for ID consult.



PHYSICAL EXAMINATION:

VITAL SIGNS:  Blood pressure _____, pulse 98, respiratory rate 20,

temperature 97.3.

LUNGS:  Bilaterally clear.  No rales.  No rhonchi.

CARDIOVASCULAR SYSTEM:  PMI not localized.  S1, S2 plus S3 positive.

ABDOMEN:  Soft, nontender.  Bowel sounds are positive.



ASSESSMENT:

1.  Acute exacerbation, arthritis, sarcoidosis.

2.  Coronary artery disease, congestive heart failure.

3.  Type 2 diabetes.

4.  Hypertension.



PLAN:

1.  Increased metoprolol to 50 mg b.i.d. to decrease blood pressure and

decrease heart rate.  Also the patient is clear for ERCP.

2.  Also the patient is on antibiotics, steroids, we will start tapering

steroids.  In the meantime monitor the patient and ID followup.  The

patient has VRE, most likely she will need Zyvox.







__________________________________________

Nasir Dunbar MD





DD:  03/18/2019 21:28:12

DT:  03/19/2019 1:29:04

Job # [id number]

## 2019-03-20 LAB
ALBUMIN SERPL-MCNC: 2.7 G/DL (ref 3.5–5)
ALBUMIN/GLOB SERPL: 0.8 {RATIO} (ref 1–2.1)
ALT SERPL-CCNC: 152 U/L (ref 9–52)
AST SERPL-CCNC: 161 U/L (ref 14–36)
BASOPHILS # BLD AUTO: 0 K/UL (ref 0–0.2)
BASOPHILS NFR BLD: 0.1 % (ref 0–2)
BILIRUB DIRECT SERPL-MCNC: 8.9 MG/DL (ref 0–0.4)
BUN SERPL-MCNC: 35 MG/DL (ref 7–17)
CALCIUM SERPL-MCNC: 7.7 MG/DL (ref 8.6–10.4)
EOSINOPHIL # BLD AUTO: 0 K/UL (ref 0–0.7)
EOSINOPHIL NFR BLD: 0 % (ref 0–4)
ERYTHROCYTE [DISTWIDTH] IN BLOOD BY AUTOMATED COUNT: 19.9 % (ref 11.5–14.5)
GFR NON-AFRICAN AMERICAN: > 60
HGB BLD-MCNC: 10.7 G/DL (ref 11–16)
LYMPHOCYTES # BLD AUTO: 0.7 K/UL (ref 1–4.3)
LYMPHOCYTES NFR BLD AUTO: 10.7 % (ref 20–40)
MCH RBC QN AUTO: 32.2 PG (ref 27–31)
MCHC RBC AUTO-ENTMCNC: 34.3 G/DL (ref 33–37)
MCV RBC AUTO: 93.6 FL (ref 81–99)
MONOCYTES # BLD: 0.6 K/UL (ref 0–0.8)
MONOCYTES NFR BLD: 9.4 % (ref 0–10)
NEUTROPHILS # BLD: 5.3 K/UL (ref 1.8–7)
NEUTROPHILS NFR BLD AUTO: 79.8 % (ref 50–75)
NRBC BLD AUTO-RTO: 0.1 % (ref 0–2)
PLATELET # BLD: 153 K/UL (ref 130–400)
PMV BLD AUTO: 9.8 FL (ref 7.2–11.7)
RBC # BLD AUTO: 3.32 MIL/UL (ref 3.8–5.2)
WBC # BLD AUTO: 6.7 K/UL (ref 4.8–10.8)

## 2019-03-20 RX ADMIN — HUMAN INSULIN SCH UNITS: 100 INJECTION, SOLUTION SUBCUTANEOUS at 21:46

## 2019-03-20 RX ADMIN — HUMAN INSULIN SCH UNITS: 100 INJECTION, SOLUTION SUBCUTANEOUS at 08:35

## 2019-03-20 RX ADMIN — METHYLPREDNISOLONE SODIUM SUCCINATE SCH MG: 40 INJECTION, POWDER, FOR SOLUTION INTRAMUSCULAR; INTRAVENOUS at 10:37

## 2019-03-20 RX ADMIN — DIPHENHYDRAMINE HYDROCHLORIDE PRN MG: 50 INJECTION INTRAMUSCULAR; INTRAVENOUS at 13:48

## 2019-03-20 RX ADMIN — DIGOXIN SCH: 0.25 TABLET ORAL at 17:32

## 2019-03-20 RX ADMIN — DIPHENHYDRAMINE HYDROCHLORIDE PRN MG: 50 INJECTION INTRAMUSCULAR; INTRAVENOUS at 05:13

## 2019-03-20 RX ADMIN — HUMAN INSULIN SCH UNITS: 100 INJECTION, SOLUTION SUBCUTANEOUS at 17:28

## 2019-03-20 RX ADMIN — HUMAN INSULIN SCH UNITS: 100 INJECTION, SOLUTION SUBCUTANEOUS at 12:20

## 2019-03-20 RX ADMIN — METOPROLOL SUCCINATE SCH: 100 TABLET, EXTENDED RELEASE ORAL at 10:35

## 2019-03-20 RX ADMIN — LINEZOLID SCH MLS/HR: 600 INJECTION, SOLUTION INTRAVENOUS at 05:20

## 2019-03-20 RX ADMIN — DIPHENHYDRAMINE HYDROCHLORIDE PRN MG: 50 INJECTION INTRAMUSCULAR; INTRAVENOUS at 21:18

## 2019-03-20 NOTE — PN
DATE:  03/20/2019



LOCATION:  554.



SUBJECTIVE:  This is a 53-year-old female, post ERCP with biliary stent

exchange, seen and examined in rounds without significant clinical changes

but with intermittent period of abdominal pain, severe at times,

postprandial abdominal distention, but less bowel movement frequency.



No actual chest pain, palpitation or significant shortness of breath.



Most recent lab results today showed hemoglobin 10.7, hematocrit 31.1 with

normal platelet count with BUN 35, creatinine 0.6, blood glucose level 207,

calcium 7.7, total bilirubin went down to 10.3 with , ,

alkaline phosphatase 224, albumin 2.7.



PHYSICAL EXAMINATION:

GENERAL:  A 53-year-old female.

VITAL SIGNS:  Afebrile with pulse of 56, respiratory rate 20 to 22, blood

pressure 120/72.

HEENT:  Showed pale dry oral mucous membrane.  Bilateral icteric sclerae.

LUNGS:  Few scattered crepitation.  Decreased air entry at bases.

HEART:  Positive S1 and S2.

ABDOMEN:  Soft with mild-to-moderate generalized tenderness and distention.

No mass or organomegaly.  No rebound tenderness or guarding.

EXTREMITIES:  Evidence of bilateral above-knee amputation.

NEUROLOGIC:  No reported new neurological deficit, sensory or motor.



IMPRESSION:

1.  Liver sarcoidosis.

2.  Abnormal liver function test secondary to above as well as severe

biliary spasm.

3.  Status post endoscopic retrograde cholangiopancreatography with biliary

stent exchange.

4.  Multiple past medical history including diabetes mellitus, cardiac

arrhythmia, hypertension as well as peripheral vascular disease with

bilateral above-knee amputation.

5.  It has to be mentioned that there is no clear evidence in the meantime

that the patient may have ascending cholangitis.



SUGGESTIONS:

1.  Continue current management.

2.  Reduce the dose of the steroids as needed.

3.  Further recommendation to follow.







__________________________________________

Jeffrey Albert MD





DD:  03/20/2019 10:47:59

DT:  03/20/2019 10:49:59

Job # [id number]

## 2019-03-20 NOTE — CP.PCM.CON
<Roselia Rivera - Last Filed: 19 16:12>





History of Present Illness





- History of Present Illness


History of Present Illness: 





Consult Note for Dr. Murphy





HPI: Patient is a 53 year old female with history of hepatic sarcoidois, who 

initially presented for severe jaundice, dark urine, severe throbbing sharp 

right abdominal pain and bilious emesis prior to admission. Patient is status 

post ERCP with biliary stent exchange with Dr. Olmedo on 3/18/19. She currently 

complains of abdominal pain located in her right abdomen associated with nausea.

She was able to tolerate breakfast this morning without any episodes of emesis. 

She states that her stool has been normal, denies diarrhea, constipation. She 

states she has diffuse pruritis to her entire body. She states she has had 

recent subjective fever and chills at home. She denies chest pain, shortness of 

breath, palpitation. She has a neurogenic bladder and has had an indwelling 

catheter for the past year. 


She has been following up with [facility] for a liver transplant given her

hepatic sarcoidosis. Consult placed for hepatic sarcoidosis, and need for liver 

transplant. 








PMH: Type 2 DM, HTN, hx of CAD with 2 stents, severe HF with poor LVEF AICD, PAD


PSH: cholecystectomy, intraluminal biliary stent, C section x1, colon mass 

resection?, CAD with angioplasty x2, bilateral AKA, AICD 


Allergies: avocado, banana, cherry, ketorolac


Social hx: hx of tobacco use in the past - used to smoke 1 pack every 5 days, 

not smoking currently. No ETOH use. no history of drug use. Used to work as a 

teacher. 


Family hx: Mother - breast cancer. Father  of heart failure in his 60s.





Past Patient History





- Infectious Disease


Hx of Infectious Diseases: None





- Tetanus Immunizations


Tetanus Immunization: Unknown





- Past Medical History & Family History


Past Medical History?: Yes





- Past Social History


Smoking Status: Never Smoked





- CARDIAC


Hx Cardiac Disorders: Yes (x 1 stent; arrhythmia)


Hx Congestive Heart Failure: Yes


Hx Hypercholesterolemia: Yes


Hx Hypertension: Yes





- PULMONARY


Hx Chronic Obstructive Pulmonary Disease (COPD): Yes (asthma)





- NEUROLOGICAL


Hx Neurological Disorder: No





- HEENT


Hx HEENT Problems: Yes


Hx Cataracts: Yes (BILAT.)





- RENAL


Hx Chronic Kidney Disease: No





- ENDOCRINE/METABOLIC


Hx Diabetes Mellitus Type 1: Yes





- HEMATOLOGICAL/ONCOLOGICAL


Hx Blood Disorders: Yes


Hx Anemia: Yes





- INTEGUMENTARY


Hx Dermatological Problems: No





- MUSCULOSKELETAL/RHEUMATOLOGICAL


Hx Musculoskeletal Disorders: Yes


Hx Falls: Yes





- GASTROINTESTINAL


Hx Gastrointestinal Disorders: Yes


Hx Gall Bladder Disease: Yes





- GENITOURINARY/GYNECOLOGICAL


Hx Genitourinary Disorders: Yes


Hx Urinary Tract Infection: Yes


Other/Comment: PT HAS LONG TERM GUTIERREZ





- PSYCHIATRIC


Hx Substance Use: No





- SURGICAL HISTORY


Hx Surgeries: Yes


Hx Appendectomy: Yes


Hx Cholecystectomy: Yes


Hx Coronary Stent: Yes





- ANESTHESIA


Hx Anesthesia: Yes


Hx Anesthesia Reactions: No


Hx Malignant Hyperthermia: No





Meds


Allergies/Adverse Reactions: 


                                    Allergies











Allergy/AdvReac Type Severity Reaction Status Date / Time


 


avocado Allergy Severe ANAPHYLAXIS Verified 19 18:57


 


banana Allergy Severe ANAPHYLAXIS Verified 19 18:57


 


cherry Allergy Severe ANAPHYLAXIS Verified 19 18:57


 


ketorolac [From Toradol] Allergy   Verified 19 18:57














- Medications


Medications: 


                               Current Medications





Apixaban (Eliquis)  5 mg PO BID [facility]


   Last Admin: 19 10:32 Dose:  5 mg


Digoxin (Lanoxin)  0.25 mg PO DAILY@1800 [facility]


   Last Admin: 19 18:10 Dose:  Not Given


Diphenhydramine HCl (Benadryl)  25 mg IVP Q6 PRN


   PRN Reason: Itching / Pruritus


   Last Admin: 19 05:13 Dose:  25 mg


Diphenoxylate HCl/Atropine (Lomotil 0.025-2.5 Mg Tablet)  1 tab PO Q8 PRN


   PRN Reason: Diarrhea


Gabapentin (Neurontin)  100 mg PO BID [facility]


   Last Admin: 19 10:32 Dose:  100 mg


Insulin Human Regular (Novolin R)  0 unit SC [facility]; Protocol


   Last Admin: 19 08:35 Dose:  3 units


Methylprednisolone (Solu-Medrol)  40 mg IV DAILY [facility]


   Last Admin: 19 10:37 Dose:  40 mg


Metoprolol Succinate (Toprol Xl)  100 mg PO DAILY [facility]


   Last Admin: 19 10:35 Dose:  Not Given


Morphine Sulfate (Morphine)  1 mg IVP Q3 PRN


   PRN Reason: Pain, moderate (4-7)


   Last Admin: 19 10:34 Dose:  1 mg


Ondansetron HCl (Zofran Inj)  8 mg IVP Q8 [facility]


   Last Admin: 19 05:21 Dose:  8 mg











Physical Exam





- Head Exam


Head Exam: ATRAUMATIC, NORMOCEPHALIC





- Eye Exam


Eye Exam: EOMI, PERRL, Scleral icterus





- ENT Exam


ENT Exam: Mucous Membranes Moist





- Neck Exam


Neck exam: Positive for: Full Rom





- Respiratory Exam


Respiratory Exam: Clear to Auscultation Bilateral, NORMAL BREATHING PATTERN





- Cardiovascular Exam


Cardiovascular Exam: REGULAR RHYTHM, +S1, +S2


Additional comments: 





AICD in left upper chest





- GI/Abdominal Exam


GI & Abdominal Exam: Soft, Tenderness (right upper and lower quadrant, ).  

absent: Distended, Firm, Guarding, Hernia, Rebound, Rigid


Additional comments: 





scar in RUQ from cholecystectomy


midline vertical scar from ex-lap for colon mass 





-  Exam


Additional comments: 





Indwelling gutierrez draining yellow urine 





- Extremities Exam


Additional comments: 





Bilateral AKAs





- Back Exam


Back exam: absent: CVA tenderness (L), CVA tenderness (R)





- Neurological Exam


Neurological exam: Alert, Oriented x3





- Psychiatric Exam


Psychiatric exam: Normal Affect, Normal Mood





- Skin


Additional comments: 





Jaundice diffusely





Results





- Vital Signs


Recent Vital Signs: 


                                Last Vital Signs











Temp  98.2 F   19 07:05


 


Pulse  50 L  19 10:38


 


Resp  20   19 07:05


 


BP  112/75   19 10:38


 


Pulse Ox  100   19 07:05














- Labs


Result Diagrams: 


                                 19 07:35





                                 19 07:35


Labs: 


                         Laboratory Results - last 24 hr











  19





  16:33 21:02 06:34


 


WBC   


 


RBC   


 


Hgb   


 


Hct   


 


MCV   


 


MCH   


 


MCHC   


 


RDW   


 


Plt Count   


 


MPV   


 


Neut % (Auto)   


 


Lymph % (Auto)   


 


Mono % (Auto)   


 


Eos % (Auto)   


 


Baso % (Auto)   


 


Neut # (Auto)   


 


Lymph # (Auto)   


 


Mono # (Auto)   


 


Eos # (Auto)   


 


Baso # (Auto)   


 


Sodium   


 


Potassium   


 


Chloride   


 


Carbon Dioxide   


 


Anion Gap   


 


BUN   


 


Creatinine   


 


Est GFR ( Amer)   


 


Est GFR (Non-Af Amer)   


 


POC Glucose (mg/dL)  220 H  337 H  207 H


 


Random Glucose   


 


Calcium   


 


Total Bilirubin   


 


Direct Bilirubin   


 


AST   


 


ALT   


 


Alkaline Phosphatase   


 


Total Protein   


 


Albumin   


 


Globulin   


 


Albumin/Globulin Ratio   














  19





  07:35 07:35 10:24


 


WBC  6.7  D  


 


RBC  3.32 L  


 


Hgb  10.7 L  


 


Hct  31.1 L  


 


MCV  93.6  


 


MCH  32.2 H  


 


MCHC  34.3  


 


RDW  19.9 H  


 


Plt Count  153  


 


MPV  9.8  


 


Neut % (Auto)  79.8 H  


 


Lymph % (Auto)  10.7 L  


 


Mono % (Auto)  9.4  


 


Eos % (Auto)  0.0  


 


Baso % (Auto)  0.1  


 


Neut # (Auto)  5.3  


 


Lymph # (Auto)  0.7 L  


 


Mono # (Auto)  0.6  


 


Eos # (Auto)  0.0  


 


Baso # (Auto)  0.0  


 


Sodium   136 


 


Potassium   4.5 


 


Chloride   111 H 


 


Carbon Dioxide   18 L 


 


Anion Gap   12 


 


BUN   35 H 


 


Creatinine   0.6 L 


 


Est GFR ( Amer)   > 60 


 


Est GFR (Non-Af Amer)   > 60 


 


POC Glucose (mg/dL)    254 H


 


Random Glucose   213 H D 


 


Calcium   7.7 L 


 


Total Bilirubin   10.3 H 


 


Direct Bilirubin   8.9 H 


 


AST   161 H 


 


ALT   152 H D 


 


Alkaline Phosphatase   224 H D 


 


Total Protein   5.9 L 


 


Albumin   2.7 L 


 


Globulin   3.2 


 


Albumin/Globulin Ratio   0.8 L 














  19





  11:06


 


WBC 


 


RBC 


 


Hgb 


 


Hct 


 


MCV 


 


MCH 


 


MCHC 


 


RDW 


 


Plt Count 


 


MPV 


 


Neut % (Auto) 


 


Lymph % (Auto) 


 


Mono % (Auto) 


 


Eos % (Auto) 


 


Baso % (Auto) 


 


Neut # (Auto) 


 


Lymph # (Auto) 


 


Mono # (Auto) 


 


Eos # (Auto) 


 


Baso # (Auto) 


 


Sodium 


 


Potassium 


 


Chloride 


 


Carbon Dioxide 


 


Anion Gap 


 


BUN 


 


Creatinine 


 


Est GFR ( Amer) 


 


Est GFR (Non-Af Amer) 


 


POC Glucose (mg/dL)  265 H


 


Random Glucose 


 


Calcium 


 


Total Bilirubin 


 


Direct Bilirubin 


 


AST 


 


ALT 


 


Alkaline Phosphatase 


 


Total Protein 


 


Albumin 


 


Globulin 


 


Albumin/Globulin Ratio 














Assessment & Plan





- Assessment and Plan (Free Text)


Assessment: 





53 year old female with history of hepatic sarcoidosis who presents for 

abdominal pain, jaundice, nausea, vomiting. Status post biliary stent exchange 

on 3/18/19


Plan: 





Follow up with hepatologist at [facility] for liver transplant 


No surgical intervention at this time. 





Roselia Rivera, PGY1


Case discussed with Dr. Murphy








<John Murphy - Last Filed: 19 08:51>





Meds





- Medications


Medications: 


                               Current Medications





Apixaban (Eliquis)  5 mg PO BID [facility]


   Last Admin: 19 17:27 Dose:  5 mg


Digoxin (Lanoxin)  0.25 mg PO DAILY@1800 [facility]


   Last Admin: 19 17:32 Dose:  Not Given


Diphenhydramine HCl (Benadryl)  25 mg IVP Q6 PRN


   PRN Reason: Itching / Pruritus


   Last Admin: 19 21:18 Dose:  25 mg


Diphenoxylate HCl/Atropine (Lomotil 0.025-2.5 Mg Tablet)  1 tab PO Q8 PRN


   PRN Reason: Diarrhea


Gabapentin (Neurontin)  100 mg PO BID [facility]


   Last Admin: 19 17:27 Dose:  100 mg


Pantoprazole Sodium 80 mg/ (Sodium Chloride)  100 mls @ 10 mls/hr IVP .Q10H [facility]


   Last Admin: 19 01:33 Dose:  10 mls/hr


Insulin Human Regular (Novolin R)  0 unit SC [facility]; Protocol


   Last Admin: 19 07:55 Dose:  4 units


Methylprednisolone (Solu-Medrol)  20 mg IV DAILY [facility]


Metoclopramide HCl (Reglan)  5 mg IVP Q6 PRN


   PRN Reason: Nausea/Vomiting


   Last Admin: 19 03:06 Dose:  5 mg


Metoprolol Succinate (Toprol Xl)  100 mg PO DAILY [facility]


   Last Admin: 19 10:35 Dose:  Not Given


Morphine Sulfate (Morphine)  1 mg IVP Q3 PRN


   PRN Reason: Pain, moderate (4-7)


   Last Admin: 19 01:21 Dose:  1 mg


Ondansetron HCl (Zofran Inj)  8 mg IVP Q8 [facility]


   Last Admin: 19 06:11 Dose:  8 mg


Oxybutynin Chloride (Ditropan Xl)  10 mg PO DAILY [facility]











Results





- Vital Signs


Recent Vital Signs: 


                                Last Vital Signs











Temp  97.4 F L  19 08:00


 


Pulse  60   19 08:00


 


Resp  14   19 08:00


 


BP  96/47 L  19 08:00


 


Pulse Ox  100   19 08:00














- Labs


Result Diagrams: 


                                 19 06:56





                                 19 01:10


Labs: 


                         Laboratory Results - last 24 hr











  19





  10:24 11:06 16:07


 


WBC   


 


RBC   


 


Hgb   


 


Hct   


 


MCV   


 


MCH   


 


MCHC   


 


RDW   


 


Plt Count   


 


MPV   


 


Neut % (Auto)   


 


Lymph % (Auto)   


 


Mono % (Auto)   


 


Eos % (Auto)   


 


Baso % (Auto)   


 


Neut # (Auto)   


 


Lymph # (Auto)   


 


Mono # (Auto)   


 


Eos # (Auto)   


 


Baso # (Auto)   


 


Neutrophils % (Manual)   


 


Lymphocytes % (Manual)   


 


Monocytes % (Manual)   


 


Platelet Estimate   


 


Polychromasia   


 


Anisocytosis (manual)   


 


Macrocytosis (manual)   


 


Target Cells   


 


Tear Drop Cells   


 


Ovalocytes   


 


Schistocytes   


 


PT   


 


INR   


 


APTT   


 


Sodium   


 


Potassium   


 


Chloride   


 


Carbon Dioxide   


 


Anion Gap   


 


BUN   


 


Creatinine   


 


Est GFR ( Amer)   


 


Est GFR (Non-Af Amer)   


 


POC Glucose (mg/dL)  254 H  265 H  360 H


 


Random Glucose   


 


Calcium   


 


Phosphorus   


 


Magnesium   


 


Total Bilirubin   


 


AST   


 


ALT   


 


Alkaline Phosphatase   


 


Total Protein   


 


Albumin   


 


Globulin   


 


Albumin/Globulin Ratio   


 


Stool Occult Blood   


 


Digoxin   


 


Blood Type   


 


Antibody Screen   














  19





  20:59 01:10 01:10


 


WBC   14.1 H D 


 


RBC   2.60 L 


 


Hgb   8.5 L D 


 


Hct   24.5 L 


 


MCV   94.0 


 


MCH   32.6 H 


 


MCHC   34.6 


 


RDW   20.5 H 


 


Plt Count   268  D 


 


MPV   10.2 


 


Neut % (Auto)   80.8 H 


 


Lymph % (Auto)   9.7 L 


 


Mono % (Auto)   9.4 


 


Eos % (Auto)   0.0 


 


Baso % (Auto)   0.1 


 


Neut # (Auto)   11.4 H 


 


Lymph # (Auto)   1.4 


 


Mono # (Auto)   1.3 H 


 


Eos # (Auto)   0.0 


 


Baso # (Auto)   0.0 


 


Neutrophils % (Manual)   86 H 


 


Lymphocytes % (Manual)   7 L 


 


Monocytes % (Manual)   7 


 


Platelet Estimate   Normal 


 


Polychromasia   Moderate 


 


Anisocytosis (manual)   Moderate 


 


Macrocytosis (manual)   Moderate 


 


Target Cells   Moderate 


 


Tear Drop Cells   Slight 


 


Ovalocytes   Slight 


 


Schistocytes   Slight 


 


PT   


 


INR   


 


APTT   


 


Sodium    133


 


Potassium    5.2


 


Chloride    109 H


 


Carbon Dioxide    19 L


 


Anion Gap    11


 


BUN    49 H


 


Creatinine    0.6 L


 


Est GFR ( Amer)    > 60


 


Est GFR (Non-Af Amer)    > 60


 


POC Glucose (mg/dL)  398 H  


 


Random Glucose    234 H


 


Calcium    7.9 L


 


Phosphorus    2.4 L


 


Magnesium    2.4 H


 


Total Bilirubin    10.5 H


 


AST    161 H


 


ALT    172 H


 


Alkaline Phosphatase    226 H


 


Total Protein    5.6 L


 


Albumin    2.6 L


 


Globulin    3.0


 


Albumin/Globulin Ratio    0.9 L


 


Stool Occult Blood   


 


Digoxin   


 


Blood Type   


 


Antibody Screen   














  19





  01:10 01:10 01:15


 


WBC   


 


RBC   


 


Hgb   


 


Hct   


 


MCV   


 


MCH   


 


MCHC   


 


RDW   


 


Plt Count   


 


MPV   


 


Neut % (Auto)   


 


Lymph % (Auto)   


 


Mono % (Auto)   


 


Eos % (Auto)   


 


Baso % (Auto)   


 


Neut # (Auto)   


 


Lymph # (Auto)   


 


Mono # (Auto)   


 


Eos # (Auto)   


 


Baso # (Auto)   


 


Neutrophils % (Manual)   


 


Lymphocytes % (Manual)   


 


Monocytes % (Manual)   


 


Platelet Estimate   


 


Polychromasia   


 


Anisocytosis (manual)   


 


Macrocytosis (manual)   


 


Target Cells   


 


Tear Drop Cells   


 


Ovalocytes   


 


Schistocytes   


 


PT   16.0 H 


 


INR   1.5 


 


APTT   34 


 


Sodium   


 


Potassium   


 


Chloride   


 


Carbon Dioxide   


 


Anion Gap   


 


BUN   


 


Creatinine   


 


Est GFR ( Amer)   


 


Est GFR (Non-Af Amer)   


 


POC Glucose (mg/dL)   


 


Random Glucose   


 


Calcium   


 


Phosphorus   


 


Magnesium   


 


Total Bilirubin   


 


AST   


 


ALT   


 


Alkaline Phosphatase   


 


Total Protein   


 


Albumin   


 


Globulin   


 


Albumin/Globulin Ratio   


 


Stool Occult Blood   


 


Digoxin    0.9


 


Blood Type  O POSITIVE  


 


Antibody Screen  Negative  














  19





  05:48 06:56 07:41


 


WBC   22.3 H D 


 


RBC   2.98 L 


 


Hgb   9.5 L 


 


Hct   27.7 L 


 


MCV   93.1 


 


MCH   32.0 H 


 


MCHC   34.3 


 


RDW   18.2 H 


 


Plt Count   191 


 


MPV   8.9 


 


Neut % (Auto)   79.3 H 


 


Lymph % (Auto)   8.5 L 


 


Mono % (Auto)   12.1 H 


 


Eos % (Auto)   0.0 


 


Baso % (Auto)   0.1 


 


Neut # (Auto)   17.7 H 


 


Lymph # (Auto)   1.9 


 


Mono # (Auto)   2.7 H 


 


Eos # (Auto)   0.0 


 


Baso # (Auto)   0.0 


 


Neutrophils % (Manual)   


 


Lymphocytes % (Manual)   


 


Monocytes % (Manual)   


 


Platelet Estimate   


 


Polychromasia   


 


Anisocytosis (manual)   


 


Macrocytosis (manual)   


 


Target Cells   


 


Tear Drop Cells   


 


Ovalocytes   


 


Schistocytes   


 


PT   


 


INR   


 


APTT   


 


Sodium   


 


Potassium   


 


Chloride   


 


Carbon Dioxide   


 


Anion Gap   


 


BUN   


 


Creatinine   


 


Est GFR ( Amer)   


 


Est GFR (Non-Af Amer)   


 


POC Glucose (mg/dL)    259 H


 


Random Glucose   


 


Calcium   


 


Phosphorus   


 


Magnesium   


 


Total Bilirubin   


 


AST   


 


ALT   


 


Alkaline Phosphatase   


 


Total Protein   


 


Albumin   


 


Globulin   


 


Albumin/Globulin Ratio   


 


Stool Occult Blood  Positive H  


 


Digoxin   


 


Blood Type   


 


Antibody Screen   














Assessment & Plan





- Assessment and Plan (Free Text)


Plan: 


All medical record entries made by the resident were at my direction. I have 

reviewed the chart and agree that the record accurately reflects my personal 

performance of the history, physical exam, medical decision making  for this 

patient.  





Requested to see patient for liver transplant evaluation.  She notes she was se

en in [place] for live donor liver transplant, however LDL is not offered at that

program.  Given here cardiac history, I will order a cardiac echo to  assess her

current EF, r/o pulmonary hypertension and major valve defects..

## 2019-03-20 NOTE — CP.PCM.PN
Subjective





- Date & Time of Evaluation


Date of Evaluation: 03/19/19


Time of Evaluation: 16:02





- Subjective


Subjective: 





dictated   





Objective





- Vital Signs/Intake and Output


Vital Signs (last 24 hours): 


                                        











Temp Pulse Resp BP Pulse Ox


 


 97.3 F L  52 L  20   145/69   98 


 


 03/19/19 15:40  03/19/19 15:40  03/19/19 15:40  03/19/19 15:40  03/19/19 15:40








Intake and Output: 


                                        











 03/19/19 03/20/19





 18:59 06:59


 


Intake Total 680 1050


 


Output Total  900


 


Balance 680 150














- Medications


Medications: 


                               Current Medications





Apixaban (Eliquis)  5 mg PO BID [facility]


   Last Admin: 03/19/19 18:09 Dose:  5 mg


Digoxin (Lanoxin)  0.25 mg PO DAILY@1800 [facility]


   Last Admin: 03/19/19 18:10 Dose:  Not Given


Diphenhydramine HCl (Benadryl)  25 mg IVP Q6 PRN


   PRN Reason: Itching / Pruritus


   Last Admin: 03/19/19 22:15 Dose:  25 mg


Diphenoxylate HCl/Atropine (Lomotil 0.025-2.5 Mg Tablet)  1 tab PO Q8 PRN


   PRN Reason: Diarrhea


Gabapentin (Neurontin)  100 mg PO BID [facility]


   Last Admin: 03/19/19 18:06 Dose:  100 mg


Linezolid (Zyvox 600mg/300ml D5w)  600 mg in 300 mls @ 200 mls/hr IVPB Q12H [facility];

Protocol


   Last Admin: 03/19/19 18:14 Dose:  200 mls/hr


Insulin Human Regular (Novolin R)  0 unit SC [facility]; Protocol


   Last Admin: 03/19/19 22:25 Dose:  2 units


Methylprednisolone (Solu-Medrol)  40 mg IV DAILY [facility]


   Last Admin: 03/19/19 09:03 Dose:  40 mg


Metoprolol Succinate (Toprol Xl)  100 mg PO DAILY [facility]


   Last Admin: 03/19/19 09:03 Dose:  Not Given


Morphine Sulfate (Morphine)  1 mg IVP Q3 PRN


   PRN Reason: Pain, moderate (4-7)


   Last Admin: 03/19/19 22:15 Dose:  1 mg


Ondansetron HCl (Zofran Inj)  8 mg IVP Q8 [facility]


   Last Admin: 03/19/19 22:07 Dose:  8 mg











- Labs


Labs: 


                                        





                                 03/18/19 14:18 





                                 03/18/19 14:18 





                                        











PT  14.9 SECONDS (9.7-12.2)  H  03/15/19  07:34    


 


INR  1.4   03/15/19  07:34    


 


APTT  46 SECONDS (21-34)  H  03/15/19  07:34

## 2019-03-20 NOTE — CP.PCM.PN
Subjective





- Date & Time of Evaluation


Date of Evaluation: 03/20/19


Time of Evaluation: 11:36





- Subjective


Subjective: 





afebrile


vss








RN NOTIFIED PTS 


REPEAT URINE CULTURE FROM FOLYS +VE VRE.





PT HAS A NEUROGENIC BLADDER.


SHE HAS CH FOLYS AND SHE IS COLONIZED WITH VRE.





(IN DEC. 2018  PT. ALSO +VE VRE )





LABS ;


REVIEWED.


WBC -N


BLOOD CULTURES -VE TO DATE


LFTS - IMPROVING





PLAN ;





DC IV ZYVOX.


CONTINUE CONTACT PRECAUTIONS .


WATCH FOR FEVER CURVE AFTER STOPPING ABX .














Objective





- Vital Signs/Intake and Output


Vital Signs (last 24 hours): 


                                        











Temp Pulse Resp BP Pulse Ox


 


 98.2 F   50 L  20   112/75   100 


 


 03/20/19 07:05  03/20/19 10:38  03/20/19 07:05  03/20/19 10:38  03/20/19 07:05








Intake and Output: 


                                        











 03/20/19 03/20/19





 06:59 18:59


 


Intake Total 1050 


 


Output Total 1600 


 


Balance -550 














- Medications


Medications: 


                               Current Medications





Apixaban (Eliquis)  5 mg PO BID [facility]


   Last Admin: 03/20/19 10:32 Dose:  5 mg


Digoxin (Lanoxin)  0.25 mg PO DAILY@1800 [facility]


   Last Admin: 03/19/19 18:10 Dose:  Not Given


Diphenhydramine HCl (Benadryl)  25 mg IVP Q6 PRN


   PRN Reason: Itching / Pruritus


   Last Admin: 03/20/19 05:13 Dose:  25 mg


Diphenoxylate HCl/Atropine (Lomotil 0.025-2.5 Mg Tablet)  1 tab PO Q8 PRN


   PRN Reason: Diarrhea


Gabapentin (Neurontin)  100 mg PO BID [facility]


   Last Admin: 03/20/19 10:32 Dose:  100 mg


Linezolid (Zyvox 600mg/300ml D5w)  600 mg in 300 mls @ 200 mls/hr IVPB Q12H [facility];

Protocol


   Last Admin: 03/20/19 05:20 Dose:  200 mls/hr


Insulin Human Regular (Novolin R)  0 unit SC ACHS [facility]; Protocol


   Last Admin: 03/20/19 08:35 Dose:  3 units


Methylprednisolone (Solu-Medrol)  40 mg IV DAILY [facility]


   Last Admin: 03/20/19 10:37 Dose:  40 mg


Metoprolol Succinate (Toprol Xl)  100 mg PO DAILY [facility]


   Last Admin: 03/20/19 10:35 Dose:  Not Given


Morphine Sulfate (Morphine)  1 mg IVP Q3 PRN


   PRN Reason: Pain, moderate (4-7)


   Last Admin: 03/20/19 10:34 Dose:  1 mg


Ondansetron HCl (Zofran Inj)  8 mg IVP Q8 [facility]


   Last Admin: 03/20/19 05:21 Dose:  8 mg











- Labs


Labs: 


                                        





                                 03/20/19 07:35 





                                 03/20/19 07:35 





                                        











PT  14.9 SECONDS (9.7-12.2)  H  03/15/19  07:34    


 


INR  1.4   03/15/19  07:34    


 


APTT  46 SECONDS (21-34)  H  03/15/19  07:34    














- Constitutional


Appears: No Acute Distress





- Head Exam


Head Exam: NORMAL INSPECTION





- Eye Exam


Eye Exam: EOMI, PERRL, Scleral icterus





- ENT Exam


ENT Exam: Normal Oropharynx





- Neck Exam


Neck Exam: Normal Inspection





- Respiratory Exam


Respiratory Exam: Clear to Ausculation Bilateral





- Cardiovascular Exam


Cardiovascular Exam: REGULAR RHYTHM, +S1, +S2





- GI/Abdominal Exam


GI & Abdominal Exam: Soft, Tenderness (epigastric/RUQ), Hypoactive Bowel Sounds





- Extremities Exam


Additional comments: 





B/L AKA





- Neurological Exam


Neurological Exam: Alert, Awake, CN II-XII Intact, Oriented x3





- Psychiatric Exam


Psychiatric exam: Normal Mood





- Skin


Skin Exam: Normal Color, Warm





Assessment and Plan


(1) Abdominal pain


Assessment & Plan: 


S/P ERCP AND CHANGE OF STENT.


Status: Acute   





(2) Jaundice


Assessment & Plan: 


W/U AS PER GI.





Status: Acute   





(3) Sarcoidosis


Status: Chronic   





(4) Complicated urinary tract infection


Assessment & Plan: 


PT HAS NEUROGENIC BLADDER .


COLONIZED WITH VRE .


DC  IV ZYVOX.





CASE DISCUSSED WITH NP  MS GONCALVES. 


Status: Acute   





(5) Diabetes


Status: Acute   





(6) S/P AKA (above knee amputation) bilateral


Status: Acute   





(7) Neurogenic bladder


Status: Chronic

## 2019-03-20 NOTE — PN
DATE:  03/19/2019



SUBJECTIVE:  The patient is on Zyvox for VRE, and she is afebrile.  Less

short of breath.  No chest pain.  She had nausea and vomiting, and the

patient was given immediate dose of Zofran.  The patient had a bowel

movement today as well, and she has a foully, dark yellow urine.



PHYSICAL EXAMINATION:

VITAL SIGNS:  Blood pressure 145/69, pulse 52, respiratory rate 20,

temperature 97.3.

LUNGS:  Bilaterally clear.  No rales.  No rhonchi.

CARDIOVASCULAR SYSTEM:  S1, S2, regular.

ABDOMEN:  Soft, nontender.  Bowel sounds are positive.



ASSESSMENT:

1.  Urinary tract infection due to vancomycin-resistant enterococci.

2.  Hepatic sarcoidosis, status post endoscopic retrograde cholangio

pancreatography.

3.  Type 2 diabetes.

4.  Hypertension.



PLAN:  Antibiotics.  ID followup.  Accu-Chek, sliding scale.  Monitor the

patient.





__________________________________________

Nasir Dunbar MD





DD:  03/20/2019 0:44:44

DT:  03/20/2019 1:47:23

Job # [id number]

## 2019-03-20 NOTE — CP.PCM.PN
Subjective





- Date & Time of Evaluation


Date of Evaluation: 03/20/19


Time of Evaluation: 17:00





Objective





- Vital Signs/Intake and Output


Vital Signs (last 24 hours): 


                                        











Temp Pulse Resp BP Pulse Ox


 


 97.3 F L  60   20   136/76   96 


 


 03/20/19 16:39  03/20/19 16:39  03/20/19 16:39  03/20/19 16:39  03/20/19 16:39








Intake and Output: 


                                        











 03/20/19 03/20/19





 06:59 18:59


 


Intake Total 1050 


 


Output Total 1600 1000


 


Balance -550 -1000














- Medications


Medications: 


                               Current Medications





Apixaban (Eliquis)  5 mg PO BID [facility]


   Last Admin: 03/20/19 10:32 Dose:  5 mg


Digoxin (Lanoxin)  0.25 mg PO DAILY@1800 [facility]


   Last Admin: 03/19/19 18:10 Dose:  Not Given


Diphenhydramine HCl (Benadryl)  25 mg IVP Q6 PRN


   PRN Reason: Itching / Pruritus


   Last Admin: 03/20/19 13:48 Dose:  25 mg


Diphenoxylate HCl/Atropine (Lomotil 0.025-2.5 Mg Tablet)  1 tab PO Q8 PRN


   PRN Reason: Diarrhea


Gabapentin (Neurontin)  100 mg PO BID [facility]


   Last Admin: 03/20/19 10:32 Dose:  100 mg


Insulin Human Regular (Novolin R)  0 unit SC [facility]; Protocol


   Last Admin: 03/20/19 12:20 Dose:  4 units


Methylprednisolone (Solu-Medrol)  40 mg IV DAILY [facility]


   Last Admin: 03/20/19 10:37 Dose:  40 mg


Metoprolol Succinate (Toprol Xl)  100 mg PO DAILY [facility]


   Last Admin: 03/20/19 10:35 Dose:  Not Given


Morphine Sulfate (Morphine)  1 mg IVP Q3 PRN


   PRN Reason: Pain, moderate (4-7)


   Last Admin: 03/20/19 13:49 Dose:  1 mg


Ondansetron HCl (Zofran Inj)  8 mg IVP Q8 [facility]


   Last Admin: 03/20/19 13:48 Dose:  8 mg


Oxybutynin Chloride (Ditropan Xl)  10 mg PO DAILY [facility]











- Labs


Labs: 


                                        





                                 03/20/19 07:35 





                                 03/20/19 07:35 





                                        











PT  14.9 SECONDS (9.7-12.2)  H  03/15/19  07:34    


 


INR  1.4   03/15/19  07:34    


 


APTT  46 SECONDS (21-34)  H  03/15/19  07:34

## 2019-03-21 LAB
ALBUMIN SERPL-MCNC: 2.6 G/DL (ref 3.5–5)
ALBUMIN/GLOB SERPL: 0.9 {RATIO} (ref 1–2.1)
ALT SERPL-CCNC: 172 U/L (ref 9–52)
ANISOCYTOSIS BLD QL SMEAR: (no result)
ANISOCYTOSIS BLD QL SMEAR: SLIGHT
APTT BLD: 34 SECONDS (ref 21–34)
AST SERPL-CCNC: 161 U/L (ref 14–36)
BASOPHILS # BLD AUTO: 0 K/UL (ref 0–0.2)
BASOPHILS # BLD AUTO: 0 K/UL (ref 0–0.2)
BASOPHILS NFR BLD: 0.1 % (ref 0–2)
BASOPHILS NFR BLD: 0.1 % (ref 0–2)
BUN SERPL-MCNC: 49 MG/DL (ref 7–17)
CALCIUM SERPL-MCNC: 7.9 MG/DL (ref 8.6–10.4)
EOSINOPHIL # BLD AUTO: 0 K/UL (ref 0–0.7)
EOSINOPHIL # BLD AUTO: 0 K/UL (ref 0–0.7)
EOSINOPHIL NFR BLD: 0 % (ref 0–4)
EOSINOPHIL NFR BLD: 0 % (ref 0–4)
ERYTHROCYTE [DISTWIDTH] IN BLOOD BY AUTOMATED COUNT: 18.2 % (ref 11.5–14.5)
ERYTHROCYTE [DISTWIDTH] IN BLOOD BY AUTOMATED COUNT: 20.5 % (ref 11.5–14.5)
GFR NON-AFRICAN AMERICAN: > 60
HGB BLD-MCNC: 8.5 G/DL (ref 11–16)
HGB BLD-MCNC: 9.5 G/DL (ref 11–16)
HYPOCHROMIC: SLIGHT
INR PPP: 1.5
LYMPHOCYTE: 6 % (ref 20–40)
LYMPHOCYTE: 7 % (ref 20–40)
LYMPHOCYTES # BLD AUTO: 1.4 K/UL (ref 1–4.3)
LYMPHOCYTES # BLD AUTO: 1.9 K/UL (ref 1–4.3)
LYMPHOCYTES NFR BLD AUTO: 8.5 % (ref 20–40)
LYMPHOCYTES NFR BLD AUTO: 9.7 % (ref 20–40)
MACROCYTES BLD QL SMEAR: (no result)
MCH RBC QN AUTO: 32 PG (ref 27–31)
MCH RBC QN AUTO: 32.6 PG (ref 27–31)
MCHC RBC AUTO-ENTMCNC: 34.3 G/DL (ref 33–37)
MCHC RBC AUTO-ENTMCNC: 34.6 G/DL (ref 33–37)
MCV RBC AUTO: 93.1 FL (ref 81–99)
MCV RBC AUTO: 94 FL (ref 81–99)
MONOCYTE: 12 % (ref 0–10)
MONOCYTE: 7 % (ref 0–10)
MONOCYTES # BLD: 1.3 K/UL (ref 0–0.8)
MONOCYTES # BLD: 2.7 K/UL (ref 0–0.8)
MONOCYTES NFR BLD: 12.1 % (ref 0–10)
MONOCYTES NFR BLD: 9.4 % (ref 0–10)
NEUTROPHILS # BLD: 11.4 K/UL (ref 1.8–7)
NEUTROPHILS # BLD: 17.7 K/UL (ref 1.8–7)
NEUTROPHILS NFR BLD AUTO: 79.3 % (ref 50–75)
NEUTROPHILS NFR BLD AUTO: 80.8 % (ref 50–75)
NEUTROPHILS NFR BLD AUTO: 81 % (ref 50–75)
NEUTROPHILS NFR BLD AUTO: 86 % (ref 50–75)
NEUTS BAND NFR BLD: 1 % (ref 0–2)
NRBC BLD AUTO-RTO: 0.1 % (ref 0–2)
NRBC BLD AUTO-RTO: 0.2 % (ref 0–2)
OVALOCYTES BLD QL SMEAR: SLIGHT
PLATELET # BLD EST: NORMAL 10*3/UL
PLATELET # BLD EST: NORMAL 10*3/UL
PLATELET # BLD: 191 K/UL (ref 130–400)
PLATELET # BLD: 268 K/UL (ref 130–400)
PMV BLD AUTO: 10.2 FL (ref 7.2–11.7)
PMV BLD AUTO: 8.9 FL (ref 7.2–11.7)
POIKILOCYTOSIS BLD QL SMEAR: SLIGHT
POLYCHROMIC: (no result)
PROTHROMBIN TIME: 16 SECONDS (ref 9.7–12.2)
RBC # BLD AUTO: 2.6 MIL/UL (ref 3.8–5.2)
RBC # BLD AUTO: 2.98 MIL/UL (ref 3.8–5.2)
SCHISTOCYTES BLD QL SMEAR: SLIGHT
TARGETS BLD QL SMEAR: (no result)
TEARDROP CELLS: SLIGHT
TOTAL CELLS COUNTED BLD: 100
TOTAL CELLS COUNTED BLD: 100
WBC # BLD AUTO: 14.1 K/UL (ref 4.8–10.8)
WBC # BLD AUTO: 22.3 K/UL (ref 4.8–10.8)

## 2019-03-21 RX ADMIN — HUMAN INSULIN SCH UNITS: 100 INJECTION, SOLUTION SUBCUTANEOUS at 16:52

## 2019-03-21 RX ADMIN — CLOBETASOL PROPIONATE SCH APPLIC: 0.5 OINTMENT TOPICAL at 22:19

## 2019-03-21 RX ADMIN — DIPHENHYDRAMINE HYDROCHLORIDE PRN MG: 50 INJECTION INTRAMUSCULAR; INTRAVENOUS at 16:54

## 2019-03-21 RX ADMIN — DIPHENOXYLATE HYDROCHLORIDE AND ATROPINE SULFATE PRN TAB: 2.5; .025 TABLET ORAL at 22:09

## 2019-03-21 RX ADMIN — DIPHENHYDRAMINE HYDROCHLORIDE PRN MG: 50 INJECTION INTRAMUSCULAR; INTRAVENOUS at 13:51

## 2019-03-21 RX ADMIN — DIGOXIN SCH: 0.25 TABLET ORAL at 18:27

## 2019-03-21 RX ADMIN — OXYBUTYNIN CHLORIDE SCH: 10 TABLET, EXTENDED RELEASE ORAL at 11:37

## 2019-03-21 RX ADMIN — HUMAN INSULIN SCH: 100 INJECTION, SOLUTION SUBCUTANEOUS at 21:32

## 2019-03-21 RX ADMIN — HUMAN INSULIN SCH UNITS: 100 INJECTION, SOLUTION SUBCUTANEOUS at 07:55

## 2019-03-21 RX ADMIN — HUMAN INSULIN SCH UNITS: 100 INJECTION, SOLUTION SUBCUTANEOUS at 12:08

## 2019-03-21 RX ADMIN — DIPHENHYDRAMINE HYDROCHLORIDE PRN MG: 50 INJECTION INTRAMUSCULAR; INTRAVENOUS at 10:46

## 2019-03-21 NOTE — CON
DATE:  2019



That is from Dr. Albert to Dr. Nasir Dunbar.



REASON FOR CONSULTATION:  I was called for GI consultation by the primary

MD.  The patient is seen and fully examined on 2019 as requested by

Dr. Dunbar.



The entire chart is reviewed including but not limited to the most recent

lab and radiology study results, current and the previous medication lists,

current and the previous medical events, allergy to medication list as well

as all the available current and the previous medical records.



HISTORY OF PRESENT ILLNESS:  This is a 53-year-old female, very well known

case for me from previous multiple hospital admissions as well as office

visits.  Seen and examined by me initially on 2019 for GI

consultation.  The entire chart is reviewed including but not limited to

most recent lab and radiology study results, current and the previous

medication lists, current and the previous medical records, allergy to

medication list as well as all the available current and the previous

results.



Case discussed at length with the staff in the floor as well as with Dr. Dunbar immediately before and after my physical examination and

consultation on 2019.



This 53-year-old female was admitted to the hospital due to severe

abdominal pain, postprandial abdominal distention, generalized weakness and

malaise, nausea with dyspepsia with recent change of bowel movement habit

and very poor oral intake.  No reported active bleeding at that time.



No reported actual chest pain, palpitation, significant increase of

shortness of breath or chills or fever.



PAST MEDICAL HISTORY:  Including but not limited to,

1.  Hepatic sarcoidosis with elevated liver function tests and jaundice.

2.  Peptic ulcer disease with anemia.

3.  Hypertension, diabetes mellitus, deep venous thrombosis, with

hyperlipidemia.

4.  Reported history of cardiac arrhythmia with cardiac stent insertion.

5.  Status post appendectomy, cholecystectomy,  and partial colon

resection before due to intra-abdominal intrapelvic abscess formation.

6.  The patient had multiple procedures before including ERCP with biliary

stent insertion and exchange due to blockage of the biliary stent.



FAMILY HISTORY:  Unknown.



SOCIAL HISTORY:  No reported history of cigarette smoking or alcohol

intake.



CURRENT MEDICATIONS:  Post-admission medication list was reviewed.



ALLERGIES TO MEDICATIONS:  THE PATIENT IS SOMEWHAT ALLERGIC TO AVOCADO,

BANANA, AND ANGEL.



LABORATORY DATA:  Initial blood workup at the time of the admission showed

hematocrit 33.4 with CO2 content of 20, low creatinine of 0.4, total

bilirubin 13.8, , and alkaline phosphatase 384 with low albumin 3.4.

Urine analysis was completely abnormal.



PHYSICAL EXAMINATION:

GENERAL:  A 53-year-old female.

VITAL SIGNS:  Afebrile with pulse of 72, respiratory rate 16 to 18, and

blood pressure of 144/72.

HEENT:  Showed pale, dry oral mucous membrane and bilateral icteric

sclerae.

LUNGS:  Few scattered crepitation.  Decreased air entry at bases.

HEART:  Positive S1 and S2.

ABDOMEN:  Soft with mild generalized tenderness.  No mass or organomegaly

with distended abdomen.  Bowel sounds are hyperactive.

EXTREMITIES:  With evidence of bilateral above-knee amputation.

NEUROLOGICAL:  No reported new neurological deficits, sensory or motor.



IMPRESSION:

1.  Jaundice, increased abdominal girth, abnormal liver function tests, to

rule out biliary stent blockage leading to an early phase of ascending

cholangitis.

2.  Hepatic sarcoidosis.

3.  Abnormal liver function tests secondary to above.

4.  Re-exacerbation of peptic ulcer disease.

5.  Multiple past medical history as mentioned above.



SUGGESTIONS:

1.  Agree with your plan.

2.  Start steroids IV.

3.  Due to the patient's recurrent urinary tract infection due to the

indwelling Charles catheter, ID consultation to be considered.

4.  We will schedule the patient for ERCP with biliary stent exchange after

ultrasound of the abdomen and pelvis to be done.

5.  Further recommendation to follow.



Thank you for letting me to participate in your patient's case management.







__________________________________________

Jeffrey Albert MD





DD:  2019 11:18:20

DT:  2019 11:24:53

Job # [id number]

## 2019-03-21 NOTE — PN
DATE:  03/20/2019



SUBJECTIVE:  The patient is feeling better.  She is just nauseous at times.

She denies any dysuria.  She denies any abdominal pain.  She denies any

fever, chills, rigors.  She is on Zyvox, but as per ID, the patient does

not use Zyvox.



PHYSICAL EXAMINATION:

VITAL SIGNS:  Blood pressure 136/76, pulse 60, respiratory rate 20,

temperature 97.3.

LUNGS:  Bilaterally clear.  No rales.  No rhonchi.

CARDIOVASCULAR SYSTEM:  S1, S2, plus S3 positive.

ABDOMEN:  Soft.  Nontender.  Bowel sounds are positive.  There is a Charles

catheter with dark yellow urine.



ASSESSMENT:

1.  Neurogenic bladder with urinary tract infection due to

vancomycin-resistant enterococci, recurrent.

2.  Hepatic sarcoidosis.  The patient essentially needs liver transplant. 

The patient was consulted with Dr. Eisenberg and the patient is not a

candidate for surgery and the patient needs to be referred to [facility].

3.  Hypertension.

4.  Diabetes, poorly controlled.



PLAN:  Taper steroids.  The patient is for discharge.







__________________________________________

Nasir Dunbar MD





DD:  03/20/2019 23:11:37

DT:  03/21/2019 0:12:53

Job # [id number]

## 2019-03-21 NOTE — PCM.RRT
RRT Nurses Assessment





- Situation


Date: 03/21/19


Time RRT was called: 00:40


RRT Responder Arrival Time:: 00:41


RRT Location::  Med/Surg





I.Reason for RRT





- A) Acute Change in Patient:


(Select all that apply): Uncontrolled Bleeding, Hemoptysis, Hematemesis, Melena





- Neurological Status


(Select all that apply): Alert, Responsive, Oriented





- Respiratory


Oxygen Delivery Method: Room Air





- Constitutional


Appears: Non-toxic, No Acute Distress





- Head


Head Exam: NORMAL INSPECTION





- Eyes


Eye Exam: EOMI, Normal appearance, PERRL





- Respiratory Exam


Respiratory Exam: NORMAL BREATHING PATTERN





- Cardiovascular Exam


Additional comments: 





regular rate





- GI/Abdominal Exam


GI & Abdominal Exam: Soft, Tenderness





- Neurological Exam


Neurological Exam: Alert, Awake, Oriented x3





- Extremities Exam


Additional comments: 





B/L LE amputation (chronic)





Plan





- Assessment of Findings&Treatment Plan








RRT called for emesis of blood clots:





53 F w/ PMHx of CHF w/ AICD, CAD, neurogenic bladder w/ indwelling catheter, DM,

HTN, HLD, liver sarcodosis, had 1 time emesis of blood clots w/ abdominal pain 

while laying in bed.





Vitals: 104/64 HR 65 02 100 on RA





Blood work reviewed, chart reviewed. Pt had recent EGD (3/18) revealing no 

ulcers, just 1 common hepatic duct stent.





Stat blood work ordered & w/ type and cross X3 units, eliquis held, ordered p

rotonix bolus w/ drip and & NPO.





While waiting for results, patient had 1 additional episode of emesis of blood 

clot with abdominal pain. Reviewed blood work Hgb 2 point drop. 





Spoke with ICU & GI. With ICU approval, patient transferred to ICU. Initiate 1 X

PRBC transfusion 





GI: NPO & reglan 5 mg Q6H PRN. Will perform EGD in AM.





Repeat vitals: 111/66 HR 64 O2 100 on RA





Note: Pt is on metoprolol succinate 100 mg daily, could underly compensatory HR 

w/ 2 pt hgb loss. 





Will notify primary.

## 2019-03-21 NOTE — CP.PCM.CON
History of Present Illness





- History of Present Illness


History of Present Illness: 


CCM


54 yo black female with hx CHF/AICD /CAD /HTN /HLD /DM /PAD /Liver Sarcoidosis /

Neurogenic Bladder /Indwelling john /Cholangitis / admitted thru ED c/o abd. 

pain and found with elevated Bili. Pt had EGD on 3/18 with biliary stent change.

Seen by ID for enterococcal UTI. Pt had Rapid Response in early AM for 

hemetemesis. Pt c/o weakness and admits some abd. discomfort.Denied chest pain 

/sob. Pt found with Hgb drop to 8.5 and transfusion with PRBC is planned.


ROs- as noted


All-avocado /cherry /banana /ketorolac


Social- No tob/ etoh/ drugs


 Meds- reviewed


FH CHF





PE  T-97.3  P-60  R-16  BP-110/59


Neck- no jvd


Lungs- bilat bs


Heart-rr


Abd- bs+, soft, sl. RUQ tenderness


Ext- bilat AKA





Labs-reviewed


A&P


UGI Bleed


Acute Blood Loss Anemia


Hyperbilirubinemia


Lliver Sarcoidosis


UTI


CHF


AICD 


CAD


DM 


HTN


HLD


Neurogenic Bladder


Indwelling John


PAD


Bilat AKA





Admit to ICU


 Transfuse PRBC prn


hold Eliquis


f/u cbc


maintain optima lytes


GI f/u


cont protonix / Ab











Past Patient History





- Infectious Disease


Hx of Infectious Diseases: None





- Tetanus Immunizations


Tetanus Immunization: Unknown





- Past Medical History & Family History


Past Medical History?: Yes





- Past Social History


Smoking Status: Never Smoked





- CARDIAC


Hx Cardiac Disorders: Yes (x 1 stent; arrhythmia)


Hx Congestive Heart Failure: Yes


Hx Hypercholesterolemia: Yes


Hx Hypertension: Yes





- PULMONARY


Hx Chronic Obstructive Pulmonary Disease (COPD): Yes (asthma)





- NEUROLOGICAL


Hx Neurological Disorder: No





- HEENT


Hx HEENT Problems: Yes


Hx Cataracts: Yes (BILAT.)





- RENAL


Hx Chronic Kidney Disease: No





- ENDOCRINE/METABOLIC


Hx Diabetes Mellitus Type 1: Yes





- HEMATOLOGICAL/ONCOLOGICAL


Hx Blood Disorders: Yes


Hx Anemia: Yes





- INTEGUMENTARY


Hx Dermatological Problems: No





- MUSCULOSKELETAL/RHEUMATOLOGICAL


Hx Musculoskeletal Disorders: Yes


Hx Falls: Yes





- GASTROINTESTINAL


Hx Gastrointestinal Disorders: Yes


Hx Gall Bladder Disease: Yes





- GENITOURINARY/GYNECOLOGICAL


Hx Genitourinary Disorders: Yes


Hx Urinary Tract Infection: Yes


Other/Comment: PT HAS LONG TERM JOHN





- PSYCHIATRIC


Hx Substance Use: No





- SURGICAL HISTORY


Hx Surgeries: Yes


Hx Appendectomy: Yes


Hx Cholecystectomy: Yes


Hx Coronary Stent: Yes





- ANESTHESIA


Hx Anesthesia: Yes


Hx Anesthesia Reactions: No


Hx Malignant Hyperthermia: No





Meds


Allergies/Adverse Reactions: 


                                    Allergies











Allergy/AdvReac Type Severity Reaction Status Date / Time


 


avocado Allergy Severe ANAPHYLAXIS Verified 03/14/19 18:57


 


banana Allergy Severe ANAPHYLAXIS Verified 03/14/19 18:57


 


cherry Allergy Severe ANAPHYLAXIS Verified 03/14/19 18:57


 


ketorolac [From Toradol] Allergy   Verified 03/14/19 18:57














- Medications


Medications: 


                               Current Medications





Apixaban (Eliquis)  [facility]ID [facility]


   Last Admin: 03/20/19 17:27 Dose:  5 mg


Digoxin (Lanoxin)  0.25 mg PO DAILY@1800 [facility]


   Last Admin: 03/20/19 17:32 Dose:  Not Given


Diphenhydramine HCl (Benadryl)  25 mg IVP Q6 PRN


   PRN Reason: Itching / Pruritus


   Last Admin: 03/20/19 21:18 Dose:  25 mg


Diphenoxylate HCl/Atropine (Lomotil 0.025-2.5 Mg Tablet)  1 tab PO Q8 PRN


   PRN Reason: Diarrhea


Gabapentin (Neurontin)  100 mg [facility] BID [facility]


   Last Admin: 03/20/19 17:27 Dose:  100 mg


Pantoprazole Sodium 80 mg/ (Sodium Chloride)  100 mls @ 10 mls/hr IVP .Q10H [facility]


   Last Admin: 03/21/19 01:33 Dose:  10 mls/hr


Insulin Human Regular (Novolin R)  0 unit SC [facility]


   Last Admin: 03/20/19 21:46 Dose:  3 units


Methylprednisolone (Solu-Medrol)  20 mg IV DAILY [facility]


Metoclopramide HCl (Reglan)  5 mg IVP Q6 PRN


   PRN Reason: Nausea/Vomiting


   Last Admin: 03/21/19 03:06 Dose:  5 mg


Metoprolol Succinate (Toprol Xl)  100 mg PO DAILY [facility]


   Last Admin: 03/20/19 10:35 Dose:  Not Given


Morphine Sulfate (Morphine)  1 mg IVP Q3 PRN


   PRN Reason: Pain, moderate (4-7)


   Last Admin: 03/21/19 01:21 Dose:  1 mg


Ondansetron HCl (Zofran Inj)  8 mg IVP Q8 [facility]


   Last Admin: 03/21/19 06:11 Dose:  8 mg


Oxybutynin Chloride (Ditropan Xl)  10 mg PO DAILY [facility]











Results





- Vital Signs


Recent Vital Signs: 


                                Last Vital Signs











Temp  97 F L  03/21/19 05:35


 


Pulse  60   03/21/19 05:35


 


Resp  15   03/21/19 05:35


 


BP  88/49 L  03/21/19 05:35


 


Pulse Ox  100   03/21/19 04:00














- Labs


Result Diagrams: 


                                 03/21/19 01:10





                                 03/21/19 01:10


Labs: 


                         Laboratory Results - last 24 hr











  03/20/19 03/20/19 03/20/19





  06:34 07:35 07:35


 


WBC   6.7  D 


 


RBC   3.32 L 


 


Hgb   10.7 L 


 


Hct   31.1 L 


 


MCV   93.6 


 


MCH   32.2 H 


 


MCHC   34.3 


 


RDW   19.9 H 


 


Plt Count   153 


 


MPV   9.8 


 


Neut % (Auto)   79.8 H 


 


Lymph % (Auto)   10.7 L 


 


Mono % (Auto)   9.4 


 


Eos % (Auto)   0.0 


 


Baso % (Auto)   0.1 


 


Neut # (Auto)   5.3 


 


Lymph # (Auto)   0.7 L 


 


Mono # (Auto)   0.6 


 


Eos # (Auto)   0.0 


 


Baso # (Auto)   0.0 


 


Neutrophils % (Manual)   


 


Lymphocytes % (Manual)   


 


Monocytes % (Manual)   


 


Platelet Estimate   


 


Polychromasia   


 


Anisocytosis (manual)   


 


Macrocytosis (manual)   


 


Target Cells   


 


Tear Drop Cells   


 


Ovalocytes   


 


Schistocytes   


 


PT   


 


INR   


 


APTT   


 


Sodium    136


 


Potassium    4.5


 


Chloride    111 H


 


Carbon Dioxide    18 L


 


Anion Gap    12


 


BUN    35 H


 


Creatinine    0.6 L


 


Est GFR ( Amer)    > 60


 


Est GFR (Non-Af Amer)    > 60


 


POC Glucose (mg/dL)  207 H  


 


Random Glucose    213 H D


 


Calcium    7.7 L


 


Phosphorus   


 


Magnesium   


 


Total Bilirubin    10.3 H


 


Direct Bilirubin    8.9 H


 


AST    161 H


 


ALT    152 H D


 


Alkaline Phosphatase    224 H D


 


Total Protein    5.9 L


 


Albumin    2.7 L


 


Globulin    3.2


 


Albumin/Globulin Ratio    0.8 L


 


Stool Occult Blood   


 


Digoxin   


 


Blood Type   


 


Antibody Screen   














  03/20/19 03/20/19 03/20/19





  10:24 11:06 16:07


 


WBC   


 


RBC   


 


Hgb   


 


Hct   


 


MCV   


 


MCH   


 


MCHC   


 


RDW   


 


Plt Count   


 


MPV   


 


Neut % (Auto)   


 


Lymph % (Auto)   


 


Mono % (Auto)   


 


Eos % (Auto)   


 


Baso % (Auto)   


 


Neut # (Auto)   


 


Lymph # (Auto)   


 


Mono # (Auto)   


 


Eos # (Auto)   


 


Baso # (Auto)   


 


Neutrophils % (Manual)   


 


Lymphocytes % (Manual)   


 


Monocytes % (Manual)   


 


Platelet Estimate   


 


Polychromasia   


 


Anisocytosis (manual)   


 


Macrocytosis (manual)   


 


Target Cells   


 


Tear Drop Cells   


 


Ovalocytes   


 


Schistocytes   


 


PT   


 


INR   


 


APTT   


 


Sodium   


 


Potassium   


 


Chloride   


 


Carbon Dioxide   


 


Anion Gap   


 


BUN   


 


Creatinine   


 


Est GFR ( Amer)   


 


Est GFR (Non-Af Amer)   


 


POC Glucose (mg/dL)  254 H  265 H  360 H


 


Random Glucose   


 


Calcium   


 


Phosphorus   


 


Magnesium   


 


Total Bilirubin   


 


Direct Bilirubin   


 


AST   


 


ALT   


 


Alkaline Phosphatase   


 


Total Protein   


 


Albumin   


 


Globulin   


 


Albumin/Globulin Ratio   


 


Stool Occult Blood   


 


Digoxin   


 


Blood Type   


 


Antibody Screen   














  03/20/19 03/21/19 03/21/19





  20:59 01:10 01:10


 


WBC   14.1 H D 


 


RBC   2.60 L 


 


Hgb   8.5 L D 


 


Hct   24.5 L 


 


MCV   94.0 


 


MCH   32.6 H 


 


MCHC   34.6 


 


RDW   20.5 H 


 


Plt Count   268  D 


 


MPV   10.2 


 


Neut % (Auto)   80.8 H 


 


Lymph % (Auto)   9.7 L 


 


Mono % (Auto)   9.4 


 


Eos % (Auto)   0.0 


 


Baso % (Auto)   0.1 


 


Neut # (Auto)   11.4 H 


 


Lymph # (Auto)   1.4 


 


Mono # (Auto)   1.3 H 


 


Eos # (Auto)   0.0 


 


Baso # (Auto)   0.0 


 


Neutrophils % (Manual)   86 H 


 


Lymphocytes % (Manual)   7 L 


 


Monocytes % (Manual)   7 


 


Platelet Estimate   Normal 


 


Polychromasia   Moderate 


 


Anisocytosis (manual)   Moderate 


 


Macrocytosis (manual)   Moderate 


 


Target Cells   Moderate 


 


Tear Drop Cells   Slight 


 


Ovalocytes   Slight 


 


Schistocytes   Slight 


 


PT   


 


INR   


 


APTT   


 


Sodium    133


 


Potassium    5.2


 


Chloride    109 H


 


Carbon Dioxide    19 L


 


Anion Gap    11


 


BUN    49 H


 


Creatinine    0.6 L


 


Est GFR ( Amer)    > 60


 


Est GFR (Non-Af Amer)    > 60


 


POC Glucose (mg/dL)  398 H  


 


Random Glucose    234 H


 


Calcium    7.9 L


 


Phosphorus    2.4 L


 


Magnesium    2.4 H


 


Total Bilirubin    10.5 H


 


Direct Bilirubin   


 


AST    161 H


 


ALT    172 H


 


Alkaline Phosphatase    226 H


 


Total Protein    5.6 L


 


Albumin    2.6 L


 


Globulin    3.0


 


Albumin/Globulin Ratio    0.9 L


 


Stool Occult Blood   


 


Digoxin   


 


Blood Type   


 


Antibody Screen   














  03/21/19 03/21/19 03/21/19





  01:10 01:10 01:15


 


WBC   


 


RBC   


 


Hgb   


 


Hct   


 


MCV   


 


MCH   


 


MCHC   


 


RDW   


 


Plt Count   


 


MPV   


 


Neut % (Auto)   


 


Lymph % (Auto)   


 


Mono % (Auto)   


 


Eos % (Auto)   


 


Baso % (Auto)   


 


Neut # (Auto)   


 


Lymph # (Auto)   


 


Mono # (Auto)   


 


Eos # (Auto)   


 


Baso # (Auto)   


 


Neutrophils % (Manual)   


 


Lymphocytes % (Manual)   


 


Monocytes % (Manual)   


 


Platelet Estimate   


 


Polychromasia   


 


Anisocytosis (manual)   


 


Macrocytosis (manual)   


 


Target Cells   


 


Tear Drop Cells   


 


Ovalocytes   


 


Schistocytes   


 


PT   16.0 H 


 


INR   1.5 


 


APTT   34 


 


Sodium   


 


Potassium   


 


Chloride   


 


Carbon Dioxide   


 


Anion Gap   


 


BUN   


 


Creatinine   


 


Est GFR ( Amer)   


 


Est GFR (Non-Af Amer)   


 


POC Glucose (mg/dL)   


 


Random Glucose   


 


Calcium   


 


Phosphorus   


 


Magnesium   


 


Total Bilirubin   


 


Direct Bilirubin   


 


AST   


 


ALT   


 


Alkaline Phosphatase   


 


Total Protein   


 


Albumin   


 


Globulin   


 


Albumin/Globulin Ratio   


 


Stool Occult Blood   


 


Digoxin    0.9


 


Blood Type  O POSITIVE  


 


Antibody Screen  Negative  














  03/21/19





  05:48


 


WBC 


 


RBC 


 


Hgb 


 


Hct 


 


MCV 


 


MCH 


 


MCHC 


 


RDW 


 


Plt Count 


 


MPV 


 


Neut % (Auto) 


 


Lymph % (Auto) 


 


Mono % (Auto) 


 


Eos % (Auto) 


 


Baso % (Auto) 


 


Neut # (Auto) 


 


Lymph # (Auto) 


 


Mono # (Auto) 


 


Eos # (Auto) 


 


Baso # (Auto) 


 


Neutrophils % (Manual) 


 


Lymphocytes % (Manual) 


 


Monocytes % (Manual) 


 


Platelet Estimate 


 


Polychromasia 


 


Anisocytosis (manual) 


 


Macrocytosis (manual) 


 


Target Cells 


 


Tear Drop Cells 


 


Ovalocytes 


 


Schistocytes 


 


PT 


 


INR 


 


APTT 


 


Sodium 


 


Potassium 


 


Chloride 


 


Carbon Dioxide 


 


Anion Gap 


 


BUN 


 


Creatinine 


 


Est GFR ( Amer) 


 


Est GFR (Non-Af Amer) 


 


POC Glucose (mg/dL) 


 


Random Glucose 


 


Calcium 


 


Phosphorus 


 


Magnesium 


 


Total Bilirubin 


 


Direct Bilirubin 


 


AST 


 


ALT 


 


Alkaline Phosphatase 


 


Total Protein 


 


Albumin 


 


Globulin 


 


Albumin/Globulin Ratio 


 


Stool Occult Blood  Positive H


 


Digoxin 


 


Blood Type 


 


Antibody Screen 














Assessment & Plan


(1) GI bleed


Status: Acute   





(2) Acute blood loss anemia


Status: Acute   





(3) Sarcoidosis


Status: Chronic   Priority: Medium   





(4) Bilirubinemia


Status: Chronic   





(5) UTI (urinary tract infection) due to urinary indwelling John catheter


Status: Acute   Priority: High   





(6) Diabetes mellitus with peripheral circulatory disorder


Status: Chronic   Priority: Medium   





(7) Hx of essential hypertension


Status: Acute   





(8) Congestive heart failure


Status: Chronic

## 2019-03-21 NOTE — CP.CCUPN
CCU Objective





- Vital Signs / Intake & Output


Vital Signs (Last 4 hours): 


Vital Signs











  Temp Pulse Resp BP Pulse Ox


 


 03/21/19 08:00  97.4 F L  60  14  96/47 L  100


 


 03/21/19 07:59   59 L  10 L  96/47 L  100


 


 03/21/19 07:00   60  13   100


 


 03/21/19 06:57   60  13  104/59 L  100


 


 03/21/19 06:00   60  12   100


 


 03/21/19 05:57   60  11 L  98/56 L  100


 


 03/21/19 05:35  97 F L  60  15  88/49 L 











Intake and Output (Last 8hrs): 


                                 Intake & Output











 03/20/19 03/21/19 03/21/19





 22:59 06:59 14:59


 


Intake Total  424 10


 


Output Total 800 285 


 


Balance -800 139 10


 


Intake:   


 


  Intake, IV Amount  40 10


 


    Right Port-A-Cath  40 10


 


  Oral  0 0


 


  Blood Product  284 


 


    Apheresis Rbc Cp2d As3 Lr  284 





    1st  Unit [id number]   


 


  Other  100 


 


    Apheresis Rbc Cp2d As3 Lr  100 





    1st  Unit [id number]   


 


Output:   


 


  Urine 800 275 


 


    Urethral (Charles) 800 275 


 


  Emesis  10 


 


Other:   


 


  # Bowel Movements  2 














- Medications


Active Medications: 


Active Medications











Generic Name Dose Route Start Last Admin





  Trade Name Freq  PRN Reason Stop Dose Admin


 


Apixaban  5 mg  03/15/19 10:00  03/20/19 17:27





  Eliquis  PO   5 mg





  BID WHITNEY   Administration





     





     





     





     


 


Digoxin  0.25 mg  03/15/19 18:00  03/20/19 17:32





  Lanoxin  PO   Not Given





  DAILY@1800 WHITNEY   





     





     





     





     


 


Diphenhydramine HCl  25 mg  03/15/19 07:25  03/20/19 21:18





  Benadryl  IVP   25 mg





  Q6 PRN   Administration





  Itching / Pruritus   





     





     





     


 


Diphenoxylate HCl/Atropine  1 tab  03/14/19 22:00  





  Lomotil 0.025-2.5 Mg Tablet  PO   





  Q8 PRN   





  Diarrhea   





     





     





     


 


Gabapentin  100 mg  03/15/19 10:00  03/20/19 17:27





  Neurontin  PO   100 mg





  BID WHITNEY   Administration





     





     





     





     


 


Pantoprazole Sodium 80 mg/  100 mls @ 10 mls/hr  03/21/19 01:00  03/21/19 01:33





  Sodium Chloride  IVP   10 mls/hr





  .Q10H WHITNEY   Administration





     





     





     





  8 MG/HR   


 


Insulin Human Regular  0 unit  03/15/19 07:30  03/21/19 07:55





  Novolin R  SC   4 units





  [facility]NEY   Administration





     





     





  Protocol   





     


 


Methylprednisolone  20 mg  03/20/19 23:11  





  Solu-Medrol  IV   





  DAILY [facility]   





     





     





     





     


 


Metoclopramide HCl  5 mg  03/21/19 01:45  03/21/19 03:06





  Reglan  IVP   5 mg





  Q6 PRN   Administration





  Nausea/Vomiting   





     





     





     


 


Metoprolol Succinate  100 mg  03/18/19 21:12  03/20/19 10:35





  Toprol Xl  PO   Not Given





  DAILY [facility]   





     





     





     





     


 


Morphine Sulfate  1 mg  03/15/19 07:17  03/21/19 01:21





  Morphine  IVP   1 mg





  Q3 PRN   Administration





  Pain, moderate (4-7)   





     





     





     


 


Ondansetron HCl  8 mg  03/18/19 22:00  03/21/19 06:11





  Zofran Inj  IVP   8 mg





  Q8 [facility]   Administration





     





     





     





     


 


Oxybutynin Chloride  10 mg  03/21/19 10:00  





  Ditropan Xl  PO   





  DAILY [facility]   





     





     





     





     














- Patient Studies


Lab Studies: 


                              Microbiology Studies











 03/18/19 17:30 Blood Culture - Preliminary





 Blood-Venous    NO GROWTH AFTER 48 HOURS


 


 03/18/19 18:00 Blood Culture - Preliminary





 Blood-Venous    NO GROWTH AFTER 48 HOURS


 


 03/18/19 18:12 Urine Culture - Final





 Urine,Charles    Vancomycin Resistant E.faecium








                                   Lab Studies











  03/21/19 03/21/19 03/21/19 Range/Units





  08:49 07:41 06:56 


 


WBC    22.3 H D  (4.8-10.8)  K/uL


 


RBC    2.98 L  (3.80-5.20)  Mil/uL


 


Hgb    9.5 L  (11.0-16.0)  g/dL


 


Hct    27.7 L  (34.0-47.0)  %


 


MCV    93.1  (81.0-99.0)  fL


 


MCH    32.0 H  (27.0-31.0)  pg


 


MCHC    34.3  (33.0-37.0)  g/dL


 


RDW    18.2 H  (11.5-14.5)  %


 


Plt Count    191  (130-400)  K/uL


 


MPV    8.9  (7.2-11.7)  fL


 


Neut % (Auto)    79.3 H  (50.0-75.0)  %


 


Lymph % (Auto)    8.5 L  (20.0-40.0)  %


 


Mono % (Auto)    12.1 H  (0.0-10.0)  %


 


Eos % (Auto)    0.0  (0.0-4.0)  %


 


Baso % (Auto)    0.1  (0.0-2.0)  %


 


Neut # (Auto)    17.7 H  (1.8-7.0)  K/uL


 


Lymph # (Auto)    1.9  (1.0-4.3)  K/uL


 


Mono # (Auto)    2.7 H  (0.0-0.8)  K/uL


 


Eos # (Auto)    0.0  (0.0-0.7)  K/uL


 


Baso # (Auto)    0.0  (0.0-0.2)  K/uL


 


Neutrophils % (Manual)     (50-75)  %


 


Lymphocytes % (Manual)     (20-40)  %


 


Monocytes % (Manual)     (0-10)  %


 


Platelet Estimate     (NORMAL)  


 


Polychromasia     


 


Anisocytosis (manual)     


 


Macrocytosis (manual)     


 


Target Cells     


 


Tear Drop Cells     


 


Ovalocytes     


 


Schistocytes     


 


PT     (9.7-12.2)  SECONDS


 


INR     


 


APTT     (21-34)  SECONDS


 


Sodium     (132-148)  mmol/L


 


Potassium     (3.6-5.2)  mmol/L


 


Chloride     ()  mmol/L


 


Carbon Dioxide     (22-30)  mmol/L


 


Anion Gap     (10-20)  


 


BUN     (7-17)  mg/dL


 


Creatinine     (0.7-1.2)  mg/dL


 


Est GFR (African Amer)     


 


Est GFR (Non-Af Amer)     


 


POC Glucose (mg/dL)   259 H   ()  mg/dL


 


Random Glucose     ()  mg/dL


 


Calcium     (8.6-10.4)  mg/dl


 


Phosphorus     (2.5-4.5)  mg/dL


 


Magnesium     (1.6-2.3)  mg/dL


 


Total Bilirubin     (0.2-1.3)  mg/dL


 


AST     (14-36)  U/L


 


ALT     (9-52)  U/L


 


Alkaline Phosphatase     ()  U/L


 


Total Protein     (6.3-8.3)  g/dL


 


Albumin     (3.5-5.0)  g/dL


 


Globulin     (2.2-3.9)  gm/dL


 


Albumin/Globulin Ratio     (1.0-2.1)  


 


Urine HCG, Qual  Negative    (NEGATIVE)  


 


Stool Occult Blood     (NEGATIVE)  


 


Digoxin     (0.8-2.0)  ng/mL


 


Blood Type     


 


Antibody Screen     














  03/21/19 03/21/19 03/21/19 Range/Units





  05:48 01:15 01:10 


 


WBC     (4.8-10.8)  K/uL


 


RBC     (3.80-5.20)  Mil/uL


 


Hgb     (11.0-16.0)  g/dL


 


Hct     (34.0-47.0)  %


 


MCV     (81.0-99.0)  fL


 


MCH     (27.0-31.0)  pg


 


MCHC     (33.0-37.0)  g/dL


 


RDW     (11.5-14.5)  %


 


Plt Count     (130-400)  K/uL


 


MPV     (7.2-11.7)  fL


 


Neut % (Auto)     (50.0-75.0)  %


 


Lymph % (Auto)     (20.0-40.0)  %


 


Mono % (Auto)     (0.0-10.0)  %


 


Eos % (Auto)     (0.0-4.0)  %


 


Baso % (Auto)     (0.0-2.0)  %


 


Neut # (Auto)     (1.8-7.0)  K/uL


 


Lymph # (Auto)     (1.0-4.3)  K/uL


 


Mono # (Auto)     (0.0-0.8)  K/uL


 


Eos # (Auto)     (0.0-0.7)  K/uL


 


Baso # (Auto)     (0.0-0.2)  K/uL


 


Neutrophils % (Manual)     (50-75)  %


 


Lymphocytes % (Manual)     (20-40)  %


 


Monocytes % (Manual)     (0-10)  %


 


Platelet Estimate     (NORMAL)  


 


Polychromasia     


 


Anisocytosis (manual)     


 


Macrocytosis (manual)     


 


Target Cells     


 


Tear Drop Cells     


 


Ovalocytes     


 


Schistocytes     


 


PT    16.0 H  (9.7-12.2)  SECONDS


 


INR    1.5  


 


APTT    34  (21-34)  SECONDS


 


Sodium     (132-148)  mmol/L


 


Potassium     (3.6-5.2)  mmol/L


 


Chloride     ()  mmol/L


 


Carbon Dioxide     (22-30)  mmol/L


 


Anion Gap     (10-20)  


 


BUN     (7-17)  mg/dL


 


Creatinine     (0.7-1.2)  mg/dL


 


Est GFR (African Amer)     


 


Est GFR (Non-Af Amer)     


 


POC Glucose (mg/dL)     ()  mg/dL


 


Random Glucose     ()  mg/dL


 


Calcium     (8.6-10.4)  mg/dl


 


Phosphorus     (2.5-4.5)  mg/dL


 


Magnesium     (1.6-2.3)  mg/dL


 


Total Bilirubin     (0.2-1.3)  mg/dL


 


AST     (14-36)  U/L


 


ALT     (9-52)  U/L


 


Alkaline Phosphatase     ()  U/L


 


Total Protein     (6.3-8.3)  g/dL


 


Albumin     (3.5-5.0)  g/dL


 


Globulin     (2.2-3.9)  gm/dL


 


Albumin/Globulin Ratio     (1.0-2.1)  


 


Urine HCG, Qual     (NEGATIVE)  


 


Stool Occult Blood  Positive H    (NEGATIVE)  


 


Digoxin   0.9   (0.8-2.0)  ng/mL


 


Blood Type     


 


Antibody Screen     














  03/21/19 03/21/19 03/21/19 Range/Units





  01:10 01:10 01:10 


 


WBC    14.1 H D  (4.8-10.8)  K/uL


 


RBC    2.60 L  (3.80-5.20)  Mil/uL


 


Hgb    8.5 L D  (11.0-16.0)  g/dL


 


Hct    24.5 L  (34.0-47.0)  %


 


MCV    94.0  (81.0-99.0)  fL


 


MCH    32.6 H  (27.0-31.0)  pg


 


MCHC    34.6  (33.0-37.0)  g/dL


 


RDW    20.5 H  (11.5-14.5)  %


 


Plt Count    268  D  (130-400)  K/uL


 


MPV    10.2  (7.2-11.7)  fL


 


Neut % (Auto)    80.8 H  (50.0-75.0)  %


 


Lymph % (Auto)    9.7 L  (20.0-40.0)  %


 


Mono % (Auto)    9.4  (0.0-10.0)  %


 


Eos % (Auto)    0.0  (0.0-4.0)  %


 


Baso % (Auto)    0.1  (0.0-2.0)  %


 


Neut # (Auto)    11.4 H  (1.8-7.0)  K/uL


 


Lymph # (Auto)    1.4  (1.0-4.3)  K/uL


 


Mono # (Auto)    1.3 H  (0.0-0.8)  K/uL


 


Eos # (Auto)    0.0  (0.0-0.7)  K/uL


 


Baso # (Auto)    0.0  (0.0-0.2)  K/uL


 


Neutrophils % (Manual)    86 H  (50-75)  %


 


Lymphocytes % (Manual)    7 L  (20-40)  %


 


Monocytes % (Manual)    7  (0-10)  %


 


Platelet Estimate    Normal  (NORMAL)  


 


Polychromasia    Moderate  


 


Anisocytosis (manual)    Moderate  


 


Macrocytosis (manual)    Moderate  


 


Target Cells    Moderate  


 


Tear Drop Cells    Slight  


 


Ovalocytes    Slight  


 


Schistocytes    Slight  


 


PT     (9.7-12.2)  SECONDS


 


INR     


 


APTT     (21-34)  SECONDS


 


Sodium   133   (132-148)  mmol/L


 


Potassium   5.2   (3.6-5.2)  mmol/L


 


Chloride   109 H   ()  mmol/L


 


Carbon Dioxide   19 L   (22-30)  mmol/L


 


Anion Gap   11   (10-20)  


 


BUN   49 H   (7-17)  mg/dL


 


Creatinine   0.6 L   (0.7-1.2)  mg/dL


 


Est GFR (African Amer)   > 60   


 


Est GFR (Non-Af Amer)   > 60   


 


POC Glucose (mg/dL)     ()  mg/dL


 


Random Glucose   234 H   ()  mg/dL


 


Calcium   7.9 L   (8.6-10.4)  mg/dl


 


Phosphorus   2.4 L   (2.5-4.5)  mg/dL


 


Magnesium   2.4 H   (1.6-2.3)  mg/dL


 


Total Bilirubin   10.5 H   (0.2-1.3)  mg/dL


 


AST   161 H   (14-36)  U/L


 


ALT   172 H   (9-52)  U/L


 


Alkaline Phosphatase   226 H   ()  U/L


 


Total Protein   5.6 L   (6.3-8.3)  g/dL


 


Albumin   2.6 L   (3.5-5.0)  g/dL


 


Globulin   3.0   (2.2-3.9)  gm/dL


 


Albumin/Globulin Ratio   0.9 L   (1.0-2.1)  


 


Urine HCG, Qual     (NEGATIVE)  


 


Stool Occult Blood     (NEGATIVE)  


 


Digoxin     (0.8-2.0)  ng/mL


 


Blood Type  O POSITIVE    


 


Antibody Screen  Negative    














  03/20/19 03/20/19 03/20/19 Range/Units





  20:59 16:07 11:06 


 


WBC     (4.8-10.8)  K/uL


 


RBC     (3.80-5.20)  Mil/uL


 


Hgb     (11.0-16.0)  g/dL


 


Hct     (34.0-47.0)  %


 


MCV     (81.0-99.0)  fL


 


MCH     (27.0-31.0)  pg


 


MCHC     (33.0-37.0)  g/dL


 


RDW     (11.5-14.5)  %


 


Plt Count     (130-400)  K/uL


 


MPV     (7.2-11.7)  fL


 


Neut % (Auto)     (50.0-75.0)  %


 


Lymph % (Auto)     (20.0-40.0)  %


 


Mono % (Auto)     (0.0-10.0)  %


 


Eos % (Auto)     (0.0-4.0)  %


 


Baso % (Auto)     (0.0-2.0)  %


 


Neut # (Auto)     (1.8-7.0)  K/uL


 


Lymph # (Auto)     (1.0-4.3)  K/uL


 


Mono # (Auto)     (0.0-0.8)  K/uL


 


Eos # (Auto)     (0.0-0.7)  K/uL


 


Baso # (Auto)     (0.0-0.2)  K/uL


 


Neutrophils % (Manual)     (50-75)  %


 


Lymphocytes % (Manual)     (20-40)  %


 


Monocytes % (Manual)     (0-10)  %


 


Platelet Estimate     (NORMAL)  


 


Polychromasia     


 


Anisocytosis (manual)     


 


Macrocytosis (manual)     


 


Target Cells     


 


Tear Drop Cells     


 


Ovalocytes     


 


Schistocytes     


 


PT     (9.7-12.2)  SECONDS


 


INR     


 


APTT     (21-34)  SECONDS


 


Sodium     (132-148)  mmol/L


 


Potassium     (3.6-5.2)  mmol/L


 


Chloride     ()  mmol/L


 


Carbon Dioxide     (22-30)  mmol/L


 


Anion Gap     (10-20)  


 


BUN     (7-17)  mg/dL


 


Creatinine     (0.7-1.2)  mg/dL


 


Est GFR (African Amer)     


 


Est GFR (Non-Af Amer)     


 


POC Glucose (mg/dL)  398 H  360 H  265 H  ()  mg/dL


 


Random Glucose     ()  mg/dL


 


Calcium     (8.6-10.4)  mg/dl


 


Phosphorus     (2.5-4.5)  mg/dL


 


Magnesium     (1.6-2.3)  mg/dL


 


Total Bilirubin     (0.2-1.3)  mg/dL


 


AST     (14-36)  U/L


 


ALT     (9-52)  U/L


 


Alkaline Phosphatase     ()  U/L


 


Total Protein     (6.3-8.3)  g/dL


 


Albumin     (3.5-5.0)  g/dL


 


Globulin     (2.2-3.9)  gm/dL


 


Albumin/Globulin Ratio     (1.0-2.1)  


 


Urine HCG, Qual     (NEGATIVE)  


 


Stool Occult Blood     (NEGATIVE)  


 


Digoxin     (0.8-2.0)  ng/mL


 


Blood Type     


 


Antibody Screen     














  03/20/19 Range/Units





  10:24 


 


WBC   (4.8-10.8)  K/uL


 


RBC   (3.80-5.20)  Mil/uL


 


Hgb   (11.0-16.0)  g/dL


 


Hct   (34.0-47.0)  %


 


MCV   (81.0-99.0)  fL


 


MCH   (27.0-31.0)  pg


 


MCHC   (33.0-37.0)  g/dL


 


RDW   (11.5-14.5)  %


 


Plt Count   (130-400)  K/uL


 


MPV   (7.2-11.7)  fL


 


Neut % (Auto)   (50.0-75.0)  %


 


Lymph % (Auto)   (20.0-40.0)  %


 


Mono % (Auto)   (0.0-10.0)  %


 


Eos % (Auto)   (0.0-4.0)  %


 


Baso % (Auto)   (0.0-2.0)  %


 


Neut # (Auto)   (1.8-7.0)  K/uL


 


Lymph # (Auto)   (1.0-4.3)  K/uL


 


Mono # (Auto)   (0.0-0.8)  K/uL


 


Eos # (Auto)   (0.0-0.7)  K/uL


 


Baso # (Auto)   (0.0-0.2)  K/uL


 


Neutrophils % (Manual)   (50-75)  %


 


Lymphocytes % (Manual)   (20-40)  %


 


Monocytes % (Manual)   (0-10)  %


 


Platelet Estimate   (NORMAL)  


 


Polychromasia   


 


Anisocytosis (manual)   


 


Macrocytosis (manual)   


 


Target Cells   


 


Tear Drop Cells   


 


Ovalocytes   


 


Schistocytes   


 


PT   (9.7-12.2)  SECONDS


 


INR   


 


APTT   (21-34)  SECONDS


 


Sodium   (132-148)  mmol/L


 


Potassium   (3.6-5.2)  mmol/L


 


Chloride   ()  mmol/L


 


Carbon Dioxide   (22-30)  mmol/L


 


Anion Gap   (10-20)  


 


BUN   (7-17)  mg/dL


 


Creatinine   (0.7-1.2)  mg/dL


 


Est GFR (African Amer)   


 


Est GFR (Non-Af Amer)   


 


POC Glucose (mg/dL)  254 H  ()  mg/dL


 


Random Glucose   ()  mg/dL


 


Calcium   (8.6-10.4)  mg/dl


 


Phosphorus   (2.5-4.5)  mg/dL


 


Magnesium   (1.6-2.3)  mg/dL


 


Total Bilirubin   (0.2-1.3)  mg/dL


 


AST   (14-36)  U/L


 


ALT   (9-52)  U/L


 


Alkaline Phosphatase   ()  U/L


 


Total Protein   (6.3-8.3)  g/dL


 


Albumin   (3.5-5.0)  g/dL


 


Globulin   (2.2-3.9)  gm/dL


 


Albumin/Globulin Ratio   (1.0-2.1)  


 


Urine HCG, Qual   (NEGATIVE)  


 


Stool Occult Blood   (NEGATIVE)  


 


Digoxin   (0.8-2.0)  ng/mL


 


Blood Type   


 


Antibody Screen   








                         Laboratory Results - last 24 hr











  03/20/19 03/20/19 03/20/19





  10:24 11:06 16:07


 


WBC   


 


RBC   


 


Hgb   


 


Hct   


 


MCV   


 


MCH   


 


MCHC   


 


RDW   


 


Plt Count   


 


MPV   


 


Neut % (Auto)   


 


Lymph % (Auto)   


 


Mono % (Auto)   


 


Eos % (Auto)   


 


Baso % (Auto)   


 


Neut # (Auto)   


 


Lymph # (Auto)   


 


Mono # (Auto)   


 


Eos # (Auto)   


 


Baso # (Auto)   


 


Neutrophils % (Manual)   


 


Lymphocytes % (Manual)   


 


Monocytes % (Manual)   


 


Platelet Estimate   


 


Polychromasia   


 


Anisocytosis (manual)   


 


Macrocytosis (manual)   


 


Target Cells   


 


Tear Drop Cells   


 


Ovalocytes   


 


Schistocytes   


 


PT   


 


INR   


 


APTT   


 


Sodium   


 


Potassium   


 


Chloride   


 


Carbon Dioxide   


 


Anion Gap   


 


BUN   


 


Creatinine   


 


Est GFR ( Amer)   


 


Est GFR (Non-Af Amer)   


 


POC Glucose (mg/dL)  254 H  265 H  360 H


 


Random Glucose   


 


Calcium   


 


Phosphorus   


 


Magnesium   


 


Total Bilirubin   


 


AST   


 


ALT   


 


Alkaline Phosphatase   


 


Total Protein   


 


Albumin   


 


Globulin   


 


Albumin/Globulin Ratio   


 


Urine HCG, Qual   


 


Stool Occult Blood   


 


Digoxin   


 


Blood Type   


 


Antibody Screen   














  03/20/19 03/21/19 03/21/19





  20:59 01:10 01:10


 


WBC   14.1 H D 


 


RBC   2.60 L 


 


Hgb   8.5 L D 


 


Hct   24.5 L 


 


MCV   94.0 


 


MCH   32.6 H 


 


MCHC   34.6 


 


RDW   20.5 H 


 


Plt Count   268  D 


 


MPV   10.2 


 


Neut % (Auto)   80.8 H 


 


Lymph % (Auto)   9.7 L 


 


Mono % (Auto)   9.4 


 


Eos % (Auto)   0.0 


 


Baso % (Auto)   0.1 


 


Neut # (Auto)   11.4 H 


 


Lymph # (Auto)   1.4 


 


Mono # (Auto)   1.3 H 


 


Eos # (Auto)   0.0 


 


Baso # (Auto)   0.0 


 


Neutrophils % (Manual)   86 H 


 


Lymphocytes % (Manual)   7 L 


 


Monocytes % (Manual)   7 


 


Platelet Estimate   Normal 


 


Polychromasia   Moderate 


 


Anisocytosis (manual)   Moderate 


 


Macrocytosis (manual)   Moderate 


 


Target Cells   Moderate 


 


Tear Drop Cells   Slight 


 


Ovalocytes   Slight 


 


Schistocytes   Slight 


 


PT   


 


INR   


 


APTT   


 


Sodium    133


 


Potassium    5.2


 


Chloride    109 H


 


Carbon Dioxide    19 L


 


Anion Gap    11


 


BUN    49 H


 


Creatinine    0.6 L


 


Est GFR ( Amer)    > 60


 


Est GFR (Non-Af Amer)    > 60


 


POC Glucose (mg/dL)  398 H  


 


Random Glucose    234 H


 


Calcium    7.9 L


 


Phosphorus    2.4 L


 


Magnesium    2.4 H


 


Total Bilirubin    10.5 H


 


AST    161 H


 


ALT    172 H


 


Alkaline Phosphatase    226 H


 


Total Protein    5.6 L


 


Albumin    2.6 L


 


Globulin    3.0


 


Albumin/Globulin Ratio    0.9 L


 


Urine HCG, Qual   


 


Stool Occult Blood   


 


Digoxin   


 


Blood Type   


 


Antibody Screen   














  03/21/19 03/21/19 03/21/19





  01:10 01:10 01:15


 


WBC   


 


RBC   


 


Hgb   


 


Hct   


 


MCV   


 


MCH   


 


MCHC   


 


RDW   


 


Plt Count   


 


MPV   


 


Neut % (Auto)   


 


Lymph % (Auto)   


 


Mono % (Auto)   


 


Eos % (Auto)   


 


Baso % (Auto)   


 


Neut # (Auto)   


 


Lymph # (Auto)   


 


Mono # (Auto)   


 


Eos # (Auto)   


 


Baso # (Auto)   


 


Neutrophils % (Manual)   


 


Lymphocytes % (Manual)   


 


Monocytes % (Manual)   


 


Platelet Estimate   


 


Polychromasia   


 


Anisocytosis (manual)   


 


Macrocytosis (manual)   


 


Target Cells   


 


Tear Drop Cells   


 


Ovalocytes   


 


Schistocytes   


 


PT   16.0 H 


 


INR   1.5 


 


APTT   34 


 


Sodium   


 


Potassium   


 


Chloride   


 


Carbon Dioxide   


 


Anion Gap   


 


BUN   


 


Creatinine   


 


Est GFR ( Amer)   


 


Est GFR (Non-Af Amer)   


 


POC Glucose (mg/dL)   


 


Random Glucose   


 


Calcium   


 


Phosphorus   


 


Magnesium   


 


Total Bilirubin   


 


AST   


 


ALT   


 


Alkaline Phosphatase   


 


Total Protein   


 


Albumin   


 


Globulin   


 


Albumin/Globulin Ratio   


 


Urine HCG, Qual   


 


Stool Occult Blood   


 


Digoxin    0.9


 


Blood Type  O POSITIVE  


 


Antibody Screen  Negative  














  03/21/19 03/21/19 03/21/19





  05:48 06:56 07:41


 


WBC   22.3 H D 


 


RBC   2.98 L 


 


Hgb   9.5 L 


 


Hct   27.7 L 


 


MCV   93.1 


 


MCH   32.0 H 


 


MCHC   34.3 


 


RDW   18.2 H 


 


Plt Count   191 


 


MPV   8.9 


 


Neut % (Auto)   79.3 H 


 


Lymph % (Auto)   8.5 L 


 


Mono % (Auto)   12.1 H 


 


Eos % (Auto)   0.0 


 


Baso % (Auto)   0.1 


 


Neut # (Auto)   17.7 H 


 


Lymph # (Auto)   1.9 


 


Mono # (Auto)   2.7 H 


 


Eos # (Auto)   0.0 


 


Baso # (Auto)   0.0 


 


Neutrophils % (Manual)   


 


Lymphocytes % (Manual)   


 


Monocytes % (Manual)   


 


Platelet Estimate   


 


Polychromasia   


 


Anisocytosis (manual)   


 


Macrocytosis (manual)   


 


Target Cells   


 


Tear Drop Cells   


 


Ovalocytes   


 


Schistocytes   


 


PT   


 


INR   


 


APTT   


 


Sodium   


 


Potassium   


 


Chloride   


 


Carbon Dioxide   


 


Anion Gap   


 


BUN   


 


Creatinine   


 


Est GFR ( Amer)   


 


Est GFR (Non-Af Amer)   


 


POC Glucose (mg/dL)    259 H


 


Random Glucose   


 


Calcium   


 


Phosphorus   


 


Magnesium   


 


Total Bilirubin   


 


AST   


 


ALT   


 


Alkaline Phosphatase   


 


Total Protein   


 


Albumin   


 


Globulin   


 


Albumin/Globulin Ratio   


 


Urine HCG, Qual   


 


Stool Occult Blood  Positive H  


 


Digoxin   


 


Blood Type   


 


Antibody Screen   














  03/21/19





  08:49


 


WBC 


 


RBC 


 


Hgb 


 


Hct 


 


MCV 


 


MCH 


 


MCHC 


 


RDW 


 


Plt Count 


 


MPV 


 


Neut % (Auto) 


 


Lymph % (Auto) 


 


Mono % (Auto) 


 


Eos % (Auto) 


 


Baso % (Auto) 


 


Neut # (Auto) 


 


Lymph # (Auto) 


 


Mono # (Auto) 


 


Eos # (Auto) 


 


Baso # (Auto) 


 


Neutrophils % (Manual) 


 


Lymphocytes % (Manual) 


 


Monocytes % (Manual) 


 


Platelet Estimate 


 


Polychromasia 


 


Anisocytosis (manual) 


 


Macrocytosis (manual) 


 


Target Cells 


 


Tear Drop Cells 


 


Ovalocytes 


 


Schistocytes 


 


PT 


 


INR 


 


APTT 


 


Sodium 


 


Potassium 


 


Chloride 


 


Carbon Dioxide 


 


Anion Gap 


 


BUN 


 


Creatinine 


 


Est GFR ( Amer) 


 


Est GFR (Non-Af Amer) 


 


POC Glucose (mg/dL) 


 


Random Glucose 


 


Calcium 


 


Phosphorus 


 


Magnesium 


 


Total Bilirubin 


 


AST 


 


ALT 


 


Alkaline Phosphatase 


 


Total Protein 


 


Albumin 


 


Globulin 


 


Albumin/Globulin Ratio 


 


Urine HCG, Qual  Negative


 


Stool Occult Blood 


 


Digoxin 


 


Blood Type 


 


Antibody Screen 











Fingerstick Blood Sugar Results: 259





Critical Care Progress Note





- Nutrition


Nutrition: 


                                    Nutrition











 Category Date Time Status


 


 NPO Diet [DIET] Diets  03/21/19 Lunch Active

## 2019-03-21 NOTE — CP.[email or web address].PN
Subjective





- Date & Time of Evaluation


Date of Evaluation: 03/21/19


Time of Evaluation: 09:20





- Subjective


Subjective: 





dictated   





Objective





- Vital Signs/Intake and Output


Vital Signs (last 24 hours): 


                                        











Temp Pulse Resp BP Pulse Ox


 


 97.0 F L  62   13   123/55 L  100 


 


 03/21/19 16:00  03/21/19 19:00  03/21/19 19:00  03/21/19 18:43  03/21/19 18:43








Intake and Output: 


                                        











 03/21/19 03/22/19





 18:59 06:59


 


Intake Total 420 60


 


Output Total 790 185


 


Balance -370 -125














- Medications


Medications: 


                               Current Medications





Clobetasol Propionate (Temovate 0.05%)  0 gm TOP Q6H [facility]


Digoxin (Lanoxin)  0.25 mg PO DAILY@1800 [facility]


   Last Admin: 03/21/19 18:27 Dose:  Not Given


Diphenoxylate HCl/Atropine (Lomotil 0.025-2.5 Mg Tablet)  1 tab PO Q8 PRN


   PRN Reason: Diarrhea


Gabapentin (Neurontin)  100 mg PO BID [facility]


   Last Admin: 03/21/19 18:28 Dose:  Not Given


Pantoprazole Sodium 80 mg/ (Sodium Chloride)  100 mls @ 10 mls/hr IVPB .Q10H [facility]


   Last Admin: 03/21/19 11:21 Dose:  10 mls/hr


Insulin Human Regular (Novolin R)  0 unit SC ACHS [facility]; Protocol


   Last Admin: 03/21/19 21:32 Dose:  Not Given


Methylprednisolone (Solu-Medrol)  40 mg IV DAILY [facility]


Metoprolol Succinate (Toprol Xl)  100 mg PO DAILY [facility]


   Last Admin: 03/20/19 10:35 Dose:  Not Given


Morphine Sulfate (Morphine)  1 mg IVP Q3 PRN


   PRN Reason: Pain, moderate (4-7)


   Last Admin: 03/21/19 20:14 Dose:  1 mg


Oxybutynin Chloride (Ditropan Xl)  10 mg PO DAILY [facility]


   Last Admin: 03/21/19 11:37 Dose:  Not Given











- Labs


Labs: 


                                        





                                 03/21/19 06:56 





                                 03/21/19 01:10 





                                        











PT  16.0 SECONDS (9.7-12.2)  H  03/21/19  01:10    


 


INR  1.5   03/21/19  01:10    


 


APTT  34 SECONDS (21-34)   03/21/19  01:10

## 2019-03-22 LAB
ALBUMIN SERPL-MCNC: 2.6 G/DL (ref 3.5–5)
ALBUMIN/GLOB SERPL: 1 {RATIO} (ref 1–2.1)
ALT SERPL-CCNC: 175 U/L (ref 9–52)
AST SERPL-CCNC: 169 U/L (ref 14–36)
BASOPHILS # BLD AUTO: 0 K/UL (ref 0–0.2)
BASOPHILS NFR BLD: 0 % (ref 0–2)
BUN SERPL-MCNC: 36 MG/DL (ref 7–17)
CALCIUM SERPL-MCNC: 8.1 MG/DL (ref 8.6–10.4)
EOSINOPHIL # BLD AUTO: 0 K/UL (ref 0–0.7)
EOSINOPHIL NFR BLD: 0 % (ref 0–4)
ERYTHROCYTE [DISTWIDTH] IN BLOOD BY AUTOMATED COUNT: 17 % (ref 11.5–14.5)
ERYTHROCYTE [DISTWIDTH] IN BLOOD BY AUTOMATED COUNT: 18.6 % (ref 11.5–14.5)
GFR NON-AFRICAN AMERICAN: > 60
HGB BLD-MCNC: 10.7 G/DL (ref 11–16)
HGB BLD-MCNC: 6.8 G/DL (ref 11–16)
LYMPHOCYTES # BLD AUTO: 1.2 K/UL (ref 1–4.3)
LYMPHOCYTES NFR BLD AUTO: 12 % (ref 20–40)
MCH RBC QN AUTO: 31.9 PG (ref 27–31)
MCH RBC QN AUTO: 32.5 PG (ref 27–31)
MCHC RBC AUTO-ENTMCNC: 35 G/DL (ref 33–37)
MCHC RBC AUTO-ENTMCNC: 35.1 G/DL (ref 33–37)
MCV RBC AUTO: 91.4 FL (ref 81–99)
MCV RBC AUTO: 92.6 FL (ref 81–99)
MONOCYTES # BLD: 0.7 K/UL (ref 0–0.8)
MONOCYTES NFR BLD: 6.9 % (ref 0–10)
NEUTROPHILS # BLD: 8.1 K/UL (ref 1.8–7)
NEUTROPHILS NFR BLD AUTO: 81.1 % (ref 50–75)
NRBC BLD AUTO-RTO: 0.3 % (ref 0–2)
PLATELET # BLD: 127 K/UL (ref 130–400)
PLATELET # BLD: 139 K/UL (ref 130–400)
PMV BLD AUTO: 8.6 FL (ref 7.2–11.7)
PMV BLD AUTO: 9.2 FL (ref 7.2–11.7)
RBC # BLD AUTO: 2.08 MIL/UL (ref 3.8–5.2)
RBC # BLD AUTO: 3.35 MIL/UL (ref 3.8–5.2)
WBC # BLD AUTO: 10 K/UL (ref 4.8–10.8)
WBC # BLD AUTO: 9.2 K/UL (ref 4.8–10.8)

## 2019-03-22 PROCEDURE — 0DB68ZX EXCISION OF STOMACH, VIA NATURAL OR ARTIFICIAL OPENING ENDOSCOPIC, DIAGNOSTIC: ICD-10-PCS | Performed by: SPECIALIST

## 2019-03-22 RX ADMIN — CLOBETASOL PROPIONATE SCH APPLIC: 0.5 OINTMENT TOPICAL at 03:04

## 2019-03-22 RX ADMIN — HUMAN INSULIN SCH: 100 INJECTION, SOLUTION SUBCUTANEOUS at 08:21

## 2019-03-22 RX ADMIN — HUMAN INSULIN SCH: 100 INJECTION, SOLUTION SUBCUTANEOUS at 12:30

## 2019-03-22 RX ADMIN — HUMAN INSULIN SCH: 100 INJECTION, SOLUTION SUBCUTANEOUS at 16:09

## 2019-03-22 RX ADMIN — DIGOXIN SCH: 0.25 TABLET ORAL at 17:30

## 2019-03-22 RX ADMIN — SUCRALFATE SCH GM: 1 SUSPENSION ORAL at 21:35

## 2019-03-22 RX ADMIN — CLOBETASOL PROPIONATE SCH: 0.5 OINTMENT TOPICAL at 15:26

## 2019-03-22 RX ADMIN — CLOBETASOL PROPIONATE SCH: 0.5 OINTMENT TOPICAL at 10:53

## 2019-03-22 RX ADMIN — DIPHENOXYLATE HYDROCHLORIDE AND ATROPINE SULFATE PRN TAB: 2.5; .025 TABLET ORAL at 13:22

## 2019-03-22 RX ADMIN — METHYLPREDNISOLONE SODIUM SUCCINATE SCH MG: 40 INJECTION, POWDER, FOR SOLUTION INTRAMUSCULAR; INTRAVENOUS at 10:15

## 2019-03-22 RX ADMIN — OXYBUTYNIN CHLORIDE SCH: 10 TABLET, EXTENDED RELEASE ORAL at 09:37

## 2019-03-22 RX ADMIN — HUMAN INSULIN SCH: 100 INJECTION, SOLUTION SUBCUTANEOUS at 22:00

## 2019-03-22 RX ADMIN — CLOBETASOL PROPIONATE SCH: 0.5 OINTMENT TOPICAL at 21:15

## 2019-03-22 NOTE — CP.PCM.PN
Subjective





- Date & Time of Evaluation


Date of Evaluation: 03/22/19


Time of Evaluation: 09:40





- Subjective


Subjective: 





dictated   





Objective





- Vital Signs/Intake and Output


Vital Signs (last 24 hours): 


                                        











Temp Pulse Resp BP Pulse Ox


 


 100.2 F H  58 L  12   122/67   100 


 


 03/22/19 20:00  03/22/19 19:00  03/22/19 19:00  03/22/19 18:52  03/22/19 20:00








Intake and Output: 


                                        











 03/22/19 03/23/19





 18:59 06:59


 


Intake Total 1230 100


 


Output Total 770 80


 


Balance 460 20














- Medications


Medications: 


                               Current Medications





Clobetasol Propionate (Temovate 0.05%)  0 gm TOP Q6H [facility]


   Last Admin: 03/22/19 15:26 Dose:  Not Given


Digoxin (Lanoxin)  0.25 mg PO DAILY@1800 [facility]


   Last Admin: 03/22/19 17:30 Dose:  Not Given


Diphenhydramine HCl (Benadryl)  25 mg PO Q8H PRN


   PRN Reason: Itching / Pruritus


   Last Admin: 03/22/19 15:23 Dose:  25 mg


Diphenoxylate HCl/Atropine (Lomotil 0.025-2.5 Mg Tablet)  1 tab PO Q8 PRN


   PRN Reason: Diarrhea


   Last Admin: 03/22/19 13:22 Dose:  1 tab


Fluconazole (Diflucan)  200 mg PO DAILY [facility]; Protocol


   Last Admin: 03/22/19 12:39 Dose:  200 mg


Gabapentin (Neurontin)  100 mg PO BID [facility]


   Last Admin: 03/22/19 17:58 Dose:  100 mg


Insulin Human Regular (Novolin R)  0 unit SC [facility]; Protocol


   Last Admin: 03/22/19 16:09 Dose:  Not Given


Methylprednisolone (Solu-Medrol)  40 mg IV DAILY [facility]


   Last Admin: 03/22/19 10:15 Dose:  40 mg


Metoprolol Succinate (Toprol Xl)  100 mg PO DAILY [facility]


   Last Admin: 03/20/19 10:35 Dose:  Not Given


Morphine Sulfate (Morphine)  1 mg IVP Q3H PRN


   PRN Reason: Pain, severe (8-10)


   Last Admin: 03/22/19 19:34 Dose:  1 mg


Oxybutynin Chloride (Ditropan Xl)  10 mg PO DAILY [facility]


   Last Admin: 03/22/19 09:37 Dose:  Not Given


Pantoprazole Sodium (Protonix Inj)  40 mg IVP DAILY [facility]


Sucralfate (Carafate Oral Susp)  1 gm PO [facility]


   Last Admin: 03/22/19 21:35 Dose:  1 gm











- Labs


Labs: 


                                        





                                 03/22/19 06:25 





                                 03/22/19 06:20 





                                        











PT  16.0 SECONDS (9.7-12.2)  H  03/21/19  01:10    


 


INR  1.5   03/21/19  01:10    


 


APTT  34 SECONDS (21-34)   03/21/19  01:10

## 2019-03-22 NOTE — CP.CCUPN
<Lorne Mims - Last Filed: 03/22/19 14:37>





CCU Subjective





- Physician Review


Subjective (Free Text): 


ICU Progress Note for Dr. Castañeda





Pt seen and examined at bedside this am. Denies any acute complaints. Per 

overnight report, pt had melena. Currently NPO for repeat EGD today with Dr. Davalos

in. Denies headache, dizziness, chest pain, sob, n/v or other symptoms. 





CCU Objective





- Vital Signs / Intake & Output


Vital Signs (Last 4 hours): 


Vital Signs











  Temp Pulse Resp BP Pulse Ox


 


 03/22/19 13:46  97.8 F  55 L  14  124/50 L 


 


 03/22/19 13:16  97.6 F  57 L  18  140/42 L 


 


 03/22/19 13:04   57 L  14  137/45 L 


 


 03/22/19 13:02   57 L  14  128/46 L 


 


 03/22/19 13:01  97.5 F L  57 L  11 L  62/41 L 


 


 03/22/19 13:00   57 L  13  


 


 03/22/19 12:49   57 L  9 L  108/50 L 


 


 03/22/19 12:46  98.7 F  57 L  14  124/58 L 


 


 03/22/19 12:45   56 L  12  124/58 L 


 


 03/22/19 12:34   56 L  9 L  125/56 L  100


 


 03/22/19 12:19   57 L  11 L  132/56 L 


 


 03/22/19 12:04   56 L  13  127/43 L 


 


 03/22/19 12:00  98.7 F  55 L  17   100


 


 03/22/19 11:49   55 L  13  130/52 L 


 


 03/22/19 11:48   56 L   


 


 03/22/19 11:21  97.7 F  55 L  8 L  129/70  100


 


 03/22/19 10:00   55 L  8 L   100











Intake and Output (Last 8hrs): 


                                 Intake & Output











 03/21/19 03/22/19 03/22/19





 22:59 06:59 14:59


 


Intake Total 580 580 480


 


Output Total 715 950 410


 


Balance -135 -370 70


 


Weight  104 lb 11.2 oz 


 


Intake:   


 


  IV   100


 


  Intake, IV Amount 480 480 260


 


    Left Port-A-Cath 400 400 200


 


    Right Port-A-Cath 80 80 60


 


  Oral 100 100 120


 


  Blood Product   0


 


    Apheresis Rbc Cp2d As3 Lr   0





    1st  Unit [id number]   


 


Output:   


 


  Urine 715 950 410


 


    Urethral (John) 715 950 410


 


Other:   


 


  # Bowel Movements 1 1 0














- Physical Exam


Head: Positive for: Atraumatic, Normocephalic


Pupils: Positive for: PERRL


Extroacular Muscles: Positive for: EOMI


Conjunctiva: Positive for: Normal


Mouth: Positive for: Moist Mucous Membranes


Neck: Positive for: Normal Range of Motion.  Negative for: JVD


Respiratory/Chest: Positive for: Clear to Auscultation.  Negative for: Wheezes, 

Rales, Rhonchi


Cardiovascular: Positive for: Normal S1, S2, Bradycardic.  Negative for: 

Murmurs, Rub, Gallop


Abdomen: Positive for: Normal Bowel Sounds.  Negative for: Tenderness, 

Distention, Mass/Organomegaly


Upper Extremity: Positive for: Normal Inspection, Normal ROM, NORMAL PULSES, 

Neurovascularly Intact, Capillary Refill < 2s


Lower Extremity: Positive for: Other (s/p bilateral AKA)


Neurological: Positive for: GCS=15, CN II-XII Intact


Skin: Positive for: Warm, Dry, Normal Color


Psychiatric: Positive for: Alert, Oriented x 3





- Medications


Active Medications: 


Active Medications











Generic Name Dose Route Start Last Admin





  Trade Name Freq  PRN Reason Stop Dose Admin


 


Clobetasol Propionate  0 gm  03/21/19 21:15  03/22/19 10:53





  Temovate 0.05%  TOP   Not Given





  Q6H WHITNEY   





     





     





     





     


 


Digoxin  0.25 mg  03/15/19 18:00  03/21/19 18:27





  Lanoxin  PO   Not Given





  DAILY@1800 WHITNEY   





     





     





     





     


 


Diphenoxylate HCl/Atropine  1 tab  03/14/19 22:00  03/22/19 13:22





  Lomotil 0.025-2.5 Mg Tablet  PO   1 tab





  Q8 PRN   Administration





  Diarrhea   





     





     





     


 


Fluconazole  200 mg  03/22/19 11:30  03/22/19 12:39





  Diflucan  PO   200 mg





  DAILY WHITNEY   Administration





     





     





  Protocol   





     


 


Gabapentin  100 mg  03/15/19 10:00  03/22/19 09:37





  Neurontin  PO   Not Given





  BID WHITNEY   





     





     





     





     


 


Insulin Human Regular  0 unit  03/15/19 07:30  03/22/19 12:30





  Novolin R  SC   Not Given





  [facility] WHITNEY   





     





     





  Protocol   





     


 


Methylprednisolone  40 mg  03/21/19 11:47  03/22/19 10:15





  Solu-Medrol  IV   40 mg





  DAILY WHITNEY   Administration





     





     





     





     


 


Metoprolol Succinate  100 mg  03/18/19 21:12  03/20/19 10:35





  Toprol Xl  PO   Not Given





  DAILY Replaced by [facility]   





     





     





     





     


 


Morphine Sulfate  1 mg  03/22/19 12:51  03/22/19 13:13





  Morphine  IVP   1 mg





  Q3H PRN   Administration





  Pain, severe (8-10)   





     





     





     


 


Oxybutynin Chloride  10 mg  03/21/19 10:00  03/22/19 09:37





  Ditropan Xl  PO   Not Given





  DAILY Replaced by [facility]   





     





     





     





     


 


Pantoprazole Sodium  40 mg  03/23/19 10:00  





  Protonix Inj  IVP   





  DAILY Replaced by [facility]   





     





     





     





     


 


Sucralfate  1 gm  03/22/19 22:00  





  Carafate Oral Susp  PO   





  [facility] WHITNEY   





     





     





     





     














- Patient Studies


Lab Studies: 


                              Microbiology Studies











 03/21/19 05:48 MRSA Culture (Admit) - Final





 Nose    MRSA NOT DETECTED


 


 03/20/19 20:22 Urine Culture - Final





 Urine,John    Yeast Species


 


 03/18/19 17:30 Blood Culture - Preliminary





 Blood-Venous    NO GROWTH AFTER 3 DAYS


 


 03/18/19 18:00 Blood Culture - Preliminary





 Blood-Venous    NO GROWTH AFTER 3 DAYS








                                   Lab Studies











  03/22/19 03/22/19 03/22/19 Range/Units





  11:52 07:20 06:25 


 


WBC    10.0  D  (4.8-10.8)  K/uL


 


RBC    2.08 L  (3.80-5.20)  Mil/uL


 


Hgb    6.8 L D  (11.0-16.0)  g/dL


 


Hct    19.3 L  (34.0-47.0)  %


 


MCV    92.6  (81.0-99.0)  fL


 


MCH    32.5 H  (27.0-31.0)  pg


 


MCHC    35.1  (33.0-37.0)  g/dL


 


RDW    18.6 H  (11.5-14.5)  %


 


Plt Count    139  (130-400)  K/uL


 


MPV    9.2  (7.2-11.7)  fL


 


Neut % (Auto)    81.1 H  (50.0-75.0)  %


 


Lymph % (Auto)    12.0 L  (20.0-40.0)  %


 


Mono % (Auto)    6.9  (0.0-10.0)  %


 


Eos % (Auto)    0.0  (0.0-4.0)  %


 


Baso % (Auto)    0.0  (0.0-2.0)  %


 


Neut # (Auto)    8.1 H  (1.8-7.0)  K/uL


 


Lymph # (Auto)    1.2  (1.0-4.3)  K/uL


 


Mono # (Auto)    0.7  (0.0-0.8)  K/uL


 


Eos # (Auto)    0.0  (0.0-0.7)  K/uL


 


Baso # (Auto)    0.0  (0.0-0.2)  K/uL


 


Sodium     (132-148)  mmol/L


 


Potassium     (3.6-5.2)  mmol/L


 


Chloride     ()  mmol/L


 


Carbon Dioxide     (22-30)  mmol/L


 


Anion Gap     (10-20)  


 


BUN     (7-17)  mg/dL


 


Creatinine     (0.7-1.2)  mg/dL


 


Est GFR (African Amer)     


 


Est GFR (Non-Af Amer)     


 


POC Glucose (mg/dL)  101  94   ()  mg/dL


 


Random Glucose     ()  mg/dL


 


Calcium     (8.6-10.4)  mg/dl


 


Phosphorus     (2.5-4.5)  mg/dL


 


Magnesium     (1.6-2.3)  mg/dL


 


Total Bilirubin     (0.2-1.3)  mg/dL


 


AST     (14-36)  U/L


 


ALT     (9-52)  U/L


 


Alkaline Phosphatase     ()  U/L


 


Total Protein     (6.3-8.3)  g/dL


 


Albumin     (3.5-5.0)  g/dL


 


Globulin     (2.2-3.9)  gm/dL


 


Albumin/Globulin Ratio     (1.0-2.1)  


 


Blood Type     


 


Antibody Screen     














  03/22/19 03/21/19 03/21/19 Range/Units





  06:20 21:11 16:17 


 


WBC     (4.8-10.8)  K/uL


 


RBC     (3.80-5.20)  Mil/uL


 


Hgb     (11.0-16.0)  g/dL


 


Hct     (34.0-47.0)  %


 


MCV     (81.0-99.0)  fL


 


MCH     (27.0-31.0)  pg


 


MCHC     (33.0-37.0)  g/dL


 


RDW     (11.5-14.5)  %


 


Plt Count     (130-400)  K/uL


 


MPV     (7.2-11.7)  fL


 


Neut % (Auto)     (50.0-75.0)  %


 


Lymph % (Auto)     (20.0-40.0)  %


 


Mono % (Auto)     (0.0-10.0)  %


 


Eos % (Auto)     (0.0-4.0)  %


 


Baso % (Auto)     (0.0-2.0)  %


 


Neut # (Auto)     (1.8-7.0)  K/uL


 


Lymph # (Auto)     (1.0-4.3)  K/uL


 


Mono # (Auto)     (0.0-0.8)  K/uL


 


Eos # (Auto)     (0.0-0.7)  K/uL


 


Baso # (Auto)     (0.0-0.2)  K/uL


 


Sodium  134    (132-148)  mmol/L


 


Potassium  4.6    (3.6-5.2)  mmol/L


 


Chloride  106    ()  mmol/L


 


Carbon Dioxide  21 L    (22-30)  mmol/L


 


Anion Gap  13    (10-20)  


 


BUN  36 H    (7-17)  mg/dL


 


Creatinine  0.7    (0.7-1.2)  mg/dL


 


Est GFR (African Amer)  > 60    


 


Est GFR (Non-Af Amer)  > 60    


 


POC Glucose (mg/dL)   182 H  263 H  ()  mg/dL


 


Random Glucose  95  D    ()  mg/dL


 


Calcium  8.1 L    (8.6-10.4)  mg/dl


 


Phosphorus  2.9    (2.5-4.5)  mg/dL


 


Magnesium  2.4 H    (1.6-2.3)  mg/dL


 


Total Bilirubin  12.3 H    (0.2-1.3)  mg/dL


 


AST  169 H    (14-36)  U/L


 


ALT  175 H    (9-52)  U/L


 


Alkaline Phosphatase  217 H    ()  U/L


 


Total Protein  5.4 L    (6.3-8.3)  g/dL


 


Albumin  2.6 L    (3.5-5.0)  g/dL


 


Globulin  2.7    (2.2-3.9)  gm/dL


 


Albumin/Globulin Ratio  1.0    (1.0-2.1)  


 


Blood Type     


 


Antibody Screen     














  03/21/19 Range/Units





  01:10 


 


WBC   (4.8-10.8)  K/uL


 


RBC   (3.80-5.20)  Mil/uL


 


Hgb   (11.0-16.0)  g/dL


 


Hct   (34.0-47.0)  %


 


MCV   (81.0-99.0)  fL


 


MCH   (27.0-31.0)  pg


 


MCHC   (33.0-37.0)  g/dL


 


RDW   (11.5-14.5)  %


 


Plt Count   (130-400)  K/uL


 


MPV   (7.2-11.7)  fL


 


Neut % (Auto)   (50.0-75.0)  %


 


Lymph % (Auto)   (20.0-40.0)  %


 


Mono % (Auto)   (0.0-10.0)  %


 


Eos % (Auto)   (0.0-4.0)  %


 


Baso % (Auto)   (0.0-2.0)  %


 


Neut # (Auto)   (1.8-7.0)  K/uL


 


Lymph # (Auto)   (1.0-4.3)  K/uL


 


Mono # (Auto)   (0.0-0.8)  K/uL


 


Eos # (Auto)   (0.0-0.7)  K/uL


 


Baso # (Auto)   (0.0-0.2)  K/uL


 


Sodium   (132-148)  mmol/L


 


Potassium   (3.6-5.2)  mmol/L


 


Chloride   ()  mmol/L


 


Carbon Dioxide   (22-30)  mmol/L


 


Anion Gap   (10-20)  


 


BUN   (7-17)  mg/dL


 


Creatinine   (0.7-1.2)  mg/dL


 


Est GFR (African Amer)   


 


Est GFR (Non-Af Amer)   


 


POC Glucose (mg/dL)   ()  mg/dL


 


Random Glucose   ()  mg/dL


 


Calcium   (8.6-10.4)  mg/dl


 


Phosphorus   (2.5-4.5)  mg/dL


 


Magnesium   (1.6-2.3)  mg/dL


 


Total Bilirubin   (0.2-1.3)  mg/dL


 


AST   (14-36)  U/L


 


ALT   (9-52)  U/L


 


Alkaline Phosphatase   ()  U/L


 


Total Protein   (6.3-8.3)  g/dL


 


Albumin   (3.5-5.0)  g/dL


 


Globulin   (2.2-3.9)  gm/dL


 


Albumin/Globulin Ratio   (1.0-2.1)  


 


Blood Type  O POSITIVE  


 


Antibody Screen  Negative  








                         Laboratory Results - last 24 hr











  03/21/19 03/21/19 03/21/19





  01:10 16:17 21:11


 


WBC   


 


RBC   


 


Hgb   


 


Hct   


 


MCV   


 


MCH   


 


MCHC   


 


RDW   


 


Plt Count   


 


MPV   


 


Neut % (Auto)   


 


Lymph % (Auto)   


 


Mono % (Auto)   


 


Eos % (Auto)   


 


Baso % (Auto)   


 


Neut # (Auto)   


 


Lymph # (Auto)   


 


Mono # (Auto)   


 


Eos # (Auto)   


 


Baso # (Auto)   


 


Sodium   


 


Potassium   


 


Chloride   


 


Carbon Dioxide   


 


Anion Gap   


 


BUN   


 


Creatinine   


 


Est GFR ( Amer)   


 


Est GFR (Non-Af Amer)   


 


POC Glucose (mg/dL)   263 H  182 H


 


Random Glucose   


 


Calcium   


 


Phosphorus   


 


Magnesium   


 


Total Bilirubin   


 


AST   


 


ALT   


 


Alkaline Phosphatase   


 


Total Protein   


 


Albumin   


 


Globulin   


 


Albumin/Globulin Ratio   


 


Blood Type  O POSITIVE  


 


Antibody Screen  Negative  














  03/22/19 03/22/19 03/22/19





  06:20 06:25 07:20


 


WBC   10.0  D 


 


RBC   2.08 L 


 


Hgb   6.8 L D 


 


Hct   19.3 L 


 


MCV   92.6 


 


MCH   32.5 H 


 


MCHC   35.1 


 


RDW   18.6 H 


 


Plt Count   139 


 


MPV   9.2 


 


Neut % (Auto)   81.1 H 


 


Lymph % (Auto)   12.0 L 


 


Mono % (Auto)   6.9 


 


Eos % (Auto)   0.0 


 


Baso % (Auto)   0.0 


 


Neut # (Auto)   8.1 H 


 


Lymph # (Auto)   1.2 


 


Mono # (Auto)   0.7 


 


Eos # (Auto)   0.0 


 


Baso # (Auto)   0.0 


 


Sodium  134  


 


Potassium  4.6  


 


Chloride  106  


 


Carbon Dioxide  21 L  


 


Anion Gap  13  


 


BUN  36 H  


 


Creatinine  0.7  


 


Est GFR ( Amer)  > 60  


 


Est GFR (Non-Af Amer)  > 60  


 


POC Glucose (mg/dL)    94


 


Random Glucose  95  D  


 


Calcium  8.1 L  


 


Phosphorus  2.9  


 


Magnesium  2.4 H  


 


Total Bilirubin  12.3 H  


 


AST  169 H  


 


ALT  175 H  


 


Alkaline Phosphatase  217 H  


 


Total Protein  5.4 L  


 


Albumin  2.6 L  


 


Globulin  2.7  


 


Albumin/Globulin Ratio  1.0  


 


Blood Type   


 


Antibody Screen   














  03/22/19





  11:52


 


WBC 


 


RBC 


 


Hgb 


 


Hct 


 


MCV 


 


MCH 


 


MCHC 


 


RDW 


 


Plt Count 


 


MPV 


 


Neut % (Auto) 


 


Lymph % (Auto) 


 


Mono % (Auto) 


 


Eos % (Auto) 


 


Baso % (Auto) 


 


Neut # (Auto) 


 


Lymph # (Auto) 


 


Mono # (Auto) 


 


Eos # (Auto) 


 


Baso # (Auto) 


 


Sodium 


 


Potassium 


 


Chloride 


 


Carbon Dioxide 


 


Anion Gap 


 


BUN 


 


Creatinine 


 


Est GFR ( Amer) 


 


Est GFR (Non-Af Amer) 


 


POC Glucose (mg/dL)  101


 


Random Glucose 


 


Calcium 


 


Phosphorus 


 


Magnesium 


 


Total Bilirubin 


 


AST 


 


ALT 


 


Alkaline Phosphatase 


 


Total Protein 


 


Albumin 


 


Globulin 


 


Albumin/Globulin Ratio 


 


Blood Type 


 


Antibody Screen 











Fingerstick Blood Sugar Results: 101





Review of Systems





- Review of Systems


All systems: reviewed and no additional remarkable complaints except





- Gastrointestinal


Gastrointestinal: Melena





Critical Care Progress Note





- Nutrition


Nutrition: 


                                    Nutrition











 Category Date Time Status


 


 Liquid Diet [DIET] Diets  03/22/19 Lunch Active














Assessment/Plan





- Assessment and Plan (Free Text)


Assessment: 


53 y o female PMhx CHF, s/p AICD, CAD, HTN, HLD, DM, PAD, s/p AKA b/l, Liver Cheryl

coidosis, Neurogenic bladder, indwelling john, cholangitis who presented on 

admission with c/o abd pain and found to have hyperbilirubinemia. Pt s/p EGD on 

3/18/19 with biliary stent change. Found to have VRE in urine, ID consulted. Pt 

had RRT on 3/21/19 in early am for hematemesis. At time pt c/o weakness and some

 abd discomfort. Pt was found at time to have Hgb drop to 8.5. Admitted to ICU 

for management of upper GI bleed. S/p EGD with Dr. Olmedo yesterday, which 

revealed bleeding vessel, epi x1 given to site with bleeding resolved. NPO today

 for repeat EGD for possible cauterization of former bleeding site. Hgb today 

6.8. Will cont to trend, transfuse PRBC prn. 


Plan: 


Neuro:


-AAOx3, s/p AKA b/l


-Neurogenic bladder


   Oxybutynin daily


   Indwelling john   


   Gabapentin bid





Cardio:


-Hx CHF


-Hx AICD


-Hx CAD


-Hx HTN, HLD


-Hx PAD s/p b/l AKA


-Atropine/diphenoxylate


-Digoxin


-Toprol XL


-Echo done 3/21, results pending


-Bradycardia, likely 2/2 pacemaker


-BP stable, cont to monitor





GI:


-Upper GI bleed


   Hgb 6.8, cont to trend, transfuse prn


-Positive stool occult blood


-S/p RRT for hematemesis on 3/21


-S/p EGD with biliary stent change on 3/18


-Hx Hepatic Sarcoidosis


-Hx cholangitis


-S/p EGD with Dr. Olmedo yesterday, which revealed bleeding vessel, epi x1 given 

to site with bleeding resolved


-NPO today for repeat EGD for possible cauterization of former bleeding site


-GI consulted (Dr. Olmedo), recs appreciated


-Gen Surgery (Dr. Murphy) consulted for liver sarcoidosis, recs appreciated


-Morphine prn for pain


-Diflucan added for esophageal candidiasis


-Transaminitis stable


-Protonix daily


-Sucralfate





Pulm:


-No acute issues at this time


-On daily Solu-Medrol


-C/w NC


-Maintain O2 sat > 92%





ID:


-VRE in urine


-Dr. Tsang (ID) consulted, recs appreciated


-Abx stopped as per ID recs, s/p Zyvox


-Leukocytosis resolved, afebrile, cont to trend





Renal:


-VRE in urine


-Neurogenic bladder, s/p b/l AKA, indwelling john


-BUN/Cr wnl


-Urology consulted, recs appreciated





Heme:


-H/H 6.8/19.3


-Acute drop may be 2/2 to acute GI bleed


-Transfuse PRBC prn


-S/p FFP transfusion yesterday x1





Pt seen, examined with, and plan discussed with Dr. Castañeda, attending phys

sydni.





Lorne Mims DO PGY-1, Family Medicine Resident


Pager #[phone]


























<Samuel Castañeda - Last Filed: 03/22/19 15:59>





CCU Objective





- Vital Signs / Intake & Output


Vital Signs (Last 4 hours): 


Vital Signs











  Temp Pulse Resp BP Pulse Ox


 


 03/22/19 15:50  97.7 F  53 L  16  129/53 L 


 


 03/22/19 15:27  97.7 F  54 L  12  131/45 L 


 


 03/22/19 15:26   54 L  12  131/45 L 


 


 03/22/19 15:00   55 L  24  


 


 03/22/19 14:00   54 L  10 L   100


 


 03/22/19 13:46  97.8 F  55 L  14  124/50 L 


 


 03/22/19 13:45   55 L  8 L  124/50 L  100


 


 03/22/19 13:34   55 L  17  134/53 L 


 


 03/22/19 13:18   57 L  14  140/42 L 


 


 03/22/19 13:16  97.6 F  57 L  18  140/42 L 


 


 03/22/19 13:04   57 L  14  137/45 L 


 


 03/22/19 13:02   57 L  14  128/46 L 


 


 03/22/19 13:01  97.5 F L  57 L  11 L  62/41 L 


 


 03/22/19 13:00   57 L  13  


 


 03/22/19 12:49   57 L  9 L  108/50 L 


 


 03/22/19 12:46  98.7 F  57 L  14  124/58 L 


 


 03/22/19 12:45   56 L  12  124/58 L 


 


 03/22/19 12:34   56 L  9 L  125/56 L  100


 


 03/22/19 12:19   57 L  11 L  132/56 L 


 


 03/22/19 12:04   56 L  13  127/43 L 


 


 03/22/19 12:00  98.7 F  55 L  17   100











Intake and Output (Last 8hrs): 


                                 Intake & Output











 03/22/19 03/22/19 03/22/19





 06:59 14:59 22:59


 


Intake Total 580 480 275


 


Output Total 950 470 40


 


Balance -370 10 235


 


Weight 104 lb 11.2 oz  


 


Intake:   


 


  IV  100 


 


  Intake, IV Amount 480 260 


 


    Left Port-A-Cath 400 200 


 


    Right Port-A-Cath 80 60 


 


  Oral 100 120 0


 


  Blood Product  0 275


 


    Apheresis Rbc Cp2d As3 Lr  0 275





    1st  Unit [id number]   


 


    Red Blood Cells Cpd As1   0





    Lr  Unit [id number]   


 


Output:   


 


  Urine 950 470 40


 


    Urethral (John) 950 470 40


 


Other:   


 


  # Bowel Movements 1 0 2














- Medications


Active Medications: 


Active Medications











Generic Name Dose Route Start Last Admin





  Trade Name Freq  PRN Reason Stop Dose Admin


 


Clobetasol Propionate  0 gm  03/21/19 21:15  03/22/19 15:26





  Temovate 0.05%  TOP   Not Given





  Q6H WHITNEY   





     





     





     





     


 


Digoxin  0.25 mg  03/15/19 18:00  03/21/19 18:27





  Lanoxin  PO   Not Given





  DAILY@1800 WHITNEY   





     





     





     





     


 


Diphenhydramine HCl  25 mg  03/22/19 14:50  03/22/19 15:23





  Benadryl  PO   25 mg





  Q8H PRN   Administration





  Itching / Pruritus   





     





     





     


 


Diphenoxylate HCl/Atropine  1 tab  03/14/19 22:00  03/22/19 13:22





  Lomotil 0.025-2.5 Mg Tablet  PO   1 tab





  Q8 PRN   Administration





  Diarrhea   





     





     





     


 


Fluconazole  200 mg  03/22/19 11:30  03/22/19 12:39





  Diflucan  PO   200 mg





  DAILY Replaced by [facility]   Administration





     





     





  Protocol   





     


 


Gabapentin  100 mg  03/15/19 10:00  03/22/19 09:37





  Neurontin  PO   Not Given





  BID Replaced by [facility]   





     





     





     





     


 


Insulin Human Regular  0 unit  03/15/19 07:30  03/22/19 12:30





  Novolin R  SC   Not Given





  [facility] Replaced by [facility]   





     





     





  Protocol   





     


 


Methylprednisolone  40 mg  03/21/19 11:47  03/22/19 10:15





  Solu-Medrol  IV   40 mg





  DAILY Replaced by [facility]   Administration





     





     





     





     


 


Metoprolol Succinate  100 mg  03/18/19 21:12  03/20/19 10:35





  Toprol Xl  PO   Not Given





  DAILY Replaced by [facility]   





     





     





     





     


 


Morphine Sulfate  1 mg  03/22/19 12:51  03/22/19 13:13





  Morphine  IVP   1 mg





  Q3H PRN   Administration





  Pain, severe (8-10)   





     





     





     


 


Oxybutynin Chloride  10 mg  03/21/19 10:00  03/22/19 09:37





  Ditropan Xl  PO   Not Given





  DAILY Replaced by [facility]   





     





     





     





     


 


Pantoprazole Sodium  40 mg  03/23/19 10:00  





  Protonix Inj  IVP   





  DAILY Replaced by [facility]   





     





     





     





     


 


Sucralfate  1 gm  03/22/19 22:00  





  Carafate Oral Susp  PO   





  [facility] Replaced by [facility]   





     





     





     





     














- Patient Studies


Lab Studies: 


                              Microbiology Studies











 03/21/19 05:48 MRSA Culture (Admit) - Final





 Nose    MRSA NOT DETECTED


 


 03/20/19 20:22 Urine Culture - Final





 Urine,John    Yeast Species


 


 03/18/19 17:30 Blood Culture - Preliminary





 Blood-Venous    NO GROWTH AFTER 3 DAYS


 


 03/18/19 18:00 Blood Culture - Preliminary





 Blood-Venous    NO GROWTH AFTER 3 DAYS








                                   Lab Studies











  03/22/19 03/22/19 03/22/19 Range/Units





  11:52 07:20 06:25 


 


WBC    10.0  D  (4.8-10.8)  K/uL


 


RBC    2.08 L  (3.80-5.20)  Mil/uL


 


Hgb    6.8 L D  (11.0-16.0)  g/dL


 


Hct    19.3 L  (34.0-47.0)  %


 


MCV    92.6  (81.0-99.0)  fL


 


MCH    32.5 H  (27.0-31.0)  pg


 


MCHC    35.1  (33.0-37.0)  g/dL


 


RDW    18.6 H  (11.5-14.5)  %


 


Plt Count    139  (130-400)  K/uL


 


MPV    9.2  (7.2-11.7)  fL


 


Neut % (Auto)    81.1 H  (50.0-75.0)  %


 


Lymph % (Auto)    12.0 L  (20.0-40.0)  %


 


Mono % (Auto)    6.9  (0.0-10.0)  %


 


Eos % (Auto)    0.0  (0.0-4.0)  %


 


Baso % (Auto)    0.0  (0.0-2.0)  %


 


Neut # (Auto)    8.1 H  (1.8-7.0)  K/uL


 


Lymph # (Auto)    1.2  (1.0-4.3)  K/uL


 


Mono # (Auto)    0.7  (0.0-0.8)  K/uL


 


Eos # (Auto)    0.0  (0.0-0.7)  K/uL


 


Baso # (Auto)    0.0  (0.0-0.2)  K/uL


 


Sodium     (132-148)  mmol/L


 


Potassium     (3.6-5.2)  mmol/L


 


Chloride     ()  mmol/L


 


Carbon Dioxide     (22-30)  mmol/L


 


Anion Gap     (10-20)  


 


BUN     (7-17)  mg/dL


 


Creatinine     (0.7-1.2)  mg/dL


 


Est GFR (African Amer)     


 


Est GFR (Non-Af Amer)     


 


POC Glucose (mg/dL)  101  94   ()  mg/dL


 


Random Glucose     ()  mg/dL


 


Calcium     (8.6-10.4)  mg/dl


 


Phosphorus     (2.5-4.5)  mg/dL


 


Magnesium     (1.6-2.3)  mg/dL


 


Total Bilirubin     (0.2-1.3)  mg/dL


 


AST     (14-36)  U/L


 


ALT     (9-52)  U/L


 


Alkaline Phosphatase     ()  U/L


 


Total Protein     (6.3-8.3)  g/dL


 


Albumin     (3.5-5.0)  g/dL


 


Globulin     (2.2-3.9)  gm/dL


 


Albumin/Globulin Ratio     (1.0-2.1)  


 


Blood Type     


 


Antibody Screen     














  03/22/19 03/21/19 03/21/19 Range/Units





  06:20 21:11 16:17 


 


WBC     (4.8-10.8)  K/uL


 


RBC     (3.80-5.20)  Mil/uL


 


Hgb     (11.0-16.0)  g/dL


 


Hct     (34.0-47.0)  %


 


MCV     (81.0-99.0)  fL


 


MCH     (27.0-31.0)  pg


 


MCHC     (33.0-37.0)  g/dL


 


RDW     (11.5-14.5)  %


 


Plt Count     (130-400)  K/uL


 


MPV     (7.2-11.7)  fL


 


Neut % (Auto)     (50.0-75.0)  %


 


Lymph % (Auto)     (20.0-40.0)  %


 


Mono % (Auto)     (0.0-10.0)  %


 


Eos % (Auto)     (0.0-4.0)  %


 


Baso % (Auto)     (0.0-2.0)  %


 


Neut # (Auto)     (1.8-7.0)  K/uL


 


Lymph # (Auto)     (1.0-4.3)  K/uL


 


Mono # (Auto)     (0.0-0.8)  K/uL


 


Eos # (Auto)     (0.0-0.7)  K/uL


 


Baso # (Auto)     (0.0-0.2)  K/uL


 


Sodium  134    (132-148)  mmol/L


 


Potassium  4.6    (3.6-5.2)  mmol/L


 


Chloride  106    ()  mmol/L


 


Carbon Dioxide  21 L    (22-30)  mmol/L


 


Anion Gap  13    (10-20)  


 


BUN  36 H    (7-17)  mg/dL


 


Creatinine  0.7    (0.7-1.2)  mg/dL


 


Est GFR (African Amer)  > 60    


 


Est GFR (Non-Af Amer)  > 60    


 


POC Glucose (mg/dL)   182 H  263 H  ()  mg/dL


 


Random Glucose  95  D    ()  mg/dL


 


Calcium  8.1 L    (8.6-10.4)  mg/dl


 


Phosphorus  2.9    (2.5-4.5)  mg/dL


 


Magnesium  2.4 H    (1.6-2.3)  mg/dL


 


Total Bilirubin  12.3 H    (0.2-1.3)  mg/dL


 


AST  169 H    (14-36)  U/L


 


ALT  175 H    (9-52)  U/L


 


Alkaline Phosphatase  217 H    ()  U/L


 


Total Protein  5.4 L    (6.3-8.3)  g/dL


 


Albumin  2.6 L    (3.5-5.0)  g/dL


 


Globulin  2.7    (2.2-3.9)  gm/dL


 


Albumin/Globulin Ratio  1.0    (1.0-2.1)  


 


Blood Type     


 


Antibody Screen     














  03/21/19 Range/Units





  01:10 


 


WBC   (4.8-10.8)  K/uL


 


RBC   (3.80-5.20)  Mil/uL


 


Hgb   (11.0-16.0)  g/dL


 


Hct   (34.0-47.0)  %


 


MCV   (81.0-99.0)  fL


 


MCH   (27.0-31.0)  pg


 


MCHC   (33.0-37.0)  g/dL


 


RDW   (11.5-14.5)  %


 


Plt Count   (130-400)  K/uL


 


MPV   (7.2-11.7)  fL


 


Neut % (Auto)   (50.0-75.0)  %


 


Lymph % (Auto)   (20.0-40.0)  %


 


Mono % (Auto)   (0.0-10.0)  %


 


Eos % (Auto)   (0.0-4.0)  %


 


Baso % (Auto)   (0.0-2.0)  %


 


Neut # (Auto)   (1.8-7.0)  K/uL


 


Lymph # (Auto)   (1.0-4.3)  K/uL


 


Mono # (Auto)   (0.0-0.8)  K/uL


 


Eos # (Auto)   (0.0-0.7)  K/uL


 


Baso # (Auto)   (0.0-0.2)  K/uL


 


Sodium   (132-148)  mmol/L


 


Potassium   (3.6-5.2)  mmol/L


 


Chloride   ()  mmol/L


 


Carbon Dioxide   (22-30)  mmol/L


 


Anion Gap   (10-20)  


 


BUN   (7-17)  mg/dL


 


Creatinine   (0.7-1.2)  mg/dL


 


Est GFR (African Amer)   


 


Est GFR (Non-Af Amer)   


 


POC Glucose (mg/dL)   ()  mg/dL


 


Random Glucose   ()  mg/dL


 


Calcium   (8.6-10.4)  mg/dl


 


Phosphorus   (2.5-4.5)  mg/dL


 


Magnesium   (1.6-2.3)  mg/dL


 


Total Bilirubin   (0.2-1.3)  mg/dL


 


AST   (14-36)  U/L


 


ALT   (9-52)  U/L


 


Alkaline Phosphatase   ()  U/L


 


Total Protein   (6.3-8.3)  g/dL


 


Albumin   (3.5-5.0)  g/dL


 


Globulin   (2.2-3.9)  gm/dL


 


Albumin/Globulin Ratio   (1.0-2.1)  


 


Blood Type  O POSITIVE  


 


Antibody Screen  Negative  








                         Laboratory Results - last 24 hr











  03/21/19 03/21/19 03/21/19





  01:10 16:17 21:11


 


WBC   


 


RBC   


 


Hgb   


 


Hct   


 


MCV   


 


MCH   


 


MCHC   


 


RDW   


 


Plt Count   


 


MPV   


 


Neut % (Auto)   


 


Lymph % (Auto)   


 


Mono % (Auto)   


 


Eos % (Auto)   


 


Baso % (Auto)   


 


Neut # (Auto)   


 


Lymph # (Auto)   


 


Mono # (Auto)   


 


Eos # (Auto)   


 


Baso # (Auto)   


 


Sodium   


 


Potassium   


 


Chloride   


 


Carbon Dioxide   


 


Anion Gap   


 


BUN   


 


Creatinine   


 


Est GFR ( Amer)   


 


Est GFR (Non-Af Amer)   


 


POC Glucose (mg/dL)   263 H  182 H


 


Random Glucose   


 


Calcium   


 


Phosphorus   


 


Magnesium   


 


Total Bilirubin   


 


AST   


 


ALT   


 


Alkaline Phosphatase   


 


Total Protein   


 


Albumin   


 


Globulin   


 


Albumin/Globulin Ratio   


 


Blood Type  O POSITIVE  


 


Antibody Screen  Negative  














  03/22/19 03/22/19 03/22/19





  06:20 06:25 07:20


 


WBC   10.0  D 


 


RBC   2.08 L 


 


Hgb   6.8 L D 


 


Hct   19.3 L 


 


MCV   92.6 


 


MCH   32.5 H 


 


MCHC   35.1 


 


RDW   18.6 H 


 


Plt Count   139 


 


MPV   9.2 


 


Neut % (Auto)   81.1 H 


 


Lymph % (Auto)   12.0 L 


 


Mono % (Auto)   6.9 


 


Eos % (Auto)   0.0 


 


Baso % (Auto)   0.0 


 


Neut # (Auto)   8.1 H 


 


Lymph # (Auto)   1.2 


 


Mono # (Auto)   0.7 


 


Eos # (Auto)   0.0 


 


Baso # (Auto)   0.0 


 


Sodium  134  


 


Potassium  4.6  


 


Chloride  106  


 


Carbon Dioxide  21 L  


 


Anion Gap  13  


 


BUN  36 H  


 


Creatinine  0.7  


 


Est GFR ( Amer)  > 60  


 


Est GFR (Non-Af Amer)  > 60  


 


POC Glucose (mg/dL)    94


 


Random Glucose  95  D  


 


Calcium  8.1 L  


 


Phosphorus  2.9  


 


Magnesium  2.4 H  


 


Total Bilirubin  12.3 H  


 


AST  169 H  


 


ALT  175 H  


 


Alkaline Phosphatase  217 H  


 


Total Protein  5.4 L  


 


Albumin  2.6 L  


 


Globulin  2.7  


 


Albumin/Globulin Ratio  1.0  


 


Blood Type   


 


Antibody Screen   














  03/22/19





  11:52


 


WBC 


 


RBC 


 


Hgb 


 


Hct 


 


MCV 


 


MCH 


 


MCHC 


 


RDW 


 


Plt Count 


 


MPV 


 


Neut % (Auto) 


 


Lymph % (Auto) 


 


Mono % (Auto) 


 


Eos % (Auto) 


 


Baso % (Auto) 


 


Neut # (Auto) 


 


Lymph # (Auto) 


 


Mono # (Auto) 


 


Eos # (Auto) 


 


Baso # (Auto) 


 


Sodium 


 


Potassium 


 


Chloride 


 


Carbon Dioxide 


 


Anion Gap 


 


BUN 


 


Creatinine 


 


Est GFR ( Amer) 


 


Est GFR (Non-Af Amer) 


 


POC Glucose (mg/dL)  101


 


Random Glucose 


 


Calcium 


 


Phosphorus 


 


Magnesium 


 


Total Bilirubin 


 


AST 


 


ALT 


 


Alkaline Phosphatase 


 


Total Protein 


 


Albumin 


 


Globulin 


 


Albumin/Globulin Ratio 


 


Blood Type 


 


Antibody Screen 














Critical Care Progress Note





- Nutrition


Nutrition: 


                                    Nutrition











 Category Date Time Status


 


 Liquid Diet [DIET] Diets  03/22/19 Lunch Active














Attending/Attestation





- Attestation


I have personally seen and examined this patient.: Yes


I have fully participated in the care of the patient.: Yes


I have reviewed all pertinent clinical information: Yes


Notes (Text): 





03/22/19 15:57


I have seen and examined the patient.  Medical records, lab studies, and imaging

 were reviewed by me and a management plan was formulated on multidisciplinary 

rounds with resident Dr. Mims. I agree with their documented assessment and 

plan.





Patient underwent repeat EGD today.  Ulcer was no longer bleeding, no further in

tervention was needed as per GI.  Patient returned to ICU stable.  Patient 

should be monitored in hospital for 48 hours more as rebleeding risk is still 

high.  Clinically stable for downgrade to telemetry floors.





Critical Care Time 35 minutes.





Multi-disciplinary rounds were performed with house staff, nursing, speech 

therapy, respiratory therapy, pharmacy and nutrition with integrated input from 

the primary team/attending and other consulting services.  The documented time 

is cumulative and includes review of patient data/exams/labs/chart review and 

examination of the patient on rounds and throughout the day; time is exclusive 

of any procedures or teaching time.

## 2019-03-22 NOTE — PN
DATE:  03/21/2019



SUBJECTIVE:  The patient, Laura Jaramillo, had rapid response _____ side when

she claimed to have hematemesis.  The patient was transferred down to ICU. 

She is in ICU.  She is status post endoscopy.  No fever.  No chills.  Her

WBC is elevated.



PHYSICAL EXAMINATION:

VITAL SIGNS:  Blood pressure 123/55, pulse 62, respiratory rate 20,

temperature 98.

LUNGS:  Clear.  No rales.  No rhonchi.

CARDIOVASCULAR:  S1 and S2, regular.

ABDOMEN:  Soft, nontender.  Bowel sounds are positive.



ASSESSMENT:

1.  Recurrent gastrointestinal bleed.  The patient has positive fecal

occult blood in the stool.  The patient has elevated white blood cell count

which could be acute phase reactant versus septicemia.

2.  Poorly controlled diabetes.

3.  Hypertension.

4.  Congestive heart failure.

5.  Hepatic sarcoidosis with advanced liver disease.



PLAN:  Blood transfusion as needed.  Repeat labs.  Monitor the patient in

ICU and monitor the patient.





__________________________________________

Nasir Dunbar MD





DD:  03/21/2019 22:14:23

DT:  03/22/2019 0:22:32

Job # [id number]

## 2019-03-23 LAB
ALBUMIN SERPL-MCNC: 2.7 G/DL (ref 3.5–5)
ALBUMIN/GLOB SERPL: 1 {RATIO} (ref 1–2.1)
ALT SERPL-CCNC: 206 U/L (ref 9–52)
AST SERPL-CCNC: 202 U/L (ref 14–36)
BASOPHILS # BLD AUTO: 0 K/UL (ref 0–0.2)
BASOPHILS NFR BLD: 0.2 % (ref 0–2)
BUN SERPL-MCNC: 23 MG/DL (ref 7–17)
CALCIUM SERPL-MCNC: 7.9 MG/DL (ref 8.6–10.4)
EOSINOPHIL # BLD AUTO: 0 K/UL (ref 0–0.7)
EOSINOPHIL NFR BLD: 0.3 % (ref 0–4)
ERYTHROCYTE [DISTWIDTH] IN BLOOD BY AUTOMATED COUNT: 16.9 % (ref 11.5–14.5)
GFR NON-AFRICAN AMERICAN: > 60
HGB BLD-MCNC: 10.1 G/DL (ref 11–16)
LYMPHOCYTES # BLD AUTO: 1.3 K/UL (ref 1–4.3)
LYMPHOCYTES NFR BLD AUTO: 16 % (ref 20–40)
MCH RBC QN AUTO: 32.1 PG (ref 27–31)
MCHC RBC AUTO-ENTMCNC: 35.1 G/DL (ref 33–37)
MCV RBC AUTO: 91.6 FL (ref 81–99)
MONOCYTES # BLD: 0.8 K/UL (ref 0–0.8)
MONOCYTES NFR BLD: 9.2 % (ref 0–10)
NEUTROPHILS # BLD: 6.1 K/UL (ref 1.8–7)
NEUTROPHILS NFR BLD AUTO: 74.3 % (ref 50–75)
NRBC BLD AUTO-RTO: 0.6 % (ref 0–2)
PLATELET # BLD: 115 K/UL (ref 130–400)
PMV BLD AUTO: 8.7 FL (ref 7.2–11.7)
RBC # BLD AUTO: 3.13 MIL/UL (ref 3.8–5.2)
WBC # BLD AUTO: 8.3 K/UL (ref 4.8–10.8)

## 2019-03-23 RX ADMIN — HUMAN INSULIN SCH UNITS: 100 INJECTION, SOLUTION SUBCUTANEOUS at 17:21

## 2019-03-23 RX ADMIN — CLOBETASOL PROPIONATE SCH: 0.5 OINTMENT TOPICAL at 11:49

## 2019-03-23 RX ADMIN — HUMAN INSULIN SCH UNITS: 100 INJECTION, SOLUTION SUBCUTANEOUS at 07:54

## 2019-03-23 RX ADMIN — HUMAN INSULIN SCH: 100 INJECTION, SOLUTION SUBCUTANEOUS at 21:57

## 2019-03-23 RX ADMIN — OXYBUTYNIN CHLORIDE SCH MG: 10 TABLET, EXTENDED RELEASE ORAL at 10:03

## 2019-03-23 RX ADMIN — CLOBETASOL PROPIONATE SCH: 0.5 OINTMENT TOPICAL at 17:15

## 2019-03-23 RX ADMIN — SUCRALFATE SCH GM: 1 SUSPENSION ORAL at 07:54

## 2019-03-23 RX ADMIN — DIGOXIN SCH: 0.25 TABLET ORAL at 17:21

## 2019-03-23 RX ADMIN — CLOBETASOL PROPIONATE SCH: 0.5 OINTMENT TOPICAL at 03:15

## 2019-03-23 RX ADMIN — METHYLPREDNISOLONE SODIUM SUCCINATE SCH MG: 40 INJECTION, POWDER, FOR SOLUTION INTRAMUSCULAR; INTRAVENOUS at 10:04

## 2019-03-23 RX ADMIN — HUMAN INSULIN SCH UNITS: 100 INJECTION, SOLUTION SUBCUTANEOUS at 11:46

## 2019-03-23 RX ADMIN — SUCRALFATE SCH GM: 1 SUSPENSION ORAL at 21:09

## 2019-03-23 RX ADMIN — CLOBETASOL PROPIONATE SCH: 0.5 OINTMENT TOPICAL at 21:15

## 2019-03-23 NOTE — PN
DATE:  03/23/2019



LOCATION:  ICU, 4.



SUBJECTIVE:  This is a 53-year-old female post upper endoscopy due to

recurrent GI bleeding, was orally intubated with reported low grade

temperature, due to respiratory insufficiency.



The patient has NG tube in place, sedated.



The entire chart is reviewed including, but not limited to the most recent

lab and radiology study results, and today's lab results showed hemoglobin

of 10.7, hematocrit 30.6 post blood transfusion with thrombocytopenia of

127 as per yesterday, 03/22/2019.  The patient still has elevated liver

function test including total bilirubin.



PHYSICAL EXAMINATION:

GENERAL:  A 53-year-old female.

VITAL SIGNS:  Afebrile with pulse of 64, blood pressure of 128/62.

HEENT:  Showed pale dry oral mucous membrane.  Bilateral icteric sclerae.

LUNGS:  Few scattered crepitation.  Decreased air entry at bases.

HEART:  Positive S1 and S2.

ABDOMEN:  Soft with mild generalized tenderness.  No mass or organomegaly. 

No rebound tenderness or guarding.  Abdominal distention noticed.

EXTREMITIES:  With evidence of bilateral above-knee amputation.

NEUROLOGIC:  No reported new significant neurological deficit.  The patient

is sedated.



IMPRESSION:

1.  Respiratory insufficiency, the patient is intubated recently.

2.  Known history of liver sarcoidosis.

3.  Recent history of gastrointestinal bleeding due to bleeding apparently

blood cell in the second portion of the duodenum.

4.  Abnormal liver function tests secondary to above with papillary

stenosis and status post endoscopic retrograde cholangiopancreatography

with biliary stent insertion.

5.  Multiple past medical history including, but not limited to cardiac

arrhythmias, hypertension, poorly controlled diabetes mellitus, peripheral

vascular disease with bilateral above-knee amputation.



SUGGESTIONS:

1.  Continue current management.

2.  Cancer markers including CEA and alpha-fetoprotein.

3.  Chest CAT scan.

4.  Case to be discussed with the admitting medical staff.





__________________________________________

Jeffrey Albert MD





DD:  03/23/2019 6:37:15

DT:  03/23/2019 6:40:03

Job # [id number]

## 2019-03-23 NOTE — PN
DATE:  03/22/2019



SUBJECTIVE:  The patient, Shivers, still has a drop in hemoglobin with a

low-grade fever.  Her hemoglobin went down to 6.8.  She has been under ICU

care, and she has been ordered blood transfusion.  The patient's urine

culture is positive for enterococcus faecium which is vancomycin resistant,

and the patient has nausea.  She has some epigastric _____ back pain but

she has fever.



PHYSICAL EXAMINATION:

VITAL SIGNS:  Blood pressure 122/67, pulse 55, respiratory rate 12, and

temperature 100.2.

SKIN:  Dry.  Poor turgor.

LUNGS:  Clear.  No rales.  No rhonchi.

CARDIOVASCULAR SYSTEM:  S1 and S2.  Regular.

ABDOMEN:  Enlarged liver with yellow discoloration of the eyes.



ASSESSMENT:

1.  Hepatic sarcoidosis with end-stage liver disease due to hepatic

sarcoidosis.

2.  Gastrointestinal bleed, most likely it is upper gastrointestinal bleed,

status post blood transfusion.  Continue to monitor the patient in

intensive care unit.

3.  Type 2 diabetes.

4.  Hypertension.



PLAN:  Endoscopy showed a medium sized hiatal hernia.  Along with that,

there were mucosal breaks involving at least 75% of esophageal

circumference.  The patient has patchy erythema without bleeding in the

gastric antrum and biopsy was taken and the patient is for ICU monitoring.





__________________________________________

Nasir Dunbar MD





DD:  03/22/2019 22:20:45

DT:  03/22/2019 23:38:12

Job # [id number]

## 2019-03-23 NOTE — CP.PCM.PN
Subjective





- Date & Time of Evaluation


Date of Evaluation: 03/23/19


Time of Evaluation: 09:00





- Subjective


Subjective: 





dictated   





Objective





- Vital Signs/Intake and Output


Vital Signs (last 24 hours): 


                                        











Temp Pulse Resp BP Pulse Ox


 


 98.9 F   60   18   150/47 L  100 


 


 03/23/19 16:00  03/23/19 16:00  03/23/19 16:00  03/23/19 12:00  03/23/19 16:00








Intake and Output: 


                                        











 03/23/19 03/24/19





 18:59 06:59


 


Intake Total 2050 


 


Output Total 1700 


 


Balance 350 














- Medications


Medications: 


                               Current Medications





Clobetasol Propionate (Temovate 0.05%)  0 gm TOP Q6H [facility]


   Last Admin: 03/23/19 17:15 Dose:  Not Given


Digoxin (Lanoxin)  0.25 mg PO DAILY@1800 [facility]


   Last Admin: 03/23/19 17:21 Dose:  Not Given


Diphenhydramine HCl (Benadryl)  25 mg PO Q8H PRN


   PRN Reason: Itching / Pruritus


   Last Admin: 03/23/19 16:12 Dose:  25 mg


Diphenoxylate HCl/Atropine (Lomotil 0.025-2.5 Mg Tablet)  1 tab PO Q8 PRN


   PRN Reason: Diarrhea


   Last Admin: 03/22/19 13:22 Dose:  1 tab


Fluconazole (Diflucan)  200 mg PO DAILY [facility]; Protocol


   Last Admin: 03/23/19 10:03 Dose:  200 mg


Gabapentin (Neurontin)  100 mg PO BID [facility]


   Last Admin: 03/23/19 17:21 Dose:  100 mg


Insulin Human Regular (Novolin R)  0 unit SC [facility]; Protocol


   Last Admin: 03/23/19 17:21 Dose:  8 units


Methylprednisolone (Solu-Medrol)  40 mg IV DAILY [facility]


   Last Admin: 03/23/19 10:04 Dose:  40 mg


Metoprolol Succinate (Toprol Xl)  100 mg PO DAILY [facility]


   Last Admin: 03/20/19 10:35 Dose:  Not Given


Morphine Sulfate (Morphine)  1 mg IVP Q3H PRN


   PRN Reason: Pain, severe (8-10)


   Last Admin: 03/23/19 16:11 Dose:  1 mg


Ondansetron HCl (Zofran Inj)  4 mg IVP Q6H PRN


   PRN Reason: Nausea/Vomiting


Oxybutynin Chloride (Ditropan Xl)  10 mg PO DAILY [facility]


   Last Admin: 03/23/19 10:03 Dose:  10 mg


Pantoprazole Sodium (Protonix Inj)  40 mg IVP DAILY [facility]


   Last Admin: 03/23/19 10:03 Dose:  40 mg


Sucralfate (Carafate Oral Susp)  1 gm PO ACBHS [facility]


   Last Admin: 03/23/19 07:54 Dose:  1 gm











- Labs


Labs: 


                                        





                                 03/23/19 06:28 





                                 03/23/19 06:27 





                                        











PT  16.0 SECONDS (9.7-12.2)  H  03/21/19  01:10    


 


INR  1.5   03/21/19  01:10    


 


APTT  34 SECONDS (21-34)   03/21/19  01:10

## 2019-03-23 NOTE — CP.PCM.PN
Subjective





- Date & Time of Evaluation


Date of Evaluation: 03/23/19


Time of Evaluation: 21:25





- Subjective


Subjective: 





EVENTS NOTED.


S/P UGI BLEED.


 S/P EGD 3/21/19, 3/22/19


FINDINGS NOTED- 





PT SEEN IN ICU TODAY 3/23/19


C/O EPIGASTRIC DISCOMFORT.


ICTERIC.








Objective





- Vital Signs/Intake and Output


Vital Signs (last 24 hours): 


                                        











Temp Pulse Resp BP Pulse Ox


 


 97.9 F   60   18   108/89   99 


 


 03/23/19 20:00  03/23/19 20:00  03/23/19 20:00  03/23/19 20:00  03/23/19 20:00








Intake and Output: 


                                        











 03/23/19 03/24/19





 18:59 06:59


 


Intake Total 2050 


 


Output Total 1700 


 


Balance 350 














- Medications


Medications: 


                               Current Medications





Clobetasol Propionate (Temovate 0.05%)  0 gm TOP Q6H [facility]


   Last Admin: 03/23/19 17:15 Dose:  Not Given


Digoxin (Lanoxin)  0.25 mg PO DAILY@1800 [facility]


   Last Admin: 03/23/19 17:21 Dose:  Not Given


Diphenhydramine HCl (Benadryl)  25 mg PO Q8H PRN


   PRN Reason: Itching / Pruritus


   Last Admin: 03/23/19 16:12 Dose:  25 mg


Diphenoxylate HCl/Atropine (Lomotil 0.025-2.5 Mg Tablet)  1 tab PO Q8 PRN


   PRN Reason: Diarrhea


   Last Admin: 03/22/19 13:22 Dose:  1 tab


Fluconazole (Diflucan)  200 mg PO DAILY [facility]; Protocol


   Last Admin: 03/23/19 10:03 Dose:  200 mg


Gabapentin (Neurontin)  100 mg PO BID [facility]


   Last Admin: 03/23/19 17:21 Dose:  100 mg


Insulin Human Regular (Novolin R)  0 unit SC [facility]; Protocol


   Last Admin: 03/23/19 17:21 Dose:  8 units


Methylprednisolone (Solu-Medrol)  40 mg IV DAILY [facility]


   Last Admin: 03/23/19 10:04 Dose:  40 mg


Metoprolol Succinate (Toprol Xl)  100 mg PO DAILY [facility]


   Last Admin: 03/20/19 10:35 Dose:  Not Given


Morphine Sulfate (Morphine)  1 mg IVP Q3H PRN


   PRN Reason: Pain, severe (8-10)


   Last Admin: 03/23/19 20:35 Dose:  1 mg


Ondansetron HCl (Zofran Inj)  4 mg IVP Q6H PRN


   PRN Reason: Nausea/Vomiting


   Last Admin: 03/23/19 20:34 Dose:  4 mg


Oxybutynin Chloride (Ditropan Xl)  10 mg PO DAILY [facility]


   Last Admin: 03/23/19 10:03 Dose:  10 mg


Pantoprazole Sodium (Protonix Inj)  40 mg IVP DAILY [facility]


   Last Admin: 03/23/19 10:03 Dose:  40 mg


Sucralfate (Carafate Oral Susp)  1 gm PO ACBHS [facility]


   Last Admin: 03/23/19 21:09 Dose:  1 gm











- Labs


Labs: 


                                        





                                 03/23/19 06:28 





                                 03/23/19 06:27 





                                        











PT  16.0 SECONDS (9.7-12.2)  H  03/21/19  01:10    


 


INR  1.5   03/21/19  01:10    


 


APTT  34 SECONDS (21-34)   03/21/19  01:10    














- Constitutional


Appears: No Acute Distress





- Head Exam


Head Exam: NORMAL INSPECTION





- Eye Exam


Eye Exam: EOMI, PERRL, Scleral icterus





- ENT Exam


ENT Exam: Normal Oropharynx





- Neck Exam


Neck Exam: Normal Inspection





- Respiratory Exam


Respiratory Exam: Clear to Ausculation Bilateral, NORMAL BREATHING PATTERN





- Cardiovascular Exam


Cardiovascular Exam: REGULAR RHYTHM, +S1, +S2





- GI/Abdominal Exam


GI & Abdominal Exam: Soft, Tenderness (EPIGASTRIC AND RIGHT UPPER QUADRANT), 

Normal Bowel Sounds





- Extremities Exam


Additional comments: 





B/L AKA





- Neurological Exam


Neurological Exam: Awake, CN II-XII Intact, Oriented x3





- Psychiatric Exam


Psychiatric exam: Normal Mood





- Skin


Skin Exam: Normal Color, Warm





Assessment and Plan


(1) GI bleed


Assessment & Plan: 


S/P EGD .


S PER GI


PRESENTLY NO LONGER BLEEDING AND STABLE.


h&h 10.1/28.7.


Status: Acute   





(2) Abdominal pain


Assessment & Plan: 


IMPROVING.


Status: Acute   





(3) Jaundice


Assessment & Plan: 


HYPERBILIRUBINEMIA AND ELEVATED TRANSAMINASES


S/P STENT EXCHANGE AND CHOLANGIOGRAPHY.


Status: Acute   





(4) Sarcoidosis


Assessment & Plan: 


PATIENT HAS HISTORY OF HEPATIC SARCOIDOSIS.


STATES WAITING FOR TRANSPLANT.


gi MAKING ARRANGEMENTS


Status: Chronic   





(5) Complicated urinary tract infection


Assessment & Plan: 


PATIENT HAS NEUROGENIC BLADDER


NOW WITH CANDIDURIA.





PATIENT NOTED TO BE ON DIFLUCAN AS PER GI  FOR  ESOPHAGEAL CANDIDIASIS SINCE 

3/22/19


NOTED BY gi ON egd.


Status: Acute   





(6) Diabetes


Status: Acute   





(7) S/P AKA (above knee amputation) bilateral


Status: Acute   





(8) Neurogenic bladder


Status: Chronic

## 2019-03-23 NOTE — CON
DATE:  03/20/2019



HISTORY OF PRESENT ILLNESS:  The patient is a 53-year-old 

female who was admitted because of flare-up in her sarcoidosis.  She has a

history of neurogenic bladder and Charles indwelling all the time.  The

patient has below-knee amputation bilaterally and cannot walk.  The patient

is complaining of lower abdominal pain and spasm from the Charles.  The Charles

was changed in the ER when the patient presented to the hospital.



PHYSICAL EXAMINATION:  Revealed catheter functioning well.  No suprapubic

fullness.  No flank tenderness.  No kidney palpable.  Urine output clean

and good.  No leakage.



IMPRESSION:

1.  Urinary retention.

2.  Neurogenic bladder.



PLAN:  The patient at home is using Ditropan.  We will continue using

Ditropan here, and I will follow her until she is discharged.  If any help

as an outpatient, please contact me.





__________________________________________

Xiomara Alfonso MD





DD:  03/22/2019 17:34:12

DT:  03/22/2019 21:47:10

Job # [id number]

## 2019-03-24 VITALS — HEART RATE: 58 BPM

## 2019-03-24 LAB
ALBUMIN SERPL-MCNC: 3.2 G/DL (ref 3.5–5)
ALBUMIN/GLOB SERPL: 1.1 {RATIO} (ref 1–2.1)
ALT SERPL-CCNC: 209 U/L (ref 9–52)
ANISOCYTOSIS BLD QL SMEAR: SLIGHT
AST SERPL-CCNC: 155 U/L (ref 14–36)
BASO STIPL BLD QL SMEAR: SLIGHT
BASOPHILS # BLD AUTO: 0 K/UL (ref 0–0.2)
BASOPHILS NFR BLD: 0.2 % (ref 0–2)
BUN SERPL-MCNC: 31 MG/DL (ref 7–17)
CALCIUM SERPL-MCNC: 8.1 MG/DL (ref 8.6–10.4)
EOSINOPHIL # BLD AUTO: 0 K/UL (ref 0–0.7)
EOSINOPHIL NFR BLD: 0 % (ref 0–4)
ERYTHROCYTE [DISTWIDTH] IN BLOOD BY AUTOMATED COUNT: 17 % (ref 11.5–14.5)
GFR NON-AFRICAN AMERICAN: 36
HGB BLD-MCNC: 11 G/DL (ref 11–16)
HYPOCHROMIC: SLIGHT
LG PLATELETS BLD QL SMEAR: PRESENT
LYMPHOCYTE: 8 % (ref 20–40)
LYMPHOCYTES # BLD AUTO: 0.6 K/UL (ref 1–4.3)
LYMPHOCYTES NFR BLD AUTO: 5.4 % (ref 20–40)
MCH RBC QN AUTO: 32.6 PG (ref 27–31)
MCHC RBC AUTO-ENTMCNC: 35.2 G/DL (ref 33–37)
MCV RBC AUTO: 92.6 FL (ref 81–99)
MONOCYTE: 7 % (ref 0–10)
MONOCYTES # BLD: 1 K/UL (ref 0–0.8)
MONOCYTES NFR BLD: 8.7 % (ref 0–10)
NEUTROPHILS # BLD: 10.1 K/UL (ref 1.8–7)
NEUTROPHILS NFR BLD AUTO: 85 % (ref 50–75)
NEUTROPHILS NFR BLD AUTO: 85.7 % (ref 50–75)
NRBC BLD AUTO-RTO: 0.3 % (ref 0–2)
PLATELET # BLD EST: NORMAL 10*3/UL
PLATELET # BLD: 145 K/UL (ref 130–400)
PMV BLD AUTO: 8.6 FL (ref 7.2–11.7)
POLYCHROMIC: SLIGHT
RBC # BLD AUTO: 3.39 MIL/UL (ref 3.8–5.2)
TOTAL CELLS COUNTED BLD: 100
TOXIC GRANULES BLD QL SMEAR: PRESENT
WBC # BLD AUTO: 11.8 K/UL (ref 4.8–10.8)

## 2019-03-24 RX ADMIN — CLOBETASOL PROPIONATE SCH: 0.5 OINTMENT TOPICAL at 15:00

## 2019-03-24 RX ADMIN — CLOBETASOL PROPIONATE SCH: 0.5 OINTMENT TOPICAL at 09:00

## 2019-03-24 RX ADMIN — HUMAN INSULIN SCH UNITS: 100 INJECTION, SOLUTION SUBCUTANEOUS at 21:42

## 2019-03-24 RX ADMIN — CLOBETASOL PROPIONATE SCH APPLIC: 0.5 OINTMENT TOPICAL at 10:12

## 2019-03-24 RX ADMIN — SUCRALFATE SCH: 1 SUSPENSION ORAL at 21:20

## 2019-03-24 RX ADMIN — METHYLPREDNISOLONE SODIUM SUCCINATE SCH MG: 40 INJECTION, POWDER, FOR SOLUTION INTRAMUSCULAR; INTRAVENOUS at 10:12

## 2019-03-24 RX ADMIN — CLOBETASOL PROPIONATE SCH: 0.5 OINTMENT TOPICAL at 21:24

## 2019-03-24 RX ADMIN — HUMAN INSULIN SCH UNITS: 100 INJECTION, SOLUTION SUBCUTANEOUS at 08:00

## 2019-03-24 RX ADMIN — HUMAN INSULIN SCH UNITS: 100 INJECTION, SOLUTION SUBCUTANEOUS at 16:08

## 2019-03-24 RX ADMIN — PHENOBARBITAL, HYOSCYAMINE SULFATE, ATROPINE SULFATE, SCOPOLAMINE HYDROBROMIDE SCH TAB: 16.2; .1037; .0194; .0065 TABLET ORAL at 10:22

## 2019-03-24 RX ADMIN — HUMAN INSULIN SCH UNITS: 100 INJECTION, SOLUTION SUBCUTANEOUS at 11:55

## 2019-03-24 RX ADMIN — CLOBETASOL PROPIONATE SCH APPLIC: 0.5 OINTMENT TOPICAL at 15:25

## 2019-03-24 RX ADMIN — SUCRALFATE SCH GM: 1 SUSPENSION ORAL at 08:00

## 2019-03-24 RX ADMIN — CLOBETASOL PROPIONATE SCH: 0.5 OINTMENT TOPICAL at 03:15

## 2019-03-24 RX ADMIN — PHENOBARBITAL, HYOSCYAMINE SULFATE, ATROPINE SULFATE, SCOPOLAMINE HYDROBROMIDE SCH TAB: 16.2; .1037; .0194; .0065 TABLET ORAL at 15:00

## 2019-03-24 RX ADMIN — OXYBUTYNIN CHLORIDE SCH MG: 10 TABLET, EXTENDED RELEASE ORAL at 10:11

## 2019-03-24 RX ADMIN — PHENOBARBITAL, HYOSCYAMINE SULFATE, ATROPINE SULFATE, SCOPOLAMINE HYDROBROMIDE SCH TAB: 16.2; .1037; .0194; .0065 TABLET ORAL at 17:33

## 2019-03-24 RX ADMIN — DIGOXIN SCH: 0.25 TABLET ORAL at 17:33

## 2019-03-24 NOTE — PN
DATE:  03/23/2019



SUBJECTIVE:  The patient is not bleeding now.  She has still some melenic

stool, but her H and H have been stabilized.  No fever.  No chills.  She is

deeply jaundiced.  Her LFTs are getting worse.



PHYSICAL EXAMINATION:

VITAL SIGNS:  Blood pressure is 108/89, pulse 60, respiratory rate 18,

temperature 97.9.

LUNGS:  Clear.

CARDIOVASCULAR SYSTEM:  S1 and S2, regular.

ABDOMEN:  Enlarged liver.



ASSESSMENT:

1.  Hepatic sarcoidosis with severe liver disease.

2.  Status post gastrointestinal bleed.

3.  Type 2 diabetes.

4.  Neurogenic bladder.



PLAN:  Continue current medications.  Monitor H and H.  Transfer the

patient out of ICU when more stable.







__________________________________________

Nasir Dunbar MD





DD:  03/23/2019 22:31:48

DT:  03/23/2019 23:25:08

Job # [id number]

## 2019-03-24 NOTE — PN
DATE:  03/24/2019



LOCATION:  ICU 4.



SUBJECTIVE:  This is a 53-year-old female seen and examined early in rounds

without significant clinical changes, but still has intermittent periods of

black tarry stools, soft.



The patient still has intermittent period of abdominal pain, severe at

times, but no reported hematemesis, vomiting but some nausea and dyspepsia.

No reported chest pain or palpitation or significant shortness of breath

recently.



The entire chart is reviewed and the most recent lab results showed white

blood cells of 11.8, hemoglobin 11, hematocrit 31.4 with normal platelet

count.  Sodium 131, BUN 31, creatinine 1.5.  Blood glucose level 159,

calcium 8.1, phosphorus 2.1, total bilirubin and unfortunately increased to

15.2 with , , alkaline phosphatase 274 with low albumin of

3.2.



PHYSICAL EXAMINATION:

GENERAL:  A 53-year-old female, afebrile, awake, alert, oriented.

VITAL SIGNS:  Pulse of 62, respiratory rate 18 to 20, blood pressure of

130/66.

HEENT:  Showed pale dry oral mucous membrane.  Nonicteric sclerae.

LUNGS:  Few scattered crepitation.  Decreased air entry at bases.

HEART:  Positive S1 and S2.

ABDOMEN:  Soft with mild generalized tenderness.  No mass or organomegaly. 

No rebound tenderness or guarding, but generalized abdominal tenderness,

mainly in the midepigastric area, right upper quadrant and midabdominal

line.  EXTREMITIES:  With evidence of bilateral below-knee amputation.

NEUROLOGIC:  No reported new neurological deficits, sensory or motor.



IMPRESSION:

1.  Recent history of upper gastrointestinal bleeding from a bleeding

duodenal vessel, subsided.

2.  Re-exacerbation of peptic ulcer disease.

3.  Known history of hepatic sarcoidosis with elevated liver function test.

4.  Severe papillary spasm with status post endoscopic retrograde

cholangiopancreatography and biliary stent insertion and exchange.

5.  Jaundice, secondary to above, most likely.

6.  Recurrent complicated urinary tract infection which plays most likely a

factor also for the patient's elevated liver function test as a refractory

phenomena.

7.  Poorly controlled diabetes mellitus.

8.  Peripheral vascular disease with status post bilateral above-knee

amputation.

9.  Neurogenic bladder, chronic.

10.  Status post cholecystectomy, status post partial colon resection with

known history of pseudomembranous colitis in the past.

11.  Anemia, most likely secondary to above.



SUGGESTIONS:

1.  Continue current management.

2.  Repeat abdominal ultrasound.

3.  Increase steroid IV in the meantime due to the patient's sarcoidosis

and the elevated liver function test.

4.  Further recommendation to follow.







__________________________________________

Jeffrey Albert MD





DD:  03/24/2019 7:59:17

DT:  03/24/2019 8:01:57

Job # [id number]

## 2019-03-24 NOTE — CP.PCM.PN
Subjective





- Date & Time of Evaluation


Date of Evaluation: 03/24/19


Time of Evaluation: 08:00





- Subjective


Subjective: 





dictated   





Objective





- Vital Signs/Intake and Output


Vital Signs (last 24 hours): 


                                        











Temp Pulse Resp BP Pulse Ox


 


 97.9 F   60   18   129/73   99 


 


 03/24/19 16:55  03/24/19 20:38  03/24/19 16:55  03/24/19 16:55  03/24/19 16:55








Intake and Output: 


                                        











 03/24/19 03/25/19





 18:59 06:59


 


Intake Total 300 


 


Balance 300 














- Medications


Medications: 


                               Current Medications





Belladonna/Phenobarbital (Donnatal)  1 tab PO TID [facility]


   Last Admin: 03/24/19 17:33 Dose:  1 tab


Clobetasol Propionate (Temovate 0.05%)  0 gm TOP Q6H [facility]


   Last Admin: 03/24/19 21:24 Dose:  Not Given


Digoxin (Lanoxin)  0.25 mg PO DAILY@1800 [facility]


   Last Admin: 03/24/19 17:33 Dose:  Not Given


Diphenhydramine HCl (Benadryl)  25 mg PO Q8H PRN


   PRN Reason: Itching / Pruritus


   Last Admin: 03/24/19 20:50 Dose:  25 mg


Diphenoxylate HCl/Atropine (Lomotil 0.025-2.5 Mg Tablet)  1 tab PO Q8 PRN


   PRN Reason: Diarrhea


   Last Admin: 03/22/19 13:22 Dose:  1 tab


Fluconazole (Diflucan)  200 mg PO DAILY [facility]; Protocol


   Last Admin: 03/24/19 10:11 Dose:  200 mg


Gabapentin (Neurontin)  100 mg PO BID [facility]


   Last Admin: 03/24/19 17:33 Dose:  100 mg


Insulin Human Regular (Novolin R)  0 unit SC ACHS [facility]; Protocol


   Last Admin: 03/24/19 21:42 Dose:  3 units


Methylprednisolone (Solu-Medrol)  40 mg IV DAILY [facility]


   Last Admin: 03/24/19 10:12 Dose:  40 mg


Metoprolol Succinate (Toprol Xl)  100 mg PO DAILY [facility]


   Last Admin: 03/20/19 10:35 Dose:  Not Given


Morphine Sulfate (Morphine)  1 mg IVP Q3H PRN


   PRN Reason: Pain, severe (8-10)


   Last Admin: 03/24/19 20:50 Dose:  1 mg


Ondansetron HCl (Zofran Inj)  4 mg IVP Q6H PRN


   PRN Reason: Nausea/Vomiting


   Last Admin: 03/23/19 20:34 Dose:  4 mg


Oxybutynin Chloride (Ditropan Xl)  10 mg PO DAILY [facility]


   Last Admin: 03/24/19 10:11 Dose:  10 mg


Pantoprazole Sodium (Protonix Inj)  40 mg IVP DAILY [facility]


   Last Admin: 03/24/19 10:11 Dose:  40 mg


Sucralfate (Carafate Oral Susp)  1 gm PO ACBHS [facility]


   Last Admin: 03/24/19 21:20 Dose:  Not Given











- Labs


Labs: 


                                        





                                 03/24/19 06:13 





                                 03/24/19 06:11 





                                        











PT  16.0 SECONDS (9.7-12.2)  H  03/21/19  01:10    


 


INR  1.5   03/21/19  01:10    


 


APTT  34 SECONDS (21-34)   03/21/19  01:10

## 2019-03-25 VITALS — RESPIRATION RATE: 20 BRPM

## 2019-03-25 LAB
ALBUMIN SERPL-MCNC: 3.3 G/DL (ref 3.5–5)
ALBUMIN/GLOB SERPL: 1.1 {RATIO} (ref 1–2.1)
ALT SERPL-CCNC: 185 U/L (ref 9–52)
ANISOCYTOSIS BLD QL SMEAR: SLIGHT
AST SERPL-CCNC: 125 U/L (ref 14–36)
BASOPHILS # BLD AUTO: 0 K/UL (ref 0–0.2)
BASOPHILS NFR BLD: 0.1 % (ref 0–2)
BUN SERPL-MCNC: 31 MG/DL (ref 7–17)
CALCIUM SERPL-MCNC: 8.4 MG/DL (ref 8.6–10.4)
EOSINOPHIL # BLD AUTO: 0 K/UL (ref 0–0.7)
EOSINOPHIL NFR BLD: 0 % (ref 0–4)
ERYTHROCYTE [DISTWIDTH] IN BLOOD BY AUTOMATED COUNT: 17.2 % (ref 11.5–14.5)
GFR NON-AFRICAN AMERICAN: 58
HGB BLD-MCNC: 11.1 G/DL (ref 11–16)
LG PLATELETS BLD QL SMEAR: PRESENT
LYMPHOCYTE: 6 % (ref 20–40)
LYMPHOCYTES # BLD AUTO: 1.2 K/UL (ref 1–4.3)
LYMPHOCYTES NFR BLD AUTO: 7.5 % (ref 20–40)
MCH RBC QN AUTO: 31.7 PG (ref 27–31)
MCHC RBC AUTO-ENTMCNC: 33.8 G/DL (ref 33–37)
MCV RBC AUTO: 93.8 FL (ref 81–99)
MONOCYTE: 8 % (ref 0–10)
MONOCYTES # BLD: 1.6 K/UL (ref 0–0.8)
MONOCYTES NFR BLD: 10.1 % (ref 0–10)
NEUTROPHILS # BLD: 12.7 K/UL (ref 1.8–7)
NEUTROPHILS NFR BLD AUTO: 82.3 % (ref 50–75)
NEUTROPHILS NFR BLD AUTO: 85 % (ref 50–75)
NEUTS BAND NFR BLD: 1 % (ref 0–2)
NRBC BLD AUTO-RTO: 0.3 % (ref 0–2)
PLATELET # BLD EST: NORMAL 10*3/UL
PLATELET # BLD: 185 K/UL (ref 130–400)
PMV BLD AUTO: 9 FL (ref 7.2–11.7)
POIKILOCYTOSIS BLD QL SMEAR: SLIGHT
RBC # BLD AUTO: 3.51 MIL/UL (ref 3.8–5.2)
TARGETS BLD QL SMEAR: (no result)
TOTAL CELLS COUNTED BLD: 100
WBC # BLD AUTO: 15.4 K/UL (ref 4.8–10.8)

## 2019-03-25 RX ADMIN — METHYLPREDNISOLONE SODIUM SUCCINATE SCH MG: 40 INJECTION, POWDER, FOR SOLUTION INTRAMUSCULAR; INTRAVENOUS at 09:12

## 2019-03-25 RX ADMIN — OXYBUTYNIN CHLORIDE SCH MG: 10 TABLET, EXTENDED RELEASE ORAL at 09:10

## 2019-03-25 RX ADMIN — CLOBETASOL PROPIONATE SCH: 0.5 OINTMENT TOPICAL at 20:54

## 2019-03-25 RX ADMIN — CLOBETASOL PROPIONATE SCH: 0.5 OINTMENT TOPICAL at 02:47

## 2019-03-25 RX ADMIN — HUMAN INSULIN SCH: 100 INJECTION, SOLUTION SUBCUTANEOUS at 21:26

## 2019-03-25 RX ADMIN — HUMAN INSULIN SCH UNITS: 100 INJECTION, SOLUTION SUBCUTANEOUS at 17:35

## 2019-03-25 RX ADMIN — PHENOBARBITAL, HYOSCYAMINE SULFATE, ATROPINE SULFATE, SCOPOLAMINE HYDROBROMIDE SCH TAB: 16.2; .1037; .0194; .0065 TABLET ORAL at 13:16

## 2019-03-25 RX ADMIN — SUCRALFATE SCH GM: 1 SUSPENSION ORAL at 08:30

## 2019-03-25 RX ADMIN — HUMAN INSULIN SCH UNITS: 100 INJECTION, SOLUTION SUBCUTANEOUS at 08:30

## 2019-03-25 RX ADMIN — DIGOXIN SCH: 0.25 TABLET ORAL at 17:49

## 2019-03-25 RX ADMIN — CLOBETASOL PROPIONATE SCH: 0.5 OINTMENT TOPICAL at 08:29

## 2019-03-25 RX ADMIN — HUMAN INSULIN SCH UNITS: 100 INJECTION, SOLUTION SUBCUTANEOUS at 12:30

## 2019-03-25 RX ADMIN — DIPHENOXYLATE HYDROCHLORIDE AND ATROPINE SULFATE PRN TAB: 2.5; .025 TABLET ORAL at 21:42

## 2019-03-25 RX ADMIN — SUCRALFATE SCH GM: 1 SUSPENSION ORAL at 21:36

## 2019-03-25 RX ADMIN — PHENOBARBITAL, HYOSCYAMINE SULFATE, ATROPINE SULFATE, SCOPOLAMINE HYDROBROMIDE SCH TAB: 16.2; .1037; .0194; .0065 TABLET ORAL at 17:36

## 2019-03-25 RX ADMIN — CLOBETASOL PROPIONATE SCH: 0.5 OINTMENT TOPICAL at 16:15

## 2019-03-25 RX ADMIN — PHENOBARBITAL, HYOSCYAMINE SULFATE, ATROPINE SULFATE, SCOPOLAMINE HYDROBROMIDE SCH TAB: 16.2; .1037; .0194; .0065 TABLET ORAL at 09:10

## 2019-03-25 NOTE — PN
DATE:  03/24/2019



SUBJECTIVE:  The patient is afebrile.  No active bleeding.  Feels better. 

Positive nausea.  Negative vomiting.  Positive dark yellow urine.  Positive

jaundice.



PHYSICAL EXAMINATION:

VITAL SIGNS:  Blood pressure 129/73, pulse 60, respiratory rate 18,

temperature 97.9.

LUNGS:  Clear.  No rales.  No rhonchi.

CARDIOVASCULAR SYSTEM: S1, S2 plus S3 positive.

ABDOMEN:  Soft, nontender.  Enlarged liver in the right upper quadrant.



ASSESSMENT:

1.  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  The patient is on

steroid.

2.  Type 2 diabetes.

3.  Neurogenic bladder with urinary tract infection.

4.  Hypertension.



PLAN:  Continue to monitor H and H.  The patient's hemoglobin is improved. 

The patient is for possible discharge in a.m.







__________________________________________

Nasir Dunbar MD





DD:  03/24/2019 23:30:59

DT:  03/25/2019 0:08:41

Job # [id number]

## 2019-03-25 NOTE — PN
DATE:  03/25/2019



LOCATION  571, bed B.



SUBJECTIVE:  This is a 53-year-old female seen out of the ICU post upper

endoscopy due to upper GI bleeding, appeared to be more awake, alert and

oriented, but with recurrent abdominal pain and postprandial abdominal

distention, still waiting for hepatobiliary surgical consultation.



The entire chart is reviewed including but not limited to the most recent

lab and radiology study results with today white blood cells 15.4, normal

hemoglobin with hematocrit 39.9.  Blood glucose levels, the latest was 418,

CO2 content 21, BUN 31 with normal creatinine, still with low calcium 8.4,

phosphorus 2.2, total bilirubin of 14.1, , ALT T 185, alkaline

phosphatase 284 with albumin 3.3 with low total protein of 6.2.



PHYSICAL EXAMINATION:

GENERAL:  A 53-year-old female, awake, alert, tolerating oral intake.

VITAL SIGNS:  Afebrile with pulse of 62, respiratory rate 20 to 22, blood

pressure 136/74, with generalized jaundice.  HEENT:  Showed pale dry oral

mucous membrane.  Nonicteric sclerae.

LUNGS:  Few scattered crepitation.  Decreased air entry at bases.

HEART:  Positive S1 and S2.

ABDOMEN:  With generalized tenderness and mild distention but mainly in the

midepigastric and the right upper quadrant area.  No mass or organomegaly. 

No rebound tenderness or guarding.

EXTREMITIES:  With evidence of bilateral above-knee amputation.  No

clubbing or cyanosis.

NEUROLOGIC:  No reported new neurological deficit, sensory or motor.



IMPRESSION:

1.  Recent history of gastrointestinal bleeding, subsided.

2.  Re-exacerbation of peptic ulcer disease.

3.  Hepatic sarcoidosis with abnormal liver function test including

elevated total bilirubin with generalized jaundice.

4.  Multiple past medical history including hypertension, diabetes

mellitus, peripheral vascular disease with status post bilateral above-knee

amputation.

5.  Status post partial colon resection due to intra-abdominal, intrapelvic

abscess formation before.

6.  Anemia, secondary to above.



SUGGESTIONS:

1.  Continue current management.

2.  Hepatobiliary surgical consultation for potential evaluation for liver

transplant.

3.  Further recommendation to follow.







__________________________________________

Jeffrey Albert MD





DD:  03/25/2019 17:59:59

DT:  03/25/2019 18:02:46

Job # [id number]

## 2019-03-25 NOTE — CP.PCM.PN
E-VISIT PROGRESS NOTE    HISTORY    The patient is a 44year old female who is requesting an asynchronous E-Visit for evaluation of cough. The patient's responses to the questions contained in the condition-specific questionnaire submission were reviewed. MEDICATIONS  The medication list in the medical record as well as updates to the medication list submitted by the patient with this E-Visit were reviewed. ALLERGIES  Allergies as of 01/23/2019 - Reviewed 01/23/2019   Allergen Reaction Noted   â¢ Peanut oil   (food or med) ANAPHYLAXIS 07/24/2014   â¢ Sulfa antibiotics ANAPHYLAXIS, PRURITUS, and Other (See Comments) 04/16/2007   â¢ Tree nuts    (food) Other (See Comments) 08/03/2014   â¢ Hydroxychloroquine Other (See Comments) 06/24/2014   â¢ Watermelon flavor   (food or med) Other (See Comments) 07/20/2016       HISTORIES  I have personally reviewed and updated the following electronic medical record sections: Current medications, Allergies, Problem list, Past Medical History, Past Surgical History, Social History and Family History      ASSESSMENT/PLAN  Upper respiratory infection, acute  - doxycycline hyclate (VIBRAMYCIN) 100 MG capsule; Take 1 capsule by mouth 2 times daily for 7 days. Dispense: 14 capsule;  Refill: 0 Subjective





- Date & Time of Evaluation


Date of Evaluation: 03/25/19


Time of Evaluation: 08:00





- Subjective


Subjective: 





dictated   





Objective





- Vital Signs/Intake and Output


Vital Signs (last 24 hours): 


                                        











Temp Pulse Resp BP Pulse Ox


 


 97.6 F   58 L  20   143/71   100 


 


 03/25/19 16:00  03/25/19 20:25  03/25/19 16:00  03/25/19 16:00  03/25/19 16:00








Intake and Output: 


                                        











 03/25/19 03/26/19





 18:59 06:59


 


Intake Total  500


 


Output Total  900


 


Balance  -400














- Medications


Medications: 


                               Current Medications





Belladonna/Phenobarbital (Donnatal)  1 tab PO TID [facility]


   Last Admin: 03/25/19 17:36 Dose:  1 tab


Clobetasol Propionate (Temovate 0.05%)  0 gm TOP Q6H [facility]


   Last Admin: 03/25/19 20:54 Dose:  Not Given


Digoxin (Lanoxin)  0.25 mg PO DAILY@1800 [facility]


   Last Admin: 03/25/19 17:49 Dose:  Not Given


Diphenhydramine HCl (Benadryl)  25 mg PO Q6H PRN


   PRN Reason: Itching / Pruritus


   Last Admin: 03/25/19 21:37 Dose:  25 mg


Diphenoxylate HCl/Atropine (Lomotil 0.025-2.5 Mg Tablet)  1 tab PO Q8 PRN


   PRN Reason: Diarrhea


   Last Admin: 03/25/19 21:42 Dose:  1 tab


Fluconazole (Diflucan)  200 mg PO DAILY [facility]; Protocol


   Last Admin: 03/25/19 11:00 Dose:  200 mg


Gabapentin (Neurontin)  100 mg PO BID [facility]


   Last Admin: 03/25/19 17:36 Dose:  100 mg


Insulin Human [facility] ([facility]  [facility]; [facility]


   Last Admin: 03/25/19 21:26 Dose:  Not Given


Methylprednisolone (Solu-Medrol)  20 mg IVP DAILY [facility]


Metoprolol Succinate (Toprol Xl)  100 mg PO DAILY [facility]


   Last Admin: 03/20/19 10:35 Dose:  Not Given


Morphine Sulfate (Morphine)  1 mg IVP Q3H PRN


   PRN Reason: Pain, severe (8-10)


   Last Admin: 03/25/19 21:37 Dose:  1 mg


Ondansetron HCl (Zofran Inj)  4 mg IVP Q6H PRN


   PRN Reason: Nausea/Vomiting


   Last Admin: 03/25/19 09:04 Dose:  4 mg


Oxybutynin Chloride (Ditropan Xl)  10 mg PO DAILY [facility]


   Last Admin: 03/25/19 09:10 Dose:  10 mg


Pantoprazole Sodium (Protonix Inj)  40 mg IVP DAILY [facility]


   Last Admin: 03/25/19 09:05 Dose:  40 mg


Sucralfate (Carafate Oral Susp)  1 gm PO ACBHS [facility]


   Last Admin: 03/25/19 21:36 Dose:  1 gm











- Labs


Labs: 


                                        





                                 03/25/19 07:53 





                                 03/25/19 07:53 





                                        











PT  16.0 SECONDS (9.7-12.2)  H  03/21/19  01:10    


 


INR  1.5   03/21/19  01:10    


 


APTT  34 SECONDS (21-34)   03/21/19  01:10

## 2019-03-26 RX ADMIN — PHENOBARBITAL, HYOSCYAMINE SULFATE, ATROPINE SULFATE, SCOPOLAMINE HYDROBROMIDE SCH TAB: 16.2; .1037; .0194; .0065 TABLET ORAL at 13:46

## 2019-03-26 RX ADMIN — PHENOBARBITAL, HYOSCYAMINE SULFATE, ATROPINE SULFATE, SCOPOLAMINE HYDROBROMIDE SCH: 16.2; .1037; .0194; .0065 TABLET ORAL at 22:03

## 2019-03-26 RX ADMIN — CLOBETASOL PROPIONATE SCH: 0.5 OINTMENT TOPICAL at 14:34

## 2019-03-26 RX ADMIN — DIGOXIN SCH: 0.25 TABLET ORAL at 22:02

## 2019-03-26 RX ADMIN — HUMAN INSULIN SCH: 100 INJECTION, SOLUTION SUBCUTANEOUS at 22:01

## 2019-03-26 RX ADMIN — SUCRALFATE SCH GM: 1 SUSPENSION ORAL at 08:30

## 2019-03-26 RX ADMIN — CLOBETASOL PROPIONATE SCH: 0.5 OINTMENT TOPICAL at 22:02

## 2019-03-26 RX ADMIN — PHENOBARBITAL, HYOSCYAMINE SULFATE, ATROPINE SULFATE, SCOPOLAMINE HYDROBROMIDE SCH TAB: 16.2; .1037; .0194; .0065 TABLET ORAL at 09:12

## 2019-03-26 RX ADMIN — SUCRALFATE SCH: 1 SUSPENSION ORAL at 22:03

## 2019-03-26 RX ADMIN — HUMAN INSULIN SCH: 100 INJECTION, SOLUTION SUBCUTANEOUS at 16:40

## 2019-03-26 RX ADMIN — CLOBETASOL PROPIONATE SCH: 0.5 OINTMENT TOPICAL at 02:41

## 2019-03-26 RX ADMIN — CLOBETASOL PROPIONATE SCH: 0.5 OINTMENT TOPICAL at 10:15

## 2019-03-26 RX ADMIN — HUMAN INSULIN SCH: 100 INJECTION, SOLUTION SUBCUTANEOUS at 08:30

## 2019-03-26 RX ADMIN — HUMAN INSULIN SCH UNITS: 100 INJECTION, SOLUTION SUBCUTANEOUS at 12:30

## 2019-03-26 RX ADMIN — OXYBUTYNIN CHLORIDE SCH MG: 10 TABLET, EXTENDED RELEASE ORAL at 09:02

## 2019-03-26 NOTE — CP.PCM.PN
Subjective





- Date & Time of Evaluation


Date of Evaluation: 03/26/19


Time of Evaluation: 16:07





- Subjective


Subjective: 





PATIENT SEEN AND EXAMINED AT THE BEDSIDE





Objective





- Vital Signs/Intake and Output


Vital Signs (last 24 hours): 


                                        











Temp Pulse Resp BP Pulse Ox


 


 97.7 F   56 L  20   142/81   100 


 


 03/26/19 08:22  03/26/19 12:18  03/26/19 08:22  03/26/19 08:22  03/26/19 08:22








Intake and Output: 


                                        











 03/26/19 03/26/19





 06:59 18:59


 


Intake Total 500 


 


Output Total 1700 


 


Balance -1200 














- Medications


Medications: 


                               Current Medications





Belladonna/Phenobarbital (Donnatal)  1 tab PO TID [facility]


   Last Admin: 03/26/19 13:46 Dose:  1 tab


Clobetasol Propionate (Temovate 0.05%)  0 gm TOP Q6H [facility]


   Last Admin: 03/26/19 14:34 Dose:  Not Given


Digoxin (Lanoxin)  0.25 mg PO DAILY@1800 [facility]


   Last Admin: 03/25/19 17:49 Dose:  Not Given


Diphenhydramine HCl (Benadryl)  25 mg PO Q6H PRN


   PRN Reason: Itching / Pruritus


   Last Admin: 03/26/19 09:07 Dose:  25 mg


Diphenoxylate HCl/Atropine (Lomotil 0.025-2.5 Mg Tablet)  1 tab PO Q8 PRN


   PRN Reason: Diarrhea


   Last Admin: 03/25/19 21:42 Dose:  1 tab


Fluconazole (Diflucan)  200 mg PO DAILY [facility]; Protocol


   Last Admin: 03/26/19 09:12 Dose:  200 mg


Gabapentin (Neurontin)  100 mg PO BID [facility]


   Last Admin: 03/26/19 09:03 Dose:  100 mg


Insulin Human Regular ([facility]olin R)  [facility]; Protocol


   Last Admin: 03/26/19 12:30 Dose:  8 units


Methylprednisolone (Solu-Medrol)  20 mg IVP DAILY [facility]


   Last Admin: 03/26/19 09:03 Dose:  20 mg


Metoprolol Succinate (Toprol Xl)  100 mg PO DAILY [facility]


   Last Admin: 03/20/19 10:35 Dose:  Not Given


Morphine Sulfate (Morphine)  1 mg IVP Q6H PRN


   PRN Reason: Pain, severe (8-10)


Ondansetron HCl (Zofran Inj)  4 mg IVP Q6H PRN


   PRN Reason: Nausea/Vomiting


   Last Admin: 03/25/19 09:04 Dose:  4 mg


Oxybutynin Chloride (Ditropan Xl)  10 mg PO DAILY [facility]


   Last Admin: 03/26/19 09:02 Dose:  10 mg


Pantoprazole Sodium (Protonix Inj)  40 mg IVP DAILY [facility]


   Last Admin: 03/26/19 09:02 Dose:  40 mg


Sucralfate (Carafate Oral Susp)  1 gm PO ACBHS [facility]


   Last Admin: 03/26/19 08:30 Dose:  1 gm











- Labs


Labs: 


                                        





                                 03/25/19 07:53 





                                 03/25/19 07:53 





                                        











PT  16.0 SECONDS (9.7-12.2)  H  03/21/19  01:10    


 


INR  1.5   03/21/19  01:10    


 


APTT  34 SECONDS (21-34)   03/21/19  01:10    














Assessment and Plan





- Assessment and Plan (Free Text)


Assessment: 





Please f/u with Dr. Dunbar office in 1 week


please f/u with [facility]  as scheduled 


Please continue medication as per med. rec. 


new prescription given


   prednisone taper 2 tabs po for 4 days then 1 tab po for 4 days


   prednisone cont 7.5 mg po per dr dunbar


   diflucan po daily for 12 days


   protonix 40 mg po daily


   carafate 1 g  po acbhs for 21 days


   oxybutynin 10 mg po dialy


   donnatal 1 tab po tid fror 21 days


resume [facility] service for home care/ [facility]

## 2019-03-26 NOTE — PN
DATE:  03/26/2019



LOCATION:  571, bed B.



SUBJECTIVE:  This is a 53-year-old female seen and examined in rounds

without significant clinical changes, but intermittent periods of abdominal

pain, seen by the surgical consultant today, his note was reviewed.



No actual chest pain or palpitations and no reported active GI bleeding. 

The entire chart is reviewed including the most recent lab results and

today's blood glucose level 359.  Rest of the lab results still pending.



PHYSICAL EXAMINATION:

GENERAL:  A 53-year-old female.

VITAL SIGNS:  Afebrile with pulse of 58, respiratory rate 20 to 22, blood

pressure 136/78.

HEENT:  Showed pale dry oral mucous membrane.  Nonicteric sclerae.

LUNGS:  Few scattered crepitation.  Decreased air entry at bases.

HEART:  Positive S1 and S2.

ABDOMEN:  Soft with mild generalized tenderness, mild distention.  Bowel

sounds are hyperactive.  No mass or organomegaly.



The patient still has generalized jaundice including the sclerae

bilaterally.



EXTREMITIES:  With bilateral above-knee amputation.

NEUROLOGIC:  No new reported significant clinical or neurological deficit.



IMPRESSION:

1.  Recent history of gastrointestinal bleeding, subsided.

2.  Hepatic sarcoidosis.

3.  Abnormal liver function test secondary to above.



SUGGESTIONS:

1.  Continue current management.

2.  The patient to be followed up with Hepatology-Oncology team at

[facility] upon DC from [facility].

3.  Further recommendation to follow.





__________________________________________

Jeffrey Albert MD



DD:  03/26/2019 13:29:59

DT:  03/26/2019 14:44:39

Job # [id number]

## 2019-03-26 NOTE — CP.PCM.PN
Subjective





- Date & Time of Evaluation


Date of Evaluation: 03/26/19


Time of Evaluation: 09:52





- Subjective


Subjective: 


History of sarcoid of the liver, now jaundiced.  Multiple comorbiditied.  

Patient indicates she was being evaluated for liver transplant.  Questionable 

history of CHF and pulmonary hypertension








Objective





- Vital Signs/Intake and Output


Vital Signs (last 24 hours): 


                                        











Temp Pulse Resp BP Pulse Ox


 


 97.7 F   58 L  20   142/81   100 


 


 03/26/19 08:22  03/26/19 08:22  03/26/19 08:22  03/26/19 08:22  03/26/19 08:22








Intake and Output: 


                                        











 03/26/19 03/26/19





 06:59 18:59


 


Intake Total 500 


 


Output Total 1700 


 


Balance -1200 














- Medications


Medications: 


                               Current Medications





Belladonna/Phenobarbital (Donnatal)  1 tab PO TID [facility]


   Last Admin: 03/26/19 09:12 Dose:  1 tab


Clobetasol Propionate (Temovate 0.05%)  0 gm TOP Q6H [facility]


   Last Admin: 03/26/19 02:41 Dose:  Not Given


Digoxin (Lanoxin)  0.25 mg PO DAILY@1800 [facility]


   Last Admin: 03/25/19 17:49 Dose:  Not Given


Diphenhydramine HCl (Benadryl)  25 mg PO Q6H PRN


   PRN Reason: Itching / Pruritus


   Last Admin: 03/26/19 09:07 Dose:  25 mg


Diphenoxylate HCl/Atropine (Lomotil 0.025-2.5 Mg Tablet)  1 tab PO Q8 PRN


   PRN Reason: Diarrhea


   Last Admin: 03/25/19 21:42 Dose:  1 tab


Fluconazole (Diflucan)  200 mg PO DAILY [facility]; Protocol


   Last Admin: 03/26/19 09:12 Dose:  200 mg


Gabapentin (Neurontin)  100 mg PO BID [facility]


   Last Admin: 03/26/19 09:03 Dose:  100 mg


Insulin Human Regular (Novolin R)  [facility]; Protocol


   Last Admin: 03/26/19 08:30 Dose:  Not Given


Methylprednisolone (Solu-Medrol)  20 mg IVP DAILY [facility]


   Last Admin: 03/26/19 09:03 Dose:  20 mg


Metoprolol Succinate (Toprol Xl)  100 mg PO DAILY [facility]


   Last Admin: 03/20/19 10:35 Dose:  Not Given


Morphine Sulfate (Morphine)  1 mg IVP Q3H PRN


   PRN Reason: Pain, severe (8-10)


   Last Admin: 03/26/19 09:08 Dose:  1 mg


Ondansetron HCl (Zofran Inj)  4 mg IVP Q6H PRN


   PRN Reason: Nausea/Vomiting


   Last Admin: 03/25/19 09:04 Dose:  4 mg


Oxybutynin Chloride (Ditropan Xl)  10 mg PO DAILY [facility]


   Last Admin: 03/26/19 09:02 Dose:  10 mg


Pantoprazole Sodium (Protonix Inj)  40 mg IVP DAILY [facility]


   Last Admin: 03/26/19 09:02 Dose:  40 mg


Sucralfate (Carafate Oral Susp)  1 gm PO ACBHS [facility]


   Last Admin: 03/26/19 08:30 Dose:  1 gm











- Labs


Labs: 


                                        





                                 03/25/19 07:53 





                                 03/25/19 07:53 





                                        











PT  16.0 SECONDS (9.7-12.2)  H  03/21/19  01:10    


 


INR  1.5   03/21/19  01:10    


 


APTT  34 SECONDS (21-34)   03/21/19  01:10    














Assessment and Plan





- Assessment and Plan (Free Text)


Assessment: 





Patient with sarcoid of the liver, jaundice, multiple comorbidites.  History of 

pulmonary hypertension, however on echo EF 65% and RVSP 25.  From cardiac point 

of view, not excluded from liver transplant evaluation

## 2019-03-26 NOTE — CP.PCM.PN
Subjective





- Date & Time of Evaluation


Date of Evaluation: 03/26/19


Time of Evaluation: 08:00





- Subjective


Subjective: 





dictated   





Objective





- Vital Signs/Intake and Output


Vital Signs (last 24 hours): 


                                        











Temp Pulse Resp BP Pulse Ox


 


 97.9 F   59 L  20   134/77   100 


 


 03/26/19 15:00  03/26/19 15:00  03/26/19 15:00  03/26/19 15:00  03/26/19 15:00











- Medications


Medications: 


                               Current Medications





Belladonna/Phenobarbital (Donnatal)  1 tab PO TID [facility]


   Last Admin: 03/26/19 22:03 Dose:  Not Given


Clobetasol Propionate (Temovate 0.05%)  0 gm TOP Q6H [facility]


   Last Admin: 03/26/19 22:02 Dose:  Not Given


Digoxin (Lanoxin)  0.25 mg PO DAILY@1800 [facility]


   Last Admin: 03/26/19 22:02 Dose:  Not Given


Diphenhydramine HCl (Benadryl)  25 mg PO Q6H PRN


   PRN Reason: Itching / Pruritus


   Last Admin: 03/26/19 16:40 Dose:  25 mg


Diphenoxylate HCl/Atropine (Lomotil 0.025-2.5 Mg Tablet)  1 tab PO Q8 PRN


   PRN Reason: Diarrhea


   Last Admin: 03/25/19 21:42 Dose:  1 tab


Fluconazole (Diflucan)  200 mg PO DAILY [facility]; Protocol


   Last Admin: 03/26/19 09:12 Dose:  200 mg


Gabapentin (Neurontin)  100 mg PO BID [facility]


   Last Admin: 03/26/19 22:06 Dose:  100 mg


Insulin Human Regular (Novolin R)  0 unit SC [facility]; Protocol


   Last Admin: 03/26/19 22:01 Dose:  Not Given


Methylprednisolone (Solu-Medrol)  20 mg IVP DAILY [facility]


   Last Admin: 03/26/19 09:03 Dose:  20 mg


Metoprolol Succinate (Toprol Xl)  100 mg PO DAILY [facility]


   Last Admin: 03/20/19 10:35 Dose:  Not Given


Morphine Sulfate (Morphine)  1 mg IVP Q6H PRN


   PRN Reason: Pain, severe (8-10)


   Last Admin: 03/26/19 23:21 Dose:  1 mg


Ondansetron HCl (Zofran Inj)  4 mg IVP Q6H PRN


   PRN Reason: Nausea/Vomiting


   Last Admin: 03/25/19 09:04 Dose:  4 mg


Oxybutynin Chloride (Ditropan Xl)  10 mg PO DAILY [facility]


   Last Admin: 03/26/19 09:02 Dose:  10 mg


Pantoprazole Sodium (Protonix Inj)  40 mg IVP DAILY [facility]


   Last Admin: 03/26/19 09:02 Dose:  40 mg


Sucralfate (Carafate Oral Susp)  1 gm PO ACBHS [facility]


   Last Admin: 03/26/19 22:03 Dose:  Not Given











- Labs


Labs: 


                                        





                                 03/25/19 07:53 





                                 03/25/19 07:53 





                                        











PT  16.0 SECONDS (9.7-12.2)  H  03/21/19  01:10    


 


INR  1.5   03/21/19  01:10    


 


APTT  34 SECONDS (21-34)   03/21/19  01:10

## 2019-03-26 NOTE — CARD
--------------- APPROVED REPORT --------------





Date of service: 03/21/2019



EXAM: Two-dimensional and M-mode echocardiogram with Doppler and 

color Doppler.



Other Information 

Quality : TDSRhythm : 



INDICATION

Cardiac Disease: CAD Congestive Heart Failure COPD 



RISK FACTORS

Hypertension 

Hyperlipidemia

Diabetes



2D DIMENSIONS 

IVSd1.1   (0.7-1.1cm)LVDd2.6   (3.9-5.9cm)

PWd0.8   (0.7-1.1cm)LA Qrlmlm79   (18-58mL)

LVDs1.7   (2.5-4.0cm)FS (%) 33.9   %

LVEF (%)65.0   (>50%)LVEF (Argueta's)67.10 %

IVC0.00 cm



Mitral Valve

MV E Pheeyhdl31.2cm/sMV A Mykshbnv60.3cm/sE/A ratio0.4



TDI

Lateral E' Peak V16.80cm/sMedial E' Peak V7.51cm/sE/Lateral E'1.7

E/Medial E'3.9



Tricuspid Valve

TR Peak [id number]/sTR Peak Gr.77peHrYQHN45diLm



 LEFT VENTRICLE 

The left ventricle is normal size.

There is mild asymmetric left ventricular hypertrophy.

Left ventricle systolic function is normal. The Ejection Fraction is 

65-70%.

There is normal LV segmental wall motion.

The left ventricular diastolic function is abnormal- Grade I-abnormal 

relaxation pattern.

No left ventricle thrombus noted on this study.



 RIGHT VENTRICLE 

The right ventricle is normal size.

The right ventricular systolic function is normal.

There is a pacemaker lead in the right ventricle.



 ATRIA 

The left atrium size is normal.

The right atrium size is normal.



 AORTIC VALVE 

The aortic valve is trileaflet.

No aortic regurgitation is present.

There is no aortic valvular stenosis. 

There is no aortic valvular vegetation.



 MITRAL VALVE 

Mitral annular calcification is mild to moderate.

There is no evidence of mitral valve prolapse.

There is no mitral valve stenosis.

There is no mitral valve regurgitation noted.



 TRICUSPID VALVE 

The tricuspid valve is normal in structure.

There is mild tricuspid regurgitation.

Right ventricular systolic pressure is estimated at less than 30 

mmHg. 

There is no pulmonary hypertension.

There is no tricuspid valve prolapse or vegetation.

There is no tricuspid valve stenosis. 



 PULMONIC VALVE 

The pulmonic valve is not well visualized.

There is no pulmonic valvular regurgitation. 



 GREAT VESSELS 

The aortic root is normal in size.

The IVC is normal in size and collapses >50% with inspiration.



 PERICARDIAL EFFUSION 

There is no pericardial effusion.

There is no pleural effusion.



<Conclusion>

This is a technically difficult study.

The left ventricle is normal size.

Left ventricle systolic function is normal. The Ejection Fraction is 

65-70%.

The left ventricular diastolic function is abnormal- Grade I-abnormal 

relaxation pattern.

The right ventricle is normal size.

The right ventricular systolic function is normal.

The left atrium size is normal.

The right atrium size is normal.

There is mild tricuspid regurgitation.

## 2019-03-27 VITALS
TEMPERATURE: 98 F | DIASTOLIC BLOOD PRESSURE: 65 MMHG | SYSTOLIC BLOOD PRESSURE: 141 MMHG | HEART RATE: 56 BPM | OXYGEN SATURATION: 100 %

## 2019-03-27 RX ADMIN — SUCRALFATE SCH: 1 SUSPENSION ORAL at 08:30

## 2019-03-27 RX ADMIN — HUMAN INSULIN SCH: 100 INJECTION, SOLUTION SUBCUTANEOUS at 08:10

## 2019-03-27 RX ADMIN — CLOBETASOL PROPIONATE SCH: 0.5 OINTMENT TOPICAL at 05:28

## 2019-03-27 NOTE — PN
DATE:  03/27/2019



LOCATION:  571, bed A.



SUBJECTIVE:  This is a 53-year-old female seen and examined very early

today in rounds with the staff in the floor, still complaining of

intermittent period of abdominal pain, postprandial abdominal distention,

itching with a slight shortness of breath, but no reported actual chest

pain, palpitation, chills or fever and no reported diarrhea.



This case discussed at length with the staff in detail as well as the

admitting medical team and the hepatobiliary surgical consultant yesterday

and at length today again prior to the patient's physical examination.



The entire chart is reviewed including but not limited to the most recent

lab and radiology study results and today's blood glucose level of 125. 

Rest of the lab results still pending despite the patient had elevated

liver function test with elevated total bilirubin before.



PHYSICAL EXAMINATION:

GENERAL:  A 53-year-old female.

VITAL SIGNS:  Afebrile with pulse of 60, respiratory rate 20 to 22, blood

pressure 136/62.

HEENT:  Showed pale dry oral mucous membrane.  Bilateral icteric sclerae.

LUNGS:  Few scattered crepitation.  Decreased air entry at bases.

HEART:  Positive S1 and S2.

ABDOMEN:  Soft with slight distention with midepigastric tenderness as well

as right upper quadrant mild tenderness.  No mass or organomegaly.  No

rebound tenderness or guarding.

EXTREMITIES:  With evidence of bilateral above-knee amputation.  No new

reported edema, clubbing or cyanosis.

NEUROLOGIC:  No reported new focal deficits or significant neurological

motor or sensory deficits.



IMPRESSION:

1.  Hepatic sarcoidosis with abnormal liver function tests and jaundice

secondary to hepatocellular injury.

2.  Status post endoscopic retrograde cholangiopancreatography with biliary

stent insertion due to the patient's jaundice as well as her papillary

spasm.

3.  Re-exacerbation of peptic ulcer disease.

4.  Multiple past medical history including but not limited to

hypertension, poorly controlled diabetes mellitus, hyperlipidemia, peptic

ulcer disease, status post cholecystectomy, status post partial colon

resection before.



SUGGESTIONS:

1.  Continue current management.

2.  The patient will need to followup at the [facility] at [place], [place] or [facility] in [place] for

potential liver transplant.







__________________________________________

Jeffrey Albert MD





DD:  03/27/2019 10:48:32

DT:  03/27/2019 10:52:55

Job # [id number]

## 2019-03-28 NOTE — PN
DATE:  03/27/2019



The patient has been refusing to leave.  She is reluctant.  The patient was

discharged.  She is not actively bleeding.  H and H are stable.  No cough. 

No fever.  No chills.  No nausea or vomiting.  Her hemoglobin yesterday on

03/26/2019, was 11.1.  The patient is moving bowel with no fresh blood.



PHYSICAL EXAMINATION:

VITAL SIGNS:  Blood pressure 134/77, pulse 59, respiratory rate 20,

temperature 97.9.

LUNGS:  Clear.

ABDOMEN:  Soft, nontender.  Enlarged liver.

CENTRAL NERVOUS SYSTEM:  Awake, alert, oriented x3.



Status post endoscopy.  The patient is not actively bleeding.  Status post

blood transfusion.  H and H are stable.  The patient has been seen by GI. 

The patient has extensive hepatic sarcoidosis.  The patient needs to have a

liver transplant.  The patient is being referred into [facility].







__________________________________________

Nasir Dunbar MD





DD:  03/27/2019 23:27:18

DT:  03/28/2019 0:36:00

Job # [id number]

## 2019-03-28 NOTE — DS
DISCHARGE DIAGNOSES:

1.  Hepatic sarcoidosis.

2.  Upper gastrointestinal bleed.

3.  Type 2 diabetes.

4.  Congestive heart failure.

5.  Hypertension.



HISTORY OF PRESENT ILLNESS:  This is a 53-year-old -American female

with a history of advanced extensive hepatic sarcoidosis, diabetes,

congestive heart failure, coronary artery disease, peripheral arterial

disease, status post bilateral AKA, hypertension, hyperlipidemia, who came

in because of abdominal pain, nausea, vomiting, deep jaundice, deep yellow

discoloration of urine.  She was admitted to the floor.  She was given

Solu-Medrol, multivitamins.  Gastroenterology is seeing the patient.  The

patient underwent EGD after she had a bleeding episode with a drop in

hemoglobin.  She was transferred to ICU.  The patient underwent blood

transfusion with multiple units of packed RBCs and then the patient was

stabilized.  Endoscopy showed hiatal hernia, gastritis and then, the

patient was discharged.  Condition upon discharge stable.



PHYSICAL EXAMINATION:

VITAL SIGNS:  Blood pressure 141/65, pulse 56, respiratory rate 20,

temperature 98.

LUNGS:  Clear.

ABDOMEN:  Soft, nontender.  Bowel sounds are positive.







__________________________________________

Nasir Dunbar MD





DD:  03/27/2019 23:29:08

DT:  03/28/2019 3:18:29

Job # [id number]

## 2019-10-12 NOTE — PCM.HF
Heart Failure Core Measure





- Heart Failure


Ejection Fraction: Less Than 40 %


ACE Inhibitor Prescribed: No


Contraindication/Reason for not providing: on ace


Beta-Blocker Prescribed: Metoprolol Succinate


Angiotensin II Receptor Blocker Prescribed: Yes


AnticoagulationTherapy for Atrial Fibrillation/Atrialflutter: Yes


Aldosterone Antagonist Prescribed: No


Hydralazine Nitrate Prescribed: No


Contraindication/Reason for not providing: on calcium channel blocker/ digoxin 

for rate controll


Implantable Cardioverter Defibrillator Therapy: Yes


Cardiac Resynchronization Therapy Prescribed: No


Contraindication/Reason for not providing: pt has ICD/





- Follow up


Will be discharged to: Home


Follow Up Date (must be within 7 days from discharge): 10/23/17


Follow Up Time: 09:00
[id number]:;

## 2021-07-20 NOTE — CP.PCM.PN
Subjective





- Date & Time of Evaluation


Date of Evaluation: 03/02/18


Time of Evaluation: 18:00





- Subjective


Subjective: 





Pt seen and examined today, she again started having diarrhea today , stool 

sent for cultures seems like she have fecal impaction and overflow due to it





Objective





- Vital Signs/Intake and Output


Vital Signs (last 24 hours): 


 











Temp Pulse Resp BP Pulse Ox


 


 97.1 F L  71   20   140/85   97 


 


 03/02/18 15:49  03/02/18 15:49  03/02/18 15:49  03/02/18 18:55  03/02/18 15:49








Intake and Output: 


 











 03/02/18 03/03/18





 18:59 06:59


 


Intake Total 300 


 


Output Total 800 


 


Balance -500 














- Medications


Medications: 


 Current Medications





Apixaban (Eliquis)  5 mg PO BID [facility]


   Last Admin: 03/02/18 18:55 Dose:  5 mg


Bismuth Subsalicylate (Pepto-Bismol)  262 mg PO Q8 PRN


   PRN Reason: Diarrhea


   Last Admin: 02/26/18 05:24 Dose:  262 mg


Digoxin (Lanoxin)  0.25 mg PO DAILY@1800 [facility]


   Last Admin: 03/02/18 18:55 Dose:  0.25 mg


Diphenhydramine HCl (Benadryl)  12.5 mg IVP Q4 PRN


   PRN Reason: Itching / Pruritus


   Last Admin: 03/02/18 20:47 Dose:  12.5 mg


Docusate Sodium (Colace)  100 mg PO TID [facility]


   Last Admin: 03/02/18 17:55 Dose:  Not Given


Furosemide (Lasix)  40 mg PO DAILY [facility]


   Last Admin: 03/02/18 18:55 Dose:  40 mg


Gabapentin (Neurontin)  100 mg PO BID [facility]


   Stop: 03/06/18 20:46


   Last Admin: 03/02/18 18:55 Dose:  100 mg


Insulin Detemir (Levemir)  10 unit SC QAM [facility]


   Last Admin: 03/02/18 10:23 Dose:  10 unit


Insulin Human Regular (Novolin R)  0 unit SC ACHS [facility]


   PRN Reason: Protocol


   Last Admin: 03/02/18 18:55 Dose:  10 unit


Lactobacillus Acidophilus (Bacid Acidophilus)  1 cap PO BID [facility]


   Last Admin: 03/02/18 18:55 Dose:  1 cap


Losartan Potassium (Cozaar)  100 mg PO DAILY [facility]


   Last Admin: 03/02/18 10:07 Dose:  100 mg


Methylprednisolone (Solu-Medrol)  20 mg IVP Q12 [facility]


   Last Admin: 03/02/18 10:24 Dose:  20 mg


Metoclopramide HCl (Reglan)  5 mg IVP Q6H [facility]


   Last Admin: 03/02/18 18:55 Dose:  Not Given


Metoprolol Succinate (Toprol Xl)  50 mg PO DAILY [facility]


   Last Admin: 03/02/18 10:22 Dose:  50 mg


Morphine Sulfate (Morphine)  2 mg IVP Q4 PRN


   PRN Reason: Pain, severe (8-10)


   Stop: 03/06/18 20:47


   Last Admin: 03/02/18 20:46 Dose:  2 mg


Oxybutynin Chloride (Ditropan Xl)  10 mg PO DAILY [facility]


   Last Admin: 03/02/18 10:07 Dose:  10 mg


Pantoprazole Sodium (Protonix Ec Tab)  40 mg PO DAILY [facility]


   Last Admin: 03/02/18 10:22 Dose:  40 mg


Sodium Bicarbonate (Sodium Bicarbonate Tab)  650 mg PO BID [facility]


   Last Admin: 03/02/18 18:55 Dose:  650 mg











- Labs


Labs: 


 





 03/02/18 07:20 





 03/02/18 07:20 





 











PT  13.6 SECONDS (9.7-12.2)  H  02/05/18  18:27    


 


INR  1.2   02/05/18  18:27    


 


APTT  54 SECONDS (21-34)  H  02/05/18  18:27    














- Constitutional


Appears: No Acute Distress





- Head Exam


Head Exam: ATRAUMATIC, NORMAL INSPECTION, NORMOCEPHALIC





- Eye Exam


Eye Exam: EOMI, Normal appearance, PERRL


Pupil Exam: NORMAL ACCOMODATION, PERRL





- Respiratory Exam


Respiratory Exam: Clear to Ausculation Bilateral, NORMAL BREATHING PATTERN





- Cardiovascular Exam


Cardiovascular Exam: REGULAR RHYTHM, +S1, +S2.  absent: Murmur





- GI/Abdominal Exam


GI & Abdominal Exam: Hyperactive Bowel Sounds





Assessment and Plan


(1) Diarrhea


Assessment & Plan: 


discharge on hold


diarrhea again


most likely over flow from fecal impaction'possible disimpaction


give pt laxatives


Status: Acute   





(2) Complicated urinary tract infection


Status: Acute   





(3) Diabetes


Status: Acute   





(4) Hypertension


Status: Acute   





(5) Neurogenic bladder


Status: Acute no

## 2025-02-05 NOTE — PN
DATE:  11/03/2018



LOCATION:  364, bed A.



SUBJECTIVE:  This is a 53-year-old female seen and examined in rounds

initially for GI consultation on 11/02/2008, as requested by the admitting

medical team and the ER, reexamined early again today with persistent

complaint of abdominal pain, recurrent nausea with occasional vomiting on

and off, without reported active bleeding, with generalized weakness and

malaise, as well as recent change of bowel movements.



The entire chart is reviewed including but not limited to the most recent

lab and radiology study results, current and the previous medication list,

current and the previous medical events.  Case discussed with the staff at

length.



As per yesterday's lab, the patient had low hemoglobin and hematocrit with

normal white blood cells, but thrombocytopenia of 125 with PT to _____17,

PTT 43, total bilirubin 15.4 with increased AST, but normal ALT with

elevated alkaline phosphatase and mildly elevated ammonia level.



Official report of CAT scan of the abdomen and pelvis at the time of the

admission is still pending.



PHYSICAL EXAMINATION:

GENERAL:  A 53-year-old female with generalized jaundice.

VITAL SIGNS:  Afebrile with pulse of 64, respiratory rate 20 to 22 blood,

pressure 134/66.

HEENT:  Showed pale dry oral mucous membrane.  Bilateral icteric sclerae.

LUNGS:  Few scattered crepitation.  Decreased air entry at bases.

HEART:  Positive S1 and S2.

ABDOMEN:  With generalized tenderness, mildly distended but mainly in the

right upper quadrant area.  No mass or organomegaly.  Bowel sounds are

hyperactive.

EXTREMITIES:  With evidence of bilateral above-knee amputation.  No

clubbing or cyanosis of the upper extremities.

NEUROLOGIC:  No reported new neurological deficits, sensory or motor.



IMPRESSION:

1.  Liver sarcoidosis.

2.  Jaundice secondary to above with abnormal liver function tests.

3.  Recurrent urinary tract infection, most likely.

4.  Status post biliary stent change recently.

5.  Multiple past medical history including but not limited to bronchial

asthma, hypertension, coronary artery disease, hyperlipidemia with diabetes

mellitus and cardiac arrhythmias.

6.  Status post cholecystectomy, status post cardiac stent insertion,

status post partial right colon resection due to intra-abdominal

intrapelvic abscess formation before.



SUGGESTIONS:

1.  Continue current management.

2.  Follow blood culture.

3.  Repeat urine culture and urine for C and S and urine for analysis.

4.  The patient may need IV steroids.

5.  Add Reglan IV.

6.  Proton pump inhibitors.

7.  Further recommendation to follow.







__________________________________________

Jeffrey Albert MD





DD:  11/03/2018 7:25:27

DT:  11/03/2018 7:29:23

Job # [id number] 06-Feb-2025 01:00

## 2025-04-08 NOTE — PN
DATE:



LOCATION:  571 bed A.



SUBJECTIVE:  This 52-year-old female seen and examined in rounds without

significant clinical changes, but period of mild change with right-sided

abdominal pain and mild distention.  No reported active bleeding.  No chest

pain, but mild diarrhea.



The entire chart is reviewed including, but not limited to the most recent

lab and radiology study results, current and the previous medication list,

current and the previous medical events.  Case discussed with the staff at

length.



Most recent lab results showed leukocytes of 13.9, but normal hemoglobin

and hematocrit with today's blood glucose level of 439 with abnormal liver

function test, persistently CO2 content of 17 indicative of metabolic

acidosis.



Stool workup reports for Clostridium difficile is negative.



PHYSICAL EXAMINATION:

GENERAL:  A 52-year-old female.

VITAL SIGNS:  Afebrile with pulse of 64, respiratory rate 20 to 22, blood

pressure 146/84.

HEENT:  Showed mildly pale dry oral mucous membranes.  Nonicteric sclera.

LUNGS:  Few scattered crepitation.  Decreased air entry at bases.

HEART:  Positive S1 and S2.

ABDOMEN:  Soft.  Bowel sounds are present with mild distention, generalized

tenderness, but mainly in the right upper quadrant area.  No masses or

organomegaly.  No rebound tenderness or guarding.

EXTREMITIES:  Showed bilateral above knee amputation.

NEUROLOGIC:  No new reported neurological deficits, sensory, or motor.



IMPRESSION:

1.  Hepatic sarcoidosis.

2.  Abnormal liver function test secondary to above most likely.

3.  Urinary tract infection _____ urosepsis.

4.  Exacerbation of peptic ulcer disease.

5.  Peripheral vascular disease with bilateral above knee amputation.

6.  Neurogenic bladder.

7.  Reported anemia secondary to above.

8.  Poorly controlled diabetes mellitus.

9.  Known history, but not limited to hypertension, cardiac arrhythmia with

congestive heart failure.



SUGGESTIONS:

1.  Continue current management.

2.  Repeat stool for Clostridium difficile.

3.  Complete stool workup.

4.  The patient may benefit from Flagyl IV or p.o.  If her symptoms

persists that will be discuss again with ID consultant on the case Dr. Albert.





__________________________________________

Jeffrey Albert MD



cc:  Jeffrey Albert MD



DD:  10/13/2017 12:21:42

DT:  10/13/2017 12:51:24

[facility] # [id number] PRN phenergan ; QTC WNL